# Patient Record
Sex: MALE | Race: AMERICAN INDIAN OR ALASKA NATIVE | NOT HISPANIC OR LATINO | URBAN - METROPOLITAN AREA
[De-identification: names, ages, dates, MRNs, and addresses within clinical notes are randomized per-mention and may not be internally consistent; named-entity substitution may affect disease eponyms.]

---

## 2017-08-22 ENCOUNTER — INPATIENT (INPATIENT)
Facility: HOSPITAL | Age: 62
LOS: 2 days | Discharge: HOME | DRG: 918 | End: 2017-08-25
Attending: FAMILY MEDICINE | Admitting: INTERNAL MEDICINE
Payer: MEDICAID

## 2017-08-22 VITALS
RESPIRATION RATE: 15 BRPM | WEIGHT: 149.91 LBS | SYSTOLIC BLOOD PRESSURE: 120 MMHG | TEMPERATURE: 99 F | HEART RATE: 86 BPM | DIASTOLIC BLOOD PRESSURE: 76 MMHG | OXYGEN SATURATION: 97 %

## 2017-08-22 DIAGNOSIS — T45.511A POISONING BY ANTICOAGULANTS, ACCIDENTAL (UNINTENTIONAL), INITIAL ENCOUNTER: ICD-10-CM

## 2017-08-22 DIAGNOSIS — J39.2 OTHER DISEASES OF PHARYNX: ICD-10-CM

## 2017-08-22 DIAGNOSIS — Z95.1 PRESENCE OF AORTOCORONARY BYPASS GRAFT: Chronic | ICD-10-CM

## 2017-08-22 DIAGNOSIS — I63.9 CEREBRAL INFARCTION, UNSPECIFIED: ICD-10-CM

## 2017-08-22 DIAGNOSIS — E11.9 TYPE 2 DIABETES MELLITUS WITHOUT COMPLICATIONS: ICD-10-CM

## 2017-08-22 DIAGNOSIS — I49.9 CARDIAC ARRHYTHMIA, UNSPECIFIED: ICD-10-CM

## 2017-08-22 DIAGNOSIS — Z29.9 ENCOUNTER FOR PROPHYLACTIC MEASURES, UNSPECIFIED: ICD-10-CM

## 2017-08-22 DIAGNOSIS — I10 ESSENTIAL (PRIMARY) HYPERTENSION: ICD-10-CM

## 2017-08-22 DIAGNOSIS — Z98.890 OTHER SPECIFIED POSTPROCEDURAL STATES: Chronic | ICD-10-CM

## 2017-08-22 DIAGNOSIS — T14.8 OTHER INJURY OF UNSPECIFIED BODY REGION: ICD-10-CM

## 2017-08-22 LAB
ALBUMIN SERPL ELPH-MCNC: 2 G/DL — LOW (ref 3.3–5)
ALP SERPL-CCNC: 653 U/L — HIGH (ref 40–120)
ALT FLD-CCNC: 88 U/L — HIGH (ref 12–78)
ANION GAP SERPL CALC-SCNC: 9 MMOL/L — SIGNIFICANT CHANGE UP (ref 5–17)
APTT BLD: 100.6 SEC — HIGH (ref 27.5–37.4)
APTT BLD: >200 SEC — CRITICAL HIGH (ref 27.5–37.4)
AST SERPL-CCNC: 114 U/L — HIGH (ref 15–37)
BASOPHILS # BLD AUTO: 0.1 K/UL — SIGNIFICANT CHANGE UP (ref 0–0.2)
BASOPHILS NFR BLD AUTO: 0.6 % — SIGNIFICANT CHANGE UP (ref 0–2)
BILIRUB SERPL-MCNC: 1.4 MG/DL — HIGH (ref 0.2–1.2)
BLD GP AB SCN SERPL QL: SIGNIFICANT CHANGE UP
BUN SERPL-MCNC: 10 MG/DL — SIGNIFICANT CHANGE UP (ref 7–23)
CALCIUM SERPL-MCNC: 8 MG/DL — LOW (ref 8.5–10.1)
CHLORIDE SERPL-SCNC: 96 MMOL/L — SIGNIFICANT CHANGE UP (ref 96–108)
CO2 SERPL-SCNC: 27 MMOL/L — SIGNIFICANT CHANGE UP (ref 22–31)
CREAT SERPL-MCNC: 0.55 MG/DL — SIGNIFICANT CHANGE UP (ref 0.5–1.3)
EOSINOPHIL # BLD AUTO: 0.2 K/UL — SIGNIFICANT CHANGE UP (ref 0–0.5)
EOSINOPHIL NFR BLD AUTO: 1.2 % — SIGNIFICANT CHANGE UP (ref 0–6)
FIBRINOGEN PPP-MCNC: 529 MG/DL — HIGH (ref 310–510)
GLUCOSE SERPL-MCNC: 200 MG/DL — HIGH (ref 70–99)
HCT VFR BLD CALC: 35.4 % — LOW (ref 39–50)
HGB BLD-MCNC: 11.8 G/DL — LOW (ref 13–17)
INR BLD: 7.44 RATIO — CRITICAL HIGH (ref 0.88–1.16)
INR BLD: >15 RATIO (ref 0.88–1.16)
LYMPHOCYTES # BLD AUTO: 13.2 % — SIGNIFICANT CHANGE UP (ref 13–44)
LYMPHOCYTES # BLD AUTO: 2.2 K/UL — SIGNIFICANT CHANGE UP (ref 1–3.3)
MCHC RBC-ENTMCNC: 29.4 PG — SIGNIFICANT CHANGE UP (ref 27–34)
MCHC RBC-ENTMCNC: 33.3 GM/DL — SIGNIFICANT CHANGE UP (ref 32–36)
MCV RBC AUTO: 88.4 FL — SIGNIFICANT CHANGE UP (ref 80–100)
MONOCYTES # BLD AUTO: 1.4 K/UL — HIGH (ref 0–0.9)
MONOCYTES NFR BLD AUTO: 8.2 % — SIGNIFICANT CHANGE UP (ref 1–9)
NEUTROPHILS # BLD AUTO: 12.6 K/UL — HIGH (ref 1.8–7.4)
NEUTROPHILS NFR BLD AUTO: 76.7 % — SIGNIFICANT CHANGE UP (ref 43–77)
PLATELET # BLD AUTO: 318 K/UL — SIGNIFICANT CHANGE UP (ref 150–400)
POTASSIUM SERPL-MCNC: 3.9 MMOL/L — SIGNIFICANT CHANGE UP (ref 3.5–5.3)
POTASSIUM SERPL-SCNC: 3.9 MMOL/L — SIGNIFICANT CHANGE UP (ref 3.5–5.3)
PROT SERPL-MCNC: 6.9 G/DL — SIGNIFICANT CHANGE UP (ref 6–8.3)
PROTHROM AB SERPL-ACNC: 84.5 SEC — HIGH (ref 9.8–12.7)
PROTHROM AB SERPL-ACNC: > 200 SEC (ref 9.8–12.7)
RBC # BLD: 4 M/UL — LOW (ref 4.2–5.8)
RBC # FLD: 15.4 % — HIGH (ref 10.3–14.5)
SODIUM SERPL-SCNC: 132 MMOL/L — LOW (ref 135–145)
WBC # BLD: 16.4 K/UL — HIGH (ref 3.8–10.5)
WBC # FLD AUTO: 16.4 K/UL — HIGH (ref 3.8–10.5)

## 2017-08-22 PROCEDURE — 99223 1ST HOSP IP/OBS HIGH 75: CPT | Mod: AI,GC

## 2017-08-22 PROCEDURE — 71010: CPT | Mod: 26

## 2017-08-22 PROCEDURE — 70490 CT SOFT TISSUE NECK W/O DYE: CPT | Mod: 26

## 2017-08-22 PROCEDURE — 99291 CRITICAL CARE FIRST HOUR: CPT | Mod: 25

## 2017-08-22 PROCEDURE — 93010 ELECTROCARDIOGRAM REPORT: CPT

## 2017-08-22 PROCEDURE — 99285 EMERGENCY DEPT VISIT HI MDM: CPT

## 2017-08-22 PROCEDURE — 99223 1ST HOSP IP/OBS HIGH 75: CPT

## 2017-08-22 RX ORDER — LEVETIRACETAM 250 MG/1
750 TABLET, FILM COATED ORAL EVERY 12 HOURS
Qty: 0 | Refills: 0 | Status: DISCONTINUED | OUTPATIENT
Start: 2017-08-22 | End: 2017-08-25

## 2017-08-22 RX ORDER — ATORVASTATIN CALCIUM 80 MG/1
0 TABLET, FILM COATED ORAL
Qty: 0 | Refills: 0 | COMMUNITY

## 2017-08-22 RX ORDER — LEVETIRACETAM 250 MG/1
0 TABLET, FILM COATED ORAL
Qty: 0 | Refills: 0 | COMMUNITY

## 2017-08-22 RX ORDER — AMIODARONE HYDROCHLORIDE 400 MG/1
0 TABLET ORAL
Qty: 0 | Refills: 0 | COMMUNITY

## 2017-08-22 RX ORDER — FAMOTIDINE 10 MG/ML
20 INJECTION INTRAVENOUS
Qty: 0 | Refills: 0 | Status: DISCONTINUED | OUTPATIENT
Start: 2017-08-22 | End: 2017-08-23

## 2017-08-22 RX ORDER — RANITIDINE HYDROCHLORIDE 150 MG/1
0 TABLET, FILM COATED ORAL
Qty: 0 | Refills: 0 | COMMUNITY

## 2017-08-22 RX ORDER — PHYTONADIONE (VIT K1) 5 MG
10 TABLET ORAL ONCE
Qty: 0 | Refills: 0 | Status: COMPLETED | OUTPATIENT
Start: 2017-08-22 | End: 2017-08-22

## 2017-08-22 RX ORDER — LEVETIRACETAM 250 MG/1
750 TABLET, FILM COATED ORAL
Qty: 0 | Refills: 0 | Status: DISCONTINUED | OUTPATIENT
Start: 2017-08-22 | End: 2017-08-22

## 2017-08-22 RX ORDER — SODIUM CHLORIDE 9 MG/ML
3 INJECTION INTRAMUSCULAR; INTRAVENOUS; SUBCUTANEOUS ONCE
Qty: 0 | Refills: 0 | Status: COMPLETED | OUTPATIENT
Start: 2017-08-22 | End: 2017-08-22

## 2017-08-22 RX ORDER — WARFARIN SODIUM 2.5 MG/1
0 TABLET ORAL
Qty: 0 | Refills: 0 | COMMUNITY

## 2017-08-22 RX ORDER — INFLUENZA VIRUS VACCINE 15; 15; 15; 15 UG/.5ML; UG/.5ML; UG/.5ML; UG/.5ML
0.5 SUSPENSION INTRAMUSCULAR ONCE
Qty: 0 | Refills: 0 | Status: DISCONTINUED | OUTPATIENT
Start: 2017-08-22 | End: 2017-08-22

## 2017-08-22 RX ORDER — ASPIRIN/CALCIUM CARB/MAGNESIUM 324 MG
0 TABLET ORAL
Qty: 0 | Refills: 0 | COMMUNITY

## 2017-08-22 RX ORDER — GLIMEPIRIDE 1 MG
0 TABLET ORAL
Qty: 0 | Refills: 0 | COMMUNITY

## 2017-08-22 RX ADMIN — LEVETIRACETAM 400 MILLIGRAM(S): 250 TABLET, FILM COATED ORAL at 20:29

## 2017-08-22 RX ADMIN — Medication 102 MILLIGRAM(S): at 18:19

## 2017-08-22 RX ADMIN — SODIUM CHLORIDE 3 MILLILITER(S): 9 INJECTION INTRAMUSCULAR; INTRAVENOUS; SUBCUTANEOUS at 15:00

## 2017-08-22 RX ADMIN — FAMOTIDINE 20 MILLIGRAM(S): 10 INJECTION INTRAVENOUS at 17:22

## 2017-08-22 NOTE — CONSULT NOTE ADULT - SUBJECTIVE AND OBJECTIVE BOX
CC: Mouth discomfort  HPI: 62 year old male with multiple medical problems. He has a mechanical heart valve and is on Coumadin. He woke up this AM with difficult speaking. He was sent to the ER for evaluation and discovered he had a FOM hematoma. He denies any hoarseness, he has some mild dysphagia but can tolerate his secretions. He feels better since the morning.       Allergies: NKDA  Meds:  Cardiac medications, coumadin  PMH: CVA, Arrhymia, TIA  PSH: Heart Valve    ROS: all other systems status quo    SH: no smoke    PE: AOx3, Penjabi speaking male, NAD  EARS: TM intact, ME spaces well aerated  OC/OP: FOM hematoma, small and compressible  able to see the posterior pharynx, tongue mobile, no trismus    Neck: supple, trach scar    Flexible Laryngoscopy [id number]    After a verbal consent was obtained, benzocaine was placed intranasally. A flexible laryngoscope was placed into the left nasal cavity. Nasopharynx was clear. He has some mild hematoma of the base of tongue. He had a normal supraglottis, bilateral vocal fold movement. He has some secretions in the pyriform sinuses.       A/P: FOM Hematoma, dysphagia    1. control INR since greater than 15.   2. will follow  3. cont to monitor in ICU.

## 2017-08-22 NOTE — H&P ADULT - NSHPSOCIALHISTORY_GEN_ALL_CORE
SOCIAL HISTORY:  Smoker: [ ] Yes  [ x] No          ETOH use: [ ] Yes  [ x] No                Ilicit Drug use:  [ ] Yes  x[ ] No  Occupation: none  Live with: Family, Kids  Assist device use: ambulated with family members,

## 2017-08-22 NOTE — ED PROVIDER NOTE - OBJECTIVE STATEMENT
63 yo M p/w noticed swelling in mouth today, went to PMD. Pt sent for eval. No weak / dizzy, no bleeding difficulty. no recent fall / trauma. No neck / back pain. no fever/chills. No agg/allev factors. No diff swallowing saliva. Pt with some diff eating today. No other co.  Unk last known INR, last one was not resulted.

## 2017-08-22 NOTE — CONSULT NOTE ADULT - SUBJECTIVE AND OBJECTIVE BOX
[facility]    Rahcel Carranza, Joshua, Urbano, Maximo, Cornelio, Kalli      [phone]    CHIEF COMPLAINT: Patient is a 62y old  Male who presents with a chief complaint of swelling under tongue.     HPI: 62 year old M with PMHx of HTN, DMTypeII, A.fib on Coumadin, CABG (2007), AS s/p AVR, CVA with right hemiparesis S/P craniectomy (July 2015) who presented with swelling/hematoma under tongue. History was taken by daughter at bedside as patient speaks Rastafarian and patient has aphasia. Pt's daughter states that son was feeding his father breakfast and noticed he had swelling with bleeding under his tongue. Daughter also noticed that pt had bleeding under his fingernails. Additionally, she states that he was c/o L. shoulder pain x2 days. Otherwise he denied fevers, chills, difficulty eating, abdominal pain, sob.     Cardio: Patient sees Dr. Yuri Werner at [facility].     PAST MEDICAL HISTORY  Arrhythmia    CVA (cerebral vascular accident)    DM (diabetes mellitus)    HTN (hypertension).    PAST SURGICAL HISTORY  craniectomy 2015  CABG 2007    MEDICATIONS   Amiodarone 200mg PO   ASA 81mg  Atorvastatin 20mg  Keppra 20mg  Ranitidine 150mg  Warfarin 3mg   Enalapril 2.5mg qd  Glimepiride 1mg PO qd    ALLERGIES  nkda    FAMILY HISTORY  Non-contributory    SOCIAL HISTORY  Denies smoking, EtOH use       MEDICATIONS  (STANDING):  levETIRAcetam 750 milliGRAM(s) Oral two times a day  famotidine    Tablet 20 milliGRAM(s) Oral two times a day  phytonadione  IVPB 10 milliGRAM(s) IV Intermittent once        REVIEW OF SYSTEMS:  eye, ent, GI, , allergic, dermatologic, musculoskeletal and neurologic are negative except as described above    Vital Signs Last 24 Hrs  T(C): 36.7 (22 Aug 2017 17:24), Max: 37.1 (22 Aug 2017 14:30)  T(F): 98 (22 Aug 2017 17:24), Max: 98.8 (22 Aug 2017 14:30)  HR: 80 (22 Aug 2017 17:24) (80 - 88)  BP: 121/74 (22 Aug 2017 17:24) (114/76 - 121/74)  BP(mean): --  RR: 16 (22 Aug 2017 17:24) (15 - 16)  SpO2: 100% (22 Aug 2017 17:24) (97% - 100%)    I&O's Summary      PHYSICAL EXAM:    Constitutional: Pt. seen lying comfortably in bed in no respiratory distress, NAD   HEENT:  Left sided craniectomy, MMM, sclerae anicteric, conjunctivae clear. Pt did not want to open mouth.   Pulmonary: Non-labored, breath sounds are clear bilaterally, No wheezing, rales or rhonchi  Cardiovascular: Regular, S1 and S2, No murmurs, rubs, gallops or clicks  Gastrointestinal: Bowel Sounds present, soft, nontender.   Lymph: No peripheral edema. No lymphadenopathy.  Neurological: Alert, right sided hemiparesis   Skin: No rashes.  Psych:  Mood & affect appropriate    LABS: All Labs Reviewed:                        11.8   16.4  )-----------( 318      ( 22 Aug 2017 16:08 )             35.4     22 Aug 2017 16:08    132    |  96     |  10     ----------------------------<  200    3.9     |  27     |  0.55     Ca    8.0        22 Aug 2017 16:08    TPro  6.9    /  Alb  2.0    /  TBili  1.4    /  DBili  x      /  AST  114    /  ALT  88     /  AlkPhos  653    22 Aug 2017 16:08    PT/INR - ( 22 Aug 2017 16:08 )   PT: > 200 sec;   INR: >15 ratio         PTT - ( 22 Aug 2017 16:08 )  PTT:>200.0 sec      Blood Culture:         RADIOLOGY:  CT NECK SOFT TISSUE                        PROCEDURE DATE:  08/22/2017    INTERPRETATION:  History: Blood clot in mouth, tongue.  Noncontrast CT soft tissues of the neck.  Inherently limited by lack of exogenous contrast.  There is asymmetric increased soft tissue density at the base of the   tongue on the right. This may be inflammatory or neoplastic in nature.   Follow-up with contrast-enhanced CT or MR and direct visualization. No   suspicious adenopathy by CT size criteria. No unusual fluid or gas   collections. The parotid and submandibular glands appear unremarkable.   Thyroid unremarkable. Cervical airway patent.  Incidental emphysematous changes in the visualized lung apices and a 1 cm   indeterminate left apical lung nodule. Follow-up with PET/CT of the chest.  No bony destructive lesions.    Impression:    Limited by lack of IV contrast.  Increased soft tissue density at the base of the tongue on the right,   inflammatory or neoplastic. Recommend contrast-enhanced CT or MR and   direct visualization  Indeterminate left upper lobe parenchymal nodule. Recommend PET/CT.    CHEST 1 VIEW                        PROCEDURE DATE:  08/22/2017    INTERPRETATION:  History: Mild swelling.    AP chest.    Status post median sternotomy cardiac valve surgery. Cardiac silhouette   magnified by AP film shallow inspiration. Bibasilar atelectasis. No   definite focal infiltrate or pleural effusion.    Impression: As above          EKG: [facility]    Rachel Carranza, Joshua, Urbano, Maximo, Cornelio, Kalli      [phone]    CHIEF COMPLAINT: Patient is a 62y old  Male who presents with a chief complaint of swelling under tongue.     HPI: 62 year old M with PMHx of HTN, DMTypeII, PAF on Coumadin, CABG (2007), AS s/p AVR (2007), CVA with right hemiparesis S/P craniectomy (July 2015) who presented with swelling/hematoma under tongue. History was taken by daughter at bedside as patient speaks Church and patient has aphasia. Pt's daughter states that son was feeding his father breakfast and noticed he had swelling with bleeding under his tongue. Daughter also noticed that pt had bleeding under his fingernails. Additionally, she states that he was c/o L. shoulder pain x2 days. Otherwise he denied fevers, chills, difficulty eating, abdominal pain, sob.     Cardio: Patient sees Dr. Yuri Werner at [facility].     PAST MEDICAL HISTORY  Arrhythmia    CVA (cerebral vascular accident)    DM (diabetes mellitus)    HTN (hypertension).    PAST SURGICAL HISTORY  craniectomy 2015  CABG 2007    MEDICATIONS   Amiodarone 200mg PO   ASA 81mg  Atorvastatin 20mg  Keppra 20mg  Ranitidine 150mg  Warfarin 3mg   Enalapril 2.5mg qd  Glimepiride 1mg PO qd    ALLERGIES  nkda    FAMILY HISTORY  Non-contributory    SOCIAL HISTORY  Denies smoking, EtOH use       MEDICATIONS  (STANDING):  levETIRAcetam 750 milliGRAM(s) Oral two times a day  famotidine    Tablet 20 milliGRAM(s) Oral two times a day  phytonadione  IVPB 10 milliGRAM(s) IV Intermittent once        REVIEW OF SYSTEMS:  eye, ent, GI, , allergic, dermatologic, musculoskeletal and neurologic are negative except as described above    Vital Signs Last 24 Hrs  T(C): 36.7 (22 Aug 2017 17:24), Max: 37.1 (22 Aug 2017 14:30)  T(F): 98 (22 Aug 2017 17:24), Max: 98.8 (22 Aug 2017 14:30)  HR: 80 (22 Aug 2017 17:24) (80 - 88)  BP: 121/74 (22 Aug 2017 17:24) (114/76 - 121/74)  BP(mean): --  RR: 16 (22 Aug 2017 17:24) (15 - 16)  SpO2: 100% (22 Aug 2017 17:24) (97% - 100%)    I&O's Summary      PHYSICAL EXAM:    Constitutional: Pt. seen lying comfortably in bed in no respiratory distress, NAD   HEENT:  Left sided craniectomy, MMM, sclerae anicteric, conjunctivae clear. + hematoma under tongue.   Pulmonary: Non-labored, breath sounds are clear bilaterally, No wheezing, rales or rhonchi. No obstruction of airway.   Cardiovascular: Regular, S1 and S2, No murmurs, rubs, gallops or clicks  Gastrointestinal: Bowel Sounds present, soft, nontender.   Lymph: No peripheral edema. No lymphadenopathy.  Neurological: Alert, right sided hemiparesis   Skin: Bleeding under finger nails noted  Psych:  Mood & affect appropriate    LABS: All Labs Reviewed:                        11.8   16.4  )-----------( 318      ( 22 Aug 2017 16:08 )             35.4     22 Aug 2017 16:08    132    |  96     |  10     ----------------------------<  200    3.9     |  27     |  0.55     Ca    8.0        22 Aug 2017 16:08    TPro  6.9    /  Alb  2.0    /  TBili  1.4    /  DBili  x      /  AST  114    /  ALT  88     /  AlkPhos  653    22 Aug 2017 16:08    PT/INR - ( 22 Aug 2017 16:08 )   PT: > 200 sec;   INR: >15 ratio         PTT - ( 22 Aug 2017 16:08 )  PTT:>200.0 sec      Blood Culture: n/a        RADIOLOGY:  CT NECK SOFT TISSUE                        PROCEDURE DATE:  08/22/2017    INTERPRETATION:  History: Blood clot in mouth, tongue.  Noncontrast CT soft tissues of the neck.  Inherently limited by lack of exogenous contrast.  There is asymmetric increased soft tissue density at the base of the   tongue on the right. This may be inflammatory or neoplastic in nature.   Follow-up with contrast-enhanced CT or MR and direct visualization. No   suspicious adenopathy by CT size criteria. No unusual fluid or gas   collections. The parotid and submandibular glands appear unremarkable.   Thyroid unremarkable. Cervical airway patent.  Incidental emphysematous changes in the visualized lung apices and a 1 cm   indeterminate left apical lung nodule. Follow-up with PET/CT of the chest.  No bony destructive lesions.    Impression:    Limited by lack of IV contrast.  Increased soft tissue density at the base of the tongue on the right,   inflammatory or neoplastic. Recommend contrast-enhanced CT or MR and   direct visualization  Indeterminate left upper lobe parenchymal nodule. Recommend PET/CT.    CHEST 1 VIEW                        PROCEDURE DATE:  08/22/2017    INTERPRETATION:  History: Mild swelling.    AP chest.    Status post median sternotomy cardiac valve surgery. Cardiac silhouette   magnified by AP film shallow inspiration. Bibasilar atelectasis. No   definite focal infiltrate or pleural effusion.    Impression: As above          EKG:  A.fib rate of 80's

## 2017-08-22 NOTE — H&P ADULT - PROBLEM SELECTOR PLAN 3
-care as per ICU -initial EKG shows Atrial Fibrillation   -Cardiology consulted  -care as per ICU -initial EKG shows Atrial Fibrillation   -Cardiology, Dr. Mahan consulted  -care as per ICU

## 2017-08-22 NOTE — ED PROVIDER NOTE - CARE PLAN
Principal Discharge DX:	Swelling of pharynx  Goal:	ro Bleeding from elev INR  Secondary Diagnosis:	Warfarin overdosage, accidental or unintentional, initial encounter

## 2017-08-22 NOTE — PATIENT PROFILE ADULT. - VISION (WITH CORRECTIVE LENSES IF THE PATIENT USUALLY WEARS THEM):
Partially impaired: cannot see medication labels or newsprint, but can see obstacles in path, and the surrounding layout; can count fingers at arm's length/uses glasses

## 2017-08-22 NOTE — ED PROVIDER NOTE - ENMT, MLM
Airway patent, Nasal mucosa clear. Mouth with normal mucosa. Throat has no vesicles, no oropharyngeal exudates. Pos large blood clot under tongue with elevation of tongue to , almost roof of mouth. Mod swelling. Unable to visual ize post pharynx.  MM Moist.  pos

## 2017-08-22 NOTE — CONSULT NOTE ADULT - SUBJECTIVE AND OBJECTIVE BOX
Patient is a 62y old  Male who presents with a chief complaint of bleeding under the tongue.     HPI: 63 y/o male with hx CAD, CABG, mechanical AVR (on warfarin), CVA, seizure disorder with right hemiparesis came in for bleeding under the tongue, difficulty speaking and eating. Denies any trauma, seizure activity. Previous INR is unknown. He was found to have an INR of >15 in the ED, received Vit K 10 mg.     Allergies: No Known Allergies    PAST MEDICAL & SURGICAL HISTORY:  Arrhythmia  DM (diabetes mellitus)  HTN (hypertension)  CVA (cerebral vascular accident)  S/P brain surgery  S/P CABG (coronary artery bypass graft)    HOME MEDICATIONS  amiodarone  aspirin  warfarin  atorvastatin  keppra  ranitidine    REVIEW OF SYSTEMS  Constitutional: No fever, chills, fatigue  Neuro: No headache, numbness, weakness  Resp: No cough, wheezing, shortness of breath  CVS: No chest pain, palpitations, leg swelling  GI: No abdominal pain, nausea, vomiting, diarrhea   : No dysuria, frequency, incontinence  Skin: No itching, burning, rashes, or lesions   Msk: No joint pain or swelling  Psych: No depression, anxiety, mood swings  Mouth: bleeding under the tongue    T(F): 98 (08-22-17 @ 17:24), Max: 98.8 (08-22-17 @ 14:30)  HR: 80 (08-22-17 @ 17:24) (80 - 88)  BP: 121/74 (08-22-17 @ 17:24) (114/76 - 121/74)  RR: 16 (08-22-17 @ 17:24) (15 - 16)  SpO2: 100% (08-22-17 @ 17:24) (97% - 100%)    PHYSICAL EXAM  General: NAD  CNS: AAO x 3, right sided weakness  HEENT: large blood clot under the tongue, tongue itself not swollen, no active bleeding, no stridor  Resp: clear b/l  CVS: S1S2, regular, metallic sound  Abd: soft, NT, +NS  Ext: no edema  Skin: warm    MEDICATIONS  levETIRAcetam Oral  famotidine    Tablet Oral                        11.8   16.4  )-----------( 318      ( 22 Aug 2017 16:08 )             35.4     08-22    132<L>  |  96  |  10  ----------------------------<  200<H>  3.9   |  27  |  0.55    Ca    8.0<L>      22 Aug 2017 16:08    TPro  6.9  /  Alb  2.0<L>  /  TBili  1.4<H>  /  DBili  x   /  AST  114<H>  /  ALT  88<H>  /  AlkPhos  653<H>  08-22    PT/INR - ( 22 Aug 2017 16:08 )   PT: > 200 sec;   INR: >15 ratio       PTT - ( 22 Aug 2017 16:08 )  PTT:>200.0 sec    CT Neck Soft Tissue No Cont (08.22.17 @ 15:46):   Limited by lack of IV contrast.  Increased soft tissue density at the base of the tongue on the right,   inflammatory or neoplastic. Recommend contrast-enhanced CT or MR and   direct visualization  Indeterminate left upper lobe parenchymal nodule. Recommend PET/CT.    CODE STATUS: full  GOC discussion: THAI

## 2017-08-22 NOTE — CONSULT NOTE ADULT - ATTENDING COMMENTS
Agree with above. Patient seen/examined at bedside.  Briefly, he is a 62 year old M HTN, DM2, A.fib on Coumadin, CABG (2007), severe AS s/p mechanical AVR, CVA with right hemiparesis S/P craniectomy (July 2015) who presented with swelling/hematoma under tongue, admitted for elevated INR of 15.   Recommend 10 mg po Vitamin K x 1; if bleeding, would seriously consider PCC and FFP.  Check INR q12hr  Transaminitis noted, would continue amio but watch carefully. Hold statin for now  No active ischemia or volume overload.

## 2017-08-22 NOTE — H&P ADULT - NSHPPHYSICALEXAM_GEN_ALL_CORE
General: Well developed, well nourished, NAD  HEENT: Left kwesi-craniectomy, EOMI, moist mucous membranes, large hematoma under tongue that eleavted the tongue, airway patent, unable to visualize posterior pharynx, no vesicles or exudates  Neurology: A&Ox3, R sided hemiplegia, ambulated with assistance    Respiratory: CTA B/L, No W/R/R  CV: RRR, +S1/S2, no murmurs,   Abdominal: Soft, mild diffusely tender, ND +BS  Extremities: Pitting edema 2+ right ankle, Bleeding in finger nail,     MSK: Normal ROM, no joint erythema or warmth, no joint swelling,   Skin: warm, dry, normal color, no rash or abnormal lesions General: Well developed, well nourished, NAD  HEENT: Left kwesi-craniectomy, EOMI, moist mucous membranes, large hematoma under tongue that elevated the tongue, airway patent, unable to visualize posterior pharynx, no vesicles or exudates  Neurology: A&Ox3, R sided hemiplegia, ambulated with assistance    Respiratory: CTA B/L, No W/R/R  CV: RRR, +S1/S2, no murmurs,   Abdominal: Soft, mild diffusely tender, ND +BS  Extremities: Pitting edema 2+ right ankle, Bleeding in finger nail,     MSK: Normal ROM, no joint erythema or warmth, no joint swelling,   Skin: warm, dry, normal color, no rash or abnormal lesions General: Well developed, well nourished, NAD  HEENT: Left kwesi-craniectomy, EOMI, moist mucous membranes, large hematoma under tongue that elevated the tongue, airway patent, unable to visualize posterior pharynx, no vesicles or exudates  Neurology: A&Ox3, R sided hemiplegia,   Respiratory: CTA B/L, No W/R/R  CV: RRR, +S1/S2, no murmurs,   Abdominal: Soft,  ND +BS  Extremities: Pitting edema 2+ right ankle,  MSK: Normal ROM, no joint erythema or warmth, no joint swelling,   Skin: warm, dry, normal color, no rash or abnormal lesions

## 2017-08-22 NOTE — ED ADULT NURSE NOTE - CHPI ED SYMPTOMS NEG
no shortness of breath/no chills/no body aches/no wheezing/no chest pain/no cough/no headache/no fever/no hemoptysis/no diaphoresis/no edema

## 2017-08-22 NOTE — H&P ADULT - PROBLEM SELECTOR PLAN 2
-Pt elevated PTT PT and INR >15, Pt has been taking 5mg of warfarin which was recently reduced to 3mg  -Pt has been given Phytonadione 10mg (Vit K)  for reversal of coumadin  -will hold off on FFP, recombinant factors  -Monitor F/U CBC, CMP, PT, PTT, INR  -Cardiology consulted  -care as per ICU -Pt elevated PTT PT and INR >15, Pt has been taking 5mg of warfarin which was recently reduced to 3mg  -Pt has been given Phytonadione 10mg (Vit K)  for reversal of coumadin  -will hold off on FFP, recombinant factors  -Monitor F/U CBC, CMP, PT, PTT, INR  -monitor for any bleeding  -Cardiology consulted  -care as per ICU -Pt elevated PTT PT and INR >15, Pt has been taking 5mg of warfarin which was recently reduced to 3mg  -Pt has been given Phytonadione 10mg (Vit K)  for reversal of coumadin  -Monitor F/U CBC, CMP, PT, PTT, INR  -monitor for any bleeding  -Cardiology consulted  -care as per ICU

## 2017-08-22 NOTE — H&P ADULT - PROBLEM SELECTOR PLAN 1
-Pt has sublingual hematoma with orophageal narrowing  -Pt is admitted to the ICU to protect the airway  -ENT has been consulted  -Monitor Pulse ox, BP control,  -Pt has been given 10 mg Vit K, NPO, Aspiration precautions  -care as per ICU -Pt has sublingual hematoma with orophageal narrowing  -Pt is admitted to the ICU to protect the airway  -ENT (Dr. Lezama) has been consulted  -Monitor Pulse ox, BP control,  -Pt has been given 10 mg Vit K, NPO, Aspiration precautions  -care as per ICU

## 2017-08-22 NOTE — H&P ADULT - PROBLEM SELECTOR PLAN 5
-continue to monitor Figer stick,   -hold PO diabetes meds  -sliding scale Insulin  -care as per ICU -continue to monitor accuchecks  -hold PO diabetes meds  -sliding scale Insulin  -care as per ICU

## 2017-08-22 NOTE — ED PROVIDER NOTE - CHPI ED SYMPTOMS NEG
no fever/no nausea/no loss of consciousness/no syncope/no chills/no vomiting/no numbness/no change in level of consciousness/no weakness/no blurred vision

## 2017-08-22 NOTE — H&P ADULT - HISTORY OF PRESENT ILLNESS
63 YO M with PMH of arrthymia, CVA (s/p L craniectomy in 2015), Right sided hemiplegia, DM, HTN, CAD (s/p CABG 2007) presented with swelling/blood clot in mouth today. Patient went to his PMD today for evaluation, noticed that there was a blood clot under his tongue and was asked to come to [place] ED.  Pt had some difficulty eating regular solid and liquid food which he normally eats daily. Pt had a  valve replaced in [place] a few months ago and was taking coumadin for it. Pt was taking about 5mg of coumadin and was found to be supra-therapeutic. The PMD lowered the dosage to 3 mg recently. Pt's missed his repeat INR check about 2 weeks ago. Pt visited the PMD today and was told to come to [place] ED. ROS is positive for left shoulder pain about 2 days ago, pain is exacerbated when he moves his arm above 90 degrees. Pt denies any weakness, dizziness, any recent bleeds, falls, head trauma, neck of back pain, fever or chills. History was attained from daughter and son at beside. 61 YO M with PMH of arrthymia, mechanical valve (on coumadin), CVA (s/p L craniectomy in 2015  right sided hemiplegia), DM, HTN, CAD (s/p CABG 2007) presented with swelling/blood clot in mouth for one day. Patient went to his PMD today for evaluation, noticed that there was a blood clot under his tongue and was asked to come to [place] ED.  Pt had some difficulty eating regular solid and liquid food which he normally eats daily. Pt had a  valve replaced in [place] a few months ago and was taking coumadin for it. Pt was taking about 5mg of coumadin and was found to be supra-therapeutic. The PMD lowered the dosage to 3 mg recently. Pt's missed his repeat INR check about 2 weeks ago. Pt visited the PMD today and was told to come to [place] ED. ROS is positive for left shoulder pain about 2 days ago, pain is exacerbated when he moves his arm above 90 degrees. Pt denies any weakness, dizziness, any recent bleeds, falls, head trauma, neck of back pain, fever or chills. History was attained from daughter and son at beside. 63 YO M with PMH of arrthymia, mechanical valve (on coumadin), CVA (s/p L craniectomy in 2015  right sided hemiplegia), DM, HTN, CAD (s/p CABG 2007) presented with swelling/blood clot in mouth for one day. Patient went to his PMD today for evaluation. PMD noticed that there was a blood clot under his tongue and was asked to come to [place] ED.  Pt had some difficulty eating his regular solid and liquid food which he normally eats daily. Pt had a  valve replaced in [place] a few months ago and was taking coumadin for it. Pt was taking about 5mg of coumadin and was found to be supra-therapeutic. The PMD lowered the dosage to 3 mg recently. Pt's missed his repeat INR check about 2 weeks ago. ROS is positive for left shoulder pain about 2 days ago, pain is exacerbated when he moves his arm above 90 degrees. Pt denies any weakness, dizziness, any recent bleeds, falls, head trauma, neck of back pain, fever or chills, chest pain, N/V/D/C, F/U/D. History was attained from daughter and son at beside. 63 YO M with PMH of arrthymia, mechanical valve (on coumadin), CVA (s/p L craniectomy in 2015  right sided hemiplegia), DM, HTN, CAD (s/p CABG 2007) presented with swelling/blood clot in mouth for one day. Patient went to his PMD today for evaluation. PMD noticed that there was a blood clot under his tongue and was asked to come to [place] ED.  Pt had some difficulty eating his regular solid and liquid food which he normally eats daily. Pt had a  valve replaced in [place] a few months ago and was taking coumadin for it. Pt was taking about 5mg of coumadin and was found to be supra-therapeutic. The PMD lowered the dosage to 3 mg recently. Pt's missed his repeat INR check about 2 weeks ago. ROS is positive for left shoulder pain about 2 days ago, pain is exacerbated when he moves his arm above 90 degrees. Pt denies any weakness, dizziness, any recent bleeds, falls, head trauma, neck of back pain, fever or chills, chest pain, N/V/D/C, F/U/D. History was attained from daughter and son at beside.     In the ED, T 98, /74, HR 80, RR 16, SPO2 100% on RA. WBC 16.4, Hgb 11.8, Hct 35.4, Platelet 318, PTT>200, PT>200, INR>15, Na 132, Glucose 200, Alk phos 653, , ALT 88  CT Neck: Increased soft tissue density at the base of the tongue on the right, inflammatory or neoplastic  CXR: Status post median sternotomy cardiac valve surgery. Cardiac silhouette magnified by AP film shallow inspiration. Bibasilar atelectasis.  EKG: A-fib, HR 77 (pending official read) 63 YO M with PMH of arrthymia, mechanical valve (on coumadin), CVA (s/p L craniectomy in 2015  right sided hemiplegia), DM, HTN, CAD (s/p CABG 2007) presented with swelling/blood clot in mouth for one day. Patient went to his PMD today for evaluation. PMD noticed that there was a blood clot under his tongue and was asked to come to [place] ED.  Pt had some difficulty eating his regular solid and liquid food which he normally eats daily. Pt had a  valve replaced in [place] in 2007 and was taking coumadin for it. Pt was taking about 5mg of coumadin and was found to be supra-therapeutic. The PMD lowered the dosage to 3 mg recently. Pt's missed his repeat INR check about 2 weeks ago. Pt denies , any recent bleeds, falls, head trauma, neck of back pain, fever or chills, chest pain, N/V/D/C, F/U/D. History was attained from daughter and son at beside.     In the ED, T 98, /74, HR 80, RR 16, SPO2 100% on RA. WBC 16.4, Hgb 11.8, Hct 35.4, Platelet 318, PTT>200, PT>200, INR>15, Na 132, Glucose 200, Alk phos 653, , ALT 88  CT Neck: Increased soft tissue density at the base of the tongue on the right, inflammatory or neoplastic  CXR: Status post median sternotomy cardiac valve surgery. Cardiac silhouette magnified by AP film shallow inspiration. Bibasilar atelectasis.  EKG: A-fib, HR 77 (pending official read)

## 2017-08-22 NOTE — CONSULT NOTE ADULT - ASSESSMENT
62 year old M with PMHx of HTN, DMTypeII, A.fib on Coumadin, CABG (2007), severe AS s/p mechanical AVR, CVA with right hemiparesis S/P craniectomy (July 2015) who presented with swelling/hematoma under tongue, admitted for  elevated INR of 15.     -INR 15 2/2 possible warfarin overdose. Recommend 10mg Vitamin K in ED. If continued bleeding, can consider giving FFP or Kcentra. Monitor PT/PTT/INR. Start Heparin once INR is at goal 2-2.5.   -EKG: A.fib, HR 80's. Continue Amiodarone  -HR stable in the 80's and BP is stable in 120/ 70's. Continue Enalapril with routine hemodynamic monitoring.   -Continue statin  -Last echo performed with outpatient cardiologist. LVEF 55%, mild concentric LV hypertrophy, mild to mod AR (7/24/17)   -will continue to follow 62 year old M with PMHx of HTN, DMTypeII, A.fib on Coumadin, CABG (2007), severe AS s/p mechanical AVR, CVA with right hemiparesis S/P craniectomy (July 2015) who presented with swelling/hematoma under tongue, admitted for  elevated INR of 15.     -INR of 15 2/2 warfarin overdose. Recommend 10mg Vitamin K in ED. If continued bleeding, can consider giving FFP or Kcentra. Monitor PT/PTT/INR. Start Heparin once INR is at goal 2-2.5.   -EKG: A.fib, HR 80's. Hold warfarin in setting of acute bleed. Continue Amiodarone.   -HR stable in the 80's and BP is stable in 120/ 70's. Continue Enalapril with routine hemodynamic monitoring.   -Continue statin  -Last echo performed with outpatient cardiologist. LVEF 55%, mild concentric LV hypertrophy, mild to mod AR (7/24/17)   -will continue to follow 62 year old M with PMHx of HTN, DMTypeII, A.fib on Coumadin, CABG (2007), severe AS s/p mechanical AVR, CVA with right hemiparesis S/P craniectomy (July 2015) who presented with swelling/hematoma under tongue, admitted for  elevated INR of 15.     -INR of 15 2/2 warfarin overdose. Recommend 10mg Vitamin K in ED. If continued bleeding, can consider giving FFP or Kcentra. Monitor PT/PTT/INR. Start Heparin once INR is at goal 2-2.5.   -EKG: A.fib, HR 80's. Hold warfarin in setting of acute bleed. Continue Amiodarone.   -Transaminitis, possibly related to amiodarone. Continue to monitor LFTs.   -HR stable in the 80's and BP is stable in 120/ 70's. Continue Enalapril with routine hemodynamic monitoring.   -Continue statin  -Last echo performed with outpatient cardiologist. LVEF 55%, mild concentric LV hypertrophy, mild to mod AR (7/24/17)   -will continue to follow

## 2017-08-22 NOTE — PATIENT PROFILE ADULT. - LANGUAGE ASSISTANCE NEEDED
No-Patient/Caregiver offered and refused free interpretation services./daughter staying with pt. pt unable to talk clearly

## 2017-08-22 NOTE — ED PROVIDER NOTE - PROGRESS NOTE DETAILS
Pt seen by Dr Grimes, ICU, and accepted. Pt doing well, no acute co. Dw Dr Barfield, accepts admit to Dr Hays. Dw Dr Grimes and Dr Mahan and Pharmacist, will give 10mg IV [place] K now

## 2017-08-22 NOTE — H&P ADULT - NSHPREVIEWOFSYSTEMS_GEN_ALL_CORE
REVIEW OF SYSTEMS:  CONSTITUTIONAL: No fever  ENMT:  Admits to tongue swelling and blood clot in mouth, No sinus or throat pain  RESPIRATORY: No cough, wheezing; No shortness of breath  CARDIOVASCULAR: No chest pain, palpitations, dizziness, or leg swelling  GASTROINTESTINAL: No abdominal or epigastric pain. No nausea, vomiting  NEUROLOGICAL: Admits to history of CVA with residual right hemiplegia  MUSCULOSKELETAL: No joint pain or swelling; No muscle, back, or extremity pain  ALLERGY AND IMMUNOLOGIC: No hives or eczema

## 2017-08-22 NOTE — H&P ADULT - PROBLEM SELECTOR PLAN 7
-currently supratherapeutic INR, -currently supratherapeutic INR. No need for chemical prophylaxis at this time.

## 2017-08-22 NOTE — H&P ADULT - ATTENDING COMMENTS
Pt is 62 year old male with h/o dm s/p cva  with rt hemiparesis , aortic mechanical valve on coumadin  admitted for supra therapeutic inr and sublingual hematoma   received vit k in er   watch for airway compromise ,   ent consult   cardiology evaluation   prognosis guarded

## 2017-08-22 NOTE — H&P ADULT - PROBLEM SELECTOR PLAN 4
-pt has a right sided hemiplegia, s/p left craniectomy in 2015, currently stable  -speech and swallow eval after resolution of hematoma and supratherapeutic INR  -care as per ICU

## 2017-08-22 NOTE — ED ADULT NURSE NOTE - OBJECTIVE STATEMENT
patient comes to ED with daughter who states patient has "a large blood clot" under his tongue daughter states patient went to bed last night without any problem and this AM awoke with this blood clot patient has large hematoma under tongue is on coumadin for artificial valve replacement 2 years ago states they have been "unable to get" an INR at doctors office

## 2017-08-22 NOTE — CONSULT NOTE ADULT - ATTENDING COMMENTS
62 M PMHx CAD, CABG, mechanical AVR (on warfarin), CVA, seizure disorder admitted with subglossal bleeding, coagulopathy (due to meds), transaminitis, hyperbilirubinemia, leucocytosis.     -neuro stable  -continue keppra  -hold aspirin  -hold warfarin, vitamin k 10 mg x 1  -repeat INR in am  -keep NPO pending swallow eval  -aspiration precautions  -ENT evaluation for oral bleeding  -check RUQ US, trend LFTs, hold amio, statin  -no obvious infection source, check UA, UCx, BCx, hold off on abx  -cc 36 mins 62 M PMHx CAD, CABG, mechanical AVR (on warfarin), CVA, seizure disorder admitted with subglossal bleeding, coagulopathy (due to meds), transaminitis, hyperbilirubinemia, leucocytosis.     -neuro stable  -continue keppra  -hold aspirin  -hold warfarin, vitamin k 10 mg x 1  -repeat INR in am  -monitor resp status, stable for now  -keep NPO pending swallow eval  -aspiration precautions  -ENT evaluation for oral bleeding  -check RUQ US, trend LFTs, hold amio, statin  -no obvious infection source, check UA, UCx, BCx, hold off on abx  -cc 36 mins

## 2017-08-22 NOTE — H&P ADULT - ASSESSMENT
63 YO M with PMH of arrthymia, mechanical valve (on coumadin), CVA (s/p L craniectomy in 2015  right sided hemiplegia), DM, HTN, CAD (s/p CABG 2007) presented with swelling and sublingual hematoma and admitted for potential airway compromise. 61 YO M with PMH of arrthymia, mechanical valve (on coumadin), CVA (s/p L craniectomy in 2015  right sided hemiplegia), DM, HTN, CAD (s/p CABG 2007) presented with swelling and sublingual hematoma and admitted for potential airway compromise., supra therapeutic inr

## 2017-08-23 LAB
ALBUMIN SERPL ELPH-MCNC: 1.7 G/DL — LOW (ref 3.3–5)
ALP SERPL-CCNC: 521 U/L — HIGH (ref 40–120)
ALT FLD-CCNC: 75 U/L — SIGNIFICANT CHANGE UP (ref 12–78)
ANION GAP SERPL CALC-SCNC: 6 MMOL/L — SIGNIFICANT CHANGE UP (ref 5–17)
APPEARANCE UR: ABNORMAL
APTT BLD: 51.4 SEC — HIGH (ref 27.5–37.4)
AST SERPL-CCNC: 106 U/L — HIGH (ref 15–37)
BASOPHILS # BLD AUTO: 0.2 K/UL — SIGNIFICANT CHANGE UP (ref 0–0.2)
BASOPHILS NFR BLD AUTO: 0.9 % — SIGNIFICANT CHANGE UP (ref 0–2)
BILIRUB SERPL-MCNC: 2.3 MG/DL — HIGH (ref 0.2–1.2)
BILIRUB UR-MCNC: NEGATIVE — SIGNIFICANT CHANGE UP
BUN SERPL-MCNC: 9 MG/DL — SIGNIFICANT CHANGE UP (ref 7–23)
CA-I BLD-SCNC: 1.14 MMOL/L — SIGNIFICANT CHANGE UP (ref 1.12–1.3)
CALCIUM SERPL-MCNC: 7.8 MG/DL — LOW (ref 8.5–10.1)
CHLORIDE SERPL-SCNC: 99 MMOL/L — SIGNIFICANT CHANGE UP (ref 96–108)
CK SERPL-CCNC: 38 U/L — SIGNIFICANT CHANGE UP (ref 26–308)
CO2 SERPL-SCNC: 29 MMOL/L — SIGNIFICANT CHANGE UP (ref 22–31)
COLOR SPEC: YELLOW — SIGNIFICANT CHANGE UP
CREAT SERPL-MCNC: 0.47 MG/DL — LOW (ref 0.5–1.3)
DIFF PNL FLD: ABNORMAL
EOSINOPHIL # BLD AUTO: 0.2 K/UL — SIGNIFICANT CHANGE UP (ref 0–0.5)
EOSINOPHIL NFR BLD AUTO: 1.4 % — SIGNIFICANT CHANGE UP (ref 0–6)
GLUCOSE SERPL-MCNC: 135 MG/DL — HIGH (ref 70–99)
GLUCOSE UR QL: NEGATIVE — SIGNIFICANT CHANGE UP
HCT VFR BLD CALC: 33.1 % — LOW (ref 39–50)
HGB BLD-MCNC: 10.9 G/DL — LOW (ref 13–17)
INR BLD: 1.95 RATIO — HIGH (ref 0.88–1.16)
KETONES UR-MCNC: NEGATIVE — SIGNIFICANT CHANGE UP
LEUKOCYTE ESTERASE UR-ACNC: NEGATIVE — SIGNIFICANT CHANGE UP
LYMPHOCYTES # BLD AUTO: 11.7 % — LOW (ref 13–44)
LYMPHOCYTES # BLD AUTO: 2 K/UL — SIGNIFICANT CHANGE UP (ref 1–3.3)
MAGNESIUM SERPL-MCNC: 1.6 MG/DL — SIGNIFICANT CHANGE UP (ref 1.6–2.6)
MCHC RBC-ENTMCNC: 29.2 PG — SIGNIFICANT CHANGE UP (ref 27–34)
MCHC RBC-ENTMCNC: 32.9 GM/DL — SIGNIFICANT CHANGE UP (ref 32–36)
MCV RBC AUTO: 88.6 FL — SIGNIFICANT CHANGE UP (ref 80–100)
MONOCYTES # BLD AUTO: 1.5 K/UL — HIGH (ref 0–0.9)
MONOCYTES NFR BLD AUTO: 8.5 % — SIGNIFICANT CHANGE UP (ref 1–9)
NEUTROPHILS # BLD AUTO: 13.6 K/UL — HIGH (ref 1.8–7.4)
NEUTROPHILS NFR BLD AUTO: 77.6 % — HIGH (ref 43–77)
NITRITE UR-MCNC: NEGATIVE — SIGNIFICANT CHANGE UP
PH UR: 7 — SIGNIFICANT CHANGE UP (ref 5–8)
PHOSPHATE SERPL-MCNC: 2.9 MG/DL — SIGNIFICANT CHANGE UP (ref 2.5–4.5)
PLATELET # BLD AUTO: 283 K/UL — SIGNIFICANT CHANGE UP (ref 150–400)
POTASSIUM SERPL-MCNC: 3.8 MMOL/L — SIGNIFICANT CHANGE UP (ref 3.5–5.3)
POTASSIUM SERPL-SCNC: 3.8 MMOL/L — SIGNIFICANT CHANGE UP (ref 3.5–5.3)
PROT SERPL-MCNC: 6.2 G/DL — SIGNIFICANT CHANGE UP (ref 6–8.3)
PROT UR-MCNC: 25 MG/DL
PROTHROM AB SERPL-ACNC: 21.6 SEC — HIGH (ref 9.8–12.7)
RBC # BLD: 3.73 M/UL — LOW (ref 4.2–5.8)
RBC # FLD: 16 % — HIGH (ref 10.3–14.5)
SODIUM SERPL-SCNC: 134 MMOL/L — LOW (ref 135–145)
SP GR SPEC: 1 — LOW (ref 1.01–1.02)
UROBILINOGEN FLD QL: NEGATIVE — SIGNIFICANT CHANGE UP
WBC # BLD: 17.5 K/UL — HIGH (ref 3.8–10.5)
WBC # FLD AUTO: 17.5 K/UL — HIGH (ref 3.8–10.5)

## 2017-08-23 PROCEDURE — 99233 SBSQ HOSP IP/OBS HIGH 50: CPT

## 2017-08-23 PROCEDURE — 76705 ECHO EXAM OF ABDOMEN: CPT | Mod: 26

## 2017-08-23 PROCEDURE — 99291 CRITICAL CARE FIRST HOUR: CPT

## 2017-08-23 RX ORDER — DEXTROSE 50 % IN WATER 50 %
25 SYRINGE (ML) INTRAVENOUS ONCE
Qty: 0 | Refills: 0 | Status: DISCONTINUED | OUTPATIENT
Start: 2017-08-23 | End: 2017-08-25

## 2017-08-23 RX ORDER — INSULIN LISPRO 100/ML
VIAL (ML) SUBCUTANEOUS
Qty: 0 | Refills: 0 | Status: DISCONTINUED | OUTPATIENT
Start: 2017-08-23 | End: 2017-08-25

## 2017-08-23 RX ORDER — SODIUM CHLORIDE 9 MG/ML
1000 INJECTION, SOLUTION INTRAVENOUS
Qty: 0 | Refills: 0 | Status: DISCONTINUED | OUTPATIENT
Start: 2017-08-23 | End: 2017-08-25

## 2017-08-23 RX ORDER — HEPARIN SODIUM 5000 [USP'U]/ML
5500 INJECTION INTRAVENOUS; SUBCUTANEOUS ONCE
Qty: 0 | Refills: 0 | Status: COMPLETED | OUTPATIENT
Start: 2017-08-23 | End: 2017-08-23

## 2017-08-23 RX ORDER — HEPARIN SODIUM 5000 [USP'U]/ML
5500 INJECTION INTRAVENOUS; SUBCUTANEOUS EVERY 6 HOURS
Qty: 0 | Refills: 0 | Status: DISCONTINUED | OUTPATIENT
Start: 2017-08-23 | End: 2017-08-25

## 2017-08-23 RX ORDER — HEPARIN SODIUM 5000 [USP'U]/ML
INJECTION INTRAVENOUS; SUBCUTANEOUS
Qty: 25000 | Refills: 0 | Status: DISCONTINUED | OUTPATIENT
Start: 2017-08-23 | End: 2017-08-25

## 2017-08-23 RX ORDER — FAMOTIDINE 10 MG/ML
20 INJECTION INTRAVENOUS DAILY
Qty: 0 | Refills: 0 | Status: DISCONTINUED | OUTPATIENT
Start: 2017-08-23 | End: 2017-08-25

## 2017-08-23 RX ORDER — DEXTROSE 50 % IN WATER 50 %
1 SYRINGE (ML) INTRAVENOUS ONCE
Qty: 0 | Refills: 0 | Status: DISCONTINUED | OUTPATIENT
Start: 2017-08-23 | End: 2017-08-25

## 2017-08-23 RX ORDER — GLUCAGON INJECTION, SOLUTION 0.5 MG/.1ML
1 INJECTION, SOLUTION SUBCUTANEOUS ONCE
Qty: 0 | Refills: 0 | Status: DISCONTINUED | OUTPATIENT
Start: 2017-08-23 | End: 2017-08-25

## 2017-08-23 RX ORDER — DEXTROSE 50 % IN WATER 50 %
12.5 SYRINGE (ML) INTRAVENOUS ONCE
Qty: 0 | Refills: 0 | Status: DISCONTINUED | OUTPATIENT
Start: 2017-08-23 | End: 2017-08-25

## 2017-08-23 RX ORDER — HEPARIN SODIUM 5000 [USP'U]/ML
2500 INJECTION INTRAVENOUS; SUBCUTANEOUS EVERY 6 HOURS
Qty: 0 | Refills: 0 | Status: DISCONTINUED | OUTPATIENT
Start: 2017-08-23 | End: 2017-08-25

## 2017-08-23 RX ADMIN — HEPARIN SODIUM 1200 UNIT(S)/HR: 5000 INJECTION INTRAVENOUS; SUBCUTANEOUS at 19:02

## 2017-08-23 RX ADMIN — Medication 1: at 21:48

## 2017-08-23 RX ADMIN — HEPARIN SODIUM 5500 UNIT(S): 5000 INJECTION INTRAVENOUS; SUBCUTANEOUS at 16:20

## 2017-08-23 RX ADMIN — FAMOTIDINE 20 MILLIGRAM(S): 10 INJECTION INTRAVENOUS at 12:04

## 2017-08-23 RX ADMIN — LEVETIRACETAM 400 MILLIGRAM(S): 250 TABLET, FILM COATED ORAL at 17:53

## 2017-08-23 RX ADMIN — LEVETIRACETAM 400 MILLIGRAM(S): 250 TABLET, FILM COATED ORAL at 05:45

## 2017-08-23 NOTE — PROGRESS NOTE ADULT - SUBJECTIVE AND OBJECTIVE BOX
Patient is a 62y old  Male who presents with a chief complaint of family found blood clot in the mouth. send by PMD (22 Aug 2017 18:33)    24 hour events: remained NPO overnight  no complaints this morning    REVIEW OF SYSTEMS  Constitutional: No fever, chills, fatigue  Neuro: No headache, numbness, weakness  Resp: No cough, wheezing, shortness of breath  CVS: No chest pain, palpitations, leg swelling  GI: No abdominal pain, nausea, vomiting, diarrhea   : No dysuria, frequency, incontinence  Skin: No itching, burning, rashes, or lesions   Msk: No joint pain or swelling  Psych: No depression, anxiety, mood swings    T(F): 98.1 (17 @ 04:00), Max: 98.8 (17 @ 14:30)  HR: 84 (17 @ 08:00) (78 - 89)  BP: 107/74 (17 @ 07:00) (94/55 - 139/77)  RR: 30 (17 @ 08:00) (15 - 30)  SpO2: 98% (17 @ 08:00) (92% - 100%)    CAPILLARY BLOOD GLUCOSE  132 ( @ 05:51), 128 ( @ 23:00)    I&O's Summary     @ 07:01  -   @ 07:00  --------------------------------------------------------  IN: 200 mL / OUT: 1500 mL / NET: -1300 mL    PHYSICAL EXAM  General: NAD  CNS: AAO x 3, right hemiparesis  HEENT: hematoma under tongue, tongue/uvula not swollen  Resp: no stridor, lungs clear  CVS: S1S2  Abd: soft, NT, +BS  Ext: no edema  Skin: warm    MEDICATIONS  levETIRAcetam  IVPB IV Intermittent  famotidine Injectable IV Push                        10.9   17.5  )-----------( 283      ( 23 Aug 2017 06:16 )             33.1     08    134<L>  |  99  |  9   ----------------------------<  135<H>  3.8   |  29  |  0.47<L>    Ca    7.8<L>      23 Aug 2017 06:16  Phos  2.9       Mg     1.6         TPro  6.2  /  Alb  1.7<L>  /  TBili  2.3<H>  /  DBili  1.40<H>  /  AST  106<H>  /  ALT  75  /  AlkPhos  521<H>      CARDIAC MARKERS ( 23 Aug 2017 06:16 )  x     / x     / 38 U/L / x     / x        PT/INR - ( 23 Aug 2017 06:16 )   PT: 21.6 sec;   INR: 1.95 ratio       PTT - ( 23 Aug 2017 06:16 )  PTT:51.4 sec  Urinalysis Basic - ( 23 Aug 2017 06:34 )    Color: Yellow / Appearance: x / S.005 / pH: x  Gluc: x / Ketone: Negative  / Bili: Negative / Urobili: Negative   Blood: x / Protein: 25 mg/dL / Nitrite: Negative   Leuk Esterase: Negative / RBC: 6-10 /HPF / WBC 0-2   Sq Epi: x / Non Sq Epi: x / Bacteria: x    CENTRAL LINE: N          ARMENTA: N                     A-LINE: N                        GLOBAL ISSUE/BEST PRACTICE  Analgesia: NA  Sedation: NA  CAM-ICU: neg  HOB elevation: yes  Stress ulcer prophylaxis: NA  VTE prophylaxis: N (bleeding)  Glycemic control: NA  Nutrition: N (pending swallow eval)    CODE STATUS: full  GOC discussion: Y

## 2017-08-23 NOTE — DIETITIAN INITIAL EVALUATION ADULT. - PERTINENT LABORATORY DATA
(8/23) Sodium: 134, Glucose: 135, Creatinine: 0.47, Calcium: 7.8; BG via fingersticks: (8/22)128 (8/23)132

## 2017-08-23 NOTE — SWALLOW BEDSIDE ASSESSMENT ADULT - ORAL PHASE
Decreased anterior-posterior movement of the bolus Decreased anterior-posterior movement of the bolus/Delayed oral transit time Decreased anterior-posterior movement of the bolus/Lingual stasis/Delayed oral transit time

## 2017-08-23 NOTE — DIETITIAN INITIAL EVALUATION ADULT. - OTHER INFO
Nutrition consult received for chewing/swallowing difficulty. Pt presented to ER with sublingual swelling and blood clot and reports of difficulty chewing/swallowing foods and liquids. Pt noted as failing dysphagia screen awaiting evaluation from speech and swallow. Pt is currently NPO for ultrasound. No reported food allergies at this time. Nutrition consult received for chewing/swallowing difficulty. Pt presented to ER with sublingual swelling and blood clot and reports of difficulty chewing/swallowing foods and liquids. Pt noted as failing dysphagia screen awaiting evaluation from speech and swallow. Pt is currently NPO for ultrasound. Daughter reports pt manages T2DM PTA with Glimepiride, controlled portions of carbohydrates, and checking fingersticks two times per week (usually 100-150mg/dL). Daughter denies issues with nausea/emesis recently and states pt has had normal BM. Daughter reports NKFA.

## 2017-08-23 NOTE — PROGRESS NOTE ADULT - ASSESSMENT
61 YO M with PMH of arrthymia, mechanical valve (on coumadin), CVA (s/p L craniectomy in 2015  right sided hemiplegia), DM, HTN, CAD (s/p CABG 2007) presented with swelling and sublingual hematoma and admitted for potential airway compromise., supra therapeutic inr

## 2017-08-23 NOTE — DIETITIAN INITIAL EVALUATION ADULT. - NS AS NUTRI INTERV ED CONTENT
Nutrition education not appropriate at this time as pt is NPO with speech and swallow evaluation pending. Discussed importance of balancing meals and contolling portions of carbohydrates at home. In depth nutrition education not appropriate at this time as pt is NPO with speech and swallow evaluation pending.

## 2017-08-23 NOTE — PROGRESS NOTE ADULT - PROBLEM SELECTOR PLAN 2
-now stable, cont on heparin drip for now as per cardio  -Pt has been given Phytonadione 10mg (Vit K)  for reversal of coumadin    -monitor for any bleeding  -Cardiology consulted  -care as per ICU -now stable, cont on heparin drip for now as per cardio  -Pt has been given Phytonadione 10mg (Vit K)  for reversal of coumadin    -monitor for any bleeding  -Cardiology consulted

## 2017-08-23 NOTE — PROGRESS NOTE ADULT - SUBJECTIVE AND OBJECTIVE BOX
[facility]    Rachel Carranza, Joshua, Urbano, Maximo, Cornelio, Kalli      [phone]    CHIEF COMPLAINT: Patient is a 62y old  Male who presents with a chief complaint of family found blood clot in the mouth. send by PMD (22 Aug 2017 18:33)      Follow Up: coagulopathy, avr    Interim history: speech issues have not worsened. no  cp or sob (via daughter at bedside)    MEDICATIONS  (STANDING):  levETIRAcetam  IVPB 750 milliGRAM(s) IV Intermittent every 12 hours  famotidine Injectable 20 milliGRAM(s) IV Push daily    MEDICATIONS  (PRN):      REVIEW OF SYSTEMS:  eye, ent, GI, , allergic, dermatologic, musculoskeletal and neurologic are negative except as described above    Vital Signs Last 24 Hrs  T(C): 36.7 (23 Aug 2017 04:00), Max: 37.1 (22 Aug 2017 14:30)  T(F): 98.1 (23 Aug 2017 04:00), Max: 98.8 (22 Aug 2017 14:30)  HR: 84 (23 Aug 2017 05:00) (78 - 89)  BP: 102/74 (23 Aug 2017 05:00) (94/55 - 139/77)  BP(mean): 84 (23 Aug 2017 05:00) (68 - 101)  RR: 22 (23 Aug 2017 05:00) (15 - 30)  SpO2: 97% (23 Aug 2017 05:00) (92% - 100%)    I&O's Summary    22 Aug 2017 07:01  -  23 Aug 2017 06:27  --------------------------------------------------------  IN: 200 mL / OUT: 1500 mL / NET: -1300 mL        Telemetry past 24h: af, controlled    PHYSICAL EXAM:    Constitutional: well-nourished, well-developed, NAD   HEENT:  MMM, sclerae anicteric, conjunctivae clear, no oral cyanosis.  Pulmonary: Non-labored, breath sounds are clear bilaterally, No wheezing, rales or rhonchi  Cardiovascular: irregular, S1 and S2.  1/6 syst murmur.  mech av closure. No rubs, gallops or clicks  Gastrointestinal: Bowel Sounds present, soft, nontender.   Lymph: No peripheral edema.   Neurological: Alert  Skin: No rashes.  Psych:  Mood & affect appropriate    LABS: All Labs Reviewed:                        10.9   17.5  )-----------( 283      ( 23 Aug 2017 06:16 )             33.1                         11.8   16.4  )-----------( 318      ( 22 Aug 2017 16:08 )             35.4     22 Aug 2017 16:08    132    |  96     |  10     ----------------------------<  200    3.9     |  27     |  0.55     Ca    8.0        22 Aug 2017 16:08    TPro  6.9    /  Alb  2.0    /  TBili  1.4    /  DBili  x      /  AST  114    /  ALT  88     /  AlkPhos  653    22 Aug 2017 16:08    PT/INR - ( 22 Aug 2017 19:46 )   PT: 84.5 sec;   INR: 7.44 ratio         PTT - ( 22 Aug 2017 19:46 )  PTT:100.6 sec      Blood Culture:         RADIOLOGY:    EKG:    Echo:

## 2017-08-23 NOTE — SWALLOW BEDSIDE ASSESSMENT ADULT - ASR SWALLOW ASPIRATION MONITOR
upper respiratory infection/cough/fever/pneumonia/throat clearing/change of breathing pattern/gurgly voice/position upright (90Y)/oral hygiene

## 2017-08-23 NOTE — SWALLOW BEDSIDE ASSESSMENT ADULT - SWALLOW EVAL: RECOMMENDED FEEDING/EATING TECHNIQUES
allow for swallow between intakes/alternate food with liquid/maintain upright posture during/after eating for 30 mins/oral hygiene/check mouth frequently for oral residue/pocketing/position upright (90 degrees)

## 2017-08-23 NOTE — SWALLOW BEDSIDE ASSESSMENT ADULT - PHARYNGEAL PHASE
Cough post oral intake/Delayed pharyngeal swallow/Wet vocal quality post oral intake/pt's O2 sats decreased to 93 Delayed pharyngeal swallow

## 2017-08-23 NOTE — DIETITIAN INITIAL EVALUATION ADULT. - PT NOT SOURCE
Pt's primary language is [place] and according to RN, pt may experience some aphagia s/p CVA (2015) Pt's primary language is [place] and according to RN, pt may experience some aphagia s/p CVA (2015) and daughter states pt cannot speak well and declines translation services on pt's behalf.

## 2017-08-23 NOTE — SWALLOW BEDSIDE ASSESSMENT ADULT - COMMENTS
Pt alert, cooperative, oriented.  Pt's family at bedside and translated.  Pt admitted with sublingual hematoma.  Family reported that pt would demonstrate overt s/s of aspiration with thin liquids.  Pt presents with oropharyngeal dysphagia characterized by adequate orientation to spoon/cup, reduced oral grading, incomplete formation of the bolus, labored/incomplete mastication, latent AP transport, swallow delay.  Overt s/s of aspiration noted for thin liquids. Pt at increased risk of aspiration for solids secondary to difficulty manipulating and masticating bolus due to hematoma.  Pt safely tolerating dysphagia 1 puree consistencies with nectar thickened liquids.  Discussed with Dr. Grimes.  Education provided to family.

## 2017-08-23 NOTE — DIETITIAN INITIAL EVALUATION ADULT. - NS AS NUTRI INTERV MEALS SNACK
When medically feasible, recommend providing Consistent Carbohydrate no snack, DASH/TLC diet with food/fluid consistency according to SLP recommendations pending speech and swallow evaluation.

## 2017-08-23 NOTE — PROGRESS NOTE ADULT - SUBJECTIVE AND OBJECTIVE BOX
Patient feels better this morning. His voice sounds stronger and he is able to move his tongue more.      AOx3, NAD  Ears TM intact  Oc/OP: Floor of mouth hematoma, decreased in size. Able to see uvula. Soft Floor of Mouth.   Neck Supple      A/P Floor of Mouth Hematoma. Dysphagia    Swelling has improved. Cont to monitor. Clear Diet okay, make advance to soft diet.  I will Follow.

## 2017-08-23 NOTE — PROGRESS NOTE ADULT - SUBJECTIVE AND OBJECTIVE BOX
Patient is a 62y old  Male who presents with a chief complaint of family found blood clot in the mouth. send by PMD (22 Aug 2017 18:33)      INTERVAL HPI: Pt seen and examined with extensive family at bedside. Kanchan speaking, daughter states he has no acute complaints at this time. States blot clot under tongue is much improved.    OVERNIGHT EVENTS: None noted  T(F): 97.4 (17 @ 05:47), Max: 98.1 (17 @ 08:07)  HR: 82 (17 @ 05:47) (81 - 89)  BP: 111/71 (17 @ 05:47) (94/67 - 119/72)  RR: 16 (17 @ 05:47) (16 - 30)  SpO2: 97% (17 @ 05:47) (96% - 99%)  I&O's Summary    23 Aug 2017 07:01  -  24 Aug 2017 07:00  --------------------------------------------------------  IN: 734 mL / OUT: 0 mL / NET: 734 mL        REVIEW OF SYSTEMS: 12 systems otherwise negative other than that stated in HPI    PHYSICAL EXAM:  GENERAL: NAD, elder, pleasant  HEAD:  Atraumatic, Normocephalic  EYES: EOMI, PERRLA, conjunctiva and sclera clear  ENMT: large hematoma inferior to distal tongue, swallows without difficulty  NECK: Supple, No JVD,  NERVOUS SYSTEM:  Alert & Oriented X3, Good concentration; Motor Strength 5/5 B/L upper and lower extremities; DTRs 2+ intact and symmetric  CHEST/LUNG: Clear to percussion bilaterally; No rales, rhonchi, wheezing, or rubs  HEART: Regular rate and rhythm; No murmurs, rubs, or gallops  ABDOMEN: Soft, Nontender, Nondistended; Bowel sounds present  EXTREMITIES:  2+ Peripheral Pulses, No clubbing, cyanosis, or edema  SKIN: No rashes or lesions    LABS:                        10.6   17.2  )-----------( 315      ( 24 Aug 2017 01:10 )             31.9     08-23    134<L>  |  99  |  9   ----------------------------<  135<H>  3.8   |  29  |  0.47<L>    Ca    7.8<L>      23 Aug 2017 06:16  Phos  2.9       Mg     1.6         TPro  6.2  /  Alb  1.7<L>  /  TBili  2.3<H>  /  DBili  1.40<H>  /  AST  106<H>  /  ALT  75  /  AlkPhos  521<H>      PT/INR - ( 23 Aug 2017 06:16 )   PT: 21.6 sec;   INR: 1.95 ratio         PTT - ( 24 Aug 2017 01:10 )  PTT:>200.0 sec  Urinalysis Basic - ( 23 Aug 2017 06:34 )    Color: Yellow / Appearance: x / S.005 / pH: x  Gluc: x / Ketone: Negative  / Bili: Negative / Urobili: Negative   Blood: x / Protein: 25 mg/dL / Nitrite: Negative   Leuk Esterase: Negative / RBC: 6-10 /HPF / WBC 0-2   Sq Epi: x / Non Sq Epi: x / Bacteria: x      CAPILLARY BLOOD GLUCOSE  164 (23 Aug 2017 21:00)  154 (23 Aug 2017 16:42)  148 (23 Aug 2017 12:04)                  MEDICATIONS  (STANDING):  levETIRAcetam  IVPB 750 milliGRAM(s) IV Intermittent every 12 hours  famotidine Injectable 20 milliGRAM(s) IV Push daily  insulin lispro (HumaLOG) corrective regimen sliding scale   SubCutaneous Before meals and at bedtime  dextrose 5%. 1000 milliLiter(s) (50 mL/Hr) IV Continuous <Continuous>  dextrose 50% Injectable 12.5 Gram(s) IV Push once  dextrose 50% Injectable 25 Gram(s) IV Push once  dextrose 50% Injectable 25 Gram(s) IV Push once  heparin  Infusion.  Unit(s)/Hr (12 mL/Hr) IV Continuous <Continuous>    MEDICATIONS  (PRN):  dextrose Gel 1 Dose(s) Oral once PRN Blood Glucose LESS THAN 70 milliGRAM(s)/deciliter  glucagon  Injectable 1 milliGRAM(s) IntraMuscular once PRN Glucose LESS THAN 70 milligrams/deciliter  heparin  Injectable 5500 Unit(s) IV Push every 6 hours PRN For aPTT less than 40  heparin  Injectable 2500 Unit(s) IV Push every 6 hours PRN For aPTT between 40 - 57

## 2017-08-23 NOTE — DIETITIAN INITIAL EVALUATION ADULT. - SOURCE
Comprehensive medical record review; RN Comprehensive medical record review; RN; daughters and son at bedside/family/significant other

## 2017-08-23 NOTE — PROGRESS NOTE ADULT - ASSESSMENT
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     - family reports some issues with recent monitoring of the inr, over the past 2 weeks  - unclear if coagulopathy reflects a lack of monitoring or hepatic dysfunction, noting transaminitis  - lst inr was ~7. continue to monitor. in the setting of af and mechanical avr goal inr would be 2.5-3.5  - since he is in af, and has hepatic issues, I see little benefit to continuing amiodarone, at least for now.  if it is being used as rate control, we can try an alternative medication  - bp reasonable  - no evidence for ischemia or volume overload  - hold statin until hepatic issues better defined  -Last echo performed with outpatient cardiologist. LVEF 55%, mild concentric LV hypertrophy, mild to mod AR (7/24/17)   -will follow     The patient is at risk of abrupt decompensation.  35 minutes of critical care time was spent with this patient.

## 2017-08-23 NOTE — DIETITIAN INITIAL EVALUATION ADULT. - PROBLEM SELECTOR PLAN 2
-Pt elevated PTT PT and INR >15, Pt has been taking 5mg of warfarin which was recently reduced to 3mg  -Pt has been given Phytonadione 10mg (Vit K)  for reversal of coumadin  -Monitor F/U CBC, CMP, PT, PTT, INR  -monitor for any bleeding  -Cardiology consulted  -care as per ICU

## 2017-08-23 NOTE — PROGRESS NOTE ADULT - PROBLEM SELECTOR PLAN 1
-improving  -Pt was stable in ICU and improved significantly for downgrade to floor   -ENT (Dr. Lezama) status uvula on exam is free of obstruction, no objection to heparin gtt at this time with close monitoring  -Monitor Pulse ox, BP control,  -Pt has been given 10 mg Vit K, dyshagia 1 diet,  Aspiration precautions  -apprec speech and swallow eval

## 2017-08-23 NOTE — PROGRESS NOTE ADULT - PROBLEM SELECTOR PLAN 5
-continue to monitor accuchecks  -hold PO diabetes meds  -sliding scale Insulin  -care as per ICU -continue to monitor accuchecks  -hold PO diabetes meds  -sliding scale Insulin

## 2017-08-23 NOTE — PROGRESS NOTE ADULT - PROBLEM SELECTOR PLAN 4
-pt has a right sided hemiplegia, s/p left craniectomy in 2015, currently stable  -speech and swallow eval after resolution of hematoma and supratherapeutic INR

## 2017-08-23 NOTE — DIETITIAN INITIAL EVALUATION ADULT. - PROBLEM SELECTOR PLAN 1
-Pt has sublingual hematoma with orophageal narrowing  -Pt is admitted to the ICU to protect the airway  -ENT (Dr. Lezama) has been consulted  -Monitor Pulse ox, BP control,  -Pt has been given 10 mg Vit K, NPO, Aspiration precautions  -care as per ICU

## 2017-08-24 LAB
ALBUMIN SERPL ELPH-MCNC: 1.8 G/DL — LOW (ref 3.3–5)
ALP SERPL-CCNC: 530 U/L — HIGH (ref 40–120)
ALT FLD-CCNC: 72 U/L — SIGNIFICANT CHANGE UP (ref 12–78)
ANION GAP SERPL CALC-SCNC: 8 MMOL/L — SIGNIFICANT CHANGE UP (ref 5–17)
APTT BLD: 141 SEC — SIGNIFICANT CHANGE UP (ref 27.5–37.4)
APTT BLD: 66.6 SEC — HIGH (ref 27.5–37.4)
APTT BLD: 88.1 SEC — HIGH (ref 27.5–37.4)
APTT BLD: >200 SEC — CRITICAL HIGH (ref 27.5–37.4)
AST SERPL-CCNC: 118 U/L — HIGH (ref 15–37)
BILIRUB SERPL-MCNC: 2.4 MG/DL — HIGH (ref 0.2–1.2)
BUN SERPL-MCNC: 10 MG/DL — SIGNIFICANT CHANGE UP (ref 7–23)
CALCIUM SERPL-MCNC: 7.8 MG/DL — LOW (ref 8.5–10.1)
CHLORIDE SERPL-SCNC: 99 MMOL/L — SIGNIFICANT CHANGE UP (ref 96–108)
CK SERPL-CCNC: 107 U/L — SIGNIFICANT CHANGE UP (ref 26–308)
CO2 SERPL-SCNC: 29 MMOL/L — SIGNIFICANT CHANGE UP (ref 22–31)
CREAT SERPL-MCNC: 0.56 MG/DL — SIGNIFICANT CHANGE UP (ref 0.5–1.3)
CULTURE RESULTS: NO GROWTH — SIGNIFICANT CHANGE UP
GLUCOSE SERPL-MCNC: 112 MG/DL — HIGH (ref 70–99)
HBA1C BLD-MCNC: 6.8 % — HIGH (ref 4–5.6)
HCT VFR BLD CALC: 31.9 % — LOW (ref 39–50)
HCT VFR BLD CALC: 35.1 % — LOW (ref 39–50)
HGB BLD-MCNC: 10.6 G/DL — LOW (ref 13–17)
HGB BLD-MCNC: 11.4 G/DL — LOW (ref 13–17)
INR BLD: 1.59 RATIO — HIGH (ref 0.88–1.16)
MAGNESIUM SERPL-MCNC: 1.8 MG/DL — SIGNIFICANT CHANGE UP (ref 1.6–2.6)
MCHC RBC-ENTMCNC: 29.2 PG — SIGNIFICANT CHANGE UP (ref 27–34)
MCHC RBC-ENTMCNC: 29.6 PG — SIGNIFICANT CHANGE UP (ref 27–34)
MCHC RBC-ENTMCNC: 32.5 GM/DL — SIGNIFICANT CHANGE UP (ref 32–36)
MCHC RBC-ENTMCNC: 33.3 GM/DL — SIGNIFICANT CHANGE UP (ref 32–36)
MCV RBC AUTO: 88.9 FL — SIGNIFICANT CHANGE UP (ref 80–100)
MCV RBC AUTO: 89.7 FL — SIGNIFICANT CHANGE UP (ref 80–100)
PHOSPHATE SERPL-MCNC: 3.6 MG/DL — SIGNIFICANT CHANGE UP (ref 2.5–4.5)
PLATELET # BLD AUTO: 315 K/UL — SIGNIFICANT CHANGE UP (ref 150–400)
PLATELET # BLD AUTO: 338 K/UL — SIGNIFICANT CHANGE UP (ref 150–400)
POTASSIUM SERPL-MCNC: 4.4 MMOL/L — SIGNIFICANT CHANGE UP (ref 3.5–5.3)
POTASSIUM SERPL-SCNC: 4.4 MMOL/L — SIGNIFICANT CHANGE UP (ref 3.5–5.3)
PROT SERPL-MCNC: 6.4 G/DL — SIGNIFICANT CHANGE UP (ref 6–8.3)
PROTHROM AB SERPL-ACNC: 17.5 SEC — HIGH (ref 9.8–12.7)
RBC # BLD: 3.59 M/UL — LOW (ref 4.2–5.8)
RBC # BLD: 3.91 M/UL — LOW (ref 4.2–5.8)
RBC # FLD: 16.1 % — HIGH (ref 10.3–14.5)
RBC # FLD: 16.1 % — HIGH (ref 10.3–14.5)
SODIUM SERPL-SCNC: 136 MMOL/L — SIGNIFICANT CHANGE UP (ref 135–145)
SPECIMEN SOURCE: SIGNIFICANT CHANGE UP
WBC # BLD: 14.4 K/UL — HIGH (ref 3.8–10.5)
WBC # BLD: 17.2 K/UL — HIGH (ref 3.8–10.5)
WBC # FLD AUTO: 14.4 K/UL — HIGH (ref 3.8–10.5)
WBC # FLD AUTO: 17.2 K/UL — HIGH (ref 3.8–10.5)

## 2017-08-24 PROCEDURE — 99233 SBSQ HOSP IP/OBS HIGH 50: CPT | Mod: GC

## 2017-08-24 PROCEDURE — 99233 SBSQ HOSP IP/OBS HIGH 50: CPT

## 2017-08-24 RX ORDER — WARFARIN SODIUM 2.5 MG/1
1 TABLET ORAL ONCE
Qty: 0 | Refills: 0 | Status: COMPLETED | OUTPATIENT
Start: 2017-08-24 | End: 2017-08-24

## 2017-08-24 RX ADMIN — HEPARIN SODIUM 800 UNIT(S)/HR: 5000 INJECTION INTRAVENOUS; SUBCUTANEOUS at 09:35

## 2017-08-24 RX ADMIN — Medication 1: at 22:04

## 2017-08-24 RX ADMIN — LEVETIRACETAM 400 MILLIGRAM(S): 250 TABLET, FILM COATED ORAL at 05:46

## 2017-08-24 RX ADMIN — LEVETIRACETAM 400 MILLIGRAM(S): 250 TABLET, FILM COATED ORAL at 17:33

## 2017-08-24 RX ADMIN — HEPARIN SODIUM 0 UNIT(S)/HR: 5000 INJECTION INTRAVENOUS; SUBCUTANEOUS at 01:45

## 2017-08-24 RX ADMIN — FAMOTIDINE 20 MILLIGRAM(S): 10 INJECTION INTRAVENOUS at 12:37

## 2017-08-24 RX ADMIN — HEPARIN SODIUM 0 UNIT(S)/HR: 5000 INJECTION INTRAVENOUS; SUBCUTANEOUS at 08:34

## 2017-08-24 RX ADMIN — HEPARIN SODIUM 800 UNIT(S)/HR: 5000 INJECTION INTRAVENOUS; SUBCUTANEOUS at 23:35

## 2017-08-24 RX ADMIN — Medication 2: at 12:37

## 2017-08-24 RX ADMIN — HEPARIN SODIUM 800 UNIT(S)/HR: 5000 INJECTION INTRAVENOUS; SUBCUTANEOUS at 16:23

## 2017-08-24 RX ADMIN — WARFARIN SODIUM 1 MILLIGRAM(S): 2.5 TABLET ORAL at 22:04

## 2017-08-24 NOTE — DISCHARGE NOTE ADULT - CARE PLAN
Principal Discharge DX:	Swelling of pharynx  Goal:	Resolution  Instructions for follow-up, activity and diet:	-Please follow up with your PMD within one week.  -Please follow up with ENT outpatient (information below).  Secondary Diagnosis:	Warfarin overdosage, accidental or unintentional, initial encounter  Secondary Diagnosis:	CVA (cerebral vascular accident)  Secondary Diagnosis:	DM (diabetes mellitus)  Secondary Diagnosis:	HTN (hypertension)  Secondary Diagnosis:	Arrhythmia Principal Discharge DX:	Swelling of pharynx  Goal:	Resolution  Instructions for follow-up, activity and diet:	-Please follow up with your PMD within one week.  -Please follow up with ENT outpatient (information below).  Secondary Diagnosis:	Warfarin overdosage, accidental or unintentional, initial encounter  Instructions for follow-up, activity and diet:	Please take 1 mg of warfarin on 8/25 (Friday), 1 mg of warfarin on 8/26, and 0.5 mg of warfarin on Sunday.  Please be sure to go to Dr. Rios for an INR check on Monday.  LFTs should also be checked at that visit.  Secondary Diagnosis:	HTN (hypertension)  Instructions for follow-up, activity and diet:	Please do not take your enalapril until instructed to do so by your PMD  Secondary Diagnosis:	Arrhythmia  Instructions for follow-up, activity and diet:	Please do not take your amiodarone until instructed to do so by your PMD  Secondary Diagnosis:	CVA (cerebral vascular accident)  Instructions for follow-up, activity and diet:	Please continue to take aspirin and keppra as instructed above  Secondary Diagnosis:	DM (diabetes mellitus)  Instructions for follow-up, activity and diet:	A1C 6.8  Please continue to take your glimepiride as instructed above Principal Discharge DX:	Swelling of pharynx  Goal:	Resolution of hematoma sec to high inr  Instructions for follow-up, activity and diet:	-Please follow up with your PMD within one week. need fu closely inr .   -Please follow up with ENT outpatient (information below).  Secondary Diagnosis:	Warfarin overdosage, accidental or unintentional, initial encounter  Instructions for follow-up, activity and diet:	Please take 1 mg of warfarin  for now . Please be sure to go to Dr. Rios for an INR check on Monday.  LFTs should also be checked at that visit.  Secondary Diagnosis:	HTN (hypertension)  Goal:	bp lower side  Instructions for follow-up, activity and diet:	off meds bp stable / resume after fu out pt  Secondary Diagnosis:	Arrhythmia  Goal:	chr afib / rate controlled now off meds  Instructions for follow-up, activity and diet:	Please do not take your amiodarone until instructed to do so by your PMD  Secondary Diagnosis:	CVA (cerebral vascular accident)  Instructions for follow-up, activity and diet:	Please continue to take aspirin and keppra as instructed above  Secondary Diagnosis:	DM (diabetes mellitus)  Instructions for follow-up, activity and diet:	A1C 6.8  Please continue to take your glimepiride as instructed above , fu bs , Accu-Chek closely

## 2017-08-24 NOTE — DISCHARGE NOTE ADULT - HOSPITAL COURSE
61 YO M with PMH of arrthymia, mechanical aortic valve (on coumadin), CVA (s/p L craniectomy in 2015  right sided hemiplegia), DM, HTN, CAD (s/p CABG 2007) presented with swelling/blood clot in mouth for one day. Patient went to his PMD on day of admission for evaluation. PMD noticed that there was a blood clot under his tongue and was asked to come to [place] ED.  Pt had some difficulty eating his regular solid and liquid food which he normally eats daily. Pt had a mechanical valve replaced in [place] in 2007 and was taking coumadin for it. Pt was taking about 5mg of coumadin and was found to be supra-therapeutic. The PMD lowered the dosage to 3 mg recently. Pt's missed his repeat INR check about 2 weeks ago. Pt denied any recent bleeds, falls, head trauma, neck of back pain, fever or chills, chest pain, N/V/D/C, F/U/D. History was attained from daughter and son at beside.     In the ED, T 98, /74, HR 80, RR 16, SPO2 100% on RA. WBC 16.4, Hgb 11.8, Hct 35.4, Platelet 318, PTT>200, PT>200, INR>15, Na 132, Glucose 200, Alk phos 653, , ALT 88  CT Neck: Increased soft tissue density at the base of the tongue on the right, inflammatory or neoplastic  CXR: Status post median sternotomy cardiac valve surgery. Cardiac silhouette magnified by AP film shallow inspiration. Bibasilar atelectasis.  EKG: A-fib, HR 77  UA showed moderate blood, negative LE, negative nitrite    Patient was admitted to the ICU for Pharyngeal swelling, Sublingual hematoma, Supratherapeutic INR, and for observation due to potential for airway compromise.  Evaluated by Dr. Carlin, who performed a flexible laryngoscopy via left nasal cavity. Nasopharynx was clear, with mild hematoma of the base of tongue noted. Patient had a normal supraglottis, bilateral vocal fold movement, and some secretions in the pyriform sinuses. Swelling continued to improve/resolve since day of admission. Patient evaluated by Speech and Swallow and started on Dysphagia diet.    Regarding supratherapeutic INR, patient's INR normalized with administration of Vitamin K. Home Coumadin was initially held and Heparin drip started. Once INR decreased to normal levels, Coumadin was restarted with Heparin bridge until target INR of 2.0-2.5 was reached per Cardio.    Regarding transaminitis, Alkaline Phos and AST remained elevated but trended down. US Gallbladder showed a contracted, mildly thick-walled gallbladder with trace pericholecystic fluid but no stones or other secondary signs of acute cholecystitis.    Regarding arrythmia, Evaluated by Kalli Zaman (Cardio), who discontinued patient's home Amiodarone due to elevated transaminases. Rate remained controlled off Amio.    Patient remained afebrile and WBC trended down to ___on day of discharge. Blood cultures x2, urine culture showed no growth.     Patient was medically optimized and improved clinically throughout hospital course. Patient seen and examined on day of discharge.    Patient is medically stable and cleared for discharge to home with outpatient follow up. 61 YO M with PMH of arrthymia, mechanical aortic valve (on coumadin), CVA (s/p L craniectomy in 2015  right sided hemiplegia), DM, HTN, CAD (s/p CABG 2007) presented with swelling/blood clot in mouth for one day. Patient went to his PMD on day of admission for evaluation. PMD noticed that there was a blood clot under his tongue and was asked to come to [place] ED.  Pt had some difficulty eating his regular solid and liquid food which he normally eats daily. Pt had a mechanical valve replaced in [place] in 2007 and was taking coumadin for it. Pt was taking about 5mg of coumadin and was found to be supra-therapeutic. The PMD lowered the dosage to 3 mg recently. Pt's missed his repeat INR check about 2 weeks ago. Pt denied any recent bleeds, falls, head trauma, neck of back pain, fever or chills, chest pain, N/V/D/C, F/U/D. History was attained from daughter and son at beside.     In the ED, T 98, /74, HR 80, RR 16, SPO2 100% on RA. WBC 16.4, Hgb 11.8, Hct 35.4, Platelet 318, PTT>200, PT>200, INR>15, Na 132, Glucose 200, Alk phos 653, , ALT 88  CT Neck: Increased soft tissue density at the base of the tongue on the right, inflammatory or neoplastic  CXR: Status post median sternotomy cardiac valve surgery. Cardiac silhouette magnified by AP film shallow inspiration. Bibasilar atelectasis.  EKG: A-fib, HR 77  UA showed moderate blood, negative LE, negative nitrite    Patient was admitted to the ICU for Pharyngeal swelling, Sublingual hematoma, Supratherapeutic INR, and for observation due to potential for airway compromise.  Evaluated by Dr. Carlin, who performed a flexible laryngoscopy via left nasal cavity. Nasopharynx was clear, with mild hematoma of the base of tongue noted. Patient had a normal supraglottis, bilateral vocal fold movement, and some secretions in the pyriform sinuses. Swelling continued to improve/resolve since day of admission. Patient evaluated by Speech and Swallow and started on Dysphagia diet.    Regarding supratherapeutic INR, patient's INR normalized with administration of Vitamin K. Home Coumadin was initially held and Heparin drip started. Once INR decreased to normal levels, Coumadin was restarted with Heparin bridge until target INR of 2.0-2.5 was reached per Cardio.    Regarding transaminitis, Alkaline Phos and AST remained elevated but trended down. US Gallbladder showed a contracted, mildly thick-walled gallbladder with trace pericholecystic fluid but no stones or other secondary signs of acute cholecystitis.    Regarding arrythmia, Evaluated by Kalli Zaman (Cardio), who discontinued patient's home Amiodarone due to elevated transaminases. Rate remained controlled off Amio.    Patient remained afebrile and WBC trended down to 12.9 on day of discharge. Blood cultures x2, urine culture showed no growth.     Patient was medically optimized and improved clinically throughout hospital course. Patient seen and examined on day of discharge.    Patient is medically stable and cleared for discharge to home with outpatient follow up. 63 YO M with PMH of arrthymia, mechanical aortic valve (on coumadin), CVA (s/p L craniectomy in 2015  right sided hemiplegia), DM, HTN, CAD (s/p CABG 2007) presented with swelling/blood clot in mouth for one day. Patient went to his PMD on day of admission for evaluation. PMD noticed that there was a blood clot under his tongue and was asked to come to [place] ED.  Pt had some difficulty eating his regular solid and liquid food which he normally eats daily. Pt had a mechanical valve replaced in [place] in 2007 and was taking coumadin for it. Pt was taking about 5mg of coumadin and was found to be supra-therapeutic. The PMD lowered the dosage to 3 mg recently. Pt's missed his repeat INR check about 2 weeks ago. Pt denied any recent bleeds, falls, head trauma, neck of back pain, fever or chills, chest pain, N/V/D/C, F/U/D. History was attained from daughter and son at beside.     In the ED, T 98, /74, HR 80, RR 16, SPO2 100% on RA. WBC 16.4, Hgb 11.8, Hct 35.4, Platelet 318, PTT>200, PT>200, INR>15, Na 132, Glucose 200, Alk phos 653, , ALT 88  CT Neck: Increased soft tissue density at the base of the tongue on the right, inflammatory or neoplastic  CXR: Status post median sternotomy cardiac valve surgery. Cardiac silhouette magnified by AP film shallow inspiration. Bibasilar atelectasis.  EKG: A-fib, HR 77  UA showed moderate blood, negative LE, negative nitrite    Patient was admitted to the ICU for Pharyngeal swelling, Sublingual hematoma, Supratherapeutic INR, and for observation due to potential for airway compromise.  Evaluated by Dr. Carlin, who performed a flexible laryngoscopy via left nasal cavity. Nasopharynx was clear, with mild hematoma of the base of tongue noted. Patient had a normal supraglottis, bilateral vocal fold movement, and some secretions in the pyriform sinuses. Swelling continued to improve/resolve since day of admission. Patient evaluated by Speech and Swallow and started on Dysphagia diet.    Regarding supratherapeutic INR, patient's INR normalized with administration of Vitamin K. Home Coumadin was initially held and Heparin drip started. Once INR decreased to normal levels, Coumadin was restarted with Heparin bridge until target INR of 2.0-2.5 was reached per Cardio.  INR was 2.27 on day of discharge.     Regarding transaminitis, Alkaline Phos and AST remained elevated but trended down. US Gallbladder showed a contracted, mildly thick-walled gallbladder with trace pericholecystic fluid but no stones or other secondary signs of acute cholecystitis.    Regarding arrythmia, Evaluated by Kalli Zaman (Cardio), who discontinued patient's home Amiodarone due to elevated transaminases. Rate remained controlled off Amio.  If patient requires rate control in the future, Toprol was recommended by shannen.  Mr. Moore's atorvastatin and enalapril were also held during admission in the setting of elevated LFTs and low BPs.  These medication may be restarted with resolution of lab abnormalities.    Regarding his DM, home glimepiride was held and sliding scale insulin was given.  BG remained 142-193.       Patient's home dose of Keppra was maintained during admission.     Patient remained afebrile and WBC trended down to 12.9 on day of discharge. Blood cultures x2, urine culture showed no growth.     Patient was medically optimized and improved clinically throughout hospital course. Patient seen and examined on day of discharge.    Vital Signs Last 24 Hrs  T(C): 36.8 (25 Aug 2017 04:49), Max: 36.9 (24 Aug 2017 12:59)  T(F): 98.2 (25 Aug 2017 04:49), Max: 98.5 (24 Aug 2017 12:59)  HR: 76 (25 Aug 2017 04:49) (76 - 95)  BP: 104/63 (25 Aug 2017 04:49) (104/63 - 123/90)  BP(mean): --  RR: 16 (25 Aug 2017 04:49) (16 - 16)  SpO2: 100% (25 Aug 2017 04:49) (96% - 100%)    Physical Exam:  General: Well developed, well nourished, NAD  HEENT: NCAT, PERRLA, EOMI bl, moist mucous membranes; able to protrude tongue more than previously able; sublingual hematoma markedly reduced  Neck: Supple, nontender, no mass  Neurology: A&Ox3, nonfocal, CN II-XII grossly intact; r. hemiplegia (2/2 prior CVA)  Respiratory: CTA B/L, No W/R/R  CV: RRR, +S1/S2, no murmurs, rubs or gallops  Abdominal: Soft, NT, ND +BSx4  Extremities: No C/C/E, + peripheral pulses  MSK: Normal ROM on left; ROM on r. markedly limited 2/2 previous CVA, no joint erythema or warmth, no joint swelling   Skin: warm, dry, normal color, no rash or abnormal lesions    Patient is medically stable and cleared for discharge to home with outpatient follow up. 63 YO M with PMH of arrthymia, mechanical aortic valve (on coumadin), CVA (s/p L craniectomy in 2015  right sided hemiplegia), DM, HTN, CAD (s/p CABG 2007) presented with swelling/blood clot in mouth for one day. Patient went to his PMD on day of admission for evaluation. PMD noticed that there was a blood clot under his tongue and was asked to come to [place] ED.  Pt had some difficulty eating his regular solid and liquid food which he normally eats daily. Pt had a mechanical valve replaced in [place] in 2007 and was taking coumadin for it. Pt was taking about 5mg of coumadin and was found to be supra-therapeutic. The PMD lowered the dosage to 3 mg recently. Pt's missed his repeat INR check about 2 weeks ago. Pt denied any recent bleeds, falls, head trauma, neck of back pain, fever or chills, chest pain, N/V/D/C, F/U/D. History was attained from daughter and son at beside.     In the ED, T 98, /74, HR 80, RR 16, SPO2 100% on RA. WBC 16.4, Hgb 11.8, Hct 35.4, Platelet 318, PTT>200, PT>200, INR>15, Na 132, Glucose 200, Alk phos 653, , ALT 88  CT Neck: Increased soft tissue density at the base of the tongue on the right, inflammatory or neoplastic  CXR: Status post median sternotomy cardiac valve surgery. Cardiac silhouette magnified by AP film shallow inspiration. Bibasilar atelectasis.  EKG: A-fib, HR 77  UA showed moderate blood, negative LE, negative nitrite    Patient was admitted to the ICU for Pharyngeal swelling, Sublingual hematoma, Supratherapeutic INR, and for observation due to potential for airway compromise.  Evaluated by Dr. Carlin, who performed a flexible laryngoscopy via left nasal cavity. Nasopharynx was clear, with mild hematoma of the base of tongue noted. Patient had a normal supraglottis, bilateral vocal fold movement, and some secretions in the pyriform sinuses. Swelling continued to improve/resolve since day of admission. Patient evaluated by Speech and Swallow and started on Dysphagia diet.    Regarding supratherapeutic INR, patient's INR normalized with administration of Vitamin K. Home Coumadin was initially held and Heparin drip started. Once INR decreased to normal levels, Coumadin was restarted with Heparin bridge until target INR of 2.0-2.5 was reached per Cardio.  INR was 2.27 on day of discharge.     Regarding transaminitis, Alkaline Phos and AST remained elevated but trended down. US Gallbladder showed a contracted, mildly thick-walled gallbladder with trace pericholecystic fluid but no stones or other secondary signs of acute cholecystitis.    Regarding arrythmia, Evaluated by Kalli Zaman (Cardio), who discontinued patient's home Amiodarone due to elevated transaminases. Rate remained controlled off Amio.  If patient requires rate control in the future, Toprol was recommended by shannen.  Mr. Moore's atorvastatin and enalapril were also held during admission in the setting of elevated LFTs and low BPs.  These medication may be restarted with resolution of lab abnormalities.    Regarding his DM, home glimepiride was held and sliding scale insulin was given.  BG remained 142-193.       Patient's home dose of Keppra was maintained during admission.     Patient remained afebrile and WBC trended down to 12.9 on day of discharge. Blood cultures x2, urine culture showed no growth.     Patient was medically optimized and improved clinically throughout hospital course. Patient seen and examined on day of discharge.    Vital Signs Last 24 Hrs  T(C): 36.8 (25 Aug 2017 04:49), Max: 36.9 (24 Aug 2017 12:59)  T(F): 98.2 (25 Aug 2017 04:49), Max: 98.5 (24 Aug 2017 12:59)  HR: 76 (25 Aug 2017 04:49) (76 - 95)  BP: 104/63 (25 Aug 2017 04:49) (104/63 - 123/90)  RR: 16 (25 Aug 2017 04:49) (16 - 16)  SpO2: 100% (25 Aug 2017 04:49) (96% - 100%)    Physical Exam:  General: Well developed, well nourished, NAD  HEENT: NCAT, PERRLA, EOMI bl, moist mucous membranes; able to protrude tongue more than previously able; sublingual hematoma markedly reduced  Neck: Supple, nontender, no mass  Neurology: A&Ox3, nonfocal, CN II-XII grossly intact; r. hemiplegia (2/2 prior CVA)  Respiratory: CTA B/L, No W/R/R  CV: RRR, +S1/S2, no murmurs, rubs or gallops  Abdominal: Soft, NT, ND +BSx4  Extremities: No C/C/E, + peripheral pulses  MSK: Normal ROM on left; ROM on r. markedly limited 2/2 previous CVA, no joint erythema or warmth, no joint swelling   Skin: warm, dry, normal color, no rash or abnormal lesions    Patient is medically stable and cleared for discharge to home with outpatient follow up. 61 YO M with PMH of arrthymia, mechanical aortic valve (on coumadin), CVA (s/p L craniectomy in 2015  right sided hemiplegia), DM, HTN, CAD (s/p CABG 2007) presented with swelling/blood clot in mouth for one day. Patient went to his PMD on day of admission for evaluation. PMD noticed that there was a blood clot under his tongue and was asked to come to [place] ED.  Pt had some difficulty eating his regular solid and liquid food which he normally eats daily. Pt had a mechanical valve replaced in [place] in 2007 and was taking coumadin for it. Pt was taking about 5mg of coumadin and was found to be supra-therapeutic. The PMD lowered the dosage to 3 mg recently. Pt's missed his repeat INR check about 2 weeks ago. Pt denied any recent bleeds, falls, head trauma, neck of back pain, fever or chills, chest pain, N/V/D/C, F/U/D. History was attained from daughter and son at beside.     In the ED, T 98, /74, HR 80, RR 16, SPO2 100% on RA. WBC 16.4, Hgb 11.8, Hct 35.4, Platelet 318, PTT>200, PT>200, INR>15, Na 132, Glucose 200, Alk phos 653, , ALT 88  CT Neck: Increased soft tissue density at the base of the tongue on the right, inflammatory or neoplastic  CXR: Status post median sternotomy cardiac valve surgery. Cardiac silhouette magnified by AP film shallow inspiration. Bibasilar atelectasis.  EKG: A-fib, HR 77  UA showed moderate blood, negative LE, negative nitrite    Patient was admitted to the ICU for Pharyngeal swelling, Sublingual hematoma sec to Supra therapeutic INR, and for observation due to potential for airway compromise.  Evaluated by Dr. Carlin, who performed a flexible laryngoscopy via left nasal cavity. Nasopharynx was clear, with mild hematoma of the base of tongue noted. Patient had a normal supraglottis, bilateral vocal fold movement, and some secretions in the pyriform sinuses. Swelling continued to improve/resolve since day of admission. Patient evaluated by Speech and Swallow  sec dysphagia sec to hx old cva  and started on Dysphagia diet 2  tolreated well , ra pulse ox above 90 , no resp distress notice .    Regarding supra therapeutic INR, patient's INR normalized with administration of Vitamin K. 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 seen by cardio dr peoples -BP stable in 110/70's.   - no evidence for ischemia or volume overload  -Last echo performed with outpatient cardiologist. LVEF 55%, mild concentric LV hypertrophy, mild to mod AR (7/24/17). No need for repeat echo at this time.  Once INR decreased to normal levels, Coumadin was restarted with Heparin bridge until target INR of 2.0-2.5 was reached per Cardio.  INR was 2.27 on day of discharge.     Regarding transaminitis, Alkaline Phos and AST remained elevated but trended down. US Gallbladder showed a contracted, mildly thick-walled gallbladder with trace pericholecystic fluid but no stones or other secondary signs of acute cholecystitis. so possible sec to meds induce .     Regarding arrythmia, Evaluated by Kalli Zaman (Cardio), who discontinued patient's home Amiodarone due to elevated transaminases. Rate remained controlled off Amio no other meds needed .  If patient requires rate control in the future, Toprol was recommended by shannen. pt atorvastatin and enalapril were also held during admission in the setting of elevated LFTs and low BPs.  These medication may be restarted with resolution of lab abnormalities after fu out pt pmd office .    Regarding his DM, home glimepiride was held and sliding scale insulin was given.  BG remained 142-193.       Patient's home dose of Keppra was maintained during admission as for seizure precaution .     Patient remained afebrile and WBC trended down to 12.9 on day of discharge. Blood cultures x2, urine culture showed no growth.     Patient was medically optimized and improved clinically throughout hospital course. Patient seen and examined on day of discharge 8/25/17 .    Vital Signs Last 24 Hrs  T(C): 36.8 (25 Aug 2017 04:49), Max: 36.9 (24 Aug 2017 12:59)  T(F): 98.2 (25 Aug 2017 04:49), Max: 98.5 (24 Aug 2017 12:59)  HR: 76 (25 Aug 2017 04:49) (76 - 95)  BP: 104/63 (25 Aug 2017 04:49) (104/63 - 123/90)  RR: 16 (25 Aug 2017 04:49) (16 - 16)  SpO2: 100% (25 Aug 2017 04:49) (96% - 100%)    Physical Exam:  General: Well developed, well nourished, NAD  HEENT: NCAT, PERRLA, EOMI bl, moist mucous membranes; able to protrude tongue more than previously able; sublingual hematoma markedly reduced  Neck: Supple, nontender, no mass  Neurology: A&Ox2, nonfocal, CN II-XII grossly intact; r. hemiparesis (2/2 prior CVA)  Respiratory: CTA B/L, No W/R/R  CV: RRR, +S1/S2, no murmurs,   Abdominal: Soft, NT, ND +BSx4  Extremities: No C/C/E, + peripheral pulses  MSK: Normal ROM on left; ROM on r. markedly limited 2/2 previous CVA, no joint erythema or warmth, no joint swelling   Skin: warm, dry, normal color, no rash or abnormal lesions    Patient is medically stable and cleared for discharge to home with outpatient follow up. pmd dr toribio amaral notified dc plan , fu inr , lft and bp closely out pt. fu out pt cardio in 1wk dr peoples office.

## 2017-08-24 NOTE — PROGRESS NOTE ADULT - PROBLEM SELECTOR PLAN 5
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

## 2017-08-24 NOTE — DISCHARGE NOTE ADULT - MEDICATION SUMMARY - MEDICATIONS TO TAKE
I will START or STAY ON the medications listed below when I get home from the hospital:    aspirin 81 mg oral delayed release tablet  -- 1 tab(s) by mouth once a day  -- Indication: For Heart prophy     warfarin 1 mg oral tablet  -- 1 tab(s) by mouth once a day MDD:1 please check inr monday   -- [facility]: [facility]     levETIRAcetam 750 mg oral tablet  -- 1 tab(s) by mouth 2 times a day  -- Indication: For Seizure precaution hx cva     glimepiride 1 mg oral tablet  -- 1 tab(s) by mouth once a day  -- Indication: For DM (diabetes mellitus)    raNITIdine 150 mg oral tablet  -- 1 tab(s) by mouth once a day  -- Indication: For gi prophy     multivitamin  -- 1 tab(s) by mouth once a day  -- Indication: For Need for prophylactic measure

## 2017-08-24 NOTE — PHYSICAL THERAPY INITIAL EVALUATION ADULT - PERTINENT HX OF CURRENT PROBLEM, REHAB EVAL
63 YO M with PMH of arrthymia, mechanical valve (on coumadin), CVA (s/p L craniectomy in 2015  right sided hemiplegia), DM, HTN, CAD (s/p CABG 2007) presented with swelling/blood clot in mouth for one day. Patient went to his PMD today for evaluation. PMD noticed that there was a blood clot under his tongue and was asked to come to [place] ED.  Pt had some difficulty eating his regular solid and liquid food which he normally eats daily.

## 2017-08-24 NOTE — DISCHARGE NOTE ADULT - ADDITIONAL INSTRUCTIONS
-Please follow up with your PMD within one week.  -Please follow up with outpatient (information below).  -Continue taking your medications as directed above.  -If symptoms persist/worsen, please call your PMD or return to the ED. -Please follow up with your PMD within one week.  -Please follow up with ENT outpatient (information below).  -Continue taking your medications as directed above.  -If symptoms persist/worsen, please call your PMD or return to the ED. -Please follow up with your PMD within one week dr toribio amaral  notified office . need fu inr monday [ keep  range -2 to 2.5  [facility] ], lft also, fu result of hepatitis panels  pending result . pt off amiodarone sec to high left , hr controlled without any meds , fu cardio in 1wk dr peoples  office.   -Please follow up with ENT outpatient (information below) dr Carlin  1 to 2 wk .  -Continue taking your medications  but stop atorvastatin and amidarone    sec to high liver function test  , stopped enalapril sec to bp low side . please check repeat blood work lft and inr and bp monday pmd office.  start  toporl xl  if needed out pt for afib .

## 2017-08-24 NOTE — PHYSICAL THERAPY INITIAL EVALUATION ADULT - RANGE OF MOTION EXAMINATION, REHAB EVAL
hypertonicity noted on L side; R side flaccid/Left UE ROM was WFL (within functional limits)/Left LE ROM was WFL (within functional limits)

## 2017-08-24 NOTE — PROGRESS NOTE ADULT - PROBLEM SELECTOR PLAN 4
-pt has a right sided hemiplegia, s/p left craniectomy in 2015, currently stable  -speech and swallow eval: increased risk of aspiration for solids, safely tolerating soft (puree) diet  -continue Levetiracetam 750mg (home medication)

## 2017-08-24 NOTE — PROGRESS NOTE ADULT - ASSESSMENT
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     - supratherapeutic INR, 1.59 today. In the setting of af and mechanical avr goal INR would be 2-2.5  - unclear if coagulopathy reflects a lack of monitoring or hepatic dysfunction, noting transaminitis  - since he is in af, and has hepatic issues, I see little benefit to continuing amiodarone, at least for now.  if it is being used as rate control, we can try an alternative medication  - bp reasonable  - no evidence for ischemia or volume overload  - hold statin until hepatic issues better defined  -Last echo performed with outpatient cardiologist. 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     - supratherapeutic INR on admission, INR 1.59 today. Unclear if coagulopathy reflects a lack of monitoring or hepatic dysfunction, noting transaminitis. Recommend restarting Coumadin. In the setting of af and mechanical avr, goal INR would be 2-2.5.   - HR stable in 80's. For rate control of A.fib, recommend starting on Toprol XL 25mg daily. Amiodarone was discontinued in setting of transaminitis.   -Transaminitis, unclear etiology. US abdomen performed: The gallbladder is contracted and mildly thick walled without stone or biliary dilatation or elicited Good sign. There is trace pericholecystic fluid. The liver is grossly normal. Hold statin until hepatic issues better defined  -BP stable in 110/70's.   - no evidence for ischemia or volume overload  -Last echo performed with outpatient cardiologist. LVEF 55%, mild concentric LV hypertrophy, mild to mod AR (7/24/17). No need for repeat echo at this time.   -will follow 62 year old M with PMHx of HTN,  A.fib on Coumadin, CABG (2007),  mechanical AVR, CVA with right hemiparesis S/P craniectomy (July 2015) who presented with swelling/hematoma under tongue, supratherapeutic INR> 15.     - supratherapeutic INR on admission, INR 1.59 today. Unclear if coagulopathy reflects a lack of monitoring or hepatic dysfunction, noting transaminitis. Recommend restarting Coumadin. In the setting of af and mechanical avr, goal INR would be 2-3.   - HR stable in 80's. For rate control of A.fib, recommend starting on Toprol XL 25mg daily. Amiodarone was discontinued in setting of transaminitis.   -Transaminitis, unclear etiology. US abdomen performed: The gallbladder is contracted and mildly thick walled without stone or biliary dilatation or elicited Good sign. There is trace pericholecystic fluid. The liver is grossly normal. Hold statin until hepatic issues better defined  -BP stable in 110/70's.   - no evidence for ischemia or volume overload  -Last echo performed with outpatient cardiologist. LVEF 55%, mild concentric LV hypertrophy, mild to mod AR (7/24/17). No need for repeat echo at this time.   -will follow

## 2017-08-24 NOTE — PROGRESS NOTE ADULT - PROBLEM SELECTOR PLAN 3
-initial EKG shows Atrial Fibrillation   -Echo (7/24/17 at outpatient cardiology): LVEF 55%, mild concentric LV hypertrophy, mild to mod AR   -Cardiology consulted: discontinue amiodarone due to elevated LFTs -initial EKG shows Atrial Fibrillation   - chr afib Echo (7/24/17 at outpatient cardiology): LVEF 55%, mild concentric LV hypertrophy, mild to mod AR   -Cardiology consulted: discontinue amiodarone due to elevated LFTs

## 2017-08-24 NOTE — PROGRESS NOTE ADULT - SUBJECTIVE AND OBJECTIVE BOX
Patient seen and examined.   He feels better.      PE: OC/CP-swelling and hematoma signifcantly improved. i am able to define [place]'s ducts. I amd bake to see his Uvula and posterior pharynx.      A/P FOM Hematoma    The hematoma is resolving. D/C home per primary team. I educated the daughter and grandson warning signs if any symptoms return.

## 2017-08-24 NOTE — PHYSICAL THERAPY INITIAL EVALUATION ADULT - FOLLOWS COMMANDS/ANSWERS QUESTIONS, REHAB EVAL
unable to use transporter phone due to patient mostly nonverbal; hematoma in his mouth. Daughter speaking on his behalf./able to follow single-step instructions

## 2017-08-24 NOTE — DISCHARGE NOTE ADULT - PROVIDER TOKENS
TOKEN:'[id number]',TOKEN:'[id number]' TOKEN:'[id number]:'[id number]:'[email or web address]'

## 2017-08-24 NOTE — PROGRESS NOTE ADULT - PROBLEM SELECTOR PLAN 1
-Sublingual hematoma improving  -Pt started on heparin, need to bridge with coumadin and have INR gradually increase to goal of 2.5-3.5  -f/u PT, PTT, INR -Sublingual hematoma improving  - ENT cleared for discharge  -Pt started on heparin, need to bridge with coumadin and have INR gradually increase per cardiology recs  -f/u PT, PTT, INR

## 2017-08-24 NOTE — DISCHARGE NOTE ADULT - CARE PROVIDER_API CALL
Roberta Rios (COREEN), Internal Medicine  [address] [place] [address]  Phone: [phone]  Fax: [phone]    Justino Carlin), Otolaryngology  [address] [place] [address] [address]  [place] [address]  Phone: [phone]  Fax: [phone] Roberta Rios (COREEN), Internal Medicine  [address] [place] [address]  Phone: [phone]  Fax: [phone]    Justino Carlin), Otolaryngology  [address] [place] [address] [address]  [place] [address]  Phone: [phone]  Fax: [phone]    Ulises Lewis), Cardiovascular Disease  [address] [place] [address]  Phone: [phone]  Fax: [phone]

## 2017-08-24 NOTE — DISCHARGE NOTE ADULT - PLAN OF CARE
Resolution -Please follow up with your PMD within one week.  -Please follow up with ENT outpatient (information below). Please take 1 mg of warfarin on 8/25 (Friday), 1 mg of warfarin on 8/26, and 0.5 mg of warfarin on Sunday.  Please be sure to go to Dr. Rios for an INR check on Monday.  LFTs should also be checked at that visit. Please continue to take aspirin and keppra as instructed above A1C 6.8  Please continue to take your glimepiride as instructed above Please do not take your amiodarone until instructed to do so by your PMD Please do not take your enalapril until instructed to do so by your PMD chr afib / rate controlled now off meds bp lower side Resolution of hematoma sec to high inr -Please follow up with your PMD within one week. need fu closely inr .   -Please follow up with ENT outpatient (information below). Please take 1 mg of warfarin  for now . Please be sure to go to Dr. Rios for an INR check on Monday.  LFTs should also be checked at that visit. A1C 6.8  Please continue to take your glimepiride as instructed above , haley bs , Accu-Chek closely off meds bp stable / resume after fu out pt

## 2017-08-24 NOTE — DISCHARGE NOTE ADULT - MEDICATION SUMMARY - MEDICATIONS TO STOP TAKING
I will STOP taking the medications listed below when I get home from the hospital:    amiodarone 200 mg oral tablet  -- 1 tab(s) by mouth 2 times a day    atorvastatin 20 mg oral tablet  -- 1 tab(s) by mouth once a day (at bedtime)    enalapril 2.5 mg oral tablet  -- 1 tab(s) by mouth once a day

## 2017-08-24 NOTE — PROGRESS NOTE ADULT - PROBLEM SELECTOR PLAN 7
-currently on heparin  -Famotidine for GI prophylaxis -currently on heparin; restart coumadin per cardiology recs  -Famotidine for GI prophylaxis

## 2017-08-24 NOTE — DISCHARGE NOTE ADULT - SECONDARY DIAGNOSIS.
Warfarin overdosage, accidental or unintentional, initial encounter CVA (cerebral vascular accident) DM (diabetes mellitus) HTN (hypertension) Arrhythmia

## 2017-08-24 NOTE — PROGRESS NOTE ADULT - SUBJECTIVE AND OBJECTIVE BOX
[facility]    Rachel Carranza, Joshua, Urbano, Maximo, Cornelio, Kalli     [phone]    CHIEF COMPLAINT: Patient is a 62y old  Male who presents with a chief complaint of family found blood clot in the mouth. send by PMD (22 Aug 2017 18:33)      Follow Up: hematoma under tongue, supratherapeutic INR>15     Interim history: Patient was seen and examined lying comfortably in bed with daughter and son at bedside. History was translated by children as patient has limited English proficiency. I observed that the patient's speech is stable and has not worsened, left shoulder pain has improved and hematoma under tongue has improved. Pt. denied chest pain, palpitations, sob, abdominal pain.     MEDICATIONS  (STANDING):  levETIRAcetam  IVPB 750 milliGRAM(s) IV Intermittent every 12 hours  famotidine Injectable 20 milliGRAM(s) IV Push daily  insulin lispro (HumaLOG) corrective regimen sliding scale   SubCutaneous Before meals and at bedtime  dextrose 5%. 1000 milliLiter(s) (50 mL/Hr) IV Continuous <Continuous>  dextrose 50% Injectable 12.5 Gram(s) IV Push once  dextrose 50% Injectable 25 Gram(s) IV Push once  dextrose 50% Injectable 25 Gram(s) IV Push once  heparin  Infusion.  Unit(s)/Hr (12 mL/Hr) IV Continuous <Continuous>    MEDICATIONS  (PRN):  dextrose Gel 1 Dose(s) Oral once PRN Blood Glucose LESS THAN 70 milliGRAM(s)/deciliter  glucagon  Injectable 1 milliGRAM(s) IntraMuscular once PRN Glucose LESS THAN 70 milligrams/deciliter  heparin  Injectable 5500 Unit(s) IV Push every 6 hours PRN For aPTT less than 40  heparin  Injectable 2500 Unit(s) IV Push every 6 hours PRN For aPTT between 40 - 57      REVIEW OF SYSTEMS:  eye, ent, GI, , allergic, dermatologic, musculoskeletal and neurologic are negative except as described above    Vital Signs Last 24 Hrs  T(C): 36.3 (24 Aug 2017 05:47), Max: 36.5 (23 Aug 2017 19:10)  T(F): 97.4 (24 Aug 2017 05:47), Max: 97.7 (23 Aug 2017 19:10)  HR: 82 (24 Aug 2017 05:47) (81 - 89)  BP: 111/71 (24 Aug 2017 05:47) (94/67 - 119/72)  BP(mean): 76 (23 Aug 2017 13:00) (76 - 83)  RR: 16 (24 Aug 2017 05:47) (16 - 25)  SpO2: 97% (24 Aug 2017 05:47) (96% - 99%)    I&O's Summary    23 Aug 2017 07:01  -  24 Aug 2017 07:00  --------------------------------------------------------  IN: 734 mL / OUT: 0 mL / NET: 734 mL        Telemetry past 24h: a.fib, rate controlled 80's    PHYSICAL EXAM:    Constitutional: Middle aged male lying comfortably in bed, no respiratory distress, well-nourished, well-developed, NAD   HEENT:  Left sided craniectomy, MMM, sclerae anicteric, conjunctivae clear, no oral cyanosis. Uvula apparent and hematoma under tongue has improved.   Pulmonary: Non-labored, breath sounds are clear bilaterally, No wheezing, rales or rhonchi  Cardiovascular: Regular, S1 and S2, No murmurs, rubs, gallops or clicks  Gastrointestinal: Bowel Sounds present, soft, nontender.   Lymph: No peripheral edema. No lymphadenopathy.  Neurological: Alert, no focal deficits. Right sided hemiparesis   Skin: Dried blood noted under left middle finger.   Psych:  Mood & affect appropriate    LABS: All Labs Reviewed:                        11.4   14.4  )-----------( 338      ( 24 Aug 2017 06:53 )             35.1                         10.6   17.2  )-----------( 315      ( 24 Aug 2017 01:10 )             31.9                         10.9   17.5  )-----------( 283      ( 23 Aug 2017 06:16 )             33.1     24 Aug 2017 06:53    136    |  99     |  10     ----------------------------<  112    4.4     |  29     |  0.56   23 Aug 2017 06:16    134    |  99     |  9      ----------------------------<  135    3.8     |  29     |  0.47   22 Aug 2017 16:08    132    |  96     |  10     ----------------------------<  200    3.9     |  27     |  0.55     Ca    7.8        24 Aug 2017 06:53  Ca    7.8        23 Aug 2017 06:16  Ca    8.0        22 Aug 2017 16:08  Phos  3.6       24 Aug 2017 06:53  Phos  2.9       23 Aug 2017 06:16  Mg     1.8       24 Aug 2017 06:53  Mg     1.6       23 Aug 2017 06:16    TPro  6.4    /  Alb  1.8    /  TBili  2.4    /  DBili  x      /  AST  118    /  ALT  72     /  AlkPhos  530    24 Aug 2017 06:53  TPro  6.2    /  Alb  1.7    /  TBili  2.3    /  DBili  1.40   /  AST  106    /  ALT  75     /  AlkPhos  521    23 Aug 2017 06:16  TPro  6.9    /  Alb  2.0    /  TBili  1.4    /  DBili  x      /  AST  114    /  ALT  88     /  AlkPhos  653    22 Aug 2017 16:08    PT/INR - ( 24 Aug 2017 06:53 )   PT: 17.5 sec;   INR: 1.59 ratio         PTT - ( 24 Aug 2017 06:53 )  PTT:141.0 sec  CARDIAC MARKERS ( 24 Aug 2017 06:53 )  x     / x     / 107 U/L / x     / x      CARDIAC MARKERS ( 23 Aug 2017 06:16 )  x     / x     / 38 U/L / x     / x          Blood Culture: negative x2         RADIOLOGY:  US ABDOMEN LIMITED                        PROCEDURE DATE:  08/23/2017    INTERPRETATION:  Right upper quadrant ultrasound    History abdominal pain and abnormal liver function    The gallbladder is contracted and mildly thick walled without stone or   biliary dilatation or elicited Good sign. There is trace   pericholecystic fluid. The liver is grossly normal. The pancreas is   obscured.    IMPRESSION:    Contracted mildly thick-walled gallbladder with trace pericholecystic   fluid but no stones or other secondary signs of acute cholecystitis        EKG:  A.fib rate of 80's [facility]    Rachel Carranza, Joshua, Urbano, Maximo, Cornelio, Kalli     [phone]    CHIEF COMPLAINT: Patient is a 62y old  Male who presents with a chief complaint of family found blood clot in the mouth. send by PMD (22 Aug 2017 18:33)      Follow Up: hematoma under tongue, supratherapeutic INR>15     Interim history: Patient was seen and examined lying comfortably in bed with daughter and son at bedside. History was translated by children as patient has limited English proficiency. I observed that the patient's speech is stable and has not worsened, left shoulder pain has improved and hematoma under tongue has improved. Pt. denied chest pain, palpitations, sob, abdominal pain.     MEDICATIONS  (STANDING):  levETIRAcetam  IVPB 750 milliGRAM(s) IV Intermittent every 12 hours  famotidine Injectable 20 milliGRAM(s) IV Push daily  insulin lispro (HumaLOG) corrective regimen sliding scale   SubCutaneous Before meals and at bedtime  dextrose 5%. 1000 milliLiter(s) (50 mL/Hr) IV Continuous <Continuous>  dextrose 50% Injectable 12.5 Gram(s) IV Push once  dextrose 50% Injectable 25 Gram(s) IV Push once  dextrose 50% Injectable 25 Gram(s) IV Push once  heparin  Infusion.  Unit(s)/Hr (12 mL/Hr) IV Continuous <Continuous>    MEDICATIONS  (PRN):  dextrose Gel 1 Dose(s) Oral once PRN Blood Glucose LESS THAN 70 milliGRAM(s)/deciliter  glucagon  Injectable 1 milliGRAM(s) IntraMuscular once PRN Glucose LESS THAN 70 milligrams/deciliter  heparin  Injectable 5500 Unit(s) IV Push every 6 hours PRN For aPTT less than 40  heparin  Injectable 2500 Unit(s) IV Push every 6 hours PRN For aPTT between 40 - 57      REVIEW OF SYSTEMS:  eye, ent, GI, , allergic, dermatologic, musculoskeletal and neurologic are negative except as described above    Vital Signs Last 24 Hrs  T(C): 36.3 (24 Aug 2017 05:47), Max: 36.5 (23 Aug 2017 19:10)  T(F): 97.4 (24 Aug 2017 05:47), Max: 97.7 (23 Aug 2017 19:10)  HR: 82 (24 Aug 2017 05:47) (81 - 89)  BP: 111/71 (24 Aug 2017 05:47) (94/67 - 119/72)  BP(mean): 76 (23 Aug 2017 13:00) (76 - 83)  RR: 16 (24 Aug 2017 05:47) (16 - 25)  SpO2: 97% (24 Aug 2017 05:47) (96% - 99%)    I&O's Summary    23 Aug 2017 07:01  -  24 Aug 2017 07:00  --------------------------------------------------------  IN: 734 mL / OUT: 0 mL / NET: 734 mL        Telemetry past 24h: a.fib, rate controlled 80's    PHYSICAL EXAM:    Constitutional: Middle aged male lying comfortably in bed, no respiratory distress, well-nourished, well-developed, NAD   HEENT:  Left sided craniectomy, MMM, sclerae anicteric, conjunctivae clear, no oral cyanosis. Uvula apparent and hematoma under tongue has improved.   Pulmonary: Non-labored, breath sounds are clear bilaterally, No wheezing, rales or rhonchi  Cardiovascular: Regular, S1 and S2, No murmurs, rubs, gallops or clicks  Gastrointestinal: Bowel Sounds present, soft, nontender.   Lymph: No peripheral edema. No lymphadenopathy.  Neurological: Alert, no focal deficits. Right sided hemiparesis   Skin: Dried blood noted under left middle finger.   Psych:  Unable to assess    LABS: All Labs Reviewed:                        11.4   14.4  )-----------( 338      ( 24 Aug 2017 06:53 )             35.1                         10.6   17.2  )-----------( 315      ( 24 Aug 2017 01:10 )             31.9                         10.9   17.5  )-----------( 283      ( 23 Aug 2017 06:16 )             33.1     24 Aug 2017 06:53    136    |  99     |  10     ----------------------------<  112    4.4     |  29     |  0.56   23 Aug 2017 06:16    134    |  99     |  9      ----------------------------<  135    3.8     |  29     |  0.47   22 Aug 2017 16:08    132    |  96     |  10     ----------------------------<  200    3.9     |  27     |  0.55     Ca    7.8        24 Aug 2017 06:53  Ca    7.8        23 Aug 2017 06:16  Ca    8.0        22 Aug 2017 16:08  Phos  3.6       24 Aug 2017 06:53  Phos  2.9       23 Aug 2017 06:16  Mg     1.8       24 Aug 2017 06:53  Mg     1.6       23 Aug 2017 06:16    TPro  6.4    /  Alb  1.8    /  TBili  2.4    /  DBili  x      /  AST  118    /  ALT  72     /  AlkPhos  530    24 Aug 2017 06:53  TPro  6.2    /  Alb  1.7    /  TBili  2.3    /  DBili  1.40   /  AST  106    /  ALT  75     /  AlkPhos  521    23 Aug 2017 06:16  TPro  6.9    /  Alb  2.0    /  TBili  1.4    /  DBili  x      /  AST  114    /  ALT  88     /  AlkPhos  653    22 Aug 2017 16:08    PT/INR - ( 24 Aug 2017 06:53 )   PT: 17.5 sec;   INR: 1.59 ratio         PTT - ( 24 Aug 2017 06:53 )  PTT:141.0 sec  CARDIAC MARKERS ( 24 Aug 2017 06:53 )  x     / x     / 107 U/L / x     / x      CARDIAC MARKERS ( 23 Aug 2017 06:16 )  x     / x     / 38 U/L / x     / x          Blood Culture: negative x2         RADIOLOGY:  US ABDOMEN LIMITED                        PROCEDURE DATE:  08/23/2017    INTERPRETATION:  Right upper quadrant ultrasound    History abdominal pain and abnormal liver function    The gallbladder is contracted and mildly thick walled without stone or   biliary dilatation or elicited Good sign. There is trace   pericholecystic fluid. The liver is grossly normal. The pancreas is   obscured.    IMPRESSION:    Contracted mildly thick-walled gallbladder with trace pericholecystic   fluid but no stones or other secondary signs of acute cholecystitis        EKG:  A.fib rate of 80's

## 2017-08-24 NOTE — PROGRESS NOTE ADULT - ASSESSMENT
63 yo M with PMH of arrythmia, mechanical valve (on coumadin), CVA with right hemiplegia (s/p L craniectomy in 2015), DM, HTN, CAD (s/p CABG 2007) admitted for possible airway compromise secondary to sublingual hematoma due to supra therapeutic INR. 63 yo M with PMH of arrythmia (possibly AFib), mechanical valve (on coumadin), CVA with right hemiplegia (s/p L craniectomy in 2015), DM, HTN, CAD (s/p CABG 2007) admitted for possible airway compromise secondary to sublingual hematoma due to supra therapeutic INR.

## 2017-08-24 NOTE — PHYSICAL THERAPY INITIAL EVALUATION ADULT - PASSIVE RANGE OF MOTION EXAMINATION, REHAB EVAL
Right UE Passive ROM was WFL (within functional limits)/Right LE Passive ROM was WFL (within functional limits)

## 2017-08-24 NOTE — DISCHARGE NOTE ADULT - CARE PROVIDERS DIRECT ADDRESSES
,DirectAddress_Unknown,DirectAddress_Unknown ,DirectAddress_Unknown,DirectAddress_Unknown,[email or web address]

## 2017-08-24 NOTE — DISCHARGE NOTE ADULT - NS AS ACTIVITY OBS
Do not drive or operate machinery/Do not make important decisions/No Heavy lifting/straining/Showering allowed Showering allowed/Do not drive or operate machinery/Do not make important decisions/Bathing allowed/No Heavy lifting/straining

## 2017-08-24 NOTE — PHYSICAL THERAPY INITIAL EVALUATION ADULT - ADDITIONAL COMMENTS
Patient nonverbal. Daughter present at bedside; states patient lives in private home with 1 flight of stairs to 2nd floor bedroom. Family members help him up the stairs by picking up his RLE to every step until he gets to the top. Requires total assist for ADL's.

## 2017-08-24 NOTE — PROGRESS NOTE ADULT - SUBJECTIVE AND OBJECTIVE BOX
Resident Progress Note    Patient is a 62y old  Male who presents with a chief complaint of family found blood clot in the mouth. send by PMD (22 Aug 2017 18:33)      HPI: Patient seen and examined with children at bedside with daughter translating. No acute events overnight. Daughter reports that patient is doing better with less swelling. He is tolerating soft diet. Denies any pain, fevers, chills, SOB, palpitations, abdominal pain, nausea, vomiting and changes in bowel habits.    ROS:  Constitutional: denies fever, chills, diaphoresis   HEENT: tongue swelling and blood clot under tongue  Respiratory: denies SOB, RUSSO, sputum production, wheezing, hemoptysis  Cardiovascular: denies CP, palpitations, edema  Gastrointestinal: denies nausea, vomiting, diarrhea, constipation, abdominal pain, melena, hematochezia   Genitourinary: denies dysuria, frequency, urgency, hematuria   Skin/Breast: denies rash, itching  Musculoskeletal: denies myalgias, joint swelling  Neurologic: right sided hemiplegia, diminished sensation on right side  Psychiatric: denies feeling anxious, depressed, suicidal, homicidal thoughts  Hematology/Oncology: denies bruising, tender or enlarged lymph nodes   ROS negative except as noted above    Vital Signs Last 24 Hrs  T(C): 36.3 (17 @ 05:47), Max: 36.5 (17 @ 19:10)  T(F): 97.4 (17 @ 05:47), Max: 97.7 (--17 @ 19:10)  HR: 82 (17 @ 05:47) (81 - 89)  BP: 111/71 (17 @ 05:47) (94/67 - 119/72)  BP(mean): 76 (17 @ 13:00) (76 - 83)  RR: 16 (17 @ 05:47) (16 - 25)  SpO2: 97% (17 @ 05:47) (96% - 99%)    Physical Exam:  General: Well developed, well nourished, NAD  HEENT: scar s/p left hemicraniectomy, EOMI, hematoma under tongue, moist mucous membranes, unable to visualize posterior pharynx   Neck: Supple, nontender, no mass  Neurology: A&Ox3  Respiratory: CTA B/L, No W/R/R  CV: RRR, +S1/S2, no murmurs  Abdominal: Soft, NT, ND +BSx4  Extremities: No C/C/E, + peripheral pulses  MSK: Decreased muscle strength on right side upper and lower extremities  Skin: warm, dry, normal color, no rash or abnormal lesions    LABS:                        11.4   14.4  )-----------( 338      ( 24 Aug 2017 06:53 )             35.1     24 Aug 2017 06:53    136    |  99     |  10     ----------------------------<  112    4.4     |  29     |  0.56     Ca    7.8        24 Aug 2017 06:53  Phos  3.6       24 Aug 2017 06:53  Mg     1.8       24 Aug 2017 06:53    TPro  6.4    /  Alb  1.8    /  TBili  2.4    /  DBili  x      /  AST  118    /  ALT  72     /  AlkPhos  530    24 Aug 2017 06:53    PT/INR - ( 24 Aug 2017 06:53 )   PT: 17.5 sec;   INR: 1.59 ratio         PTT - ( 24 Aug 2017 06:53 )  PTT:141.0 sec  CARDIAC MARKERS ( 24 Aug 2017 06:53 )  x     / x     / 107 U/L / x     / x      CARDIAC MARKERS ( 23 Aug 2017 06:16 )  x     / x     / 38 U/L / x     / x          Urinalysis Basic - ( 23 Aug 2017 06:34 )    Color: Yellow / Appearance: x / S.005 / pH: x  Gluc: x / Ketone: Negative  / Bili: Negative / Urobili: Negative   Blood: x / Protein: 25 mg/dL / Nitrite: Negative   Leuk Esterase: Negative / RBC: 6-10 /HPF / WBC 0-2   Sq Epi: x / Non Sq Epi: x / Bacteria: x      CAPILLARY BLOOD GLUCOSE  134 (24 Aug 2017 08:40)  164 (23 Aug 2017 21:00)  154 (23 Aug 2017 16:42)  148 (23 Aug 2017 12:04)            RADIOLOGY & ADDITIONAL TESTS:  EXAM:  US ABDOMEN LIMITED                        PROCEDURE DATE:  2017    IMPRESSION: Contracted mildly thick-walled gallbladder with trace pericholecystic   fluid but no stones or other secondary signs of acute cholecystitis      MEDICATIONS  (STANDING):  levETIRAcetam  IVPB 750 milliGRAM(s) IV Intermittent every 12 hours  famotidine Injectable 20 milliGRAM(s) IV Push daily  insulin lispro (HumaLOG) corrective regimen sliding scale   SubCutaneous Before meals and at bedtime  dextrose 5%. 1000 milliLiter(s) (50 mL/Hr) IV Continuous <Continuous>  dextrose 50% Injectable 12.5 Gram(s) IV Push once  dextrose 50% Injectable 25 Gram(s) IV Push once  dextrose 50% Injectable 25 Gram(s) IV Push once  heparin  Infusion.  Unit(s)/Hr (12 mL/Hr) IV Continuous <Continuous>    MEDICATIONS  (PRN):  dextrose Gel 1 Dose(s) Oral once PRN Blood Glucose LESS THAN 70 milliGRAM(s)/deciliter  glucagon  Injectable 1 milliGRAM(s) IntraMuscular once PRN Glucose LESS THAN 70 milligrams/deciliter  heparin  Injectable 5500 Unit(s) IV Push every 6 hours PRN For aPTT less than 40  heparin  Injectable 2500 Unit(s) IV Push every 6 hours PRN For aPTT between 40 - 57      Allergies    No Known Allergies    Intolerances Resident Progress Note    Patient is a 62y old  Male who presents with a chief complaint of family found blood clot in the mouth. send by PMD (22 Aug 2017 18:33)      HPI: Patient seen and examined with children at bedside with daughter translating. No acute events overnight. Daughter reports that patient is doing better with less swelling. He is tolerating soft diet. Denies any pain, fevers, chills, SOB, palpitations, abdominal pain, nausea, vomiting and changes in bowel habits.    ROS: Per family  Constitutional: denies fever, chills, diaphoresis   HEENT: tongue swelling and blood clot under tongue  Respiratory: denies SOB, RUSSO, sputum production, wheezing, hemoptysis  Cardiovascular: denies CP, palpitations, edema  Gastrointestinal: denies nausea, vomiting, diarrhea, constipation, abdominal pain, melena, hematochezia   Genitourinary: denies dysuria, frequency, urgency, hematuria   Skin/Breast: denies rash, itching  Musculoskeletal: denies myalgias, joint swelling  Neurologic: right sided hemiplegia, diminished sensation on right side (unchanged)  Psychiatric: denies feeling anxious, depressed, suicidal, homicidal thoughts  Hematology/Oncology: denies bruising, tender or enlarged lymph nodes   ROS negative except as noted above    Vital Signs Last 24 Hrs  T(C): 36.3 (17 @ 05:47), Max: 36.5 (17 @ 19:10)  T(F): 97.4 (17 @ 05:47), Max: 97.7 (-17 @ 19:10)  HR: 82 (17 @ 05:47) (81 - 89)  BP: 111/71 (17 @ 05:47) (94/67 - 119/72)  BP(mean): 76 (17 @ 13:00) (76 - 83)  RR: 16 (17 @ 05:47) (16 - 25)  SpO2: 97% (17 @ 05:47) (96% - 99%)    Physical Exam:  General: Well developed, well nourished, NAD  HEENT: scar s/p left hemicraniectomy, EOMI, hematoma under tongue, moist mucous membranes, unable to visualize posterior pharynx   Neck: Supple, nontender, no mass  Neurology: A&Ox3  Respiratory: CTA B/L, No W/R/R  CV: RRR, +S1/S2, no murmurs  Abdominal: Soft, NT, ND +BSx4  Extremities: No C/C/E, + peripheral pulses  MSK: Decreased muscle strength on right side upper and lower extremities  Skin: warm, dry, normal color, no rash or abnormal lesions    LABS:                        11.4   14.4  )-----------( 338      ( 24 Aug 2017 06:53 )             35.1     24 Aug 2017 06:53    136    |  99     |  10     ----------------------------<  112    4.4     |  29     |  0.56     Ca    7.8        24 Aug 2017 06:53  Phos  3.6       24 Aug 2017 06:53  Mg     1.8       24 Aug 2017 06:53    TPro  6.4    /  Alb  1.8    /  TBili  2.4    /  DBili  x      /  AST  118    /  ALT  72     /  AlkPhos  530    24 Aug 2017 06:53    PT/INR - ( 24 Aug 2017 06:53 )   PT: 17.5 sec;   INR: 1.59 ratio         PTT - ( 24 Aug 2017 06:53 )  PTT:141.0 sec  CARDIAC MARKERS ( 24 Aug 2017 06:53 )  x     / x     / 107 U/L / x     / x      CARDIAC MARKERS ( 23 Aug 2017 06:16 )  x     / x     / 38 U/L / x     / x          Urinalysis Basic - ( 23 Aug 2017 06:34 )    Color: Yellow / Appearance: x / S.005 / pH: x  Gluc: x / Ketone: Negative  / Bili: Negative / Urobili: Negative   Blood: x / Protein: 25 mg/dL / Nitrite: Negative   Leuk Esterase: Negative / RBC: 6-10 /HPF / WBC 0-2   Sq Epi: x / Non Sq Epi: x / Bacteria: x      CAPILLARY BLOOD GLUCOSE  134 (24 Aug 2017 08:40)  164 (23 Aug 2017 21:00)  154 (23 Aug 2017 16:42)  148 (23 Aug 2017 12:04)    Culture - Blood (17 @ 23:59)    Specimen Source: .Blood Blood-Peripheral    Culture Results:   No growth to date.    Culture - Blood (17 @ 23:59)    Specimen Source: .Blood Blood-Peripheral    Culture Results:   No growth to date.      RADIOLOGY & ADDITIONAL TESTS:  EXAM:  US ABDOMEN LIMITED                        PROCEDURE DATE:  2017    IMPRESSION: Contracted mildly thick-walled gallbladder with trace pericholecystic   fluid but no stones or other secondary signs of acute cholecystitis      EXAM:  CT NECK SOFT TISSUE                        PROCEDURE DATE:  2017    Impression:  Limited by lack of IV contrast.  Increased soft tissue density at the base of the tongue on the right,   inflammatory or neoplastic. Recommend contrast-enhanced CT or MR and   direct visualization  Indeterminate left upper lobe parenchymal nodule. Recommend PET/CT.          MEDICATIONS  (STANDING):  levETIRAcetam  IVPB 750 milliGRAM(s) IV Intermittent every 12 hours  famotidine Injectable 20 milliGRAM(s) IV Push daily  insulin lispro (HumaLOG) corrective regimen sliding scale   SubCutaneous Before meals and at bedtime  dextrose 5%. 1000 milliLiter(s) (50 mL/Hr) IV Continuous <Continuous>  dextrose 50% Injectable 12.5 Gram(s) IV Push once  dextrose 50% Injectable 25 Gram(s) IV Push once  dextrose 50% Injectable 25 Gram(s) IV Push once  heparin  Infusion.  Unit(s)/Hr (12 mL/Hr) IV Continuous <Continuous>    MEDICATIONS  (PRN):  dextrose Gel 1 Dose(s) Oral once PRN Blood Glucose LESS THAN 70 milliGRAM(s)/deciliter  glucagon  Injectable 1 milliGRAM(s) IntraMuscular once PRN Glucose LESS THAN 70 milligrams/deciliter  heparin  Injectable 5500 Unit(s) IV Push every 6 hours PRN For aPTT less than 40  heparin  Injectable 2500 Unit(s) IV Push every 6 hours PRN For aPTT between 40 - 57      Allergies    No Known Allergies    Intolerances Resident Progress Note    Patient is a 62y old  Male who presents with a chief complaint of family found subungual hematoma in the mouth. sent to ED by PMD (22 Aug 2017 18:33)      HPI: Patient seen and examined with children at bedside with daughter translating. No acute events overnight. Daughter reports that patient is doing better with less swelling. He is tolerating soft diet. Denies any pain, fevers, chills, SOB, palpitations, abdominal pain, nausea, vomiting and changes in bowel habits.    ROS: Per family  Constitutional: denies fever, chills  HEENT: tongue swelling and blood clot under tongue  Respiratory: denies SOB, RUSSO, sputum production, wheezing, hemoptysis  Cardiovascular: denies CP, palpitations, edema  Gastrointestinal: denies nausea, vomiting, diarrhea, constipation, abdominal pain, melena, hematochezia   Genitourinary: denies dysuria, frequency, urgency, hematuria   Skin/Breast: denies rash, itching  Musculoskeletal: denies myalgias, joint swelling  Neurologic: right sided hemiplegia, diminished sensation on right side (unchanged)  Psychiatric: denies feeling anxious, depressed, suicidal, homicidal thoughts  ROS negative except as noted above    Vital Signs Last 24 Hrs  T(C): 36.3 (17 @ 05:47), Max: 36.5 (17 @ 19:10)  T(F): 97.4 (17 @ 05:47), Max: 97.7 (17 @ 19:10)  HR: 82 (17 @ 05:47) (81 - 89)  BP: 111/71 (17 @ 05:47) (94/67 - 119/72)  BP(mean): 76 (17 @ 13:00) (76 - 83)  RR: 16 (17 @ 05:47) (16 - 25)  SpO2: 97% (17 @ 05:47) (96% - 99%)    Physical Exam:  General: Well developed, well nourished, NAD  HEENT: scar s/p left hemicraniectomy, EOMI, hematoma under tongue, moist mucous membranes, unable to visualize posterior pharynx due to pt anatomy; minimal swelling of tongue   Neurology: A&Ox3  Respiratory: CTA B/L, No W/R/R  CV: RRR, +S1/S2, no murmurs  Abdominal: Soft, NT, ND +BSx4  Extremities: No C/C/E, + peripheral pulses  MSK: Decreased muscle strength on right side upper and lower extremities; notable muscle wasting of right side  Skin: warm, dry, normal color, no rash or abnormal lesions except as noted above    LABS:                        11.4   14.4  )-----------( 338      ( 24 Aug 2017 06:53 )             35.1     24 Aug 2017 06:53    136    |  99     |  10     ----------------------------<  112    4.4     |  29     |  0.56     Ca    7.8        24 Aug 2017 06:53  Phos  3.6       24 Aug 2017 06:53  Mg     1.8       24 Aug 2017 06:53    TPro  6.4    /  Alb  1.8    /  TBili  2.4    /  DBili  x      /  AST  118    /  ALT  72     /  AlkPhos  530    24 Aug 2017 06:53    PT/INR - ( 24 Aug 2017 06:53 )   PT: 17.5 sec;   INR: 1.59 ratio         PTT - ( 24 Aug 2017 06:53 )  PTT:141.0 sec  CARDIAC MARKERS ( 24 Aug 2017 06:53 )  x     / x     / 107 U/L / x     / x      CARDIAC MARKERS ( 23 Aug 2017 06:16 )  x     / x     / 38 U/L / x     / x          Urinalysis Basic - ( 23 Aug 2017 06:34 )    Color: Yellow / Appearance: x / S.005 / pH: x  Gluc: x / Ketone: Negative  / Bili: Negative / Urobili: Negative   Blood: x / Protein: 25 mg/dL / Nitrite: Negative   Leuk Esterase: Negative / RBC: 6-10 /HPF / WBC 0-2   Sq Epi: x / Non Sq Epi: x / Bacteria: x      CAPILLARY BLOOD GLUCOSE  134 (24 Aug 2017 08:40)  164 (23 Aug 2017 21:00)  154 (23 Aug 2017 16:42)  148 (23 Aug 2017 12:04)    Culture - Blood (17 @ 23:59)    Specimen Source: .Blood Blood-Peripheral    Culture Results:   No growth to date.    Culture - Blood (17 @ 23:59)    Specimen Source: .Blood Blood-Peripheral    Culture Results:   No growth to date.      RADIOLOGY & ADDITIONAL TESTS:  EXAM:  US ABDOMEN LIMITED                        PROCEDURE DATE:  2017    IMPRESSION: Contracted mildly thick-walled gallbladder with trace pericholecystic   fluid but no stones or other secondary signs of acute cholecystitis      EXAM:  CT NECK SOFT TISSUE                        PROCEDURE DATE:  2017    Impression:  Limited by lack of IV contrast.  Increased soft tissue density at the base of the tongue on the right,   inflammatory or neoplastic. Recommend contrast-enhanced CT or MR and   direct visualization  Indeterminate left upper lobe parenchymal nodule. Recommend PET/CT.          MEDICATIONS  (STANDING):  levETIRAcetam  IVPB 750 milliGRAM(s) IV Intermittent every 12 hours  famotidine Injectable 20 milliGRAM(s) IV Push daily  insulin lispro (HumaLOG) corrective regimen sliding scale   SubCutaneous Before meals and at bedtime  dextrose 5%. 1000 milliLiter(s) (50 mL/Hr) IV Continuous <Continuous>  dextrose 50% Injectable 12.5 Gram(s) IV Push once  dextrose 50% Injectable 25 Gram(s) IV Push once  dextrose 50% Injectable 25 Gram(s) IV Push once  heparin  Infusion.  Unit(s)/Hr (12 mL/Hr) IV Continuous <Continuous>    MEDICATIONS  (PRN):  dextrose Gel 1 Dose(s) Oral once PRN Blood Glucose LESS THAN 70 milliGRAM(s)/deciliter  glucagon  Injectable 1 milliGRAM(s) IntraMuscular once PRN Glucose LESS THAN 70 milligrams/deciliter  heparin  Injectable 5500 Unit(s) IV Push every 6 hours PRN For aPTT less than 40  heparin  Injectable 2500 Unit(s) IV Push every 6 hours PRN For aPTT between 40 - 57      Allergies    No Known Allergies    Intolerances Resident Progress Note    Patient is a 62y old  Male who presents with a chief complaint of family found subungual hematoma in the mouth. sent to ED by PMD (22 Aug 2017 18:33)      HPI:  62 m hx cva , afib , mech aortic valve , dm type2 , Patient seen and examined with children at bedside with daughter translating. No acute events overnight. Daughter reports that patient is doing better with less swelling. He is tolerating soft diet. Denies any pain, fevers, chills, SOB, palpitations, abdominal pain, nausea, vomiting and changes in bowel habits.    ROS: Per family  Constitutional: denies fever, chills  HEENT: tongue swelling and blood clot under tongue  Respiratory: denies SOB, RUSSO, sputum production, wheezing, hemoptysis  Cardiovascular: denies CP, palpitations, edema  Gastrointestinal: denies nausea, vomiting, diarrhea, constipation, abdominal pain, melena, hematochezia   Genitourinary: denies dysuria, frequency, urgency, hematuria   Skin/Breast: denies rash, itching  Musculoskeletal: denies myalgias, joint swelling  Neurologic: right sided hemiplegia, diminished sensation on right side (unchanged)    ROS negative except as noted above    Vital Signs Last 24 Hrs  T(C): 36.3 (17 @ 05:47), Max: 36.5 (17 @ 19:10)  T(F): 97.4 (17 @ 05:47), Max: 97.7 (- @ 19:10)  HR: 82 (17 @ 05:47) (81 - 89)  BP: 111/71 (- @ 05:47) (94/67 - 119/72)  BP(mean): 76 (17 @ 13:00) (76 - 83)  RR: 16 (17 @ 05:47) (16 - 25)  SpO2: 97% (17 @ 05:47) (96% - 99%)    Physical Exam:  General: Well developed, well nourished, NAD  HEENT: scar s/p left hemicraniectomy, EOMI, hematoma under tongue, moist mucous membranes, unable to visualize posterior pharynx due to pt anatomy; minimal swelling of tongue   Neurology: A&Ox3  Respiratory: CTA B/L, No W/R/R  CV: RRR, +S1/S2, no murmurs  Abdominal: Soft, NT, ND +BSx4  Extremities: No C/C/E, + peripheral pulses  MSK: Decreased muscle strength on right side upper and lower extremities 3/5 rt side , lt 4/5 ; notable muscle wasting of right side  Skin: warm, dry, normal color, no rash or abnormal lesions except as noted above    LABS:                        11.4   14.4  )-----------( 338      ( 24 Aug 2017 06:53 )             35.1     24 Aug 2017 06:53    136    |  99     |  10     ----------------------------<  112    4.4     |  29     |  0.56     Ca    7.8        24 Aug 2017 06:53  Phos  3.6       24 Aug 2017 06:53  Mg     1.8       24 Aug 2017 06:53    TPro  6.4    /  Alb  1.8    /  TBili  2.4    /  DBili  x      /  AST  118    /  ALT  72     /  AlkPhos  530    24 Aug 2017 06:53    PT/INR - ( 24 Aug 2017 06:53 )   PT: 17.5 sec;   INR: 1.59 ratio         PTT - ( 24 Aug 2017 06:53 )  PTT:141.0 sec  CARDIAC MARKERS ( 24 Aug 2017 06:53 )  x     / x     / 107 U/L / x     / x      CARDIAC MARKERS ( 23 Aug 2017 06:16 )  x     / x     / 38 U/L / x     / x          Urinalysis Basic - ( 23 Aug 2017 06:34 )    Color: Yellow / Appearance: x / S.005 / pH: x  Gluc: x / Ketone: Negative  / Bili: Negative / Urobili: Negative   Blood: x / Protein: 25 mg/dL / Nitrite: Negative   Leuk Esterase: Negative / RBC: 6-10 /HPF / WBC 0-2   Sq Epi: x / Non Sq Epi: x / Bacteria: x      CAPILLARY BLOOD GLUCOSE  134 (24 Aug 2017 08:40)  164 (23 Aug 2017 21:00)  154 (23 Aug 2017 16:42)  148 (23 Aug 2017 12:04)    Culture - Blood (17 @ 23:59)    Specimen Source: .Blood Blood-Peripheral    Culture Results:   No growth to date.    Culture - Blood (17 @ 23:59)    Specimen Source: .Blood Blood-Peripheral    Culture Results:   No growth to date.      RADIOLOGY & ADDITIONAL TESTS:  EXAM:  US ABDOMEN LIMITED                        PROCEDURE DATE:  2017    IMPRESSION: Contracted mildly thick-walled gallbladder with trace pericholecystic   fluid but no stones or other secondary signs of acute cholecystitis      EXAM:  CT NECK SOFT TISSUE                        PROCEDURE DATE:  2017    Impression:  Limited by lack of IV contrast.  Increased soft tissue density at the base of the tongue on the right,   inflammatory or neoplastic. Recommend contrast-enhanced CT or MR and   direct visualization  Indeterminate left upper lobe parenchymal nodule. Recommend PET/CT.          MEDICATIONS  (STANDING):  levETIRAcetam  IVPB 750 milliGRAM(s) IV Intermittent every 12 hours  famotidine Injectable 20 milliGRAM(s) IV Push daily  insulin lispro (HumaLOG) corrective regimen sliding scale   SubCutaneous Before meals and at bedtime  dextrose 5%. 1000 milliLiter(s) (50 mL/Hr) IV Continuous <Continuous>  dextrose 50% Injectable 12.5 Gram(s) IV Push once  dextrose 50% Injectable 25 Gram(s) IV Push once  dextrose 50% Injectable 25 Gram(s) IV Push once  heparin  Infusion.  Unit(s)/Hr (12 mL/Hr) IV Continuous <Continuous>    MEDICATIONS  (PRN):  dextrose Gel 1 Dose(s) Oral once PRN Blood Glucose LESS THAN 70 milliGRAM(s)/deciliter  glucagon  Injectable 1 milliGRAM(s) IntraMuscular once PRN Glucose LESS THAN 70 milligrams/deciliter  heparin  Injectable 5500 Unit(s) IV Push every 6 hours PRN For aPTT less than 40  heparin  Injectable 2500 Unit(s) IV Push every 6 hours PRN For aPTT between 40 - 57      Allergies    No Known Allergies    Intolerances

## 2017-08-24 NOTE — DISCHARGE NOTE ADULT - PATIENT PORTAL LINK FT
“You can access the [employer] Patient Portal, offered by [facility], by registering with the following website: [email or web address]”

## 2017-08-25 VITALS
RESPIRATION RATE: 18 BRPM | DIASTOLIC BLOOD PRESSURE: 72 MMHG | HEART RATE: 82 BPM | OXYGEN SATURATION: 98 % | TEMPERATURE: 98 F | SYSTOLIC BLOOD PRESSURE: 107 MMHG

## 2017-08-25 LAB
ALBUMIN SERPL ELPH-MCNC: 1.7 G/DL — LOW (ref 3.3–5)
ALP SERPL-CCNC: 524 U/L — HIGH (ref 40–120)
ALT FLD-CCNC: 74 U/L — SIGNIFICANT CHANGE UP (ref 12–78)
ANION GAP SERPL CALC-SCNC: 6 MMOL/L — SIGNIFICANT CHANGE UP (ref 5–17)
ANION GAP SERPL CALC-SCNC: 7 MMOL/L — SIGNIFICANT CHANGE UP (ref 5–17)
APTT BLD: 139 SEC — CRITICAL HIGH (ref 27.5–37.4)
AST SERPL-CCNC: 113 U/L — HIGH (ref 15–37)
BILIRUB SERPL-MCNC: 2.1 MG/DL — HIGH (ref 0.2–1.2)
BUN SERPL-MCNC: 8 MG/DL — SIGNIFICANT CHANGE UP (ref 7–23)
BUN SERPL-MCNC: 9 MG/DL — SIGNIFICANT CHANGE UP (ref 7–23)
CALCIUM SERPL-MCNC: 7.4 MG/DL — LOW (ref 8.5–10.1)
CALCIUM SERPL-MCNC: 7.6 MG/DL — LOW (ref 8.5–10.1)
CHLORIDE SERPL-SCNC: 94 MMOL/L — LOW (ref 96–108)
CHLORIDE SERPL-SCNC: 98 MMOL/L — SIGNIFICANT CHANGE UP (ref 96–108)
CO2 SERPL-SCNC: 28 MMOL/L — SIGNIFICANT CHANGE UP (ref 22–31)
CO2 SERPL-SCNC: 29 MMOL/L — SIGNIFICANT CHANGE UP (ref 22–31)
CREAT SERPL-MCNC: 0.53 MG/DL — SIGNIFICANT CHANGE UP (ref 0.5–1.3)
CREAT SERPL-MCNC: 0.65 MG/DL — SIGNIFICANT CHANGE UP (ref 0.5–1.3)
GLUCOSE SERPL-MCNC: 123 MG/DL — HIGH (ref 70–99)
GLUCOSE SERPL-MCNC: 223 MG/DL — HIGH (ref 70–99)
HAV IGM SER-ACNC: SIGNIFICANT CHANGE UP
HBV CORE IGM SER-ACNC: SIGNIFICANT CHANGE UP
HBV SURFACE AG SER-ACNC: SIGNIFICANT CHANGE UP
HCT VFR BLD CALC: 31.7 % — LOW (ref 39–50)
HCV AB S/CO SERPL IA: 0.21 S/CO — SIGNIFICANT CHANGE UP
HCV AB SERPL-IMP: SIGNIFICANT CHANGE UP
HGB BLD-MCNC: 10.4 G/DL — LOW (ref 13–17)
INR BLD: 2.27 RATIO — HIGH (ref 0.88–1.16)
MCHC RBC-ENTMCNC: 29.5 PG — SIGNIFICANT CHANGE UP (ref 27–34)
MCHC RBC-ENTMCNC: 32.7 GM/DL — SIGNIFICANT CHANGE UP (ref 32–36)
MCV RBC AUTO: 90.1 FL — SIGNIFICANT CHANGE UP (ref 80–100)
PLATELET # BLD AUTO: 306 K/UL — SIGNIFICANT CHANGE UP (ref 150–400)
POTASSIUM SERPL-MCNC: 3.7 MMOL/L — SIGNIFICANT CHANGE UP (ref 3.5–5.3)
POTASSIUM SERPL-MCNC: 3.9 MMOL/L — SIGNIFICANT CHANGE UP (ref 3.5–5.3)
POTASSIUM SERPL-SCNC: 3.7 MMOL/L — SIGNIFICANT CHANGE UP (ref 3.5–5.3)
POTASSIUM SERPL-SCNC: 3.9 MMOL/L — SIGNIFICANT CHANGE UP (ref 3.5–5.3)
PROT SERPL-MCNC: 6 G/DL — SIGNIFICANT CHANGE UP (ref 6–8.3)
PROTHROM AB SERPL-ACNC: 25.2 SEC — HIGH (ref 9.8–12.7)
RBC # BLD: 3.52 M/UL — LOW (ref 4.2–5.8)
RBC # FLD: 15.5 % — HIGH (ref 10.3–14.5)
SODIUM SERPL-SCNC: 129 MMOL/L — LOW (ref 135–145)
SODIUM SERPL-SCNC: 133 MMOL/L — LOW (ref 135–145)
WBC # BLD: 12.9 K/UL — HIGH (ref 3.8–10.5)
WBC # FLD AUTO: 12.9 K/UL — HIGH (ref 3.8–10.5)

## 2017-08-25 PROCEDURE — 86850 RBC ANTIBODY SCREEN: CPT

## 2017-08-25 PROCEDURE — 99232 SBSQ HOSP IP/OBS MODERATE 35: CPT

## 2017-08-25 PROCEDURE — 85384 FIBRINOGEN ACTIVITY: CPT

## 2017-08-25 PROCEDURE — 86900 BLOOD TYPING SEROLOGIC ABO: CPT

## 2017-08-25 PROCEDURE — 82248 BILIRUBIN DIRECT: CPT

## 2017-08-25 PROCEDURE — 85610 PROTHROMBIN TIME: CPT

## 2017-08-25 PROCEDURE — 80053 COMPREHEN METABOLIC PANEL: CPT

## 2017-08-25 PROCEDURE — 82330 ASSAY OF CALCIUM: CPT

## 2017-08-25 PROCEDURE — 85730 THROMBOPLASTIN TIME PARTIAL: CPT

## 2017-08-25 PROCEDURE — 81001 URINALYSIS AUTO W/SCOPE: CPT

## 2017-08-25 PROCEDURE — 83735 ASSAY OF MAGNESIUM: CPT

## 2017-08-25 PROCEDURE — 96367 TX/PROPH/DG ADDL SEQ IV INF: CPT

## 2017-08-25 PROCEDURE — 80074 ACUTE HEPATITIS PANEL: CPT

## 2017-08-25 PROCEDURE — 99239 HOSP IP/OBS DSCHRG MGMT >30: CPT

## 2017-08-25 PROCEDURE — 86901 BLOOD TYPING SEROLOGIC RH(D): CPT

## 2017-08-25 PROCEDURE — 99285 EMERGENCY DEPT VISIT HI MDM: CPT | Mod: 25

## 2017-08-25 PROCEDURE — 97163 PT EVAL HIGH COMPLEX 45 MIN: CPT

## 2017-08-25 PROCEDURE — 80048 BASIC METABOLIC PNL TOTAL CA: CPT

## 2017-08-25 PROCEDURE — 82550 ASSAY OF CK (CPK): CPT

## 2017-08-25 PROCEDURE — 70490 CT SOFT TISSUE NECK W/O DYE: CPT

## 2017-08-25 PROCEDURE — 85027 COMPLETE CBC AUTOMATED: CPT

## 2017-08-25 PROCEDURE — 87086 URINE CULTURE/COLONY COUNT: CPT

## 2017-08-25 PROCEDURE — 83036 HEMOGLOBIN GLYCOSYLATED A1C: CPT

## 2017-08-25 PROCEDURE — 87040 BLOOD CULTURE FOR BACTERIA: CPT

## 2017-08-25 PROCEDURE — 71045 X-RAY EXAM CHEST 1 VIEW: CPT

## 2017-08-25 PROCEDURE — 93005 ELECTROCARDIOGRAM TRACING: CPT

## 2017-08-25 PROCEDURE — 84100 ASSAY OF PHOSPHORUS: CPT

## 2017-08-25 PROCEDURE — 76705 ECHO EXAM OF ABDOMEN: CPT

## 2017-08-25 PROCEDURE — 96366 THER/PROPH/DIAG IV INF ADDON: CPT

## 2017-08-25 PROCEDURE — 96365 THER/PROPH/DIAG IV INF INIT: CPT

## 2017-08-25 PROCEDURE — 36415 COLL VENOUS BLD VENIPUNCTURE: CPT

## 2017-08-25 RX ORDER — ATORVASTATIN CALCIUM 80 MG/1
1 TABLET, FILM COATED ORAL
Qty: 0 | Refills: 0 | COMMUNITY

## 2017-08-25 RX ORDER — WARFARIN SODIUM 2.5 MG/1
1 TABLET ORAL
Qty: 0 | Refills: 0 | COMMUNITY

## 2017-08-25 RX ORDER — WARFARIN SODIUM 2.5 MG/1
1 TABLET ORAL
Qty: 7 | Refills: 0
Start: 2017-08-25 | End: 2017-09-01

## 2017-08-25 RX ORDER — AMIODARONE HYDROCHLORIDE 400 MG/1
1 TABLET ORAL
Qty: 0 | Refills: 0 | COMMUNITY

## 2017-08-25 RX ORDER — WARFARIN SODIUM 2.5 MG/1
1 TABLET ORAL
Qty: 0 | Refills: 0 | COMMUNITY
Start: 2017-08-25 | End: 2017-08-26

## 2017-08-25 RX ORDER — WARFARIN SODIUM 2.5 MG/1
1 TABLET ORAL ONCE
Qty: 0 | Refills: 0 | Status: DISCONTINUED | OUTPATIENT
Start: 2017-08-25 | End: 2017-08-25

## 2017-08-25 RX ADMIN — HEPARIN SODIUM 0 UNIT(S)/HR: 5000 INJECTION INTRAVENOUS; SUBCUTANEOUS at 07:46

## 2017-08-25 RX ADMIN — LEVETIRACETAM 400 MILLIGRAM(S): 250 TABLET, FILM COATED ORAL at 05:13

## 2017-08-25 RX ADMIN — FAMOTIDINE 20 MILLIGRAM(S): 10 INJECTION INTRAVENOUS at 11:30

## 2017-08-25 RX ADMIN — Medication 1: at 12:06

## 2017-08-25 NOTE — PROGRESS NOTE ADULT - ATTENDING COMMENTS
62 M PMHx CAD, CABG, mechanical AVR (on warfarin), CVA, seizure disorder admitted with subglossal bleeding/hematoma, coagulopathy (due to meds), transaminitis, hyperbilirubinemia, leucocytosis.     -neuro stable  -continue keppra  -hold aspirin  -hold warfarin, INR 1.95 today, goal 2.5-3.5 with mechanical valve, suggest to start iv heparin when ok with ENT/cardio and monitor hematoma  -resp status stable  -seen by ENT, ok for diet, will start after speech/swallow eval  -aspiration precautions  -check RUQ US, trend LFTs, holding amio, statin  -no obvious infection source, cx pending  -PT, OOB  -stable for medicine
I saw and examined the patient.
I was performed the exam on the patient.
Chart reviewed  Pt seen and examined  Agree with plan as outlined above
Agree with above. Continue heparin gtt and start coumadin 3mg po tonight. Goal INR 2-3. AF is rate controlled, continue with Toprol XL. Amio has been held because of transaminitis. Will follow with you
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  as pt hematoma stable clear by ent dr gannon . dysphagia cont diet , aspiration precaution... For rate control of A.fib, recommend on Toprol XL 25mg daily. Amiodarone was discontinued in setting of transaminitis. fu lft hep panel.

## 2017-08-25 NOTE — PROGRESS NOTE ADULT - ASSESSMENT
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     - INR 2.27 today. Continue Coumadin. In the setting of af and mechanical avr, goal INR would be 2-3.   - HR stable in 80's. For rate control of A.fib, consider starting on Toprol XL 25mg daily. Amiodarone was discontinued in setting of transaminitis.   -Transaminitis, improving, unclear etiology. US abdomen performed: The gallbladder is contracted and mildly thick walled without stone or biliary dilatation or elicited Good sign. There is trace pericholecystic fluid. The liver is grossly normal. Hold statin until hepatic issues better defined  -BP stable in 110/70's.   - no evidence for ischemia or volume overload  -Last echo performed with outpatient cardiologist. LVEF 55%, mild concentric LV hypertrophy, mild to mod AR (7/24/17). No need for repeat echo at this time.   -will follow   62 year old M with PMHx of HTN,  A.fib on Coumadin, CABG (2007),  mechanical AVR, CVA with right hemiparesis S/P craniectomy (July 2015) who presented with swelling/hematoma under tongue, supratherapeutic INR> 15.     - supratherapeutic INR on admission, INR 1.59 today. Unclear if coagulopathy reflects a lack of monitoring or hepatic dysfunction, noting transaminitis. Recommend restarting Coumadin. In the setting of af and mechanical avr, goal INR would be 2-2.5.   - HR stable in 80's. For rate control of A.fib, recommend starting on Toprol XL 25mg daily. Amiodarone was discontinued in setting of transaminitis.   -Transaminitis, unclear etiology. US abdomen performed: The gallbladder is contracted and mildly thick walled without stone or biliary dilatation or elicited Good sign. There is trace pericholecystic fluid. The liver is grossly normal. Hold statin until hepatic issues better defined  -BP stable in 110/70's.   - no evidence for ischemia or volume overload  -Last echo performed with outpatient cardiologist. LVEF 55%, mild concentric LV hypertrophy, mild to mod AR (7/24/17). No need for repeat echo at this time.   -will follow

## 2017-08-25 NOTE — PROGRESS NOTE ADULT - SUBJECTIVE AND OBJECTIVE BOX
[facility]    Rachel Carranza, Joshua, Urbano, Maximo, Cornelio, Kalli     [phone]    CHIEF COMPLAINT: Patient is a 62y old  Male who presents with a chief complaint of family found blood clot in the mouth. send by PMD (24 Aug 2017 14:11)      Follow Up: hematoma under tongue, supratherapeutic INR>15     Interim history: Patient was seen and examined lying comfortably in bed in NAD. Patient's daughter and son at bedside stated that their father has no new complaints. There were no changes in the patient's speech. He denied chest pain, palpitations, sob, abdominal pain.     MEDICATIONS  (STANDING):  levETIRAcetam  IVPB 750 milliGRAM(s) IV Intermittent every 12 hours  famotidine Injectable 20 milliGRAM(s) IV Push daily  insulin lispro (HumaLOG) corrective regimen sliding scale   SubCutaneous Before meals and at bedtime  dextrose 5%. 1000 milliLiter(s) (50 mL/Hr) IV Continuous <Continuous>  dextrose 50% Injectable 12.5 Gram(s) IV Push once  dextrose 50% Injectable 25 Gram(s) IV Push once  dextrose 50% Injectable 25 Gram(s) IV Push once  warfarin 1 milliGRAM(s) Oral once    MEDICATIONS  (PRN):  dextrose Gel 1 Dose(s) Oral once PRN Blood Glucose LESS THAN 70 milliGRAM(s)/deciliter  glucagon  Injectable 1 milliGRAM(s) IntraMuscular once PRN Glucose LESS THAN 70 milligrams/deciliter      REVIEW OF SYSTEMS:  eye, ent, GI, , allergic, dermatologic, musculoskeletal and neurologic are negative except as described above    Vital Signs Last 24 Hrs  T(C): 36.8 (25 Aug 2017 04:49), Max: 36.9 (24 Aug 2017 12:59)  T(F): 98.2 (25 Aug 2017 04:49), Max: 98.5 (24 Aug 2017 12:59)  HR: 76 (25 Aug 2017 04:49) (76 - 95)  BP: 104/63 (25 Aug 2017 04:49) (104/63 - 123/90)  BP(mean): --  RR: 16 (25 Aug 2017 04:49) (16 - 16)  SpO2: 100% (25 Aug 2017 04:49) (96% - 100%)    I&O's Summary    24 Aug 2017 07:01  -  25 Aug 2017 07:00  --------------------------------------------------------  IN: 404 mL / OUT: 0 mL / NET: 404 mL    25 Aug 2017 07:01  -  25 Aug 2017 12:39  --------------------------------------------------------  IN: 246 mL / OUT: 0 mL / NET: 246 mL        Telemetry past 24h: not on tele    PHYSICAL EXAM:    Constitutional: Middle aged male lying comfortably in bed, no respiratory distress, well-nourished, well-developed, NAD   HEENT:  Left sided craniectomy, MMM, sclerae anicteric, conjunctivae clear, no oral cyanosis. Uvula apparent and hematoma under tongue has improved.   Pulmonary: Non-labored, breath sounds are clear bilaterally, No wheezing, rales or rhonchi  Cardiovascular: Regular, S1 and S2, No murmurs, rubs, gallops or clicks  Gastrointestinal: Bowel Sounds present, soft, nontender.   Lymph: No peripheral edema. No lymphadenopathy.  Neurological: Alert, no focal deficits. Right sided hemiparesis   Skin: Dried blood noted under left middle finger.   Psych:  Unable to assess    LABS: All Labs Reviewed:                        10.4   12.9  )-----------( 306      ( 25 Aug 2017 06:39 )             31.7                         11.4   14.4  )-----------( 338      ( 24 Aug 2017 06:53 )             35.1                         10.6   17.2  )-----------( 315      ( 24 Aug 2017 01:10 )             31.9     25 Aug 2017 09:43    133    |  98     |  9      ----------------------------<  223    3.9     |  29     |  0.65   25 Aug 2017 06:39    129    |  94     |  8      ----------------------------<  123    3.7     |  28     |  0.53   24 Aug 2017 06:53    136    |  99     |  10     ----------------------------<  112    4.4     |  29     |  0.56     Ca    7.6        25 Aug 2017 09:43  Ca    7.4        25 Aug 2017 06:39  Ca    7.8        24 Aug 2017 06:53  Phos  3.6       24 Aug 2017 06:53  Phos  2.9       23 Aug 2017 06:16  Mg     1.8       24 Aug 2017 06:53  Mg     1.6       23 Aug 2017 06:16    TPro  6.0    /  Alb  1.7    /  TBili  2.1    /  DBili  x      /  AST  113    /  ALT  74     /  AlkPhos  524    25 Aug 2017 06:39  TPro  6.4    /  Alb  1.8    /  TBili  2.4    /  DBili  x      /  AST  118    /  ALT  72     /  AlkPhos  530    24 Aug 2017 06:53  TPro  6.2    /  Alb  1.7    /  TBili  2.3    /  DBili  1.40   /  AST  106    /  ALT  75     /  AlkPhos  521    23 Aug 2017 06:16    PT/INR - ( 25 Aug 2017 06:39 )   PT: 25.2 sec;   INR: 2.27 ratio         PTT - ( 25 Aug 2017 06:39 )  PTT:139.0 sec  CARDIAC MARKERS ( 24 Aug 2017 06:53 )  x     / x     / 107 U/L / x     / x          Blood Culture: negative x2        RADIOLOGY: no new imaging performed    EKG:  Ventricular Rate 77 BPM    Atrial Rate 78 BPM    QRS Duration 100 ms    Q-T Interval 444 ms    QTC Calculation(Bezet) 502 ms    R Axis -15 degrees    T Axis 89 degrees    Diagnosis Line Atrial fibrillation  Possible Inferior infarct , age undetermined  Prolonged QT  Abnormal ECG

## 2017-08-27 RX ORDER — WARFARIN SODIUM 2.5 MG/1
0.5 TABLET ORAL
Qty: 0 | Refills: 0 | COMMUNITY
Start: 2017-08-27 | End: 2017-08-27

## 2017-08-28 LAB
CULTURE RESULTS: SIGNIFICANT CHANGE UP
CULTURE RESULTS: SIGNIFICANT CHANGE UP
SPECIMEN SOURCE: SIGNIFICANT CHANGE UP
SPECIMEN SOURCE: SIGNIFICANT CHANGE UP

## 2017-08-29 DIAGNOSIS — Z79.01 LONG TERM (CURRENT) USE OF ANTICOAGULANTS: ICD-10-CM

## 2017-08-29 DIAGNOSIS — I25.10 ATHEROSCLEROTIC HEART DISEASE OF NATIVE CORONARY ARTERY WITHOUT ANGINA PECTORIS: ICD-10-CM
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
## 2017-08-29 DIAGNOSIS — K14.8 OTHER DISEASES OF TONGUE: ICD-10-CM

## 2017-08-29 DIAGNOSIS — T45.511A POISONING BY ANTICOAGULANTS, ACCIDENTAL (UNINTENTIONAL), INITIAL ENCOUNTER: ICD-10-CM

## 2017-08-29 DIAGNOSIS — J39.2 OTHER DISEASES OF PHARYNX: ICD-10-CM

## 2017-08-29 DIAGNOSIS — T45.515A ADVERSE EFFECT OF ANTICOAGULANTS, INITIAL ENCOUNTER: ICD-10-CM

## 2017-08-29 DIAGNOSIS — I69.351 HEMIPLEGIA AND HEMIPARESIS FOLLOWING CEREBRAL INFARCTION AFFECTING RIGHT DOMINANT SIDE: ICD-10-CM

## 2017-08-29 DIAGNOSIS — E80.6 OTHER DISORDERS OF BILIRUBIN METABOLISM: ICD-10-CM
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
## 2017-08-29 DIAGNOSIS — I48.91 UNSPECIFIED ATRIAL FIBRILLATION: ICD-10-CM

## 2017-08-29 DIAGNOSIS — I49.9 CARDIAC ARRHYTHMIA, UNSPECIFIED: ICD-10-CM

## 2017-08-29 DIAGNOSIS — E11.9 TYPE 2 DIABETES MELLITUS WITHOUT COMPLICATIONS: ICD-10-CM

## 2017-08-29 DIAGNOSIS — Z95.1 PRESENCE OF AORTOCORONARY BYPASS GRAFT: ICD-10-CM

## 2017-08-29 DIAGNOSIS — I10 ESSENTIAL (PRIMARY) HYPERTENSION: ICD-10-CM

## 2017-08-29 DIAGNOSIS — D68.32 HEMORRHAGIC DISORDER DUE TO EXTRINSIC CIRCULATING ANTICOAGULANTS: ICD-10-CM

## 2018-05-11 NOTE — PATIENT PROFILE ADULT. - SPIRITUAL CULTURAL, RELIGIOUS PRACTICES/VALUES, PROFILE
Consent 3/Introductory Paragraph: I gave the patient a chance to ask questions they had about the procedure.  Following this I explained the Mohs procedure and consent was obtained. The risks, benefits and alternatives to therapy were discussed in detail. Specifically, the risks of infection, scarring, bleeding, prolonged wound healing, incomplete removal, allergy to anesthesia, nerve injury and recurrence were addressed. Prior to the procedure, the treatment site was clearly identified and confirmed by the patient. All components of Universal Protocol/PAUSE Rule completed. none

## 2019-04-12 ENCOUNTER — EMERGENCY (EMERGENCY)
Facility: HOSPITAL | Age: 64
LOS: 1 days | Discharge: HOME | End: 2019-04-12
Attending: EMERGENCY MEDICINE | Admitting: EMERGENCY MEDICINE
Payer: MEDICAID

## 2019-04-12 VITALS
DIASTOLIC BLOOD PRESSURE: 78 MMHG | SYSTOLIC BLOOD PRESSURE: 118 MMHG | OXYGEN SATURATION: 98 % | HEART RATE: 78 BPM | RESPIRATION RATE: 18 BRPM

## 2019-04-12 VITALS
OXYGEN SATURATION: 97 % | HEIGHT: 68 IN | TEMPERATURE: 98 F | HEART RATE: 86 BPM | WEIGHT: 149.03 LBS | DIASTOLIC BLOOD PRESSURE: 84 MMHG | RESPIRATION RATE: 18 BRPM | SYSTOLIC BLOOD PRESSURE: 113 MMHG

## 2019-04-12 DIAGNOSIS — Z95.1 PRESENCE OF AORTOCORONARY BYPASS GRAFT: Chronic | ICD-10-CM

## 2019-04-12 DIAGNOSIS — Z98.890 OTHER SPECIFIED POSTPROCEDURAL STATES: Chronic | ICD-10-CM

## 2019-04-12 PROBLEM — I10 ESSENTIAL (PRIMARY) HYPERTENSION: Chronic | Status: ACTIVE | Noted: 2017-08-22

## 2019-04-12 PROBLEM — I63.9 CEREBRAL INFARCTION, UNSPECIFIED: Chronic | Status: ACTIVE | Noted: 2017-08-22

## 2019-04-12 PROBLEM — E11.9 TYPE 2 DIABETES MELLITUS WITHOUT COMPLICATIONS: Chronic | Status: ACTIVE | Noted: 2017-08-22

## 2019-04-12 LAB
ALBUMIN SERPL ELPH-MCNC: 3 G/DL — LOW (ref 3.3–5)
ALP SERPL-CCNC: 324 U/L — HIGH (ref 40–120)
ALT FLD-CCNC: 29 U/L — SIGNIFICANT CHANGE UP (ref 12–78)
ANION GAP SERPL CALC-SCNC: 7 MMOL/L — SIGNIFICANT CHANGE UP (ref 5–17)
APTT BLD: 39.6 SEC — HIGH (ref 28.5–37)
AST SERPL-CCNC: 19 U/L — SIGNIFICANT CHANGE UP (ref 15–37)
BASOPHILS # BLD AUTO: 0.05 K/UL — SIGNIFICANT CHANGE UP (ref 0–0.2)
BASOPHILS NFR BLD AUTO: 0.5 % — SIGNIFICANT CHANGE UP (ref 0–2)
BILIRUB SERPL-MCNC: 0.8 MG/DL — SIGNIFICANT CHANGE UP (ref 0.2–1.2)
BUN SERPL-MCNC: 33 MG/DL — HIGH (ref 7–23)
CALCIUM SERPL-MCNC: 9 MG/DL — SIGNIFICANT CHANGE UP (ref 8.5–10.1)
CHLORIDE SERPL-SCNC: 89 MMOL/L — LOW (ref 96–108)
CO2 SERPL-SCNC: 32 MMOL/L — HIGH (ref 22–31)
CREAT SERPL-MCNC: 1.2 MG/DL — SIGNIFICANT CHANGE UP (ref 0.5–1.3)
EOSINOPHIL # BLD AUTO: 0.19 K/UL — SIGNIFICANT CHANGE UP (ref 0–0.5)
EOSINOPHIL NFR BLD AUTO: 2 % — SIGNIFICANT CHANGE UP (ref 0–6)
GLUCOSE SERPL-MCNC: 460 MG/DL — CRITICAL HIGH (ref 70–99)
HCT VFR BLD CALC: 39.7 % — SIGNIFICANT CHANGE UP (ref 39–50)
HGB BLD-MCNC: 13.2 G/DL — SIGNIFICANT CHANGE UP (ref 13–17)
IMM GRANULOCYTES NFR BLD AUTO: 0.3 % — SIGNIFICANT CHANGE UP (ref 0–1.5)
INR BLD: 2.46 RATIO — HIGH (ref 0.88–1.16)
LYMPHOCYTES # BLD AUTO: 2.22 K/UL — SIGNIFICANT CHANGE UP (ref 1–3.3)
LYMPHOCYTES # BLD AUTO: 23.6 % — SIGNIFICANT CHANGE UP (ref 13–44)
MCHC RBC-ENTMCNC: 27.2 PG — SIGNIFICANT CHANGE UP (ref 27–34)
MCHC RBC-ENTMCNC: 33.2 GM/DL — SIGNIFICANT CHANGE UP (ref 32–36)
MCV RBC AUTO: 81.7 FL — SIGNIFICANT CHANGE UP (ref 80–100)
MONOCYTES # BLD AUTO: 0.94 K/UL — HIGH (ref 0–0.9)
MONOCYTES NFR BLD AUTO: 10 % — SIGNIFICANT CHANGE UP (ref 2–14)
NEUTROPHILS # BLD AUTO: 5.99 K/UL — SIGNIFICANT CHANGE UP (ref 1.8–7.4)
NEUTROPHILS NFR BLD AUTO: 63.6 % — SIGNIFICANT CHANGE UP (ref 43–77)
NRBC # BLD: 0 /100 WBCS — SIGNIFICANT CHANGE UP (ref 0–0)
PLATELET # BLD AUTO: 256 K/UL — SIGNIFICANT CHANGE UP (ref 150–400)
POTASSIUM SERPL-MCNC: 5 MMOL/L — SIGNIFICANT CHANGE UP (ref 3.5–5.3)
POTASSIUM SERPL-SCNC: 5 MMOL/L — SIGNIFICANT CHANGE UP (ref 3.5–5.3)
PROT SERPL-MCNC: 8.6 G/DL — HIGH (ref 6–8.3)
PROTHROM AB SERPL-ACNC: 28.8 SEC — HIGH (ref 10–12.9)
RBC # BLD: 4.86 M/UL — SIGNIFICANT CHANGE UP (ref 4.2–5.8)
RBC # FLD: 14.3 % — SIGNIFICANT CHANGE UP (ref 10.3–14.5)
SODIUM SERPL-SCNC: 128 MMOL/L — LOW (ref 135–145)
WBC # BLD: 9.42 K/UL — SIGNIFICANT CHANGE UP (ref 3.8–10.5)
WBC # FLD AUTO: 9.42 K/UL — SIGNIFICANT CHANGE UP (ref 3.8–10.5)

## 2019-04-12 PROCEDURE — 96372 THER/PROPH/DIAG INJ SC/IM: CPT | Mod: XU

## 2019-04-12 PROCEDURE — 85610 PROTHROMBIN TIME: CPT

## 2019-04-12 PROCEDURE — 99285 EMERGENCY DEPT VISIT HI MDM: CPT

## 2019-04-12 PROCEDURE — 85027 COMPLETE CBC AUTOMATED: CPT

## 2019-04-12 PROCEDURE — 96374 THER/PROPH/DIAG INJ IV PUSH: CPT

## 2019-04-12 PROCEDURE — 70450 CT HEAD/BRAIN W/O DYE: CPT

## 2019-04-12 PROCEDURE — 80053 COMPREHEN METABOLIC PANEL: CPT

## 2019-04-12 PROCEDURE — 70450 CT HEAD/BRAIN W/O DYE: CPT | Mod: 26

## 2019-04-12 PROCEDURE — 85730 THROMBOPLASTIN TIME PARTIAL: CPT

## 2019-04-12 PROCEDURE — 82962 GLUCOSE BLOOD TEST: CPT

## 2019-04-12 PROCEDURE — 93005 ELECTROCARDIOGRAM TRACING: CPT

## 2019-04-12 PROCEDURE — 36415 COLL VENOUS BLD VENIPUNCTURE: CPT

## 2019-04-12 PROCEDURE — 93010 ELECTROCARDIOGRAM REPORT: CPT

## 2019-04-12 PROCEDURE — 99284 EMERGENCY DEPT VISIT MOD MDM: CPT | Mod: 25

## 2019-04-12 RX ORDER — FAMOTIDINE 10 MG/ML
20 INJECTION INTRAVENOUS ONCE
Qty: 0 | Refills: 0 | Status: COMPLETED | OUTPATIENT
Start: 2019-04-12 | End: 2019-04-12

## 2019-04-12 RX ORDER — INSULIN LISPRO 100/ML
10 VIAL (ML) SUBCUTANEOUS ONCE
Qty: 0 | Refills: 0 | Status: COMPLETED | OUTPATIENT
Start: 2019-04-12 | End: 2019-04-12

## 2019-04-12 RX ORDER — SODIUM CHLORIDE 9 MG/ML
1000 INJECTION INTRAMUSCULAR; INTRAVENOUS; SUBCUTANEOUS
Qty: 0 | Refills: 0 | Status: DISCONTINUED | OUTPATIENT
Start: 2019-04-12 | End: 2019-04-16

## 2019-04-12 RX ORDER — SODIUM CHLORIDE 9 MG/ML
3 INJECTION INTRAMUSCULAR; INTRAVENOUS; SUBCUTANEOUS ONCE
Qty: 0 | Refills: 0 | Status: COMPLETED | OUTPATIENT
Start: 2019-04-12 | End: 2019-04-12

## 2019-04-12 RX ADMIN — SODIUM CHLORIDE 3 MILLILITER(S): 9 INJECTION INTRAMUSCULAR; INTRAVENOUS; SUBCUTANEOUS at 11:38

## 2019-04-12 RX ADMIN — FAMOTIDINE 20 MILLIGRAM(S): 10 INJECTION INTRAVENOUS at 11:38

## 2019-04-12 RX ADMIN — Medication 10 UNIT(S): at 13:11

## 2019-04-12 RX ADMIN — SODIUM CHLORIDE 125 MILLILITER(S): 9 INJECTION INTRAMUSCULAR; INTRAVENOUS; SUBCUTANEOUS at 11:38

## 2019-04-12 NOTE — ED ADULT NURSE NOTE - NSIMPLEMENTINTERV_GEN_ALL_ED
Implemented All Fall with Harm Risk Interventions:  [place] to call system. Call bell, personal items and telephone within reach. Instruct patient to call for assistance. Room bathroom lighting operational. Non-slip footwear when patient is off stretcher. Physically safe environment: no spills, clutter or unnecessary equipment. Stretcher in lowest position, wheels locked, appropriate side rails in place. Provide visual cue, wrist band, yellow gown, etc. Monitor gait and stability. Monitor for mental status changes and reorient to person, place, and time. Review medications for side effects contributing to fall risk. Reinforce activity limits and safety measures with patient and family. Provide visual clues: red socks.

## 2019-04-12 NOTE — ED ADULT TRIAGE NOTE - CHIEF COMPLAINT QUOTE
Weakness and headache. Per family, he gets dizzy and is having episodes of unresponsiveness. Weakness and headache. Per family, he gets dizzy and is having episodes of unresponsiveness.  in triage

## 2019-04-12 NOTE — ED PROVIDER NOTE - PROGRESS NOTE DETAILS
As per family , back to baseline. All results were explained to patient and/or family and a copy of all available results given.  pt felt better.

## 2019-04-12 NOTE — ED PROVIDER NOTE - OBJECTIVE STATEMENT
decreased mentation and nonverbal communication as per daughters x 2 days.    appetite normal.  No pain.  right hemiparesis due to old cva decreased mentation and nonverbal communication as per daughters x 2 days.    appetite normal.  No pain.  right hemiparesis due to old cva.  No other complaint.  HO from daughter Jeffrey Orozco.  pt is wheelchair bound from home.

## 2020-06-22 ENCOUNTER — EMERGENCY (EMERGENCY)
Facility: HOSPITAL | Age: 65
LOS: 1 days | Discharge: HOME | End: 2020-06-22
Attending: EMERGENCY MEDICINE | Admitting: EMERGENCY MEDICINE
Payer: MEDICAID

## 2020-06-22 VITALS
OXYGEN SATURATION: 97 % | HEART RATE: 97 BPM | WEIGHT: 169.98 LBS | HEIGHT: 68 IN | SYSTOLIC BLOOD PRESSURE: 121 MMHG | RESPIRATION RATE: 18 BRPM | DIASTOLIC BLOOD PRESSURE: 76 MMHG | TEMPERATURE: 97 F

## 2020-06-22 VITALS
SYSTOLIC BLOOD PRESSURE: 119 MMHG | DIASTOLIC BLOOD PRESSURE: 74 MMHG | TEMPERATURE: 98 F | OXYGEN SATURATION: 100 % | RESPIRATION RATE: 17 BRPM | HEART RATE: 78 BPM

## 2020-06-22 DIAGNOSIS — Z95.1 PRESENCE OF AORTOCORONARY BYPASS GRAFT: Chronic | ICD-10-CM

## 2020-06-22 DIAGNOSIS — Z98.890 OTHER SPECIFIED POSTPROCEDURAL STATES: Chronic | ICD-10-CM

## 2020-06-22 LAB
ALBUMIN SERPL ELPH-MCNC: 3.1 G/DL — LOW (ref 3.3–5)
ALP SERPL-CCNC: 243 U/L — HIGH (ref 40–120)
ALT FLD-CCNC: 33 U/L — SIGNIFICANT CHANGE UP (ref 12–78)
ANION GAP SERPL CALC-SCNC: 8 MMOL/L — SIGNIFICANT CHANGE UP (ref 5–17)
AST SERPL-CCNC: 37 U/L — SIGNIFICANT CHANGE UP (ref 15–37)
BASOPHILS # BLD AUTO: 0.01 K/UL — SIGNIFICANT CHANGE UP (ref 0–0.2)
BASOPHILS NFR BLD AUTO: 0.1 % — SIGNIFICANT CHANGE UP (ref 0–2)
BILIRUB SERPL-MCNC: 0.4 MG/DL — SIGNIFICANT CHANGE UP (ref 0.2–1.2)
BUN SERPL-MCNC: 49 MG/DL — HIGH (ref 7–23)
CALCIUM SERPL-MCNC: 8.7 MG/DL — SIGNIFICANT CHANGE UP (ref 8.5–10.1)
CHLORIDE SERPL-SCNC: 98 MMOL/L — SIGNIFICANT CHANGE UP (ref 96–108)
CO2 SERPL-SCNC: 26 MMOL/L — SIGNIFICANT CHANGE UP (ref 22–31)
CREAT SERPL-MCNC: 1.4 MG/DL — HIGH (ref 0.5–1.3)
D DIMER BLD IA.RAPID-MCNC: 1034 NG/ML DDU — HIGH
EOSINOPHIL # BLD AUTO: 0.01 K/UL — SIGNIFICANT CHANGE UP (ref 0–0.5)
EOSINOPHIL NFR BLD AUTO: 0.1 % — SIGNIFICANT CHANGE UP (ref 0–6)
GLUCOSE SERPL-MCNC: 224 MG/DL — HIGH (ref 70–99)
HCT VFR BLD CALC: 34.5 % — LOW (ref 39–50)
HGB BLD-MCNC: 11.2 G/DL — LOW (ref 13–17)
IMM GRANULOCYTES NFR BLD AUTO: 0.6 % — SIGNIFICANT CHANGE UP (ref 0–1.5)
LACTATE SERPL-SCNC: 1.5 MMOL/L — SIGNIFICANT CHANGE UP (ref 0.7–2)
LYMPHOCYTES # BLD AUTO: 1.02 K/UL — SIGNIFICANT CHANGE UP (ref 1–3.3)
LYMPHOCYTES # BLD AUTO: 9.1 % — LOW (ref 13–44)
MCHC RBC-ENTMCNC: 26.5 PG — LOW (ref 27–34)
MCHC RBC-ENTMCNC: 32.5 GM/DL — SIGNIFICANT CHANGE UP (ref 32–36)
MCV RBC AUTO: 81.8 FL — SIGNIFICANT CHANGE UP (ref 80–100)
MONOCYTES # BLD AUTO: 0.68 K/UL — SIGNIFICANT CHANGE UP (ref 0–0.9)
MONOCYTES NFR BLD AUTO: 6.1 % — SIGNIFICANT CHANGE UP (ref 2–14)
NEUTROPHILS # BLD AUTO: 9.43 K/UL — HIGH (ref 1.8–7.4)
NEUTROPHILS NFR BLD AUTO: 84 % — HIGH (ref 43–77)
NRBC # BLD: 0 /100 WBCS — SIGNIFICANT CHANGE UP (ref 0–0)
PLATELET # BLD AUTO: 255 K/UL — SIGNIFICANT CHANGE UP (ref 150–400)
POTASSIUM SERPL-MCNC: 5.4 MMOL/L — HIGH (ref 3.5–5.3)
POTASSIUM SERPL-SCNC: 5.4 MMOL/L — HIGH (ref 3.5–5.3)
PROCALCITONIN SERPL-MCNC: 0.09 NG/ML — HIGH (ref 0–0.04)
PROT SERPL-MCNC: 8 G/DL — SIGNIFICANT CHANGE UP (ref 6–8.3)
RBC # BLD: 4.22 M/UL — SIGNIFICANT CHANGE UP (ref 4.2–5.8)
RBC # FLD: 14.4 % — SIGNIFICANT CHANGE UP (ref 10.3–14.5)
SODIUM SERPL-SCNC: 132 MMOL/L — LOW (ref 135–145)
WBC # BLD: 11.22 K/UL — HIGH (ref 3.8–10.5)
WBC # FLD AUTO: 11.22 K/UL — HIGH (ref 3.8–10.5)

## 2020-06-22 PROCEDURE — 36415 COLL VENOUS BLD VENIPUNCTURE: CPT

## 2020-06-22 PROCEDURE — U0003: CPT

## 2020-06-22 PROCEDURE — 85379 FIBRIN DEGRADATION QUANT: CPT

## 2020-06-22 PROCEDURE — 82728 ASSAY OF FERRITIN: CPT

## 2020-06-22 PROCEDURE — 83605 ASSAY OF LACTIC ACID: CPT

## 2020-06-22 PROCEDURE — 71045 X-RAY EXAM CHEST 1 VIEW: CPT

## 2020-06-22 PROCEDURE — 93010 ELECTROCARDIOGRAM REPORT: CPT

## 2020-06-22 PROCEDURE — 71045 X-RAY EXAM CHEST 1 VIEW: CPT | Mod: 26

## 2020-06-22 PROCEDURE — 86140 C-REACTIVE PROTEIN: CPT

## 2020-06-22 PROCEDURE — 84145 PROCALCITONIN (PCT): CPT

## 2020-06-22 PROCEDURE — 93005 ELECTROCARDIOGRAM TRACING: CPT

## 2020-06-22 PROCEDURE — 99284 EMERGENCY DEPT VISIT MOD MDM: CPT

## 2020-06-22 PROCEDURE — 87040 BLOOD CULTURE FOR BACTERIA: CPT

## 2020-06-22 PROCEDURE — 85610 PROTHROMBIN TIME: CPT

## 2020-06-22 PROCEDURE — 85027 COMPLETE CBC AUTOMATED: CPT

## 2020-06-22 PROCEDURE — 99285 EMERGENCY DEPT VISIT HI MDM: CPT | Mod: 25

## 2020-06-22 PROCEDURE — 80053 COMPREHEN METABOLIC PANEL: CPT

## 2020-06-22 PROCEDURE — 71250 CT THORAX DX C-: CPT

## 2020-06-22 PROCEDURE — 71250 CT THORAX DX C-: CPT | Mod: 26

## 2020-06-22 RX ORDER — SODIUM CHLORIDE 9 MG/ML
1000 INJECTION INTRAMUSCULAR; INTRAVENOUS; SUBCUTANEOUS ONCE
Refills: 0 | Status: COMPLETED | OUTPATIENT
Start: 2020-06-22 | End: 2020-06-22

## 2020-06-22 RX ADMIN — SODIUM CHLORIDE 1000 MILLILITER(S): 9 INJECTION INTRAMUSCULAR; INTRAVENOUS; SUBCUTANEOUS at 17:24

## 2020-06-22 NOTE — ED PROVIDER NOTE - ATTENDING CONTRIBUTION TO CARE
Pt seen and examined and d/w PA.  agree with a and p.  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 pt began with fever 2 weeks ago and saw pmd and had + ccovid 19 test. pt denies sob, cp, but pmd got outpt cxr and ? lobar pn. pt sent to er. on exam, nad, right hemiplegia, mmm, lungs cta, no wheeze,  wbc 11k,  ct chest with no pneumonia, sats over 95% and no distress, d/c home, has pulse ox,  fu with pmd, home quarentine to continue

## 2020-06-22 NOTE — ED PROVIDER NOTE - OBJECTIVE STATEMENT
65 y male brought to ED by daughter, who states patient tested + for covid 19 2 weeks ago, was seen by his PMD Dr Rios, who did the test related to patients PMH, a cxr was done after visit, was sent to ED for evaluation to check for pna.  states patient has had not fever, no cough, no nvd, has never had any symptoms,  does not ambulate,  has been eating and drinking well.    PMH:  Arrhythmia    CVA (cerebral vascular accident)  with residual left side weakness  DM (diabetes mellitus)    HTN (hypertension)    hx of left brain/skull surgery several years ago.

## 2020-06-22 NOTE — ED PROVIDER NOTE - PATIENT PORTAL LINK FT
You can access the FollowMyHealth Patient Portal offered by [facility] by registering at the following website: [email or web address]. By joining [employer]’s FollowMyHealth portal, you will also be able to view your health information using other applications (apps) compatible with our system.

## 2020-06-22 NOTE — ED PROVIDER NOTE - PROVIDER TOKENS
FREE:[LAST:[Dr Rios],PHONE:[(   )    -],FAX:[(   )    -],FOLLOWUP:[1-3 Days],ESTABLISHEDPATIENT:[T]]

## 2020-06-22 NOTE — ED PROVIDER NOTE - CPE EDP GU NORM
"-- Message is from the [facility]--    Order Request  Lab: blood work     Message / reason: Doctor had stated that he wanted patient to have blood tested     Insurance type: [employer] community medicaid   Payor: [address] / Plan: [place] MEDICAID HMO / Product Type: T19 HMO           Preferred Delivery Method   Call when ready for pickup - phone number to notify: [phone]     [address]       Type Contact Phone    10/09/2019 10:43 PM Phone (Incoming) Corey Jannette (Self) [phone] (H)          Alternative phone number: none    Turnaround time given to caller: ""This message will be sent to [facility] Provider's name]. The clinical team will fulfill your request as soon as they review your message when the office opens tomorrow. \""  " - - -

## 2020-06-22 NOTE — ED PROVIDER NOTE - NSFOLLOWUPINSTRUCTIONS_ED_ALL_ED_FT
follow up with Dr Rios, call tomorrow to arrange follow up  check patient oxygen level at home  if below 95 return to ED

## 2020-06-22 NOTE — ED PROVIDER NOTE - PROGRESS NOTE DETAILS
results discussed with daughter, vss, sat 100% ora, no tachycardia, patient is on coumadin INR 2.6, copy of results given, advised follow up with Dr Rios, daughter states they have pulse oximeter at home, advised if sat drops below 95, return to ED.

## 2020-06-23 LAB
CRP SERPL-MCNC: 0.28 MG/DL — SIGNIFICANT CHANGE UP (ref 0–0.4)
FERRITIN SERPL-MCNC: 35 NG/ML — SIGNIFICANT CHANGE UP (ref 30–400)
SARS-COV-2 RNA SPEC QL NAA+PROBE: SIGNIFICANT CHANGE UP

## 2020-08-10 NOTE — ED ADULT NURSE NOTE - NS_SISCREENINGSR_GEN_ALL_ED
[FreeTextEntry1] : MARAL GARCIA is a 79 year old F who presents today with the above history and the following active issues:  \par \par Abnormal EKG, with LVH and NSST's.   Nuclear stress testing in June thousand 18 was overall negative for ischemia, in presence of equivocal EKG changes. She denies any exertional chest pain or shortness of breath.  Recent echocardiogram was unremarkable as noted above\par \par She has prior history of Takotsubo cardiomyopathy  after sudden death of her , which has resolved. Currently asymptomatic. \par \par HTN. uncontrolled on low-dose Coreg.  Reduced to 6.25 mg twice daily.  Keep blood pressure log.  The patient claims that her blood pressure at home is quite low, sometimes systolic 110. \par \par Hypercholesterolemia.  Calculated 10-year cardiovascular risk is more than 7.5%.  Statins were started.  Follow-up labs are still  pending.\par \par Advised to seek medical advice prior to starting new homeopathic medications. \par \par History of CLL.  Now with neutropenia.  Follow up with Dr. Adams.\par \par History of DVT during pregnancy. No recurrence.\par \par 
Negative

## 2020-11-17 ENCOUNTER — EMERGENCY (EMERGENCY)
Facility: HOSPITAL | Age: 65
LOS: 1 days | Discharge: HOME | End: 2020-11-17
Attending: EMERGENCY MEDICINE | Admitting: EMERGENCY MEDICINE
Payer: COMMERCIAL

## 2020-11-17 VITALS
TEMPERATURE: 99 F | HEART RATE: 78 BPM | OXYGEN SATURATION: 97 % | RESPIRATION RATE: 16 BRPM | DIASTOLIC BLOOD PRESSURE: 71 MMHG | SYSTOLIC BLOOD PRESSURE: 112 MMHG

## 2020-11-17 VITALS
WEIGHT: 169.98 LBS | OXYGEN SATURATION: 97 % | HEART RATE: 85 BPM | SYSTOLIC BLOOD PRESSURE: 119 MMHG | TEMPERATURE: 99 F | RESPIRATION RATE: 16 BRPM | HEIGHT: 68 IN | DIASTOLIC BLOOD PRESSURE: 78 MMHG

## 2020-11-17 DIAGNOSIS — Z95.1 PRESENCE OF AORTOCORONARY BYPASS GRAFT: Chronic | ICD-10-CM

## 2020-11-17 DIAGNOSIS — Z98.890 OTHER SPECIFIED POSTPROCEDURAL STATES: Chronic | ICD-10-CM

## 2020-11-17 LAB
ALBUMIN SERPL ELPH-MCNC: 3.3 G/DL — SIGNIFICANT CHANGE UP (ref 3.3–5)
ALP SERPL-CCNC: 252 U/L — HIGH (ref 40–120)
ALT FLD-CCNC: 21 U/L — SIGNIFICANT CHANGE UP (ref 12–78)
ANION GAP SERPL CALC-SCNC: 4 MMOL/L — LOW (ref 5–17)
APTT BLD: 55.8 SEC — HIGH (ref 27.5–35.5)
AST SERPL-CCNC: 31 U/L — SIGNIFICANT CHANGE UP (ref 15–37)
BASOPHILS # BLD AUTO: 0.03 K/UL — SIGNIFICANT CHANGE UP (ref 0–0.2)
BASOPHILS NFR BLD AUTO: 0.2 % — SIGNIFICANT CHANGE UP (ref 0–2)
BILIRUB SERPL-MCNC: 0.6 MG/DL — SIGNIFICANT CHANGE UP (ref 0.2–1.2)
BUN SERPL-MCNC: 27 MG/DL — HIGH (ref 7–23)
CALCIUM SERPL-MCNC: 9.2 MG/DL — SIGNIFICANT CHANGE UP (ref 8.5–10.1)
CHLORIDE SERPL-SCNC: 100 MMOL/L — SIGNIFICANT CHANGE UP (ref 96–108)
CO2 SERPL-SCNC: 30 MMOL/L — SIGNIFICANT CHANGE UP (ref 22–31)
CREAT SERPL-MCNC: 1.1 MG/DL — SIGNIFICANT CHANGE UP (ref 0.5–1.3)
EOSINOPHIL # BLD AUTO: 0.15 K/UL — SIGNIFICANT CHANGE UP (ref 0–0.5)
EOSINOPHIL NFR BLD AUTO: 1.1 % — SIGNIFICANT CHANGE UP (ref 0–6)
GLUCOSE SERPL-MCNC: 147 MG/DL — HIGH (ref 70–99)
HCT VFR BLD CALC: 36.3 % — LOW (ref 39–50)
HGB BLD-MCNC: 12 G/DL — LOW (ref 13–17)
IMM GRANULOCYTES NFR BLD AUTO: 0.5 % — SIGNIFICANT CHANGE UP (ref 0–1.5)
INR BLD: 3.5 RATIO — HIGH (ref 0.88–1.16)
LYMPHOCYTES # BLD AUTO: 1.52 K/UL — SIGNIFICANT CHANGE UP (ref 1–3.3)
LYMPHOCYTES # BLD AUTO: 11.3 % — LOW (ref 13–44)
MCHC RBC-ENTMCNC: 26.6 PG — LOW (ref 27–34)
MCHC RBC-ENTMCNC: 33.1 GM/DL — SIGNIFICANT CHANGE UP (ref 32–36)
MCV RBC AUTO: 80.5 FL — SIGNIFICANT CHANGE UP (ref 80–100)
MONOCYTES # BLD AUTO: 1.14 K/UL — HIGH (ref 0–0.9)
MONOCYTES NFR BLD AUTO: 8.5 % — SIGNIFICANT CHANGE UP (ref 2–14)
NEUTROPHILS # BLD AUTO: 10.57 K/UL — HIGH (ref 1.8–7.4)
NEUTROPHILS NFR BLD AUTO: 78.4 % — HIGH (ref 43–77)
NRBC # BLD: 0 /100 WBCS — SIGNIFICANT CHANGE UP (ref 0–0)
PLATELET # BLD AUTO: 333 K/UL — SIGNIFICANT CHANGE UP (ref 150–400)
POTASSIUM SERPL-MCNC: 5.3 MMOL/L — SIGNIFICANT CHANGE UP (ref 3.5–5.3)
POTASSIUM SERPL-SCNC: 5.3 MMOL/L — SIGNIFICANT CHANGE UP (ref 3.5–5.3)
PROT SERPL-MCNC: 8.3 G/DL — SIGNIFICANT CHANGE UP (ref 6–8.3)
PROTHROM AB SERPL-ACNC: 38.6 SEC — HIGH (ref 10.6–13.6)
RBC # BLD: 4.51 M/UL — SIGNIFICANT CHANGE UP (ref 4.2–5.8)
RBC # FLD: 14.4 % — SIGNIFICANT CHANGE UP (ref 10.3–14.5)
SARS-COV-2 RNA SPEC QL NAA+PROBE: SIGNIFICANT CHANGE UP
SODIUM SERPL-SCNC: 134 MMOL/L — LOW (ref 135–145)
TROPONIN I SERPL-MCNC: <.015 NG/ML — SIGNIFICANT CHANGE UP (ref 0.01–0.04)
WBC # BLD: 13.48 K/UL — HIGH (ref 3.8–10.5)
WBC # FLD AUTO: 13.48 K/UL — HIGH (ref 3.8–10.5)

## 2020-11-17 PROCEDURE — 93010 ELECTROCARDIOGRAM REPORT: CPT

## 2020-11-17 PROCEDURE — U0003: CPT

## 2020-11-17 PROCEDURE — 71260 CT THORAX DX C+: CPT

## 2020-11-17 PROCEDURE — 93005 ELECTROCARDIOGRAM TRACING: CPT

## 2020-11-17 PROCEDURE — 36415 COLL VENOUS BLD VENIPUNCTURE: CPT

## 2020-11-17 PROCEDURE — 70450 CT HEAD/BRAIN W/O DYE: CPT

## 2020-11-17 PROCEDURE — 71260 CT THORAX DX C+: CPT | Mod: 26

## 2020-11-17 PROCEDURE — 85730 THROMBOPLASTIN TIME PARTIAL: CPT

## 2020-11-17 PROCEDURE — 80053 COMPREHEN METABOLIC PANEL: CPT

## 2020-11-17 PROCEDURE — 99285 EMERGENCY DEPT VISIT HI MDM: CPT

## 2020-11-17 PROCEDURE — 99284 EMERGENCY DEPT VISIT MOD MDM: CPT | Mod: 25

## 2020-11-17 PROCEDURE — 85610 PROTHROMBIN TIME: CPT

## 2020-11-17 PROCEDURE — 84484 ASSAY OF TROPONIN QUANT: CPT

## 2020-11-17 PROCEDURE — 71045 X-RAY EXAM CHEST 1 VIEW: CPT

## 2020-11-17 PROCEDURE — 70450 CT HEAD/BRAIN W/O DYE: CPT | Mod: 26

## 2020-11-17 PROCEDURE — 71045 X-RAY EXAM CHEST 1 VIEW: CPT | Mod: 26

## 2020-11-17 PROCEDURE — 85025 COMPLETE CBC W/AUTO DIFF WBC: CPT
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
## 2020-11-17 NOTE — ED PROVIDER NOTE - CARE PROVIDER_API CALL
Segun Seals  CARDIOVASCULAR DISEASE  [address], [address]  [place] [address]  Phone: [phone]  Fax: [phone]  Follow Up Time: 1-3 Days

## 2020-11-17 NOTE — ED PROVIDER NOTE - PROGRESS NOTE DETAILS
pt doing well. Work up WNL. no rib fx, intrathoracic injury. CE negative. Pt made aware of stable aneurysm. Advised acute follow up. ER return precautions discussed

## 2020-11-17 NOTE — ED ADULT NURSE NOTE - NSIMPLEMENTINTERV_GEN_ALL_ED
Implemented All Fall with Harm Risk Interventions:  [employer] to call system. Call bell, personal items and telephone within reach. Instruct patient to call for assistance. Room bathroom lighting operational. Non-slip footwear when patient is off stretcher. Physically safe environment: no spills, clutter or unnecessary equipment. Stretcher in lowest position, wheels locked, appropriate side rails in place. Provide visual cue, wrist band, yellow gown, etc. Monitor gait and stability. Monitor for mental status changes and reorient to person, place, and time. Review medications for side effects contributing to fall risk. Reinforce activity limits and safety measures with patient and family. Provide visual clues: red socks.
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
## 2020-11-17 NOTE — ED PROVIDER NOTE - PATIENT PORTAL LINK FT
You can access the FollowMyHealth Patient Portal offered by [facility] by registering at the following website: [email or web address]. By joining [employer]’s FollowMyHealth portal, you will also be able to view your health information using other applications (apps) compatible with our system.

## 2020-11-17 NOTE — ED PROVIDER NOTE - PHYSICAL EXAMINATION
Constitutional: Awake, Alert, non-toxic. NAD. Well appearing, well nourished.   HEAD: Normocephalic, atraumatic.   EYES: PERRL, EOM intact, conjunctiva and sclera are clear bilaterally. No raccoon eyes.   ENT: No tristan sign. No rhinorrhea, normal pharynx, patent, no tonsillar exudate or enlargement, mucous membranes pink/moist, no erythema, no drooling or stridor.   NECK: Supple, non-tender  BACK: No midline or paraspinal TTP of cervical/thoracic/lumbar spine, FROM. No ecchymosis or hematomas.   CARDIOVASCULAR: Normal S1, S2; regular rate and rhythm.  CHEST; (+) anterior superior chest wall swelling and TTP.  RESPIRATORY: Normal respiratory effort; breath sounds CTAB, no wheezes, rhonchi, or rales. Speaking in full sentences. No accessory muscle use.   ABDOMEN: Soft; non-tender, non-distended.   EXTREMITIES: NO movement of RLE and RUE; FROM LUE and RLE non-tender to palpation; distal pulses palpable and symmetric, no edema, no crepitus or step off  SKIN: Warm, dry; good skin turgor, no apparent lesions or rashes, no ecchymosis, brisk capillary refill.  NEURO: A&O x3. Sensory and motor functions are grossly intact. Speech is normal. Appearance and judgement seem appropriate for gender and age. No neurological deficits. Neurovascular sensation intact motor function 5/5 of left sided upper and lower extremities, CN II-XII grossly intact, no ataxia, absent romberg and pronator drift, intact cerebellar function. Speech clear, without articulation or word-finding difficulties. Eyes- PERRL bilaterally. EOMs in tact. No nystagmus. No facial droop.

## 2020-11-17 NOTE — ED PROVIDER NOTE - NSFOLLOWUPCLINICS_GEN_ALL_ED_FT
[facility] - [facility]  Primary Care  [address] [place] [address]  Phone: [phone]  Fax:   Follow Up Time: 1-3 Days

## 2020-11-17 NOTE — ED ADULT NURSE NOTE - CHPI ED NUR SYMPTOMS NEG
no chills/no back pain/no shortness of breath/no vomiting/no fever/no congestion/no nausea/no syncope/no dizziness/no diaphoresis
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
## 2020-11-17 NOTE — ED PROVIDER NOTE - NSFOLLOWUPINSTRUCTIONS_ED_ALL_ED_FT
Follow up with your primary care doctor as soon as possible. Return to ED for any worsening pain, shortness of breath, dizziness, palpitatios, or any worsening condition. You need to follow up with you doctor for the aneurysm noted on CT.     Contusion      A contusion is a deep bruise. This is a result of an injury that causes bleeding under the skin. Symptoms of bruising include pain, swelling, and discolored skin. The skin may turn blue, purple, or yellow.      Follow these instructions at home:      Managing pain, stiffness, and swelling   You may use RICE. This stands for:  •Resting.      •Icing.      •Compression, or putting pressure.      •Elevating, or raising the injured area.    To follow this method, do these actions:  •Rest the injured area.    •If told, put ice on the injured area.  •Put ice in a plastic bag.      •Place a towel between your skin and the bag.      •Leave the ice on for 20 minutes, 2–3 times per day.        •If told, put light pressure (compression) on the injured area using an elastic bandage. Make sure the bandage is not too tight. If the area tingles or becomes numb, remove it and put it back on as told by your doctor.      •If possible, raise (elevate) the injured area above the level of your heart while you are sitting or lying down.      General instructions     •Take over-the-counter and prescription medicines only as told by your doctor.      •Keep all follow-up visits as told by your doctor. This is important.        Contact a doctor if:    •Your symptoms do not get better after several days of treatment.      •Your symptoms get worse.      •You have trouble moving the injured area.        Get help right away if:    •You have very bad pain.      •You have a loss of feeling (numbness) in a hand or foot.      •Your hand or foot turns pale or cold.        Summary    •A contusion is a deep bruise. This is a result of an injury that causes bleeding under the skin.      •Symptoms of bruising include pain, swelling, and discolored skin. The skin may turn blue, purple, or yellow.      •This condition is treated with rest, ice, compression, and elevation. This is also called RICE. You may be given over-the-counter medicines for pain.      •Contact a doctor if you do not feel better, or you feel worse. Get help right away if you have very bad pain, have lost feeling in a hand or foot, or the area turns pale or cold.      This information is not intended to replace advice given to you by your health care provider. Make sure you discuss any questions you have with your health care provider.

## 2020-11-17 NOTE — ED ADULT NURSE NOTE - CHPI ED NUR AGGRAVATING FX
From: Salvador Thurman  To: Mary SHARRI Antonina  Sent: 10/27/2020 11:14 AM CDT  Subject: Lab Test or Test Related Question    I am writing to you about my appointment and lab work from Sep 18 2020. I am being billed for a vitamin D test and some others that look regular test. The vitamin D test is the one got my attention today. I am being billed $267 for that test alone. In the past this was the test I had as part of my blood work and I never had to pay out of pocket before. The insurance just informed me that if a test is not preventive I have to pay out of pocket. No one ever inform me that I have to pay for my regular check. It would be some error on the lap order or some how that showed it was not a preventive lap test. I would appreciate if the clinic can explain or make a corrected claim to my insurance.  Regards,    Katerin   breathing deeply
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
## 2021-07-25 NOTE — ED ADULT TRIAGE NOTE - NS ED NURSE BANDS TYPE
Name band; Scribe Attestation (For Scribes USE Only)... Attending Attestation (For Attendings USE Only).../Scribe Attestation (For Scribes USE Only)...

## 2022-05-02 ENCOUNTER — EMERGENCY (EMERGENCY)
Facility: HOSPITAL | Age: 67
LOS: 1 days | Discharge: HOME | End: 2022-05-02
Attending: STUDENT IN AN ORGANIZED HEALTH CARE EDUCATION/TRAINING PROGRAM | Admitting: STUDENT IN AN ORGANIZED HEALTH CARE EDUCATION/TRAINING PROGRAM
Payer: MEDICAID

## 2022-05-02 VITALS
RESPIRATION RATE: 15 BRPM | TEMPERATURE: 98 F | SYSTOLIC BLOOD PRESSURE: 114 MMHG | HEART RATE: 89 BPM | OXYGEN SATURATION: 99 % | DIASTOLIC BLOOD PRESSURE: 75 MMHG

## 2022-05-02 VITALS
WEIGHT: 169.98 LBS | TEMPERATURE: 98 F | SYSTOLIC BLOOD PRESSURE: 121 MMHG | HEIGHT: 68 IN | OXYGEN SATURATION: 98 % | HEART RATE: 95 BPM | RESPIRATION RATE: 16 BRPM | DIASTOLIC BLOOD PRESSURE: 84 MMHG

## 2022-05-02 DIAGNOSIS — Z98.890 OTHER SPECIFIED POSTPROCEDURAL STATES: Chronic | ICD-10-CM

## 2022-05-02 DIAGNOSIS — Z95.1 PRESENCE OF AORTOCORONARY BYPASS GRAFT: Chronic | ICD-10-CM

## 2022-05-02 LAB
ALBUMIN SERPL ELPH-MCNC: 3.5 G/DL — SIGNIFICANT CHANGE UP (ref 3.3–5)
ALP SERPL-CCNC: 277 U/L — HIGH (ref 40–120)
ALT FLD-CCNC: 23 U/L — SIGNIFICANT CHANGE UP (ref 12–78)
ANION GAP SERPL CALC-SCNC: 7 MMOL/L — SIGNIFICANT CHANGE UP (ref 5–17)
APTT BLD: 47 SEC — HIGH (ref 27.5–35.5)
AST SERPL-CCNC: 20 U/L — SIGNIFICANT CHANGE UP (ref 15–37)
B-OH-BUTYR SERPL-SCNC: 1.4 MMOL/L — HIGH
BASE EXCESS BLDV CALC-SCNC: 3 MMOL/L — SIGNIFICANT CHANGE UP (ref -2–3)
BASOPHILS # BLD AUTO: 0.03 K/UL — SIGNIFICANT CHANGE UP (ref 0–0.2)
BASOPHILS NFR BLD AUTO: 0.3 % — SIGNIFICANT CHANGE UP (ref 0–2)
BILIRUB SERPL-MCNC: 0.7 MG/DL — SIGNIFICANT CHANGE UP (ref 0.2–1.2)
BUN SERPL-MCNC: 32 MG/DL — HIGH (ref 7–23)
CALCIUM SERPL-MCNC: 9.6 MG/DL — SIGNIFICANT CHANGE UP (ref 8.5–10.1)
CHLORIDE SERPL-SCNC: 89 MMOL/L — LOW (ref 96–108)
CO2 SERPL-SCNC: 29 MMOL/L — SIGNIFICANT CHANGE UP (ref 22–31)
CREAT SERPL-MCNC: 1.3 MG/DL — SIGNIFICANT CHANGE UP (ref 0.5–1.3)
EGFR: 61 ML/MIN/1.73M2 — SIGNIFICANT CHANGE UP
EOSINOPHIL # BLD AUTO: 0.07 K/UL — SIGNIFICANT CHANGE UP (ref 0–0.5)
EOSINOPHIL NFR BLD AUTO: 0.7 % — SIGNIFICANT CHANGE UP (ref 0–6)
GAS PNL BLDV: SIGNIFICANT CHANGE UP
GLUCOSE SERPL-MCNC: 401 MG/DL — HIGH (ref 70–99)
HCO3 BLDV-SCNC: 28 MMOL/L — SIGNIFICANT CHANGE UP (ref 22–29)
HCT VFR BLD CALC: 39 % — SIGNIFICANT CHANGE UP (ref 39–50)
HGB BLD-MCNC: 12.4 G/DL — LOW (ref 13–17)
IMM GRANULOCYTES NFR BLD AUTO: 0.5 % — SIGNIFICANT CHANGE UP (ref 0–1.5)
INR BLD: 3.64 RATIO — HIGH (ref 0.88–1.16)
LYMPHOCYTES # BLD AUTO: 1.25 K/UL — SIGNIFICANT CHANGE UP (ref 1–3.3)
LYMPHOCYTES # BLD AUTO: 12.7 % — LOW (ref 13–44)
MAGNESIUM SERPL-MCNC: 2 MG/DL — SIGNIFICANT CHANGE UP (ref 1.6–2.6)
MCHC RBC-ENTMCNC: 23.9 PG — LOW (ref 27–34)
MCHC RBC-ENTMCNC: 31.8 GM/DL — LOW (ref 32–36)
MCV RBC AUTO: 75.1 FL — LOW (ref 80–100)
MONOCYTES # BLD AUTO: 0.56 K/UL — SIGNIFICANT CHANGE UP (ref 0–0.9)
MONOCYTES NFR BLD AUTO: 5.7 % — SIGNIFICANT CHANGE UP (ref 2–14)
NEUTROPHILS # BLD AUTO: 7.88 K/UL — HIGH (ref 1.8–7.4)
NEUTROPHILS NFR BLD AUTO: 80.1 % — HIGH (ref 43–77)
NRBC # BLD: 0 /100 WBCS — SIGNIFICANT CHANGE UP (ref 0–0)
PCO2 BLDV: 49 MMHG — SIGNIFICANT CHANGE UP (ref 42–55)
PH BLDV: 7.37 — SIGNIFICANT CHANGE UP (ref 7.32–7.43)
PLATELET # BLD AUTO: 359 K/UL — SIGNIFICANT CHANGE UP (ref 150–400)
PO2 BLDV: 50 MMHG — HIGH (ref 25–45)
POTASSIUM SERPL-MCNC: 5.6 MMOL/L — HIGH (ref 3.5–5.3)
POTASSIUM SERPL-SCNC: 5.6 MMOL/L — HIGH (ref 3.5–5.3)
PROT SERPL-MCNC: 8 G/DL — SIGNIFICANT CHANGE UP (ref 6–8.3)
PROTHROM AB SERPL-ACNC: 43.2 SEC — HIGH (ref 10.5–13.4)
RBC # BLD: 5.19 M/UL — SIGNIFICANT CHANGE UP (ref 4.2–5.8)
RBC # FLD: 15.8 % — HIGH (ref 10.3–14.5)
SAO2 % BLDV: 76.2 % — SIGNIFICANT CHANGE UP (ref 67–88)
SODIUM SERPL-SCNC: 125 MMOL/L — LOW (ref 135–145)
TROPONIN I, HIGH SENSITIVITY RESULT: 13 NG/L — SIGNIFICANT CHANGE UP
WBC # BLD: 9.84 K/UL — SIGNIFICANT CHANGE UP (ref 3.8–10.5)
WBC # FLD AUTO: 9.84 K/UL — SIGNIFICANT CHANGE UP (ref 3.8–10.5)

## 2022-05-02 PROCEDURE — 93005 ELECTROCARDIOGRAM TRACING: CPT

## 2022-05-02 PROCEDURE — 83735 ASSAY OF MAGNESIUM: CPT

## 2022-05-02 PROCEDURE — 71045 X-RAY EXAM CHEST 1 VIEW: CPT

## 2022-05-02 PROCEDURE — 84484 ASSAY OF TROPONIN QUANT: CPT

## 2022-05-02 PROCEDURE — 85610 PROTHROMBIN TIME: CPT

## 2022-05-02 PROCEDURE — 36415 COLL VENOUS BLD VENIPUNCTURE: CPT

## 2022-05-02 PROCEDURE — 70450 CT HEAD/BRAIN W/O DYE: CPT | Mod: 26,MA

## 2022-05-02 PROCEDURE — 85730 THROMBOPLASTIN TIME PARTIAL: CPT

## 2022-05-02 PROCEDURE — 85025 COMPLETE CBC W/AUTO DIFF WBC: CPT

## 2022-05-02 PROCEDURE — 70450 CT HEAD/BRAIN W/O DYE: CPT | Mod: MA

## 2022-05-02 PROCEDURE — 99285 EMERGENCY DEPT VISIT HI MDM: CPT | Mod: 25

## 2022-05-02 PROCEDURE — 82803 BLOOD GASES ANY COMBINATION: CPT

## 2022-05-02 PROCEDURE — 71045 X-RAY EXAM CHEST 1 VIEW: CPT | Mod: 26

## 2022-05-02 PROCEDURE — 93010 ELECTROCARDIOGRAM REPORT: CPT

## 2022-05-02 PROCEDURE — 99285 EMERGENCY DEPT VISIT HI MDM: CPT

## 2022-05-02 PROCEDURE — 80053 COMPREHEN METABOLIC PANEL: CPT

## 2022-05-02 PROCEDURE — 82962 GLUCOSE BLOOD TEST: CPT

## 2022-05-02 PROCEDURE — 82010 KETONE BODYS QUAN: CPT

## 2022-05-02 RX ORDER — SODIUM CHLORIDE 9 MG/ML
1000 INJECTION INTRAMUSCULAR; INTRAVENOUS; SUBCUTANEOUS ONCE
Refills: 0 | Status: COMPLETED | OUTPATIENT
Start: 2022-05-02 | End: 2022-05-02

## 2022-05-02 RX ORDER — INSULIN LISPRO 100/ML
5 VIAL (ML) SUBCUTANEOUS ONCE
Refills: 0 | Status: COMPLETED | OUTPATIENT
Start: 2022-05-02 | End: 2022-05-02

## 2022-05-02 RX ADMIN — SODIUM CHLORIDE 1000 MILLILITER(S): 9 INJECTION INTRAMUSCULAR; INTRAVENOUS; SUBCUTANEOUS at 11:00

## 2022-05-02 RX ADMIN — Medication 5 UNIT(S): at 13:22

## 2022-05-02 NOTE — ED PROVIDER NOTE - NS ED ROS FT
Constitutional: No reported recent fever.   Neurological: No reported acute headache.  Eyes: No reported new vision changes.   Ears, Nose, Mouth, Throat: No reported acute sore throat.  Cardiovascular: No reported current chest pain.  Respiratory: No reported new shortness of breath.  Gastrointestinal: No reported vomiting.  Genitourinary: No reported new urinary problems.  Musculoskeletal: No reported acute extremity pain.  Integumentary (skin and/or breast): No reported new rash.

## 2022-05-02 NOTE — ED PROVIDER NOTE - PROGRESS NOTE DETAILS
patient feels better, trying to leave. patient and family made aware of results including elevated potassium and INR. they do not want to stay for repeat labs. they will follow up with PMD ASAP.

## 2022-05-02 NOTE — ED PROVIDER NOTE - CARE PROVIDER_API CALL
Roberta Rios  Internal Medicine  [address] [place] [address]  Phone: ([phone]  Fax: ()-  Follow Up Time: Urgent

## 2022-05-02 NOTE — ED ADULT NURSE NOTE - OBJECTIVE STATEMENT
patient brought to ED by family who states patient has had decreased appetite for past few days and more lethargic and seems weaker family reports patient was like this when his INR was high in the past pt has right sided hemiparesis and aphasia from a stroke 7 years ago pt has hx of a fib taking Coumadin pt is bed/wheelchair bound

## 2022-05-02 NOTE — ED ADULT NURSE NOTE - CHIEF COMPLAINT QUOTE
c/o Not feeling well,not eating for a couple of days c/o Not feeling well,not eating for a couple of days,Hx of CVA in 2015 with left sided weakness,aphasia and wheelchair bound

## 2022-05-02 NOTE — ED PROVIDER NOTE - HIV OFFER
sounds normal.   Abdominal: Soft. Bowel sounds are normal. There is no tenderness. There is no rebound and no guarding. Lymphadenopathy:     She has no cervical adenopathy. ASSESSMENT/ PLAN    1. Encounter for routine child health examination without abnormal findings  - vaccines utd    2.  Need for influenza vaccination  - INFLUENZA, QUADV, 3 YRS AND OLDER, IM, MDV, 0.5ML (Thony Nguyen)                Electronically signed by:  EDISON Castañeda   1/8/18
Unable to answer due to medical condition/unresponsive/etc...

## 2022-05-02 NOTE — ED PROVIDER NOTE - NSFOLLOWUPINSTRUCTIONS_ED_ALL_ED_FT
Please follow up with your Primary Care Physician and any specialists as discussed.  Stay hydrated, use your insulin to control your blood sugar.  Avoid high potassium foods.  Hold one dose of Warfarin (Coumadin) and speak to your PMD about your INR goal and coumadin dosing.  Please take your medications as prescribed.  If your symptoms persist or worsen, please seek care. Either return to the Emergency Department, go to urgent care or see your primary care doctor.  See refer to general information and follow up instructions below:      Hyperglycemia      Hyperglycemia is when the sugar (glucose) level in your blood is too high. High blood sugar can happen to people who have or do not have diabetes. High blood sugar can happen quickly. It can be an emergency.      What are the causes?    If you have diabetes, high blood sugar may be caused by:  •Medicines that increase blood sugar or affect your control of diabetes.      •Getting less physical activity.      •Overeating.      •Being sick or injured or having an infection.      •Having surgery.      •Stress.       •Not giving yourself enough insulin (if you are taking it).      You may have high blood sugar because you have diabetes that has not been diagnosed yet.    If you do not have diabetes, high blood sugar may be caused by:  •Certain medicines.      •Stress.       •A bad illness.      •An infection.      •Having surgery.      •Diseases of the pancreas.        What increases the risk?    This condition is more likely to develop in people who have risk factors for diabetes, such as:  •Having a family member with diabetes.       •Certain conditions in which the body's defense system (immune system) attacks itself. These are called autoimmune disorders.      •Being overweight.      •Not being active.      •Having a condition called insulin resistance.    •Having a history of:   •Prediabetes.      •Diabetes when pregnant.      •Polycystic ovarian syndrome (PCOS).          What are the signs or symptoms?    This condition may not cause symptoms. If you do have symptoms, they may include:  •Feeling more thirsty than normal.      •Needing to pee (urinate) more often than normal.      •Hunger.      •Feeling very tired.      •Blurry eyesight (vision).      You may get other symptoms as the condition gets worse, such as:  •Dry mouth.      •Pain in your belly (abdomen).      •Not being hungry (loss of appetite).      •Breath that smells fruity.      •Weakness.      •Weight loss that is not planned.      •A tingling or numb feeling in your hands or feet.      •A headache.      •Cuts or bruises that heal slowly.        How is this treated?    Treatment depends on the cause of your condition. Treatment may include:  •Taking medicine to control your blood sugar levels.      •Changing your medicine or dosage if you take insulin or other diabetes medicines.    •Lifestyle changes. These may include:  •Exercising more.      •Eating healthier foods.      •Losing weight.        •Treating an illness or infection.       •Checking your blood sugar more often.      •Stopping or reducing steroid medicines.      If your condition gets very bad, you will need to be treated in the hospital.      Follow these instructions at home:    General instructions     •Take over-the-counter and prescription medicines only as told by your doctor.      • Do not smoke or use any products that contain nicotine or tobacco. If you need help quitting, ask your doctor.    •If you drink alcohol: •Limit how much you have to:  •0–1 drink a day for women who are not pregnant.      •0–2 drinks a day for men.         •Know how much alcohol is in a drink. In the [place] one drink equals one 12 oz bottle of beer (355 mL), one 5 oz glass of wine (148 mL), or one 1½ oz glass of hard liquor (44 mL).        •Manage stress. If you need help with this, ask your doctor.      •Do exercises as told by your doctor.      •Keep all follow-up visits.        Eating and drinking      •Stay at a healthy weight.    •Make sure you drink enough fluid when you:  •Exercise.      •Get sick.      •Are in hot temperatures.        •Drink enough fluid to keep your pee (urine) pale yellow.        If you have diabetes:      •Know the symptoms of high blood sugar.    •Follow your diabetes management plan as told by your doctor. Make sure you:  •Take insulin and medicines as told.      •Follow your exercise plan.      •Follow your meal plan. Eat on time. Do not skip meals.      •Check your blood sugar as often as told. Make sure you check before and after exercise. If you exercise longer or in a different way, check your blood sugar more often.      •Follow your sick day plan whenever you cannot eat or drink normally. Make this plan ahead of time with your doctor.        •Share your diabetes management plan with people in your workplace, school, and household.      •Check your pee for ketones when you are ill and as told by your doctor.      •Carry a card or wear jewelry that says that you have diabetes.        Where to find more information    American Diabetes Association: www.diabetes.org      Contact a doctor if:    •Your blood sugar level is at or above 240 mg/dL (13.3 mmol/L) for 2 days in a row.      •You have problems keeping your blood sugar in your target range.      •You have high blood pressure often.    •You have signs of illness, such as:  •Feeling like you may vomit (feeling nauseous).      •Vomiting.      •A fever.          Get help right away if:    •Your blood sugar monitor reads "high" even when you are taking insulin.      •You have trouble breathing.      •You have a change in how you think, feel, or act (mental status).      •You feel like you may vomit, and the feeling does not go away.      •You cannot stop vomiting.      These symptoms may be an emergency. Get medical help right away. Call your local emergency services (911 in the [place]    • Do not wait to see if the symptoms will go away.        • Do not drive yourself to the hospital.         Summary    •Hyperglycemia is when the sugar (glucose) level in your blood is too high.      •High blood sugar can happen to people who have or do not have diabetes.      •Make sure you drink enough fluids and follow your meal plan. Exercise as often as told by your doctor.      •Contact your doctor if you have problems keeping your blood sugar in your target range.      This information is not intended to replace advice given to you by your health care provider. Make sure you discuss any questions you have with your health care provider.

## 2022-05-02 NOTE — ED ADULT NURSE NOTE - NSIMPLEMENTINTERV_GEN_ALL_ED
Implemented All Fall with Harm Risk Interventions:  [place] to call system. Call bell, personal items and telephone within reach. Instruct patient to call for assistance. Room bathroom lighting operational. Non-slip footwear when patient is off stretcher. Physically safe environment: no spills, clutter or unnecessary equipment. Stretcher in lowest position, wheels locked, appropriate side rails in place. Provide visual cue, wrist band, yellow gown, etc. Monitor gait and stability. Monitor for mental status changes and reorient to person, place, and time. Review medications for side effects contributing to fall risk. Reinforce activity limits and safety measures with patient and family. Provide visual clues: red socks.

## 2022-05-02 NOTE — ED PROVIDER NOTE - PATIENT PORTAL LINK FT
You can access the FollowMyHealth Patient Portal offered by [facility] by registering at the following website: [email or web address]. By joining [employer]’s FollowMyHealth portal, you will also be able to view your health information using other applications (apps) compatible with our system.

## 2022-05-02 NOTE — ED ADULT NURSE NOTE - NSICDXPASTMEDICALHX_GEN_ALL_CORE_FT
PAST MEDICAL HISTORY:  Arrhythmia     CVA (cerebral vascular accident)     DM (diabetes mellitus)     History of atrial fibrillation     HTN (hypertension)

## 2022-05-02 NOTE — ED PROVIDER NOTE - PHYSICAL EXAMINATION
Vital signs as available reviewed.  General:  No acute distress.  Head:  Normocephalic, atraumatic.  Eyes:  Conjunctiva pink, no icterus.  Cardiovascular:  Regular rate, irregular rhythm.  Respiratory:  Clear to auscultation, good air entry bilaterally.  Abdomen:  Soft, non-tender.  Musculoskeletal:  No obvious deformity.  Neurologic: Alert, aphasic, right nasolabial flattening, flaccid right upper extremity and right lower extremity.  Skin:  Warm and dry.

## 2022-05-02 NOTE — ED PROVIDER NOTE - OBJECTIVE STATEMENT
66 male with PMHx A Fib, CAD, CVA (residual right sided hemiplegia) and DM 67 yo [place] speaking male with PMHx A Fib (on eliquis), CAD, CVA (residual right sided hemiplegia / aphasia) and DM here with daughter complaining of several days of malaise. family notes he has been more tired and has not been eating as much. They also note blood sugar was elevated this morning. (normally ~200, not >300). patient has been refusing to use his insulin. patient always feels cold, no fevers, cough, chest pain, nausea, vomiting, abdominal pain, constipation, diarrhea. PMD Roberta Coe, Cardio Shaikh Talavera. Video  offered- Daughter gives history no acute distress provides interpretation per patient and family preference.

## 2022-05-02 NOTE — ED PROVIDER NOTE - CARE PLAN
Principal Discharge DX:	Hyperglycemia  Secondary Diagnosis:	Hyperkalemia  Secondary Diagnosis:	Elevated INR   1

## 2022-10-11 NOTE — ED PROVIDER NOTE - NSCAREINITIATED _GEN_ER
Ulises Garcia(Attending)
Initiate Treatment: tazarotene 0.1 % topical cream
Detail Level: Zone
Initiate Treatment: hydroquinone 4 % topical cream

## 2022-10-18 NOTE — ED ADULT TRIAGE NOTE - NS ED NOTE AC HIGH RISK COUNTRIES
No RRT RN rounded on pt for MEWS of 3. Primary RN reports that MD is aware of vs. BP soft this afternoon after receiving morning medications. VS still within normal limits. RN denies need for assistance from RRT at this time.

## 2022-11-21 ENCOUNTER — INPATIENT (INPATIENT)
Facility: HOSPITAL | Age: 67
LOS: 2 days | Discharge: HOME | DRG: 378 | End: 2022-11-24
Attending: INTERNAL MEDICINE | Admitting: INTERNAL MEDICINE
Payer: MEDICARE

## 2022-11-21 VITALS
SYSTOLIC BLOOD PRESSURE: 151 MMHG | HEART RATE: 68 BPM | RESPIRATION RATE: 18 BRPM | DIASTOLIC BLOOD PRESSURE: 79 MMHG | OXYGEN SATURATION: 98 % | WEIGHT: 156.09 LBS | TEMPERATURE: 98 F

## 2022-11-21 DIAGNOSIS — Z95.1 PRESENCE OF AORTOCORONARY BYPASS GRAFT: Chronic | ICD-10-CM

## 2022-11-21 DIAGNOSIS — K92.2 GASTROINTESTINAL HEMORRHAGE, UNSPECIFIED: ICD-10-CM

## 2022-11-21 DIAGNOSIS — Z98.890 OTHER SPECIFIED POSTPROCEDURAL STATES: Chronic | ICD-10-CM

## 2022-11-21 PROBLEM — Z86.79 PERSONAL HISTORY OF OTHER DISEASES OF THE CIRCULATORY SYSTEM: Chronic | Status: ACTIVE | Noted: 2022-05-02

## 2022-11-21 LAB
ALBUMIN SERPL ELPH-MCNC: 3.4 G/DL — SIGNIFICANT CHANGE UP (ref 3.3–5)
ALP SERPL-CCNC: 237 U/L — HIGH (ref 40–120)
ALT FLD-CCNC: 21 U/L — SIGNIFICANT CHANGE UP (ref 12–78)
ANION GAP SERPL CALC-SCNC: 9 MMOL/L — SIGNIFICANT CHANGE UP (ref 5–17)
APTT BLD: 39 SEC — HIGH (ref 27.5–35.5)
AST SERPL-CCNC: 21 U/L — SIGNIFICANT CHANGE UP (ref 15–37)
BASOPHILS # BLD AUTO: 0.03 K/UL — SIGNIFICANT CHANGE UP (ref 0–0.2)
BASOPHILS NFR BLD AUTO: 0.3 % — SIGNIFICANT CHANGE UP (ref 0–2)
BILIRUB SERPL-MCNC: 0.6 MG/DL — SIGNIFICANT CHANGE UP (ref 0.2–1.2)
BUN SERPL-MCNC: 27 MG/DL — HIGH (ref 7–23)
CALCIUM SERPL-MCNC: 9.1 MG/DL — SIGNIFICANT CHANGE UP (ref 8.5–10.1)
CHLORIDE SERPL-SCNC: 94 MMOL/L — LOW (ref 96–108)
CO2 SERPL-SCNC: 29 MMOL/L — SIGNIFICANT CHANGE UP (ref 22–31)
CREAT SERPL-MCNC: 1.1 MG/DL — SIGNIFICANT CHANGE UP (ref 0.5–1.3)
EGFR: 74 ML/MIN/1.73M2 — SIGNIFICANT CHANGE UP
EOSINOPHIL # BLD AUTO: 0.18 K/UL — SIGNIFICANT CHANGE UP (ref 0–0.5)
EOSINOPHIL NFR BLD AUTO: 2.1 % — SIGNIFICANT CHANGE UP (ref 0–6)
GLUCOSE SERPL-MCNC: 289 MG/DL — HIGH (ref 70–99)
HCT VFR BLD CALC: 26 % — LOW (ref 39–50)
HGB BLD-MCNC: 7.1 G/DL — LOW (ref 13–17)
IMM GRANULOCYTES NFR BLD AUTO: 0.2 % — SIGNIFICANT CHANGE UP (ref 0–0.9)
INR BLD: 1.75 RATIO — HIGH (ref 0.88–1.16)
LYMPHOCYTES # BLD AUTO: 1.38 K/UL — SIGNIFICANT CHANGE UP (ref 1–3.3)
LYMPHOCYTES # BLD AUTO: 16 % — SIGNIFICANT CHANGE UP (ref 13–44)
MCHC RBC-ENTMCNC: 17.1 PG — LOW (ref 27–34)
MCHC RBC-ENTMCNC: 27.3 GM/DL — LOW (ref 32–36)
MCV RBC AUTO: 62.8 FL — LOW (ref 80–100)
MONOCYTES # BLD AUTO: 0.78 K/UL — SIGNIFICANT CHANGE UP (ref 0–0.9)
MONOCYTES NFR BLD AUTO: 9 % — SIGNIFICANT CHANGE UP (ref 2–14)
NEUTROPHILS # BLD AUTO: 6.25 K/UL — SIGNIFICANT CHANGE UP (ref 1.8–7.4)
NEUTROPHILS NFR BLD AUTO: 72.4 % — SIGNIFICANT CHANGE UP (ref 43–77)
NRBC # BLD: 0 /100 WBCS — SIGNIFICANT CHANGE UP (ref 0–0)
PLATELET # BLD AUTO: 331 K/UL — SIGNIFICANT CHANGE UP (ref 150–400)
POTASSIUM SERPL-MCNC: 3.9 MMOL/L — SIGNIFICANT CHANGE UP (ref 3.5–5.3)
POTASSIUM SERPL-SCNC: 3.9 MMOL/L — SIGNIFICANT CHANGE UP (ref 3.5–5.3)
PROT SERPL-MCNC: 7.8 G/DL — SIGNIFICANT CHANGE UP (ref 6–8.3)
PROTHROM AB SERPL-ACNC: 20.6 SEC — HIGH (ref 10.5–13.4)
RBC # BLD: 4.14 M/UL — LOW (ref 4.2–5.8)
RBC # FLD: 18.4 % — HIGH (ref 10.3–14.5)
SARS-COV-2 RNA SPEC QL NAA+PROBE: SIGNIFICANT CHANGE UP
SODIUM SERPL-SCNC: 132 MMOL/L — LOW (ref 135–145)
WBC # BLD: 8.64 K/UL — SIGNIFICANT CHANGE UP (ref 3.8–10.5)
WBC # FLD AUTO: 8.64 K/UL — SIGNIFICANT CHANGE UP (ref 3.8–10.5)

## 2022-11-21 PROCEDURE — 93010 ELECTROCARDIOGRAM REPORT: CPT

## 2022-11-21 PROCEDURE — 99285 EMERGENCY DEPT VISIT HI MDM: CPT

## 2022-11-21 PROCEDURE — 71045 X-RAY EXAM CHEST 1 VIEW: CPT | Mod: 26

## 2022-11-21 RX ORDER — PANTOPRAZOLE SODIUM 20 MG/1
40 TABLET, DELAYED RELEASE ORAL ONCE
Refills: 0 | Status: COMPLETED | OUTPATIENT
Start: 2022-11-21 | End: 2022-11-21

## 2022-11-21 RX ADMIN — PANTOPRAZOLE SODIUM 40 MILLIGRAM(S): 20 TABLET, DELAYED RELEASE ORAL at 19:32

## 2022-11-21 NOTE — ED ADULT TRIAGE NOTE - CHIEF COMPLAINT QUOTE
67yr old male arrived to ED from home with daughter, per daughter PCP contacted pt to inform hbg 6.4, results noted to be from 11/14. pt notes to be non-verbal hx of cva rt sided weakness. Daughter denies noting blood in stool. Respiration even and unlabored no other complaints at this time.

## 2022-11-21 NOTE — ED ADULT NURSE NOTE - OBJECTIVE STATEMENT
Pt a/ox4, presents with right side weakness from previous stroke, uses wheelchair at home.  No signs of acute distress, sent by PMD for low H/H.

## 2022-11-21 NOTE — ED PROVIDER NOTE - NS ED ATTENDING STATEMENT MOD
This was a shared visit with the EYAL. I reviewed and verified the documentation and independently performed the documented:

## 2022-11-21 NOTE — ED PROVIDER NOTE - OBJECTIVE STATEMENT
68 yo M PMHx CVA with residual R sided weakness and aphasia, esophageal varices, ascites, as per daughter sent in by Dr. Cuong JOHNSON for tranfusion and follow up in the AM. Outpatient labs revealed hemoglobin 6.4-- daughter reports melena x a few days previously but now normal. As per daughter, pt at his baseline. Pt has no acute complaint.

## 2022-11-21 NOTE — ED PROVIDER NOTE - CLINICAL SUMMARY MEDICAL DECISION MAKING FREE TEXT BOX
With the patient's daughter at the bedside, as patient is aphasic secondary to intracerebral hemorrhage with right hemiplegia, status post left craniotomy.  He is nonverbal.  He has a history of esophageal varices ascites of uncertain etiology neither the daughter is aware he follows with a GI specialist.  Was found to be anemic with a hemoglobin in the sixes.  Sent by his primary doctor to be transfused, and then to follow-up with GI in the morning.  According to the daughter he had couple of episodes of melanotic stools which are.  The stools are now normal.  Patient had no abdominal pain, continue to eat and drink well.    On evaluation is a chronically ill male who is aphasic Miles is cooperative and is in no distress.  HEENT reveals a large cranial defect status post craniotomy.  A status post tracheotomy scar in the anterior neck.  Smiling, with marked pallor of his conjunctiva.  His speech is undiscernible to me as he speaks in foreign language but the daughter is able to understand him and he is able to understand her following simple commands.  Cardiac exam he has a loud blowing murmur secondary to valve replacement, chest exam is clear.  Abdominal exam is soft nontender without any pain or guarding.  Musculoskeletal exam he has marked right hemiplegia.  Skin exam reveals marked pallor.  This includes pale conjunctiva, pale nailbeds, and mucous membranes.  Neurologic exam according to the daughter is unchanged.    Plan of care is to check a CBC, provide transfusion of blood, and have patient follow-up with his GI specialist in the morning.

## 2022-11-22 LAB
HCT VFR BLD CALC: 29.4 % — LOW (ref 39–50)
HGB BLD-MCNC: 8.3 G/DL — LOW (ref 13–17)
MCHC RBC-ENTMCNC: 18.9 PG — LOW (ref 27–34)
MCHC RBC-ENTMCNC: 28.2 GM/DL — LOW (ref 32–36)
MCV RBC AUTO: 67 FL — LOW (ref 80–100)
NRBC # BLD: 0 /100 WBCS — SIGNIFICANT CHANGE UP (ref 0–0)
PLATELET # BLD AUTO: 277 K/UL — SIGNIFICANT CHANGE UP (ref 150–400)
RBC # BLD: 4.39 M/UL — SIGNIFICANT CHANGE UP (ref 4.2–5.8)
RBC # FLD: 22.5 % — HIGH (ref 10.3–14.5)
WBC # BLD: 8.95 K/UL — SIGNIFICANT CHANGE UP (ref 3.8–10.5)
WBC # FLD AUTO: 8.95 K/UL — SIGNIFICANT CHANGE UP (ref 3.8–10.5)

## 2022-11-22 RX ORDER — DEXTROSE 50 % IN WATER 50 %
15 SYRINGE (ML) INTRAVENOUS ONCE
Refills: 0 | Status: DISCONTINUED | OUTPATIENT
Start: 2022-11-22 | End: 2022-11-24

## 2022-11-22 RX ORDER — LEVETIRACETAM 250 MG/1
750 TABLET, FILM COATED ORAL
Refills: 0 | Status: DISCONTINUED | OUTPATIENT
Start: 2022-11-22 | End: 2022-11-24

## 2022-11-22 RX ORDER — PANTOPRAZOLE SODIUM 20 MG/1
40 TABLET, DELAYED RELEASE ORAL
Refills: 0 | Status: DISCONTINUED | OUTPATIENT
Start: 2022-11-22 | End: 2022-11-24

## 2022-11-22 RX ORDER — ATORVASTATIN CALCIUM 80 MG/1
10 TABLET, FILM COATED ORAL AT BEDTIME
Refills: 0 | Status: DISCONTINUED | OUTPATIENT
Start: 2022-11-22 | End: 2022-11-24

## 2022-11-22 RX ORDER — INSULIN GLARGINE 100 [IU]/ML
7 INJECTION, SOLUTION SUBCUTANEOUS AT BEDTIME
Refills: 0 | Status: DISCONTINUED | OUTPATIENT
Start: 2022-11-22 | End: 2022-11-23

## 2022-11-22 RX ORDER — DEXTROSE 50 % IN WATER 50 %
12.5 SYRINGE (ML) INTRAVENOUS ONCE
Refills: 0 | Status: DISCONTINUED | OUTPATIENT
Start: 2022-11-22 | End: 2022-11-24

## 2022-11-22 RX ORDER — ASPIRIN/CALCIUM CARB/MAGNESIUM 324 MG
81 TABLET ORAL DAILY
Refills: 0 | Status: DISCONTINUED | OUTPATIENT
Start: 2022-11-22 | End: 2022-11-24

## 2022-11-22 RX ORDER — HEPARIN SODIUM 5000 [USP'U]/ML
4400 INJECTION INTRAVENOUS; SUBCUTANEOUS EVERY 6 HOURS
Refills: 0 | Status: DISCONTINUED | OUTPATIENT
Start: 2022-11-22 | End: 2022-11-22

## 2022-11-22 RX ORDER — DEXTROSE 50 % IN WATER 50 %
25 SYRINGE (ML) INTRAVENOUS ONCE
Refills: 0 | Status: DISCONTINUED | OUTPATIENT
Start: 2022-11-22 | End: 2022-11-24

## 2022-11-22 RX ORDER — SODIUM CHLORIDE 9 MG/ML
1000 INJECTION, SOLUTION INTRAVENOUS
Refills: 0 | Status: DISCONTINUED | OUTPATIENT
Start: 2022-11-22 | End: 2022-11-24

## 2022-11-22 RX ORDER — GLUCAGON INJECTION, SOLUTION 0.5 MG/.1ML
1 INJECTION, SOLUTION SUBCUTANEOUS ONCE
Refills: 0 | Status: DISCONTINUED | OUTPATIENT
Start: 2022-11-22 | End: 2022-11-24

## 2022-11-22 RX ORDER — FUROSEMIDE 40 MG
40 TABLET ORAL DAILY
Refills: 0 | Status: DISCONTINUED | OUTPATIENT
Start: 2022-11-22 | End: 2022-11-24

## 2022-11-22 RX ORDER — SPIRONOLACTONE 25 MG/1
25 TABLET, FILM COATED ORAL
Refills: 0 | Status: DISCONTINUED | OUTPATIENT
Start: 2022-11-22 | End: 2022-11-24

## 2022-11-22 RX ORDER — ASPIRIN/CALCIUM CARB/MAGNESIUM 324 MG
81 TABLET ORAL DAILY
Refills: 0 | Status: DISCONTINUED | OUTPATIENT
Start: 2022-11-22 | End: 2022-11-22

## 2022-11-22 RX ORDER — INSULIN LISPRO 100/ML
VIAL (ML) SUBCUTANEOUS
Refills: 0 | Status: DISCONTINUED | OUTPATIENT
Start: 2022-11-22 | End: 2022-11-23

## 2022-11-22 RX ORDER — HEPARIN SODIUM 5000 [USP'U]/ML
4400 INJECTION INTRAVENOUS; SUBCUTANEOUS ONCE
Refills: 0 | Status: DISCONTINUED | OUTPATIENT
Start: 2022-11-22 | End: 2022-11-22

## 2022-11-22 RX ORDER — FAMOTIDINE 10 MG/ML
20 INJECTION INTRAVENOUS EVERY 24 HOURS
Refills: 0 | Status: DISCONTINUED | OUTPATIENT
Start: 2022-11-22 | End: 2022-11-22

## 2022-11-22 RX ORDER — HEPARIN SODIUM 5000 [USP'U]/ML
INJECTION INTRAVENOUS; SUBCUTANEOUS
Qty: 25000 | Refills: 0 | Status: DISCONTINUED | OUTPATIENT
Start: 2022-11-22 | End: 2022-11-22

## 2022-11-22 RX ORDER — INSULIN LISPRO 100/ML
VIAL (ML) SUBCUTANEOUS
Refills: 0 | Status: DISCONTINUED | OUTPATIENT
Start: 2022-11-22 | End: 2022-11-22

## 2022-11-22 RX ADMIN — Medication 3: at 12:56

## 2022-11-22 RX ADMIN — ATORVASTATIN CALCIUM 10 MILLIGRAM(S): 80 TABLET, FILM COATED ORAL at 22:40

## 2022-11-22 RX ADMIN — SPIRONOLACTONE 25 MILLIGRAM(S): 25 TABLET, FILM COATED ORAL at 17:25

## 2022-11-22 RX ADMIN — Medication 4: at 23:30

## 2022-11-22 RX ADMIN — LEVETIRACETAM 750 MILLIGRAM(S): 250 TABLET, FILM COATED ORAL at 17:26

## 2022-11-22 RX ADMIN — Medication 40 MILLIGRAM(S): at 08:38

## 2022-11-22 RX ADMIN — PANTOPRAZOLE SODIUM 40 MILLIGRAM(S): 20 TABLET, DELAYED RELEASE ORAL at 17:25

## 2022-11-22 RX ADMIN — INSULIN GLARGINE 7 UNIT(S): 100 INJECTION, SOLUTION SUBCUTANEOUS at 23:44

## 2022-11-22 RX ADMIN — Medication 3: at 17:26

## 2022-11-22 RX ADMIN — Medication 3: at 08:34

## 2022-11-22 RX ADMIN — Medication 81 MILLIGRAM(S): at 12:46

## 2022-11-22 NOTE — PATIENT PROFILE ADULT - FALL HARM RISK - HARM RISK INTERVENTIONS

## 2022-11-22 NOTE — PATIENT PROFILE ADULT - FUNCTIONAL ASSESSMENT - BASIC MOBILITY 6.
2-calculated by average/Not able to assess (calculate score using [facility] averaging method)

## 2022-11-22 NOTE — PROVIDER CONTACT NOTE (OTHER) - ACTION/TREATMENT ORDERED:
Dr. Taylor was notified and stated Dr. Strong is the primary resident/MD and was made aware by him and will put an order in.
Spoke to Dr Harmon as per pt's daughter pt has a metallic valve and needs anticoagulation. Will start heparin after results of CBC and MD's confirmation to start. Dr Pham, cardiologist to see pt.

## 2022-11-22 NOTE — CONSULT NOTE ADULT - ASSESSMENT
GI bleed      s/p prbc  cont proton pump inhibitor  hep gtt on hold  cont asa   upper gastrointestinal endoscopy in am  d/w dtr

## 2022-11-22 NOTE — H&P ADULT - NSHPPHYSICALEXAM_GEN_ALL_CORE
General: WN/WD NAD  PERRLA  Neurology: A&Ox3, nonfocal, WILKINS x 4  Respiratory: CTA B/L  CV: RRR, S1S2, no murmurs, rubs or gallops  Abdominal: Soft, NT, ND +BS, Last BM  Extremities: No edema, + peripheral pulses  Skin Normal

## 2022-11-22 NOTE — H&P ADULT - NSHPLABSRESULTS_GEN_ALL_CORE
Lab Results:  CBC  CBC Full  -  ( 21 Nov 2022 16:00 )  WBC Count : 8.64 K/uL  RBC Count : 4.14 M/uL  Hemoglobin : 7.1 g/dL  Hematocrit : 26.0 %  Platelet Count - Automated : 331 K/uL  Mean Cell Volume : 62.8 fl  Mean Cell Hemoglobin : 17.1 pg  Mean Cell Hemoglobin Concentration : 27.3 gm/dL  Auto Neutrophil # : 6.25 K/uL  Auto Lymphocyte # : 1.38 K/uL  Auto Monocyte # : 0.78 K/uL  Auto Eosinophil # : 0.18 K/uL  Auto Basophil # : 0.03 K/uL  Auto Neutrophil % : 72.4 %  Auto Lymphocyte % : 16.0 %  Auto Monocyte % : 9.0 %  Auto Eosinophil % : 2.1 %  Auto Basophil % : 0.3 %    .		Differential:	[] Automated		[] Manual  Chemistry                        7.1    8.64  )-----------( 331      ( 21 Nov 2022 16:00 )             26.0     11-21    132<L>  |  94<L>  |  27<H>  ----------------------------<  289<H>  3.9   |  29  |  1.10    Ca    9.1      21 Nov 2022 16:00    TPro  7.8  /  Alb  3.4  /  TBili  0.6  /  DBili  x   /  AST  21  /  ALT  21  /  AlkPhos  237<H>  11-21    LIVER FUNCTIONS - ( 21 Nov 2022 16:00 )  Alb: 3.4 g/dL / Pro: 7.8 g/dL / ALK PHOS: 237 U/L / ALT: 21 U/L / AST: 21 U/L / GGT: x           PT/INR - ( 21 Nov 2022 16:00 )   PT: 20.6 sec;   INR: 1.75 ratio         PTT - ( 21 Nov 2022 16:00 )  PTT:39.0 sec          MICROBIOLOGY/CULTURES:      RADIOLOGY RESULTS:reviwewed

## 2022-11-22 NOTE — CONSULT NOTE ADULT - ASSESSMENT
67M CAD s/p CABG, DM, Afib, mechanical valve RIGHT admitted with sided weakness and aphasia and anemia.    Suggest:  1.       67M CAD s/p CABG, DM, Afib, mechanical valve with RIGHT sided weakness and aphasia and anemia.    Suggest:  1.  GI Bleed with iron def anemia  - Hold IV heparin until cleared by GI  - Cardiac optimized for endoscopy/colonoscopy    2. Mechanical valve with known AF  - I have contacted patient's cardiologist and am waiting to hear back  - Hold IV anticoagulation for now  - Continue with baby aspirin daily    3. CAD s/p CABG  - C/w baby aspirin and Lipitor 10 mg daily    4. HTN  - C/w Spironolactone    5. Will follow

## 2022-11-22 NOTE — H&P ADULT - ASSESSMENT
68 yo M PMHx CVA with residual R sided weakness and aphasia, esophageal varices, ascites, as per daughter sent in by Dr. Cuong JOHNSON for tranfusion and follow up in the AM. Outpatient labs revealed hemoglobin 6.4-- daughter reports melena x a few days previously but now normal. As per daughter, pt at his baseline. Pt has no acute complaint.    1  66 yo M PMHx CVA with residual R sided weakness and aphasia, esophageal varices, ascites, as per daughter sent in by Dr. Cuong JOHNSON for tranfusion and follow up in the AM. Outpatient labs revealed hemoglobin 6.4-- daughter reports melena x a few days previously but now normal. As per daughter, pt at his baseline. Pt has no acute complaint.    1 Acute blood loss anemia  - sp 1 unit  - monitor cbc  - transfuse PRN  - GI called    2 CAD, CABG  - cw asa, statin  - cards called    3 Metallic valve   -unclear which one as per daughter  - will start heparin drip and monitor   - cards to folllow  - check ECHO    4 HX of CVA  - b\neuro fu  - cw asa, statin

## 2022-11-22 NOTE — CONSULT NOTE ADULT - ASSESSMENT
66 yo M PMHx CVA , CAD, CABG, mechanical valve as per daughterwith residual R sided weakness and aphasia, esophageal varices, ascites, as per daughter sent in by Dr. Cuong JOHNSON for tranfusion and follow up in the AM. Outpatient labs revealed hemoglobin 6.4-- daughter reports melena x a few days previously but now normal. As per daughter, pt at his baseline. Pt has no acute complaint. (22 Nov 2022 06:39)      hyponatremia   will check ua , urine osmolality , urine sodium , urine uric acid , serum sodium , serum osmolality , serum uric acid , f/u with hyponatremia work up , f/u with bmp , monitor i and o    fluid overload   spironolactone 25 milliGRAM(s) Oral two times a day  furosemide    Tablet 40 milliGRAM(s) Oral daily    BP monitoring,continue current antihypertensive meds, low salt diet,followup with PMD in 1-2 weeks  propranolol 20 milliGRAM(s) Oral two times a day

## 2022-11-22 NOTE — H&P ADULT - HISTORY OF PRESENT ILLNESS
68 yo M PMHx CVA with residual R sided weakness and aphasia, esophageal varices, ascites, as per daughter sent in by Dr. Cuong JOHNSON for tranfusion and follow up in the AM. Outpatient labs revealed hemoglobin 6.4-- daughter reports melena x a few days previously but now normal. As per daughter, pt at his baseline. Pt has no acute complaint. 66 yo M PMHx CVA , CAD, CABG, mechanical valve as per daughterwith residual R sided weakness and aphasia, esophageal varices, ascites, as per daughter sent in by Dr. Cuong JOHNSON for tranfusion and follow up in the AM. Outpatient labs revealed hemoglobin 6.4-- daughter reports melena x a few days previously but now normal. As per daughter, pt at his baseline. Pt has no acute complaint.

## 2022-11-22 NOTE — PROVIDER CONTACT NOTE (OTHER) - SITUATION
Heparin infusion ordered, pt is a GI bleed with a Hgb of 7.1 prior to 1unit of PRBC.
Blood sugar was elevated, but there's no insulin coverage on the EMAR at this time
no vascular compromise

## 2022-11-22 NOTE — CHART NOTE - NSCHARTNOTEFT_GEN_A_CORE
Called by RN for high glucoses      - Placed patient on LDSS and 7 units of Lantus at bedside. Nurse to sign out to day nurse to notify Dr. Harmon for further management and adjustments if needed.

## 2022-11-23 DIAGNOSIS — E11.65 TYPE 2 DIABETES MELLITUS WITH HYPERGLYCEMIA: ICD-10-CM

## 2022-11-23 LAB
A1C WITH ESTIMATED AVERAGE GLUCOSE RESULT: 11.1 % — HIGH (ref 4–5.6)
ALBUMIN SERPL ELPH-MCNC: 3.1 G/DL — LOW (ref 3.3–5)
ALP SERPL-CCNC: 193 U/L — HIGH (ref 40–120)
ALT FLD-CCNC: 15 U/L — SIGNIFICANT CHANGE UP (ref 12–78)
ANION GAP SERPL CALC-SCNC: 5 MMOL/L — SIGNIFICANT CHANGE UP (ref 5–17)
AST SERPL-CCNC: 13 U/L — LOW (ref 15–37)
BILIRUB SERPL-MCNC: 0.9 MG/DL — SIGNIFICANT CHANGE UP (ref 0.2–1.2)
BUN SERPL-MCNC: 18 MG/DL — SIGNIFICANT CHANGE UP (ref 7–23)
CALCIUM SERPL-MCNC: 8.7 MG/DL — SIGNIFICANT CHANGE UP (ref 8.5–10.1)
CHLORIDE SERPL-SCNC: 99 MMOL/L — SIGNIFICANT CHANGE UP (ref 96–108)
CO2 SERPL-SCNC: 32 MMOL/L — HIGH (ref 22–31)
CREAT SERPL-MCNC: 0.92 MG/DL — SIGNIFICANT CHANGE UP (ref 0.5–1.3)
EGFR: 92 ML/MIN/1.73M2 — SIGNIFICANT CHANGE UP
ESTIMATED AVERAGE GLUCOSE: 272 MG/DL — HIGH (ref 68–114)
GLUCOSE SERPL-MCNC: 239 MG/DL — HIGH (ref 70–99)
HCT VFR BLD CALC: 28.1 % — LOW (ref 39–50)
HCV AB S/CO SERPL IA: 0.1 S/CO — SIGNIFICANT CHANGE UP (ref 0–0.99)
HCV AB SERPL-IMP: SIGNIFICANT CHANGE UP
HGB BLD-MCNC: 8.2 G/DL — LOW (ref 13–17)
MCHC RBC-ENTMCNC: 19.2 PG — LOW (ref 27–34)
MCHC RBC-ENTMCNC: 29.2 GM/DL — LOW (ref 32–36)
MCV RBC AUTO: 65.7 FL — LOW (ref 80–100)
NRBC # BLD: 0 /100 WBCS — SIGNIFICANT CHANGE UP (ref 0–0)
PLATELET # BLD AUTO: 275 K/UL — SIGNIFICANT CHANGE UP (ref 150–400)
POTASSIUM SERPL-MCNC: 3.2 MMOL/L — LOW (ref 3.5–5.3)
POTASSIUM SERPL-SCNC: 3.2 MMOL/L — LOW (ref 3.5–5.3)
PROT SERPL-MCNC: 7.1 G/DL — SIGNIFICANT CHANGE UP (ref 6–8.3)
RBC # BLD: 4.28 M/UL — SIGNIFICANT CHANGE UP (ref 4.2–5.8)
RBC # FLD: 22.5 % — HIGH (ref 10.3–14.5)
SODIUM SERPL-SCNC: 136 MMOL/L — SIGNIFICANT CHANGE UP (ref 135–145)
WBC # BLD: 8.48 K/UL — SIGNIFICANT CHANGE UP (ref 3.8–10.5)
WBC # FLD AUTO: 8.48 K/UL — SIGNIFICANT CHANGE UP (ref 3.8–10.5)

## 2022-11-23 PROCEDURE — 99221 1ST HOSP IP/OBS SF/LOW 40: CPT

## 2022-11-23 PROCEDURE — 88312 SPECIAL STAINS GROUP 1: CPT | Mod: 26

## 2022-11-23 PROCEDURE — 88305 TISSUE EXAM BY PATHOLOGIST: CPT | Mod: 26

## 2022-11-23 RX ORDER — INSULIN LISPRO 100/ML
VIAL (ML) SUBCUTANEOUS AT BEDTIME
Refills: 0 | Status: DISCONTINUED | OUTPATIENT
Start: 2022-11-23 | End: 2022-11-24

## 2022-11-23 RX ORDER — INSULIN GLARGINE 100 [IU]/ML
25 INJECTION, SOLUTION SUBCUTANEOUS AT BEDTIME
Refills: 0 | Status: DISCONTINUED | OUTPATIENT
Start: 2022-11-23 | End: 2022-11-24

## 2022-11-23 RX ORDER — INSULIN LISPRO 100/ML
10 VIAL (ML) SUBCUTANEOUS
Refills: 0 | Status: DISCONTINUED | OUTPATIENT
Start: 2022-11-23 | End: 2022-11-24

## 2022-11-23 RX ORDER — INSULIN LISPRO 100/ML
VIAL (ML) SUBCUTANEOUS
Refills: 0 | Status: DISCONTINUED | OUTPATIENT
Start: 2022-11-23 | End: 2022-11-24

## 2022-11-23 RX ADMIN — Medication 40 MILLIGRAM(S): at 05:46

## 2022-11-23 RX ADMIN — LEVETIRACETAM 750 MILLIGRAM(S): 250 TABLET, FILM COATED ORAL at 05:46

## 2022-11-23 RX ADMIN — SPIRONOLACTONE 25 MILLIGRAM(S): 25 TABLET, FILM COATED ORAL at 05:46

## 2022-11-23 RX ADMIN — PANTOPRAZOLE SODIUM 40 MILLIGRAM(S): 20 TABLET, DELAYED RELEASE ORAL at 05:46

## 2022-11-23 RX ADMIN — ATORVASTATIN CALCIUM 10 MILLIGRAM(S): 80 TABLET, FILM COATED ORAL at 21:16

## 2022-11-23 RX ADMIN — Medication 10 UNIT(S): at 17:28

## 2022-11-23 RX ADMIN — PANTOPRAZOLE SODIUM 40 MILLIGRAM(S): 20 TABLET, DELAYED RELEASE ORAL at 17:29

## 2022-11-23 RX ADMIN — Medication 3: at 08:36

## 2022-11-23 RX ADMIN — Medication 4: at 21:16

## 2022-11-23 RX ADMIN — LEVETIRACETAM 750 MILLIGRAM(S): 250 TABLET, FILM COATED ORAL at 17:27

## 2022-11-23 RX ADMIN — INSULIN GLARGINE 25 UNIT(S): 100 INJECTION, SOLUTION SUBCUTANEOUS at 21:17

## 2022-11-23 RX ADMIN — Medication 4: at 12:10

## 2022-11-23 RX ADMIN — Medication 4: at 17:28

## 2022-11-23 RX ADMIN — SPIRONOLACTONE 25 MILLIGRAM(S): 25 TABLET, FILM COATED ORAL at 17:27

## 2022-11-23 RX ADMIN — Medication 81 MILLIGRAM(S): at 17:27

## 2022-11-23 NOTE — PROGRESS NOTE ADULT - SUBJECTIVE AND OBJECTIVE BOX
Patient is a 67y Male whom presented to the hospital with hyponatremia     PAST MEDICAL & SURGICAL HISTORY:  CVA (cerebral vascular accident)      HTN (hypertension)      DM (diabetes mellitus)      Arrhythmia      History of atrial fibrillation      S/P CABG (coronary artery bypass graft)      S/P brain surgery        Allergies    No Known Allergies    Intolerances        SOCIAL HISTORY:  Denies ETOh,Smoking,     FAMILY HISTORY:  No pertinent family history in first degree relatives        REVIEW OF SYSTEMS:    CONSTITUTIONAL: No weakness, fevers or chills  RESPIRATORY: No cough, wheezing, hemoptysis; No shortness of breath  CARDIOVASCULAR: No chest pain or palpitations  GASTROINTESTINAL: No abdominal or epigastric pain. No nausea, vomiting,     No diarrhea or constipation. No melena   SKIN: dry    Date of servie : 11-23-22 @ 12:54  INTERVAL HPI/OVERNIGHT EVENTS:  T(C): 36.8 (11-23-22 @ 12:33), Max: 36.9 (11-22-22 @ 21:41)  HR: 77 (11-23-22 @ 12:33) (75 - 81)  BP: 126/76 (11-23-22 @ 12:33) (104/73 - 126/76)  RR: 18 (11-23-22 @ 12:33) (17 - 18)  SpO2: 99% (11-23-22 @ 12:33) (98% - 99%)  Wt(kg): --  I&O's Summary      LABS:                        8.2    8.48  )-----------( 275      ( 23 Nov 2022 09:40 )             28.1     11-23    136  |  99  |  18  ----------------------------<  239<H>  3.2<L>   |  32<H>  |  0.92    Ca    8.7      23 Nov 2022 09:40    TPro  7.1  /  Alb  3.1<L>  /  TBili  0.9  /  DBili  x   /  AST  13<L>  /  ALT  15  /  AlkPhos  193<H>  11-23    PT/INR - ( 21 Nov 2022 16:00 )   PT: 20.6 sec;   INR: 1.75 ratio         PTT - ( 21 Nov 2022 16:00 )  PTT:39.0 sec    CAPILLARY BLOOD GLUCOSE      POCT Blood Glucose.: 202 mg/dL (23 Nov 2022 11:27)  POCT Blood Glucose.: 287 mg/dL (23 Nov 2022 07:40)  POCT Blood Glucose.: 339 mg/dL (22 Nov 2022 23:20)  POCT Blood Glucose.: 346 mg/dL (22 Nov 2022 21:55)  POCT Blood Glucose.: 292 mg/dL (22 Nov 2022 16:34)            MEDICATIONS  (STANDING):  aspirin enteric coated 81 milliGRAM(s) Oral daily  atorvastatin 10 milliGRAM(s) Oral at bedtime  dextrose 5%. 1000 milliLiter(s) (50 mL/Hr) IV Continuous <Continuous>  dextrose 5%. 1000 milliLiter(s) (100 mL/Hr) IV Continuous <Continuous>  dextrose 50% Injectable 25 Gram(s) IV Push once  dextrose 50% Injectable 12.5 Gram(s) IV Push once  dextrose 50% Injectable 25 Gram(s) IV Push once  furosemide    Tablet 40 milliGRAM(s) Oral daily  glucagon  Injectable 1 milliGRAM(s) IntraMuscular once  insulin glargine Injectable (LANTUS) 25 Unit(s) SubCutaneous at bedtime  insulin lispro (ADMELOG) corrective regimen sliding scale   SubCutaneous three times a day before meals  insulin lispro (ADMELOG) corrective regimen sliding scale   SubCutaneous at bedtime  insulin lispro Injectable (ADMELOG) 10 Unit(s) SubCutaneous three times a day before meals  levETIRAcetam 750 milliGRAM(s) Oral two times a day  pantoprazole  Injectable 40 milliGRAM(s) IV Push two times a day  propranolol 20 milliGRAM(s) Oral two times a day  spironolactone 25 milliGRAM(s) Oral two times a day    MEDICATIONS  (PRN):  dextrose Oral Gel 15 Gram(s) Oral once PRN Blood Glucose LESS THAN 70 milliGRAM(s)/deciliter              PHYSICAL EXAM:    Constitutional: NAD  HEENT: conjunctive   clear   Neck:  No JVD  Respiratory: CTAB  Cardiovascular: S1 and S2  Gastrointestinal: BS+, soft, NT/ND  Extremities: No peripheral edema  : No Adams  Skin: No rashes  Access: Not applicable

## 2022-11-23 NOTE — PROGRESS NOTE ADULT - ASSESSMENT
66 yo M PMHx CVA , CAD, CABG, mechanical valve as per daughterwith residual R sided weakness and aphasia, esophageal varices, ascites, as per daughter sent in by Dr. Cuong JOHNSON for tranfusion and follow up in the AM. Outpatient labs revealed hemoglobin 6.4-- daughter reports melena x a few days previously but now normal. As per daughter, pt at his baseline. Pt has no acute complaint. (22 Nov 2022 06:39)      hyponatremia is improving   will check ua , urine osmolality , urine sodium , urine uric acid , serum sodium , serum osmolality , serum uric acid , f/u with hyponatremia work up , f/u with bmp , monitor i and o    fluid overload   spironolactone 25 milliGRAM(s) Oral two times a day  furosemide    Tablet 40 milliGRAM(s) Oral daily    BP monitoring,continue current antihypertensive meds, low salt diet,followup with PMD in 1-2 weeks  propranolol 20 milliGRAM(s) Oral two times a day

## 2022-11-23 NOTE — CONSULT NOTE ADULT - ASSESSMENT
Physical Exam:   Vital Signs Last 24 Hrs  T(C): 36.2 (23 Nov 2022 05:10), Max: 36.9 (22 Nov 2022 21:41)  T(F): 97.2 (23 Nov 2022 05:10), Max: 98.4 (22 Nov 2022 21:41)  HR: 75 (23 Nov 2022 05:10) (75 - 81)  BP: 124/76 (23 Nov 2022 05:10) (104/73 - 124/76)  BP(mean): --  RR: 18 (23 Nov 2022 05:10) (17 - 18)  SpO2: 98% (23 Nov 2022 05:10) (95% - 99%)    Parameters below as of 22 Nov 2022 21:41  Patient On (Oxygen Delivery Method): room air      CAPILLARY BLOOD GLUCOSE      POCT Blood Glucose.: 287 mg/dL (23 Nov 2022 07:40)  POCT Blood Glucose.: 339 mg/dL (22 Nov 2022 23:20)  POCT Blood Glucose.: 346 mg/dL (22 Nov 2022 21:55)  POCT Blood Glucose.: 292 mg/dL (22 Nov 2022 16:34)  POCT Blood Glucose.: 278 mg/dL (22 Nov 2022 12:49)  POCT Blood Glucose.: 275 mg/dL (22 Nov 2022 11:40)      Cholesterol, Serum: 113 mg/dL (05.19.21 @ 08:36)     HDL Cholesterol, Serum: 22 mg/dL (05.19.21 @ 08:36)     LDL Cholesterol Calculated: 66 mg/dL (05.19.21 @ 08:36)     DIET: CC  >50%

## 2022-11-23 NOTE — PROGRESS NOTE ADULT - ASSESSMENT
68 yo M PMHx CVA with residual R sided weakness and aphasia, esophageal varices, ascites, as per daughter sent in by Dr. Cuong JOHNSON for tranfusion and follow up in the AM. Outpatient labs revealed hemoglobin 6.4-- daughter reports melena x a few days previously but now normal. As per daughter, pt at his baseline. Pt has no acute complaint.    1 Acute blood loss anemia  - sp 1 unit  - monitor cbc  - transfuse PRN  - GI fu  - EGD today     2 CAD, CABG  - cw asa, statin  - cards called    3 Aortic  Metallic valve   - ok to hold AC for a week as per cards   - cards fu       4 HX of CVA  -  neuro fu  - cw asa, statin

## 2022-11-23 NOTE — PROGRESS NOTE ADULT - SUBJECTIVE AND OBJECTIVE BOX
s/p  upper gastrointestinal endoscopy    normal esophagus  multiple clean based gastric ulcers, biopsied  normal duodenum    rec  reg diet  proton pump inhibitor bid

## 2022-11-23 NOTE — PROGRESS NOTE ADULT - SUBJECTIVE AND OBJECTIVE BOX
Patient is a 67y old  Male who presents with a chief complaint of anemia (23 Nov 2022 11:03)    Date of servie : 11-23-22 @ 12:54  INTERVAL HPI/OVERNIGHT EVENTS:  T(C): 36.8 (11-23-22 @ 12:33), Max: 36.9 (11-22-22 @ 21:41)  HR: 77 (11-23-22 @ 12:33) (75 - 81)  BP: 126/76 (11-23-22 @ 12:33) (104/73 - 126/76)  RR: 18 (11-23-22 @ 12:33) (17 - 18)  SpO2: 99% (11-23-22 @ 12:33) (98% - 99%)  Wt(kg): --  I&O's Summary      LABS:                        8.2    8.48  )-----------( 275      ( 23 Nov 2022 09:40 )             28.1     11-23    136  |  99  |  18  ----------------------------<  239<H>  3.2<L>   |  32<H>  |  0.92    Ca    8.7      23 Nov 2022 09:40    TPro  7.1  /  Alb  3.1<L>  /  TBili  0.9  /  DBili  x   /  AST  13<L>  /  ALT  15  /  AlkPhos  193<H>  11-23    PT/INR - ( 21 Nov 2022 16:00 )   PT: 20.6 sec;   INR: 1.75 ratio         PTT - ( 21 Nov 2022 16:00 )  PTT:39.0 sec    CAPILLARY BLOOD GLUCOSE      POCT Blood Glucose.: 202 mg/dL (23 Nov 2022 11:27)  POCT Blood Glucose.: 287 mg/dL (23 Nov 2022 07:40)  POCT Blood Glucose.: 339 mg/dL (22 Nov 2022 23:20)  POCT Blood Glucose.: 346 mg/dL (22 Nov 2022 21:55)  POCT Blood Glucose.: 292 mg/dL (22 Nov 2022 16:34)            MEDICATIONS  (STANDING):  aspirin enteric coated 81 milliGRAM(s) Oral daily  atorvastatin 10 milliGRAM(s) Oral at bedtime  dextrose 5%. 1000 milliLiter(s) (50 mL/Hr) IV Continuous <Continuous>  dextrose 5%. 1000 milliLiter(s) (100 mL/Hr) IV Continuous <Continuous>  dextrose 50% Injectable 25 Gram(s) IV Push once  dextrose 50% Injectable 12.5 Gram(s) IV Push once  dextrose 50% Injectable 25 Gram(s) IV Push once  furosemide    Tablet 40 milliGRAM(s) Oral daily  glucagon  Injectable 1 milliGRAM(s) IntraMuscular once  insulin glargine Injectable (LANTUS) 25 Unit(s) SubCutaneous at bedtime  insulin lispro (ADMELOG) corrective regimen sliding scale   SubCutaneous three times a day before meals  insulin lispro (ADMELOG) corrective regimen sliding scale   SubCutaneous at bedtime  insulin lispro Injectable (ADMELOG) 10 Unit(s) SubCutaneous three times a day before meals  levETIRAcetam 750 milliGRAM(s) Oral two times a day  pantoprazole  Injectable 40 milliGRAM(s) IV Push two times a day  propranolol 20 milliGRAM(s) Oral two times a day  spironolactone 25 milliGRAM(s) Oral two times a day    MEDICATIONS  (PRN):  dextrose Oral Gel 15 Gram(s) Oral once PRN Blood Glucose LESS THAN 70 milliGRAM(s)/deciliter          PHYSICAL EXAM:  GENERAL: NAD, well-groomed, well-developed  HEAD:  Atraumatic, Normocephalic  CHEST/LUNG: Clear to percussion bilaterally; No rales, rhonchi, wheezing, or rubs  HEART: Regular rate and rhythm; No murmurs, rubs, or gallops  ABDOMEN: Soft, Nontender, Nondistended; Bowel sounds present  EXTREMITIES:  2+ Peripheral Pulses, No clubbing, cyanosis, or edema  LYMPH: No lymphadenopathy noted  SKIN: No rashes or lesions    Care Discussed with Consultants/Other Providers [ ] YES  [ ] NO

## 2022-11-23 NOTE — PROGRESS NOTE ADULT - SUBJECTIVE AND OBJECTIVE BOX
Neurology follow up note    ARNIE [id number]      Interval History:    Patient feels ok seen dtr     Allergies    No Known Allergies    Intolerances        MEDICATIONS    aspirin enteric coated 81 milliGRAM(s) Oral daily  atorvastatin 10 milliGRAM(s) Oral at bedtime  dextrose 5%. 1000 milliLiter(s) IV Continuous <Continuous>  dextrose 5%. 1000 milliLiter(s) IV Continuous <Continuous>  dextrose 50% Injectable 25 Gram(s) IV Push once  dextrose 50% Injectable 12.5 Gram(s) IV Push once  dextrose 50% Injectable 25 Gram(s) IV Push once  dextrose Oral Gel 15 Gram(s) Oral once PRN  furosemide    Tablet 40 milliGRAM(s) Oral daily  glucagon  Injectable 1 milliGRAM(s) IntraMuscular once  insulin glargine Injectable (LANTUS) 25 Unit(s) SubCutaneous at bedtime  insulin lispro (ADMELOG) corrective regimen sliding scale   SubCutaneous three times a day before meals  insulin lispro (ADMELOG) corrective regimen sliding scale   SubCutaneous at bedtime  insulin lispro Injectable (ADMELOG) 10 Unit(s) SubCutaneous three times a day before meals  levETIRAcetam 750 milliGRAM(s) Oral two times a day  pantoprazole  Injectable 40 milliGRAM(s) IV Push two times a day  propranolol 20 milliGRAM(s) Oral two times a day  spironolactone 25 milliGRAM(s) Oral two times a day            Weight (kg): 70.8 (11-23 @ 09:53)    Vital Signs Last 24 Hrs  T(C): 36.2 (23 Nov 2022 05:10), Max: 36.9 (22 Nov 2022 21:41)  T(F): 97.2 (23 Nov 2022 05:10), Max: 98.4 (22 Nov 2022 21:41)  HR: 75 (23 Nov 2022 05:10) (75 - 81)  BP: 124/76 (23 Nov 2022 05:10) (104/73 - 124/76)  BP(mean): --  RR: 18 (23 Nov 2022 05:10) (17 - 18)  SpO2: 98% (23 Nov 2022 05:10) (95% - 99%)    Parameters below as of 22 Nov 2022 21:41  Patient On (Oxygen Delivery Method): room air    REVIEW OF SYSTEMS:  Completely limited secondary to the patient repeats words over, has severe expressive aphasia, cannot express himself properly, but has been in his normal state of health and is at his baseline at present as per daughter.    PHYSICAL EXAMINATION:   HEENT:  Head:  Normocephalic.  Eyes:  No scleral icterus.  Ears:  Hearing hard to evaluate secondary to cannot answer questions accordingly.  NECK:  Supple.  RESPIRATORY:  Good air entry bilaterally.  CARDIOVASCULAR:  S1 and S2 heard.  ABDOMEN:  Soft and nontender.  EXTREMITIES:  No clubbing or cyanosis was noted.      NEUROLOGIC:  The patient is awake and alert.  Positive on-off blink to visual threat.  Positive expressive aphasia, will say a few words but repeat same words over.  Right upper and right lower 0/5 with increased tone.  Right hemiplegia at baseline.  Left upper and left lower 4/5.                  LABS:  CBC Full  -  ( 23 Nov 2022 09:40 )  WBC Count : 8.48 K/uL  RBC Count : 4.28 M/uL  Hemoglobin : 8.2 g/dL  Hematocrit : 28.1 %  Platelet Count - Automated : 275 K/uL  Mean Cell Volume : 65.7 fl  Mean Cell Hemoglobin : 19.2 pg  Mean Cell Hemoglobin Concentration : 29.2 gm/dL  Auto Neutrophil # : x  Auto Lymphocyte # : x  Auto Monocyte # : x  Auto Eosinophil # : x  Auto Basophil # : x  Auto Neutrophil % : x  Auto Lymphocyte % : x  Auto Monocyte % : x  Auto Eosinophil % : x  Auto Basophil % : x      11-23    136  |  99  |  18  ----------------------------<  239<H>  3.2<L>   |  32<H>  |  0.92    Ca    8.7      23 Nov 2022 09:40    TPro  7.1  /  Alb  3.1<L>  /  TBili  0.9  /  DBili  x   /  AST  13<L>  /  ALT  15  /  AlkPhos  193<H>  11-23    Hemoglobin A1C:     LIVER FUNCTIONS - ( 23 Nov 2022 09:40 )  Alb: 3.1 g/dL / Pro: 7.1 g/dL / ALK PHOS: 193 U/L / ALT: 15 U/L / AST: 13 U/L / GGT: x           Vitamin B12   PT/INR - ( 21 Nov 2022 16:00 )   PT: 20.6 sec;   INR: 1.75 ratio         PTT - ( 21 Nov 2022 16:00 )  PTT:39.0 sec      RADIOLOGY    ANALYSIS AND PLAN:  This is a 67-year-old with history of cerebrovascular accident with low hemoglobin and hematocrit, atrial fibrillation, and heart valve replacement.  For history of cerebrovascular accident and atrial fibrillation, risks versus benefits if the patient is having any type of active GI bleeding.  From neurology standpoint, the patient does not need to be on antiplatelet and anticoagulation as he is on anticoagulation from neurology standpoint for atrial fibrillation.  For history of seizure prophylaxis, continue the patient on Keppra.  For history of diabetes, strict control of blood sugars.  For hypertension, monitor systolic blood pressure.    Spoke with daughter, Jeffrey, at bedside.  Her telephone number is [phone] 11/23  She understands my reasoning and thought process.    Greater than 45 minutes of time was spent with the patient, plan of care, reviewing data, with greater than 50% of the visit was spent counseling and/or coordinating care with multidisciplinary healthcare team

## 2022-11-23 NOTE — PROGRESS NOTE ADULT - ASSESSMENT
67M CAD s/p CABG, DM, Afib, mechanical valve with RIGHT sided weakness and aphasia and anemia.    Suggest:  1.  GI Bleed with iron def anemia  - Hold IV heparin until cleared by GI  - Cardiac optimized for endoscopy/colonoscopy    2. Mechanical valve with known AF  - I have contacted patient's cardiologist and am waiting to hear back  - Continue to Hold IV anticoagulation for 24 hours further  - Continue with baby aspirin daily    3. CAD s/p CABG  - C/w baby aspirin and Lipitor 10 mg daily    4. HTN  - C/w Spironolactone    5. Will follow

## 2022-11-23 NOTE — CONSULT NOTE ADULT - PROBLEM SELECTOR RECOMMENDATION 9
Type 2 A1c pending% adm GIB  add Lantus 25 units @ HS  add admelog 10 units premeal  increase to moderate ISS  Recommend endocrine-Perlman onconsult  FU appt: TBA  DSC recommendations: return to home regimen and glucose monitoring  diabetes education provided as documented above  Diabetes support info and cell # [phone] given   Goal 100-180 mg/dL; 140-180 mg/dL in critical care areas

## 2022-11-23 NOTE — PROGRESS NOTE ADULT - SUBJECTIVE AND OBJECTIVE BOX
[facility] Cardiology Progress Note [phone] (Dr. Pham, Db, Linda, Edgar)    CHIEF COMPLAINT: Patient is a 67y old  Male who presents with a chief complaint of anemia (22 Nov 2022 16:58)      Follow Up Today: The patient denies any chest discomfort or shortness of breath.    HPI:  66 yo M PMHx CVA , CAD, CABG, mechanical valve as per daughterwith residual R sided weakness and aphasia, esophageal varices, ascites, as per daughter sent in by Dr. Cuong JOHNSON for tranfusion and follow up in the AM. Outpatient labs revealed hemoglobin 6.4-- daughter reports melena x a few days previously but now normal. As per daughter, pt at his baseline. Pt has no acute complaint. (22 Nov 2022 06:39)      PAST MEDICAL & SURGICAL HISTORY:  CVA (cerebral vascular accident)      HTN (hypertension)      DM (diabetes mellitus)      Arrhythmia      History of atrial fibrillation      S/P CABG (coronary artery bypass graft)      S/P brain surgery          MEDICATIONS  (STANDING):  aspirin enteric coated 81 milliGRAM(s) Oral daily  atorvastatin 10 milliGRAM(s) Oral at bedtime  dextrose 5%. 1000 milliLiter(s) (50 mL/Hr) IV Continuous <Continuous>  dextrose 5%. 1000 milliLiter(s) (100 mL/Hr) IV Continuous <Continuous>  dextrose 50% Injectable 25 Gram(s) IV Push once  dextrose 50% Injectable 12.5 Gram(s) IV Push once  dextrose 50% Injectable 25 Gram(s) IV Push once  furosemide    Tablet 40 milliGRAM(s) Oral daily  glucagon  Injectable 1 milliGRAM(s) IntraMuscular once  insulin glargine Injectable (LANTUS) 7 Unit(s) SubCutaneous at bedtime  insulin lispro (ADMELOG) corrective regimen sliding scale   SubCutaneous three times a day before meals  levETIRAcetam 750 milliGRAM(s) Oral two times a day  pantoprazole  Injectable 40 milliGRAM(s) IV Push two times a day  propranolol 20 milliGRAM(s) Oral two times a day  spironolactone 25 milliGRAM(s) Oral two times a day    MEDICATIONS  (PRN):  dextrose Oral Gel 15 Gram(s) Oral once PRN Blood Glucose LESS THAN 70 milliGRAM(s)/deciliter      Allergies    No Known Allergies    Intolerances        REVIEW OF SYSTEMS:    All other review of systems is negative unless indicated above    Vital Signs Last 24 Hrs  T(C): 36.2 (23 Nov 2022 05:10), Max: 36.9 (22 Nov 2022 21:41)  T(F): 97.2 (23 Nov 2022 05:10), Max: 98.4 (22 Nov 2022 21:41)  HR: 75 (23 Nov 2022 05:10) (75 - 81)  BP: 124/76 (23 Nov 2022 05:10) (104/73 - 124/76)  BP(mean): --  RR: 18 (23 Nov 2022 05:10) (17 - 18)  SpO2: 98% (23 Nov 2022 05:10) (95% - 99%)    Parameters below as of 22 Nov 2022 21:41  Patient On (Oxygen Delivery Method): room air        I&O's Summary      PHYSICAL EXAM:    Constitutional: NAD, awake and alert, well-developed  Eyes:  EOMI,  Pupils round, No oral cyanosis.  HEENT: No exudate or erythema  Pulmonary: Non-labored, breath sounds are clear bilaterally, No wheezing, rales or rhonchi  Cardiovascular: Regular, S1 and S2, No murmurs, rubs, gallops oir clicks  Gastrointestinal: Bowel Sounds present, soft, nontender.   Ext: No significant LE edema  Neurological: Alert, no gross focal motor deficits  Skin: No rashes.  Psych:  Mood & affect appropriate    LABS: All Labs Reviewed:                        8.3    8.95  )-----------( 277      ( 22 Nov 2022 07:50 )             29.4                         7.1    8.64  )-----------( 331      ( 21 Nov 2022 16:00 )             26.0     21 Nov 2022 16:00    132    |  94     |  27     ----------------------------<  289    3.9     |  29     |  1.10     Ca    9.1        21 Nov 2022 16:00    TPro  7.8    /  Alb  3.4    /  TBili  0.6    /  DBili  x      /  AST  21     /  ALT  21     /  AlkPhos  237    21 Nov 2022 16:00    PT/INR - ( 21 Nov 2022 16:00 )   PT: 20.6 sec;   INR: 1.75 ratio         PTT - ( 21 Nov 2022 16:00 )  PTT:39.0 sec      Blood Culture:         RADIOLOGY/EKG:    Attending Attestation:   20 minutes spent on total encounter; more than 50% of the visit was spent counseling and/or coordinating care by the attending physician.     ASSESSMENT AND PLAN

## 2022-11-24 VITALS — HEART RATE: 73 BPM | DIASTOLIC BLOOD PRESSURE: 68 MMHG | SYSTOLIC BLOOD PRESSURE: 107 MMHG

## 2022-11-24 LAB
ALBUMIN SERPL ELPH-MCNC: 3.1 G/DL — LOW (ref 3.3–5)
ALP SERPL-CCNC: 204 U/L — HIGH (ref 40–120)
ALT FLD-CCNC: 15 U/L — SIGNIFICANT CHANGE UP (ref 12–78)
ANION GAP SERPL CALC-SCNC: 7 MMOL/L — SIGNIFICANT CHANGE UP (ref 5–17)
AST SERPL-CCNC: 18 U/L — SIGNIFICANT CHANGE UP (ref 15–37)
BILIRUB SERPL-MCNC: 0.9 MG/DL — SIGNIFICANT CHANGE UP (ref 0.2–1.2)
BUN SERPL-MCNC: 22 MG/DL — SIGNIFICANT CHANGE UP (ref 7–23)
CALCIUM SERPL-MCNC: 9 MG/DL — SIGNIFICANT CHANGE UP (ref 8.5–10.1)
CHLORIDE SERPL-SCNC: 100 MMOL/L — SIGNIFICANT CHANGE UP (ref 96–108)
CO2 SERPL-SCNC: 30 MMOL/L — SIGNIFICANT CHANGE UP (ref 22–31)
CREAT SERPL-MCNC: 1.1 MG/DL — SIGNIFICANT CHANGE UP (ref 0.5–1.3)
EGFR: 74 ML/MIN/1.73M2 — SIGNIFICANT CHANGE UP
GLUCOSE SERPL-MCNC: 111 MG/DL — HIGH (ref 70–99)
HCT VFR BLD CALC: 29.9 % — LOW (ref 39–50)
HGB BLD-MCNC: 8.4 G/DL — LOW (ref 13–17)
MCHC RBC-ENTMCNC: 18.8 PG — LOW (ref 27–34)
MCHC RBC-ENTMCNC: 28.1 GM/DL — LOW (ref 32–36)
MCV RBC AUTO: 67 FL — LOW (ref 80–100)
NRBC # BLD: 0 /100 WBCS — SIGNIFICANT CHANGE UP (ref 0–0)
PLATELET # BLD AUTO: 269 K/UL — SIGNIFICANT CHANGE UP (ref 150–400)
POTASSIUM SERPL-MCNC: 3.5 MMOL/L — SIGNIFICANT CHANGE UP (ref 3.5–5.3)
POTASSIUM SERPL-SCNC: 3.5 MMOL/L — SIGNIFICANT CHANGE UP (ref 3.5–5.3)
PROT SERPL-MCNC: 7.1 G/DL — SIGNIFICANT CHANGE UP (ref 6–8.3)
RBC # BLD: 4.46 M/UL — SIGNIFICANT CHANGE UP (ref 4.2–5.8)
RBC # FLD: 23.1 % — HIGH (ref 10.3–14.5)
SODIUM SERPL-SCNC: 137 MMOL/L — SIGNIFICANT CHANGE UP (ref 135–145)
WBC # BLD: 8.56 K/UL — SIGNIFICANT CHANGE UP (ref 3.8–10.5)
WBC # FLD AUTO: 8.56 K/UL — SIGNIFICANT CHANGE UP (ref 3.8–10.5)

## 2022-11-24 PROCEDURE — 85610 PROTHROMBIN TIME: CPT

## 2022-11-24 PROCEDURE — 85730 THROMBOPLASTIN TIME PARTIAL: CPT

## 2022-11-24 PROCEDURE — 85025 COMPLETE CBC W/AUTO DIFF WBC: CPT

## 2022-11-24 PROCEDURE — 85027 COMPLETE CBC AUTOMATED: CPT

## 2022-11-24 PROCEDURE — 80053 COMPREHEN METABOLIC PANEL: CPT

## 2022-11-24 PROCEDURE — 93005 ELECTROCARDIOGRAM TRACING: CPT

## 2022-11-24 PROCEDURE — 82962 GLUCOSE BLOOD TEST: CPT

## 2022-11-24 PROCEDURE — 86901 BLOOD TYPING SEROLOGIC RH(D): CPT

## 2022-11-24 PROCEDURE — 96374 THER/PROPH/DIAG INJ IV PUSH: CPT | Mod: XU

## 2022-11-24 PROCEDURE — 86803 HEPATITIS C AB TEST: CPT

## 2022-11-24 PROCEDURE — U0005: CPT

## 2022-11-24 PROCEDURE — 88305 TISSUE EXAM BY PATHOLOGIST: CPT

## 2022-11-24 PROCEDURE — 93306 TTE W/DOPPLER COMPLETE: CPT

## 2022-11-24 PROCEDURE — 88312 SPECIAL STAINS GROUP 1: CPT

## 2022-11-24 PROCEDURE — 83036 HEMOGLOBIN GLYCOSYLATED A1C: CPT

## 2022-11-24 PROCEDURE — P9016: CPT

## 2022-11-24 PROCEDURE — 36430 TRANSFUSION BLD/BLD COMPNT: CPT

## 2022-11-24 PROCEDURE — 36415 COLL VENOUS BLD VENIPUNCTURE: CPT

## 2022-11-24 PROCEDURE — 86900 BLOOD TYPING SEROLOGIC ABO: CPT

## 2022-11-24 PROCEDURE — 99285 EMERGENCY DEPT VISIT HI MDM: CPT

## 2022-11-24 PROCEDURE — 86850 RBC ANTIBODY SCREEN: CPT

## 2022-11-24 PROCEDURE — U0003: CPT

## 2022-11-24 PROCEDURE — 86923 COMPATIBILITY TEST ELECTRIC: CPT

## 2022-11-24 PROCEDURE — 71045 X-RAY EXAM CHEST 1 VIEW: CPT

## 2022-11-24 RX ORDER — PANTOPRAZOLE SODIUM 20 MG/1
1 TABLET, DELAYED RELEASE ORAL
Qty: 60 | Refills: 0
Start: 2022-11-24 | End: 2022-12-23

## 2022-11-24 RX ORDER — ASPIRIN/CALCIUM CARB/MAGNESIUM 324 MG
1 TABLET ORAL
Qty: 0 | Refills: 0 | DISCHARGE
Start: 2022-11-24

## 2022-11-24 RX ORDER — ASPIRIN/CALCIUM CARB/MAGNESIUM 324 MG
1 TABLET ORAL
Qty: 0 | Refills: 0 | DISCHARGE

## 2022-11-24 RX ORDER — GLIMEPIRIDE 1 MG
1 TABLET ORAL
Qty: 0 | Refills: 0 | DISCHARGE

## 2022-11-24 RX ADMIN — LEVETIRACETAM 750 MILLIGRAM(S): 250 TABLET, FILM COATED ORAL at 06:03

## 2022-11-24 RX ADMIN — PANTOPRAZOLE SODIUM 40 MILLIGRAM(S): 20 TABLET, DELAYED RELEASE ORAL at 06:03

## 2022-11-24 NOTE — CONSULT NOTE ADULT - SUBJECTIVE AND OBJECTIVE BOX
[facility] Cardiology Consult - [place] [phone]    CHIEF COMPLAINT: Patient is a 67y old  Male who presents with a chief complaint of anemia (22 Nov 2022 06:39)      HPI:  68 yo M PMHx CVA , CAD, CABG, mechanical valve as per daughterwith residual R sided weakness and aphasia, esophageal varices, ascites, as per daughter sent in by Dr. Cuong JOHNSON for tranfusion and follow up in the AM. Outpatient labs revealed hemoglobin 6.4-- daughter reports melena x a few days previously but now normal. As per daughter, pt at his baseline. Pt has no acute complaint. (22 Nov 2022 06:39)      PAST MEDICAL & SURGICAL HISTORY:  CVA (cerebral vascular accident)      HTN (hypertension)      DM (diabetes mellitus)      Arrhythmia      History of atrial fibrillation      S/P CABG (coronary artery bypass graft)      S/P brain surgery          SOCIAL HISTORY: Alochol: Denied  Smoking: Nonsmoker  Drug Use: Denied  Marital Status:         FAMILY HISTORY: FAMILY HISTORY:  No pertinent family history in first degree relatives        MEDICATIONS  (STANDING):  aspirin enteric coated 81 milliGRAM(s) Oral daily  atorvastatin 10 milliGRAM(s) Oral at bedtime  dextrose 5%. 1000 milliLiter(s) (50 mL/Hr) IV Continuous <Continuous>  dextrose 5%. 1000 milliLiter(s) (100 mL/Hr) IV Continuous <Continuous>  dextrose 50% Injectable 25 Gram(s) IV Push once  dextrose 50% Injectable 12.5 Gram(s) IV Push once  dextrose 50% Injectable 25 Gram(s) IV Push once  furosemide    Tablet 40 milliGRAM(s) Oral daily  glucagon  Injectable 1 milliGRAM(s) IntraMuscular once  heparin   Injectable 4400 Unit(s) IV Push once  heparin  Infusion.  Unit(s)/Hr (9 mL/Hr) IV Continuous <Continuous>  insulin lispro (ADMELOG) corrective regimen sliding scale   SubCutaneous three times a day before meals  levETIRAcetam 750 milliGRAM(s) Oral two times a day  pantoprazole  Injectable 40 milliGRAM(s) IV Push two times a day  propranolol 20 milliGRAM(s) Oral two times a day  spironolactone 25 milliGRAM(s) Oral two times a day    MEDICATIONS  (PRN):  dextrose Oral Gel 15 Gram(s) Oral once PRN Blood Glucose LESS THAN 70 milliGRAM(s)/deciliter  heparin   Injectable 4400 Unit(s) IV Push every 6 hours PRN For aPTT less than 40      Allergies    No Known Allergies    Intolerances        REVIEW OF SYSTEMS:  CONSTITUTIONAL: No weakness, fevers or chills  EYES/ENT: No visual changes;  No vertigo or throat pain   NECK: No pain or stiffness  RESPIRATORY: No cough, wheezing, hemoptysis; No shortness of breath  CARDIOVASCULAR: No chest pain or palpitations  GASTROINTESTINAL: No abdominal pain. No nausea, vomiting, or hematemesis; No diarrhea or constipation. No melena or hematochezia.  GENITOURINARY: No dysuria, frequency or hematuria  NEUROLOGICAL: No numbness or weakness  SKIN: No itching or rash  All other review of systems is negative unless indicated above    VITAL SIGNS:   Vital Signs Last 24 Hrs  T(C): 36.9 (21 Nov 2022 23:00), Max: 36.9 (21 Nov 2022 19:20)  T(F): 98.5 (21 Nov 2022 23:00), Max: 98.5 (21 Nov 2022 19:20)  HR: 87 (21 Nov 2022 23:00) (68 - 87)  BP: 112/69 (21 Nov 2022 23:00) (101/74 - 151/79)  BP(mean): --  RR: 16 (21 Nov 2022 23:00) (16 - 18)  SpO2: 98% (21 Nov 2022 23:00) (98% - 98%)    Parameters below as of 21 Nov 2022 23:00  Patient On (Oxygen Delivery Method): room air        I&O's Summary      PHYSICAL EXAM:  Constitutional: Awake in NAD  HEENT:  GUSTAVO, EOMI  Neck: No JVD  Pulmonary: CTA B/L No R/R/W  Cardiovascular: PMI not palpable non-displaced Regular S1 and S2, no murmurs  Gastrointestinal: Bowel Sounds present, soft, nontender.   Extremities: Trace peripheral edema.   Neuro: No gross focal deficits    LABS: All Labs Reviewed:                        7.1    8.64  )-----------( 331      ( 21 Nov 2022 16:00 )             26.0     21 Nov 2022 16:00    132    |  94     |  27     ----------------------------<  289    3.9     |  29     |  1.10     Ca    9.1        21 Nov 2022 16:00    TPro  7.8    /  Alb  3.4    /  TBili  0.6    /  DBili  x      /  AST  21     /  ALT  21     /  AlkPhos  237    21 Nov 2022 16:00    PT/INR - ( 21 Nov 2022 16:00 )   PT: 20.6 sec;   INR: 1.75 ratio         PTT - ( 21 Nov 2022 16:00 )  PTT:39.0 sec      Blood Culture:         RADIOLOGY/EKG:    < from: CT Head No Cont (05.02.22 @ 11:29) >  IMPRESSION:    No acute intracranial hemorrhage or brain edema.    Redemonstration of extensive left frontal and parietal encephalomalacia   and gliosis with ex vacuo dilatation of the left lateral and third   ventricles.    < end of copied text >  
  Patient is a 67y Male whom presented to the hospital with hyponatremia     PAST MEDICAL & SURGICAL HISTORY:  CVA (cerebral vascular accident)      HTN (hypertension)      DM (diabetes mellitus)      Arrhythmia      History of atrial fibrillation      S/P CABG (coronary artery bypass graft)      S/P brain surgery          MEDICATIONS  (STANDING):  aspirin enteric coated 81 milliGRAM(s) Oral daily  atorvastatin 10 milliGRAM(s) Oral at bedtime  dextrose 5%. 1000 milliLiter(s) (50 mL/Hr) IV Continuous <Continuous>  dextrose 5%. 1000 milliLiter(s) (100 mL/Hr) IV Continuous <Continuous>  dextrose 50% Injectable 25 Gram(s) IV Push once  dextrose 50% Injectable 12.5 Gram(s) IV Push once  dextrose 50% Injectable 25 Gram(s) IV Push once  furosemide    Tablet 40 milliGRAM(s) Oral daily  glucagon  Injectable 1 milliGRAM(s) IntraMuscular once  insulin glargine Injectable (LANTUS) 25 Unit(s) SubCutaneous at bedtime  insulin lispro (ADMELOG) corrective regimen sliding scale   SubCutaneous three times a day before meals  insulin lispro (ADMELOG) corrective regimen sliding scale   SubCutaneous at bedtime  insulin lispro Injectable (ADMELOG) 10 Unit(s) SubCutaneous three times a day before meals  levETIRAcetam 750 milliGRAM(s) Oral two times a day  pantoprazole  Injectable 40 milliGRAM(s) IV Push two times a day  propranolol 20 milliGRAM(s) Oral two times a day  spironolactone 25 milliGRAM(s) Oral two times a day      Allergies    No Known Allergies    Intolerances        SOCIAL HISTORY:  Denies ETOh,Smoking,     FAMILY HISTORY:  No pertinent family history in first degree relatives        REVIEW OF SYSTEMS:    CONSTITUTIONAL: No weakness, fevers or chills  RESPIRATORY: No cough, wheezing, hemoptysis; No shortness of breath  CARDIOVASCULAR: No chest pain or palpitations  GASTROINTESTINAL: No abdominal or epigastric pain. No nausea, vomiting,     No diarrhea or constipation. No melena   SKIN: dry      Vital Signs:  Vital Signs Last 24 Hrs  T(C): 36.8 (22 Nov 2022 12:04), Max: 36.9 (21 Nov 2022 19:20)  T(F): 98.2 (22 Nov 2022 12:04), Max: 98.5 (21 Nov 2022 19:20)  HR: 76 (22 Nov 2022 12:04) (71 - 87)  BP: 121/69 (22 Nov 2022 12:04) (101/74 - 121/69)  BP(mean): --  RR: 17 (22 Nov 2022 12:04) (16 - 17)  SpO2: 95% (22 Nov 2022 12:04) (95% - 98%)    Parameters below as of 22 Nov 2022 12:04  Patient On (Oxygen Delivery Method): room air         8.3    8.95  )-----------( 277      ( 22 Nov 2022 07:50 )             29.4     11-21    132<L>  |  94<L>  |  27<H>  ----------------------------<  289<H>  3.9   |  29  |  1.10    Ca    9.1      21 Nov 2022 16:00    TPro  7.8  /  Alb  3.4  /  TBili  0.6  /  DBili  x   /  AST  21  /  ALT  21  /  AlkPhos  237<H>  11-21    LIVER FUNCTIONS - ( 21 Nov 2022 16:00 )  Alb: 3.4 g/dL / Pro: 7.8 g/dL / ALK PHOS: 237 U/L / ALT: 21 U/L / AST: 21 U/L / GGT: x           PT/INR - ( 21 Nov 2022 16:00 )   PT: 20.6 sec;   INR: 1.75 ratio         PTT - ( 21 Nov 2022 16:00 )  PTT:39.0 sec        Imaging:              PHYSICAL EXAM:    Constitutional: NAD  HEENT: conjunctive   clear   Neck:  No JVD  Respiratory: CTAB  Cardiovascular: S1 and S2  Gastrointestinal: BS+, soft, NT/ND  Extremities: No peripheral edema  : No Adams  Skin: No rashes  Access: Not applicable      Medications:  aspirin enteric coated 81 milliGRAM(s) Oral daily  atorvastatin 10 milliGRAM(s) Oral at bedtime  dextrose 5%. 1000 milliLiter(s) IV Continuous <Continuous>  dextrose 5%. 1000 milliLiter(s) IV Continuous <Continuous>  dextrose 50% Injectable 25 Gram(s) IV Push once  dextrose 50% Injectable 12.5 Gram(s) IV Push once  dextrose 50% Injectable 25 Gram(s) IV Push once  dextrose Oral Gel 15 Gram(s) Oral once PRN  furosemide    Tablet 40 milliGRAM(s) Oral daily  glucagon  Injectable 1 milliGRAM(s) IntraMuscular once  insulin lispro (ADMELOG) corrective regimen sliding scale   SubCutaneous three times a day before meals  levETIRAcetam 750 milliGRAM(s) Oral two times a day  pantoprazole  Injectable 40 milliGRAM(s) IV Push two times a day  propranolol 20 milliGRAM(s) Oral two times a day  spironolactone 25 milliGRAM(s) Oral two times a day      PMHX/PSHX:  CVA (cerebral vascular accident)    
[facility]  Shane Murray PA-C  [address] [place] [id number]  [phone]      Chief Complaint:  Patient is a 67y old  Male who presents with a chief complaint of anemia (22 Nov 2022 09:16)      HPI: 66 yo M PMHx CVA , CAD, CABG, mechanical valve as per daughterwith residual R sided weakness and aphasia, esophageal varices, ascites, as per daughter sent in by Dr. Cuong JOHNSON for tranfusion and follow up in the AM. Outpatient labs revealed hemoglobin 6.4-- daughter reports melena x a few days previously but now normal. As per daughter, pt at his baseline. Pt has no acute complaint.    Allergies:  No Known Allergies      Medications:  aspirin enteric coated 81 milliGRAM(s) Oral daily  atorvastatin 10 milliGRAM(s) Oral at bedtime  dextrose 5%. 1000 milliLiter(s) IV Continuous <Continuous>  dextrose 5%. 1000 milliLiter(s) IV Continuous <Continuous>  dextrose 50% Injectable 25 Gram(s) IV Push once  dextrose 50% Injectable 12.5 Gram(s) IV Push once  dextrose 50% Injectable 25 Gram(s) IV Push once  dextrose Oral Gel 15 Gram(s) Oral once PRN  furosemide    Tablet 40 milliGRAM(s) Oral daily  glucagon  Injectable 1 milliGRAM(s) IntraMuscular once  insulin lispro (ADMELOG) corrective regimen sliding scale   SubCutaneous three times a day before meals  levETIRAcetam 750 milliGRAM(s) Oral two times a day  pantoprazole  Injectable 40 milliGRAM(s) IV Push two times a day  propranolol 20 milliGRAM(s) Oral two times a day  spironolactone 25 milliGRAM(s) Oral two times a day      PMHX/PSHX:  CVA (cerebral vascular accident)    HTN (hypertension)    DM (diabetes mellitus)    Arrhythmia    History of atrial fibrillation    S/P CABG (coronary artery bypass graft)    S/P brain surgery        Family history:  No pertinent family history in first degree relatives    No pertinent family history in first degree relatives        Social History:     ROS:     General:  No wt loss, fevers, chills, night sweats, fatigue,   Eyes:  Good vision, no reported pain  ENT:  No sore throat, pain, runny nose, dysphagia  CV:  No pain, palpitations, hypo/hypertension  Resp:  No dyspnea, cough, tachypnea, wheezing  GI:  No pain, No nausea, No vomiting, No diarrhea, No constipation, No weight loss, No fever, No pruritis, No rectal bleeding, No tarry stools, No dysphagia,  :  No pain, bleeding, incontinence, nocturia  Muscle:  No pain, weakness  Neuro:  No weakness, tingling, memory problems  Psych:  No fatigue, insomnia, mood problems, depression  Endocrine:  No polyuria, polydipsia, cold/heat intolerance  Heme:  No petechiae, ecchymosis, easy bruisability  Skin:  No rash, tattoos, scars, edema      PHYSICAL EXAM:   Vital Signs:  Vital Signs Last 24 Hrs  T(C): 36.8 (22 Nov 2022 12:04), Max: 36.9 (21 Nov 2022 19:20)  T(F): 98.2 (22 Nov 2022 12:04), Max: 98.5 (21 Nov 2022 19:20)  HR: 76 (22 Nov 2022 12:04) (71 - 87)  BP: 121/69 (22 Nov 2022 12:04) (101/74 - 121/69)  BP(mean): --  RR: 17 (22 Nov 2022 12:04) (16 - 17)  SpO2: 95% (22 Nov 2022 12:04) (95% - 98%)    Parameters below as of 22 Nov 2022 12:04  Patient On (Oxygen Delivery Method): room air      Daily     Daily     GENERAL:  Appears stated age,   HEENT:  NC/AT,    CHEST:  Full & symmetric excursion,   HEART:  Regular rhythm  ABDOMEN:  Soft, non-tender, non-distended,   EXTEREMITIES:  no cyanosis,clubbing or edema  SKIN:  No rash  NEURO:  Alert,    LABS:                        8.3    8.95  )-----------( 277      ( 22 Nov 2022 07:50 )             29.4     11-21    132<L>  |  94<L>  |  27<H>  ----------------------------<  289<H>  3.9   |  29  |  1.10    Ca    9.1      21 Nov 2022 16:00    TPro  7.8  /  Alb  3.4  /  TBili  0.6  /  DBili  x   /  AST  21  /  ALT  21  /  AlkPhos  237<H>  11-21    LIVER FUNCTIONS - ( 21 Nov 2022 16:00 )  Alb: 3.4 g/dL / Pro: 7.8 g/dL / ALK PHOS: 237 U/L / ALT: 21 U/L / AST: 21 U/L / GGT: x           PT/INR - ( 21 Nov 2022 16:00 )   PT: 20.6 sec;   INR: 1.75 ratio         PTT - ( 21 Nov 2022 16:00 )  PTT:39.0 sec        Imaging:          
h/o ICH AFIB mechanical heart valve  low H/H form a neurologic  standpoint if on AC no need for antiplatelets  GI Anemia work up as needed  continue Keppra  spoke to dtr at bedside 
[facility] Cardiology    CHIEF COMPLAINT: Patient is a 67y old  Male who presents with a chief complaint of anemia (24 Nov 2022 10:12)      Follow Up: [ ] Chest Pain      [ ] Dyspnea     [ ] Palpitations    [ ] Atrial Fibrillation     [ ] Ventricular Dysrhythmia    [ ] Abnormal EKG                      [ ] Abnormal Cardiac Enzymes     [ ] Valvular Disease    HPI:  68 yo M PMHx CVA , CAD, CABG, mechanical valve as per daughterwith residual R sided weakness and aphasia, esophageal varices, ascites, as per daughter sent in by Dr. Cuong JOHNSON for tranfusion and follow up in the AM. Outpatient labs revealed hemoglobin 6.4-- daughter reports melena x a few days previously but now normal. As per daughter, pt at his baseline. Pt has no acute complaint. (22 Nov 2022 06:39)    PAST MEDICAL & SURGICAL HISTORY:  CVA (cerebral vascular accident)      HTN (hypertension)      DM (diabetes mellitus)      Arrhythmia      History of atrial fibrillation      S/P CABG (coronary artery bypass graft)      S/P brain surgery        MEDICATIONS  (STANDING):  aspirin enteric coated 81 milliGRAM(s) Oral daily  atorvastatin 10 milliGRAM(s) Oral at bedtime  dextrose 5%. 1000 milliLiter(s) (50 mL/Hr) IV Continuous <Continuous>  dextrose 5%. 1000 milliLiter(s) (100 mL/Hr) IV Continuous <Continuous>  dextrose 50% Injectable 25 Gram(s) IV Push once  dextrose 50% Injectable 12.5 Gram(s) IV Push once  dextrose 50% Injectable 25 Gram(s) IV Push once  furosemide    Tablet 40 milliGRAM(s) Oral daily  glucagon  Injectable 1 milliGRAM(s) IntraMuscular once  insulin glargine Injectable (LANTUS) 25 Unit(s) SubCutaneous at bedtime  insulin lispro (ADMELOG) corrective regimen sliding scale   SubCutaneous three times a day before meals  insulin lispro (ADMELOG) corrective regimen sliding scale   SubCutaneous at bedtime  insulin lispro Injectable (ADMELOG) 10 Unit(s) SubCutaneous three times a day before meals  levETIRAcetam 750 milliGRAM(s) Oral two times a day  pantoprazole  Injectable 40 milliGRAM(s) IV Push two times a day  propranolol 20 milliGRAM(s) Oral two times a day  spironolactone 25 milliGRAM(s) Oral two times a day    MEDICATIONS  (PRN):  dextrose Oral Gel 15 Gram(s) Oral once PRN Blood Glucose LESS THAN 70 milliGRAM(s)/deciliter    Allergies    No Known Allergies    Intolerances        REVIEW OF SYSTEMS:    CONSTITUTIONAL: No weakness, fevers or chills.   EYES/ENT: No visual changes;    NECK: No pain or stiffness  RESPIRATORY: No cough, wheezing, No shortness of breath  CARDIOVASCULAR: No chest pain or palpitations  GASTROINTESTINAL: No abdominal pain, or hematochezia.  GENITOURINARY: No dysuria orhematuria  NEUROLOGICAL: No numbness or weakness  SKIN: No itching, burning, rashes  All other review of systems is negative unless indicated above    Vital Signs Last 24 Hrs  T(C): 36.8 (24 Nov 2022 05:15), Max: 36.8 (23 Nov 2022 12:33)  T(F): 98.2 (24 Nov 2022 05:15), Max: 98.2 (23 Nov 2022 12:33)  HR: 73 (24 Nov 2022 06:01) (65 - 90)  BP: 107/68 (24 Nov 2022 06:01) (104/64 - 128/80)  BP(mean): --  RR: 16 (24 Nov 2022 05:15) (16 - 20)  SpO2: 96% (24 Nov 2022 05:15) (95% - 100%)    Parameters below as of 24 Nov 2022 05:15  Patient On (Oxygen Delivery Method): room air      I&O's Summary      PHYSICAL EXAM:  Constitutional: NAD  Neurological: Alert, no focal deficits  HEENT: no JVD, EOMI  Cardiovascular: Regular, S1 and S2, no murmur  Pulmonary: breath sounds bilaterally  Gastrointestinal: Bowel Sounds present, soft, nontender  EXT:  no peripheral edema  Skin: No rashes.  Psych:  Mood calm  LABS: All Labs Reviewed:                          8.4    8.56  )-----------( 269      ( 24 Nov 2022 07:30 )             29.9     11-24    137  |  100  |  22  ----------------------------<  111<H>  3.5   |  30  |  1.10    Ca    9.0      24 Nov 2022 07:30    TPro  7.1  /  Alb  3.1<L>  /  TBili  0.9  /  DBili  x   /  AST  18  /  ALT  15  /  AlkPhos  204<H>  11-24          TTE Echo Complete w/o Contrast w/ Doppler:   ACC: [id number] EXAM:  ECHO TTE WO CON COMP W DOPP                          PROCEDURE DATE:  11/22/2022          INTERPRETATION:  Ordering Physician: NICOLLE CHAVEZ [id number]    Indication: Murmur    Study Quality: Adequate  A complete echocardiographic study was performed utilizing standard   protocol including spectral and color Doppler in all echocardiographic   windows.    Height: 173 cm  Weight: 70 kg  BSA: 1.8  Blood Pressure: 121/69    MEASUREMENTS  IVS: 1.4cm  PWT: 1.1cm  LA: 4.3cm  AO: 3.3cm  AV Cusp Separation: cm  LVIDd: 4.2cm  LVIDs: 3cm  LVOT: 1.9cm    LVEF: 55%  RVSP: 34mmHg    FINDINGS  Left Ventricle: Normal LV systolic function  Aortic Valve: History of aortic valve replacement, mean aortic gradient   19 mmHg, mild aortic stenosis suggested  Mitral Valve: Trace MR  Tricuspid Valve: Trace TR  Pulmonic Valve: Trace PI  Left Atrium: Dilated  Right Ventricle: Normal systolic function  Right Atrium: Normal  Diastolic Function: Indeterminate  Pericardium/Pleura: Normal pericardium      Impression:  Normal LV systolic function EF 55%, history of [facility]-aortic valve replacement,   mean aortic gradient 19 mmHg, mild aortic stenosis suggested, biatrial   enlargement, normal pericardium, RVSP 34 mmHg    --- End of Report ---            MIKE PAULINO MD; Attending Cardiologist  This document has been electronically signed. Nov 24 2022  9:53AM (11-22-22 @ 19:15)  Xray Chest 1 View- PORTABLE-Urgent:   ACC: [id number] EXAM:  XR CHEST PORTABLE URGENT 1V                          PROCEDURE DATE:  11/21/2022          INTERPRETATION:  AP semierect chest on November 21, 2022 at 7:32 PM.   Patient is scheduled for admission.    Heart magnified by technique but possibly enlarged.    Sternotomy and prosthetic heart valve again noted.    There is a persistent loculated effusion at left base.    Chest is similar to May 2 this year.    IMPRESSION: Persistent left chest findings.    --- End of Report ---    RADHAMES ROBERTS MD; Attending Radiologist  This document has been electronically signed. Nov 22 2022  4:26PM (11-21-22 @ 19:54)  12 Lead ECG:   Ventricular Rate 73 BPM    QRS Duration 72 ms    Q-T Interval 442 ms    QTC Calculation(Bazett) 486 ms    R Axis 32 degrees    T Axis 171 degrees    Diagnosis Line Atrial fibrillation  ST & T wave abnormality, consider lateral ischemia  Confirmed by UZMA PHELPS ([id number]) on 11/22/2022 1:13:12 PM (11-21-22 @ 19:15)    Assessment and Plan:   · Assessment	  67M CAD s/p CABG, DM, Afib, mechanical valve with RIGHT sided weakness and aphasia and anemia.    Suggest:  1.  GI Bleed with iron def anemia  -s/p endoscopy/colonoscopy    2. Mechanical Aortic valve with known AF  - would start IV heparin bridge and warfarin 2 mg dosing today  -check daily INR, stop heparin when INR > 2  - restart ASA daily in 24-48 hrs per GI    3. CAD s/p CABG  -  aspirin and Lipitor 10 mg daily    4. HTN  - C/w Spironolactone    5. Will follow  here. pt sees Dr Sheik Horvath-outpt cardio  -inr followed by GP    
Patient is a 67y old  Male who presents with a chief complaint of anemia (23 Nov 2022 09:05)    pt is [place] speaking, aphasic from stroke in 2015, spoke with daughter/caregiver ILIANA GONZALES at bedside:  Type 2 DM DX 2007 year, hx CVA in 2015, no hx DKA/HSS, managed by PCP Dr. Roberta Rios, last seen November 17, A1c 12.2 (14.4 in May) previously managed by endo Dr. Magnus Camejo, last seen 2019. Rx home Metformin 500mg BID, Basaglar 50 units @ HS, admelog 25 units premeal BID. daughter injects patient, verbalizes proper insulin pen administration and injection sites, primarily stomach, reports patient sometimes refuses injections, discussed alternative injection sites, techniques to reduce discomfort. glucometer checks ACHS, reports readings usually 200-300, 1-2 episodes of hypoglycemia in the past, s/s of sweating, treated with sugar, discussed rule of 15, treat until >100mg/dL. pt able to eat and drink, eats primarily [place] cuisine, roti, lentils, potatoes, fish. discussed consistent carb diet, portion control, provided diabetes dietary meal plan guide. pt wheelchair bound.   diabetes education provided- A1c measure and BG targets  fasting, <180 2 hours postmeal. medication MOA and considerations/side effects,  insulin type, onset and duration, inhospital BGM frequency and insulin administration, s/s of hyperglycemia/hypoglycemia and management, glycemic control and preventing complications, consistent carb diet, balanced plate method, consistent meal planning. sick day management, provider f/u  Hx CVA    inhospital: F/S 263>275>278>292>346>339>287  CC diet, NPO for endoscopy  accucheck ACHS  ALICIA    HPI:  66 yo M PMHx CVA , CAD, CABG, mechanical valve as per daughterwith residual R sided weakness and aphasia, esophageal varices, ascites, as per daughter sent in by Dr. Cuong JOHNSON for tranfusion and follow up in the AM. Outpatient labs revealed hemoglobin 6.4-- daughter reports melena x a few days previously but now normal. As per daughter, pt at his baseline. Pt has no acute complaint. (22 Nov 2022 06:39)      PAST MEDICAL & SURGICAL HISTORY:  CVA (cerebral vascular accident)      HTN (hypertension)      DM (diabetes mellitus)      Arrhythmia      History of atrial fibrillation      S/P CABG (coronary artery bypass graft)      S/P brain surgery    Allergies    No Known Allergies    Intolerances        MEDICATIONS  (STANDING):  aspirin enteric coated 81 milliGRAM(s) Oral daily  atorvastatin 10 milliGRAM(s) Oral at bedtime  dextrose 5%. 1000 milliLiter(s) (50 mL/Hr) IV Continuous <Continuous>  dextrose 5%. 1000 milliLiter(s) (100 mL/Hr) IV Continuous <Continuous>  dextrose 50% Injectable 25 Gram(s) IV Push once  dextrose 50% Injectable 12.5 Gram(s) IV Push once  dextrose 50% Injectable 25 Gram(s) IV Push once  furosemide    Tablet 40 milliGRAM(s) Oral daily  glucagon  Injectable 1 milliGRAM(s) IntraMuscular once  insulin glargine Injectable (LANTUS) 7 Unit(s) SubCutaneous at bedtime  insulin lispro (ADMELOG) corrective regimen sliding scale   SubCutaneous three times a day before meals  levETIRAcetam 750 milliGRAM(s) Oral two times a day  pantoprazole  Injectable 40 milliGRAM(s) IV Push two times a day  propranolol 20 milliGRAM(s) Oral two times a day  spironolactone 25 milliGRAM(s) Oral two times a day

## 2022-11-24 NOTE — DISCHARGE NOTE PROVIDER - HOSPITAL COURSE
66 yo M PMHx CVA with residual R sided weakness and aphasia, esophageal varices, ascites, as per daughter sent in by Dr. Cuong JOHNSON for tranfusion and follow up in the AM. Outpatient labs revealed hemoglobin 6.4-- daughter reports melena x a few days previously but now normal. As per daughter, pt at his baseline. Pt has no acute complaint.    1 Acute blood loss anemia  - sp 1 unit  - monitor cbc  - transfuse PRN  - GI fu  - EGD with gastric ulcer  - PPI      2 CAD, CABG  - cw asa, statin  - cards called    3 Aortic  Metallic valve   - ok to hold AC for a week as per cards   - cards fu       4 HX of CVA  -  neuro fu  - cw asa, statin     Pts daughter was here in the hospital from 11/21 to 11/24 with the pt

## 2022-11-24 NOTE — DISCHARGE NOTE NURSING/CASE MANAGEMENT/SOCIAL WORK - PATIENT PORTAL LINK FT
You can access the FollowMyHealth Patient Portal offered by [facility] by registering at the following website: [email or web address]. By joining [employer]’s FollowMyHealth portal, you will also be able to view your health information using other applications (apps) compatible with our system.

## 2022-11-24 NOTE — DISCHARGE NOTE PROVIDER - CARE PROVIDER_API CALL
Gamaliel Kendrick (DO)  Gastroenterology; Internal Medicine  [address] [place] [address]  Phone: [phone]  Fax: [phone]  Follow Up Time:

## 2022-11-24 NOTE — PROGRESS NOTE ADULT - NUTRITIONAL ASSESSMENT
8.4    8.56  )-----------( 269      ( 24 Nov 2022 07:30 )             29.9       CBC Full  -  ( 24 Nov 2022 07:30 )  WBC Count : 8.56 K/uL  RBC Count : 4.46 M/uL  Hemoglobin : 8.4 g/dL  Hematocrit : 29.9 %  Platelet Count - Automated : 269 K/uL  Mean Cell Volume : 67.0 fl  Mean Cell Hemoglobin : 18.8 pg  Mean Cell Hemoglobin Concentration : 28.1 gm/dL  Auto Neutrophil # : x  Auto Lymphocyte # : x  Auto Monocyte # : x  Auto Eosinophil # : x  Auto Basophil # : x  Auto Neutrophil % : x  Auto Lymphocyte % : x  Auto Monocyte % : x  Auto Eosinophil % : x  Auto Basophil % : x      11-24    137  |  100  |  22  ----------------------------<  111<H>  3.5   |  30  |  1.10    Ca    9.0      24 Nov 2022 07:30    TPro  7.1  /  Alb  3.1<L>  /  TBili  0.9  /  DBili  x   /  AST  18  /  ALT  15  /  AlkPhos  204<H>  11-24      CAPILLARY BLOOD GLUCOSE      POCT Blood Glucose.: 141 mg/dL (24 Nov 2022 07:47)  POCT Blood Glucose.: 316 mg/dL (23 Nov 2022 21:06)      Vital Signs Last 24 Hrs  T(C): 36.8 (24 Nov 2022 05:15), Max: 36.8 (24 Nov 2022 05:15)  T(F): 98.2 (24 Nov 2022 05:15), Max: 98.2 (24 Nov 2022 05:15)  HR: 73 (24 Nov 2022 06:01) (73 - 90)  BP: 107/68 (24 Nov 2022 06:01) (104/64 - 112/69)  BP(mean): --  RR: 16 (24 Nov 2022 05:15) (16 - 18)  SpO2: 96% (24 Nov 2022 05:15) (96% - 99%)    Parameters below as of 24 Nov 2022 05:15  Patient On (Oxygen Delivery Method): room air      MEDICATIONS  (STANDING):  aspirin enteric coated 81 milliGRAM(s) Oral daily  atorvastatin 10 milliGRAM(s) Oral at bedtime  dextrose 5%. 1000 milliLiter(s) (50 mL/Hr) IV Continuous <Continuous>  dextrose 5%. 1000 milliLiter(s) (100 mL/Hr) IV Continuous <Continuous>  dextrose 50% Injectable 25 Gram(s) IV Push once  dextrose 50% Injectable 12.5 Gram(s) IV Push once  dextrose 50% Injectable 25 Gram(s) IV Push once  furosemide    Tablet 40 milliGRAM(s) Oral daily  glucagon  Injectable 1 milliGRAM(s) IntraMuscular once  insulin glargine Injectable (LANTUS) 25 Unit(s) SubCutaneous at bedtime  insulin lispro (ADMELOG) corrective regimen sliding scale   SubCutaneous three times a day before meals  insulin lispro (ADMELOG) corrective regimen sliding scale   SubCutaneous at bedtime  insulin lispro Injectable (ADMELOG) 10 Unit(s) SubCutaneous three times a day before meals  levETIRAcetam 750 milliGRAM(s) Oral two times a day  pantoprazole  Injectable 40 milliGRAM(s) IV Push two times a day  propranolol 20 milliGRAM(s) Oral two times a day  spironolactone 25 milliGRAM(s) Oral two times a day

## 2022-11-24 NOTE — PROGRESS NOTE ADULT - ASSESSMENT
GI bleed    s/p  upper gastrointestinal endoscopy; multiple clean based gastric ulcers, biopsied  follow path  reg diet  proton pump inhibitor bid  outpatient follow up  hold asa for 24-48h  no objection to d/c plan  d/w pt and family    I reviewed the overnight course of events on the unit, re-confirming the patient history. I discussed the care with the patient and their family  Differential diagnosis and plan of care discussed with patient after the evaluation  35 minutes spent on total encounter of which more than fifty percent of the encounter was spent counseling and/or coordinating care by the attending physician.  Advanced care planning was discussed with patient and family.  Advanced care planning forms were reviewed and discussed.  Risks, benefits and alternatives of gastroenterologic procedures were discussed in detail and all questions were answered.   GI bleed    s/p  upper gastrointestinal endoscopy; multiple clean based gastric ulcers, biopsied  follow path  reg diet  proton pump inhibitor bid  outpatient follow up  cont asa  ok to restart a/c  no objection to d/c plan  d/w pt and family    I reviewed the overnight course of events on the unit, re-confirming the patient history. I discussed the care with the patient and their family  Differential diagnosis and plan of care discussed with patient after the evaluation  35 minutes spent on total encounter of which more than fifty percent of the encounter was spent counseling and/or coordinating care by the attending physician.  Advanced care planning was discussed with patient and family.  Advanced care planning forms were reviewed and discussed.  Risks, benefits and alternatives of gastroenterologic procedures were discussed in detail and all questions were answered.

## 2022-11-24 NOTE — PROGRESS NOTE ADULT - ASSESSMENT
68 yo M PMHx CVA , CAD, CABG, mechanical valve as per daughterwith residual R sided weakness and aphasia, esophageal varices, ascites, as per daughter sent in by Dr. Cuong JOHNSON for tranfusion and follow up in the AM. Outpatient labs revealed hemoglobin 6.4-- daughter reports melena x a few days previously but now normal. As per daughter, pt at his baseline. Pt has no acute complaint. (22 Nov 2022 06:39)      hyponatremia is improving   will check ua , urine osmolality , urine sodium , urine uric acid , serum sodium , serum osmolality , serum uric acid , f/u with hyponatremia work up , f/u with bmp , monitor i and o    fluid overload   spironolactone 25 milliGRAM(s) Oral two times a day  furosemide    Tablet 40 milliGRAM(s) Oral daily    BP monitoring,continue current antihypertensive meds, low salt diet,followup with PMD in 1-2 weeks  propranolol 20 milliGRAM(s) Oral two times a day

## 2022-11-24 NOTE — CONSULT NOTE ADULT - CONSULT REQUESTED DATE/TIME
22-Nov-2022 09:16
22-Nov-2022 16:58
24-Nov-2022 10:46
22-Nov-2022
22-Nov-2022 08:53
23-Nov-2022 09:56

## 2022-11-24 NOTE — DISCHARGE NOTE PROVIDER - NSDCCPCAREPLAN_GEN_ALL_CORE_FT
PRINCIPAL DISCHARGE DIAGNOSIS  Diagnosis: Gastrointestinal bleeding  Assessment and Plan of Treatment: started protonix 40 bid for gastric ulcer, fu with GI

## 2022-11-24 NOTE — PROGRESS NOTE ADULT - SUBJECTIVE AND OBJECTIVE BOX
[facility]  Shane Murray PA-C  [address] [place] [address]  [phone]      INTERVAL HPI/OVERNIGHT EVENTS:  Pt s/e with family member at bedside who preferred to translate for patient  Reports feeling well s/p egd  Otherwise no GI events    MEDICATIONS  (STANDING):  aspirin enteric coated 81 milliGRAM(s) Oral daily  atorvastatin 10 milliGRAM(s) Oral at bedtime  dextrose 5%. 1000 milliLiter(s) (50 mL/Hr) IV Continuous <Continuous>  dextrose 5%. 1000 milliLiter(s) (100 mL/Hr) IV Continuous <Continuous>  dextrose 50% Injectable 25 Gram(s) IV Push once  dextrose 50% Injectable 12.5 Gram(s) IV Push once  dextrose 50% Injectable 25 Gram(s) IV Push once  furosemide    Tablet 40 milliGRAM(s) Oral daily  glucagon  Injectable 1 milliGRAM(s) IntraMuscular once  insulin glargine Injectable (LANTUS) 25 Unit(s) SubCutaneous at bedtime  insulin lispro (ADMELOG) corrective regimen sliding scale   SubCutaneous three times a day before meals  insulin lispro (ADMELOG) corrective regimen sliding scale   SubCutaneous at bedtime  insulin lispro Injectable (ADMELOG) 10 Unit(s) SubCutaneous three times a day before meals  levETIRAcetam 750 milliGRAM(s) Oral two times a day  pantoprazole  Injectable 40 milliGRAM(s) IV Push two times a day  propranolol 20 milliGRAM(s) Oral two times a day  spironolactone 25 milliGRAM(s) Oral two times a day    MEDICATIONS  (PRN):  dextrose Oral Gel 15 Gram(s) Oral once PRN Blood Glucose LESS THAN 70 milliGRAM(s)/deciliter      Allergies    No Known Allergies    PHYSICAL EXAM:   Vital Signs:  Vital Signs Last 24 Hrs  T(C): 36.8 (24 Nov 2022 05:15), Max: 36.8 (23 Nov 2022 12:33)  T(F): 98.2 (24 Nov 2022 05:15), Max: 98.2 (23 Nov 2022 12:33)  HR: 73 (24 Nov 2022 06:01) (65 - 90)  BP: 107/68 (24 Nov 2022 06:01) (104/64 - 128/80)  BP(mean): --  RR: 16 (24 Nov 2022 05:15) (16 - 20)  SpO2: 96% (24 Nov 2022 05:15) (95% - 100%)    Parameters below as of 24 Nov 2022 05:15  Patient On (Oxygen Delivery Method): room air    GENERAL:  Appears stated age  ABDOMEN:  Soft, non-tender, non-distended  NEURO:  Alert      LABS:                        8.4    8.56  )-----------( 269      ( 24 Nov 2022 07:30 )             29.9     11-24    137  |  100  |  22  ----------------------------<  111<H>  3.5   |  30  |  1.10    Ca    9.0      24 Nov 2022 07:30    TPro  7.1  /  Alb  3.1<L>  /  TBili  0.9  /  DBili  x   /  AST  18  /  ALT  15  /  AlkPhos  204<H>  11-24    I spent 120 minutes face to face with the patient. Time was spent in discussion with patient. Discussed endoscopy results, lab results, treatment plan, outpatient follow up plan, answered all patient questions. Visit start time: 8:00. Visit end time: 10:00.

## 2022-11-24 NOTE — PROGRESS NOTE ADULT - SUBJECTIVE AND OBJECTIVE BOX
Patient is a 67y Male whom presented to the hospital with hyponatremia     PAST MEDICAL & SURGICAL HISTORY:  CVA (cerebral vascular accident)      HTN (hypertension)      DM (diabetes mellitus)      Arrhythmia      History of atrial fibrillation      S/P CABG (coronary artery bypass graft)      S/P brain surgery        Allergies    No Known Allergies    Intolerances        SOCIAL HISTORY:  Denies ETOh,Smoking,     FAMILY HISTORY:  No pertinent family history in first degree relatives        REVIEW OF SYSTEMS:    CONSTITUTIONAL: No weakness, fevers or chills  RESPIRATORY: No cough, wheezing, hemoptysis; No shortness of breath  CARDIOVASCULAR: No chest pain or palpitations  GASTROINTESTINAL: No abdominal or epigastric pain. No nausea, vomiting,     No diarrhea or constipation. No melena   SKIN: dry                            8.4    8.56  )-----------( 269      ( 24 Nov 2022 07:30 )             29.9       CBC Full  -  ( 24 Nov 2022 07:30 )  WBC Count : 8.56 K/uL  RBC Count : 4.46 M/uL  Hemoglobin : 8.4 g/dL  Hematocrit : 29.9 %  Platelet Count - Automated : 269 K/uL  Mean Cell Volume : 67.0 fl  Mean Cell Hemoglobin : 18.8 pg  Mean Cell Hemoglobin Concentration : 28.1 gm/dL  Auto Neutrophil # : x  Auto Lymphocyte # : x  Auto Monocyte # : x  Auto Eosinophil # : x  Auto Basophil # : x  Auto Neutrophil % : x  Auto Lymphocyte % : x  Auto Monocyte % : x  Auto Eosinophil % : x  Auto Basophil % : x      11-24    137  |  100  |  22  ----------------------------<  111<H>  3.5   |  30  |  1.10    Ca    9.0      24 Nov 2022 07:30    TPro  7.1  /  Alb  3.1<L>  /  TBili  0.9  /  DBili  x   /  AST  18  /  ALT  15  /  AlkPhos  204<H>  11-24      CAPILLARY BLOOD GLUCOSE      POCT Blood Glucose.: 141 mg/dL (24 Nov 2022 07:47)  POCT Blood Glucose.: 316 mg/dL (23 Nov 2022 21:06)      Vital Signs Last 24 Hrs  T(C): 36.8 (24 Nov 2022 05:15), Max: 36.8 (24 Nov 2022 05:15)  T(F): 98.2 (24 Nov 2022 05:15), Max: 98.2 (24 Nov 2022 05:15)  HR: 73 (24 Nov 2022 06:01) (73 - 90)  BP: 107/68 (24 Nov 2022 06:01) (104/64 - 112/69)  BP(mean): --  RR: 16 (24 Nov 2022 05:15) (16 - 18)  SpO2: 96% (24 Nov 2022 05:15) (96% - 99%)    Parameters below as of 24 Nov 2022 05:15  Patient On (Oxygen Delivery Method): room air                    PHYSICAL EXAM:    Constitutional: NAD  HEENT: conjunctive   clear   Neck:  No JVD  Respiratory: CTAB  Cardiovascular: S1 and S2  Gastrointestinal: BS+, soft, NT/ND  Extremities: No peripheral edema  : No Adams  Skin: No rashes  Access: Not applicable

## 2022-11-24 NOTE — DISCHARGE NOTE PROVIDER - NSDCMRMEDTOKEN_GEN_ALL_CORE_FT
aspirin 81 mg oral delayed release tablet: 1 tab(s) orally once a day  atorvastatin 10 mg oral tablet: 1 tab(s) orally once a day  furosemide 40 mg oral tablet: 1 tab(s) orally once a day  insulin glargine 100 units/mL subcutaneous solution:   levETIRAcetam 750 mg oral tablet: 1 tab(s) orally 2 times a day  metFORMIN 500 mg oral tablet: 1 tab(s) orally 2 times a day  multivitamin: 1 tab(s) orally once a day  NovoLOG FlexPen 100 units/mL injectable solution:   pantoprazole 40 mg oral granule, delayed release: 1 each orally 2 times a day   propranolol 20 mg oral tablet: 1 tab(s) orally 2 times a day  raNITIdine 150 mg oral tablet: 1 tab(s) orally once a day  spironolactone 25 mg oral tablet: 1 tab(s) orally 2 times a day  warfarin 1 mg oral tablet: 1 tab(s) orally once a day MDD:  Every Sunday only  warfarin 2 mg oral tablet: 1 tab(s) orally once a day.  Monday to Saturday

## 2022-11-28 ENCOUNTER — INPATIENT (INPATIENT)
Facility: HOSPITAL | Age: 67
LOS: 5 days | Discharge: HOME | DRG: 193 | End: 2022-12-04
Attending: INTERNAL MEDICINE | Admitting: INTERNAL MEDICINE
Payer: MEDICARE

## 2022-11-28 VITALS
WEIGHT: 149.91 LBS | SYSTOLIC BLOOD PRESSURE: 123 MMHG | HEART RATE: 81 BPM | TEMPERATURE: 99 F | DIASTOLIC BLOOD PRESSURE: 75 MMHG | HEIGHT: 68 IN | RESPIRATION RATE: 16 BRPM | OXYGEN SATURATION: 95 %

## 2022-11-28 DIAGNOSIS — Z95.1 PRESENCE OF AORTOCORONARY BYPASS GRAFT: Chronic | ICD-10-CM

## 2022-11-28 DIAGNOSIS — J18.9 PNEUMONIA, UNSPECIFIED ORGANISM: ICD-10-CM

## 2022-11-28 DIAGNOSIS — Z98.890 OTHER SPECIFIED POSTPROCEDURAL STATES: Chronic | ICD-10-CM

## 2022-11-28 LAB
ALBUMIN SERPL ELPH-MCNC: 3 G/DL — LOW (ref 3.3–5)
ALP SERPL-CCNC: 227 U/L — HIGH (ref 40–120)
ALT FLD-CCNC: 17 U/L — SIGNIFICANT CHANGE UP (ref 12–78)
ANION GAP SERPL CALC-SCNC: 8 MMOL/L — SIGNIFICANT CHANGE UP (ref 5–17)
APPEARANCE UR: CLEAR — SIGNIFICANT CHANGE UP
APTT BLD: 37.1 SEC — HIGH (ref 27.5–35.5)
AST SERPL-CCNC: 21 U/L — SIGNIFICANT CHANGE UP (ref 15–37)
BASOPHILS # BLD AUTO: 0.03 K/UL — SIGNIFICANT CHANGE UP (ref 0–0.2)
BASOPHILS NFR BLD AUTO: 0.5 % — SIGNIFICANT CHANGE UP (ref 0–2)
BILIRUB SERPL-MCNC: 0.9 MG/DL — SIGNIFICANT CHANGE UP (ref 0.2–1.2)
BILIRUB UR-MCNC: NEGATIVE — SIGNIFICANT CHANGE UP
BUN SERPL-MCNC: 33 MG/DL — HIGH (ref 7–23)
CALCIUM SERPL-MCNC: 8.7 MG/DL — SIGNIFICANT CHANGE UP (ref 8.5–10.1)
CHLORIDE SERPL-SCNC: 93 MMOL/L — LOW (ref 96–108)
CO2 SERPL-SCNC: 29 MMOL/L — SIGNIFICANT CHANGE UP (ref 22–31)
COLOR SPEC: SIGNIFICANT CHANGE UP
CREAT SERPL-MCNC: 1.3 MG/DL — SIGNIFICANT CHANGE UP (ref 0.5–1.3)
DIFF PNL FLD: ABNORMAL
EGFR: 61 ML/MIN/1.73M2 — SIGNIFICANT CHANGE UP
EOSINOPHIL # BLD AUTO: 0.11 K/UL — SIGNIFICANT CHANGE UP (ref 0–0.5)
EOSINOPHIL NFR BLD AUTO: 1.7 % — SIGNIFICANT CHANGE UP (ref 0–6)
GLUCOSE SERPL-MCNC: 299 MG/DL — HIGH (ref 70–99)
GLUCOSE UR QL: 250
HCT VFR BLD CALC: 28.6 % — LOW (ref 39–50)
HGB BLD-MCNC: 8 G/DL — LOW (ref 13–17)
HMPV RNA SPEC QL NAA+PROBE: DETECTED
IMM GRANULOCYTES NFR BLD AUTO: 0.6 % — SIGNIFICANT CHANGE UP (ref 0–0.9)
INR BLD: 1.64 RATIO — HIGH (ref 0.88–1.16)
KETONES UR-MCNC: NEGATIVE — SIGNIFICANT CHANGE UP
LACTATE SERPL-SCNC: 2.6 MMOL/L — HIGH (ref 0.7–2)
LEUKOCYTE ESTERASE UR-ACNC: NEGATIVE — SIGNIFICANT CHANGE UP
LIDOCAIN IGE QN: 146 U/L — SIGNIFICANT CHANGE UP (ref 73–393)
LYMPHOCYTES # BLD AUTO: 0.95 K/UL — LOW (ref 1–3.3)
LYMPHOCYTES # BLD AUTO: 14.5 % — SIGNIFICANT CHANGE UP (ref 13–44)
MCHC RBC-ENTMCNC: 18.8 PG — LOW (ref 27–34)
MCHC RBC-ENTMCNC: 28 GM/DL — LOW (ref 32–36)
MCV RBC AUTO: 67.1 FL — LOW (ref 80–100)
MONOCYTES # BLD AUTO: 1 K/UL — HIGH (ref 0–0.9)
MONOCYTES NFR BLD AUTO: 15.3 % — HIGH (ref 2–14)
NEUTROPHILS # BLD AUTO: 4.41 K/UL — SIGNIFICANT CHANGE UP (ref 1.8–7.4)
NEUTROPHILS NFR BLD AUTO: 67.4 % — SIGNIFICANT CHANGE UP (ref 43–77)
NITRITE UR-MCNC: NEGATIVE — SIGNIFICANT CHANGE UP
NRBC # BLD: 0 /100 WBCS — SIGNIFICANT CHANGE UP (ref 0–0)
PH UR: 6 — SIGNIFICANT CHANGE UP (ref 5–8)
PLATELET # BLD AUTO: 221 K/UL — SIGNIFICANT CHANGE UP (ref 150–400)
POTASSIUM SERPL-MCNC: 4.2 MMOL/L — SIGNIFICANT CHANGE UP (ref 3.5–5.3)
POTASSIUM SERPL-SCNC: 4.2 MMOL/L — SIGNIFICANT CHANGE UP (ref 3.5–5.3)
PROT SERPL-MCNC: 7.3 G/DL — SIGNIFICANT CHANGE UP (ref 6–8.3)
PROT UR-MCNC: 15
PROTHROM AB SERPL-ACNC: 19.3 SEC — HIGH (ref 10.5–13.4)
RAPID RVP RESULT: DETECTED
RBC # BLD: 4.26 M/UL — SIGNIFICANT CHANGE UP (ref 4.2–5.8)
RBC # FLD: 23.9 % — HIGH (ref 10.3–14.5)
SARS-COV-2 RNA SPEC QL NAA+PROBE: SIGNIFICANT CHANGE UP
SODIUM SERPL-SCNC: 130 MMOL/L — LOW (ref 135–145)
SP GR SPEC: 1.01 — SIGNIFICANT CHANGE UP (ref 1.01–1.02)
TROPONIN I, HIGH SENSITIVITY RESULT: 14.2 NG/L — SIGNIFICANT CHANGE UP
UROBILINOGEN FLD QL: NEGATIVE — SIGNIFICANT CHANGE UP
WBC # BLD: 6.54 K/UL — SIGNIFICANT CHANGE UP (ref 3.8–10.5)
WBC # FLD AUTO: 6.54 K/UL — SIGNIFICANT CHANGE UP (ref 3.8–10.5)

## 2022-11-28 PROCEDURE — 99285 EMERGENCY DEPT VISIT HI MDM: CPT

## 2022-11-28 PROCEDURE — 74176 CT ABD & PELVIS W/O CONTRAST: CPT | Mod: 26,MA

## 2022-11-28 PROCEDURE — 93010 ELECTROCARDIOGRAM REPORT: CPT

## 2022-11-28 PROCEDURE — 70450 CT HEAD/BRAIN W/O DYE: CPT | Mod: 26,MA

## 2022-11-28 PROCEDURE — 71250 CT THORAX DX C-: CPT | Mod: 26,MA

## 2022-11-28 PROCEDURE — 71045 X-RAY EXAM CHEST 1 VIEW: CPT | Mod: 26

## 2022-11-28 RX ORDER — PIPERACILLIN AND TAZOBACTAM 4; .5 G/20ML; G/20ML
3.38 INJECTION, POWDER, LYOPHILIZED, FOR SOLUTION INTRAVENOUS ONCE
Refills: 0 | Status: COMPLETED | OUTPATIENT
Start: 2022-11-28 | End: 2022-11-28

## 2022-11-28 RX ORDER — INSULIN LISPRO 100/ML
VIAL (ML) SUBCUTANEOUS AT BEDTIME
Refills: 0 | Status: DISCONTINUED | OUTPATIENT
Start: 2022-11-28 | End: 2022-12-01

## 2022-11-28 RX ORDER — PANTOPRAZOLE SODIUM 20 MG/1
40 TABLET, DELAYED RELEASE ORAL
Refills: 0 | Status: DISCONTINUED | OUTPATIENT
Start: 2022-11-28 | End: 2022-12-04

## 2022-11-28 RX ORDER — ASPIRIN/CALCIUM CARB/MAGNESIUM 324 MG
81 TABLET ORAL DAILY
Refills: 0 | Status: DISCONTINUED | OUTPATIENT
Start: 2022-11-28 | End: 2022-12-04

## 2022-11-28 RX ORDER — PIPERACILLIN AND TAZOBACTAM 4; .5 G/20ML; G/20ML
3.38 INJECTION, POWDER, LYOPHILIZED, FOR SOLUTION INTRAVENOUS EVERY 8 HOURS
Refills: 0 | Status: DISCONTINUED | OUTPATIENT
Start: 2022-11-28 | End: 2022-11-30

## 2022-11-28 RX ORDER — WARFARIN SODIUM 2.5 MG/1
2 TABLET ORAL ONCE
Refills: 0 | Status: COMPLETED | OUTPATIENT
Start: 2022-11-28 | End: 2022-11-28

## 2022-11-28 RX ORDER — SODIUM CHLORIDE 9 MG/ML
1000 INJECTION, SOLUTION INTRAVENOUS
Refills: 0 | Status: DISCONTINUED | OUTPATIENT
Start: 2022-11-28 | End: 2022-12-04

## 2022-11-28 RX ORDER — DEXTROSE 50 % IN WATER 50 %
25 SYRINGE (ML) INTRAVENOUS ONCE
Refills: 0 | Status: DISCONTINUED | OUTPATIENT
Start: 2022-11-28 | End: 2022-12-04

## 2022-11-28 RX ORDER — SPIRONOLACTONE 25 MG/1
25 TABLET, FILM COATED ORAL
Refills: 0 | Status: DISCONTINUED | OUTPATIENT
Start: 2022-11-28 | End: 2022-11-28

## 2022-11-28 RX ORDER — INSULIN LISPRO 100/ML
VIAL (ML) SUBCUTANEOUS
Refills: 0 | Status: DISCONTINUED | OUTPATIENT
Start: 2022-11-28 | End: 2022-12-01

## 2022-11-28 RX ORDER — GLUCAGON INJECTION, SOLUTION 0.5 MG/.1ML
1 INJECTION, SOLUTION SUBCUTANEOUS ONCE
Refills: 0 | Status: DISCONTINUED | OUTPATIENT
Start: 2022-11-28 | End: 2022-12-04

## 2022-11-28 RX ORDER — DEXTROSE 50 % IN WATER 50 %
12.5 SYRINGE (ML) INTRAVENOUS ONCE
Refills: 0 | Status: DISCONTINUED | OUTPATIENT
Start: 2022-11-28 | End: 2022-12-04

## 2022-11-28 RX ORDER — DEXTROSE 50 % IN WATER 50 %
15 SYRINGE (ML) INTRAVENOUS ONCE
Refills: 0 | Status: DISCONTINUED | OUTPATIENT
Start: 2022-11-28 | End: 2022-12-04

## 2022-11-28 RX ORDER — ACETAMINOPHEN 500 MG
650 TABLET ORAL EVERY 6 HOURS
Refills: 0 | Status: DISCONTINUED | OUTPATIENT
Start: 2022-11-28 | End: 2022-12-04

## 2022-11-28 RX ORDER — SODIUM CHLORIDE 9 MG/ML
1000 INJECTION INTRAMUSCULAR; INTRAVENOUS; SUBCUTANEOUS ONCE
Refills: 0 | Status: COMPLETED | OUTPATIENT
Start: 2022-11-28 | End: 2022-11-28

## 2022-11-28 RX ORDER — ATORVASTATIN CALCIUM 80 MG/1
10 TABLET, FILM COATED ORAL AT BEDTIME
Refills: 0 | Status: DISCONTINUED | OUTPATIENT
Start: 2022-11-28 | End: 2022-12-04

## 2022-11-28 RX ORDER — ENOXAPARIN SODIUM 100 MG/ML
70 INJECTION SUBCUTANEOUS EVERY 12 HOURS
Refills: 0 | Status: DISCONTINUED | OUTPATIENT
Start: 2022-11-28 | End: 2022-12-04

## 2022-11-28 RX ORDER — FAMOTIDINE 10 MG/ML
20 INJECTION INTRAVENOUS EVERY 24 HOURS
Refills: 0 | Status: DISCONTINUED | OUTPATIENT
Start: 2022-11-28 | End: 2022-11-30

## 2022-11-28 RX ORDER — LEVETIRACETAM 250 MG/1
750 TABLET, FILM COATED ORAL
Refills: 0 | Status: DISCONTINUED | OUTPATIENT
Start: 2022-11-28 | End: 2022-12-04

## 2022-11-28 RX ADMIN — PIPERACILLIN AND TAZOBACTAM 25 GRAM(S): 4; .5 INJECTION, POWDER, LYOPHILIZED, FOR SOLUTION INTRAVENOUS at 18:34

## 2022-11-28 RX ADMIN — SODIUM CHLORIDE 1000 MILLILITER(S): 9 INJECTION INTRAMUSCULAR; INTRAVENOUS; SUBCUTANEOUS at 11:08

## 2022-11-28 RX ADMIN — PANTOPRAZOLE SODIUM 40 MILLIGRAM(S): 20 TABLET, DELAYED RELEASE ORAL at 18:26

## 2022-11-28 RX ADMIN — FAMOTIDINE 20 MILLIGRAM(S): 10 INJECTION INTRAVENOUS at 18:32

## 2022-11-28 RX ADMIN — LEVETIRACETAM 750 MILLIGRAM(S): 250 TABLET, FILM COATED ORAL at 20:32

## 2022-11-28 RX ADMIN — ENOXAPARIN SODIUM 70 MILLIGRAM(S): 100 INJECTION SUBCUTANEOUS at 18:26

## 2022-11-28 RX ADMIN — ATORVASTATIN CALCIUM 10 MILLIGRAM(S): 80 TABLET, FILM COATED ORAL at 23:05

## 2022-11-28 RX ADMIN — Medication 2: at 23:14

## 2022-11-28 RX ADMIN — PIPERACILLIN AND TAZOBACTAM 200 GRAM(S): 4; .5 INJECTION, POWDER, LYOPHILIZED, FOR SOLUTION INTRAVENOUS at 11:08

## 2022-11-28 RX ADMIN — WARFARIN SODIUM 2 MILLIGRAM(S): 2.5 TABLET ORAL at 23:06

## 2022-11-28 NOTE — CONSULT NOTE ADULT - SUBJECTIVE AND OBJECTIVE BOX
[facility]  Shane Murray PA-C  [address] [place] [id number]  [phone]      Chief Complaint:  Patient is a 67y old  Male who presents with a chief complaint of weakness (2022 13:57)      HPI:67 year old male w/ PMHx CVA (w/ residual right sided weakness and aphasia), CAD, A fib (on coumadin), PUD, DM presents to the ED w/ weakness, decreased appetite. Pt d/c from [facility] on 2022 after admission for melena/low hemoglobin, requiring transfusion. Pt had endoscopy showing PUD w/o active bleeding. Pt daughter at bedside to assist w/ H&P and translation for patient. Daughter states pt felt well after leaving hospital. Over the weekend, family noted decreased appetite, fatigue and generalized weakness. Daughter also noted non productive cough since discharge. Denies fever/chills. Pt w/ hx of CVA and aphasia, communicates minimally but daughter states he has been at his baseline mental status. Pt has not complained of any pain. She does not believe he has had a bowel movement since discharge. He has started his PPI and restarted his dose of coumadin. Daughter denies any N/V. she has noted increased work of breathing for pt. States urine has been normal color, no foul smell. no hx of asthma/copd     Allergies:  No Known Allergies      Medications:  aspirin enteric coated 81 milliGRAM(s) Oral daily  atorvastatin 10 milliGRAM(s) Oral at bedtime  dextrose 5%. 1000 milliLiter(s) IV Continuous <Continuous>  dextrose 5%. 1000 milliLiter(s) IV Continuous <Continuous>  dextrose 50% Injectable 25 Gram(s) IV Push once  dextrose 50% Injectable 12.5 Gram(s) IV Push once  dextrose 50% Injectable 25 Gram(s) IV Push once  dextrose Oral Gel 15 Gram(s) Oral once PRN  famotidine Injectable 20 milliGRAM(s) IV Push every 24 hours  glucagon  Injectable 1 milliGRAM(s) IntraMuscular once  insulin lispro (ADMELOG) corrective regimen sliding scale   SubCutaneous three times a day before meals  levETIRAcetam 750 milliGRAM(s) Oral two times a day  pantoprazole   Suspension 40 milliGRAM(s) Oral two times a day  piperacillin/tazobactam IVPB.. 3.375 Gram(s) IV Intermittent every 8 hours  propranolol 20 milliGRAM(s) Oral two times a day  spironolactone 25 milliGRAM(s) Oral two times a day      PMHX/PSHX:  CVA (cerebral vascular accident)    HTN (hypertension)    DM (diabetes mellitus)    Arrhythmia    History of atrial fibrillation    S/P CABG (coronary artery bypass graft)    S/P brain surgery        Family history:  No pertinent family history in first degree relatives    No pertinent family history in first degree relatives        Social History:     ROS:     General:  No wt loss, fevers, chills, night sweats, fatigue,   Eyes:  Good vision, no reported pain  ENT:  No sore throat, pain, runny nose, dysphagia  CV:  No pain, palpitations, hypo/hypertension  Resp:  No dyspnea, cough, tachypnea, wheezing  GI:  No pain, No nausea, No vomiting, No diarrhea, No constipation, No weight loss, No fever, No pruritis, No rectal bleeding, No tarry stools, No dysphagia,  :  No pain, bleeding, incontinence, nocturia  Muscle:  No pain, weakness  Neuro:  No weakness, tingling, memory problems  Psych:  No fatigue, insomnia, mood problems, depression  Endocrine:  No polyuria, polydipsia, cold/heat intolerance  Heme:  No petechiae, ecchymosis, easy bruisability  Skin:  No rash, tattoos, scars, edema      PHYSICAL EXAM:   Vital Signs:  Vital Signs Last 24 Hrs  T(C): 37.1 (2022 08:53), Max: 37.1 (2022 08:53)  T(F): 98.8 (2022 08:53), Max: 98.8 (2022 08:53)  HR: 81 (2022 08:53) (81 - 81)  BP: 123/75 (2022 08:53) (123/75 - 123/75)  BP(mean): --  RR: 16 (2022 08:53) (16 - 16)  SpO2: 95% (2022 08:53) (95% - 95%)    Parameters below as of 2022 08:53  Patient On (Oxygen Delivery Method): room air      Daily Height in cm: 172.72 (2022 08:53)    Daily     GENERAL:  Appears stated age,   HEENT:  NC/AT,    CHEST:  Full & symmetric excursion,   HEART:  Regular rhythm  ABDOMEN:  Soft, non-tender, non-distended,   EXTEREMITIES:  no cyanosis,clubbing or edema  SKIN:  No rash  NEURO:  Alert,    LABS:                        8.0    6.54  )-----------( 221      ( 2022 09:30 )             28.6         130<L>  |  93<L>  |  33<H>  ----------------------------<  299<H>  4.2   |  29  |  1.30    Ca    8.7      2022 09:30    TPro  7.3  /  Alb  3.0<L>  /  TBili  0.9  /  DBili  x   /  AST  21  /  ALT  17  /  AlkPhos  227<H>      LIVER FUNCTIONS - ( 2022 09:30 )  Alb: 3.0 g/dL / Pro: 7.3 g/dL / ALK PHOS: 227 U/L / ALT: 17 U/L / AST: 21 U/L / GGT: x           PT/INR - ( 2022 09:30 )   PT: 19.3 sec;   INR: 1.64 ratio         PTT - ( 2022 09:30 )  PTT:37.1 sec  Urinalysis Basic - ( 2022 12:20 )    Color: Pale Yellow / Appearance: Clear / S.010 / pH: x  Gluc: x / Ketone: Negative  / Bili: Negative / Urobili: Negative   Blood: x / Protein: 15 / Nitrite: Negative   Leuk Esterase: Negative / RBC: 3-5 /HPF / WBC 0-2   Sq Epi: x / Non Sq Epi: Negative / Bacteria: x      Amylase Serum--      Lipase [id number]       Ammonia--      Imaging:

## 2022-11-28 NOTE — H&P ADULT - ASSESSMENT
67 year old male w/ PMHx CVA (w/ residual right sided weakness and aphasia), CAD, esophageal varices, A fib (on coumadin), PUD, DM presents to the ED w/ weakness, decreased appetite. Pt d/c from [facility] on 11/24/2022 after admission for melena/low hemoglobin, requiring transfusion. Pt had endoscopy showing PUD w/o active bleeding. Pt daughter at bedside to assist w/ H&P and translation for patient. Daughter states pt felt well after leaving hospital. Over the weekend, family noted decreased appetite, fatigue and generalized weakness. Daughter also noted non productive cough since discharge. Denies fever/chills. Pt w/ hx of CVA and aphasia, communicates minimally but daughter states he has been at his baseline mental status. Pt has not complained of any pain. She does not believe he has had a bowel movement since discharge. He has started his PPI and restarted his dose of coumadin. Daughter denies any N/V. she has noted increased work of breathing for pt. States urine has been normal color, no foul smell. no hx of asthma/copd     1 Pneumonia  - cw zosyn  - pulmonary and ID fu  - fu cultures  - speech and swallow  - will monitor     2 Recent GIB  - EGD with gastric ulcers  - cw PPI  - monitor cbc    3 Hyponatremia  - hold lasix and spironolatone  - monitor bmp    4 CAD  - stable  - cw home meds  - cards to follow    5 Hx of CVA  - cw coumadin   - will monitor

## 2022-11-28 NOTE — CONSULT NOTE ADULT - ASSESSMENT
67 year old male w/ PMHx CVA (w/ residual right sided weakness and aphasia), CAD, esophageal varices, A fib (on coumadin), PUD, DM presented to the ED w/ weakness, decreased appetit, and altered mental status. Pt d/c from [facility] on 11/24/2022 after admission for melena/low hemoglobin, requiring transfusion. Pt had endoscopy showing PUD w/o active bleeding. Pt felt well after leaving hospital. Noted non productive cough since discharge. Pt w/ hx of CVA and aphasia, communicates minimally. Abn chest exam and imaging and RVP+  hMPV (RapRVP): Detected    RECOMMENDATIONS  Confirmed hMPV but also concerning based on exam and imaging for bacterial infection (bacterial PNA) and with mental status and recent hosp stay agree with  Zosyn (started 11/28) with anticipated 5 days of Rx with last day 12/2 but recs to follow.    Thank you for consulting us and involving us in the management of this most interesting and challenging case.  We will follow along in the care of this patient. Please call us at [phone] or text me directly on my cell# at [phone] with any concerns.

## 2022-11-28 NOTE — PATIENT PROFILE ADULT - SAFE PLACE TO LIVE
PATIENT IS CALLING IN SHE STATES THAT SHE WAS AT [facility] AND THEY DID AN XRAY ON HER BACK AND HE SAW SOMETHING ON HER LUNGS AND IS RECOMMENDING THAT SHE GET CHEST XRAY TO GET THIS LOOKED AT.      SHE WOULD LIKE TO KNOW CAN SHE JUST WALK IN TO GET OR DOES SHE NEED ORDER FIRST.    PLEASE ADVISE    CALLBACK NUMBER IS  [id number]     no

## 2022-11-28 NOTE — CONSULT NOTE ADULT - ASSESSMENT
67 year old male w/ PMHx CVA (w/ residual right sided weakness and aphasia), CAD, esophageal varices, A fib (on coumadin), PUD, DM presents to the ED w/ weakness, decreased appetite. Pt d/c from [facility] on 11/24/2022 after admission for melena/low hemoglobin, requiring transfusion. Pt had endoscopy showing PUD w/o active bleeding. Pt daughter at bedside to assist w/ H&P and translation for patient. Daughter states pt felt well after leaving hospital. Over the weekend, family noted decreased appetite, fatigue and generalized weakness. Daughter also noted non productive cough since discharge. Denies fever/chills. Pt w/ hx of CVA and aphasia, communicates minimally but daughter states he has been at his baseline mental status. Pt has not complained of any pain. She does not believe he has had a bowel movement since discharge. He has started his PPI and restarted his dose of coumadin. Daughter denies any N/V. she has noted increased work of breathing for pt. States urine has been normal color, no foul smell. no hx of asthma/copd  (28 Nov 2022 13:57)      hyponatremia    will check ua , urine osmolality , urine sodium , urine uric acid , serum sodium , serum osmolality , serum uric acid , f/u with hyponatremia work up , f/u with bmp , monitor i and o     blood and urine cx,serial lactate levels,monitor vitals closley,ivfs hydration,monitor urine output and renal profile,iv abx as per id cons    BP monitoring,continue current antihypertensive meds, low salt diet,followup with PMD in 1-2 weeks

## 2022-11-28 NOTE — CONSULT NOTE ADULT - SUBJECTIVE AND OBJECTIVE BOX
Date/Time Patient Seen:  		  Referring MD:   Data Reviewed	       Patient is a 67y old  Male who presents with a chief complaint of weakness (28 Nov 2022 14:14)      Subjective/HPI   67 year old male w/ PMHx CVA (w/ residual right sided weakness and aphasia), CAD, esophageal varices, A fib (on coumadin), PUD, DM presents to the ED w/ weakness, decreased appetite. Pt d/c from [facility] on 11/24/2022 after admission for melena/low hemoglobin, requiring transfusion. Pt had endoscopy showing PUD w/o active bleeding. Pt daughter at bedside to assist w/ H&P and translation for patient. Daughter states pt felt well after leaving hospital. Over the weekend, family noted decreased appetite, fatigue and generalized weakness. Daughter also noted non productive cough since discharge. Denies fever/chills. Pt w/ hx of CVA and aphasia, communicates minimally but daughter states he has been at his baseline mental status. Pt has not complained of any pain. She does not believe he has had a bowel movement since discharge. He has started his PPI and restarted his dose of coumadin. Daughter denies any N/V. she has noted increased work of breathing for pt. States urine has been normal color, no foul smell. no hx of asthma/copd   PAST MEDICAL & SURGICAL HISTORY:  CVA (cerebral vascular accident)    HTN (hypertension)    DM (diabetes mellitus)    Arrhythmia    History of atrial fibrillation    S/P CABG (coronary artery bypass graft)    S/P brain surgery    FAMILY HISTORY:  No pertinent family history in first degree relatives. No pertinent family history of: weakness.     Social History:  · Substance use	No  · Social History (marital status, living situation, occupation, and sexual history)	neg     Tobacco Screening:  · Core Measure Site	Yes  · Has the patient used tobacco in the past 30 days?	No    Risk Assessment:    Present on Admission:  Deep Venous Thrombosis	no  Pulmonary Embolus	no     HIV Screening:  · In accordance with [place] State law, we offer every patient who comes to our ED an HIV test. Would you like to be tested today?	Opt out        Medication list         MEDICATIONS  (STANDING):  aspirin enteric coated 81 milliGRAM(s) Oral daily  atorvastatin 10 milliGRAM(s) Oral at bedtime  dextrose 5%. 1000 milliLiter(s) (50 mL/Hr) IV Continuous <Continuous>  dextrose 5%. 1000 milliLiter(s) (100 mL/Hr) IV Continuous <Continuous>  dextrose 50% Injectable 25 Gram(s) IV Push once  dextrose 50% Injectable 12.5 Gram(s) IV Push once  dextrose 50% Injectable 25 Gram(s) IV Push once  famotidine Injectable 20 milliGRAM(s) IV Push every 24 hours  glucagon  Injectable 1 milliGRAM(s) IntraMuscular once  insulin lispro (ADMELOG) corrective regimen sliding scale   SubCutaneous three times a day before meals  levETIRAcetam 750 milliGRAM(s) Oral two times a day  pantoprazole   Suspension 40 milliGRAM(s) Oral two times a day  piperacillin/tazobactam IVPB.. 3.375 Gram(s) IV Intermittent every 8 hours  propranolol 20 milliGRAM(s) Oral two times a day    MEDICATIONS  (PRN):  dextrose Oral Gel 15 Gram(s) Oral once PRN Blood Glucose LESS THAN 70 milliGRAM(s)/deciliter         Vitals log        ICU Vital Signs Last 24 Hrs  T(C): 37.1 (28 Nov 2022 08:53), Max: 37.1 (28 Nov 2022 08:53)  T(F): 98.8 (28 Nov 2022 08:53), Max: 98.8 (28 Nov 2022 08:53)  HR: 81 (28 Nov 2022 08:53) (81 - 81)  BP: 123/75 (28 Nov 2022 08:53) (123/75 - 123/75)  BP(mean): --  ABP: --  ABP(mean): --  RR: 16 (28 Nov 2022 08:53) (16 - 16)  SpO2: 95% (28 Nov 2022 08:53) (95% - 95%)    O2 Parameters below as of 28 Nov 2022 08:53  Patient On (Oxygen Delivery Method): room air                 Input and Output:  I&O's Detail      Lab Data                        8.0    6.54  )-----------( 221      ( 28 Nov 2022 09:30 )             28.6     11-28    130<L>  |  93<L>  |  33<H>  ----------------------------<  299<H>  4.2   |  29  |  1.30    Ca    8.7      28 Nov 2022 09:30    TPro  7.3  /  Alb  3.0<L>  /  TBili  0.9  /  DBili  x   /  AST  21  /  ALT  17  /  AlkPhos  227<H>  11-28            Review of Systems	  weakness      Objective     Physical Examination        Pertinent Lab findings & Imaging      Adams:  NO   Adequate UO     I&O's Detail           Discussed with:     Cultures:	        Radiology    ACC: [id number] EXAM:  CT ABDOMEN AND PELVIS                        ACC: [id number] EXAM:  CT CHEST                          PROCEDURE DATE:  11/28/2022          INTERPRETATION:  CLINICAL INFORMATION: Cough. Weakness, lethargy,   decreased appetite.  History of CVA with right hemiparesis and aphasia.    COMPARISON: CT scan chest 11/17/2020.    CONTRAST/COMPLICATIONS:  IV Contrast: NONE  Oral Contrast: NONE  Complications: None reported at time of study completion    PROCEDURE:  CT of the Chest, Abdomen and Pelvis was performed.  Sagittal and coronal reformats were performed.    FINDINGS:    CHEST:    LUNGS AND LARGE AIRWAYS: PLEURA:  There are bronchocentric airspace consolidations bilateral upper lobes,   left lingula/lower lobe and to a lesser degree right lower lobe.    There is volume loss left lower lobe, with persistent area of rounded   consolidation left lower lobe extending to the pleural surface. Findings   are compatible with chronic rounded atelectasis.  There is trace left-sided pleural effusion associated with pleural   thickening.    The central airways remain patent.    VESSELS: Atherosclerotic changes thoracic aorta with aneurysmal   dilatation of the ascending aorta measuring 4.4 cm, previously measuring   4.1 cm.  Coronary artery calcification.    HEART: Cardiomegaly.  Aortic valve replacement. No pericardial effusion.    MEDIASTINUM AND GREER:  Clusters of small subcentimeter short axis prevascular, pretracheal and   subcarinal mediastinal lymph nodes.    CHEST WALL AND LOWER NECK:  Bilateral gynecomastia.  Sternotomy.    ABDOMEN AND PELVIS:    The evaluation of the solid organ parenchyma is limited without   intravenous contrast.    LIVER: Nodular contour suggestive of cirrhotic morphology.  BILE DUCTS: Normal caliber.  GALLBLADDER: Within normal limits.  SPLEEN: Within normal limits.  PANCREAS: Within normal limits.  ADRENALS: Within normal limits.  KIDNEYS/URETERS: Within normal limits.    BLADDER: Moderately distended.  REPRODUCTIVE ORGANS: The prostate is mildly enlarged.    BOWEL:   Colonic fecal retention, without bowel obstruction.  Appendix within normal limits.  PERITONEUM: No ascites.    VESSELS: Atherosclerotic changes.  RETROPERITONEUM/LYMPH NODES: No lymphadenopathy.    ABDOMINAL WALL:  Tiny fat-containing umbilical hernia.    BONES: Degenerative changes.    IMPRESSION:    Bilateral, multilobar pneumonia.    Chronic rounded atelectasis left lower lobe.    No acute intra-abdominal pathology.    Other findings as discussed above.    --- End of Report ---            ELLEN PUGA MD; Attending Radiologist  This document has been electronically signed. Nov 28 2022 11:41AM                           Date/Time Patient Seen:  		  Referring MD:   Data Reviewed	       Patient is a 67y old  Male who presents with a chief complaint of weakness (28 Nov 2022 14:14)      Subjective/HPI  ID eval noted  GI eval noted  vs noted  labs reviewed  H and P reviewed  ER provider note reviewed  imaging reviewed       67 year old male w/ PMHx CVA (w/ residual right sided weakness and aphasia), CAD, esophageal varices, A fib (on coumadin), PUD, DM presents to the ED w/ weakness, decreased appetite. Pt d/c from [facility] on 11/24/2022 after admission for melena/low hemoglobin, requiring transfusion. Pt had endoscopy showing PUD w/o active bleeding. Pt daughter at bedside to assist w/ H&P and translation for patient. Daughter states pt felt well after leaving hospital. Over the weekend, family noted decreased appetite, fatigue and generalized weakness. Daughter also noted non productive cough since discharge. Denies fever/chills. Pt w/ hx of CVA and aphasia, communicates minimally but daughter states he has been at his baseline mental status. Pt has not complained of any pain. She does not believe he has had a bowel movement since discharge. He has started his PPI and restarted his dose of coumadin. Daughter denies any N/V. she has noted increased work of breathing for pt. States urine has been normal color, no foul smell. no hx of asthma/copd   PAST MEDICAL & SURGICAL HISTORY:  CVA (cerebral vascular accident)    HTN (hypertension)    DM (diabetes mellitus)    Arrhythmia    History of atrial fibrillation    S/P CABG (coronary artery bypass graft)    S/P brain surgery    FAMILY HISTORY:  No pertinent family history in first degree relatives. No pertinent family history of: weakness.     Social History:  · Substance use	No  · Social History (marital status, living situation, occupation, and sexual history)	neg     Tobacco Screening:  · Core Measure Site	Yes  · Has the patient used tobacco in the past 30 days?	No    Risk Assessment:    Present on Admission:  Deep Venous Thrombosis	no  Pulmonary Embolus	no     HIV Screening:  · In accordance with [place] law, we offer every patient who comes to our ED an HIV test. Would you like to be tested today?	Opt out        Medication list         MEDICATIONS  (STANDING):  aspirin enteric coated 81 milliGRAM(s) Oral daily  atorvastatin 10 milliGRAM(s) Oral at bedtime  dextrose 5%. 1000 milliLiter(s) (50 mL/Hr) IV Continuous <Continuous>  dextrose 5%. 1000 milliLiter(s) (100 mL/Hr) IV Continuous <Continuous>  dextrose 50% Injectable 25 Gram(s) IV Push once  dextrose 50% Injectable 12.5 Gram(s) IV Push once  dextrose 50% Injectable 25 Gram(s) IV Push once  famotidine Injectable 20 milliGRAM(s) IV Push every 24 hours  glucagon  Injectable 1 milliGRAM(s) IntraMuscular once  insulin lispro (ADMELOG) corrective regimen sliding scale   SubCutaneous three times a day before meals  levETIRAcetam 750 milliGRAM(s) Oral two times a day  pantoprazole   Suspension 40 milliGRAM(s) Oral two times a day  piperacillin/tazobactam IVPB.. 3.375 Gram(s) IV Intermittent every 8 hours  propranolol 20 milliGRAM(s) Oral two times a day    MEDICATIONS  (PRN):  dextrose Oral Gel 15 Gram(s) Oral once PRN Blood Glucose LESS THAN 70 milliGRAM(s)/deciliter         Vitals log        ICU Vital Signs Last 24 Hrs  T(C): 37.1 (28 Nov 2022 08:53), Max: 37.1 (28 Nov 2022 08:53)  T(F): 98.8 (28 Nov 2022 08:53), Max: 98.8 (28 Nov 2022 08:53)  HR: 81 (28 Nov 2022 08:53) (81 - 81)  BP: 123/75 (28 Nov 2022 08:53) (123/75 - 123/75)  BP(mean): --  ABP: --  ABP(mean): --  RR: 16 (28 Nov 2022 08:53) (16 - 16)  SpO2: 95% (28 Nov 2022 08:53) (95% - 95%)    O2 Parameters below as of 28 Nov 2022 08:53  Patient On (Oxygen Delivery Method): room air                 Input and Output:  I&O's Detail      Lab Data                        8.0    6.54  )-----------( 221      ( 28 Nov 2022 09:30 )             28.6     11-28    130<L>  |  93<L>  |  33<H>  ----------------------------<  299<H>  4.2   |  29  |  1.30    Ca    8.7      28 Nov 2022 09:30    TPro  7.3  /  Alb  3.0<L>  /  TBili  0.9  /  DBili  x   /  AST  21  /  ALT  17  /  AlkPhos  227<H>  11-28            Review of Systems	  weakness      Objective     Physical Examination  heart s1s2  lung dec BS  head nc        Pertinent Lab findings & Imaging      Adams:  NO   Adequate UO     I&O's Detail           Discussed with:     Cultures:	        Radiology    ACC: [id number] EXAM:  CT ABDOMEN AND PELVIS                        ACC: [id number] EXAM:  CT CHEST                          PROCEDURE DATE:  11/28/2022          INTERPRETATION:  CLINICAL INFORMATION: Cough. Weakness, lethargy,   decreased appetite.  History of CVA with right hemiparesis and aphasia.    COMPARISON: CT scan chest 11/17/2020.    CONTRAST/COMPLICATIONS:  IV Contrast: NONE  Oral Contrast: NONE  Complications: None reported at time of study completion    PROCEDURE:  CT of the Chest, Abdomen and Pelvis was performed.  Sagittal and coronal reformats were performed.    FINDINGS:    CHEST:    LUNGS AND LARGE AIRWAYS: PLEURA:  There are bronchocentric airspace consolidations bilateral upper lobes,   left lingula/lower lobe and to a lesser degree right lower lobe.    There is volume loss left lower lobe, with persistent area of rounded   consolidation left lower lobe extending to the pleural surface. Findings   are compatible with chronic rounded atelectasis.  There is trace left-sided pleural effusion associated with pleural   thickening.    The central airways remain patent.    VESSELS: Atherosclerotic changes thoracic aorta with aneurysmal   dilatation of the ascending aorta measuring 4.4 cm, previously measuring   4.1 cm.  Coronary artery calcification.    HEART: Cardiomegaly.  Aortic valve replacement. No pericardial effusion.    MEDIASTINUM AND GREER:  Clusters of small subcentimeter short axis prevascular, pretracheal and   subcarinal mediastinal lymph nodes.    CHEST WALL AND LOWER NECK:  Bilateral gynecomastia.  Sternotomy.    ABDOMEN AND PELVIS:    The evaluation of the solid organ parenchyma is limited without   intravenous contrast.    LIVER: Nodular contour suggestive of cirrhotic morphology.  BILE DUCTS: Normal caliber.  GALLBLADDER: Within normal limits.  SPLEEN: Within normal limits.  PANCREAS: Within normal limits.  ADRENALS: Within normal limits.  KIDNEYS/URETERS: Within normal limits.    BLADDER: Moderately distended.  REPRODUCTIVE ORGANS: The prostate is mildly enlarged.    BOWEL:   Colonic fecal retention, without bowel obstruction.  Appendix within normal limits.  PERITONEUM: No ascites.    VESSELS: Atherosclerotic changes.  RETROPERITONEUM/LYMPH NODES: No lymphadenopathy.    ABDOMINAL WALL:  Tiny fat-containing umbilical hernia.    BONES: Degenerative changes.    IMPRESSION:    Bilateral, multilobar pneumonia.    Chronic rounded atelectasis left lower lobe.    No acute intra-abdominal pathology.    Other findings as discussed above.    --- End of Report ---            ELLEN PUGA MD; Attending Radiologist  This document has been electronically signed. Nov 28 2022 11:41AM

## 2022-11-28 NOTE — ED ADULT NURSE NOTE - OBJECTIVE STATEMENT
Received pt brought by family for increased weakness and loss of appetite.   Family states they brought pt to the ed 11/21 when he was admitted with anemia and dx with stomach ulcer.   Family denies BM since discharging from hospital on 11/24, denies known active bleeding.    Pt appears weak at this time (baseline R sided flaccidity and nonverbal s/p old CVA).   Pt safety maintained, pale in appearance.  ED PA at bedside.  Will continue frequent monitring. BN Received pt brought by family for increased weakness and loss of appetite.   Family states they brought pt to the ed 11/21 when he was admitted with anemia and dx with stomach ulcer.   Family denies BM since discharging from hospital on 11/24, denies known active bleeding.    Pt appears weak at this time (baseline R sided flaccidity and nonverbal s/p old CVA).   Pt safety maintained, pale in appearance.  ED PA at bedside.  Will continue frequent monitring. BN      1618: pt resting in stretcher at this time, respirations even and unlabored.   Pt calm no coughing noted at this time.   Family at bedside, will continue frequent monitoring. BN

## 2022-11-28 NOTE — CONSULT NOTE ADULT - ASSESSMENT
67 year old male w/ PMHx CVA (w/ residual right sided weakness and aphasia), CAD, esophageal varices, A fib (on coumadin), PUD, DM presents to the ED w/ weakness, decreased appetite    covid  anemia  cva  gastritis  cad  gerd  esoph varices  PUD  DM   67 year old male w/ PMHx CVA (w/ residual right sided weakness and aphasia), CAD, esophageal varices, A fib (on coumadin), PUD, DM presents to the ED w/ weakness, decreased appetite      anemia  cva  gastritis  cad  gerd  esoph varices  PUD  DM  hMPV - viral infection - PNA    hMPV resp viral infection with prob PNA - superimposed infection  CT imaging reviewed  on ZOSYN  ID eval noted  GI eval noted  PPI  monitor Hgb - monitor HD -   serial labs  isolation precs  cough rx regimen  acap rx regimen  on AC for valv heart disease - metallic valve -   monitor VS and Sat  keep sat > 88 pct  old records reviewed  assist with needs  Biomarkers -

## 2022-11-28 NOTE — ED ADULT NURSE NOTE - NSINTERVENTIONOPT_GEN_ALL_ED
Hourly Rounding/Enhanced Supervision/Move Toilet (commode/urinal) to patient using "arms reach" Communicable/Infectious

## 2022-11-28 NOTE — ED PROVIDER NOTE - OBJECTIVE STATEMENT
67 year old male w/ PMHx CVA (w/ residual right sided weakness and aphasia), CAD, esophageal varices, A fib (on coumadin), PUD, DM presents to the ED w/ weakness, decreased appetite. Pt d/c from [facility] on 11/24/2022 after admission for melena/low hemoglobin, requiring transfusion. Pt had endoscopy showing PUD w/o active bleeding. Pt daughter at bedside to assist w/ H&P and translation for patient. Daughter states pt felt well after leaving hospital. Over the weekend, family noted decreased appetite, fatigue and generalized weakness. Daughter also noted non productive cough since discharge. Denies fever/chills. Pt w/ hx of CVA and aphasia, communicates minimally but daughter states he has been at his baseline mental status. Pt has not complained of any pain. She does not believe he has had a bowel movement since discharge. He has started his PPI and restarted his dose of coumadin. Daughter denies any N/V. she has noted increased work of breathing for pt. States urine has been normal color, no foul smell. no hx of asthma/copd   pt is vaccinated against covid 19, hx of covid 19 in August 2022

## 2022-11-28 NOTE — H&P ADULT - NSHPLABSRESULTS_GEN_ALL_CORE
Lab Results:  CBC  CBC Full  -  ( 2022 09:30 )  WBC Count : 6.54 K/uL  RBC Count : 4.26 M/uL  Hemoglobin : 8.0 g/dL  Hematocrit : 28.6 %  Platelet Count - Automated : 221 K/uL  Mean Cell Volume : 67.1 fl  Mean Cell Hemoglobin : 18.8 pg  Mean Cell Hemoglobin Concentration : 28.0 gm/dL  Auto Neutrophil # : 4.41 K/uL  Auto Lymphocyte # : 0.95 K/uL  Auto Monocyte # : 1.00 K/uL  Auto Eosinophil # : 0.11 K/uL  Auto Basophil # : 0.03 K/uL  Auto Neutrophil % : 67.4 %  Auto Lymphocyte % : 14.5 %  Auto Monocyte % : 15.3 %  Auto Eosinophil % : 1.7 %  Auto Basophil % : 0.5 %    .		Differential:	[] Automated		[] Manual  Chemistry                        8.0    6.54  )-----------( 221      ( 2022 09:30 )             28.6     11-28    130<L>  |  93<L>  |  33<H>  ----------------------------<  299<H>  4.2   |  29  |  1.30    Ca    8.7      2022 09:30    TPro  7.3  /  Alb  3.0<L>  /  TBili  0.9  /  DBili  x   /  AST  21  /  ALT  17  /  AlkPhos  227<H>  11-28    LIVER FUNCTIONS - ( 2022 09:30 )  Alb: 3.0 g/dL / Pro: 7.3 g/dL / ALK PHOS: 227 U/L / ALT: 17 U/L / AST: 21 U/L / GGT: x           PT/INR - ( 2022 09:30 )   PT: 19.3 sec;   INR: 1.64 ratio         PTT - ( 2022 09:30 )  PTT:37.1 sec  Urinalysis Basic - ( 2022 12:20 )    Color: Pale Yellow / Appearance: Clear / S.010 / pH: x  Gluc: x / Ketone: Negative  / Bili: Negative / Urobili: Negative   Blood: x / Protein: 15 / Nitrite: Negative   Leuk Esterase: Negative / RBC: 3-5 /HPF / WBC 0-2   Sq Epi: x / Non Sq Epi: Negative / Bacteria: x            MICROBIOLOGY/CULTURES:      RADIOLOGY RESULTS: reviewed

## 2022-11-28 NOTE — ED PROVIDER NOTE - CARE PLAN
Principal Discharge DX:	Pneumonia  Secondary Diagnosis:	Fatigue  Secondary Diagnosis:	Decreased appetite  Secondary Diagnosis:	Sepsis, unspecified organism   1

## 2022-11-28 NOTE — ED PROVIDER NOTE - CLINICAL SUMMARY MEDICAL DECISION MAKING FREE TEXT BOX
67 year old male w/ PMHx CVA (w/ residual right sided weakness and aphasia), CAD, esophageal varices, A fib (on coumadin), PUD, DM presents to the ED w/ weakness, decreased appetite. pt noted w/ cough, rhonchi and slight expiratory wheeze, concern for URI/pneumonia   plan for sepsis w/u including lactate, blood/urine culture, CXR, UA, RVP w/ covid . Repeat Type and screen due to recent hx of GI bleed

## 2022-11-28 NOTE — CONSULT NOTE ADULT - SUBJECTIVE AND OBJECTIVE BOX
Patient is a 67y Male whom presented to the hospital with hyponatremia     PAST MEDICAL & SURGICAL HISTORY:  CVA (cerebral vascular accident)      HTN (hypertension)      DM (diabetes mellitus)      Arrhythmia      History of atrial fibrillation      S/P CABG (coronary artery bypass graft)      S/P brain surgery          MEDICATIONS  (STANDING):  aspirin enteric coated 81 milliGRAM(s) Oral daily  atorvastatin 10 milliGRAM(s) Oral at bedtime  dextrose 5%. 1000 milliLiter(s) (50 mL/Hr) IV Continuous <Continuous>  dextrose 5%. 1000 milliLiter(s) (100 mL/Hr) IV Continuous <Continuous>  dextrose 50% Injectable 25 Gram(s) IV Push once  dextrose 50% Injectable 12.5 Gram(s) IV Push once  dextrose 50% Injectable 25 Gram(s) IV Push once  enoxaparin Injectable 70 milliGRAM(s) SubCutaneous every 12 hours  famotidine Injectable 20 milliGRAM(s) IV Push every 24 hours  glucagon  Injectable 1 milliGRAM(s) IntraMuscular once  insulin lispro (ADMELOG) corrective regimen sliding scale   SubCutaneous three times a day before meals  levETIRAcetam 750 milliGRAM(s) Oral two times a day  pantoprazole   Suspension 40 milliGRAM(s) Oral two times a day  piperacillin/tazobactam IVPB.. 3.375 Gram(s) IV Intermittent every 8 hours  propranolol 20 milliGRAM(s) Oral two times a day  warfarin 2 milliGRAM(s) Oral once      Allergies    No Known Allergies    Intolerances        SOCIAL HISTORY:  Denies ETOh,Smoking,     FAMILY HISTORY:  No pertinent family history in first degree relatives        REVIEW OF SYSTEMS:    CONSTITUTIONAL: No weakness, fevers or chills  RESPIRATORY: No cough, wheezing, hemoptysis; No shortness of breath  CARDIOVASCULAR: No chest pain or palpitations  GASTROINTESTINAL: No abdominal or epigastric pain. No nausea, vomiting,     No diarrhea or constipation. No melena   SKIN: dry      VITAL:  T(C): , Max: 37.1 (22 @ 08:53)  T(F): , Max: 98.8 (22 @ 08:53)  HR: 71 (22 @ 16:16)  BP: 100/64 (22 @ 16:16)  BP(mean): --  RR: 18 (22 @ 16:16)  SpO2: 97% (22 @ 16:16)  Wt(kg): --    I and O's:    Height (cm): 172.7 ( @ 08:53)  Weight (kg): 68 ( @ 08:53)  BMI (kg/m2): 22.8 ( @ 08:53)  BSA (m2): 1.81 ( @ 08:53)    PHYSICAL EXAM:    Constitutional: NAD  HEENT: conjunctive   clear   Neck:  No JVD  Respiratory: CTAB  Cardiovascular: S1 and S2  Gastrointestinal: BS+, soft, NT/ND  Extremities: No peripheral edema  Neurological:  no focal deficits  Psychiatric: Normal mood, normal affect  : No Adams  Skin: No rashes  Access: Not applicable    LABS:                        8.0    6.54  )-----------( 221      ( 2022 09:30 )             28.6         130<L>  |  93<L>  |  33<H>  ----------------------------<  299<H>  4.2   |  29  |  1.30    Ca    8.7      2022 09:30    TPro  7.3  /  Alb  3.0<L>  /  TBili  0.9  /  DBili  x   /  AST  21  /  ALT  17  /  AlkPhos  227<H>        Urine Studies:  Urinalysis Basic - ( 2022 12:20 )    Color: Pale Yellow / Appearance: Clear / S.010 / pH: x  Gluc: x / Ketone: Negative  / Bili: Negative / Urobili: Negative   Blood: x / Protein: 15 / Nitrite: Negative   Leuk Esterase: Negative / RBC: 3-5 /HPF / WBC 0-2   Sq Epi: x / Non Sq Epi: Negative / Bacteria: x            RADIOLOGY & ADDITIONAL STUDIES:

## 2022-11-28 NOTE — CONSULT NOTE ADULT - ASSESSMENT
gastric ulcer  recent GI bleed    diet as tolerated  cont proton pump inhibitor  hgb stable  supportive care  will follow

## 2022-11-28 NOTE — H&P ADULT - NSHPPHYSICALEXAM_GEN_ALL_CORE
General: WN/WD NAD  PERRLA  Neurology: A&Ox3  Respiratory: CTA B/L  CV: RRR, S1S2, no murmurs, rubs or gallops  Abdominal: Soft, NT, ND +BS, Last BM  Extremities: No edema, + peripheral pulses  Skin Normal

## 2022-11-28 NOTE — ED PROVIDER NOTE - ATTENDING APP SHARED VISIT CONTRIBUTION OF CARE
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, recent admission 11/21 through 11/24/2022 in which case the patient required 1 unit blood transfusion and had EGD which showed multiple clean based gastric ulcers, when patient was discharged to home family states the patient has not been eating, has had increased weakness, has been having cough, patient awakens to voice, noted cough and congestion concern for pneumonia we will follow-up chest x-ray, patient currently on Coumadin with increased lethargy and history of CVA we will follow-up CT head to rule out intracranial pathology, patient had recent blood transfusion for anemia and GI bleed, will follow-up repeat CBC and type and screen, will also send CMP and blood cultures and lactate level and reevaluate patient.

## 2022-11-28 NOTE — ED PROVIDER NOTE - PROGRESS NOTE DETAILS
Zosyn, IVF ordered for lactate 2.6 pt noted w/ bilateral pneumonia on CT Chest   IV zosyn given   case discussed w/ Dr Harmon, pt accepted for admission

## 2022-11-28 NOTE — H&P ADULT - HISTORY OF PRESENT ILLNESS
67 year old male w/ PMHx CVA (w/ residual right sided weakness and aphasia), CAD, esophageal varices, A fib (on coumadin), PUD, DM presents to the ED w/ weakness, decreased appetite. Pt d/c from [facility] on 11/24/2022 after admission for melena/low hemoglobin, requiring transfusion. Pt had endoscopy showing PUD w/o active bleeding. Pt daughter at bedside to assist w/ H&P and translation for patient. Daughter states pt felt well after leaving hospital. Over the weekend, family noted decreased appetite, fatigue and generalized weakness. Daughter also noted non productive cough since discharge. Denies fever/chills. Pt w/ hx of CVA and aphasia, communicates minimally but daughter states he has been at his baseline mental status. Pt has not complained of any pain. She does not believe he has had a bowel movement since discharge. He has started his PPI and restarted his dose of coumadin. Daughter denies any N/V. she has noted increased work of breathing for pt. States urine has been normal color, no foul smell. no hx of asthma/copd  67 year old male w/ PMHx CVA (w/ residual right sided weakness and aphasia), CAD, esophageal varices, A fib (on coumadin), PUD, T2 DM presents to the ED w/ weakness, decreased appetite. Pt d/c from [facility] on 11/24/2022 after admission for melena/low hemoglobin, requiring transfusion. Pt had endoscopy showing PUD w/o active bleeding. Pt daughter at bedside to assist w/ H&P and translation for patient. Daughter states pt felt well after leaving hospital. Over the weekend, family noted decreased appetite, fatigue and generalized weakness. Daughter also noted non productive cough since discharge. Denies fever/chills. Pt w/ hx of CVA and aphasia, communicates minimally but daughter states he has been at his baseline mental status. Pt has not complained of any pain. She does not believe he has had a bowel movement since discharge. He has started his PPI and restarted his dose of coumadin. Daughter denies any N/V. she has noted increased work of breathing for pt. States urine has been normal color, no foul smell. no hx of asthma/copd

## 2022-11-28 NOTE — CONSULT NOTE ADULT - SUBJECTIVE AND OBJECTIVE BOX
[facility] DIVISION of INFECTIOUS DISEASE  Guru Michel MD PhD, Lisa James MD, Becca Acosta MD, Karen Grimes MD, Corky Perez MD  and providing coverage with Carlos Marx MD  Providing Infectious Disease Consultations at [facility], [facility], [facility]'s    Office# [phone] to schedule follow up appointments  Answering Service for urgent calls or New Consults [phone]  Cell# to text for urgent issues Guru Michel [phone]     HPI:  67 year old male w/ PMHx CVA (w/ residual right sided weakness and aphasia), CAD, esophageal varices, A fib (on coumadin), PUD, DM presented to the ED w/ weakness, decreased appetite. Pt d/c from [facility] on 2022 after admission for melena/low hemoglobin, requiring transfusion. Pt had endoscopy showing PUD w/o active bleeding. Pt felt well after leaving hospital. Over the weekend, family noted decreased appetite, fatigue and generalized weakness. Daughter also noted non productive cough since discharge. Denies fever/chills. Pt w/ hx of CVA and aphasia, communicates minimally but daughter states he has been at his baseline mental status. Pt has not complained of any pain. She does not believe he has had a bowel movement since discharge. He has started his PPI and restarted his dose of coumadin. Daughter denies any N/V. she has noted increased work of breathing for pt. States urine has been normal color, no foul smell. no hx of asthma/copd  (2022 13:57)      PAST MEDICAL & SURGICAL HISTORY:  CVA (cerebral vascular accident)  HTN (hypertension)  DM (diabetes mellitus)  Arrhythmia - atrial fibrillation      S/P CABG (coronary artery bypass graft)  S/P brain surgery          Antimicrobials  piperacillin/tazobactam IVPB.. 3.375 Gram(s) IV Intermittent every 8 hours      Immunological      Other  aspirin enteric coated 81 milliGRAM(s) Oral daily  atorvastatin 10 milliGRAM(s) Oral at bedtime  dextrose 5%. 1000 milliLiter(s) IV Continuous <Continuous>  dextrose 5%. 1000 milliLiter(s) IV Continuous <Continuous>  dextrose 50% Injectable 25 Gram(s) IV Push once  dextrose 50% Injectable 12.5 Gram(s) IV Push once  dextrose 50% Injectable 25 Gram(s) IV Push once  dextrose Oral Gel 15 Gram(s) Oral once PRN  enoxaparin Injectable 70 milliGRAM(s) SubCutaneous every 12 hours  famotidine Injectable 20 milliGRAM(s) IV Push every 24 hours  glucagon  Injectable 1 milliGRAM(s) IntraMuscular once  insulin lispro (ADMELOG) corrective regimen sliding scale   SubCutaneous three times a day before meals  levETIRAcetam 750 milliGRAM(s) Oral two times a day  pantoprazole   Suspension 40 milliGRAM(s) Oral two times a day  propranolol 20 milliGRAM(s) Oral two times a day  warfarin 2 milliGRAM(s) Oral once      Allergies    No Known Allergies    Intolerances        SOCIAL HISTORY:  no toxic habits reported      FAMILY HISTORY:  No pertinent family history in first degree relatives        ROS:  limited by cognitive function    Vital Signs Last 24 Hrs  T(C): 36.7 (2022 16:16), Max: 37.1 (2022 08:53)  T(F): 98 (2022 16:16), Max: 98.8 (2022 08:53)  HR: 71 (2022 16:16) (71 - 81)  BP: 100/64 (2022 16:16) (100/64 - 123/75)  BP(mean): --  RR: 18 (2022 16:16) (16 - 18)  SpO2: 97% (2022 16:16) (95% - 97%)    Parameters below as of 2022 16:16  Patient On (Oxygen Delivery Method): room air        PE:  WDWN in no distress  HEENT:  NC, PERRL, sclerae anicteric, conjunctivae clear, EOMI.  Sinuses nontender, no nasal exudate.  No buccal or pharyngeal lesions, erythema or exudate  Neck:  Supple, no adenopathy  Lungs:  No accessory muscle use, bilaterally diffuse ronchi, areas of dec BS and crackles  Cor:  distant  Abd:  Symmetric, normoactive BS.  Soft, nontender, no masses, guarding or rebound.  Liver and spleen not enlarged  Extrem:  No cyanosis or edema  Skin:  No rashes.  Neuro: limited        LABS:                        8.0    6.54  )-----------( 221      ( 2022 09:30 )             28.6       WBC Count: 6.54 K/uL (22 @ 09:30)  WBC Count: 8.56 K/uL (22 @ 07:30)  WBC Count: 8.48 K/uL (22 @ 09:40)  WBC Count: 8.95 K/uL (22 @ 07:50)          130<L>  |  93<L>  |  33<H>  ----------------------------<  299<H>  4.2   |  29  |  1.30    Ca    8.7      2022 09:30    TPro  7.3  /  Alb  3.0<L>  /  TBili  0.9  /  DBili  x   /  AST  21  /  ALT  17  /  AlkPhos  227<H>        Creatinine, Serum: 1.30 mg/dL (22 @ 09:30)  Creatinine, Serum: 1.10 mg/dL (22 @ 07:30)  Creatinine, Serum: 0.92 mg/dL (22 @ 09:40)      Urinalysis Basic - ( 2022 12:20 )    Color: Pale Yellow / Appearance: Clear / S.010 / pH: x  Gluc: x / Ketone: Negative  / Bili: Negative / Urobili: Negative   Blood: x / Protein: 15 / Nitrite: Negative   Leuk Esterase: Negative / RBC: 3-5 /HPF / WBC 0-2   Sq Epi: x / Non Sq Epi: Negative / Bacteria: x      MICROBIOLOGY:    Respiratory Viral Panel with COVID-19 by JUAN DANIEL (22 @ 09:30)    Rapid RVP Result: Detected    SARS-CoV-2: NotDetec: This Respiratory Panel uses polymerase chain reaction (PCR) to detect for  adenovirus; coronavirus (HKU1, NL63, 229E, OC43); human metapneumovirus  (hMPV); human enterovirus/rhinovirus (Entero/RV); influenza A; influenza  A/H1; influenza A/H3; influenza A/H1-2009; influenza B; parainfluenza  viruses 1, 2, 3, 4; respiratory syncytial virus; Mycoplasma pneumoniae;  Chlamydophila pneumoniae; and SARS-CoV-2.    hMPV (RapRVP): Detected        RADIOLOGY & ADDITIONAL STUDIES:    --

## 2022-11-29 LAB
APTT BLD: 54.1 SEC — HIGH (ref 27.5–35.5)
CULTURE RESULTS: NO GROWTH — SIGNIFICANT CHANGE UP
INR BLD: 1.83 RATIO — HIGH (ref 0.88–1.16)
MAGNESIUM SERPL-MCNC: 1.7 MG/DL — SIGNIFICANT CHANGE UP (ref 1.6–2.6)
PROCALCITONIN SERPL-MCNC: 0.13 NG/ML — HIGH
PROTHROM AB SERPL-ACNC: 21.6 SEC — HIGH (ref 10.5–13.4)
SPECIMEN SOURCE: SIGNIFICANT CHANGE UP

## 2022-11-29 RX ADMIN — PIPERACILLIN AND TAZOBACTAM 25 GRAM(S): 4; .5 INJECTION, POWDER, LYOPHILIZED, FOR SOLUTION INTRAVENOUS at 02:58

## 2022-11-29 RX ADMIN — Medication 2: at 08:35

## 2022-11-29 RX ADMIN — LEVETIRACETAM 750 MILLIGRAM(S): 250 TABLET, FILM COATED ORAL at 05:33

## 2022-11-29 RX ADMIN — PIPERACILLIN AND TAZOBACTAM 25 GRAM(S): 4; .5 INJECTION, POWDER, LYOPHILIZED, FOR SOLUTION INTRAVENOUS at 20:17

## 2022-11-29 RX ADMIN — ENOXAPARIN SODIUM 70 MILLIGRAM(S): 100 INJECTION SUBCUTANEOUS at 17:20

## 2022-11-29 RX ADMIN — Medication 3: at 17:18

## 2022-11-29 RX ADMIN — PIPERACILLIN AND TAZOBACTAM 25 GRAM(S): 4; .5 INJECTION, POWDER, LYOPHILIZED, FOR SOLUTION INTRAVENOUS at 12:08

## 2022-11-29 RX ADMIN — LEVETIRACETAM 750 MILLIGRAM(S): 250 TABLET, FILM COATED ORAL at 17:19

## 2022-11-29 RX ADMIN — Medication 3: at 12:08

## 2022-11-29 RX ADMIN — PANTOPRAZOLE SODIUM 40 MILLIGRAM(S): 20 TABLET, DELAYED RELEASE ORAL at 17:19

## 2022-11-29 RX ADMIN — FAMOTIDINE 20 MILLIGRAM(S): 10 INJECTION INTRAVENOUS at 18:49

## 2022-11-29 RX ADMIN — PANTOPRAZOLE SODIUM 40 MILLIGRAM(S): 20 TABLET, DELAYED RELEASE ORAL at 05:33

## 2022-11-29 RX ADMIN — ENOXAPARIN SODIUM 70 MILLIGRAM(S): 100 INJECTION SUBCUTANEOUS at 05:33

## 2022-11-29 RX ADMIN — ATORVASTATIN CALCIUM 10 MILLIGRAM(S): 80 TABLET, FILM COATED ORAL at 21:59

## 2022-11-29 RX ADMIN — Medication 81 MILLIGRAM(S): at 12:09

## 2022-11-29 NOTE — SWALLOW BEDSIDE ASSESSMENT ADULT - COMMENTS
per H&P - 67 year old male w/ PMHx CVA (w/ residual right sided weakness and aphasia), CAD, esophageal varices, A fib (on coumadin), PUD, DM presents to the ED w/ weakness, decreased appetite  WBC WNL  CT HEAD - IMPRESSION:  No significant interval change compared with the prior   5/2/2022 with left hemicraniectomy and encephalomalacia in the left   frontal temporal parietal region. No intracranial hemorrhage appreciated.  There are bronchocentric airspace consolidations bilateral upper lobes,   left lingula/lower lobe and to a lesser degree right lower lobe.    CT CHEST - There is volume loss left lower lobe, with persistent area of rounded   consolidation left lower lobe extending to the pleural surface. Findings   are compatible with chronic rounded atelectasis.  There is trace left-sided pleural effusion associated with pleural   thickening.    Orders received and chart reviewed. The patient is received in bed with family present and providing translation as needed per their preference. They provide background information as needed. The patient has aphasia from old CVA but has been tolerating regular diet/thin liquids at home with no history of aspiration PNA. Patient is not following commands or verbalizing at time of assessment.

## 2022-11-29 NOTE — SWALLOW BEDSIDE ASSESSMENT ADULT - SWALLOW EVAL: RECOMMENDED DIET
Will defer diet consistency to MD at this time as SLP was unable to complete full assessment due to lethargy

## 2022-11-29 NOTE — SWALLOW BEDSIDE ASSESSMENT ADULT - ASR SWALLOW LINGUAL MOBILITY
patient did not follow commands to complete oral peripheral exam, however his daughter reports poor lingual movement since CVA

## 2022-11-29 NOTE — CONSULT NOTE ADULT - ASSESSMENT
67M with h/o Mech Ao Valve, Afib on coumadin, CAD and CVA (cerebral hemorrhage from coumadin toxicity) with recent GI bleed from esophageal varices presents with worsening fatigue, productive cough and AMS.  He was found with HMPV and concern for bacterial PNA started on IV abx by ID.      TTE Echo Complete w/o Contrast w/ Doppler --11.22.22 @ 19:15  Normal LV systolic function EF 55%, history of aortic valve replacement,   mean aortic gradient 19 mmHg, mild aortic stenosis suggested, biatrial   enlargement, normal pericardium, RVSP 34 mmHg      Suggest:    1. Cough/PNA  - Empiric Tx with Zosyn  - IV fluid hydration  - Doubt repeat TTE will provide additional benefit to evaluate for endocarditis  - Follow up blood cultures    2. Mech Ao Valve/Afib  - Continue with Lovenox full dose vs. coumadin with Goal INR 2.5- 3.5  - Continue with aspirin 81mg daily  - Monitor INR and CBC daily    3. CAD  - C/w Lipitor and aspirin    4. Will follow 67M with h/o Mech Ao Valve, Afib on coumadin, CAD and CVA (cerebral hemorrhage from coumadin toxicity) with recent GI bleed from esophageal varices presents with worsening fatigue, productive cough and AMS.  He was found with HMPV and concern for bacterial PNA started on IV abx by ID.  Daughter at bedside states that he is clinically improving    TTE Echo Complete w/o Contrast w/ Doppler --11.22.22 @ 19:15  Normal LV systolic function EF 55%, history of aortic valve replacement,   mean aortic gradient 19 mmHg, mild aortic stenosis suggested, biatrial   enlargement, normal pericardium, RVSP 34 mmHg      Suggest:    1. Cough/PNA  - Empiric Tx with Zosyn  - IV fluid hydration  - Doubt repeat TTE will provide additional benefit to evaluate for endocarditis  - Follow up blood cultures    2. Mech Ao Valve/Afib  - Continue with Lovenox full dose vs. coumadin with Goal INR 2.5- 3.5  - Continue with aspirin 81mg daily  - Monitor INR and CBC daily    3. CAD  - C/w Lipitor and aspirin    4. Will follow

## 2022-11-29 NOTE — CONSULT NOTE ADULT - SUBJECTIVE AND OBJECTIVE BOX
[facility] Cardiology Consult - [employer] [place] [phone]    CHIEF COMPLAINT: Patient is a 67y old  Male who presents with a chief complaint of weakness (29 Nov 2022 05:18)      HPI:  67 year old male w/ PMHx CVA (w/ residual right sided weakness and aphasia), CAD, esophageal varices, A fib (on coumadin), PUD, DM presents to the ED w/ weakness, decreased appetite. Pt d/c from [facility] on 11/24/2022 after admission for melena/low hemoglobin, requiring transfusion. Pt had endoscopy showing PUD w/o active bleeding. Pt daughter at bedside to assist w/ H&P and translation for patient. Daughter states pt felt well after leaving hospital. Over the weekend, family noted decreased appetite, fatigue and generalized weakness. Daughter also noted non productive cough since discharge. Denies fever/chills. Pt w/ hx of CVA and aphasia, communicates minimally but daughter states he has been at his baseline mental status. Pt has not complained of any pain. She does not believe he has had a bowel movement since discharge. He has started his PPI and restarted his dose of coumadin. Daughter denies any N/V. she has noted increased work of breathing for pt. States urine has been normal color, no foul smell. no hx of asthma/copd  (28 Nov 2022 13:57)      PAST MEDICAL & SURGICAL HISTORY:  CVA (cerebral vascular accident)      HTN (hypertension)      DM (diabetes mellitus)      Arrhythmia      History of atrial fibrillation      S/P CABG (coronary artery bypass graft)      S/P brain surgery          SOCIAL HISTORY: Alochol: Denied  Smoking: Nonsmoker  Drug Use: Denied  Marital Status:         FAMILY HISTORY: FAMILY HISTORY:  No pertinent family history in first degree relatives        MEDICATIONS  (STANDING):  aspirin enteric coated 81 milliGRAM(s) Oral daily  atorvastatin 10 milliGRAM(s) Oral at bedtime  dextrose 5%. 1000 milliLiter(s) (50 mL/Hr) IV Continuous <Continuous>  dextrose 5%. 1000 milliLiter(s) (100 mL/Hr) IV Continuous <Continuous>  dextrose 50% Injectable 25 Gram(s) IV Push once  dextrose 50% Injectable 12.5 Gram(s) IV Push once  dextrose 50% Injectable 25 Gram(s) IV Push once  enoxaparin Injectable 70 milliGRAM(s) SubCutaneous every 12 hours  famotidine Injectable 20 milliGRAM(s) IV Push every 24 hours  glucagon  Injectable 1 milliGRAM(s) IntraMuscular once  insulin lispro (ADMELOG) corrective regimen sliding scale   SubCutaneous at bedtime  insulin lispro (ADMELOG) corrective regimen sliding scale   SubCutaneous three times a day before meals  levETIRAcetam 750 milliGRAM(s) Oral two times a day  pantoprazole   Suspension 40 milliGRAM(s) Oral two times a day  piperacillin/tazobactam IVPB.. 3.375 Gram(s) IV Intermittent every 8 hours  propranolol 20 milliGRAM(s) Oral two times a day    MEDICATIONS  (PRN):  acetaminophen     Tablet .. 650 milliGRAM(s) Oral every 6 hours PRN Temp greater or equal to 38C (100.4F), Mild Pain (1 - 3)  dextrose Oral Gel 15 Gram(s) Oral once PRN Blood Glucose LESS THAN 70 milliGRAM(s)/deciliter  guaiFENesin Oral Liquid (Sugar-Free) 200 milliGRAM(s) Oral every 6 hours PRN Cough      Allergies    No Known Allergies    Intolerances        REVIEW OF SYSTEMS:  CONSTITUTIONAL: No weakness, fevers or chills  EYES/ENT: No visual changes;  No vertigo or throat pain   NECK: No pain or stiffness  RESPIRATORY: No cough, wheezing, hemoptysis; No shortness of breath  CARDIOVASCULAR: No chest pain or palpitations  GASTROINTESTINAL: No abdominal pain. No nausea, vomiting, or hematemesis; No diarrhea or constipation. No melena or hematochezia.  GENITOURINARY: No dysuria, frequency or hematuria  NEUROLOGICAL: No numbness or weakness  SKIN: No itching or rash  All other review of systems is negative unless indicated above    VITAL SIGNS:   Vital Signs Last 24 Hrs  T(C): 36.9 (29 Nov 2022 05:10), Max: 37.1 (28 Nov 2022 08:53)  T(F): 98.4 (29 Nov 2022 05:10), Max: 98.8 (28 Nov 2022 08:53)  HR: 68 (29 Nov 2022 05:10) (68 - 92)  BP: 131/75 (29 Nov 2022 05:10) (100/64 - 131/75)  BP(mean): --  RR: 17 (29 Nov 2022 05:10) (16 - 18)  SpO2: 95% (29 Nov 2022 05:10) (94% - 97%)    Parameters below as of 29 Nov 2022 05:10  Patient On (Oxygen Delivery Method): room air        I&O's Summary    28 Nov 2022 07:01  -  29 Nov 2022 07:00  --------------------------------------------------------  IN: 0 mL / OUT: 400 mL / NET: -400 mL        PHYSICAL EXAM:  Constitutional: Awake in NAD  HEENT:  GUSTAVO, EOMI  Neck: No JVD  Pulmonary: CTA B/L No R/R/W  Cardiovascular: PMI not palpable non-displaced Regular S1 and S2, no murmurs  Gastrointestinal: Bowel Sounds present, soft, nontender.   Extremities: Trace peripheral edema.   Neuro: No gross focal deficits    LABS: All Labs Reviewed:                        8.0    6.54  )-----------( 221      ( 28 Nov 2022 09:30 )             28.6     28 Nov 2022 09:30    130    |  93     |  33     ----------------------------<  299    4.2     |  29     |  1.30     Ca    8.7        28 Nov 2022 09:30  Mg     1.7       29 Nov 2022 06:05    TPro  7.3    /  Alb  3.0    /  TBili  0.9    /  DBili  x      /  AST  21     /  ALT  17     /  AlkPhos  227    28 Nov 2022 09:30    PT/INR - ( 29 Nov 2022 06:05 )   PT: 21.6 sec;   INR: 1.83 ratio         PTT - ( 29 Nov 2022 06:05 )  PTT:54.1 sec      Blood Culture:         RADIOLOGY/EKG:

## 2022-11-29 NOTE — SWALLOW BEDSIDE ASSESSMENT ADULT - ASR SWALLOW LABIAL MOBILITY
patient did not follow commands to complete oral peripheral exam, however his daughter reports poor labial movement/closure since CVA

## 2022-11-29 NOTE — SWALLOW BEDSIDE ASSESSMENT ADULT - SWALLOW EVAL: DIAGNOSIS
1. The patient demonstrated mild oropharyngeal dysphagia for thin liquids. Oral stage marked by increased time required to form seal to accept liquid from cup, delayed posterior transfer, trace anterior loss. Pharyngeal stage marked by appearance of delayed swallow initiation, present hyolaryngeal movement, no overt signs of aspiration, congested breath sounds not exacerbated. Patient only accepts 1x cup sip of thin liquids prior to closing eyes/falling asleep, and no further trials were provided. Therefore this assessment is extremely limited.

## 2022-11-30 DIAGNOSIS — E11.65 TYPE 2 DIABETES MELLITUS WITH HYPERGLYCEMIA: ICD-10-CM

## 2022-11-30 LAB
ALBUMIN SERPL ELPH-MCNC: 2.7 G/DL — LOW (ref 3.3–5)
ALBUMIN SERPL ELPH-MCNC: 3.1 G/DL — LOW (ref 3.3–5)
ALP SERPL-CCNC: 215 U/L — HIGH (ref 40–120)
ALP SERPL-CCNC: 227 U/L — HIGH (ref 40–120)
ALT FLD-CCNC: 16 U/L — SIGNIFICANT CHANGE UP (ref 12–78)
ALT FLD-CCNC: 16 U/L — SIGNIFICANT CHANGE UP (ref 12–78)
ANION GAP SERPL CALC-SCNC: 5 MMOL/L — SIGNIFICANT CHANGE UP (ref 5–17)
ANION GAP SERPL CALC-SCNC: 7 MMOL/L — SIGNIFICANT CHANGE UP (ref 5–17)
AST SERPL-CCNC: 24 U/L — SIGNIFICANT CHANGE UP (ref 15–37)
AST SERPL-CCNC: 24 U/L — SIGNIFICANT CHANGE UP (ref 15–37)
BILIRUB SERPL-MCNC: 0.7 MG/DL — SIGNIFICANT CHANGE UP (ref 0.2–1.2)
BILIRUB SERPL-MCNC: 0.8 MG/DL — SIGNIFICANT CHANGE UP (ref 0.2–1.2)
BUN SERPL-MCNC: 14 MG/DL — SIGNIFICANT CHANGE UP (ref 7–23)
BUN SERPL-MCNC: 14 MG/DL — SIGNIFICANT CHANGE UP (ref 7–23)
CALCIUM SERPL-MCNC: 8.5 MG/DL — SIGNIFICANT CHANGE UP (ref 8.5–10.1)
CALCIUM SERPL-MCNC: 9.4 MG/DL — SIGNIFICANT CHANGE UP (ref 8.5–10.1)
CHLORIDE SERPL-SCNC: 100 MMOL/L — SIGNIFICANT CHANGE UP (ref 96–108)
CHLORIDE SERPL-SCNC: 99 MMOL/L — SIGNIFICANT CHANGE UP (ref 96–108)
CO2 SERPL-SCNC: 31 MMOL/L — SIGNIFICANT CHANGE UP (ref 22–31)
CO2 SERPL-SCNC: 31 MMOL/L — SIGNIFICANT CHANGE UP (ref 22–31)
CREAT SERPL-MCNC: 0.88 MG/DL — SIGNIFICANT CHANGE UP (ref 0.5–1.3)
CREAT SERPL-MCNC: 0.99 MG/DL — SIGNIFICANT CHANGE UP (ref 0.5–1.3)
EGFR: 85 ML/MIN/1.73M2 — SIGNIFICANT CHANGE UP
EGFR: 95 ML/MIN/1.73M2 — SIGNIFICANT CHANGE UP
GLUCOSE SERPL-MCNC: 228 MG/DL — HIGH (ref 70–99)
GLUCOSE SERPL-MCNC: 288 MG/DL — HIGH (ref 70–99)
HCT VFR BLD CALC: 29.7 % — LOW (ref 39–50)
HCT VFR BLD CALC: 32 % — LOW (ref 39–50)
HGB BLD-MCNC: 8.3 G/DL — LOW (ref 13–17)
HGB BLD-MCNC: 8.7 G/DL — LOW (ref 13–17)
INR BLD: 2.15 RATIO — HIGH (ref 0.88–1.16)
MCHC RBC-ENTMCNC: 18.6 PG — LOW (ref 27–34)
MCHC RBC-ENTMCNC: 18.9 PG — LOW (ref 27–34)
MCHC RBC-ENTMCNC: 27.2 GM/DL — LOW (ref 32–36)
MCHC RBC-ENTMCNC: 27.9 GM/DL — LOW (ref 32–36)
MCV RBC AUTO: 67.8 FL — LOW (ref 80–100)
MCV RBC AUTO: 68.5 FL — LOW (ref 80–100)
MRSA PCR RESULT.: SIGNIFICANT CHANGE UP
NRBC # BLD: 0 /100 WBCS — SIGNIFICANT CHANGE UP (ref 0–0)
NRBC # BLD: 0 /100 WBCS — SIGNIFICANT CHANGE UP (ref 0–0)
PLATELET # BLD AUTO: 227 K/UL — SIGNIFICANT CHANGE UP (ref 150–400)
PLATELET # BLD AUTO: 270 K/UL — SIGNIFICANT CHANGE UP (ref 150–400)
POTASSIUM SERPL-MCNC: 4.1 MMOL/L — SIGNIFICANT CHANGE UP (ref 3.5–5.3)
POTASSIUM SERPL-MCNC: 4.3 MMOL/L — SIGNIFICANT CHANGE UP (ref 3.5–5.3)
POTASSIUM SERPL-SCNC: 4.1 MMOL/L — SIGNIFICANT CHANGE UP (ref 3.5–5.3)
POTASSIUM SERPL-SCNC: 4.3 MMOL/L — SIGNIFICANT CHANGE UP (ref 3.5–5.3)
PROT SERPL-MCNC: 6.8 G/DL — SIGNIFICANT CHANGE UP (ref 6–8.3)
PROT SERPL-MCNC: 7.4 G/DL — SIGNIFICANT CHANGE UP (ref 6–8.3)
PROTHROM AB SERPL-ACNC: 25.4 SEC — HIGH (ref 10.5–13.4)
RBC # BLD: 4.38 M/UL — SIGNIFICANT CHANGE UP (ref 4.2–5.8)
RBC # BLD: 4.67 M/UL — SIGNIFICANT CHANGE UP (ref 4.2–5.8)
RBC # FLD: 23.8 % — HIGH (ref 10.3–14.5)
RBC # FLD: 23.9 % — HIGH (ref 10.3–14.5)
S AUREUS DNA NOSE QL NAA+PROBE: SIGNIFICANT CHANGE UP
SODIUM SERPL-SCNC: 136 MMOL/L — SIGNIFICANT CHANGE UP (ref 135–145)
SODIUM SERPL-SCNC: 137 MMOL/L — SIGNIFICANT CHANGE UP (ref 135–145)
WBC # BLD: 4.88 K/UL — SIGNIFICANT CHANGE UP (ref 3.8–10.5)
WBC # BLD: 5.13 K/UL — SIGNIFICANT CHANGE UP (ref 3.8–10.5)
WBC # FLD AUTO: 4.88 K/UL — SIGNIFICANT CHANGE UP (ref 3.8–10.5)
WBC # FLD AUTO: 5.13 K/UL — SIGNIFICANT CHANGE UP (ref 3.8–10.5)

## 2022-11-30 RX ORDER — CEFTRIAXONE 500 MG/1
1000 INJECTION, POWDER, FOR SOLUTION INTRAMUSCULAR; INTRAVENOUS EVERY 24 HOURS
Refills: 0 | Status: COMPLETED | OUTPATIENT
Start: 2022-11-30 | End: 2022-12-02

## 2022-11-30 RX ORDER — WARFARIN SODIUM 2.5 MG/1
3 TABLET ORAL ONCE
Refills: 0 | Status: DISCONTINUED | OUTPATIENT
Start: 2022-11-30 | End: 2022-11-30

## 2022-11-30 RX ORDER — WARFARIN SODIUM 2.5 MG/1
2 TABLET ORAL ONCE
Refills: 0 | Status: COMPLETED | OUTPATIENT
Start: 2022-11-30 | End: 2022-11-30

## 2022-11-30 RX ADMIN — Medication 200 MILLIGRAM(S): at 21:27

## 2022-11-30 RX ADMIN — Medication 5: at 17:12

## 2022-11-30 RX ADMIN — Medication 81 MILLIGRAM(S): at 11:27

## 2022-11-30 RX ADMIN — PIPERACILLIN AND TAZOBACTAM 25 GRAM(S): 4; .5 INJECTION, POWDER, LYOPHILIZED, FOR SOLUTION INTRAVENOUS at 04:18

## 2022-11-30 RX ADMIN — ATORVASTATIN CALCIUM 10 MILLIGRAM(S): 80 TABLET, FILM COATED ORAL at 21:24

## 2022-11-30 RX ADMIN — Medication 4: at 11:29

## 2022-11-30 RX ADMIN — PANTOPRAZOLE SODIUM 40 MILLIGRAM(S): 20 TABLET, DELAYED RELEASE ORAL at 05:18

## 2022-11-30 RX ADMIN — ENOXAPARIN SODIUM 70 MILLIGRAM(S): 100 INJECTION SUBCUTANEOUS at 05:18

## 2022-11-30 RX ADMIN — LEVETIRACETAM 750 MILLIGRAM(S): 250 TABLET, FILM COATED ORAL at 17:13

## 2022-11-30 RX ADMIN — Medication 2: at 21:27

## 2022-11-30 RX ADMIN — Medication 2: at 08:10

## 2022-11-30 RX ADMIN — LEVETIRACETAM 750 MILLIGRAM(S): 250 TABLET, FILM COATED ORAL at 05:18

## 2022-11-30 RX ADMIN — PANTOPRAZOLE SODIUM 40 MILLIGRAM(S): 20 TABLET, DELAYED RELEASE ORAL at 17:15

## 2022-11-30 RX ADMIN — CEFTRIAXONE 100 MILLIGRAM(S): 500 INJECTION, POWDER, FOR SOLUTION INTRAMUSCULAR; INTRAVENOUS at 15:55

## 2022-11-30 RX ADMIN — WARFARIN SODIUM 2 MILLIGRAM(S): 2.5 TABLET ORAL at 21:24

## 2022-11-30 RX ADMIN — PIPERACILLIN AND TAZOBACTAM 25 GRAM(S): 4; .5 INJECTION, POWDER, LYOPHILIZED, FOR SOLUTION INTRAVENOUS at 11:27

## 2022-11-30 RX ADMIN — ENOXAPARIN SODIUM 70 MILLIGRAM(S): 100 INJECTION SUBCUTANEOUS at 17:13

## 2022-11-30 NOTE — DISCHARGE NOTE NURSING/CASE MANAGEMENT/SOCIAL WORK - PATIENT PORTAL LINK FT
You can access the FollowMyHealth Patient Portal offered by [facility] by registering at the following website: [email or web address] By joining [facility]’s FollowMyHealth portal, you will also be able to view your health information using other applications (apps) compatible with our system.

## 2022-11-30 NOTE — DISCHARGE NOTE NURSING/CASE MANAGEMENT/SOCIAL WORK - NSDCPEFALRISK_GEN_ALL_CORE
For information on Fall & Injury Prevention, visit: [email or web address] OR  [email or web address] OR  https://www.cdc.gov/steadi/patient.html

## 2022-11-30 NOTE — PHARMACOTHERAPY INTERVENTION NOTE - COMMENTS
Patient is a 67-year old male ordered for warfarin 3 mg x 1 for the treatment of non-valvular atrial fibrillation. Confirmed with patient’s daughter that pt receives 2 mg Monday-Saturday and half a tablet of 2 mg on Sunday. Daughter noted doses of 3 mg have previously resulted in large INR jumps. Discussed with Dr. Harmon reducing dose to 2 mg or less following increase in INR from 1.83 to 2.15 and discussion with daughter. MD agreed and requested order for 2 mg x 1 tonight and follow INR. Order was entered and timed accordingly.     Patient is a 67-year old male ordered for warfarin 3 mg x 1 for the treatment of non-valvular atrial fibrillation with a goal INR of 2.5-3.5. Confirmed with patient’s daughter that pt receives 2 mg Monday-Saturday and half a tablet of 2 mg on Sunday. Daughter noted doses of 3 mg have previously resulted in large INR jumps. Discussed with Dr. Harmon reducing dose to 2 mg or less following increase in INR from 1.83 to 2.15 and discussion with daughter. MD agreed and requested order for 2 mg x 1 tonight and follow INR. Order was entered and timed accordingly.

## 2022-11-30 NOTE — DISCHARGE NOTE NURSING/CASE MANAGEMENT/SOCIAL WORK - NSSCNAMETXT_GEN_ALL_CORE
[facility] - [phone]/ [phone]  Registered Nurse to visit the day after hospital discharge; Physical Therapist to follow. Please contact the home care agency at the above phone number if you have not heard from them by 12 noon on the day after your hospital discharge.

## 2022-11-30 NOTE — CONSULT NOTE ADULT - PROBLEM SELECTOR RECOMMENDATION 9
change mod dose admelog corrective scale coverage qac/qhs  add lantus 15 units qhs  add admelog 5 units 3x/day before meals  cont cons cho diet  goal bg 100-180 in hosp setting

## 2022-11-30 NOTE — CONSULT NOTE ADULT - CONSULT REQUESTED DATE/TIME
28-Nov-2022 15:24
28-Nov-2022 16:33
29-Nov-2022 08:17
28-Nov-2022 14:15
28-Nov-2022 19:10
30-Nov-2022 11:51

## 2022-11-30 NOTE — DISCHARGE NOTE NURSING/CASE MANAGEMENT/SOCIAL WORK - INFLUENZA IMMUNIZATION DATE (APPROXIMATE):
C/O intermittent pelvic pain with heavier vaginal bleeding x3 days than usual for pt. Denies passing clots, dizziness or pregnancy. Denies , BM symptoms. Stable gait, A&Ox3.   05-Oct-2022

## 2022-11-30 NOTE — CONSULT NOTE ADULT - SUBJECTIVE AND OBJECTIVE BOX
Patient is a 67y old  Male who presents with a chief complaint of weakness (30 Nov 2022 11:12)      Reason For Consult: dm2 uncontrolled    HPI:  67 year old male w/ PMHx CVA (w/ residual right sided weakness and aphasia), CAD, esophageal varices, A fib (on coumadin), PUD, T2 DM presents to the ED w/ weakness, decreased appetite. Pt d/c from [facility] on 11/24/2022 after admission for melena/low hemoglobin, requiring transfusion. Pt had endoscopy showing PUD w/o active bleeding. Pt daughter at bedside to assist w/ H&P and translation for patient. Daughter states pt felt well after leaving hospital. Over the weekend, family noted decreased appetite, fatigue and generalized weakness. Daughter also noted non productive cough since discharge. Denies fever/chills. Pt w/ hx of CVA and aphasia, communicates minimally but daughter states he has been at his baseline mental status. Pt has not complained of any pain. She does not believe he has had a bowel movement since discharge. He has started his PPI and restarted his dose of coumadin. Daughter denies any N/V. she has noted increased work of breathing for pt. States urine has been normal color, no foul smell. no hx of asthma/copd  (28 Nov 2022 13:57)      PAST MEDICAL & SURGICAL HISTORY:  CVA (cerebral vascular accident)      HTN (hypertension)      DM (diabetes mellitus)      Arrhythmia      History of atrial fibrillation      S/P CABG (coronary artery bypass graft)      S/P brain surgery          FAMILY HISTORY:  No pertinent family history in first degree relatives          Social History:    MEDICATIONS  (STANDING):  aspirin enteric coated 81 milliGRAM(s) Oral daily  atorvastatin 10 milliGRAM(s) Oral at bedtime  dextrose 5%. 1000 milliLiter(s) (50 mL/Hr) IV Continuous <Continuous>  dextrose 5%. 1000 milliLiter(s) (100 mL/Hr) IV Continuous <Continuous>  dextrose 50% Injectable 25 Gram(s) IV Push once  dextrose 50% Injectable 12.5 Gram(s) IV Push once  dextrose 50% Injectable 25 Gram(s) IV Push once  enoxaparin Injectable 70 milliGRAM(s) SubCutaneous every 12 hours  famotidine Injectable 20 milliGRAM(s) IV Push every 24 hours  glucagon  Injectable 1 milliGRAM(s) IntraMuscular once  insulin lispro (ADMELOG) corrective regimen sliding scale   SubCutaneous at bedtime  insulin lispro (ADMELOG) corrective regimen sliding scale   SubCutaneous three times a day before meals  levETIRAcetam 750 milliGRAM(s) Oral two times a day  pantoprazole   Suspension 40 milliGRAM(s) Oral two times a day  piperacillin/tazobactam IVPB.. 3.375 Gram(s) IV Intermittent every 8 hours  propranolol 20 milliGRAM(s) Oral two times a day  warfarin 3 milliGRAM(s) Oral once    MEDICATIONS  (PRN):  acetaminophen     Tablet .. 650 milliGRAM(s) Oral every 6 hours PRN Temp greater or equal to 38C (100.4F), Mild Pain (1 - 3)  dextrose Oral Gel 15 Gram(s) Oral once PRN Blood Glucose LESS THAN 70 milliGRAM(s)/deciliter  guaiFENesin Oral Liquid (Sugar-Free) 200 milliGRAM(s) Oral every 6 hours PRN Cough        T(C): 36.6 (11-30-22 @ 05:08), Max: 36.6 (11-30-22 @ 05:08)  HR: 74 (11-30-22 @ 05:08) (62 - 74)  BP: 131/84 (11-30-22 @ 05:08) (115/75 - 131/84)  RR: 17 (11-30-22 @ 05:08) (17 - 18)  SpO2: 96% (11-30-22 @ 05:08) (95% - 96%)  Wt(kg): --    PHYSICAL EXAM:  CHEST/LUNG: Clear to percussion bilaterally; No rales, rhonchi, wheezing, or rubs  HEART: Regular rate and rhythm; No murmurs, rubs, or gallops  ABDOMEN: Soft, Nontender, Nondistended; Bowel sounds present  EXTREMITIES:  2+ Peripheral Pulses, No clubbing, cyanosis, or edema  SKIN: No rashes or lesions    CAPILLARY BLOOD GLUCOSE      POCT Blood Glucose.: 337 mg/dL (30 Nov 2022 11:28)  POCT Blood Glucose.: 250 mg/dL (30 Nov 2022 08:04)  POCT Blood Glucose.: 243 mg/dL (29 Nov 2022 21:49)  POCT Blood Glucose.: 291 mg/dL (29 Nov 2022 16:44)                            8.3    4.88  )-----------( 227      ( 30 Nov 2022 10:00 )             29.7       CMP:  11-30 @ 10:00  SGPT 16  Albumin 2.7   Alk Phos 215   Anion Gap 5   SGOT 24   Total Bili 0.7   BUN 14   Calcium Total 8.5   CO2 31   Chloride 100   Creatinine 0.99   eGFR if AA --   eGFR if non AA --   Glucose 288   Potassium 4.1   Protein 6.8   Sodium 136      Thyroid Function Tests:      Diabetes Tests:       Radiology:

## 2022-12-01 LAB
INR BLD: 2.08 RATIO — HIGH (ref 0.88–1.16)
LEGIONELLA AG UR QL: NEGATIVE — SIGNIFICANT CHANGE UP
PROTHROM AB SERPL-ACNC: 24.5 SEC — HIGH (ref 10.5–13.4)

## 2022-12-01 RX ORDER — INSULIN LISPRO 100/ML
5 VIAL (ML) SUBCUTANEOUS
Refills: 0 | Status: DISCONTINUED | OUTPATIENT
Start: 2022-12-01 | End: 2022-12-04

## 2022-12-01 RX ORDER — INSULIN LISPRO 100/ML
VIAL (ML) SUBCUTANEOUS AT BEDTIME
Refills: 0 | Status: DISCONTINUED | OUTPATIENT
Start: 2022-12-01 | End: 2022-12-04

## 2022-12-01 RX ORDER — WARFARIN SODIUM 2.5 MG/1
2 TABLET ORAL ONCE
Refills: 0 | Status: COMPLETED | OUTPATIENT
Start: 2022-12-01 | End: 2022-12-01

## 2022-12-01 RX ORDER — INSULIN GLARGINE 100 [IU]/ML
15 INJECTION, SOLUTION SUBCUTANEOUS EVERY MORNING
Refills: 0 | Status: DISCONTINUED | OUTPATIENT
Start: 2022-12-02 | End: 2022-12-04

## 2022-12-01 RX ORDER — INSULIN GLARGINE 100 [IU]/ML
15 INJECTION, SOLUTION SUBCUTANEOUS AT BEDTIME
Refills: 0 | Status: DISCONTINUED | OUTPATIENT
Start: 2022-12-01 | End: 2022-12-01

## 2022-12-01 RX ORDER — INSULIN GLARGINE 100 [IU]/ML
15 INJECTION, SOLUTION SUBCUTANEOUS ONCE
Refills: 0 | Status: COMPLETED | OUTPATIENT
Start: 2022-12-01 | End: 2022-12-01

## 2022-12-01 RX ORDER — INSULIN LISPRO 100/ML
VIAL (ML) SUBCUTANEOUS
Refills: 0 | Status: DISCONTINUED | OUTPATIENT
Start: 2022-12-01 | End: 2022-12-04

## 2022-12-01 RX ADMIN — LEVETIRACETAM 750 MILLIGRAM(S): 250 TABLET, FILM COATED ORAL at 19:57

## 2022-12-01 RX ADMIN — ENOXAPARIN SODIUM 70 MILLIGRAM(S): 100 INJECTION SUBCUTANEOUS at 19:57

## 2022-12-01 RX ADMIN — CEFTRIAXONE 100 MILLIGRAM(S): 500 INJECTION, POWDER, FOR SOLUTION INTRAMUSCULAR; INTRAVENOUS at 16:16

## 2022-12-01 RX ADMIN — WARFARIN SODIUM 2 MILLIGRAM(S): 2.5 TABLET ORAL at 21:21

## 2022-12-01 RX ADMIN — Medication 5 UNIT(S): at 17:08

## 2022-12-01 RX ADMIN — Medication 5 UNIT(S): at 08:17

## 2022-12-01 RX ADMIN — Medication 6: at 12:00

## 2022-12-01 RX ADMIN — PANTOPRAZOLE SODIUM 40 MILLIGRAM(S): 20 TABLET, DELAYED RELEASE ORAL at 19:56

## 2022-12-01 RX ADMIN — Medication 5 UNIT(S): at 12:00

## 2022-12-01 RX ADMIN — ENOXAPARIN SODIUM 70 MILLIGRAM(S): 100 INJECTION SUBCUTANEOUS at 05:29

## 2022-12-01 RX ADMIN — INSULIN GLARGINE 15 UNIT(S): 100 INJECTION, SOLUTION SUBCUTANEOUS at 08:18

## 2022-12-01 RX ADMIN — LEVETIRACETAM 750 MILLIGRAM(S): 250 TABLET, FILM COATED ORAL at 05:29

## 2022-12-01 RX ADMIN — PANTOPRAZOLE SODIUM 40 MILLIGRAM(S): 20 TABLET, DELAYED RELEASE ORAL at 05:29

## 2022-12-01 RX ADMIN — Medication 81 MILLIGRAM(S): at 16:15

## 2022-12-01 RX ADMIN — ATORVASTATIN CALCIUM 10 MILLIGRAM(S): 80 TABLET, FILM COATED ORAL at 21:21

## 2022-12-02 LAB
ALBUMIN SERPL ELPH-MCNC: 2.6 G/DL — LOW (ref 3.3–5)
ALP SERPL-CCNC: 237 U/L — HIGH (ref 40–120)
ALT FLD-CCNC: 18 U/L — SIGNIFICANT CHANGE UP (ref 12–78)
ANION GAP SERPL CALC-SCNC: 7 MMOL/L — SIGNIFICANT CHANGE UP (ref 5–17)
AST SERPL-CCNC: 23 U/L — SIGNIFICANT CHANGE UP (ref 15–37)
BILIRUB SERPL-MCNC: 0.4 MG/DL — SIGNIFICANT CHANGE UP (ref 0.2–1.2)
BUN SERPL-MCNC: 10 MG/DL — SIGNIFICANT CHANGE UP (ref 7–23)
CALCIUM SERPL-MCNC: 8.4 MG/DL — LOW (ref 8.5–10.1)
CHLORIDE SERPL-SCNC: 103 MMOL/L — SIGNIFICANT CHANGE UP (ref 96–108)
CO2 SERPL-SCNC: 29 MMOL/L — SIGNIFICANT CHANGE UP (ref 22–31)
CREAT SERPL-MCNC: 0.76 MG/DL — SIGNIFICANT CHANGE UP (ref 0.5–1.3)
EGFR: 100 ML/MIN/1.73M2 — SIGNIFICANT CHANGE UP
GLUCOSE SERPL-MCNC: 118 MG/DL — HIGH (ref 70–99)
HCT VFR BLD CALC: 28.5 % — LOW (ref 39–50)
HGB BLD-MCNC: 7.9 G/DL — LOW (ref 13–17)
INR BLD: 2.27 RATIO — HIGH (ref 0.88–1.16)
MCHC RBC-ENTMCNC: 18.8 PG — LOW (ref 27–34)
MCHC RBC-ENTMCNC: 27.7 GM/DL — LOW (ref 32–36)
MCV RBC AUTO: 67.9 FL — LOW (ref 80–100)
NRBC # BLD: 0 /100 WBCS — SIGNIFICANT CHANGE UP (ref 0–0)
PLATELET # BLD AUTO: 258 K/UL — SIGNIFICANT CHANGE UP (ref 150–400)
POTASSIUM SERPL-MCNC: 3.9 MMOL/L — SIGNIFICANT CHANGE UP (ref 3.5–5.3)
POTASSIUM SERPL-SCNC: 3.9 MMOL/L — SIGNIFICANT CHANGE UP (ref 3.5–5.3)
PROT SERPL-MCNC: 6.4 G/DL — SIGNIFICANT CHANGE UP (ref 6–8.3)
PROTHROM AB SERPL-ACNC: 26.8 SEC — HIGH (ref 10.5–13.4)
RBC # BLD: 4.2 M/UL — SIGNIFICANT CHANGE UP (ref 4.2–5.8)
RBC # FLD: 23.7 % — HIGH (ref 10.3–14.5)
SODIUM SERPL-SCNC: 139 MMOL/L — SIGNIFICANT CHANGE UP (ref 135–145)
WBC # BLD: 6.11 K/UL — SIGNIFICANT CHANGE UP (ref 3.8–10.5)
WBC # FLD AUTO: 6.11 K/UL — SIGNIFICANT CHANGE UP (ref 3.8–10.5)

## 2022-12-02 RX ORDER — WARFARIN SODIUM 2.5 MG/1
2 TABLET ORAL ONCE
Refills: 0 | Status: COMPLETED | OUTPATIENT
Start: 2022-12-02 | End: 2022-12-02

## 2022-12-02 RX ADMIN — Medication 5 UNIT(S): at 17:19

## 2022-12-02 RX ADMIN — Medication 5 UNIT(S): at 12:17

## 2022-12-02 RX ADMIN — WARFARIN SODIUM 2 MILLIGRAM(S): 2.5 TABLET ORAL at 21:13

## 2022-12-02 RX ADMIN — LEVETIRACETAM 750 MILLIGRAM(S): 250 TABLET, FILM COATED ORAL at 17:18

## 2022-12-02 RX ADMIN — CEFTRIAXONE 100 MILLIGRAM(S): 500 INJECTION, POWDER, FOR SOLUTION INTRAMUSCULAR; INTRAVENOUS at 11:54

## 2022-12-02 RX ADMIN — PANTOPRAZOLE SODIUM 40 MILLIGRAM(S): 20 TABLET, DELAYED RELEASE ORAL at 17:19

## 2022-12-02 RX ADMIN — ENOXAPARIN SODIUM 70 MILLIGRAM(S): 100 INJECTION SUBCUTANEOUS at 05:32

## 2022-12-02 RX ADMIN — Medication 5 UNIT(S): at 08:16

## 2022-12-02 RX ADMIN — INSULIN GLARGINE 15 UNIT(S): 100 INJECTION, SOLUTION SUBCUTANEOUS at 08:17

## 2022-12-02 RX ADMIN — Medication 81 MILLIGRAM(S): at 11:54

## 2022-12-02 RX ADMIN — ATORVASTATIN CALCIUM 10 MILLIGRAM(S): 80 TABLET, FILM COATED ORAL at 21:13

## 2022-12-02 RX ADMIN — Medication 6: at 12:17

## 2022-12-02 RX ADMIN — ENOXAPARIN SODIUM 70 MILLIGRAM(S): 100 INJECTION SUBCUTANEOUS at 17:18

## 2022-12-02 RX ADMIN — LEVETIRACETAM 750 MILLIGRAM(S): 250 TABLET, FILM COATED ORAL at 05:32

## 2022-12-02 RX ADMIN — PANTOPRAZOLE SODIUM 40 MILLIGRAM(S): 20 TABLET, DELAYED RELEASE ORAL at 05:32

## 2022-12-02 NOTE — PROGRESS NOTE ADULT - PROBLEM SELECTOR PLAN 1
cont lantus 15 units qam  cont admelog 5 units 3x/day before meals  cont admelog corrective scale coverage qac/qhs  cont cons cho diet  goal bg 100-180 in hosp setting

## 2022-12-03 LAB
CULTURE RESULTS: SIGNIFICANT CHANGE UP
CULTURE RESULTS: SIGNIFICANT CHANGE UP
HCT VFR BLD CALC: 29.5 % — LOW (ref 39–50)
HGB BLD-MCNC: 7.9 G/DL — LOW (ref 13–17)
INR BLD: 2.07 RATIO — HIGH (ref 0.88–1.16)
MCHC RBC-ENTMCNC: 18.4 PG — LOW (ref 27–34)
MCHC RBC-ENTMCNC: 26.8 GM/DL — LOW (ref 32–36)
MCV RBC AUTO: 68.8 FL — LOW (ref 80–100)
NRBC # BLD: 0 /100 WBCS — SIGNIFICANT CHANGE UP (ref 0–0)
PLATELET # BLD AUTO: 289 K/UL — SIGNIFICANT CHANGE UP (ref 150–400)
PROTHROM AB SERPL-ACNC: 24.4 SEC — HIGH (ref 10.5–13.4)
RBC # BLD: 4.29 M/UL — SIGNIFICANT CHANGE UP (ref 4.2–5.8)
RBC # FLD: 23.6 % — HIGH (ref 10.3–14.5)
SPECIMEN SOURCE: SIGNIFICANT CHANGE UP
SPECIMEN SOURCE: SIGNIFICANT CHANGE UP
WBC # BLD: 7.13 K/UL — SIGNIFICANT CHANGE UP (ref 3.8–10.5)
WBC # FLD AUTO: 7.13 K/UL — SIGNIFICANT CHANGE UP (ref 3.8–10.5)

## 2022-12-03 RX ORDER — WARFARIN SODIUM 2.5 MG/1
3 TABLET ORAL ONCE
Refills: 0 | Status: COMPLETED | OUTPATIENT
Start: 2022-12-03 | End: 2022-12-03

## 2022-12-03 RX ADMIN — ATORVASTATIN CALCIUM 10 MILLIGRAM(S): 80 TABLET, FILM COATED ORAL at 21:18

## 2022-12-03 RX ADMIN — PANTOPRAZOLE SODIUM 40 MILLIGRAM(S): 20 TABLET, DELAYED RELEASE ORAL at 17:37

## 2022-12-03 RX ADMIN — WARFARIN SODIUM 3 MILLIGRAM(S): 2.5 TABLET ORAL at 21:18

## 2022-12-03 RX ADMIN — Medication 5 UNIT(S): at 11:46

## 2022-12-03 RX ADMIN — Medication 81 MILLIGRAM(S): at 11:48

## 2022-12-03 RX ADMIN — INSULIN GLARGINE 15 UNIT(S): 100 INJECTION, SOLUTION SUBCUTANEOUS at 08:12

## 2022-12-03 RX ADMIN — ENOXAPARIN SODIUM 70 MILLIGRAM(S): 100 INJECTION SUBCUTANEOUS at 17:49

## 2022-12-03 RX ADMIN — Medication 5 UNIT(S): at 08:11

## 2022-12-03 RX ADMIN — Medication 2: at 08:11

## 2022-12-03 RX ADMIN — Medication 6: at 11:47

## 2022-12-03 RX ADMIN — ENOXAPARIN SODIUM 70 MILLIGRAM(S): 100 INJECTION SUBCUTANEOUS at 06:33

## 2022-12-03 RX ADMIN — LEVETIRACETAM 750 MILLIGRAM(S): 250 TABLET, FILM COATED ORAL at 17:38

## 2022-12-03 RX ADMIN — Medication 200 MILLIGRAM(S): at 11:46

## 2022-12-03 RX ADMIN — LEVETIRACETAM 750 MILLIGRAM(S): 250 TABLET, FILM COATED ORAL at 06:33

## 2022-12-03 RX ADMIN — PANTOPRAZOLE SODIUM 40 MILLIGRAM(S): 20 TABLET, DELAYED RELEASE ORAL at 06:33

## 2022-12-04 VITALS
DIASTOLIC BLOOD PRESSURE: 81 MMHG | SYSTOLIC BLOOD PRESSURE: 127 MMHG | RESPIRATION RATE: 18 BRPM | HEART RATE: 64 BPM | TEMPERATURE: 98 F | OXYGEN SATURATION: 97 %

## 2022-12-04 LAB
ALBUMIN SERPL ELPH-MCNC: 2.7 G/DL — LOW (ref 3.3–5)
ALP SERPL-CCNC: 278 U/L — HIGH (ref 40–120)
ALT FLD-CCNC: 19 U/L — SIGNIFICANT CHANGE UP (ref 12–78)
ANION GAP SERPL CALC-SCNC: 5 MMOL/L — SIGNIFICANT CHANGE UP (ref 5–17)
AST SERPL-CCNC: 26 U/L — SIGNIFICANT CHANGE UP (ref 15–37)
BILIRUB SERPL-MCNC: 0.5 MG/DL — SIGNIFICANT CHANGE UP (ref 0.2–1.2)
BUN SERPL-MCNC: 11 MG/DL — SIGNIFICANT CHANGE UP (ref 7–23)
CALCIUM SERPL-MCNC: 8.4 MG/DL — LOW (ref 8.5–10.1)
CHLORIDE SERPL-SCNC: 105 MMOL/L — SIGNIFICANT CHANGE UP (ref 96–108)
CO2 SERPL-SCNC: 29 MMOL/L — SIGNIFICANT CHANGE UP (ref 22–31)
CREAT SERPL-MCNC: 0.95 MG/DL — SIGNIFICANT CHANGE UP (ref 0.5–1.3)
EGFR: 89 ML/MIN/1.73M2 — SIGNIFICANT CHANGE UP
GLUCOSE SERPL-MCNC: 125 MG/DL — HIGH (ref 70–99)
HCT VFR BLD CALC: 29.8 % — LOW (ref 39–50)
HGB BLD-MCNC: 8 G/DL — LOW (ref 13–17)
INR BLD: 2.19 RATIO — HIGH (ref 0.88–1.16)
MCHC RBC-ENTMCNC: 18.5 PG — LOW (ref 27–34)
MCHC RBC-ENTMCNC: 26.8 GM/DL — LOW (ref 32–36)
MCV RBC AUTO: 68.8 FL — LOW (ref 80–100)
NRBC # BLD: 0 /100 WBCS — SIGNIFICANT CHANGE UP (ref 0–0)
PLATELET # BLD AUTO: 310 K/UL — SIGNIFICANT CHANGE UP (ref 150–400)
POTASSIUM SERPL-MCNC: 3.9 MMOL/L — SIGNIFICANT CHANGE UP (ref 3.5–5.3)
POTASSIUM SERPL-SCNC: 3.9 MMOL/L — SIGNIFICANT CHANGE UP (ref 3.5–5.3)
PROT SERPL-MCNC: 6.7 G/DL — SIGNIFICANT CHANGE UP (ref 6–8.3)
PROTHROM AB SERPL-ACNC: 25.8 SEC — HIGH (ref 10.5–13.4)
RBC # BLD: 4.33 M/UL — SIGNIFICANT CHANGE UP (ref 4.2–5.8)
RBC # FLD: 23.5 % — HIGH (ref 10.3–14.5)
SODIUM SERPL-SCNC: 139 MMOL/L — SIGNIFICANT CHANGE UP (ref 135–145)
WBC # BLD: 7.41 K/UL — SIGNIFICANT CHANGE UP (ref 3.8–10.5)
WBC # FLD AUTO: 7.41 K/UL — SIGNIFICANT CHANGE UP (ref 3.8–10.5)

## 2022-12-04 PROCEDURE — 85610 PROTHROMBIN TIME: CPT

## 2022-12-04 PROCEDURE — 83690 ASSAY OF LIPASE: CPT

## 2022-12-04 PROCEDURE — 84145 PROCALCITONIN (PCT): CPT

## 2022-12-04 PROCEDURE — 74176 CT ABD & PELVIS W/O CONTRAST: CPT | Mod: MA

## 2022-12-04 PROCEDURE — 87640 STAPH A DNA AMP PROBE: CPT

## 2022-12-04 PROCEDURE — 85730 THROMBOPLASTIN TIME PARTIAL: CPT

## 2022-12-04 PROCEDURE — 82962 GLUCOSE BLOOD TEST: CPT

## 2022-12-04 PROCEDURE — 80053 COMPREHEN METABOLIC PANEL: CPT

## 2022-12-04 PROCEDURE — 96374 THER/PROPH/DIAG INJ IV PUSH: CPT

## 2022-12-04 PROCEDURE — 70450 CT HEAD/BRAIN W/O DYE: CPT | Mod: MA

## 2022-12-04 PROCEDURE — 0225U NFCT DS DNA&RNA 21 SARSCOV2: CPT

## 2022-12-04 PROCEDURE — 81001 URINALYSIS AUTO W/SCOPE: CPT

## 2022-12-04 PROCEDURE — 93005 ELECTROCARDIOGRAM TRACING: CPT

## 2022-12-04 PROCEDURE — 36415 COLL VENOUS BLD VENIPUNCTURE: CPT

## 2022-12-04 PROCEDURE — 87086 URINE CULTURE/COLONY COUNT: CPT

## 2022-12-04 PROCEDURE — 83605 ASSAY OF LACTIC ACID: CPT

## 2022-12-04 PROCEDURE — 85027 COMPLETE CBC AUTOMATED: CPT

## 2022-12-04 PROCEDURE — 83735 ASSAY OF MAGNESIUM: CPT

## 2022-12-04 PROCEDURE — 85025 COMPLETE CBC W/AUTO DIFF WBC: CPT

## 2022-12-04 PROCEDURE — 71250 CT THORAX DX C-: CPT | Mod: MA

## 2022-12-04 PROCEDURE — 84484 ASSAY OF TROPONIN QUANT: CPT

## 2022-12-04 PROCEDURE — 87641 MR-STAPH DNA AMP PROBE: CPT

## 2022-12-04 PROCEDURE — 71045 X-RAY EXAM CHEST 1 VIEW: CPT

## 2022-12-04 PROCEDURE — 87040 BLOOD CULTURE FOR BACTERIA: CPT

## 2022-12-04 PROCEDURE — 99285 EMERGENCY DEPT VISIT HI MDM: CPT

## 2022-12-04 PROCEDURE — 87449 NOS EACH ORGANISM AG IA: CPT

## 2022-12-04 RX ORDER — WARFARIN SODIUM 2.5 MG/1
1 TABLET ORAL
Qty: 0 | Refills: 0 | DISCHARGE

## 2022-12-04 RX ADMIN — ENOXAPARIN SODIUM 70 MILLIGRAM(S): 100 INJECTION SUBCUTANEOUS at 05:22

## 2022-12-04 RX ADMIN — INSULIN GLARGINE 15 UNIT(S): 100 INJECTION, SOLUTION SUBCUTANEOUS at 08:05

## 2022-12-04 RX ADMIN — LEVETIRACETAM 750 MILLIGRAM(S): 250 TABLET, FILM COATED ORAL at 05:22

## 2022-12-04 RX ADMIN — PANTOPRAZOLE SODIUM 40 MILLIGRAM(S): 20 TABLET, DELAYED RELEASE ORAL at 05:22

## 2022-12-04 RX ADMIN — Medication 5 UNIT(S): at 08:05

## 2022-12-04 NOTE — DISCHARGE NOTE PROVIDER - NSDCMRMEDTOKEN_GEN_ALL_CORE_FT
aspirin 81 mg oral delayed release tablet: 1 tab(s) orally once a day  atorvastatin 10 mg oral tablet: 1 tab(s) orally once a day  furosemide 40 mg oral tablet: 1 tab(s) orally once a day  insulin glargine 100 units/mL subcutaneous solution:   levETIRAcetam 750 mg oral tablet: 1 tab(s) orally 2 times a day  metFORMIN 500 mg oral tablet: 1 tab(s) orally 2 times a day  multivitamin: 1 tab(s) orally once a day  NovoLOG FlexPen 100 units/mL injectable solution:   pantoprazole 40 mg oral granule, delayed release: 1 each orally 2 times a day   propranolol 20 mg oral tablet: 1 tab(s) orally 2 times a day  raNITIdine 150 mg oral tablet: 1 tab(s) orally once a day  spironolactone 25 mg oral tablet: 1 tab(s) orally 2 times a day  warfarin 2 mg oral tablet: 1.5 tab(s) orally once a day

## 2022-12-04 NOTE — DISCHARGE NOTE PROVIDER - HOSPITAL COURSE
67 year old male w/ PMHx CVA (w/ residual right sided weakness and aphasia), CAD, esophageal varices, A fib (on coumadin), PUD, DM presents to the ED w/ weakness, decreased appetite. Pt d/c from [facility] on 11/24/2022 after admission for melena/low hemoglobin, requiring transfusion. Pt had endoscopy showing PUD w/o active bleeding. Pt daughter at bedside to assist w/ H&P and translation for patient. Daughter states pt felt well after leaving hospital. Over the weekend, family noted decreased appetite, fatigue and generalized weakness. Daughter also noted non productive cough since discharge. Denies fever/chills. Pt w/ hx of CVA and aphasia, communicates minimally but daughter states he has been at his baseline mental status. Pt has not complained of any pain. She does not believe he has had a bowel movement since discharge. He has started his PPI and restarted his dose of coumadin. Daughter denies any N/V. she has noted increased work of breathing for pt. States urine has been normal color, no foul smell. no hx of asthma/copd     1 Pneumonia  - abx as per ID   - pulmonary and ID fu  - fu cultures  - speech and swallow reviewed  - will monitor     2 Recent GIB  - EGD with gastric ulcers  - cw PPI  - monitor cbc    3 Hyponatremia  - renal fu   - monitor bmp    4 CAD  - stable  - cw home meds  - cards fu     5 Hx of CVA  - cw coumadin   - will monitor     6 Metallic aortic valve  - check INR and dose coumadin accordingly

## 2022-12-04 NOTE — PROGRESS NOTE ADULT - REASON FOR ADMISSION
weakness

## 2022-12-04 NOTE — PROGRESS NOTE ADULT - ASSESSMENT
67 year old male w/ PMHx CVA (w/ residual right sided weakness and aphasia), CAD, esophageal varices, A fib (on coumadin), PUD, DM presents to the ED w/ weakness, decreased appetite      anemia  cva  gastritis  cad  gerd  esoph varices  PUD  DM  hMPV - viral infection - PNA    s/p ABX  CM follow up  dc planning    hMPV resp viral infection with prob PNA - superimposed infection  CT imaging reviewed  s/p Rocephin  ID eval noted  GI eval noted  PPI  monitor Hgb - monitor HD -   serial labs  isolation precs  cough rx regimen  acap rx regimen  on AC for valv heart disease - metallic valve -   monitor VS and Sat  keep sat > 88 pct  old records reviewed  assist with needs  [employer] -   
67 year old male w/ PMHx CVA (w/ residual right sided weakness and aphasia), CAD, esophageal varices, A fib (on coumadin), PUD, DM presents to the ED w/ weakness, decreased appetite      anemia  cva  gastritis  cad  gerd  esoph varices  PUD  DM  hMPV - viral infection - PNA    vs noted  on Rocephin      hMPV resp viral infection with prob PNA - superimposed infection  CT imaging reviewed  on Rocephin  ID eval noted  GI eval noted  PPI  monitor Hgb - monitor HD -   serial labs  isolation precs  cough rx regimen  acap rx regimen  on AC for valv heart disease - metallic valve -   monitor VS and Sat  keep sat > 88 pct  old records reviewed  assist with needs  [employer] - 
67 year old male w/ PMHx CVA (w/ residual right sided weakness and aphasia), CAD, esophageal varices, A fib (on coumadin), PUD, DM presents to the ED w/ weakness, decreased appetite      anemia  cva  gastritis  cad  gerd  esoph varices  PUD  DM  hMPV - viral infection - PNA    vs noted  on Rocephin  hyperglycemia  labs reviewed  cardio eval noted  renal follow up    hMPV resp viral infection with prob PNA - superimposed infection  CT imaging reviewed  on Rocephin  ID eval noted  GI eval noted  PPI  monitor Hgb - monitor HD -   serial labs  isolation precs  cough rx regimen  acap rx regimen  on AC for valv heart disease - metallic valve -   monitor VS and Sat  keep sat > 88 pct  old records reviewed  assist with needs  [employer] - 
67 year old male w/ PMHx CVA (w/ residual right sided weakness and aphasia), CAD, esophageal varices, A fib (on coumadin), PUD, DM presents to the ED w/ weakness, decreased appetite      anemia  cva  gastritis  cad  gerd  esoph varices  PUD  DM  hMPV - viral infection - PNA    vs noted  on zosyn  hyperglycemia  labs reviewed      hMPV resp viral infection with prob PNA - superimposed infection  CT imaging reviewed  on ZOSYN  ID eval noted  GI eval noted  PPI  monitor Hgb - monitor HD -   serial labs  isolation precs  cough rx regimen  acap rx regimen  on AC for valv heart disease - metallic valve -   monitor VS and Sat  keep sat > 88 pct  old records reviewed  assist with needs  [employer] -   
67 year old male w/ PMHx CVA (w/ residual right sided weakness and aphasia), CAD, esophageal varices, A fib (on coumadin), PUD, DM presents to the ED w/ weakness, decreased appetite. Pt d/c from [facility] on 11/24/2022 after admission for melena/low hemoglobin, requiring transfusion. Pt had endoscopy showing PUD w/o active bleeding. Pt daughter at bedside to assist w/ H&P and translation for patient. Daughter states pt felt well after leaving hospital. Over the weekend, family noted decreased appetite, fatigue and generalized weakness. Daughter also noted non productive cough since discharge. Denies fever/chills. Pt w/ hx of CVA and aphasia, communicates minimally but daughter states he has been at his baseline mental status. Pt has not complained of any pain. She does not believe he has had a bowel movement since discharge. He has started his PPI and restarted his dose of coumadin. Daughter denies any N/V. she has noted increased work of breathing for pt. States urine has been normal color, no foul smell. no hx of asthma/copd  (28 Nov 2022 13:57)      hyponatremia    will check ua , urine osmolality , urine sodium , urine uric acid , serum sodium , serum osmolality , serum uric acid , f/u with hyponatremia work up , f/u with bmp , monitor i and o     blood and urine cx,serial lactate levels,monitor vitals closley,ivfs hydration,monitor urine output and renal profile,iv abx as per id cons    BP monitoring,continue current antihypertensive meds, low salt diet,followup with PMD in 1-2 weeks  
67 year old male w/ PMHx CVA (w/ residual right sided weakness and aphasia), CAD, esophageal varices, A fib (on coumadin), PUD, DM presents to the ED w/ weakness, decreased appetite. Pt d/c from [facility] on 11/24/2022 after admission for melena/low hemoglobin, requiring transfusion. Pt had endoscopy showing PUD w/o active bleeding. Pt daughter at bedside to assist w/ H&P and translation for patient. Daughter states pt felt well after leaving hospital. Over the weekend, family noted decreased appetite, fatigue and generalized weakness. Daughter also noted non productive cough since discharge. Denies fever/chills. Pt w/ hx of CVA and aphasia, communicates minimally but daughter states he has been at his baseline mental status. Pt has not complained of any pain. She does not believe he has had a bowel movement since discharge. He has started his PPI and restarted his dose of coumadin. Daughter denies any N/V. she has noted increased work of breathing for pt. States urine has been normal color, no foul smell. no hx of asthma/copd     1 Pneumonia  - abx as per ID   - pulmonary and ID fu  - fu cultures  - speech and swallow reviewed  - will monitor     2 Recent GIB  - EGD with gastric ulcers  - cw PPI  - monitor cbc    3 Hyponatremia  - renal fu   - monitor bmp    4 CAD  - stable  - cw home meds  - cards fu     5 Hx of CVA  - cw coumadin   - will monitor     6 Metallic aortic valve  - check INR and dose coumadin accordingly  - cw Lovenox bridge     
67 year old male w/ PMHx CVA (w/ residual right sided weakness and aphasia), CAD, esophageal varices, A fib (on coumadin), PUD, DM presents to the ED w/ weakness, decreased appetite. Pt d/c from [facility] on 11/24/2022 after admission for melena/low hemoglobin, requiring transfusion. Pt had endoscopy showing PUD w/o active bleeding. Pt daughter at bedside to assist w/ H&P and translation for patient. Daughter states pt felt well after leaving hospital. Over the weekend, family noted decreased appetite, fatigue and generalized weakness. Daughter also noted non productive cough since discharge. Denies fever/chills. Pt w/ hx of CVA and aphasia, communicates minimally but daughter states he has been at his baseline mental status. Pt has not complained of any pain. She does not believe he has had a bowel movement since discharge. He has started his PPI and restarted his dose of coumadin. Daughter denies any N/V. she has noted increased work of breathing for pt. States urine has been normal color, no foul smell. no hx of asthma/copd     1 Pneumonia  - abx as per ID   - pulmonary and ID fu  - fu cultures  - speech and swallow reviewed  - will monitor     2 Recent GIB  - EGD with gastric ulcers  - cw PPI  - monitor cbc    3 Hyponatremia  - renal fu   - monitor bmp    4 CAD  - stable  - cw home meds  - cards fu     5 Hx of CVA  - cw coumadin   - will monitor     6 Metallic aortic valve  - check INR and dose coumadin accordingly  - cw Lovenox bridge     
67 year old male w/ PMHx CVA (w/ residual right sided weakness and aphasia), CAD, esophageal varices, A fib (on coumadin), PUD, DM presents to the ED w/ weakness, decreased appetite. Pt d/c from [facility] on 11/24/2022 after admission for melena/low hemoglobin, requiring transfusion. Pt had endoscopy showing PUD w/o active bleeding. Pt daughter at bedside to assist w/ H&P and translation for patient. Daughter states pt felt well after leaving hospital. Over the weekend, family noted decreased appetite, fatigue and generalized weakness. Daughter also noted non productive cough since discharge. Denies fever/chills. Pt w/ hx of CVA and aphasia, communicates minimally but daughter states he has been at his baseline mental status. Pt has not complained of any pain. She does not believe he has had a bowel movement since discharge. He has started his PPI and restarted his dose of coumadin. Daughter denies any N/V. she has noted increased work of breathing for pt. States urine has been normal color, no foul smell. no hx of asthma/copd  (28 Nov 2022 13:57)      hyponatremia    will check ua , urine osmolality , urine sodium , urine uric acid , serum sodium , serum osmolality , serum uric acid , f/u with hyponatremia work up , f/u with bmp , monitor i and o     blood and urine cx,serial lactate levels,monitor vitals closley,ivfs hydration,monitor urine output and renal profile,iv abx as per id cons    BP monitoring,continue current antihypertensive meds, low salt diet,followup with PMD in 1-2 weeks  
gastric ulcer  recent GI bleed    diet as tolerated  cont proton pump inhibitor  hgb stable  cont ac  supportive care  will follow     I reviewed the overnight course of events on the unit, re-confirming the patient history. I discussed the care with the patient and their family  Differential diagnosis and plan of care discussed with patient after the evaluation  35 minutes spent on total encounter of which more than fifty percent of the encounter was spent counseling and/or coordinating care by the attending physician.  Advanced care planning was discussed with patient and family.  Advanced care planning forms were reviewed and discussed.  Risks, benefits and alternatives of gastroenterologic procedures were discussed in detail and all questions were answered.
gastric ulcer  recent GI bleed    diet as tolerated  cont proton pump inhibitor  hgb stable  cont ac  supportive care  will follow     I reviewed the overnight course of events on the unit, re-confirming the patient history. I discussed the care with the patient and their family  Differential diagnosis and plan of care discussed with patient after the evaluation  35 minutes spent on total encounter of which more than fifty percent of the encounter was spent counseling and/or coordinating care by the attending physician.  Advanced care planning was discussed with patient and family.  Advanced care planning forms were reviewed and discussed.  Risks, benefits and alternatives of gastroenterologic procedures were discussed in detail and all questions were answered.
67 year old male w/ PMHx CVA (w/ residual right sided weakness and aphasia), CAD, esophageal varices, A fib (on coumadin), PUD, DM presented to the ED w/ weakness, decreased appetit, and altered mental status. Pt d/c from [facility] on 11/24/2022 after admission for melena/low hemoglobin, requiring transfusion. Pt had endoscopy showing PUD w/o active bleeding. Pt felt well after leaving hospital. Noted non productive cough since discharge. Pt w/ hx of CVA and aphasia, communicates minimally. Abn chest exam and imaging and RVP+  hMPV (RapRVP): Detected    RECOMMENDATIONS  Confirmed hMPV but also concerning based on exam and imaging for bacterial infection (bacterial PNA) and with mental status and recent hosp stay agree with  Zosyn (started 11/28) with anticipated 5 days of Rx with last day 12/2 but recs to follow with possible transition to oral to finish course    Thank you for consulting us and involving us in the management of this most interesting and challenging case.  We will follow along in the care of this patient. Please call us at [phone] or text me directly on my cell# at [phone] with any concerns.      
67 year old male w/ PMHx CVA (w/ residual right sided weakness and aphasia), CAD, esophageal varices, A fib (on coumadin), PUD, DM presented to the ED w/ weakness, decreased appetit, and altered mental status. Pt d/c from [facility] on 11/24/2022 after admission for melena/low hemoglobin, requiring transfusion. Pt had endoscopy showing PUD w/o active bleeding. Pt felt well after leaving hospital. Noted non productive cough since discharge. Pt w/ hx of CVA and aphasia, communicates minimally. Abn chest exam and imaging and RVP+  hMPV (RapRVP): Detected    RECOMMENDATIONS  Confirmed hMPV but also concerning based on exam and imaging for bacterial infection (bacterial PNA) and with mental status and recent hosp stay agree with  Zosyn (started 11/28)   s/p ceftriaxone -- completed total 5d course 12/2   Continue off antibiotics     Stable from ID standpoint.   Discharge planning per primary team   ID will sign off at this time but remains available for any further questions/concerns.    Karen Grimes M.D.  [facility], Division of Infectious Diseases  [phone]  After 5pm on weekdays and all day on weekends - please call [phone]
67 year old male w/ PMHx CVA (w/ residual right sided weakness and aphasia), CAD, esophageal varices, A fib (on coumadin), PUD, DM presented to the ED w/ weakness, decreased appetit, and altered mental status. Pt d/c from [facility] on 11/24/2022 after admission for melena/low hemoglobin, requiring transfusion. Pt had endoscopy showing PUD w/o active bleeding. Pt felt well after leaving hospital. Noted non productive cough since discharge. Pt w/ hx of CVA and aphasia, communicates minimally. Abn chest exam and imaging and RVP+  hMPV (RapRVP): Detected    RECOMMENDATIONS  Confirmed hMPV but also concerning based on exam and imaging for bacterial infection (bacterial PNA) and with mental status and recent hosp stay agree with  Zosyn (started 11/28)   changed Ceftriaxone -- complete total 5d course today 12/2     Discharge planning per primary team   Karen Grimes M.D.  [facility], Division of Infectious Diseases  [phone]  After 5pm on weekdays and all day on weekends - please call [phone]
67 year old male w/ PMHx CVA (w/ residual right sided weakness and aphasia), CAD, esophageal varices, A fib (on coumadin), PUD, DM presents to the ED w/ weakness, decreased appetite      anemia  cva  gastritis  cad  gerd  esoph varices  PUD  DM  hMPV - viral infection - PNA    vs noted  on zosyn  hyperglycemia  labs reviewed  cardio eval noted  SLP eval noted    hMPV resp viral infection with prob PNA - superimposed infection  CT imaging reviewed  on ZOSYN  ID eval noted  GI eval noted  PPI  monitor Hgb - monitor HD -   serial labs  isolation precs  cough rx regimen  acap rx regimen  on AC for valv heart disease - metallic valve -   monitor VS and Sat  keep sat > 88 pct  old records reviewed  assist with needs  [employer] - 
67 year old male w/ PMHx CVA (w/ residual right sided weakness and aphasia), CAD, esophageal varices, A fib (on coumadin), PUD, DM presents to the ED w/ weakness, decreased appetite. Pt d/c from [facility] on 11/24/2022 after admission for melena/low hemoglobin, requiring transfusion. Pt had endoscopy showing PUD w/o active bleeding. Pt daughter at bedside to assist w/ H&P and translation for patient. Daughter states pt felt well after leaving hospital. Over the weekend, family noted decreased appetite, fatigue and generalized weakness. Daughter also noted non productive cough since discharge. Denies fever/chills. Pt w/ hx of CVA and aphasia, communicates minimally but daughter states he has been at his baseline mental status. Pt has not complained of any pain. She does not believe he has had a bowel movement since discharge. He has started his PPI and restarted his dose of coumadin. Daughter denies any N/V. she has noted increased work of breathing for pt. States urine has been normal color, no foul smell. no hx of asthma/copd  (28 Nov 2022 13:57)      hyponatremia    will check ua , urine osmolality , urine sodium , urine uric acid , serum sodium , serum osmolality , serum uric acid , f/u with hyponatremia work up , f/u with bmp , monitor i and o     blood and urine cx,serial lactate levels,monitor vitals closley,ivfs hydration,monitor urine output and renal profile,iv abx as per id cons    BP monitoring,continue current antihypertensive meds, low salt diet,followup with PMD in 1-2 weeks  
67 year old male w/ PMHx CVA (w/ residual right sided weakness and aphasia), CAD, esophageal varices, A fib (on coumadin), PUD, DM presents to the ED w/ weakness, decreased appetite. Pt d/c from [facility] on 11/24/2022 after admission for melena/low hemoglobin, requiring transfusion. Pt had endoscopy showing PUD w/o active bleeding. Pt daughter at bedside to assist w/ H&P and translation for patient. Daughter states pt felt well after leaving hospital. Over the weekend, family noted decreased appetite, fatigue and generalized weakness. Daughter also noted non productive cough since discharge. Denies fever/chills. Pt w/ hx of CVA and aphasia, communicates minimally but daughter states he has been at his baseline mental status. Pt has not complained of any pain. She does not believe he has had a bowel movement since discharge. He has started his PPI and restarted his dose of coumadin. Daughter denies any N/V. she has noted increased work of breathing for pt. States urine has been normal color, no foul smell. no hx of asthma/copd  (28 Nov 2022 13:57)      hyponatremia    will check ua , urine osmolality , urine sodium , urine uric acid , serum sodium , serum osmolality , serum uric acid , f/u with hyponatremia work up , f/u with bmp , monitor i and o     blood and urine cx,serial lactate levels,monitor vitals closley,ivfs hydration,monitor urine output and renal profile,iv abx as per id cons    BP monitoring,continue current antihypertensive meds, low salt diet,followup with PMD in 1-2 weeks  
67 year old male w/ PMHx CVA (w/ residual right sided weakness and aphasia), CAD, esophageal varices, A fib (on coumadin), PUD, DM presents to the ED w/ weakness, decreased appetite. Pt d/c from [facility] on 11/24/2022 after admission for melena/low hemoglobin, requiring transfusion. Pt had endoscopy showing PUD w/o active bleeding. Pt daughter at bedside to assist w/ H&P and translation for patient. Daughter states pt felt well after leaving hospital. Over the weekend, family noted decreased appetite, fatigue and generalized weakness. Daughter also noted non productive cough since discharge. Denies fever/chills. Pt w/ hx of CVA and aphasia, communicates minimally but daughter states he has been at his baseline mental status. Pt has not complained of any pain. She does not believe he has had a bowel movement since discharge. He has started his PPI and restarted his dose of coumadin. Daughter denies any N/V. she has noted increased work of breathing for pt. States urine has been normal color, no foul smell. no hx of asthma/copd     1 Pneumonia  - cw zosyn  - pulmonary and ID fu  - fu cultures  - speech and swallow  - will monitor     2 Recent GIB  - EGD with gastric ulcers  - cw PPI  - monitor cbc    3 Hyponatremia  - hold lasix and spironolatone  - monitor bmp    4 CAD  - stable  - cw home meds  - cards to follow    5 Hx of CVA  - cw coumadin   - will monitor     
gastric ulcer  recent GI bleed    diet as tolerated  cont proton pump inhibitor  hgb stable  cont ac  supportive care  will follow     I reviewed the overnight course of events on the unit, re-confirming the patient history. I discussed the care with the patient and their family  Differential diagnosis and plan of care discussed with patient after the evaluation  35 minutes spent on total encounter of which more than fifty percent of the encounter was spent counseling and/or coordinating care by the attending physician.  Advanced care planning was discussed with patient and family.  Advanced care planning forms were reviewed and discussed.  Risks, benefits and alternatives of gastroenterologic procedures were discussed in detail and all questions were answered.
67 year old male w/ PMHx CVA (w/ residual right sided weakness and aphasia), CAD, esophageal varices, A fib (on coumadin), PUD, DM presented to the ED w/ weakness, decreased appetit, and altered mental status. Pt d/c from [facility] on 11/24/2022 after admission for melena/low hemoglobin, requiring transfusion. Pt had endoscopy showing PUD w/o active bleeding. Pt felt well after leaving hospital. Noted non productive cough since discharge. Pt w/ hx of CVA and aphasia, communicates minimally. Abn chest exam and imaging and RVP+  hMPV (RapRVP): Detected    RECOMMENDATIONS  Confirmed hMPV but also concerning based on exam and imaging for bacterial infection (bacterial PNA) and with mental status and recent hosp stay agree with  Zosyn (started 11/28)   changed Ceftriaxone   anticipated 5 days of antibiotics with last day 12/2 but as pt improves could finish course with Cefuroxime 500mg PO BID  with last day 12/2    Thank you for consulting us and involving us in the management of this most interesting and challenging case.  We will follow along in the care of this patient. Please call us at [phone] or text me directly on my cell# at [phone] with any concerns.      
67 year old male w/ PMHx CVA (w/ residual right sided weakness and aphasia), CAD, esophageal varices, A fib (on coumadin), PUD, DM presented to the ED w/ weakness, decreased appetit, and altered mental status. Pt d/c from [facility] on 11/24/2022 after admission for melena/low hemoglobin, requiring transfusion. Pt had endoscopy showing PUD w/o active bleeding. Pt felt well after leaving hospital. Noted non productive cough since discharge. Pt w/ hx of CVA and aphasia, communicates minimally. Abn chest exam and imaging and RVP+  hMPV (RapRVP): Detected    RECOMMENDATIONS  Confirmed hMPV but also concerning based on exam and imaging for bacterial infection (bacterial PNA) and with mental status and recent hosp stay agree with  Zosyn (started 11/28)   will change to Ceftriaxone today with anticipated 5 days of Rx with last day 12/2 but as pt improves could finish course with Cefuroxime 500mg PO BID  with last day 12/2    Thank you for consulting us and involving us in the management of this most interesting and challenging case.  We will follow along in the care of this patient. Please call us at [phone] or text me directly on my cell# at [phone] with any concerns.      
67 year old male w/ PMHx CVA (w/ residual right sided weakness and aphasia), CAD, esophageal varices, A fib (on coumadin), PUD, DM presents to the ED w/ weakness, decreased appetite      anemia  cva  gastritis  cad  gerd  esoph varices  PUD  DM  hMPV - viral infection - PNA    s/p ABX  CM follow up  dc planning    hMPV resp viral infection with prob PNA - superimposed infection  CT imaging reviewed  s/p Rocephin  ID eval noted  GI eval noted  PPI  monitor Hgb - monitor HD -   serial labs  isolation precs  cough rx regimen  acap rx regimen  on AC for valv heart disease - metallic valve -   monitor VS and Sat  keep sat > 88 pct  old records reviewed  assist with needs  [employer] - 
67 year old male w/ PMHx CVA (w/ residual right sided weakness and aphasia), CAD, esophageal varices, A fib (on coumadin), PUD, DM presents to the ED w/ weakness, decreased appetite. Pt d/c from [facility] on 11/24/2022 after admission for melena/low hemoglobin, requiring transfusion. Pt had endoscopy showing PUD w/o active bleeding. Pt daughter at bedside to assist w/ H&P and translation for patient. Daughter states pt felt well after leaving hospital. Over the weekend, family noted decreased appetite, fatigue and generalized weakness. Daughter also noted non productive cough since discharge. Denies fever/chills. Pt w/ hx of CVA and aphasia, communicates minimally but daughter states he has been at his baseline mental status. Pt has not complained of any pain. She does not believe he has had a bowel movement since discharge. He has started his PPI and restarted his dose of coumadin. Daughter denies any N/V. she has noted increased work of breathing for pt. States urine has been normal color, no foul smell. no hx of asthma/copd     1 Pneumonia  - abx as per ID   - pulmonary and ID fu  - fu cultures  - speech and swallow reviewed  - will monitor     2 Recent GIB  - EGD with gastric ulcers  - cw PPI  - monitor cbc    3 Hyponatremia  - renal fu   - monitor bmp    4 CAD  - stable  - cw home meds  - cards fu     5 Hx of CVA  - cw coumadin   - will monitor     6 Metallic aortic valve  - check INR and dose coumadin accordingly  - cw Lovenox bridge     
67 year old male w/ PMHx CVA (w/ residual right sided weakness and aphasia), CAD, esophageal varices, A fib (on coumadin), PUD, DM presents to the ED w/ weakness, decreased appetite. Pt d/c from [facility] on 11/24/2022 after admission for melena/low hemoglobin, requiring transfusion. Pt had endoscopy showing PUD w/o active bleeding. Pt daughter at bedside to assist w/ H&P and translation for patient. Daughter states pt felt well after leaving hospital. Over the weekend, family noted decreased appetite, fatigue and generalized weakness. Daughter also noted non productive cough since discharge. Denies fever/chills. Pt w/ hx of CVA and aphasia, communicates minimally but daughter states he has been at his baseline mental status. Pt has not complained of any pain. She does not believe he has had a bowel movement since discharge. He has started his PPI and restarted his dose of coumadin. Daughter denies any N/V. she has noted increased work of breathing for pt. States urine has been normal color, no foul smell. no hx of asthma/copd  (28 Nov 2022 13:57)      hyponatremia    will check ua , urine osmolality , urine sodium , urine uric acid , serum sodium , serum osmolality , serum uric acid , f/u with hyponatremia work up , f/u with bmp , monitor i and o     blood and urine cx,serial lactate levels,monitor vitals closley,ivfs hydration,monitor urine output and renal profile,iv abx as per id cons    BP monitoring,continue current antihypertensive meds, low salt diet,followup with PMD in 1-2 weeks  
67 year old male w/ PMHx CVA (w/ residual right sided weakness and aphasia), CAD, esophageal varices, A fib (on coumadin), PUD, DM presents to the ED w/ weakness, decreased appetite. Pt d/c from [facility] on 11/24/2022 after admission for melena/low hemoglobin, requiring transfusion. Pt had endoscopy showing PUD w/o active bleeding. Pt daughter at bedside to assist w/ H&P and translation for patient. Daughter states pt felt well after leaving hospital. Over the weekend, family noted decreased appetite, fatigue and generalized weakness. Daughter also noted non productive cough since discharge. Denies fever/chills. Pt w/ hx of CVA and aphasia, communicates minimally but daughter states he has been at his baseline mental status. Pt has not complained of any pain. She does not believe he has had a bowel movement since discharge. He has started his PPI and restarted his dose of coumadin. Daughter denies any N/V. she has noted increased work of breathing for pt. States urine has been normal color, no foul smell. no hx of asthma/copd     1 Pneumonia  - abx as per ID   - pulmonary and ID fu  - fu cultures  - speech and swallow  - will monitor     2 Recent GIB  - EGD with gastric ulcers  - cw PPI  - monitor cbc    3 Hyponatremia  - hold lasix and spironolatone  - monitor bmp    4 CAD  - stable  - cw home meds  - cards to follow    5 Hx of CVA  - cw coumadin   - will monitor   
gastric ulcer  recent GI bleed    diet as tolerated  cont proton pump inhibitor  hgb stable  cont ac  supportive care  will follow     I reviewed the overnight course of events on the unit, re-confirming the patient history. I discussed the care with the patient and their family  Differential diagnosis and plan of care discussed with patient after the evaluation  35 minutes spent on total encounter of which more than fifty percent of the encounter was spent counseling and/or coordinating care by the attending physician.  Advanced care planning was discussed with patient and family.  Advanced care planning forms were reviewed and discussed.  Risks, benefits and alternatives of gastroenterologic procedures were discussed in detail and all questions were answered.  
67 year old male w/ PMHx CVA (w/ residual right sided weakness and aphasia), CAD, esophageal varices, A fib (on coumadin), PUD, DM presents to the ED w/ weakness, decreased appetite. Pt d/c from [facility] on 11/24/2022 after admission for melena/low hemoglobin, requiring transfusion. Pt had endoscopy showing PUD w/o active bleeding. Pt daughter at bedside to assist w/ H&P and translation for patient. Daughter states pt felt well after leaving hospital. Over the weekend, family noted decreased appetite, fatigue and generalized weakness. Daughter also noted non productive cough since discharge. Denies fever/chills. Pt w/ hx of CVA and aphasia, communicates minimally but daughter states he has been at his baseline mental status. Pt has not complained of any pain. She does not believe he has had a bowel movement since discharge. He has started his PPI and restarted his dose of coumadin. Daughter denies any N/V. she has noted increased work of breathing for pt. States urine has been normal color, no foul smell. no hx of asthma/copd  (28 Nov 2022 13:57)      hyponatremia    will check ua , urine osmolality , urine sodium , urine uric acid , serum sodium , serum osmolality , serum uric acid , f/u with hyponatremia work up , f/u with bmp , monitor i and o     blood and urine cx,serial lactate levels,monitor vitals closley,ivfs hydration,monitor urine output and renal profile,iv abx as per id cons    BP monitoring,continue current antihypertensive meds, low salt diet,followup with PMD in 1-2 weeks

## 2022-12-04 NOTE — PROGRESS NOTE ADULT - SUBJECTIVE AND OBJECTIVE BOX
Patient is a 67y Male whom presented to the hospital with hyponatremia     PAST MEDICAL & SURGICAL HISTORY:  CVA (cerebral vascular accident)      HTN (hypertension)      DM (diabetes mellitus)      Arrhythmia      History of atrial fibrillation      S/P CABG (coronary artery bypass graft)      S/P brain surgery          MEDICATIONS  (STANDING):  aspirin enteric coated 81 milliGRAM(s) Oral daily  atorvastatin 10 milliGRAM(s) Oral at bedtime  dextrose 5%. 1000 milliLiter(s) (50 mL/Hr) IV Continuous <Continuous>  dextrose 5%. 1000 milliLiter(s) (100 mL/Hr) IV Continuous <Continuous>  dextrose 50% Injectable 25 Gram(s) IV Push once  dextrose 50% Injectable 12.5 Gram(s) IV Push once  dextrose 50% Injectable 25 Gram(s) IV Push once  enoxaparin Injectable 70 milliGRAM(s) SubCutaneous every 12 hours  famotidine Injectable 20 milliGRAM(s) IV Push every 24 hours  glucagon  Injectable 1 milliGRAM(s) IntraMuscular once  insulin lispro (ADMELOG) corrective regimen sliding scale   SubCutaneous three times a day before meals  levETIRAcetam 750 milliGRAM(s) Oral two times a day  pantoprazole   Suspension 40 milliGRAM(s) Oral two times a day  piperacillin/tazobactam IVPB.. 3.375 Gram(s) IV Intermittent every 8 hours  propranolol 20 milliGRAM(s) Oral two times a day  warfarin 2 milliGRAM(s) Oral once      Allergies    No Known Allergies    Intolerances        SOCIAL HISTORY:  Denies ETOh,Smoking,     FAMILY HISTORY:  No pertinent family history in first degree relatives        REVIEW OF SYSTEMS:    CONSTITUTIONAL: No weakness, fevers or chills  RESPIRATORY: No cough, wheezing, hemoptysis; No shortness of breath  CARDIOVASCULAR: No chest pain or palpitations  GASTROINTESTINAL: No abdominal or epigastric pain. No nausea, vomiting,     No diarrhea or constipation. No melena   SKIN: dry                                                                   7.9    7.13  )-----------( 289      ( 03 Dec 2022 05:57 )             29.5       CBC Full  -  ( 03 Dec 2022 05:57 )  WBC Count : 7.13 K/uL  RBC Count : 4.29 M/uL  Hemoglobin : 7.9 g/dL  Hematocrit : 29.5 %  Platelet Count - Automated : 289 K/uL  Mean Cell Volume : 68.8 fl  Mean Cell Hemoglobin : 18.4 pg  Mean Cell Hemoglobin Concentration : 26.8 gm/dL  Auto Neutrophil # : x  Auto Lymphocyte # : x  Auto Monocyte # : x  Auto Eosinophil # : x  Auto Basophil # : x  Auto Neutrophil % : x  Auto Lymphocyte % : x  Auto Monocyte % : x  Auto Eosinophil % : x  Auto Basophil % : x      12-02    139  |  103  |  10  ----------------------------<  118<H>  3.9   |  29  |  0.76    Ca    8.4<L>      02 Dec 2022 07:40    TPro  6.4  /  Alb  2.6<L>  /  TBili  0.4  /  DBili  x   /  AST  23  /  ALT  18  /  AlkPhos  237<H>  12-02      CAPILLARY BLOOD GLUCOSE      POCT Blood Glucose.: 75 mg/dL (03 Dec 2022 16:46)  POCT Blood Glucose.: 264 mg/dL (03 Dec 2022 11:37)  POCT Blood Glucose.: 180 mg/dL (03 Dec 2022 07:56)  POCT Blood Glucose.: 125 mg/dL (02 Dec 2022 21:06)      Vital Signs Last 24 Hrs  T(C): 36.7 (03 Dec 2022 11:47), Max: 36.8 (03 Dec 2022 05:10)  T(F): 98.1 (03 Dec 2022 11:47), Max: 98.2 (03 Dec 2022 05:10)  HR: 67 (03 Dec 2022 11:47) (67 - 76)  BP: 132/88 (03 Dec 2022 11:47) (122/71 - 134/74)  BP(mean): --  RR: 18 (03 Dec 2022 11:47) (16 - 18)  SpO2: 96% (03 Dec 2022 11:47) (95% - 96%)    Parameters below as of 03 Dec 2022 11:47  Patient On (Oxygen Delivery Method): room air            PT/INR - ( 03 Dec 2022 05:57 )   PT: 24.4 sec;   INR: 2.07 ratio                   PHYSICAL EXAM:    Constitutional: NAD  HEENT: conjunctive   clear   Neck:  No JVD  Respiratory: CTAB  Cardiovascular: S1 and S2  Gastrointestinal: BS+, soft, NT/ND  Extremities: No peripheral edema  Neurological:  no focal deficits          
  Patient is a 67y Male whom presented to the hospital with hyponatremia     PAST MEDICAL & SURGICAL HISTORY:  CVA (cerebral vascular accident)      HTN (hypertension)      DM (diabetes mellitus)      Arrhythmia      History of atrial fibrillation      S/P CABG (coronary artery bypass graft)      S/P brain surgery          MEDICATIONS  (STANDING):  aspirin enteric coated 81 milliGRAM(s) Oral daily  atorvastatin 10 milliGRAM(s) Oral at bedtime  dextrose 5%. 1000 milliLiter(s) (50 mL/Hr) IV Continuous <Continuous>  dextrose 5%. 1000 milliLiter(s) (100 mL/Hr) IV Continuous <Continuous>  dextrose 50% Injectable 25 Gram(s) IV Push once  dextrose 50% Injectable 12.5 Gram(s) IV Push once  dextrose 50% Injectable 25 Gram(s) IV Push once  enoxaparin Injectable 70 milliGRAM(s) SubCutaneous every 12 hours  famotidine Injectable 20 milliGRAM(s) IV Push every 24 hours  glucagon  Injectable 1 milliGRAM(s) IntraMuscular once  insulin lispro (ADMELOG) corrective regimen sliding scale   SubCutaneous three times a day before meals  levETIRAcetam 750 milliGRAM(s) Oral two times a day  pantoprazole   Suspension 40 milliGRAM(s) Oral two times a day  piperacillin/tazobactam IVPB.. 3.375 Gram(s) IV Intermittent every 8 hours  propranolol 20 milliGRAM(s) Oral two times a day  warfarin 2 milliGRAM(s) Oral once      Allergies    No Known Allergies    Intolerances        SOCIAL HISTORY:  Denies ETOh,Smoking,     FAMILY HISTORY:  No pertinent family history in first degree relatives        REVIEW OF SYSTEMS:    CONSTITUTIONAL: No weakness, fevers or chills  RESPIRATORY: No cough, wheezing, hemoptysis; No shortness of breath  CARDIOVASCULAR: No chest pain or palpitations  GASTROINTESTINAL: No abdominal or epigastric pain. No nausea, vomiting,     No diarrhea or constipation. No melena   SKIN: dry                                     8.3    4.88  )-----------( 227      ( 30 Nov 2022 10:00 )             29.7       CBC Full  -  ( 30 Nov 2022 10:00 )  WBC Count : 4.88 K/uL  RBC Count : 4.38 M/uL  Hemoglobin : 8.3 g/dL  Hematocrit : 29.7 %  Platelet Count - Automated : 227 K/uL  Mean Cell Volume : 67.8 fl  Mean Cell Hemoglobin : 18.9 pg  Mean Cell Hemoglobin Concentration : 27.9 gm/dL  Auto Neutrophil # : x  Auto Lymphocyte # : x  Auto Monocyte # : x  Auto Eosinophil # : x  Auto Basophil # : x  Auto Neutrophil % : x  Auto Lymphocyte % : x  Auto Monocyte % : x  Auto Eosinophil % : x  Auto Basophil % : x      11-30    136  |  100  |  14  ----------------------------<  288<H>  4.1   |  31  |  0.99    Ca    8.5      30 Nov 2022 10:00  Mg     1.7     11-29    TPro  6.8  /  Alb  2.7<L>  /  TBili  0.7  /  DBili  x   /  AST  24  /  ALT  16  /  AlkPhos  215<H>  11-30      CAPILLARY BLOOD GLUCOSE      POCT Blood Glucose.: 337 mg/dL (30 Nov 2022 11:28)  POCT Blood Glucose.: 250 mg/dL (30 Nov 2022 08:04)  POCT Blood Glucose.: 243 mg/dL (29 Nov 2022 21:49)  POCT Blood Glucose.: 291 mg/dL (29 Nov 2022 16:44)      Vital Signs Last 24 Hrs  T(C): 36.3 (30 Nov 2022 12:05), Max: 36.6 (30 Nov 2022 05:08)  T(F): 97.3 (30 Nov 2022 12:05), Max: 97.8 (30 Nov 2022 05:08)  HR: 69 (30 Nov 2022 12:05) (62 - 74)  BP: 123/78 (30 Nov 2022 12:05) (115/75 - 131/84)  BP(mean): --  RR: 18 (30 Nov 2022 12:05) (17 - 18)  SpO2: 98% (30 Nov 2022 12:05) (95% - 98%)    Parameters below as of 30 Nov 2022 12:05  Patient On (Oxygen Delivery Method): room air            PT/INR - ( 30 Nov 2022 10:00 )   PT: 25.4 sec;   INR: 2.15 ratio         PTT - ( 29 Nov 2022 06:05 )  PTT:54.1 sec      PHYSICAL EXAM:    Constitutional: NAD  HEENT: conjunctive   clear   Neck:  No JVD  Respiratory: CTAB  Cardiovascular: S1 and S2  Gastrointestinal: BS+, soft, NT/ND  Extremities: No peripheral edema  Neurological:  no focal deficits          
[facility]  Shane Murray PA-C  [address] [place] [address]  [phone]      INTERVAL HPI/OVERNIGHT EVENTS:  Pt s/e with family member who translates for patient  Reports no abdominal pain, N/V/D  Denies overt GI bleeding or further GI complaints    MEDICATIONS  (STANDING):  aspirin enteric coated 81 milliGRAM(s) Oral daily  atorvastatin 10 milliGRAM(s) Oral at bedtime  dextrose 5%. 1000 milliLiter(s) (50 mL/Hr) IV Continuous <Continuous>  dextrose 5%. 1000 milliLiter(s) (100 mL/Hr) IV Continuous <Continuous>  dextrose 50% Injectable 25 Gram(s) IV Push once  dextrose 50% Injectable 12.5 Gram(s) IV Push once  dextrose 50% Injectable 25 Gram(s) IV Push once  enoxaparin Injectable 70 milliGRAM(s) SubCutaneous every 12 hours  glucagon  Injectable 1 milliGRAM(s) IntraMuscular once  insulin glargine Injectable (LANTUS) 15 Unit(s) SubCutaneous every morning  insulin lispro (ADMELOG) corrective regimen sliding scale   SubCutaneous three times a day before meals  insulin lispro (ADMELOG) corrective regimen sliding scale   SubCutaneous at bedtime  insulin lispro Injectable (ADMELOG) 5 Unit(s) SubCutaneous three times a day before meals  levETIRAcetam 750 milliGRAM(s) Oral two times a day  pantoprazole   Suspension 40 milliGRAM(s) Oral two times a day  propranolol 20 milliGRAM(s) Oral two times a day    MEDICATIONS  (PRN):  acetaminophen     Tablet .. 650 milliGRAM(s) Oral every 6 hours PRN Temp greater or equal to 38C (100.4F), Mild Pain (1 - 3)  dextrose Oral Gel 15 Gram(s) Oral once PRN Blood Glucose LESS THAN 70 milliGRAM(s)/deciliter  guaiFENesin Oral Liquid (Sugar-Free) 200 milliGRAM(s) Oral every 6 hours PRN Cough      Allergies    No Known Allergies    PHYSICAL EXAM:   Vital Signs:  Vital Signs Last 24 Hrs  T(C): 36.4 (04 Dec 2022 04:44), Max: 36.7 (03 Dec 2022 11:47)  T(F): 97.5 (04 Dec 2022 04:44), Max: 98.1 (03 Dec 2022 11:47)  HR: 74 (04 Dec 2022 04:44) (67 - 74)  BP: 147/73 (04 Dec 2022 04:44) (110/62 - 147/73)  BP(mean): --  RR: 18 (04 Dec 2022 04:44) (18 - 18)  SpO2: 98% (04 Dec 2022 04:44) (96% - 98%)    Parameters below as of 04 Dec 2022 04:44  Patient On (Oxygen Delivery Method): room air    GENERAL:  Appears stated age  ABDOMEN:  Soft, non-tender, non-distended  NEURO:  Alert      LABS:                        8.0    7.41  )-----------( 310      ( 04 Dec 2022 07:15 )             29.8     12-04    139  |  105  |  11  ----------------------------<  125<H>  3.9   |  29  |  0.95    Ca    8.4<L>      04 Dec 2022 07:15    TPro  6.7  /  Alb  2.7<L>  /  TBili  0.5  /  DBili  x   /  AST  26  /  ALT  19  /  AlkPhos  278<H>  12-04    PT/INR - ( 04 Dec 2022 07:15 )   PT: 25.8 sec;   INR: 2.19 ratio       
[facility]    CHIEF COMPLAINT: Patient is a 67y old  Male who presents with a chief complaint of weakness (01 Dec 2022 06:30)      Follow Up: [ ] Chest Pain      [ ] Dyspnea     [ ] Palpitations    [ ] Atrial Fibrillation     [ ] Ventricular Dysrhythmia    [ ] Abnormal EKG                      [ ] Abnormal Cardiac Enzymes     [ ] Valvular Disease    HPI:  67 year old male w/ PMHx CVA (w/ residual right sided weakness and aphasia), CAD, esophageal varices, A fib (on coumadin), PUD, T2 DM presents to the ED w/ weakness, decreased appetite. Pt d/c [facility] on 11/24/[facility] after admission for melena/low hemoglobin, requiring transfusion. Pt had endoscopy showing PUD w/o active bleeding. Pt daughter at bedside to assist w/ H&P and translation for patient. Daughter states pt felt well after leaving hospital. Over the weekend, family noted decreased appetite, fatigue and generalized weakness. Daughter also noted non productive cough since discharge. Denies fever/chills. Pt w/ hx of CVA and aphasia, communicates minimally but daughter states he has been at his baseline mental status. Pt has not complained of any pain. She does not believe he has had a bowel movement since discharge. He has started his PPI and restarted his dose of coumadin. Daughter denies any N/V. she has noted increased work of breathing for pt. States urine has been normal color, no foul smell. no hx of asthma/copd  (28 Nov 2022 13:57)    PAST MEDICAL & SURGICAL HISTORY:  CVA (cerebral vascular accident)      HTN (hypertension)      DM (diabetes mellitus)      Arrhythmia      History of atrial fibrillation      S/P CABG (coronary artery bypass graft)      S/P brain surgery        MEDICATIONS  (STANDING):  aspirin enteric coated 81 milliGRAM(s) Oral daily  atorvastatin 10 milliGRAM(s) Oral at bedtime  cefTRIAXone   IVPB 1000 milliGRAM(s) IV Intermittent every 24 hours  dextrose 5%. 1000 milliLiter(s) (100 mL/Hr) IV Continuous <Continuous>  dextrose 5%. 1000 milliLiter(s) (50 mL/Hr) IV Continuous <Continuous>  dextrose 50% Injectable 25 Gram(s) IV Push once  dextrose 50% Injectable 12.5 Gram(s) IV Push once  dextrose 50% Injectable 25 Gram(s) IV Push once  enoxaparin Injectable 70 milliGRAM(s) SubCutaneous every 12 hours  glucagon  Injectable 1 milliGRAM(s) IntraMuscular once  insulin lispro (ADMELOG) corrective regimen sliding scale   SubCutaneous three times a day before meals  insulin lispro (ADMELOG) corrective regimen sliding scale   SubCutaneous at bedtime  insulin lispro Injectable (ADMELOG) 5 Unit(s) SubCutaneous three times a day before meals  levETIRAcetam 750 milliGRAM(s) Oral two times a day  pantoprazole   Suspension 40 milliGRAM(s) Oral two times a day  propranolol 20 milliGRAM(s) Oral two times a day    MEDICATIONS  (PRN):  acetaminophen     Tablet .. 650 milliGRAM(s) Oral every 6 hours PRN Temp greater or equal to 38C (100.4F), Mild Pain (1 - 3)  dextrose Oral Gel 15 Gram(s) Oral once PRN Blood Glucose LESS THAN 70 milliGRAM(s)/deciliter  guaiFENesin Oral Liquid (Sugar-Free) 200 milliGRAM(s) Oral every 6 hours PRN Cough    Allergies    No Known Allergies    Intolerances        REVIEW OF SYSTEMS:    CONSTITUTIONAL: No weakness, fevers or chills.   EYES/ENT: No visual changes;    NECK: No pain or stiffness  RESPIRATORY: No cough, wheezing, No shortness of breath  CARDIOVASCULAR: No chest pain or palpitations  GASTROINTESTINAL: No abdominal pain, or hematochezia.  GENITOURINARY: No dysuria orhematuria  NEUROLOGICAL: No numbness or weakness  SKIN: No itching, burning, rashes  All other review of systems is negative unless indicated above    Vital Signs Last 24 Hrs  T(C): 36.7 (01 Dec 2022 04:57), Max: 36.7 (30 Nov 2022 20:43)  T(F): 98.1 (01 Dec 2022 04:57), Max: 98.1 (01 Dec 2022 04:57)  HR: 72 (01 Dec 2022 04:57) (69 - 72)  BP: 118/68 (01 Dec 2022 04:57) (118/68 - 123/78)  BP(mean): --  RR: 17 (01 Dec 2022 04:57) (17 - 18)  SpO2: 93% (01 Dec 2022 04:57) (92% - 98%)    Parameters below as of 01 Dec 2022 04:57  Patient On (Oxygen Delivery Method): room air      I&O's Summary      PHYSICAL EXAM:  Constitutional: NAD  Neurological: Alert, no focal deficits  HEENT: no JVD, EOMI  Cardiovascular: Regular, S1 and S2, no murmur  Pulmonary: breath sounds bilaterally  Gastrointestinal: Bowel Sounds present, soft, nontender  EXT:  no peripheral edema  Skin: No rashes.  Psych:  Mood calm  LABS: All Labs Reviewed:                          8.3    4.88  )-----------( 227      ( 30 Nov 2022 10:00 )             29.7     11-30    136  |  100  |  14  ----------------------------<  288<H>  4.1   |  31  |  0.99    Ca    8.5      30 Nov 2022 10:00    TPro  6.8  /  Alb  2.7<L>  /  TBili  0.7  /  DBili  x   /  AST  24  /  ALT  16  /  AlkPhos  215<H>  11-30    PT/INR - ( 01 Dec 2022 06:48 )   PT: 24.5 sec;   INR: 2.08 ratio               CT Head No Cont:   ACC: [id number] EXAM:  CT BRAIN                          PROCEDURE DATE:  11/28/2022          INTERPRETATION:  INDICATIONS:  AMS, lethargy on coumadin    TECHNIQUE:  Serial axial images were obtained from the skull base to the   vertex without intravenous contrast. Sagittal and Coronal reformats were   performed    COMPARISON EXAMINATION: 5/2/2022    FINDINGS:  VENTRICLES AND SULCI: Ex vacuo dilatation of the left lateral ventricle   as seen on the prior study  INTRA-AXIAL: Encephalomalacia in the left frontal temporal parietal   region with evidence of the prior hemicraniectomy. Evidence of wallerian   degeneration on the left.  EXTRA-AXIAL:  No mass or collection is seen.  VISUALIZED SINUSES:  Clear.  VISUALIZED MASTOIDS:  Clear.  CALVARIUM:  Left hemicraniectomy  MISCELLANEOUS:  None.    IMPRESSION:  No significant interval change compared with the prior   5/2/2022 with left hemicraniectomy and encephalomalacia in the left   frontal temporal parietal region. No intracranial hemorrhage appreciated.    --- End of Report ---            SHARONDA ALVAREZ MD; Attending Radiologist  This document has been electronically signed. Nov 28 2022 10:56AM (11-28-22 @ 10:52)  12 Lead ECG:   Ventricular Rate 67 BPM    QRS Duration 74 ms    Q-T Interval 432 ms    QTC Calculation(Bazett) 456 ms    R Axis 26 degrees    T Axis 162 degrees    Diagnosis Line Atrial fibrillation  ST & T wave abnormality, consider lateral ischemia  Abnormal ECG  When compared with ECG of 21-NOV-2022 19:15,  No significant change was found  Confirmed by giselle Mahan ([id number]) on 11/28/2022 2:50:57 PM (11-28-22 @ 10:18)  Xray Chest 1 View- PORTABLE-Urgent:   ACC: [id number] EXAM:  XR CHEST PORTABLE URGENT 1V                          PROCEDURE DATE:  11/28/2022          INTERPRETATION:  INDICATION: Sepsis    COMPARISON: 11/21/2022    FINDINGS:  An AP portable semiupright view of the chest shows an ill-defined   infiltrate in the right upper lobe. This is not present previously. There   is persistent consolidation in the left lower lobe, likely due to   atelectasis with coexistent left pleural effusion. Loculated fluid versus   pleural-based lesion or thickening on the left may be present as well,   also grossly unchanged. The cardiac silhouette remains slightly prominent   with midline sternal wires and previous valvuloplasty but without   pulmonary edema. There is no pleural effusion on the right. There is no   pneumothorax.    IMPRESSION:  1. There is a new ill-defined infiltrate in the right upper lobe   consistent with pneumonia.  2. Persistent chronic left lower lobe consolidation, likely due to   atelectasis with coexistent left pleural effusion, some of which may be   loculated or associated with left pleural thickening. This is unchanged.  3. Slight prominence of the cardiac silhouette with valvuloplasty and   midline sternal wires but without pulmonary edema, unchanged.    --- Endof Report ---    UMA MALDONADO MD; Attending Radiologist  This document has been electronically signed. Nov 28 2022  3:19PM (11-28-22 @ 10:06)    · Assessment	  67M with h/o Mech Ao Valve, Afib on coumadin, CAD and CVA (cerebral hemorrhage from coumadin toxicity) with recent GI bleed from esophageal varices presents with worsening fatigue, productive cough and AMS.  He was found with HMPV and concern for bacterial PNA started on IV abx by ID.  Daughter at bedside states that he is clinically improving    TTE Echo Complete w/o Contrast w/ Doppler --11.22.22 @ 19:15  Normal LV systolic function EF 55%, history of aortic valve replacement,   mean aortic gradient 19 mmHg, mild aortic stenosis suggested, biatrial   enlargement, normal pericardium, RVSP 34 mmHg      Suggest:    1. Cough/PNA  - Empiric Tx with Zosyn  - IV fluid hydration  - Follow up blood cultures    2. Mech Ao Valve/Afib  - s/p Lovenox full dose, . coumadin with Goal INR 2.5- 3.5.  inr2.08 today  - Continue with aspirin 81mg daily  - Monitor INR and CBC daily    3. CAD  - C/w Lipitor and aspirin    4. Will follow        
[facility]  Shane Murray PA-C  [address] [place] [address]  [phone]      INTERVAL HPI/OVERNIGHT EVENTS:  Pt s/e with family at bedside who translates for pt  Reports no overt GI bleeding    MEDICATIONS  (STANDING):  aspirin enteric coated 81 milliGRAM(s) Oral daily  atorvastatin 10 milliGRAM(s) Oral at bedtime  dextrose 5%. 1000 milliLiter(s) (50 mL/Hr) IV Continuous <Continuous>  dextrose 5%. 1000 milliLiter(s) (100 mL/Hr) IV Continuous <Continuous>  dextrose 50% Injectable 25 Gram(s) IV Push once  dextrose 50% Injectable 12.5 Gram(s) IV Push once  dextrose 50% Injectable 25 Gram(s) IV Push once  enoxaparin Injectable 70 milliGRAM(s) SubCutaneous every 12 hours  glucagon  Injectable 1 milliGRAM(s) IntraMuscular once  insulin glargine Injectable (LANTUS) 15 Unit(s) SubCutaneous every morning  insulin lispro (ADMELOG) corrective regimen sliding scale   SubCutaneous three times a day before meals  insulin lispro (ADMELOG) corrective regimen sliding scale   SubCutaneous at bedtime  insulin lispro Injectable (ADMELOG) 5 Unit(s) SubCutaneous three times a day before meals  levETIRAcetam 750 milliGRAM(s) Oral two times a day  pantoprazole   Suspension 40 milliGRAM(s) Oral two times a day  propranolol 20 milliGRAM(s) Oral two times a day  warfarin 3 milliGRAM(s) Oral once    MEDICATIONS  (PRN):  acetaminophen     Tablet .. 650 milliGRAM(s) Oral every 6 hours PRN Temp greater or equal to 38C (100.4F), Mild Pain (1 - 3)  dextrose Oral Gel 15 Gram(s) Oral once PRN Blood Glucose LESS THAN 70 milliGRAM(s)/deciliter  guaiFENesin Oral Liquid (Sugar-Free) 200 milliGRAM(s) Oral every 6 hours PRN Cough      Allergies    No Known Allergies    PHYSICAL EXAM:   Vital Signs:  Vital Signs Last 24 Hrs  T(C): 36.8 (03 Dec 2022 05:10), Max: 36.8 (02 Dec 2022 12:01)  T(F): 98.2 (03 Dec 2022 05:10), Max: 98.2 (02 Dec 2022 12:01)  HR: 76 (03 Dec 2022 05:10) (70 - 76)  BP: 128/76 (03 Dec 2022 05:10) (121/76 - 163/74)  BP(mean): --  RR: 18 (03 Dec 2022 05:10) (16 - 18)  SpO2: 95% (03 Dec 2022 05:10) (95% - 97%)    Parameters below as of 03 Dec 2022 05:10  Patient On (Oxygen Delivery Method): room air    GENERAL:  Appears stated age  ABDOMEN:  Soft, non-tender, non-distended  NEURO:  Alert    LABS:                        7.9    7.13  )-----------( 289      ( 03 Dec 2022 05:57 )             29.5     12-02    139  |  103  |  10  ----------------------------<  118<H>  3.9   |  29  |  0.76    Ca    8.4<L>      02 Dec 2022 07:40    TPro  6.4  /  Alb  2.6<L>  /  TBili  0.4  /  DBili  x   /  AST  23  /  ALT  18  /  AlkPhos  237<H>  12-02    PT/INR - ( 03 Dec 2022 05:57 )   PT: 24.4 sec;   INR: 2.07 ratio    
[facility]  ADAM Nava Y. Patel, S. Shah, G. Chris  [phone]  ([phone] - weekdays after 5pm and weekends)    Name: ARNIE DELGADILLO  Age/Gender: 67y Male  MRN: [id number]    Interval History:  Patient seen and examined.  Feels well, has no new complaints.   Notes reviewed  No concerning overnight events  Afebrile   Allergies: No Known Allergies    Objective:  Vitals:   T(F): 98.2 (12-02-22 @ 12:01), Max: 98.2 (12-02-22 @ 12:01)  HR: 71 (12-02-22 @ 12:01) (71 - 75)  BP: 121/76 (12-02-22 @ 12:01) (121/76 - 129/71)  RR: 18 (12-02-22 @ 12:01) (17 - 18)  SpO2: 97% (12-02-22 @ 12:01) (96% - 97%)  Physical Examination:  General: no acute distress, nontoxic appearing  HEENT: NC/AT, anicteric, neck supple  Respiratory: no acc muscle use, breathing comfortably  Cardiovascular: S1 and S2 present  Gastrointestinal: normal appearing, nondistended  Extremities: no edema, no cyanosis  Skin: no visible rash    Laboratory Studies:  CBC:                       7.9    6.11  )-----------( 258      ( 02 Dec 2022 07:40 )             28.5     WBC Trend:  6.11 12-02-22 @ 07:40  4.88 11-30-22 @ 10:00  5.13 11-30-22 @ 06:35  6.54 11-28-22 @ 09:30    CMP: 12-02    139  |  103  |  10  ----------------------------<  118<H>  3.9   |  29  |  0.76    Ca    8.4<L>      02 Dec 2022 07:40    TPro  6.4  /  Alb  2.6<L>  /  TBili  0.4  /  DBili  x   /  AST  23  /  ALT  18  /  AlkPhos  237<H>  12-02    Creatinine, Serum: 0.76 mg/dL (12-02-22 @ 07:40)  Creatinine, Serum: 0.99 mg/dL (11-30-22 @ 10:00)  Creatinine, Serum: 0.88 mg/dL (11-30-22 @ 06:35)  Creatinine, Serum: 1.30 mg/dL (11-28-22 @ 09:30)    LIVER FUNCTIONS - ( 02 Dec 2022 07:40 )  Alb: 2.6 g/dL / Pro: 6.4 g/dL / ALK PHOS: 237 U/L / ALT: 18 U/L / AST: 23 U/L / GGT: x           Microbiology: reviewed   Culture - Urine (collected 11-28-22 @ 12:20)  Source: Clean Catch Clean Catch (Midstream)  Final Report (11-29-22 @ 15:49):    No growth    Culture - Blood (collected 11-28-22 @ 09:30)  Source: .Blood Blood-Peripheral  Preliminary Report (11-29-22 @ 17:02):    No growth to date.    Culture - Blood (collected 11-28-22 @ 09:20)  Source: .Blood Blood-Peripheral  Preliminary Report (11-29-22 @ 17:02):    No growth to date.    COVID-19 PCR: NotDetec (21 Nov 2022 16:00)    Radiology: reviewed     Medications:  acetaminophen     Tablet .. 650 milliGRAM(s) Oral every 6 hours PRN  aspirin enteric coated 81 milliGRAM(s) Oral daily  atorvastatin 10 milliGRAM(s) Oral at bedtime  dextrose 5%. 1000 milliLiter(s) IV Continuous <Continuous>  dextrose 5%. 1000 milliLiter(s) IV Continuous <Continuous>  dextrose 50% Injectable 25 Gram(s) IV Push once  dextrose 50% Injectable 12.5 Gram(s) IV Push once  dextrose 50% Injectable 25 Gram(s) IV Push once  dextrose Oral Gel 15 Gram(s) Oral once PRN  enoxaparin Injectable 70 milliGRAM(s) SubCutaneous every 12 hours  glucagon  Injectable 1 milliGRAM(s) IntraMuscular once  guaiFENesin Oral Liquid (Sugar-Free) 200 milliGRAM(s) Oral every 6 hours PRN  insulin glargine Injectable (LANTUS) 15 Unit(s) SubCutaneous every morning  insulin lispro (ADMELOG) corrective regimen sliding scale   SubCutaneous three times a day before meals  insulin lispro (ADMELOG) corrective regimen sliding scale   SubCutaneous at bedtime  insulin lispro Injectable (ADMELOG) 5 Unit(s) SubCutaneous three times a day before meals  levETIRAcetam 750 milliGRAM(s) Oral two times a day  pantoprazole   Suspension 40 milliGRAM(s) Oral two times a day  propranolol 20 milliGRAM(s) Oral two times a day  warfarin 2 milliGRAM(s) Oral once    Current Antimicrobials:    Prior/Completed Antimicrobials:  cefTRIAXone   IVPB  piperacillin/tazobactam IVPB...  
[facility] of INFECTIOUS DISEASE  Guru Michel MD PhD, Lisa James MD, Becca Acosta MD, Karen Grimes MD, Corky Perez MD  and providing coverage with Carlos Marx MD  Providing Infectious Disease Consultations at [facility], [facility], [place], [facility], [facility]'s    Office# [phone] to schedule follow up appointments  Answering Service for urgent calls or New Consults [phone]  Cell# to text for urgent issues Guru Michel [phone]     infectious diseases progress note:    ARNIE DELGADILLO is a 67y y. o. Male patient    Patient more alert    Allergies    No Known Allergies    Intolerances        ANTIBIOTICS/RELEVANT:  antimicrobials  piperacillin/tazobactam IVPB.. 3.375 Gram(s) IV Intermittent every 8 hours    immunologic:    OTHER:  acetaminophen     Tablet .. 650 milliGRAM(s) Oral every 6 hours PRN  aspirin enteric coated 81 milliGRAM(s) Oral daily  atorvastatin 10 milliGRAM(s) Oral at bedtime  dextrose 5%. 1000 milliLiter(s) IV Continuous <Continuous>  dextrose 5%. 1000 milliLiter(s) IV Continuous <Continuous>  dextrose 50% Injectable 25 Gram(s) IV Push once  dextrose 50% Injectable 12.5 Gram(s) IV Push once  dextrose 50% Injectable 25 Gram(s) IV Push once  dextrose Oral Gel 15 Gram(s) Oral once PRN  enoxaparin Injectable 70 milliGRAM(s) SubCutaneous every 12 hours  famotidine Injectable 20 milliGRAM(s) IV Push every 24 hours  glucagon  Injectable 1 milliGRAM(s) IntraMuscular once  guaiFENesin Oral Liquid (Sugar-Free) 200 milliGRAM(s) Oral every 6 hours PRN  insulin lispro (ADMELOG) corrective regimen sliding scale   SubCutaneous at bedtime  insulin lispro (ADMELOG) corrective regimen sliding scale   SubCutaneous three times a day before meals  levETIRAcetam 750 milliGRAM(s) Oral two times a day  pantoprazole   Suspension 40 milliGRAM(s) Oral two times a day  propranolol 20 milliGRAM(s) Oral two times a day      Objective:  Last 24-Vital Signs Last 24 Hrs  T(C): 36.9 (2022 05:10), Max: 36.9 (2022 05:10)  T(F): 98.4 (2022 05:10), Max: 98.4 (2022 05:10)  HR: 68 (2022 05:10) (68 - 92)  BP: 131/75 (2022 05:10) (100/64 - 131/75)  BP(mean): --  RR: 17 (2022 05:10) (17 - 18)  SpO2: 95% (2022 05:10) (94% - 97%)    Parameters below as of 2022 05:10  Patient On (Oxygen Delivery Method): room air        T(C): 36.9 (22 @ 05:10), Max: 37.1 (22 @ 08:53)  T(F): 98.4 (22 @ 05:10), Max: 98.8 (22 @ 08:53)  T(C): 36.9 (22 @ 05:10), Max: 37.1 (22 @ 08:53)  T(F): 98.4 (22 @ 05:10), Max: 98.8 (22 @ 08:53)  T(C): 36.9 (22 @ 05:10), Max: 37.1 (22 @ 08:53)  T(F): 98.4 (22 @ 05:10), Max: 98.8 (22 @ 08:53)    PHYSICAL EXAM:  Constitutional: Well-developed, well nourished  Eyes: PERRLA, EOMI  Ear/Nose/Throat: oropharynx normal	  Neck: no JVD, no lymphadenopathy, supple  Respiratory: no accessory muscle use, lung fields bilaterally crackles and ronchi  Cardiovascular: distant  Gastrointestinal: soft, NT, no HSM, BS-normal  Extremities: no clubbing, no cyanosis, edema absent  Neuro: patient alert,         LABS:                        8.0    6.54  )-----------( 221      ( 2022 09:30 )             28.6       WBC 6.54   @ 09:30  WBC 8.56   @ 07:30  WBC 8.48   @ 09:40          130<L>  |  93<L>  |  33<H>  ----------------------------<  299<H>  4.2   |  29  |  1.30    Ca    8.7      2022 09:30  Mg     1.7         TPro  7.3  /  Alb  3.0<L>  /  TBili  0.9  /  DBili  x   /  AST  21  /  ALT  17  /  AlkPhos  227<H>        Creatinine, Serum: 1.30 mg/dL (22 @ 09:30)  Creatinine, Serum: 1.10 mg/dL (22 @ 07:30)  Creatinine, Serum: 0.92 mg/dL (22 @ 09:40)      PT/INR - ( 2022 06:05 )   PT: 21.6 sec;   INR: 1.83 ratio         PTT - ( 2022 06:05 )  PTT:54.1 sec  Urinalysis Basic - ( 2022 12:20 )    Color: Pale Yellow / Appearance: Clear / S.010 / pH: x  Gluc: x / Ketone: Negative  / Bili: Negative / Urobili: Negative   Blood: x / Protein: 15 / Nitrite: Negative   Leuk Esterase: Negative / RBC: 3-5 /HPF / WBC 0-2   Sq Epi: x / Non Sq Epi: Negative / Bacteria: x            MICROBIOLOGY:              RADIOLOGY & ADDITIONAL STUDIES:  
[facility]  Shane Murray PA-C  [address] [place] [address]  [phone]      INTERVAL HPI/OVERNIGHT EVENTS:  Pt s/e with family at bedside who translated for patient  He reports no abdominal pain, N/V/D  Denies overt GI bleeding or further GI complaints    MEDICATIONS  (STANDING):  aspirin enteric coated 81 milliGRAM(s) Oral daily  atorvastatin 10 milliGRAM(s) Oral at bedtime  cefTRIAXone   IVPB 1000 milliGRAM(s) IV Intermittent every 24 hours  dextrose 5%. 1000 milliLiter(s) (100 mL/Hr) IV Continuous <Continuous>  dextrose 5%. 1000 milliLiter(s) (50 mL/Hr) IV Continuous <Continuous>  dextrose 50% Injectable 25 Gram(s) IV Push once  dextrose 50% Injectable 12.5 Gram(s) IV Push once  dextrose 50% Injectable 25 Gram(s) IV Push once  enoxaparin Injectable 70 milliGRAM(s) SubCutaneous every 12 hours  glucagon  Injectable 1 milliGRAM(s) IntraMuscular once  insulin lispro (ADMELOG) corrective regimen sliding scale   SubCutaneous three times a day before meals  insulin lispro (ADMELOG) corrective regimen sliding scale   SubCutaneous at bedtime  insulin lispro Injectable (ADMELOG) 5 Unit(s) SubCutaneous three times a day before meals  levETIRAcetam 750 milliGRAM(s) Oral two times a day  pantoprazole   Suspension 40 milliGRAM(s) Oral two times a day  propranolol 20 milliGRAM(s) Oral two times a day    MEDICATIONS  (PRN):  acetaminophen     Tablet .. 650 milliGRAM(s) Oral every 6 hours PRN Temp greater or equal to 38C (100.4F), Mild Pain (1 - 3)  dextrose Oral Gel 15 Gram(s) Oral once PRN Blood Glucose LESS THAN 70 milliGRAM(s)/deciliter  guaiFENesin Oral Liquid (Sugar-Free) 200 milliGRAM(s) Oral every 6 hours PRN Cough      Allergies    No Known Allergies    PHYSICAL EXAM:   Vital Signs:  Vital Signs Last 24 Hrs  T(C): 36.7 (01 Dec 2022 04:57), Max: 36.7 (30 Nov 2022 20:43)  T(F): 98.1 (01 Dec 2022 04:57), Max: 98.1 (01 Dec 2022 04:57)  HR: 72 (01 Dec 2022 04:57) (69 - 72)  BP: 118/68 (01 Dec 2022 04:57) (118/68 - 123/78)  BP(mean): --  RR: 17 (01 Dec 2022 04:57) (17 - 18)  SpO2: 93% (01 Dec 2022 04:57) (92% - 98%)    Parameters below as of 01 Dec 2022 04:57  Patient On (Oxygen Delivery Method): room air    GENERAL:  Appears stated age  ABDOMEN:  Soft, non-tender, non-distended  NEURO:  Alert      LABS:                        8.3    4.88  )-----------( 227      ( 30 Nov 2022 10:00 )             29.7     11-30    136  |  100  |  14  ----------------------------<  288<H>  4.1   |  31  |  0.99    Ca    8.5      30 Nov 2022 10:00    TPro  6.8  /  Alb  2.7<L>  /  TBili  0.7  /  DBili  x   /  AST  24  /  ALT  16  /  AlkPhos  215<H>  11-30    PT/INR - ( 01 Dec 2022 06:48 )   PT: 24.5 sec;   INR: 2.08 ratio       
Patient is a 67y old  Male who presents with a chief complaint of weakness (2022 11:42)    Date of servie : 22 @ 13:28  INTERVAL HPI/OVERNIGHT EVENTS:  T(C): 36.4 (22 @ 11:35), Max: 36.9 (22 @ 05:10)  HR: 75 (22 @ 11:35) (68 - 92)  BP: 111/66 (22 @ 11:35) (100/64 - 131/75)  RR: 18 (22 @ 11:35) (17 - 18)  SpO2: 95% (22 @ 11:35) (94% - 97%)  Wt(kg): --  I&O's Summary    2022 07:01  -  2022 07:00  --------------------------------------------------------  IN: 0 mL / OUT: 400 mL / NET: -400 mL        LABS:                        8.0    6.54  )-----------( 221      ( 2022 09:30 )             28.6         130<L>  |  93<L>  |  33<H>  ----------------------------<  299<H>  4.2   |  29  |  1.30    Ca    8.7      2022 09:30  Mg     1.7         TPro  7.3  /  Alb  3.0<L>  /  TBili  0.9  /  DBili  x   /  AST  21  /  ALT  17  /  AlkPhos  227<H>      PT/INR - ( 2022 06:05 )   PT: 21.6 sec;   INR: 1.83 ratio         PTT - ( 2022 06:05 )  PTT:54.1 sec  Urinalysis Basic - ( 2022 12:20 )    Color: Pale Yellow / Appearance: Clear / S.010 / pH: x  Gluc: x / Ketone: Negative  / Bili: Negative / Urobili: Negative   Blood: x / Protein: 15 / Nitrite: Negative   Leuk Esterase: Negative / RBC: 3-5 /HPF / WBC 0-2   Sq Epi: x / Non Sq Epi: Negative / Bacteria: x      CAPILLARY BLOOD GLUCOSE      POCT Blood Glucose.: 262 mg/dL (2022 11:29)  POCT Blood Glucose.: 246 mg/dL (2022 07:46)  POCT Blood Glucose.: 321 mg/dL (2022 22:20)  POCT Blood Glucose.: 289 mg/dL (2022 18:24)        Urinalysis Basic - ( 2022 12:20 )    Color: Pale Yellow / Appearance: Clear / S.010 / pH: x  Gluc: x / Ketone: Negative  / Bili: Negative / Urobili: Negative   Blood: x / Protein: 15 / Nitrite: Negative   Leuk Esterase: Negative / RBC: 3-5 /HPF / WBC 0-2   Sq Epi: x / Non Sq Epi: Negative / Bacteria: x        MEDICATIONS  (STANDING):  aspirin enteric coated 81 milliGRAM(s) Oral daily  atorvastatin 10 milliGRAM(s) Oral at bedtime  dextrose 5%. 1000 milliLiter(s) (50 mL/Hr) IV Continuous <Continuous>  dextrose 5%. 1000 milliLiter(s) (100 mL/Hr) IV Continuous <Continuous>  dextrose 50% Injectable 25 Gram(s) IV Push once  dextrose 50% Injectable 12.5 Gram(s) IV Push once  dextrose 50% Injectable 25 Gram(s) IV Push once  enoxaparin Injectable 70 milliGRAM(s) SubCutaneous every 12 hours  famotidine Injectable 20 milliGRAM(s) IV Push every 24 hours  glucagon  Injectable 1 milliGRAM(s) IntraMuscular once  insulin lispro (ADMELOG) corrective regimen sliding scale   SubCutaneous at bedtime  insulin lispro (ADMELOG) corrective regimen sliding scale   SubCutaneous three times a day before meals  levETIRAcetam 750 milliGRAM(s) Oral two times a day  pantoprazole   Suspension 40 milliGRAM(s) Oral two times a day  piperacillin/tazobactam IVPB.. 3.375 Gram(s) IV Intermittent every 8 hours  propranolol 20 milliGRAM(s) Oral two times a day    MEDICATIONS  (PRN):  acetaminophen     Tablet .. 650 milliGRAM(s) Oral every 6 hours PRN Temp greater or equal to 38C (100.4F), Mild Pain (1 - 3)  dextrose Oral Gel 15 Gram(s) Oral once PRN Blood Glucose LESS THAN 70 milliGRAM(s)/deciliter  guaiFENesin Oral Liquid (Sugar-Free) 200 milliGRAM(s) Oral every 6 hours PRN Cough          PHYSICAL EXAM:  GENERAL: NAD, well-groomed, well-developed  HEAD:  Atraumatic, Normocephalic  CHEST/LUNG: Clear to percussion bilaterally; No rales, rhonchi, wheezing, or rubs  HEART: Regular rate and rhythm; No murmurs, rubs, or gallops  ABDOMEN: Soft, Nontender, Nondistended; Bowel sounds present  EXTREMITIES:  2+ Peripheral Pulses, No clubbing, cyanosis, or edema  LYMPH: No lymphadenopathy noted  SKIN: No rashes or lesions    Care Discussed with Consultants/Other Providers [x ] YES  [ ] NO
Patient is a 67y old  Male who presents with a chief complaint of weakness (30 Nov 2022 13:54)    Date of servie : 11-30-22 @ 18:54  INTERVAL HPI/OVERNIGHT EVENTS:  T(C): 36.3 (11-30-22 @ 12:05), Max: 36.6 (11-30-22 @ 05:08)  HR: 69 (11-30-22 @ 12:05) (62 - 74)  BP: 123/78 (11-30-22 @ 12:05) (123/78 - 131/84)  RR: 18 (11-30-22 @ 12:05) (17 - 18)  SpO2: 98% (11-30-22 @ 12:05) (95% - 98%)  Wt(kg): --  I&O's Summary      LABS:                        8.3    4.88  )-----------( 227      ( 30 Nov 2022 10:00 )             29.7     11-30    136  |  100  |  14  ----------------------------<  288<H>  4.1   |  31  |  0.99    Ca    8.5      30 Nov 2022 10:00  Mg     1.7     11-29    TPro  6.8  /  Alb  2.7<L>  /  TBili  0.7  /  DBili  x   /  AST  24  /  ALT  16  /  AlkPhos  215<H>  11-30    PT/INR - ( 30 Nov 2022 10:00 )   PT: 25.4 sec;   INR: 2.15 ratio         PTT - ( 29 Nov 2022 06:05 )  PTT:54.1 sec    CAPILLARY BLOOD GLUCOSE      POCT Blood Glucose.: 374 mg/dL (30 Nov 2022 16:57)  POCT Blood Glucose.: 337 mg/dL (30 Nov 2022 11:28)  POCT Blood Glucose.: 250 mg/dL (30 Nov 2022 08:04)  POCT Blood Glucose.: 243 mg/dL (29 Nov 2022 21:49)            MEDICATIONS  (STANDING):  aspirin enteric coated 81 milliGRAM(s) Oral daily  atorvastatin 10 milliGRAM(s) Oral at bedtime  cefTRIAXone   IVPB 1000 milliGRAM(s) IV Intermittent every 24 hours  dextrose 5%. 1000 milliLiter(s) (100 mL/Hr) IV Continuous <Continuous>  dextrose 5%. 1000 milliLiter(s) (50 mL/Hr) IV Continuous <Continuous>  dextrose 50% Injectable 25 Gram(s) IV Push once  dextrose 50% Injectable 12.5 Gram(s) IV Push once  dextrose 50% Injectable 25 Gram(s) IV Push once  enoxaparin Injectable 70 milliGRAM(s) SubCutaneous every 12 hours  glucagon  Injectable 1 milliGRAM(s) IntraMuscular once  insulin lispro (ADMELOG) corrective regimen sliding scale   SubCutaneous at bedtime  insulin lispro (ADMELOG) corrective regimen sliding scale   SubCutaneous three times a day before meals  levETIRAcetam 750 milliGRAM(s) Oral two times a day  pantoprazole   Suspension 40 milliGRAM(s) Oral two times a day  propranolol 20 milliGRAM(s) Oral two times a day  warfarin 2 milliGRAM(s) Oral once    MEDICATIONS  (PRN):  acetaminophen     Tablet .. 650 milliGRAM(s) Oral every 6 hours PRN Temp greater or equal to 38C (100.4F), Mild Pain (1 - 3)  dextrose Oral Gel 15 Gram(s) Oral once PRN Blood Glucose LESS THAN 70 milliGRAM(s)/deciliter  guaiFENesin Oral Liquid (Sugar-Free) 200 milliGRAM(s) Oral every 6 hours PRN Cough          PHYSICAL EXAM:  GENERAL: NAD, well-groomed, well-developed  HEAD:  Atraumatic, Normocephalic  CHEST/LUNG: Clear to percussion bilaterally; No rales, rhonchi, wheezing, or rubs  HEART: Regular rate and rhythm; No murmurs, rubs, or gallops  ABDOMEN: Soft, Nontender, Nondistended; Bowel sounds present  EXTREMITIES:  2+ Peripheral Pulses, No clubbing, cyanosis, or edema  LYMPH: No lymphadenopathy noted  SKIN: No rashes or lesions    Care Discussed with Consultants/Other Providers [ ] YES  [ ] NO
[facility]  Shane Murray PA-C  [address] [place] [address]  [phone]      INTERVAL HPI/OVERNIGHT EVENTS:  Pt s/e with daughter at bedside  Dtr preferred to translate for patient instead of using  phone  Reports no overt GI bleeding  No GI complaints    MEDICATIONS  (STANDING):  aspirin enteric coated 81 milliGRAM(s) Oral daily  atorvastatin 10 milliGRAM(s) Oral at bedtime  dextrose 5%. 1000 milliLiter(s) (50 mL/Hr) IV Continuous <Continuous>  dextrose 5%. 1000 milliLiter(s) (100 mL/Hr) IV Continuous <Continuous>  dextrose 50% Injectable 25 Gram(s) IV Push once  dextrose 50% Injectable 12.5 Gram(s) IV Push once  dextrose 50% Injectable 25 Gram(s) IV Push once  enoxaparin Injectable 70 milliGRAM(s) SubCutaneous every 12 hours  famotidine Injectable 20 milliGRAM(s) IV Push every 24 hours  glucagon  Injectable 1 milliGRAM(s) IntraMuscular once  insulin lispro (ADMELOG) corrective regimen sliding scale   SubCutaneous at bedtime  insulin lispro (ADMELOG) corrective regimen sliding scale   SubCutaneous three times a day before meals  levETIRAcetam 750 milliGRAM(s) Oral two times a day  pantoprazole   Suspension 40 milliGRAM(s) Oral two times a day  piperacillin/tazobactam IVPB.. 3.375 Gram(s) IV Intermittent every 8 hours  propranolol 20 milliGRAM(s) Oral two times a day    MEDICATIONS  (PRN):  acetaminophen     Tablet .. 650 milliGRAM(s) Oral every 6 hours PRN Temp greater or equal to 38C (100.4F), Mild Pain (1 - 3)  dextrose Oral Gel 15 Gram(s) Oral once PRN Blood Glucose LESS THAN 70 milliGRAM(s)/deciliter  guaiFENesin Oral Liquid (Sugar-Free) 200 milliGRAM(s) Oral every 6 hours PRN Cough      Allergies    No Known Allergies    PHYSICAL EXAM:   Vital Signs:  Vital Signs Last 24 Hrs  T(C): 36.4 (2022 11:35), Max: 36.9 (2022 05:10)  T(F): 97.6 (2022 11:35), Max: 98.4 (2022 05:10)  HR: 75 (2022 11:35) (68 - 92)  BP: 111/66 (2022 11:35) (100/64 - 131/75)  BP(mean): --  RR: 18 (2022 11:35) (17 - 18)  SpO2: 95% (2022 11:35) (94% - 97%)    Parameters below as of 2022 11:35  Patient On (Oxygen Delivery Method): room air      GENERAL:  Appears stated age  ABDOMEN:  Soft, non-tender, non-distended  NEURO:  Alert    LABS:                        8.0    6.54  )-----------( 221      ( 2022 09:30 )             28.6     11-28    130<L>  |  93<L>  |  33<H>  ----------------------------<  299<H>  4.2   |  29  |  1.30    Ca    8.7      2022 09:30  Mg     1.7     11-29    TPro  7.3  /  Alb  3.0<L>  /  TBili  0.9  /  DBili  x   /  AST  21  /  ALT  17  /  AlkPhos  227<H>  11-28    PT/INR - ( 2022 06:05 )   PT: 21.6 sec;   INR: 1.83 ratio         PTT - ( 2022 06:05 )  PTT:54.1 sec  Urinalysis Basic - ( 2022 12:20 )    Color: Pale Yellow / Appearance: Clear / S.010 / pH: x  Gluc: x / Ketone: Negative  / Bili: Negative / Urobili: Negative   Blood: x / Protein: 15 / Nitrite: Negative   Leuk Esterase: Negative / RBC: 3-5 /HPF / WBC 0-2   Sq Epi: x / Non Sq Epi: Negative / Bacteria: x    I spent 120 minutes face to face with the patient. Time was spent in discussion with patient. Discussed previous endoscopy, anticoagulation, lab work, imaging results, treatment plan, answered all patient questions. Visit start time: 8:00. Visit end time: 10:00.  
[facility]  Shane Murray PA-C  [address] [place] [address]  [phone]      INTERVAL HPI/OVERNIGHT EVENTS:  Pt s/e with family member at bedside who preferred to translate for patient  Reports no new GI complaints; no overt GI bleeding    MEDICATIONS  (STANDING):  aspirin enteric coated 81 milliGRAM(s) Oral daily  atorvastatin 10 milliGRAM(s) Oral at bedtime  dextrose 5%. 1000 milliLiter(s) (50 mL/Hr) IV Continuous <Continuous>  dextrose 5%. 1000 milliLiter(s) (100 mL/Hr) IV Continuous <Continuous>  dextrose 50% Injectable 25 Gram(s) IV Push once  dextrose 50% Injectable 12.5 Gram(s) IV Push once  dextrose 50% Injectable 25 Gram(s) IV Push once  enoxaparin Injectable 70 milliGRAM(s) SubCutaneous every 12 hours  famotidine Injectable 20 milliGRAM(s) IV Push every 24 hours  glucagon  Injectable 1 milliGRAM(s) IntraMuscular once  insulin lispro (ADMELOG) corrective regimen sliding scale   SubCutaneous three times a day before meals  insulin lispro (ADMELOG) corrective regimen sliding scale   SubCutaneous at bedtime  levETIRAcetam 750 milliGRAM(s) Oral two times a day  pantoprazole   Suspension 40 milliGRAM(s) Oral two times a day  piperacillin/tazobactam IVPB.. 3.375 Gram(s) IV Intermittent every 8 hours  propranolol 20 milliGRAM(s) Oral two times a day  warfarin 3 milliGRAM(s) Oral once    MEDICATIONS  (PRN):  acetaminophen     Tablet .. 650 milliGRAM(s) Oral every 6 hours PRN Temp greater or equal to 38C (100.4F), Mild Pain (1 - 3)  dextrose Oral Gel 15 Gram(s) Oral once PRN Blood Glucose LESS THAN 70 milliGRAM(s)/deciliter  guaiFENesin Oral Liquid (Sugar-Free) 200 milliGRAM(s) Oral every 6 hours PRN Cough      Allergies    No Known Allergies      PHYSICAL EXAM:   Vital Signs:  Vital Signs Last 24 Hrs  T(C): 36.6 (2022 05:08), Max: 36.6 (2022 05:08)  T(F): 97.8 (2022 05:08), Max: 97.8 (2022 05:08)  HR: 74 (2022 05:08) (62 - 75)  BP: 131/84 (2022 05:08) (111/66 - 131/84)  BP(mean): --  RR: 17 (2022 05:08) (17 - 18)  SpO2: 96% (2022 05:08) (95% - 96%)    Parameters below as of 2022 05:08  Patient On (Oxygen Delivery Method): room air      Daily     Daily Weight in k.6 (2022 05:08)    GENERAL:  Appears stated age  ABDOMEN:  Soft, non-tender, non-distended  NEURO: Alert      LABS:                        8.3    4.88  )-----------( 227      ( 2022 10:00 )             29.7     11-30    136  |  100  |  14  ----------------------------<  288<H>  4.1   |  31  |  0.99    Ca    8.5      2022 10:00  Mg     1.7     11-29    TPro  6.8  /  Alb  2.7<L>  /  TBili  0.7  /  DBili  x   /  AST  24  /  ALT  16  /  AlkPhos  215<H>  11-30    PT/INR - ( 2022 10:00 )   PT: 25.4 sec;   INR: 2.15 ratio         PTT - ( 2022 06:05 )  PTT:54.1 sec  Urinalysis Basic - ( 2022 12:20 )    Color: Pale Yellow / Appearance: Clear / S.010 / pH: x  Gluc: x / Ketone: Negative  / Bili: Negative / Urobili: Negative   Blood: x / Protein: 15 / Nitrite: Negative   Leuk Esterase: Negative / RBC: 3-5 /HPF / WBC 0-2   Sq Epi: x / Non Sq Epi: Negative / Bacteria: x  
Date/Time Patient Seen:  		  Referring MD:   Data Reviewed	       Patient is a 67y old  Male who presents with a chief complaint of weakness (02 Dec 2022 13:45)      Subjective/HPI     PAST MEDICAL & SURGICAL HISTORY:  CVA (cerebral vascular accident)    HTN (hypertension)    DM (diabetes mellitus)    Arrhythmia    History of atrial fibrillation    S/P CABG (coronary artery bypass graft)    S/P brain surgery          Medication list         MEDICATIONS  (STANDING):  aspirin enteric coated 81 milliGRAM(s) Oral daily  atorvastatin 10 milliGRAM(s) Oral at bedtime  dextrose 5%. 1000 milliLiter(s) (100 mL/Hr) IV Continuous <Continuous>  dextrose 5%. 1000 milliLiter(s) (50 mL/Hr) IV Continuous <Continuous>  dextrose 50% Injectable 25 Gram(s) IV Push once  dextrose 50% Injectable 12.5 Gram(s) IV Push once  dextrose 50% Injectable 25 Gram(s) IV Push once  enoxaparin Injectable 70 milliGRAM(s) SubCutaneous every 12 hours  glucagon  Injectable 1 milliGRAM(s) IntraMuscular once  insulin glargine Injectable (LANTUS) 15 Unit(s) SubCutaneous every morning  insulin lispro (ADMELOG) corrective regimen sliding scale   SubCutaneous three times a day before meals  insulin lispro (ADMELOG) corrective regimen sliding scale   SubCutaneous at bedtime  insulin lispro Injectable (ADMELOG) 5 Unit(s) SubCutaneous three times a day before meals  levETIRAcetam 750 milliGRAM(s) Oral two times a day  pantoprazole   Suspension 40 milliGRAM(s) Oral two times a day  propranolol 20 milliGRAM(s) Oral two times a day    MEDICATIONS  (PRN):  acetaminophen     Tablet .. 650 milliGRAM(s) Oral every 6 hours PRN Temp greater or equal to 38C (100.4F), Mild Pain (1 - 3)  dextrose Oral Gel 15 Gram(s) Oral once PRN Blood Glucose LESS THAN 70 milliGRAM(s)/deciliter  guaiFENesin Oral Liquid (Sugar-Free) 200 milliGRAM(s) Oral every 6 hours PRN Cough         Vitals log        ICU Vital Signs Last 24 Hrs  T(C): 36.8 (03 Dec 2022 05:10), Max: 36.8 (02 Dec 2022 12:01)  T(F): 98.2 (03 Dec 2022 05:10), Max: 98.2 (02 Dec 2022 12:01)  HR: 76 (03 Dec 2022 05:10) (70 - 76)  BP: 128/76 (03 Dec 2022 05:10) (121/76 - 163/74)  BP(mean): --  ABP: --  ABP(mean): --  RR: 18 (03 Dec 2022 05:10) (16 - 18)  SpO2: 95% (03 Dec 2022 05:10) (95% - 97%)    O2 Parameters below as of 03 Dec 2022 05:10  Patient On (Oxygen Delivery Method): room air                 Input and Output:  I&O's Detail    02 Dec 2022 07:01  -  03 Dec 2022 05:24  --------------------------------------------------------  IN:  Total IN: 0 mL    OUT:    Voided (mL): 780 mL  Total OUT: 780 mL    Total NET: -780 mL          Lab Data                        7.9    6.11  )-----------( 258      ( 02 Dec 2022 07:40 )             28.5     12-02    139  |  103  |  10  ----------------------------<  118<H>  3.9   |  29  |  0.76    Ca    8.4<L>      02 Dec 2022 07:40    TPro  6.4  /  Alb  2.6<L>  /  TBili  0.4  /  DBili  x   /  AST  23  /  ALT  18  /  AlkPhos  237<H>  12-02            Review of Systems	      Objective     Physical Examination    heart s1s2  lung dec BS  abd soft  head nc      Pertinent Lab findings & Imaging      Bryan:  NO   Adequate UO     I&O's Detail    02 Dec 2022 07:01  -  03 Dec 2022 05:24  --------------------------------------------------------  IN:  Total IN: 0 mL    OUT:    Voided (mL): 780 mL  Total OUT: 780 mL    Total NET: -780 mL               Discussed with:     Cultures:	        Radiology                            
[facility] of INFECTIOUS DISEASE  Guru Michel MD PhD, Lisa James MD, Becca Acosta MD, Karen Grimes MD, Corky Perez MD  and providing coverage with Carlos Marx MD  Providing Infectious Disease Consultations at [facility], [facility], [facility], [facility]'s    Office# [phone] to schedule follow up appointments  Answering Service for urgent calls or New Consults [phone]  Cell# to text for urgent issues Guru Michel [phone]     infectious diseases progress note:    ARNIE DELGADILLO is a 67y y. o. Male patient    Overnight and events of the last 24hrs reviewed    Allergies    No Known Allergies    Intolerances        ANTIBIOTICS/RELEVANT:  antimicrobials  cefTRIAXone   IVPB 1000 milliGRAM(s) IV Intermittent every 24 hours    immunologic:    OTHER:  acetaminophen     Tablet .. 650 milliGRAM(s) Oral every 6 hours PRN  aspirin enteric coated 81 milliGRAM(s) Oral daily  atorvastatin 10 milliGRAM(s) Oral at bedtime  dextrose 5%. 1000 milliLiter(s) IV Continuous <Continuous>  dextrose 5%. 1000 milliLiter(s) IV Continuous <Continuous>  dextrose 50% Injectable 25 Gram(s) IV Push once  dextrose 50% Injectable 12.5 Gram(s) IV Push once  dextrose 50% Injectable 25 Gram(s) IV Push once  dextrose Oral Gel 15 Gram(s) Oral once PRN  enoxaparin Injectable 70 milliGRAM(s) SubCutaneous every 12 hours  glucagon  Injectable 1 milliGRAM(s) IntraMuscular once  guaiFENesin Oral Liquid (Sugar-Free) 200 milliGRAM(s) Oral every 6 hours PRN  insulin lispro (ADMELOG) corrective regimen sliding scale   SubCutaneous three times a day before meals  insulin lispro (ADMELOG) corrective regimen sliding scale   SubCutaneous at bedtime  insulin lispro Injectable (ADMELOG) 5 Unit(s) SubCutaneous three times a day before meals  levETIRAcetam 750 milliGRAM(s) Oral two times a day  pantoprazole   Suspension 40 milliGRAM(s) Oral two times a day  propranolol 20 milliGRAM(s) Oral two times a day  warfarin 2 milliGRAM(s) Oral once      Objective:  Vital Signs Last 24 Hrs  T(C): 36.2 (01 Dec 2022 12:55), Max: 36.7 (30 Nov 2022 20:43)  T(F): 97.2 (01 Dec 2022 12:55), Max: 98.1 (01 Dec 2022 04:57)  HR: 70 (01 Dec 2022 12:55) (70 - 72)  BP: 134/78 (01 Dec 2022 12:55) (118/68 - 134/78)  BP(mean): --  RR: 18 (01 Dec 2022 12:55) (17 - 18)  SpO2: 95% (01 Dec 2022 12:55) (92% - 95%)    Parameters below as of 01 Dec 2022 12:55  Patient On (Oxygen Delivery Method): room air        T(C): 36.2 (12-01-22 @ 12:55), Max: 36.7 (11-30-22 @ 20:43)  T(C): 36.2 (12-01-22 @ 12:55), Max: 36.9 (11-29-22 @ 05:10)  T(C): 36.2 (12-01-22 @ 12:55), Max: 37.1 (11-28-22 @ 08:53)    PHYSICAL EXAM:  HEENT: NC atraumatic  Neck: supple  Respiratory: no accessory muscle use, breathing comfortably  Cardiovascular: distant  Gastrointestinal: normal appearing, nondistended  Extremities: no clubbing, no cyanosis,        LABS:                          8.3    4.88  )-----------( 227      ( 30 Nov 2022 10:00 )             29.7       WBC  4.88 11-30 @ 10:00  5.13 11-30 @ 06:35  6.54 11-28 @ 09:30      11-30    136  |  100  |  14  ----------------------------<  288<H>  4.1   |  31  |  0.99    Ca    8.5      30 Nov 2022 10:00    TPro  6.8  /  Alb  2.7<L>  /  TBili  0.7  /  DBili  x   /  AST  24  /  ALT  16  /  AlkPhos  215<H>  11-30      Creatinine, Serum: 0.99 mg/dL (11-30-22 @ 10:00)  Creatinine, Serum: 0.88 mg/dL (11-30-22 @ 06:35)  Creatinine, Serum: 1.30 mg/dL (11-28-22 @ 09:30)      PT/INR - ( 01 Dec 2022 06:48 )   PT: 24.5 sec;   INR: 2.08 ratio                   INFLAMMATORY MARKERS      MICROBIOLOGY:              RADIOLOGY & ADDITIONAL STUDIES:  
Patient is a 67y old  Male who presents with a chief complaint of weakness (01 Dec 2022 05:36)    Date of servie : 12-01-22 @ 06:31  INTERVAL HPI/OVERNIGHT EVENTS:  T(C): 36.7 (12-01-22 @ 04:57), Max: 36.7 (11-30-22 @ 20:43)  HR: 72 (12-01-22 @ 04:57) (69 - 72)  BP: 118/68 (12-01-22 @ 04:57) (118/68 - 123/78)  RR: 17 (12-01-22 @ 04:57) (17 - 18)  SpO2: 93% (12-01-22 @ 04:57) (92% - 98%)  Wt(kg): --  I&O's Summary      LABS:                        8.3    4.88  )-----------( 227      ( 30 Nov 2022 10:00 )             29.7     11-30    136  |  100  |  14  ----------------------------<  288<H>  4.1   |  31  |  0.99    Ca    8.5      30 Nov 2022 10:00    TPro  6.8  /  Alb  2.7<L>  /  TBili  0.7  /  DBili  x   /  AST  24  /  ALT  16  /  AlkPhos  215<H>  11-30    PT/INR - ( 30 Nov 2022 10:00 )   PT: 25.4 sec;   INR: 2.15 ratio             CAPILLARY BLOOD GLUCOSE      POCT Blood Glucose.: 313 mg/dL (30 Nov 2022 20:56)  POCT Blood Glucose.: 374 mg/dL (30 Nov 2022 16:57)  POCT Blood Glucose.: 337 mg/dL (30 Nov 2022 11:28)  POCT Blood Glucose.: 250 mg/dL (30 Nov 2022 08:04)            MEDICATIONS  (STANDING):  aspirin enteric coated 81 milliGRAM(s) Oral daily  atorvastatin 10 milliGRAM(s) Oral at bedtime  cefTRIAXone   IVPB 1000 milliGRAM(s) IV Intermittent every 24 hours  dextrose 5%. 1000 milliLiter(s) (100 mL/Hr) IV Continuous <Continuous>  dextrose 5%. 1000 milliLiter(s) (50 mL/Hr) IV Continuous <Continuous>  dextrose 50% Injectable 25 Gram(s) IV Push once  dextrose 50% Injectable 12.5 Gram(s) IV Push once  dextrose 50% Injectable 25 Gram(s) IV Push once  enoxaparin Injectable 70 milliGRAM(s) SubCutaneous every 12 hours  glucagon  Injectable 1 milliGRAM(s) IntraMuscular once  insulin glargine Injectable (LANTUS) 15 Unit(s) SubCutaneous at bedtime  insulin lispro (ADMELOG) corrective regimen sliding scale   SubCutaneous at bedtime  insulin lispro (ADMELOG) corrective regimen sliding scale   SubCutaneous three times a day before meals  insulin lispro Injectable (ADMELOG) 5 Unit(s) SubCutaneous three times a day before meals  levETIRAcetam 750 milliGRAM(s) Oral two times a day  pantoprazole   Suspension 40 milliGRAM(s) Oral two times a day  propranolol 20 milliGRAM(s) Oral two times a day    MEDICATIONS  (PRN):  acetaminophen     Tablet .. 650 milliGRAM(s) Oral every 6 hours PRN Temp greater or equal to 38C (100.4F), Mild Pain (1 - 3)  dextrose Oral Gel 15 Gram(s) Oral once PRN Blood Glucose LESS THAN 70 milliGRAM(s)/deciliter  guaiFENesin Oral Liquid (Sugar-Free) 200 milliGRAM(s) Oral every 6 hours PRN Cough          PHYSICAL EXAM:  GENERAL: NAD, well-groomed, well-developed  HEAD:  Atraumatic, Normocephalic  CHEST/LUNG: Clear to percussion bilaterally; No rales, rhonchi, wheezing, or rubs  HEART: Regular rate and rhythm; No murmurs, rubs, or gallops  ABDOMEN: Soft, Nontender, Nondistended; Bowel sounds present  EXTREMITIES:  2+ Peripheral Pulses, No clubbing, cyanosis, or edema  LYMPH: No lymphadenopathy noted  SKIN: No rashes or lesions    Care Discussed with Consultants/Other Providers [ ] YES  [ ] NO
  Patient is a 67y Male whom presented to the hospital with hyponatremia     PAST MEDICAL & SURGICAL HISTORY:  CVA (cerebral vascular accident)      HTN (hypertension)      DM (diabetes mellitus)      Arrhythmia      History of atrial fibrillation      S/P CABG (coronary artery bypass graft)      S/P brain surgery          MEDICATIONS  (STANDING):  aspirin enteric coated 81 milliGRAM(s) Oral daily  atorvastatin 10 milliGRAM(s) Oral at bedtime  dextrose 5%. 1000 milliLiter(s) (50 mL/Hr) IV Continuous <Continuous>  dextrose 5%. 1000 milliLiter(s) (100 mL/Hr) IV Continuous <Continuous>  dextrose 50% Injectable 25 Gram(s) IV Push once  dextrose 50% Injectable 12.5 Gram(s) IV Push once  dextrose 50% Injectable 25 Gram(s) IV Push once  enoxaparin Injectable 70 milliGRAM(s) SubCutaneous every 12 hours  famotidine Injectable 20 milliGRAM(s) IV Push every 24 hours  glucagon  Injectable 1 milliGRAM(s) IntraMuscular once  insulin lispro (ADMELOG) corrective regimen sliding scale   SubCutaneous three times a day before meals  levETIRAcetam 750 milliGRAM(s) Oral two times a day  pantoprazole   Suspension 40 milliGRAM(s) Oral two times a day  piperacillin/tazobactam IVPB.. 3.375 Gram(s) IV Intermittent every 8 hours  propranolol 20 milliGRAM(s) Oral two times a day  warfarin 2 milliGRAM(s) Oral once      Allergies    No Known Allergies    Intolerances        SOCIAL HISTORY:  Denies ETOh,Smoking,     FAMILY HISTORY:  No pertinent family history in first degree relatives        REVIEW OF SYSTEMS:    CONSTITUTIONAL: No weakness, fevers or chills  RESPIRATORY: No cough, wheezing, hemoptysis; No shortness of breath  CARDIOVASCULAR: No chest pain or palpitations  GASTROINTESTINAL: No abdominal or epigastric pain. No nausea, vomiting,     No diarrhea or constipation. No melena   SKIN: dry                                             7.9    6.11  )-----------( 258      ( 02 Dec 2022 07:40 )             28.5       CBC Full  -  ( 02 Dec 2022 07:40 )  WBC Count : 6.11 K/uL  RBC Count : 4.20 M/uL  Hemoglobin : 7.9 g/dL  Hematocrit : 28.5 %  Platelet Count - Automated : 258 K/uL  Mean Cell Volume : 67.9 fl  Mean Cell Hemoglobin : 18.8 pg  Mean Cell Hemoglobin Concentration : 27.7 gm/dL  Auto Neutrophil # : x  Auto Lymphocyte # : x  Auto Monocyte # : x  Auto Eosinophil # : x  Auto Basophil # : x  Auto Neutrophil % : x  Auto Lymphocyte % : x  Auto Monocyte % : x  Auto Eosinophil % : x  Auto Basophil % : x      12-02    139  |  103  |  10  ----------------------------<  118<H>  3.9   |  29  |  0.76    Ca    8.4<L>      02 Dec 2022 07:40    TPro  6.4  /  Alb  2.6<L>  /  TBili  0.4  /  DBili  x   /  AST  23  /  ALT  18  /  AlkPhos  237<H>  12-02      CAPILLARY BLOOD GLUCOSE      POCT Blood Glucose.: 255 mg/dL (02 Dec 2022 12:12)  POCT Blood Glucose.: 110 mg/dL (02 Dec 2022 07:57)  POCT Blood Glucose.: 113 mg/dL (01 Dec 2022 20:38)  POCT Blood Glucose.: 114 mg/dL (01 Dec 2022 16:50)      Vital Signs Last 24 Hrs  T(C): 36.8 (02 Dec 2022 12:01), Max: 36.8 (02 Dec 2022 12:01)  T(F): 98.2 (02 Dec 2022 12:01), Max: 98.2 (02 Dec 2022 12:01)  HR: 71 (02 Dec 2022 12:01) (71 - 75)  BP: 121/76 (02 Dec 2022 12:01) (121/76 - 129/71)  BP(mean): --  RR: 18 (02 Dec 2022 12:01) (17 - 18)  SpO2: 97% (02 Dec 2022 12:01) (96% - 97%)    Parameters below as of 02 Dec 2022 12:01  Patient On (Oxygen Delivery Method): room air            PT/INR - ( 02 Dec 2022 07:40 )   PT: 26.8 sec;   INR: 2.27 ratio                PHYSICAL EXAM:    Constitutional: NAD  HEENT: conjunctive   clear   Neck:  No JVD  Respiratory: CTAB  Cardiovascular: S1 and S2  Gastrointestinal: BS+, soft, NT/ND  Extremities: No peripheral edema  Neurological:  no focal deficits          
[facility] Cardiology    CHIEF COMPLAINT: Patient is a 67y old  Male who presents with a chief complaint of weakness (02 Dec 2022 08:07)      Follow Up: [ ] Chest Pain      [ ] Dyspnea     [ ] Palpitations    [ ] Atrial Fibrillation     [ ] Ventricular Dysrhythmia    [ ] Abnormal EKG                      [ ] Abnormal Cardiac Enzymes     [ ] Valvular Disease    HPI:  67 year old male w/ PMHx CVA (w/ residual right sided weakness and aphasia), CAD, esophageal varices, A fib (on coumadin), PUD, T2 DM presents to the ED w/ weakness, decreased appetite. Pt d/c from [facility] on 11/24/2022 after admission for melena/low hemoglobin, requiring transfusion. Pt had endoscopy showing PUD w/o active bleeding. Pt daughter at bedside to assist w/ H&P and translation for patient. Daughter states pt felt well after leaving hospital. Over the weekend, family noted decreased appetite, fatigue and generalized weakness. Daughter also noted non productive cough since discharge. Denies fever/chills. Pt w/ hx of CVA and aphasia, communicates minimally but daughter states he has been at his baseline mental status. Pt has not complained of any pain. She does not believe he has had a bowel movement since discharge. He has started his PPI and restarted his dose of coumadin. Daughter denies any N/V. she has noted increased work of breathing for pt. States urine has been normal color, no foul smell. no hx of asthma/copd  (28 Nov 2022 13:57)    PAST MEDICAL & SURGICAL HISTORY:  CVA (cerebral vascular accident)      HTN (hypertension)      DM (diabetes mellitus)      Arrhythmia      History of atrial fibrillation      S/P CABG (coronary artery bypass graft)      S/P brain surgery        MEDICATIONS  (STANDING):  aspirin enteric coated 81 milliGRAM(s) Oral daily  atorvastatin 10 milliGRAM(s) Oral at bedtime  cefTRIAXone   IVPB 1000 milliGRAM(s) IV Intermittent every 24 hours  dextrose 5%. 1000 milliLiter(s) (100 mL/Hr) IV Continuous <Continuous>  dextrose 5%. 1000 milliLiter(s) (50 mL/Hr) IV Continuous <Continuous>  dextrose 50% Injectable 25 Gram(s) IV Push once  dextrose 50% Injectable 12.5 Gram(s) IV Push once  dextrose 50% Injectable 25 Gram(s) IV Push once  enoxaparin Injectable 70 milliGRAM(s) SubCutaneous every 12 hours  glucagon  Injectable 1 milliGRAM(s) IntraMuscular once  insulin glargine Injectable (LANTUS) 15 Unit(s) SubCutaneous every morning  insulin lispro (ADMELOG) corrective regimen sliding scale   SubCutaneous three times a day before meals  insulin lispro (ADMELOG) corrective regimen sliding scale   SubCutaneous at bedtime  insulin lispro Injectable (ADMELOG) 5 Unit(s) SubCutaneous three times a day before meals  levETIRAcetam 750 milliGRAM(s) Oral two times a day  pantoprazole   Suspension 40 milliGRAM(s) Oral two times a day  propranolol 20 milliGRAM(s) Oral two times a day    MEDICATIONS  (PRN):  acetaminophen     Tablet .. 650 milliGRAM(s) Oral every 6 hours PRN Temp greater or equal to 38C (100.4F), Mild Pain (1 - 3)  dextrose Oral Gel 15 Gram(s) Oral once PRN Blood Glucose LESS THAN 70 milliGRAM(s)/deciliter  guaiFENesin Oral Liquid (Sugar-Free) 200 milliGRAM(s) Oral every 6 hours PRN Cough    Allergies    No Known Allergies    Intolerances        REVIEW OF SYSTEMS:    CONSTITUTIONAL: No weakness, fevers or chills.   EYES/ENT: No visual changes;    NECK: No pain or stiffness  RESPIRATORY: No cough, wheezing, No shortness of breath  CARDIOVASCULAR: No chest pain or palpitations  GASTROINTESTINAL: No abdominal pain, or hematochezia.  GENITOURINARY: No dysuria orhematuria  NEUROLOGICAL: No numbness or weakness  SKIN: No itching, burning, rashes  All other review of systems is negative unless indicated above    Vital Signs Last 24 Hrs  T(C): 36.4 (02 Dec 2022 05:31), Max: 36.4 (02 Dec 2022 05:31)  T(F): 97.5 (02 Dec 2022 05:31), Max: 97.5 (02 Dec 2022 05:31)  HR: 75 (02 Dec 2022 05:31) (70 - 75)  BP: 126/75 (02 Dec 2022 05:31) (126/75 - 134/78)  BP(mean): --  RR: 17 (02 Dec 2022 05:31) (17 - 18)  SpO2: 97% (02 Dec 2022 05:31) (95% - 97%)    Parameters below as of 02 Dec 2022 05:31  Patient On (Oxygen Delivery Method): room air      I&O's Summary      PHYSICAL EXAM:  Constitutional: NAD  Neurological: Alert,   daughter/family at bedside  HEENT: no JVD, EOMI  Psych:  Mood calm  LABS: All Labs Reviewed:                          7.9    6.11  )-----------( 258      ( 02 Dec 2022 07:40 )             28.5     12-02    139  |  103  |  10  ----------------------------<  118<H>  3.9   |  29  |  0.76    Ca    8.4<L>      02 Dec 2022 07:40    TPro  6.4  /  Alb  2.6<L>  /  TBili  0.4  /  DBili  x   /  AST  23  /  ALT  18  /  AlkPhos  237<H>  12-02    PT/INR - ( 02 Dec 2022 07:40 )   PT: 26.8 sec;   INR: 2.27 ratio           · Assessment	  67M with h/o [facility] Ao Valve, Afib on coumadin, CAD and CVA (cerebral hemorrhage from coumadin toxicity) with recent GI bleed from esophageal varices presents with worsening fatigue, productive cough and AMS.  He was found with HMPV and concern for bacterial PNA started on IV abx by ID.  Daughter at bedside states that he is clinically improving    TTE Echo Complete w/o Contrast w/ Doppler --11.22.22 @ 19:15  Normal LV systolic function EF 55%, history of aortic valve replacement,   mean aortic gradient 19 mmHg, mild aortic stenosis suggested, biatrial   enlargement, normal pericardium, RVSP 34 mmHg      Suggest:    1. Cough/PNA  - Empiric Tx with Zosyn  - IV fluid hydration  - Follow up blood cultures per Med/ID    2. [facility] Ao Valve/Afib  - s/p Lovenox full dose, . coumadin with Goal INR 2.5- 3.5.  inr2.27 today  - Continue with aspirin 81mg daily  - Monitor INR and CBC daily    3. CAD  - C/w Lipitor and aspirin    4. Will follow prn                  
[facility] Cardiology    CHIEF COMPLAINT: Patient is a 67y old  Male who presents with a chief complaint of weakness (30 Nov 2022 05:25)      Follow Up: [ ] Chest Pain      [ ] Dyspnea     [ ] Palpitations    [ ] Atrial Fibrillation     [ ] Ventricular Dysrhythmia    [ ] Abnormal EKG                      [ ] Abnormal Cardiac Enzymes     [ ] Valvular Disease    HPI:  67 year old male w/ PMHx CVA (w/ residual right sided weakness and aphasia), CAD, esophageal varices, A fib (on coumadin), PUD, DM presents to the ED w/ weakness, decreased appetite. Pt d/c from [facility] on 11/24/2022 after admission for melena/low hemoglobin, requiring transfusion. Pt had endoscopy showing PUD w/o active bleeding. Pt daughter at bedside to assist w/ H&P and translation for patient. Daughter states pt felt well after leaving hospital. Over the weekend, family noted decreased appetite, fatigue and generalized weakness. Daughter also noted non productive cough since discharge. Denies fever/chills. Pt w/ hx of CVA and aphasia, communicates minimally but daughter states he has been at his baseline mental status. Pt has not complained of any pain. She does not believe he has had a bowel movement since discharge. He has started his PPI and restarted his dose of coumadin. Daughter denies any N/V. she has noted increased work of breathing for pt. States urine has been normal color, no foul smell. no hx of asthma/copd  (28 Nov 2022 13:57)    PAST MEDICAL & SURGICAL HISTORY:  CVA (cerebral vascular accident)      HTN (hypertension)      DM (diabetes mellitus)      Arrhythmia      History of atrial fibrillation      S/P CABG (coronary artery bypass graft)      S/P brain surgery        MEDICATIONS  (STANDING):  aspirin enteric coated 81 milliGRAM(s) Oral daily  atorvastatin 10 milliGRAM(s) Oral at bedtime  dextrose 5%. 1000 milliLiter(s) (50 mL/Hr) IV Continuous <Continuous>  dextrose 5%. 1000 milliLiter(s) (100 mL/Hr) IV Continuous <Continuous>  dextrose 50% Injectable 25 Gram(s) IV Push once  dextrose 50% Injectable 12.5 Gram(s) IV Push once  dextrose 50% Injectable 25 Gram(s) IV Push once  enoxaparin Injectable 70 milliGRAM(s) SubCutaneous every 12 hours  famotidine Injectable 20 milliGRAM(s) IV Push every 24 hours  glucagon  Injectable 1 milliGRAM(s) IntraMuscular once  insulin lispro (ADMELOG) corrective regimen sliding scale   SubCutaneous three times a day before meals  insulin lispro (ADMELOG) corrective regimen sliding scale   SubCutaneous at bedtime  levETIRAcetam 750 milliGRAM(s) Oral two times a day  pantoprazole   Suspension 40 milliGRAM(s) Oral two times a day  piperacillin/tazobactam IVPB.. 3.375 Gram(s) IV Intermittent every 8 hours  propranolol 20 milliGRAM(s) Oral two times a day  warfarin 3 milliGRAM(s) Oral once    MEDICATIONS  (PRN):  acetaminophen     Tablet .. 650 milliGRAM(s) Oral every 6 hours PRN Temp greater or equal to 38C (100.4F), Mild Pain (1 - 3)  dextrose Oral Gel 15 Gram(s) Oral once PRN Blood Glucose LESS THAN 70 milliGRAM(s)/deciliter  guaiFENesin Oral Liquid (Sugar-Free) 200 milliGRAM(s) Oral every 6 hours PRN Cough    Allergies    No Known Allergies    Intolerances        REVIEW OF SYSTEMS:    CONSTITUTIONAL: No weakness, fevers or chills.   EYES/ENT: No visual changes;    NECK: No pain or stiffness  RESPIRATORY: No cough, wheezing, No shortness of breath  CARDIOVASCULAR: No chest pain or palpitations  GASTROINTESTINAL: No abdominal pain, or hematochezia.  GENITOURINARY: No dysuria orhematuria  NEUROLOGICAL: No numbness or weakness  SKIN: No itching, burning, rashes  All other review of systems is negative unless indicated above    Vital Signs Last 24 Hrs  T(C): 36.6 (30 Nov 2022 05:08), Max: 36.6 (30 Nov 2022 05:08)  T(F): 97.8 (30 Nov 2022 05:08), Max: 97.8 (30 Nov 2022 05:08)  HR: 74 (30 Nov 2022 05:08) (62 - 75)  BP: 131/84 (30 Nov 2022 05:08) (111/66 - 131/84)  BP(mean): --  RR: 17 (30 Nov 2022 05:08) (17 - 18)  SpO2: 96% (30 Nov 2022 05:08) (95% - 96%)    Parameters below as of 30 Nov 2022 05:08  Patient On (Oxygen Delivery Method): room air      I&O's Summary      PHYSICAL EXAM:  Constitutional: NAD  Neurological: Alert, no focal deficits  HEENT: no JVD, EOMI  Cardiovascular: Regular, S1 and S2, no murmur  Pulmonary: breath sounds bilaterally coarse  Gastrointestinal: Bowel Sounds present, soft, nontender  EXT:  no peripheral edema  Skin: No rashes.  Psych:  Mood calm  LABS: All Labs Reviewed:                          8.7    5.13  )-----------( 270      ( 30 Nov 2022 06:35 )             32.0     11-30    137  |  99  |  14  ----------------------------<  228<H>  4.3   |  31  |  0.88    Ca    9.4      30 Nov 2022 06:35  Mg     1.7     11-29    TPro  7.4  /  Alb  3.1<L>  /  TBili  0.8  /  DBili  x   /  AST  24  /  ALT  16  /  AlkPhos  227<H>  11-30    PT/INR - ( 29 Nov 2022 06:05 )   PT: 21.6 sec;   INR: 1.83 ratio         PTT - ( 29 Nov 2022 06:05 )  PTT:54.1 sec      CT Head No Cont:   ACC: [id number] EXAM:  CT BRAIN                          PROCEDURE DATE:  11/28/2022          INTERPRETATION:  INDICATIONS:  AMS, lethargy on coumadin    TECHNIQUE:  Serial axial images were obtained from the skull base to the   vertex without intravenous contrast. Sagittal and Coronal reformats were   performed    COMPARISON EXAMINATION: 5/2/2022    FINDINGS:  VENTRICLES AND SULCI: Ex vacuo dilatation of the left lateral ventricle   as seen on the prior study  INTRA-AXIAL: Encephalomalacia in the left frontal temporal parietal   region with evidence of the prior hemicraniectomy. Evidence of wallerian   degeneration on the left.  EXTRA-AXIAL:  No mass or collection is seen.  VISUALIZED SINUSES:  Clear.  VISUALIZED MASTOIDS:  Clear.  CALVARIUM:  Left hemicraniectomy  MISCELLANEOUS:  None.    IMPRESSION:  No significant interval change compared with the prior   5/2/2022 with left hemicraniectomy and encephalomalacia in the left   frontal temporal parietal region. No intracranial hemorrhage appreciated.    --- End of Report ---      SHARONDA ALVAREZ MD; Attending Radiologist  This document has been electronically signed. Nov 28 2022 10:56AM (11-28-22 @ 10:52)  12 Lead ECG:   Ventricular Rate 67 BPM    QRS Duration 74 ms    Q-T Interval 432 ms    QTC Calculation(Bazett) 456 ms    R Axis 26 degrees    T Axis 162 degrees    Diagnosis Line Atrial fibrillation  ST & T wave abnormality, consider lateral ischemia  Abnormal ECG  When compared with ECG of 21-NOV-2022 19:15,  No significant change was found  Confirmed by giselle Mahan ([id number]) on 11/28/2022 2:50:57 PM (11-28-22 @ 10:18)  Xray Chest 1 View- PORTABLE-Urgent:   ACC: [id number] EXAM:  XR CHEST PORTABLE URGENT 1V                          PROCEDURE DATE:  11/28/2022      INTERPRETATION:  INDICATION: Sepsis  COMPARISON: 11/21/2022    FINDINGS:  An AP portable semiupright view of the chest shows an ill-defined   infiltrate in the right upper lobe. This is not present previously. There   is persistent consolidation in the left lower lobe, likely due to   atelectasis with coexistent left pleural effusion. Loculated fluid versus   pleural-based lesion or thickening on the left may be present as well,   also grossly unchanged. The cardiac silhouette remains slightly prominent   with midline sternal wires and previous valvuloplasty but without   pulmonary edema. There is no pleural effusion on the right. There is no   pneumothorax.    IMPRESSION:  1. There is a new ill-defined infiltrate in the right upper lobe   consistent with pneumonia.  2. Persistent chronic left lower lobe consolidation, likely due to   atelectasis with coexistent left pleural effusion, some of which may be   loculated or associated with left pleural thickening. This is unchanged.  3. Slight prominence of the cardiac silhouette with valvuloplasty and   midline sternal wires but without pulmonary edema, unchanged.    --- Endof Report ---    · Assessment	  67M with h/o Mech Ao Valve, Afib on coumadin, CAD and CVA (cerebral hemorrhage from coumadin toxicity) with recent GI bleed from esophageal varices presents with worsening fatigue, productive cough and AMS.  He was found with HMPV and concern for bacterial PNA started on IV abx by ID.  Daughter at bedside states that he is clinically improving    TTE Echo Complete w/o Contrast w/ Doppler --11.22.22 @ 19:15  Normal LV systolic function EF 55%, history of aortic valve replacement,   mean aortic gradient 19 mmHg, mild aortic stenosis suggested, biatrial   enlargement, normal pericardium, RVSP 34 mmHg      Suggest:    1. Cough/PNA  - Empiric Tx with Zosyn  - IV fluid hydration  - Follow up blood cultures    [facility]  - Continue with Lovenox full dose, . coumadin with Goal INR 2.5- 3.5  - Continue with aspirin 81mg daily  - Monitor INR and CBC daily    3. CAD  - C/w Lipitor and aspirin    4. Will follow            UMA MALDONADO MD; Attending Radiologist  This document has been electronically signed. Nov 28 2022  3:19PM (11-28-22 @ 10:06)    
Date/Time Patient Seen:  		  Referring MD:   Data Reviewed	       Patient is a 67y old  Male who presents with a chief complaint of weakness (28 Nov 2022 16:33)      Subjective/HPI     PAST MEDICAL & SURGICAL HISTORY:  CVA (cerebral vascular accident)    HTN (hypertension)    DM (diabetes mellitus)    Arrhythmia    History of atrial fibrillation    S/P CABG (coronary artery bypass graft)    S/P brain surgery          Medication list         MEDICATIONS  (STANDING):  aspirin enteric coated 81 milliGRAM(s) Oral daily  atorvastatin 10 milliGRAM(s) Oral at bedtime  dextrose 5%. 1000 milliLiter(s) (50 mL/Hr) IV Continuous <Continuous>  dextrose 5%. 1000 milliLiter(s) (100 mL/Hr) IV Continuous <Continuous>  dextrose 50% Injectable 25 Gram(s) IV Push once  dextrose 50% Injectable 12.5 Gram(s) IV Push once  dextrose 50% Injectable 25 Gram(s) IV Push once  enoxaparin Injectable 70 milliGRAM(s) SubCutaneous every 12 hours  famotidine Injectable 20 milliGRAM(s) IV Push every 24 hours  glucagon  Injectable 1 milliGRAM(s) IntraMuscular once  insulin lispro (ADMELOG) corrective regimen sliding scale   SubCutaneous at bedtime  insulin lispro (ADMELOG) corrective regimen sliding scale   SubCutaneous three times a day before meals  levETIRAcetam 750 milliGRAM(s) Oral two times a day  pantoprazole   Suspension 40 milliGRAM(s) Oral two times a day  piperacillin/tazobactam IVPB.. 3.375 Gram(s) IV Intermittent every 8 hours  propranolol 20 milliGRAM(s) Oral two times a day    MEDICATIONS  (PRN):  acetaminophen     Tablet .. 650 milliGRAM(s) Oral every 6 hours PRN Temp greater or equal to 38C (100.4F), Mild Pain (1 - 3)  dextrose Oral Gel 15 Gram(s) Oral once PRN Blood Glucose LESS THAN 70 milliGRAM(s)/deciliter  guaiFENesin Oral Liquid (Sugar-Free) 200 milliGRAM(s) Oral every 6 hours PRN Cough         Vitals log        ICU Vital Signs Last 24 Hrs  T(C): 36.9 (29 Nov 2022 05:10), Max: 37.1 (28 Nov 2022 08:53)  T(F): 98.4 (29 Nov 2022 05:10), Max: 98.8 (28 Nov 2022 08:53)  HR: 68 (29 Nov 2022 05:10) (68 - 92)  BP: 131/75 (29 Nov 2022 05:10) (100/64 - 131/75)  BP(mean): --  ABP: --  ABP(mean): --  RR: 17 (29 Nov 2022 05:10) (16 - 18)  SpO2: 95% (29 Nov 2022 05:10) (94% - 97%)    O2 Parameters below as of 29 Nov 2022 05:10  Patient On (Oxygen Delivery Method): room air                 Input and Output:  I&O's Detail      Lab Data                        8.0    6.54  )-----------( 221      ( 28 Nov 2022 09:30 )             28.6     11-28    130<L>  |  93<L>  |  33<H>  ----------------------------<  299<H>  4.2   |  29  |  1.30    Ca    8.7      28 Nov 2022 09:30    TPro  7.3  /  Alb  3.0<L>  /  TBili  0.9  /  DBili  x   /  AST  21  /  ALT  17  /  AlkPhos  227<H>  11-28            Review of Systems	      Objective     Physical Examination    heart s1s2  lung dec BS  head nc  head at      Pertinent Lab findings & Imaging      Bryan:  NO   Adequate UO     I&O's Detail           Discussed with:     Cultures:	        Radiology                            
Date/Time Patient Seen:  		  Referring MD:   Data Reviewed	       Patient is a 67y old  Male who presents with a chief complaint of weakness (29 Nov 2022 13:28)      Subjective/HPI     PAST MEDICAL & SURGICAL HISTORY:  CVA (cerebral vascular accident)    HTN (hypertension)    DM (diabetes mellitus)    Arrhythmia    History of atrial fibrillation    S/P CABG (coronary artery bypass graft)    S/P brain surgery          Medication list         MEDICATIONS  (STANDING):  aspirin enteric coated 81 milliGRAM(s) Oral daily  atorvastatin 10 milliGRAM(s) Oral at bedtime  dextrose 5%. 1000 milliLiter(s) (50 mL/Hr) IV Continuous <Continuous>  dextrose 5%. 1000 milliLiter(s) (100 mL/Hr) IV Continuous <Continuous>  dextrose 50% Injectable 25 Gram(s) IV Push once  dextrose 50% Injectable 12.5 Gram(s) IV Push once  dextrose 50% Injectable 25 Gram(s) IV Push once  enoxaparin Injectable 70 milliGRAM(s) SubCutaneous every 12 hours  famotidine Injectable 20 milliGRAM(s) IV Push every 24 hours  glucagon  Injectable 1 milliGRAM(s) IntraMuscular once  insulin lispro (ADMELOG) corrective regimen sliding scale   SubCutaneous three times a day before meals  insulin lispro (ADMELOG) corrective regimen sliding scale   SubCutaneous at bedtime  levETIRAcetam 750 milliGRAM(s) Oral two times a day  pantoprazole   Suspension 40 milliGRAM(s) Oral two times a day  piperacillin/tazobactam IVPB.. 3.375 Gram(s) IV Intermittent every 8 hours  propranolol 20 milliGRAM(s) Oral two times a day    MEDICATIONS  (PRN):  acetaminophen     Tablet .. 650 milliGRAM(s) Oral every 6 hours PRN Temp greater or equal to 38C (100.4F), Mild Pain (1 - 3)  dextrose Oral Gel 15 Gram(s) Oral once PRN Blood Glucose LESS THAN 70 milliGRAM(s)/deciliter  guaiFENesin Oral Liquid (Sugar-Free) 200 milliGRAM(s) Oral every 6 hours PRN Cough         Vitals log        ICU Vital Signs Last 24 Hrs  T(C): 36.6 (30 Nov 2022 05:08), Max: 36.6 (30 Nov 2022 05:08)  T(F): 97.8 (30 Nov 2022 05:08), Max: 97.8 (30 Nov 2022 05:08)  HR: 74 (30 Nov 2022 05:08) (62 - 75)  BP: 131/84 (30 Nov 2022 05:08) (111/66 - 131/84)  BP(mean): --  ABP: --  ABP(mean): --  RR: 17 (30 Nov 2022 05:08) (17 - 18)  SpO2: 96% (30 Nov 2022 05:08) (95% - 96%)    O2 Parameters below as of 30 Nov 2022 05:08  Patient On (Oxygen Delivery Method): room air                 Input and Output:  I&O's Detail    28 Nov 2022 07:01  -  29 Nov 2022 07:00  --------------------------------------------------------  IN:  Total IN: 0 mL    OUT:    Blood Loss (mL): 400 mL  Total OUT: 400 mL    Total NET: -400 mL          Lab Data                        8.0    6.54  )-----------( 221      ( 28 Nov 2022 09:30 )             28.6     11-28    130<L>  |  93<L>  |  33<H>  ----------------------------<  299<H>  4.2   |  29  |  1.30    Ca    8.7      28 Nov 2022 09:30  Mg     1.7     11-29    TPro  7.3  /  Alb  3.0<L>  /  TBili  0.9  /  DBili  x   /  AST  21  /  ALT  17  /  AlkPhos  227<H>  11-28            Review of Systems	      Objective     Physical Examination    heart s1s2  lung dec BS  head nc      Pertinent Lab findings & Imaging      Bryan:  NO   Adequate UO     I&O's Detail    28 Nov 2022 07:01  -  29 Nov 2022 07:00  --------------------------------------------------------  IN:  Total IN: 0 mL    OUT:    Blood Loss (mL): 400 mL  Total OUT: 400 mL    Total NET: -400 mL               Discussed with:     Cultures:	        Radiology                            
Date/Time Patient Seen:  		  Referring MD:   Data Reviewed	       Patient is a 67y old  Male who presents with a chief complaint of weakness (30 Nov 2022 18:53)      Subjective/HPI     PAST MEDICAL & SURGICAL HISTORY:  CVA (cerebral vascular accident)    HTN (hypertension)    DM (diabetes mellitus)    Arrhythmia    History of atrial fibrillation    S/P CABG (coronary artery bypass graft)    S/P brain surgery          Medication list         MEDICATIONS  (STANDING):  aspirin enteric coated 81 milliGRAM(s) Oral daily  atorvastatin 10 milliGRAM(s) Oral at bedtime  cefTRIAXone   IVPB 1000 milliGRAM(s) IV Intermittent every 24 hours  dextrose 5%. 1000 milliLiter(s) (100 mL/Hr) IV Continuous <Continuous>  dextrose 5%. 1000 milliLiter(s) (50 mL/Hr) IV Continuous <Continuous>  dextrose 50% Injectable 25 Gram(s) IV Push once  dextrose 50% Injectable 12.5 Gram(s) IV Push once  dextrose 50% Injectable 25 Gram(s) IV Push once  enoxaparin Injectable 70 milliGRAM(s) SubCutaneous every 12 hours  glucagon  Injectable 1 milliGRAM(s) IntraMuscular once  insulin lispro (ADMELOG) corrective regimen sliding scale   SubCutaneous at bedtime  insulin lispro (ADMELOG) corrective regimen sliding scale   SubCutaneous three times a day before meals  levETIRAcetam 750 milliGRAM(s) Oral two times a day  pantoprazole   Suspension 40 milliGRAM(s) Oral two times a day  propranolol 20 milliGRAM(s) Oral two times a day    MEDICATIONS  (PRN):  acetaminophen     Tablet .. 650 milliGRAM(s) Oral every 6 hours PRN Temp greater or equal to 38C (100.4F), Mild Pain (1 - 3)  dextrose Oral Gel 15 Gram(s) Oral once PRN Blood Glucose LESS THAN 70 milliGRAM(s)/deciliter  guaiFENesin Oral Liquid (Sugar-Free) 200 milliGRAM(s) Oral every 6 hours PRN Cough         Vitals log        ICU Vital Signs Last 24 Hrs  T(C): 36.7 (01 Dec 2022 04:57), Max: 36.7 (30 Nov 2022 20:43)  T(F): 98.1 (01 Dec 2022 04:57), Max: 98.1 (01 Dec 2022 04:57)  HR: 72 (01 Dec 2022 04:57) (69 - 72)  BP: 118/68 (01 Dec 2022 04:57) (118/68 - 123/78)  BP(mean): --  ABP: --  ABP(mean): --  RR: 17 (01 Dec 2022 04:57) (17 - 18)  SpO2: 93% (01 Dec 2022 04:57) (92% - 98%)    O2 Parameters below as of 01 Dec 2022 04:57  Patient On (Oxygen Delivery Method): room air                 Input and Output:  I&O's Detail      Lab Data                        8.3    4.88  )-----------( 227      ( 30 Nov 2022 10:00 )             29.7     11-30    136  |  100  |  14  ----------------------------<  288<H>  4.1   |  31  |  0.99    Ca    8.5      30 Nov 2022 10:00  Mg     1.7     11-29    TPro  6.8  /  Alb  2.7<L>  /  TBili  0.7  /  DBili  x   /  AST  24  /  ALT  16  /  AlkPhos  215<H>  11-30            Review of Systems	      Objective     Physical Examination    heart s1s2  lung dec BS  head nc      Pertinent Lab findings & Imaging      Bryan:  NO   Adequate UO     I&O's Detail           Discussed with:     Cultures:	        Radiology                            
[facility]    CHIEF COMPLAINT: Patient is a 67y old  Male who presents with a chief complaint of weakness (03 Dec 2022 11:33)      Follow Up: [ ] Chest Pain      [ ] Dyspnea     [ ] Palpitations    [ ] Atrial Fibrillation     [ ] Ventricular Dysrhythmia    [ ] Abnormal EKG                      [ ] Abnormal Cardiac Enzymes     [ ] Valvular Disease    HPI:  67 year old male w/ PMHx CVA (w/ residual right sided weakness and aphasia), CAD, esophageal varices, A fib (on coumadin), PUD, T2 DM presents to the ED w/ weakness, decreased appetite. Pt d/c from [facility] on 11/24/2022 after admission for melena/low hemoglobin, requiring transfusion. Pt had endoscopy showing PUD w/o active bleeding. Pt daughter at bedside to assist w/ H&P and translation for patient. Daughter states pt felt well after leaving hospital. Over the weekend, family noted decreased appetite, fatigue and generalized weakness. Daughter also noted non productive cough since discharge. Denies fever/chills. Pt w/ hx of CVA and aphasia, communicates minimally but daughter states he has been at his baseline mental status. Pt has not complained of any pain. She does not believe he has had a bowel movement since discharge. He has started his PPI and restarted his dose of coumadin. Daughter denies any N/V. she has noted increased work of breathing for pt. States urine has been normal color, no foul smell. no hx of asthma/copd  (28 Nov 2022 13:57)    PAST MEDICAL & SURGICAL HISTORY:  CVA (cerebral vascular accident)      HTN (hypertension)      DM (diabetes mellitus)      Arrhythmia      History of atrial fibrillation      S/P CABG (coronary artery bypass graft)      S/P brain surgery        MEDICATIONS  (STANDING):  aspirin enteric coated 81 milliGRAM(s) Oral daily  atorvastatin 10 milliGRAM(s) Oral at bedtime  dextrose 5%. 1000 milliLiter(s) (100 mL/Hr) IV Continuous <Continuous>  dextrose 5%. 1000 milliLiter(s) (50 mL/Hr) IV Continuous <Continuous>  dextrose 50% Injectable 25 Gram(s) IV Push once  dextrose 50% Injectable 12.5 Gram(s) IV Push once  dextrose 50% Injectable 25 Gram(s) IV Push once  enoxaparin Injectable 70 milliGRAM(s) SubCutaneous every 12 hours  glucagon  Injectable 1 milliGRAM(s) IntraMuscular once  insulin glargine Injectable (LANTUS) 15 Unit(s) SubCutaneous every morning  insulin lispro (ADMELOG) corrective regimen sliding scale   SubCutaneous three times a day before meals  insulin lispro (ADMELOG) corrective regimen sliding scale   SubCutaneous at bedtime  insulin lispro Injectable (ADMELOG) 5 Unit(s) SubCutaneous three times a day before meals  levETIRAcetam 750 milliGRAM(s) Oral two times a day  pantoprazole   Suspension 40 milliGRAM(s) Oral two times a day  propranolol 20 milliGRAM(s) Oral two times a day  warfarin 3 milliGRAM(s) Oral once    MEDICATIONS  (PRN):  acetaminophen     Tablet .. 650 milliGRAM(s) Oral every 6 hours PRN Temp greater or equal to 38C (100.4F), Mild Pain (1 - 3)  dextrose Oral Gel 15 Gram(s) Oral once PRN Blood Glucose LESS THAN 70 milliGRAM(s)/deciliter  guaiFENesin Oral Liquid (Sugar-Free) 200 milliGRAM(s) Oral every 6 hours PRN Cough    Allergies    No Known Allergies    Intolerances        REVIEW OF SYSTEMS:    CONSTITUTIONAL: No weakness, fevers or chills.   EYES/ENT: No visual changes;    NECK: No pain or stiffness  RESPIRATORY: No cough, wheezing, No shortness of breath  CARDIOVASCULAR: No chest pain or palpitations  GASTROINTESTINAL: No abdominal pain, or hematochezia.  GENITOURINARY: No dysuria orhematuria  NEUROLOGICAL: No numbness or weakness  SKIN: No itching, burning, rashes  All other review of systems is negative unless indicated above    Vital Signs Last 24 Hrs  T(C): 36.7 (03 Dec 2022 11:47), Max: 36.8 (03 Dec 2022 05:10)  T(F): 98.1 (03 Dec 2022 11:47), Max: 98.2 (03 Dec 2022 05:10)  HR: 67 (03 Dec 2022 11:47) (67 - 76)  BP: 132/88 (03 Dec 2022 11:47) (122/71 - 163/74)  BP(mean): --  RR: 18 (03 Dec 2022 11:47) (16 - 18)  SpO2: 96% (03 Dec 2022 11:47) (95% - 96%)    Parameters below as of 03 Dec 2022 11:47  Patient On (Oxygen Delivery Method): room air      I&O's Summary    02 Dec 2022 07:01  -  03 Dec 2022 07:00  --------------------------------------------------------  IN: 0 mL / OUT: 780 mL / NET: -780 mL        PHYSICAL EXAM:  Constitutional: NAD  Neurological: Alert,   HEENT: no JVD, EOMI  Cardiovascular: Regular, S1 and S2, no murmur  Pulmonary: breath sounds bilaterally  Gastrointestinal: Bowel Sounds present, soft, nontender  EXT:  no peripheral edema  Skin: No rashes.  Psych:  Mood calm  LABS: All Labs Reviewed:                          7.9    7.13  )-----------( 289      ( 03 Dec 2022 05:57 )             29.5     12-02    139  |  103  |  10  ----------------------------<  118<H>  3.9   |  29  |  0.76    Ca    8.4<L>      02 Dec 2022 07:40    TPro  6.4  /  Alb  2.6<L>  /  TBili  0.4  /  DBili  x   /  AST  23  /  ALT  18  /  AlkPhos  237<H>  12-02    PT/INR - ( 03 Dec 2022 05:57 )   PT: 24.4 sec;   INR: 2.07 ratio         · Assessment	  67M with h/o Mech Ao Valve, Afib on coumadin, CAD and CVA (cerebral hemorrhage from coumadin toxicity) with recent GI bleed from esophageal varices presents with worsening fatigue, productive cough and AMS.  He was found with HMPV and concern for bacterial PNA started on IV abx by ID.  Daughter at bedside states that he is clinically improving    TTE Echo Complete w/o Contrast w/ Doppler --11.22.22 @ 19:15  Normal LV systolic function EF 55%, history of aortic valve replacement,   mean aortic gradient 19 mmHg, mild aortic stenosis suggested, biatrial   enlargement, normal pericardium, RVSP 34 mmHg      Suggest:    1. Cough/PNA  - Empiric Tx with Zosyn  - IV fluid hydration  - Follow up blood cultures per Med/ID    2. Mech Ao Valve/Afib  - s/p Lovenox full dose, . coumadin with Goal INR 2.5- 3.5.  inr2.07 today  - Continue with aspirin 81mg daily  - Monitor INR and CBC daily    3. CAD  - C/w Lipitor and aspirin    4. Will follow prn            
Patient is a 67y old  Male who presents with a chief complaint of weakness (02 Dec 2022 13:34)    Date of servie : 12-02-22 @ 13:45  INTERVAL HPI/OVERNIGHT EVENTS:  T(C): 36.8 (12-02-22 @ 12:01), Max: 36.8 (12-02-22 @ 12:01)  HR: 71 (12-02-22 @ 12:01) (71 - 75)  BP: 121/76 (12-02-22 @ 12:01) (121/76 - 129/71)  RR: 18 (12-02-22 @ 12:01) (17 - 18)  SpO2: 97% (12-02-22 @ 12:01) (96% - 97%)  Wt(kg): --  I&O's Summary      LABS:                        7.9    6.11  )-----------( 258      ( 02 Dec 2022 07:40 )             28.5     12-02    139  |  103  |  10  ----------------------------<  118<H>  3.9   |  29  |  0.76    Ca    8.4<L>      02 Dec 2022 07:40    TPro  6.4  /  Alb  2.6<L>  /  TBili  0.4  /  DBili  x   /  AST  23  /  ALT  18  /  AlkPhos  237<H>  12-02    PT/INR - ( 02 Dec 2022 07:40 )   PT: 26.8 sec;   INR: 2.27 ratio             CAPILLARY BLOOD GLUCOSE      POCT Blood Glucose.: 255 mg/dL (02 Dec 2022 12:12)  POCT Blood Glucose.: 110 mg/dL (02 Dec 2022 07:57)  POCT Blood Glucose.: 113 mg/dL (01 Dec 2022 20:38)  POCT Blood Glucose.: 114 mg/dL (01 Dec 2022 16:50)            MEDICATIONS  (STANDING):  aspirin enteric coated 81 milliGRAM(s) Oral daily  atorvastatin 10 milliGRAM(s) Oral at bedtime  dextrose 5%. 1000 milliLiter(s) (50 mL/Hr) IV Continuous <Continuous>  dextrose 5%. 1000 milliLiter(s) (100 mL/Hr) IV Continuous <Continuous>  dextrose 50% Injectable 25 Gram(s) IV Push once  dextrose 50% Injectable 12.5 Gram(s) IV Push once  dextrose 50% Injectable 25 Gram(s) IV Push once  enoxaparin Injectable 70 milliGRAM(s) SubCutaneous every 12 hours  glucagon  Injectable 1 milliGRAM(s) IntraMuscular once  insulin glargine Injectable (LANTUS) 15 Unit(s) SubCutaneous every morning  insulin lispro (ADMELOG) corrective regimen sliding scale   SubCutaneous three times a day before meals  insulin lispro (ADMELOG) corrective regimen sliding scale   SubCutaneous at bedtime  insulin lispro Injectable (ADMELOG) 5 Unit(s) SubCutaneous three times a day before meals  levETIRAcetam 750 milliGRAM(s) Oral two times a day  pantoprazole   Suspension 40 milliGRAM(s) Oral two times a day  propranolol 20 milliGRAM(s) Oral two times a day  warfarin 2 milliGRAM(s) Oral once    MEDICATIONS  (PRN):  acetaminophen     Tablet .. 650 milliGRAM(s) Oral every 6 hours PRN Temp greater or equal to 38C (100.4F), Mild Pain (1 - 3)  dextrose Oral Gel 15 Gram(s) Oral once PRN Blood Glucose LESS THAN 70 milliGRAM(s)/deciliter  guaiFENesin Oral Liquid (Sugar-Free) 200 milliGRAM(s) Oral every 6 hours PRN Cough          PHYSICAL EXAM:  GENERAL: NAD, well-groomed, well-developed  HEAD:  Atraumatic, Normocephalic  CHEST/LUNG: Clear to percussion bilaterally; No rales, rhonchi, wheezing, or rubs  HEART: Regular rate and rhythm; No murmurs, rubs, or gallops  ABDOMEN: Soft, Nontender, Nondistended; Bowel sounds present  EXTREMITIES:  2+ Peripheral Pulses, No clubbing, cyanosis, or edema  LYMPH: No lymphadenopathy noted  SKIN: No rashes or lesions    Care Discussed with Consultants/Other Providers [ x] YES  [ ] NO
Patient is a 67y old  Male who presents with a chief complaint of weakness (03 Dec 2022 11:24)    Date of servie : 12-03-22 @ 11:34  INTERVAL HPI/OVERNIGHT EVENTS:  T(C): 36.8 (12-03-22 @ 05:10), Max: 36.8 (12-02-22 @ 12:01)  HR: 76 (12-03-22 @ 05:10) (70 - 76)  BP: 128/76 (12-03-22 @ 05:10) (121/76 - 163/74)  RR: 18 (12-03-22 @ 05:10) (16 - 18)  SpO2: 95% (12-03-22 @ 05:10) (95% - 97%)  Wt(kg): --  I&O's Summary    02 Dec 2022 07:01  -  03 Dec 2022 07:00  --------------------------------------------------------  IN: 0 mL / OUT: 780 mL / NET: -780 mL        LABS:                        7.9    7.13  )-----------( 289      ( 03 Dec 2022 05:57 )             29.5     12-02    139  |  103  |  10  ----------------------------<  118<H>  3.9   |  29  |  0.76    Ca    8.4<L>      02 Dec 2022 07:40    TPro  6.4  /  Alb  2.6<L>  /  TBili  0.4  /  DBili  x   /  AST  23  /  ALT  18  /  AlkPhos  237<H>  12-02    PT/INR - ( 03 Dec 2022 05:57 )   PT: 24.4 sec;   INR: 2.07 ratio             CAPILLARY BLOOD GLUCOSE      POCT Blood Glucose.: 180 mg/dL (03 Dec 2022 07:56)  POCT Blood Glucose.: 125 mg/dL (02 Dec 2022 21:06)  POCT Blood Glucose.: 110 mg/dL (02 Dec 2022 17:03)  POCT Blood Glucose.: 255 mg/dL (02 Dec 2022 12:12)            MEDICATIONS  (STANDING):  aspirin enteric coated 81 milliGRAM(s) Oral daily  atorvastatin 10 milliGRAM(s) Oral at bedtime  dextrose 5%. 1000 milliLiter(s) (50 mL/Hr) IV Continuous <Continuous>  dextrose 5%. 1000 milliLiter(s) (100 mL/Hr) IV Continuous <Continuous>  dextrose 50% Injectable 25 Gram(s) IV Push once  dextrose 50% Injectable 12.5 Gram(s) IV Push once  dextrose 50% Injectable 25 Gram(s) IV Push once  enoxaparin Injectable 70 milliGRAM(s) SubCutaneous every 12 hours  glucagon  Injectable 1 milliGRAM(s) IntraMuscular once  insulin glargine Injectable (LANTUS) 15 Unit(s) SubCutaneous every morning  insulin lispro (ADMELOG) corrective regimen sliding scale   SubCutaneous three times a day before meals  insulin lispro (ADMELOG) corrective regimen sliding scale   SubCutaneous at bedtime  insulin lispro Injectable (ADMELOG) 5 Unit(s) SubCutaneous three times a day before meals  levETIRAcetam 750 milliGRAM(s) Oral two times a day  pantoprazole   Suspension 40 milliGRAM(s) Oral two times a day  propranolol 20 milliGRAM(s) Oral two times a day  warfarin 3 milliGRAM(s) Oral once    MEDICATIONS  (PRN):  acetaminophen     Tablet .. 650 milliGRAM(s) Oral every 6 hours PRN Temp greater or equal to 38C (100.4F), Mild Pain (1 - 3)  dextrose Oral Gel 15 Gram(s) Oral once PRN Blood Glucose LESS THAN 70 milliGRAM(s)/deciliter  guaiFENesin Oral Liquid (Sugar-Free) 200 milliGRAM(s) Oral every 6 hours PRN Cough          PHYSICAL EXAM:  GENERAL: NAD, well-groomed, well-developed  HEAD:  Atraumatic, Normocephalic  CHEST/LUNG: Clear to percussion bilaterally; No rales, rhonchi, wheezing, or rubs  HEART: Regular rate and rhythm; No murmurs, rubs, or gallops  ABDOMEN: Soft, Nontender, Nondistended; Bowel sounds present  EXTREMITIES:  2+ Peripheral Pulses, No clubbing, cyanosis, or edema  LYMPH: No lymphadenopathy noted  SKIN: No rashes or lesions    Care Discussed with Consultants/Other Providers [ ] YES  [ ] NO
  Patient is a 67y Male whom presented to the hospital with hyponatremia     PAST MEDICAL & SURGICAL HISTORY:  CVA (cerebral vascular accident)      HTN (hypertension)      DM (diabetes mellitus)      Arrhythmia      History of atrial fibrillation      S/P CABG (coronary artery bypass graft)      S/P brain surgery          MEDICATIONS  (STANDING):  aspirin enteric coated 81 milliGRAM(s) Oral daily  atorvastatin 10 milliGRAM(s) Oral at bedtime  dextrose 5%. 1000 milliLiter(s) (50 mL/Hr) IV Continuous <Continuous>  dextrose 5%. 1000 milliLiter(s) (100 mL/Hr) IV Continuous <Continuous>  dextrose 50% Injectable 25 Gram(s) IV Push once  dextrose 50% Injectable 12.5 Gram(s) IV Push once  dextrose 50% Injectable 25 Gram(s) IV Push once  enoxaparin Injectable 70 milliGRAM(s) SubCutaneous every 12 hours  famotidine Injectable 20 milliGRAM(s) IV Push every 24 hours  glucagon  Injectable 1 milliGRAM(s) IntraMuscular once  insulin lispro (ADMELOG) corrective regimen sliding scale   SubCutaneous three times a day before meals  levETIRAcetam 750 milliGRAM(s) Oral two times a day  pantoprazole   Suspension 40 milliGRAM(s) Oral two times a day  piperacillin/tazobactam IVPB.. 3.375 Gram(s) IV Intermittent every 8 hours  propranolol 20 milliGRAM(s) Oral two times a day  warfarin 2 milliGRAM(s) Oral once      Allergies    No Known Allergies    Intolerances        SOCIAL HISTORY:  Denies ETOh,Smoking,     FAMILY HISTORY:  No pertinent family history in first degree relatives        REVIEW OF SYSTEMS:    CONSTITUTIONAL: No weakness, fevers or chills  RESPIRATORY: No cough, wheezing, hemoptysis; No shortness of breath  CARDIOVASCULAR: No chest pain or palpitations  GASTROINTESTINAL: No abdominal or epigastric pain. No nausea, vomiting,                               8.0    7.41  )-----------( 310      ( 04 Dec 2022 07:15 )             29.8       CBC Full  -  ( 04 Dec 2022 07:15 )  WBC Count : 7.41 K/uL  RBC Count : 4.33 M/uL  Hemoglobin : 8.0 g/dL  Hematocrit : 29.8 %  Platelet Count - Automated : 310 K/uL  Mean Cell Volume : 68.8 fl  Mean Cell Hemoglobin : 18.5 pg  Mean Cell Hemoglobin Concentration : 26.8 gm/dL  Auto Neutrophil # : x  Auto Lymphocyte # : x  Auto Monocyte # : x  Auto Eosinophil # : x  Auto Basophil # : x  Auto Neutrophil % : x  Auto Lymphocyte % : x  Auto Monocyte % : x  Auto Eosinophil % : x  Auto Basophil % : x      12-04    139  |  105  |  11  ----------------------------<  125<H>  3.9   |  29  |  0.95    Ca    8.4<L>      04 Dec 2022 07:15    TPro  6.7  /  Alb  2.7<L>  /  TBili  0.5  /  DBili  x   /  AST  26  /  ALT  19  /  AlkPhos  278<H>  12-04      CAPILLARY BLOOD GLUCOSE      POCT Blood Glucose.: 136 mg/dL (04 Dec 2022 07:46)  POCT Blood Glucose.: 117 mg/dL (03 Dec 2022 21:30)  POCT Blood Glucose.: 75 mg/dL (03 Dec 2022 16:46)      Vital Signs Last 24 Hrs  T(C): 36.6 (04 Dec 2022 11:29), Max: 36.6 (03 Dec 2022 20:43)  T(F): 97.9 (04 Dec 2022 11:29), Max: 97.9 (04 Dec 2022 11:29)  HR: 64 (04 Dec 2022 11:29) (64 - 74)  BP: 127/81 (04 Dec 2022 11:29) (110/62 - 147/73)  BP(mean): --  RR: 18 (04 Dec 2022 11:29) (18 - 18)  SpO2: 97% (04 Dec 2022 11:29) (96% - 98%)    Parameters below as of 04 Dec 2022 11:29  Patient On (Oxygen Delivery Method): room air            PT/INR - ( 04 Dec 2022 07:15 )   PT: 25.8 sec;   INR: 2.19 ratio                      PHYSICAL EXAM:    Constitutional: NAD  HEENT: conjunctive   clear   Neck:  No JVD  Respiratory: CTAB  Cardiovascular: S1 and S2  Gastrointestinal: BS+, soft, NT/ND  Extremities: No peripheral edema  Neurological:  no focal deficits          
  Patient is a 67y Male whom presented to the hospital with hyponatremia     PAST MEDICAL & SURGICAL HISTORY:  CVA (cerebral vascular accident)      HTN (hypertension)      DM (diabetes mellitus)      Arrhythmia      History of atrial fibrillation      S/P CABG (coronary artery bypass graft)      S/P brain surgery          MEDICATIONS  (STANDING):  aspirin enteric coated 81 milliGRAM(s) Oral daily  atorvastatin 10 milliGRAM(s) Oral at bedtime  dextrose 5%. 1000 milliLiter(s) (50 mL/Hr) IV Continuous <Continuous>  dextrose 5%. 1000 milliLiter(s) (100 mL/Hr) IV Continuous <Continuous>  dextrose 50% Injectable 25 Gram(s) IV Push once  dextrose 50% Injectable 12.5 Gram(s) IV Push once  dextrose 50% Injectable 25 Gram(s) IV Push once  enoxaparin Injectable 70 milliGRAM(s) SubCutaneous every 12 hours  famotidine Injectable 20 milliGRAM(s) IV Push every 24 hours  glucagon  Injectable 1 milliGRAM(s) IntraMuscular once  insulin lispro (ADMELOG) corrective regimen sliding scale   SubCutaneous three times a day before meals  levETIRAcetam 750 milliGRAM(s) Oral two times a day  pantoprazole   Suspension 40 milliGRAM(s) Oral two times a day  piperacillin/tazobactam IVPB.. 3.375 Gram(s) IV Intermittent every 8 hours  propranolol 20 milliGRAM(s) Oral two times a day  warfarin 2 milliGRAM(s) Oral once      Allergies    No Known Allergies    Intolerances        SOCIAL HISTORY:  Denies ETOh,Smoking,     FAMILY HISTORY:  No pertinent family history in first degree relatives        REVIEW OF SYSTEMS:    CONSTITUTIONAL: No weakness, fevers or chills  RESPIRATORY: No cough, wheezing, hemoptysis; No shortness of breath  CARDIOVASCULAR: No chest pain or palpitations  GASTROINTESTINAL: No abdominal or epigastric pain. No nausea, vomiting,     No diarrhea or constipation. No melena   SKIN: dry                            8.0    6.54  )-----------( 221      ( 2022 09:30 )             28.6       CBC Full  -  ( 2022 09:30 )  WBC Count : 6.54 K/uL  RBC Count : 4.26 M/uL  Hemoglobin : 8.0 g/dL  Hematocrit : 28.6 %  Platelet Count - Automated : 221 K/uL  Mean Cell Volume : 67.1 fl  Mean Cell Hemoglobin : 18.8 pg  Mean Cell Hemoglobin Concentration : 28.0 gm/dL  Auto Neutrophil # : 4.41 K/uL  Auto Lymphocyte # : 0.95 K/uL  Auto Monocyte # : 1.00 K/uL  Auto Eosinophil # : 0.11 K/uL  Auto Basophil # : 0.03 K/uL  Auto Neutrophil % : 67.4 %  Auto Lymphocyte % : 14.5 %  Auto Monocyte % : 15.3 %  Auto Eosinophil % : 1.7 %  Auto Basophil % : 0.5 %          130<L>  |  93<L>  |  33<H>  ----------------------------<  299<H>  4.2   |  29  |  1.30    Ca    8.7      2022 09:30  Mg     1.7         TPro  7.3  /  Alb  3.0<L>  /  TBili  0.9  /  DBili  x   /  AST  21  /  ALT  17  /  AlkPhos  227<H>        CAPILLARY BLOOD GLUCOSE      POCT Blood Glucose.: 291 mg/dL (2022 16:44)  POCT Blood Glucose.: 262 mg/dL (2022 11:29)  POCT Blood Glucose.: 246 mg/dL (2022 07:46)  POCT Blood Glucose.: 321 mg/dL (2022 22:20)      Vital Signs Last 24 Hrs  T(C): 36.4 (2022 20:24), Max: 36.9 (2022 05:10)  T(F): 97.5 (2022 20:24), Max: 98.4 (2022 05:10)  HR: 62 (2022 20:24) (62 - 75)  BP: 131/71 (2022 20:24) (111/66 - 131/75)  BP(mean): --  RR: 18 (2022 20:24) (17 - 18)  SpO2: 95% (2022 20:24) (95% - 95%)    Parameters below as of 2022 20:24  Patient On (Oxygen Delivery Method): room air        Urinalysis Basic - ( 2022 12:20 )    Color: Pale Yellow / Appearance: Clear / S.010 / pH: x  Gluc: x / Ketone: Negative  / Bili: Negative / Urobili: Negative   Blood: x / Protein: 15 / Nitrite: Negative   Leuk Esterase: Negative / RBC: 3-5 /HPF / WBC 0-2   Sq Epi: x / Non Sq Epi: Negative / Bacteria: x        PT/INR - ( 2022 06:05 )   PT: 21.6 sec;   INR: 1.83 ratio         PTT - ( 2022 06:05 )  PTT:54.1 sec          PHYSICAL EXAM:    Constitutional: NAD  HEENT: conjunctive   clear   Neck:  No JVD  Respiratory: CTAB  Cardiovascular: S1 and S2  Gastrointestinal: BS+, soft, NT/ND  Extremities: No peripheral edema  Neurological:  no focal deficits          
  Patient is a 67y Male whom presented to the hospital with hyponatremia     PAST MEDICAL & SURGICAL HISTORY:  CVA (cerebral vascular accident)      HTN (hypertension)      DM (diabetes mellitus)      Arrhythmia      History of atrial fibrillation      S/P CABG (coronary artery bypass graft)      S/P brain surgery          MEDICATIONS  (STANDING):  aspirin enteric coated 81 milliGRAM(s) Oral daily  atorvastatin 10 milliGRAM(s) Oral at bedtime  dextrose 5%. 1000 milliLiter(s) (50 mL/Hr) IV Continuous <Continuous>  dextrose 5%. 1000 milliLiter(s) (100 mL/Hr) IV Continuous <Continuous>  dextrose 50% Injectable 25 Gram(s) IV Push once  dextrose 50% Injectable 12.5 Gram(s) IV Push once  dextrose 50% Injectable 25 Gram(s) IV Push once  enoxaparin Injectable 70 milliGRAM(s) SubCutaneous every 12 hours  famotidine Injectable 20 milliGRAM(s) IV Push every 24 hours  glucagon  Injectable 1 milliGRAM(s) IntraMuscular once  insulin lispro (ADMELOG) corrective regimen sliding scale   SubCutaneous three times a day before meals  levETIRAcetam 750 milliGRAM(s) Oral two times a day  pantoprazole   Suspension 40 milliGRAM(s) Oral two times a day  piperacillin/tazobactam IVPB.. 3.375 Gram(s) IV Intermittent every 8 hours  propranolol 20 milliGRAM(s) Oral two times a day  warfarin 2 milliGRAM(s) Oral once      Allergies    No Known Allergies    Intolerances        SOCIAL HISTORY:  Denies ETOh,Smoking,     FAMILY HISTORY:  No pertinent family history in first degree relatives        REVIEW OF SYSTEMS:    CONSTITUTIONAL: No weakness, fevers or chills  RESPIRATORY: No cough, wheezing, hemoptysis; No shortness of breath  CARDIOVASCULAR: No chest pain or palpitations  GASTROINTESTINAL: No abdominal or epigastric pain. No nausea, vomiting,     No diarrhea or constipation. No melena   SKIN: dry                          8.3    4.88  )-----------( 227      ( 30 Nov 2022 10:00 )             29.7       CBC Full  -  ( 30 Nov 2022 10:00 )  WBC Count : 4.88 K/uL  RBC Count : 4.38 M/uL  Hemoglobin : 8.3 g/dL  Hematocrit : 29.7 %  Platelet Count - Automated : 227 K/uL  Mean Cell Volume : 67.8 fl  Mean Cell Hemoglobin : 18.9 pg  Mean Cell Hemoglobin Concentration : 27.9 gm/dL  Auto Neutrophil # : x  Auto Lymphocyte # : x  Auto Monocyte # : x  Auto Eosinophil # : x  Auto Basophil # : x  Auto Neutrophil % : x  Auto Lymphocyte % : x  Auto Monocyte % : x  Auto Eosinophil % : x  Auto Basophil % : x      11-30    136  |  100  |  14  ----------------------------<  288<H>  4.1   |  31  |  0.99    Ca    8.5      30 Nov 2022 10:00    TPro  6.8  /  Alb  2.7<L>  /  TBili  0.7  /  DBili  x   /  AST  24  /  ALT  16  /  AlkPhos  215<H>  11-30      CAPILLARY BLOOD GLUCOSE      POCT Blood Glucose.: 114 mg/dL (01 Dec 2022 16:50)  POCT Blood Glucose.: 281 mg/dL (01 Dec 2022 11:26)  POCT Blood Glucose.: 183 mg/dL (01 Dec 2022 07:48)  POCT Blood Glucose.: 313 mg/dL (30 Nov 2022 20:56)      Vital Signs Last 24 Hrs  T(C): 36.2 (01 Dec 2022 12:55), Max: 36.7 (30 Nov 2022 20:43)  T(F): 97.2 (01 Dec 2022 12:55), Max: 98.1 (01 Dec 2022 04:57)  HR: 70 (01 Dec 2022 12:55) (70 - 72)  BP: 134/78 (01 Dec 2022 12:55) (118/68 - 134/78)  BP(mean): --  RR: 18 (01 Dec 2022 12:55) (17 - 18)  SpO2: 95% (01 Dec 2022 12:55) (92% - 95%)    Parameters below as of 01 Dec 2022 12:55  Patient On (Oxygen Delivery Method): room air            PT/INR - ( 01 Dec 2022 06:48 )   PT: 24.5 sec;   INR: 2.08 ratio             PHYSICAL EXAM:    Constitutional: NAD  HEENT: conjunctive   clear   Neck:  No JVD  Respiratory: CTAB  Cardiovascular: S1 and S2  Gastrointestinal: BS+, soft, NT/ND  Extremities: No peripheral edema  Neurological:  no focal deficits          
[facility]  Shane Murray PA-C  [address] [place] [address]  [phone]      INTERVAL HPI/OVERNIGHT EVENTS:  Pt s/e with family member who translated for pt  Reports no new GI complaints  Denies overt GI bleeding    MEDICATIONS  (STANDING):  aspirin enteric coated 81 milliGRAM(s) Oral daily  atorvastatin 10 milliGRAM(s) Oral at bedtime  dextrose 5%. 1000 milliLiter(s) (100 mL/Hr) IV Continuous <Continuous>  dextrose 5%. 1000 milliLiter(s) (50 mL/Hr) IV Continuous <Continuous>  dextrose 50% Injectable 25 Gram(s) IV Push once  dextrose 50% Injectable 12.5 Gram(s) IV Push once  dextrose 50% Injectable 25 Gram(s) IV Push once  enoxaparin Injectable 70 milliGRAM(s) SubCutaneous every 12 hours  glucagon  Injectable 1 milliGRAM(s) IntraMuscular once  insulin glargine Injectable (LANTUS) 15 Unit(s) SubCutaneous every morning  insulin lispro (ADMELOG) corrective regimen sliding scale   SubCutaneous three times a day before meals  insulin lispro (ADMELOG) corrective regimen sliding scale   SubCutaneous at bedtime  insulin lispro Injectable (ADMELOG) 5 Unit(s) SubCutaneous three times a day before meals  levETIRAcetam 750 milliGRAM(s) Oral two times a day  pantoprazole   Suspension 40 milliGRAM(s) Oral two times a day  propranolol 20 milliGRAM(s) Oral two times a day  warfarin 2 milliGRAM(s) Oral once    MEDICATIONS  (PRN):  acetaminophen     Tablet .. 650 milliGRAM(s) Oral every 6 hours PRN Temp greater or equal to 38C (100.4F), Mild Pain (1 - 3)  dextrose Oral Gel 15 Gram(s) Oral once PRN Blood Glucose LESS THAN 70 milliGRAM(s)/deciliter  guaiFENesin Oral Liquid (Sugar-Free) 200 milliGRAM(s) Oral every 6 hours PRN Cough      Allergies    No Known Allergies      PHYSICAL EXAM:   Vital Signs:  Vital Signs Last 24 Hrs  T(C): 36.8 (02 Dec 2022 12:01), Max: 36.8 (02 Dec 2022 12:01)  T(F): 98.2 (02 Dec 2022 12:01), Max: 98.2 (02 Dec 2022 12:01)  HR: 71 (02 Dec 2022 12:01) (70 - 75)  BP: 121/76 (02 Dec 2022 12:01) (121/76 - 134/78)  BP(mean): --  RR: 18 (02 Dec 2022 12:01) (17 - 18)  SpO2: 97% (02 Dec 2022 12:01) (95% - 97%)    Parameters below as of 02 Dec 2022 12:01  Patient On (Oxygen Delivery Method): room air      GENERAL:  Appears stated age  ABDOMEN:  Soft, non-tender, non-distended  NEURO:  Alert    LABS:                        7.9    6.11  )-----------( 258      ( 02 Dec 2022 07:40 )             28.5     12-02    139  |  103  |  10  ----------------------------<  118<H>  3.9   |  29  |  0.76    Ca    8.4<L>      02 Dec 2022 07:40    TPro  6.4  /  Alb  2.6<L>  /  TBili  0.4  /  DBili  x   /  AST  23  /  ALT  18  /  AlkPhos  237<H>  12-02    PT/INR - ( 02 Dec 2022 07:40 )   PT: 26.8 sec;   INR: 2.27 ratio         
Date/Time Patient Seen:  		  Referring MD:   Data Reviewed	       Patient is a 67y old  Male who presents with a chief complaint of weakness (01 Dec 2022 13:49)      Subjective/HPI     PAST MEDICAL & SURGICAL HISTORY:  CVA (cerebral vascular accident)    HTN (hypertension)    DM (diabetes mellitus)    Arrhythmia    History of atrial fibrillation    S/P CABG (coronary artery bypass graft)    S/P brain surgery          Medication list         MEDICATIONS  (STANDING):  aspirin enteric coated 81 milliGRAM(s) Oral daily  atorvastatin 10 milliGRAM(s) Oral at bedtime  cefTRIAXone   IVPB 1000 milliGRAM(s) IV Intermittent every 24 hours  dextrose 5%. 1000 milliLiter(s) (100 mL/Hr) IV Continuous <Continuous>  dextrose 5%. 1000 milliLiter(s) (50 mL/Hr) IV Continuous <Continuous>  dextrose 50% Injectable 25 Gram(s) IV Push once  dextrose 50% Injectable 12.5 Gram(s) IV Push once  dextrose 50% Injectable 25 Gram(s) IV Push once  enoxaparin Injectable 70 milliGRAM(s) SubCutaneous every 12 hours  glucagon  Injectable 1 milliGRAM(s) IntraMuscular once  insulin glargine Injectable (LANTUS) 15 Unit(s) SubCutaneous every morning  insulin lispro (ADMELOG) corrective regimen sliding scale   SubCutaneous three times a day before meals  insulin lispro (ADMELOG) corrective regimen sliding scale   SubCutaneous at bedtime  insulin lispro Injectable (ADMELOG) 5 Unit(s) SubCutaneous three times a day before meals  levETIRAcetam 750 milliGRAM(s) Oral two times a day  pantoprazole   Suspension 40 milliGRAM(s) Oral two times a day  propranolol 20 milliGRAM(s) Oral two times a day    MEDICATIONS  (PRN):  acetaminophen     Tablet .. 650 milliGRAM(s) Oral every 6 hours PRN Temp greater or equal to 38C (100.4F), Mild Pain (1 - 3)  dextrose Oral Gel 15 Gram(s) Oral once PRN Blood Glucose LESS THAN 70 milliGRAM(s)/deciliter  guaiFENesin Oral Liquid (Sugar-Free) 200 milliGRAM(s) Oral every 6 hours PRN Cough         Vitals log        ICU Vital Signs Last 24 Hrs  T(C): 36.3 (01 Dec 2022 19:36), Max: 36.3 (01 Dec 2022 19:36)  T(F): 97.3 (01 Dec 2022 19:36), Max: 97.3 (01 Dec 2022 19:36)  HR: 73 (01 Dec 2022 19:36) (70 - 73)  BP: 129/71 (01 Dec 2022 19:36) (129/71 - 134/78)  BP(mean): --  ABP: --  ABP(mean): --  RR: 18 (01 Dec 2022 19:36) (18 - 18)  SpO2: 96% (01 Dec 2022 19:36) (95% - 96%)    O2 Parameters below as of 01 Dec 2022 19:36  Patient On (Oxygen Delivery Method): room air                 Input and Output:  I&O's Detail      Lab Data                        8.3    4.88  )-----------( 227      ( 30 Nov 2022 10:00 )             29.7     11-30    136  |  100  |  14  ----------------------------<  288<H>  4.1   |  31  |  0.99    Ca    8.5      30 Nov 2022 10:00    TPro  6.8  /  Alb  2.7<L>  /  TBili  0.7  /  DBili  x   /  AST  24  /  ALT  16  /  AlkPhos  215<H>  11-30            Review of Systems	      Objective     Physical Examination  heart s1s2  lung dc BS  head nc        Pertinent Lab findings & Imaging      Bryan:  NO   Adequate UO     I&O's Detail           Discussed with:     Cultures:	        Radiology                            
Date/Time Patient Seen:  		  Referring MD:   Data Reviewed	       Patient is a 67y old  Male who presents with a chief complaint of weakness (03 Dec 2022 15:21)      Subjective/HPI     PAST MEDICAL & SURGICAL HISTORY:  CVA (cerebral vascular accident)    HTN (hypertension)    DM (diabetes mellitus)    Arrhythmia    History of atrial fibrillation    S/P CABG (coronary artery bypass graft)    S/P brain surgery          Medication list         MEDICATIONS  (STANDING):  aspirin enteric coated 81 milliGRAM(s) Oral daily  atorvastatin 10 milliGRAM(s) Oral at bedtime  dextrose 5%. 1000 milliLiter(s) (100 mL/Hr) IV Continuous <Continuous>  dextrose 5%. 1000 milliLiter(s) (50 mL/Hr) IV Continuous <Continuous>  dextrose 50% Injectable 12.5 Gram(s) IV Push once  dextrose 50% Injectable 25 Gram(s) IV Push once  dextrose 50% Injectable 25 Gram(s) IV Push once  enoxaparin Injectable 70 milliGRAM(s) SubCutaneous every 12 hours  glucagon  Injectable 1 milliGRAM(s) IntraMuscular once  insulin glargine Injectable (LANTUS) 15 Unit(s) SubCutaneous every morning  insulin lispro (ADMELOG) corrective regimen sliding scale   SubCutaneous three times a day before meals  insulin lispro (ADMELOG) corrective regimen sliding scale   SubCutaneous at bedtime  insulin lispro Injectable (ADMELOG) 5 Unit(s) SubCutaneous three times a day before meals  levETIRAcetam 750 milliGRAM(s) Oral two times a day  pantoprazole   Suspension 40 milliGRAM(s) Oral two times a day  propranolol 20 milliGRAM(s) Oral two times a day    MEDICATIONS  (PRN):  acetaminophen     Tablet .. 650 milliGRAM(s) Oral every 6 hours PRN Temp greater or equal to 38C (100.4F), Mild Pain (1 - 3)  dextrose Oral Gel 15 Gram(s) Oral once PRN Blood Glucose LESS THAN 70 milliGRAM(s)/deciliter  guaiFENesin Oral Liquid (Sugar-Free) 200 milliGRAM(s) Oral every 6 hours PRN Cough         Vitals log        ICU Vital Signs Last 24 Hrs  T(C): 36.4 (04 Dec 2022 04:44), Max: 36.7 (03 Dec 2022 11:47)  T(F): 97.5 (04 Dec 2022 04:44), Max: 98.1 (03 Dec 2022 11:47)  HR: 74 (04 Dec 2022 04:44) (67 - 74)  BP: 147/73 (04 Dec 2022 04:44) (110/62 - 147/73)  BP(mean): --  ABP: --  ABP(mean): --  RR: 18 (04 Dec 2022 04:44) (18 - 18)  SpO2: 98% (04 Dec 2022 04:44) (96% - 98%)    O2 Parameters below as of 04 Dec 2022 04:44  Patient On (Oxygen Delivery Method): room air                 Input and Output:  I&O's Detail    02 Dec 2022 07:01  -  03 Dec 2022 07:00  --------------------------------------------------------  IN:  Total IN: 0 mL    OUT:    Voided (mL): 780 mL  Total OUT: 780 mL    Total NET: -780 mL          Lab Data                        7.9    7.13  )-----------( 289      ( 03 Dec 2022 05:57 )             29.5     12-02    139  |  103  |  10  ----------------------------<  118<H>  3.9   |  29  |  0.76    Ca    8.4<L>      02 Dec 2022 07:40    TPro  6.4  /  Alb  2.6<L>  /  TBili  0.4  /  DBili  x   /  AST  23  /  ALT  18  /  AlkPhos  237<H>  12-02            Review of Systems	      Objective     Physical Examination  heart s1s2  lung dec BS  head nc        Pertinent Lab findings & Imaging      Bryan:  NO   Adequate UO     I&O's Detail    02 Dec 2022 07:01  -  03 Dec 2022 07:00  --------------------------------------------------------  IN:  Total IN: 0 mL    OUT:    Voided (mL): 780 mL  Total OUT: 780 mL    Total NET: -780 mL               Discussed with:     Cultures:	        Radiology                            
[facility]  ADAM Nava Y. Patel, S. Shah, G. Chris  [phone]  ([phone] - weekdays after 5pm and weekends)    Name: ARNIE DELGADILLO  Age/Gender: 67y Male  MRN: [id number]    Interval History:  Patient seen this afternoon  Resting comfortably.   Notes reviewed. Afebrile   Allergies: No Known Allergies      Objective:  Vitals:   T(F): 97.8 (12-03-22 @ 20:43), Max: 98.2 (12-03-22 @ 05:10)  HR: 68 (12-03-22 @ 20:43) (67 - 76)  BP: 110/62 (12-03-22 @ 20:43) (110/62 - 132/88)  RR: 18 (12-03-22 @ 20:43) (18 - 18)  SpO2: 96% (12-03-22 @ 20:43) (95% - 96%)  Physical Examination:  General: no acute distress  HEENT: NC/AT, anicteric, neck supple  Respiratory: no acc muscle use, breathing comfortably  Cardiovascular: S1 and S2 present  Gastrointestinal: normal appearing, nondistended  Extremities: no edema, no cyanosis  Skin: no visible rash    Laboratory Studies:  CBC:                       7.9    7.13  )-----------( 289      ( 03 Dec 2022 05:57 )             29.5     WBC Trend:  7.13 12-03-22 @ 05:57  6.11 12-02-22 @ 07:40  4.88 11-30-22 @ 10:00  5.13 11-30-22 @ 06:35  6.54 11-28-22 @ 09:30    CMP: 12-02    139  |  103  |  10  ----------------------------<  118<H>  3.9   |  29  |  0.76    Ca    8.4<L>      02 Dec 2022 07:40    TPro  6.4  /  Alb  2.6<L>  /  TBili  0.4  /  DBili  x   /  AST  23  /  ALT  18  /  AlkPhos  237<H>  12-02    Creatinine, Serum: 0.76 mg/dL (12-02-22 @ 07:40)  Creatinine, Serum: 0.99 mg/dL (11-30-22 @ 10:00)  Creatinine, Serum: 0.88 mg/dL (11-30-22 @ 06:35)  Creatinine, Serum: 1.30 mg/dL (11-28-22 @ 09:30)    LIVER FUNCTIONS - ( 02 Dec 2022 07:40 )  Alb: 2.6 g/dL / Pro: 6.4 g/dL / ALK PHOS: 237 U/L / ALT: 18 U/L / AST: 23 U/L / GGT: x           Microbiology: reviewed   Culture - Urine (collected 11-28-22 @ 12:20)  Source: Clean Catch Clean Catch (Midstream)  Final Report (11-29-22 @ 15:49):    No growth    Culture - Blood (collected 11-28-22 @ 09:30)  Source: .Blood Blood-Peripheral  Final Report (12-03-22 @ 17:01):    No Growth Final    Culture - Blood (collected 11-28-22 @ 09:20)  Source: .Blood Blood-Peripheral  Final Report (12-03-22 @ 17:01):    No Growth Final    COVID-19 PCR: NotDetec (21 Nov 2022 16:00)    Radiology: reviewed     Medications:  acetaminophen     Tablet .. 650 milliGRAM(s) Oral every 6 hours PRN  aspirin enteric coated 81 milliGRAM(s) Oral daily  atorvastatin 10 milliGRAM(s) Oral at bedtime  dextrose 5%. 1000 milliLiter(s) IV Continuous <Continuous>  dextrose 5%. 1000 milliLiter(s) IV Continuous <Continuous>  dextrose 50% Injectable 25 Gram(s) IV Push once  dextrose 50% Injectable 25 Gram(s) IV Push once  dextrose 50% Injectable 12.5 Gram(s) IV Push once  dextrose Oral Gel 15 Gram(s) Oral once PRN  enoxaparin Injectable 70 milliGRAM(s) SubCutaneous every 12 hours  glucagon  Injectable 1 milliGRAM(s) IntraMuscular once  guaiFENesin Oral Liquid (Sugar-Free) 200 milliGRAM(s) Oral every 6 hours PRN  insulin glargine Injectable (LANTUS) 15 Unit(s) SubCutaneous every morning  insulin lispro (ADMELOG) corrective regimen sliding scale   SubCutaneous three times a day before meals  insulin lispro (ADMELOG) corrective regimen sliding scale   SubCutaneous at bedtime  insulin lispro Injectable (ADMELOG) 5 Unit(s) SubCutaneous three times a day before meals  levETIRAcetam 750 milliGRAM(s) Oral two times a day  pantoprazole   Suspension 40 milliGRAM(s) Oral two times a day  propranolol 20 milliGRAM(s) Oral two times a day    Current Antimicrobials:    Prior/Completed Antimicrobials:  cefTRIAXone   IVPB  piperacillin/tazobactam IVPB...  
[facility]  Guru Michel MD PhD, Lisa James MD, Becca Acosta MD, Karen Grimes MD, Corky Perez MD  and providing coverage with Carlos Marx MD  Providing Infectious Disease Consultations at [facility], [facility], [facility]'s    Office# [phone] to schedule follow up appointments  Answering Service for urgent calls or New Consults [phone]  Cell# to text for urgent issues Guru Michel [phone]     infectious diseases progress note:    ARNIE DELGADILLO is a 67y y. o. Male patient    Overnight and events of the last 24hrs reviewed    Allergies    No Known Allergies    Intolerances        ANTIBIOTICS/RELEVANT:  antimicrobials  piperacillin/tazobactam IVPB.. 3.375 Gram(s) IV Intermittent every 8 hours    immunologic:    OTHER:  acetaminophen     Tablet .. 650 milliGRAM(s) Oral every 6 hours PRN  aspirin enteric coated 81 milliGRAM(s) Oral daily  atorvastatin 10 milliGRAM(s) Oral at bedtime  dextrose 5%. 1000 milliLiter(s) IV Continuous <Continuous>  dextrose 5%. 1000 milliLiter(s) IV Continuous <Continuous>  dextrose 50% Injectable 25 Gram(s) IV Push once  dextrose 50% Injectable 12.5 Gram(s) IV Push once  dextrose 50% Injectable 25 Gram(s) IV Push once  dextrose Oral Gel 15 Gram(s) Oral once PRN  enoxaparin Injectable 70 milliGRAM(s) SubCutaneous every 12 hours  famotidine Injectable 20 milliGRAM(s) IV Push every 24 hours  glucagon  Injectable 1 milliGRAM(s) IntraMuscular once  guaiFENesin Oral Liquid (Sugar-Free) 200 milliGRAM(s) Oral every 6 hours PRN  insulin lispro (ADMELOG) corrective regimen sliding scale   SubCutaneous at bedtime  insulin lispro (ADMELOG) corrective regimen sliding scale   SubCutaneous three times a day before meals  levETIRAcetam 750 milliGRAM(s) Oral two times a day  pantoprazole   Suspension 40 milliGRAM(s) Oral two times a day  propranolol 20 milliGRAM(s) Oral two times a day  warfarin 3 milliGRAM(s) Oral once      Objective:  Vital Signs Last 24 Hrs  T(C): 36.3 (30 Nov 2022 12:05), Max: 36.6 (30 Nov 2022 05:08)  T(F): 97.3 (30 Nov 2022 12:05), Max: 97.8 (30 Nov 2022 05:08)  HR: 69 (30 Nov 2022 12:05) (62 - 74)  BP: 123/78 (30 Nov 2022 12:05) (115/75 - 131/84)  BP(mean): --  RR: 18 (30 Nov 2022 12:05) (17 - 18)  SpO2: 98% (30 Nov 2022 12:05) (95% - 98%)    Parameters below as of 30 Nov 2022 12:05  Patient On (Oxygen Delivery Method): room air        T(C): 36.3 (11-30-22 @ 12:05), Max: 36.9 (11-29-22 @ 05:10)  T(C): 36.3 (11-30-22 @ 12:05), Max: 37.1 (11-28-22 @ 08:53)  T(C): 36.3 (11-30-22 @ 12:05), Max: 37.1 (11-28-22 @ 08:53)    PHYSICAL EXAM:  HEENT: NC atraumatic  Neck: supple  Respiratory: no accessory muscle use, breathing comfortably  Cardiovascular: distant  Gastrointestinal: normal appearing, nondistended  Extremities: no clubbing, no cyanosis,        LABS:                          8.3    4.88  )-----------( 227      ( 30 Nov 2022 10:00 )             29.7       WBC  4.88 11-30 @ 10:00  5.13 11-30 @ 06:35  6.54 11-28 @ 09:30  8.56 11-24 @ 07:30      11-30    136  |  100  |  14  ----------------------------<  288<H>  4.1   |  31  |  0.99    Ca    8.5      30 Nov 2022 10:00  Mg     1.7     11-29    TPro  6.8  /  Alb  2.7<L>  /  TBili  0.7  /  DBili  x   /  AST  24  /  ALT  16  /  AlkPhos  215<H>  11-30      Creatinine, Serum: 0.99 mg/dL (11-30-22 @ 10:00)  Creatinine, Serum: 0.88 mg/dL (11-30-22 @ 06:35)  Creatinine, Serum: 1.30 mg/dL (11-28-22 @ 09:30)  Creatinine, Serum: 1.10 mg/dL (11-24-22 @ 07:30)      PT/INR - ( 30 Nov 2022 10:00 )   PT: 25.4 sec;   INR: 2.15 ratio         PTT - ( 29 Nov 2022 06:05 )  PTT:54.1 sec          INFLAMMATORY MARKERS      MICROBIOLOGY:              RADIOLOGY & ADDITIONAL STUDIES:  
CAPILLARY BLOOD GLUCOSE      POCT Blood Glucose.: 110 mg/dL (02 Dec 2022 07:57)  POCT Blood Glucose.: 113 mg/dL (01 Dec 2022 20:38)  POCT Blood Glucose.: 114 mg/dL (01 Dec 2022 16:50)  POCT Blood Glucose.: 281 mg/dL (01 Dec 2022 11:26)      Vital Signs Last 24 Hrs  T(C): 36.4 (02 Dec 2022 05:31), Max: 36.4 (02 Dec 2022 05:31)  T(F): 97.5 (02 Dec 2022 05:31), Max: 97.5 (02 Dec 2022 05:31)  HR: 75 (02 Dec 2022 05:31) (70 - 75)  BP: 126/75 (02 Dec 2022 05:31) (126/75 - 134/78)  BP(mean): --  RR: 17 (02 Dec 2022 05:31) (17 - 18)  SpO2: 97% (02 Dec 2022 05:31) (95% - 97%)    Parameters below as of 02 Dec 2022 05:31  Patient On (Oxygen Delivery Method): room air        General: WN/WD NAD  Respiratory: CTA B/L  CV: RRR, S1S2, no murmurs, rubs or gallops  Abdominal: Soft, NT, ND +BS, Last BM  Extremities: No edema, + peripheral pulses     11-30    136  |  100  |  14  ----------------------------<  288<H>  4.1   |  31  |  0.99    Ca    8.5      30 Nov 2022 10:00    TPro  6.8  /  Alb  2.7<L>  /  TBili  0.7  /  DBili  x   /  AST  24  /  ALT  16  /  AlkPhos  215<H>  11-30      atorvastatin 10 milliGRAM(s) Oral at bedtime  dextrose 50% Injectable 25 Gram(s) IV Push once  dextrose 50% Injectable 12.5 Gram(s) IV Push once  dextrose 50% Injectable 25 Gram(s) IV Push once  dextrose Oral Gel 15 Gram(s) Oral once PRN  glucagon  Injectable 1 milliGRAM(s) IntraMuscular once  insulin glargine Injectable (LANTUS) 15 Unit(s) SubCutaneous every morning  insulin lispro (ADMELOG) corrective regimen sliding scale   SubCutaneous three times a day before meals  insulin lispro (ADMELOG) corrective regimen sliding scale   SubCutaneous at bedtime  insulin lispro Injectable (ADMELOG) 5 Unit(s) SubCutaneous three times a day before meals

## 2022-12-04 NOTE — DISCHARGE NOTE PROVIDER - CARE PROVIDER_API CALL
Roberta Rios)  Internal Medicine  [id number] [place] [address]  Phone: [phone]  Fax: [phone]  Follow Up Time:

## 2022-12-04 NOTE — PROGRESS NOTE ADULT - NUTRITIONAL ASSESSMENT
MEDICATIONS  (STANDING):  aspirin enteric coated 81 milliGRAM(s) Oral daily  atorvastatin 10 milliGRAM(s) Oral at bedtime  cefTRIAXone   IVPB 1000 milliGRAM(s) IV Intermittent every 24 hours  dextrose 5%. 1000 milliLiter(s) (100 mL/Hr) IV Continuous <Continuous>  dextrose 5%. 1000 milliLiter(s) (50 mL/Hr) IV Continuous <Continuous>  dextrose 50% Injectable 25 Gram(s) IV Push once  dextrose 50% Injectable 12.5 Gram(s) IV Push once  dextrose 50% Injectable 25 Gram(s) IV Push once  enoxaparin Injectable 70 milliGRAM(s) SubCutaneous every 12 hours  famotidine Injectable 20 milliGRAM(s) IV Push every 24 hours  glucagon  Injectable 1 milliGRAM(s) IntraMuscular once  insulin lispro (ADMELOG) corrective regimen sliding scale   SubCutaneous at bedtime  insulin lispro (ADMELOG) corrective regimen sliding scale   SubCutaneous three times a day before meals  levETIRAcetam 750 milliGRAM(s) Oral two times a day  pantoprazole   Suspension 40 milliGRAM(s) Oral two times a day  propranolol 20 milliGRAM(s) Oral two times a day  warfarin 3 milliGRAM(s) Oral once
MEDICATIONS  (STANDING):  aspirin enteric coated 81 milliGRAM(s) Oral daily  atorvastatin 10 milliGRAM(s) Oral at bedtime  dextrose 5%. 1000 milliLiter(s) (50 mL/Hr) IV Continuous <Continuous>  dextrose 5%. 1000 milliLiter(s) (100 mL/Hr) IV Continuous <Continuous>  dextrose 50% Injectable 25 Gram(s) IV Push once  dextrose 50% Injectable 12.5 Gram(s) IV Push once  dextrose 50% Injectable 25 Gram(s) IV Push once  enoxaparin Injectable 70 milliGRAM(s) SubCutaneous every 12 hours  glucagon  Injectable 1 milliGRAM(s) IntraMuscular once  insulin glargine Injectable (LANTUS) 15 Unit(s) SubCutaneous every morning  insulin lispro (ADMELOG) corrective regimen sliding scale   SubCutaneous three times a day before meals  insulin lispro (ADMELOG) corrective regimen sliding scale   SubCutaneous at bedtime  insulin lispro Injectable (ADMELOG) 5 Unit(s) SubCutaneous three times a day before meals  levETIRAcetam 750 milliGRAM(s) Oral two times a day  pantoprazole   Suspension 40 milliGRAM(s) Oral two times a day  propranolol 20 milliGRAM(s) Oral two times a day  warfarin 2 milliGRAM(s) Oral once
MEDICATIONS  (STANDING):  aspirin enteric coated 81 milliGRAM(s) Oral daily  atorvastatin 10 milliGRAM(s) Oral at bedtime  cefTRIAXone   IVPB 1000 milliGRAM(s) IV Intermittent every 24 hours  dextrose 5%. 1000 milliLiter(s) (100 mL/Hr) IV Continuous <Continuous>  dextrose 5%. 1000 milliLiter(s) (50 mL/Hr) IV Continuous <Continuous>  dextrose 50% Injectable 25 Gram(s) IV Push once  dextrose 50% Injectable 12.5 Gram(s) IV Push once  dextrose 50% Injectable 25 Gram(s) IV Push once  enoxaparin Injectable 70 milliGRAM(s) SubCutaneous every 12 hours  famotidine Injectable 20 milliGRAM(s) IV Push every 24 hours  glucagon  Injectable 1 milliGRAM(s) IntraMuscular once  insulin lispro (ADMELOG) corrective regimen sliding scale   SubCutaneous at bedtime  insulin lispro (ADMELOG) corrective regimen sliding scale   SubCutaneous three times a day before meals  levETIRAcetam 750 milliGRAM(s) Oral two times a day  pantoprazole   Suspension 40 milliGRAM(s) Oral two times a day  propranolol 20 milliGRAM(s) Oral two times a day  warfarin 3 milliGRAM(s) Oral once
MEDICATIONS  (STANDING):  aspirin enteric coated 81 milliGRAM(s) Oral daily  atorvastatin 10 milliGRAM(s) Oral at bedtime  dextrose 5%. 1000 milliLiter(s) (50 mL/Hr) IV Continuous <Continuous>  dextrose 5%. 1000 milliLiter(s) (100 mL/Hr) IV Continuous <Continuous>  dextrose 50% Injectable 25 Gram(s) IV Push once  dextrose 50% Injectable 12.5 Gram(s) IV Push once  dextrose 50% Injectable 25 Gram(s) IV Push once  enoxaparin Injectable 70 milliGRAM(s) SubCutaneous every 12 hours  glucagon  Injectable 1 milliGRAM(s) IntraMuscular once  insulin glargine Injectable (LANTUS) 15 Unit(s) SubCutaneous every morning  insulin lispro (ADMELOG) corrective regimen sliding scale   SubCutaneous three times a day before meals  insulin lispro (ADMELOG) corrective regimen sliding scale   SubCutaneous at bedtime  insulin lispro Injectable (ADMELOG) 5 Unit(s) SubCutaneous three times a day before meals  levETIRAcetam 750 milliGRAM(s) Oral two times a day  pantoprazole   Suspension 40 milliGRAM(s) Oral two times a day  propranolol 20 milliGRAM(s) Oral two times a day  warfarin 2 milliGRAM(s) Oral once
MEDICATIONS  (STANDING):  aspirin enteric coated 81 milliGRAM(s) Oral daily  atorvastatin 10 milliGRAM(s) Oral at bedtime  dextrose 5%. 1000 milliLiter(s) (50 mL/Hr) IV Continuous <Continuous>  dextrose 5%. 1000 milliLiter(s) (100 mL/Hr) IV Continuous <Continuous>  dextrose 50% Injectable 25 Gram(s) IV Push once  dextrose 50% Injectable 12.5 Gram(s) IV Push once  dextrose 50% Injectable 25 Gram(s) IV Push once  enoxaparin Injectable 70 milliGRAM(s) SubCutaneous every 12 hours  glucagon  Injectable 1 milliGRAM(s) IntraMuscular once  insulin glargine Injectable (LANTUS) 15 Unit(s) SubCutaneous every morning  insulin lispro (ADMELOG) corrective regimen sliding scale   SubCutaneous three times a day before meals  insulin lispro (ADMELOG) corrective regimen sliding scale   SubCutaneous at bedtime  insulin lispro Injectable (ADMELOG) 5 Unit(s) SubCutaneous three times a day before meals  levETIRAcetam 750 milliGRAM(s) Oral two times a day  pantoprazole   Suspension 40 milliGRAM(s) Oral two times a day  propranolol 20 milliGRAM(s) Oral two times a day  warfarin 2 milliGRAM(s) Oral once

## 2022-12-04 NOTE — DISCHARGE NOTE PROVIDER - NSDCCPCAREPLAN_GEN_ALL_CORE_FT
PRINCIPAL DISCHARGE DIAGNOSIS  Diagnosis: Pneumonia  Assessment and Plan of Treatment:       SECONDARY DISCHARGE DIAGNOSES  Diagnosis: Fatigue  Assessment and Plan of Treatment:     Diagnosis: Decreased appetite  Assessment and Plan of Treatment:     Diagnosis: Sepsis, unspecified organism  Assessment and Plan of Treatment:

## 2023-01-01 ENCOUNTER — INPATIENT (INPATIENT)
Facility: HOSPITAL | Age: 68
LOS: 8 days | Discharge: HOME | DRG: 256 | End: 2023-06-14
Attending: INTERNAL MEDICINE | Admitting: INTERNAL MEDICINE
Payer: MEDICARE

## 2023-01-01 ENCOUNTER — OUTPATIENT (OUTPATIENT)
Dept: OUTPATIENT SERVICES | Facility: HOSPITAL | Age: 68
LOS: 1 days | End: 2023-01-01
Payer: MEDICARE

## 2023-01-01 ENCOUNTER — INPATIENT (INPATIENT)
Facility: HOSPITAL | Age: 68
LOS: 14 days | Discharge: HOME | DRG: 813 | End: 2023-10-20
Attending: INTERNAL MEDICINE | Admitting: INTERNAL MEDICINE
Payer: MEDICARE

## 2023-01-01 VITALS
SYSTOLIC BLOOD PRESSURE: 115 MMHG | OXYGEN SATURATION: 97 % | DIASTOLIC BLOOD PRESSURE: 73 MMHG | TEMPERATURE: 97 F | HEART RATE: 63 BPM | RESPIRATION RATE: 18 BRPM

## 2023-01-01 VITALS
SYSTOLIC BLOOD PRESSURE: 122 MMHG | DIASTOLIC BLOOD PRESSURE: 75 MMHG | TEMPERATURE: 98 F | HEART RATE: 67 BPM | OXYGEN SATURATION: 95 % | RESPIRATION RATE: 18 BRPM

## 2023-01-01 VITALS
OXYGEN SATURATION: 98 % | SYSTOLIC BLOOD PRESSURE: 101 MMHG | RESPIRATION RATE: 18 BRPM | TEMPERATURE: 98 F | HEART RATE: 69 BPM | DIASTOLIC BLOOD PRESSURE: 64 MMHG | HEIGHT: 70 IN | WEIGHT: 130.07 LBS

## 2023-01-01 VITALS
RESPIRATION RATE: 15 BRPM | HEIGHT: 70 IN | WEIGHT: 130.07 LBS | OXYGEN SATURATION: 100 % | DIASTOLIC BLOOD PRESSURE: 72 MMHG | TEMPERATURE: 97 F | SYSTOLIC BLOOD PRESSURE: 109 MMHG | HEART RATE: 53 BPM

## 2023-01-01 VITALS
OXYGEN SATURATION: 100 % | DIASTOLIC BLOOD PRESSURE: 58 MMHG | RESPIRATION RATE: 16 BRPM | SYSTOLIC BLOOD PRESSURE: 92 MMHG | WEIGHT: 139.99 LBS | HEART RATE: 82 BPM | TEMPERATURE: 97 F

## 2023-01-01 DIAGNOSIS — E11.65 TYPE 2 DIABETES MELLITUS WITH HYPERGLYCEMIA: ICD-10-CM

## 2023-01-01 DIAGNOSIS — I96 GANGRENE, NOT ELSEWHERE CLASSIFIED: ICD-10-CM

## 2023-01-01 DIAGNOSIS — Z95.1 PRESENCE OF AORTOCORONARY BYPASS GRAFT: Chronic | ICD-10-CM

## 2023-01-01 DIAGNOSIS — E11.40 TYPE 2 DIABETES MELLITUS WITH DIABETIC NEUROPATHY, UNSPECIFIED: ICD-10-CM

## 2023-01-01 DIAGNOSIS — Z98.890 OTHER SPECIFIED POSTPROCEDURAL STATES: Chronic | ICD-10-CM

## 2023-01-01 DIAGNOSIS — K92.2 GASTROINTESTINAL HEMORRHAGE, UNSPECIFIED: ICD-10-CM

## 2023-01-01 DIAGNOSIS — E11.49 TYPE 2 DIABETES MELLITUS WITH OTHER DIABETIC NEUROLOGICAL COMPLICATION: ICD-10-CM

## 2023-01-01 DIAGNOSIS — L97.524 NON-PRESSURE CHRONIC ULCER OF OTHER PART OF LEFT FOOT WITH NECROSIS OF BONE: ICD-10-CM

## 2023-01-01 DIAGNOSIS — Z01.818 ENCOUNTER FOR OTHER PREPROCEDURAL EXAMINATION: ICD-10-CM

## 2023-01-01 LAB
-  AMPICILLIN/SULBACTAM: SIGNIFICANT CHANGE UP
-  CEFAZOLIN: SIGNIFICANT CHANGE UP
-  CLINDAMYCIN: SIGNIFICANT CHANGE UP
-  ERYTHROMYCIN: SIGNIFICANT CHANGE UP
-  GENTAMICIN: SIGNIFICANT CHANGE UP
-  OXACILLIN: SIGNIFICANT CHANGE UP
-  RIFAMPIN: SIGNIFICANT CHANGE UP
-  TETRACYCLINE: SIGNIFICANT CHANGE UP
-  TRIMETHOPRIM/SULFAMETHOXAZOLE: SIGNIFICANT CHANGE UP
-  VANCOMYCIN: SIGNIFICANT CHANGE UP
A1C WITH ESTIMATED AVERAGE GLUCOSE RESULT: 7.3 % — HIGH (ref 4–5.6)
A1C WITH ESTIMATED AVERAGE GLUCOSE RESULT: 7.6 % — HIGH (ref 4–5.6)
A1C WITH ESTIMATED AVERAGE GLUCOSE RESULT: 9.4 % — HIGH (ref 4–5.6)
ALBUMIN SERPL ELPH-MCNC: 2.4 G/DL — LOW (ref 3.3–5)
ALBUMIN SERPL ELPH-MCNC: 2.4 G/DL — LOW (ref 3.3–5)
ALBUMIN SERPL ELPH-MCNC: 2.5 G/DL — LOW (ref 3.3–5)
ALBUMIN SERPL ELPH-MCNC: 2.6 G/DL — LOW (ref 3.3–5)
ALBUMIN SERPL ELPH-MCNC: 2.6 G/DL — LOW (ref 3.3–5)
ALBUMIN SERPL ELPH-MCNC: 2.7 G/DL — LOW (ref 3.3–5)
ALBUMIN SERPL ELPH-MCNC: 2.9 G/DL — LOW (ref 3.3–5)
ALBUMIN SERPL ELPH-MCNC: 3 G/DL — LOW (ref 3.3–5)
ALBUMIN SERPL ELPH-MCNC: 3.1 G/DL — LOW (ref 3.3–5)
ALBUMIN SERPL ELPH-MCNC: 3.2 G/DL — LOW (ref 3.3–5)
ALBUMIN SERPL ELPH-MCNC: 3.4 G/DL — SIGNIFICANT CHANGE UP (ref 3.3–5)
ALP SERPL-CCNC: 316 U/L — HIGH (ref 40–120)
ALP SERPL-CCNC: 347 U/L — HIGH (ref 40–120)
ALP SERPL-CCNC: 364 U/L — HIGH (ref 40–120)
ALP SERPL-CCNC: 383 U/L — HIGH (ref 40–120)
ALP SERPL-CCNC: 418 U/L — HIGH (ref 40–120)
ALP SERPL-CCNC: 479 U/L — HIGH (ref 40–120)
ALP SERPL-CCNC: 497 U/L — HIGH (ref 40–120)
ALP SERPL-CCNC: 569 U/L — HIGH (ref 40–120)
ALP SERPL-CCNC: 580 U/L — HIGH (ref 40–120)
ALP SERPL-CCNC: 603 U/L — HIGH (ref 40–120)
ALP SERPL-CCNC: 631 U/L — HIGH (ref 40–120)
ALP SERPL-CCNC: 661 U/L — HIGH (ref 40–120)
ALP SERPL-CCNC: 665 U/L — HIGH (ref 40–120)
ALP SERPL-CCNC: 769 U/L — HIGH (ref 40–120)
ALP SERPL-CCNC: 933 U/L — HIGH (ref 40–120)
ALP SERPL-CCNC: 957 U/L — HIGH (ref 40–120)
ALP SERPL-CCNC: 957 U/L — HIGH (ref 40–120)
ALP SERPL-CCNC: 980 U/L — HIGH (ref 40–120)
ALP SERPL-CCNC: 986 U/L — HIGH (ref 40–120)
ALP SERPL-CCNC: 986 U/L — HIGH (ref 40–120)
ALP SERPL-CCNC: 987 U/L — HIGH (ref 40–120)
ALP SERPL-CCNC: 987 U/L — HIGH (ref 40–120)
ALT FLD-CCNC: 11 U/L — LOW (ref 12–78)
ALT FLD-CCNC: 11 U/L — LOW (ref 12–78)
ALT FLD-CCNC: 12 U/L — SIGNIFICANT CHANGE UP (ref 12–78)
ALT FLD-CCNC: 12 U/L — SIGNIFICANT CHANGE UP (ref 12–78)
ALT FLD-CCNC: 15 U/L — SIGNIFICANT CHANGE UP (ref 12–78)
ALT FLD-CCNC: 15 U/L — SIGNIFICANT CHANGE UP (ref 12–78)
ALT FLD-CCNC: 16 U/L — SIGNIFICANT CHANGE UP (ref 12–78)
ALT FLD-CCNC: 16 U/L — SIGNIFICANT CHANGE UP (ref 12–78)
ALT FLD-CCNC: 17 U/L — SIGNIFICANT CHANGE UP (ref 12–78)
ALT FLD-CCNC: 20 U/L — SIGNIFICANT CHANGE UP (ref 12–78)
ALT FLD-CCNC: 21 U/L — SIGNIFICANT CHANGE UP (ref 12–78)
ALT FLD-CCNC: 21 U/L — SIGNIFICANT CHANGE UP (ref 12–78)
ALT FLD-CCNC: 23 U/L — SIGNIFICANT CHANGE UP (ref 12–78)
ALT FLD-CCNC: 26 U/L — SIGNIFICANT CHANGE UP (ref 12–78)
ALT FLD-CCNC: 29 U/L — SIGNIFICANT CHANGE UP (ref 12–78)
ALT FLD-CCNC: 35 U/L — SIGNIFICANT CHANGE UP (ref 12–78)
ALT FLD-CCNC: 35 U/L — SIGNIFICANT CHANGE UP (ref 12–78)
ALT FLD-CCNC: 39 U/L — SIGNIFICANT CHANGE UP (ref 12–78)
ALT FLD-CCNC: 40 U/L — SIGNIFICANT CHANGE UP (ref 12–78)
ALT FLD-CCNC: 42 U/L — SIGNIFICANT CHANGE UP (ref 12–78)
ALT FLD-CCNC: 42 U/L — SIGNIFICANT CHANGE UP (ref 12–78)
ALT FLD-CCNC: 46 U/L — SIGNIFICANT CHANGE UP (ref 12–78)
ANION GAP SERPL CALC-SCNC: 10 MMOL/L — SIGNIFICANT CHANGE UP (ref 5–17)
ANION GAP SERPL CALC-SCNC: 10 MMOL/L — SIGNIFICANT CHANGE UP (ref 5–17)
ANION GAP SERPL CALC-SCNC: 11 MMOL/L — SIGNIFICANT CHANGE UP (ref 5–17)
ANION GAP SERPL CALC-SCNC: 3 MMOL/L — LOW (ref 5–17)
ANION GAP SERPL CALC-SCNC: 3 MMOL/L — LOW (ref 5–17)
ANION GAP SERPL CALC-SCNC: 4 MMOL/L — LOW (ref 5–17)
ANION GAP SERPL CALC-SCNC: 5 MMOL/L — SIGNIFICANT CHANGE UP (ref 5–17)
ANION GAP SERPL CALC-SCNC: 6 MMOL/L — SIGNIFICANT CHANGE UP (ref 5–17)
ANION GAP SERPL CALC-SCNC: 7 MMOL/L — SIGNIFICANT CHANGE UP (ref 5–17)
ANION GAP SERPL CALC-SCNC: 8 MMOL/L — SIGNIFICANT CHANGE UP (ref 5–17)
ANION GAP SERPL CALC-SCNC: 8 MMOL/L — SIGNIFICANT CHANGE UP (ref 5–17)
ANION GAP SERPL CALC-SCNC: 9 MMOL/L — SIGNIFICANT CHANGE UP (ref 5–17)
ANISOCYTOSIS BLD QL: SLIGHT — SIGNIFICANT CHANGE UP
APTT BLD: 103.1 SEC — HIGH (ref 24.5–35.6)
APTT BLD: 127.3 SEC — CRITICAL HIGH (ref 24.5–35.6)
APTT BLD: 134.9 SEC — CRITICAL HIGH (ref 24.5–35.6)
APTT BLD: 159.5 SEC — CRITICAL HIGH (ref 24.5–35.6)
APTT BLD: 183.1 SEC — CRITICAL HIGH (ref 24.5–35.6)
APTT BLD: 34.2 SEC — SIGNIFICANT CHANGE UP (ref 24.5–35.6)
APTT BLD: 34.4 SEC — SIGNIFICANT CHANGE UP (ref 24.5–35.6)
APTT BLD: 43.2 SEC — HIGH (ref 27.5–35.5)
APTT BLD: 47.5 SEC — HIGH (ref 27.5–35.5)
APTT BLD: 56.6 SEC — HIGH (ref 24.5–35.6)
APTT BLD: 57.9 SEC — HIGH (ref 24.5–35.6)
APTT BLD: 58.1 SEC — HIGH (ref 27.5–35.5)
APTT BLD: 69.6 SEC — HIGH (ref 24.5–35.6)
APTT BLD: 69.9 SEC — HIGH (ref 24.5–35.6)
APTT BLD: 73.4 SEC — HIGH (ref 24.5–35.6)
APTT BLD: 73.4 SEC — HIGH (ref 24.5–35.6)
APTT BLD: 74.8 SEC — HIGH (ref 24.5–35.6)
APTT BLD: 75.7 SEC — HIGH (ref 24.5–35.6)
APTT BLD: 78.8 SEC — HIGH (ref 24.5–35.6)
APTT BLD: 80.8 SEC — HIGH (ref 24.5–35.6)
APTT BLD: 82.9 SEC — HIGH (ref 24.5–35.6)
APTT BLD: 91.6 SEC — HIGH (ref 24.5–35.6)
APTT BLD: 94.7 SEC — HIGH (ref 24.5–35.6)
AST SERPL-CCNC: 16 U/L — SIGNIFICANT CHANGE UP (ref 15–37)
AST SERPL-CCNC: 16 U/L — SIGNIFICANT CHANGE UP (ref 15–37)
AST SERPL-CCNC: 17 U/L — SIGNIFICANT CHANGE UP (ref 15–37)
AST SERPL-CCNC: 17 U/L — SIGNIFICANT CHANGE UP (ref 15–37)
AST SERPL-CCNC: 21 U/L — SIGNIFICANT CHANGE UP (ref 15–37)
AST SERPL-CCNC: 33 U/L — SIGNIFICANT CHANGE UP (ref 15–37)
AST SERPL-CCNC: 34 U/L — SIGNIFICANT CHANGE UP (ref 15–37)
AST SERPL-CCNC: 41 U/L — HIGH (ref 15–37)
AST SERPL-CCNC: 41 U/L — HIGH (ref 15–37)
AST SERPL-CCNC: 42 U/L — HIGH (ref 15–37)
AST SERPL-CCNC: 44 U/L — HIGH (ref 15–37)
AST SERPL-CCNC: 44 U/L — HIGH (ref 15–37)
AST SERPL-CCNC: 45 U/L — HIGH (ref 15–37)
AST SERPL-CCNC: 46 U/L — HIGH (ref 15–37)
AST SERPL-CCNC: 49 U/L — HIGH (ref 15–37)
AST SERPL-CCNC: 68 U/L — HIGH (ref 15–37)
BASOPHILS # BLD AUTO: 0.02 K/UL — SIGNIFICANT CHANGE UP (ref 0–0.2)
BASOPHILS # BLD AUTO: 0.03 K/UL — SIGNIFICANT CHANGE UP (ref 0–0.2)
BASOPHILS NFR BLD AUTO: 0.3 % — SIGNIFICANT CHANGE UP (ref 0–2)
BASOPHILS NFR BLD AUTO: 0.3 % — SIGNIFICANT CHANGE UP (ref 0–2)
BILIRUB SERPL-MCNC: 0.4 MG/DL — SIGNIFICANT CHANGE UP (ref 0.2–1.2)
BILIRUB SERPL-MCNC: 0.5 MG/DL — SIGNIFICANT CHANGE UP (ref 0.2–1.2)
BILIRUB SERPL-MCNC: 0.6 MG/DL — SIGNIFICANT CHANGE UP (ref 0.2–1.2)
BILIRUB SERPL-MCNC: 0.7 MG/DL — SIGNIFICANT CHANGE UP (ref 0.2–1.2)
BILIRUB SERPL-MCNC: 1 MG/DL — SIGNIFICANT CHANGE UP (ref 0.2–1.2)
BILIRUB SERPL-MCNC: 1.1 MG/DL — SIGNIFICANT CHANGE UP (ref 0.2–1.2)
BILIRUB SERPL-MCNC: 1.1 MG/DL — SIGNIFICANT CHANGE UP (ref 0.2–1.2)
BILIRUB SERPL-MCNC: 1.2 MG/DL — SIGNIFICANT CHANGE UP (ref 0.2–1.2)
BLD GP AB SCN SERPL QL: SIGNIFICANT CHANGE UP
BLD GP AB SCN SERPL QL: SIGNIFICANT CHANGE UP
BUN SERPL-MCNC: 14 MG/DL — SIGNIFICANT CHANGE UP (ref 7–23)
BUN SERPL-MCNC: 15 MG/DL — SIGNIFICANT CHANGE UP (ref 7–23)
BUN SERPL-MCNC: 18 MG/DL — SIGNIFICANT CHANGE UP (ref 7–23)
BUN SERPL-MCNC: 27 MG/DL — HIGH (ref 7–23)
BUN SERPL-MCNC: 29 MG/DL — HIGH (ref 7–23)
BUN SERPL-MCNC: 31 MG/DL — HIGH (ref 7–23)
BUN SERPL-MCNC: 33 MG/DL — HIGH (ref 7–23)
BUN SERPL-MCNC: 35 MG/DL — HIGH (ref 7–23)
BUN SERPL-MCNC: 35 MG/DL — HIGH (ref 7–23)
BUN SERPL-MCNC: 37 MG/DL — HIGH (ref 7–23)
BUN SERPL-MCNC: 39 MG/DL — HIGH (ref 7–23)
BUN SERPL-MCNC: 42 MG/DL — HIGH (ref 7–23)
BUN SERPL-MCNC: 43 MG/DL — HIGH (ref 7–23)
BUN SERPL-MCNC: 46 MG/DL — HIGH (ref 7–23)
BUN SERPL-MCNC: 46 MG/DL — HIGH (ref 7–23)
BUN SERPL-MCNC: 50 MG/DL — HIGH (ref 7–23)
BUN SERPL-MCNC: 50 MG/DL — HIGH (ref 7–23)
BUN SERPL-MCNC: 63 MG/DL — HIGH (ref 7–23)
BUN SERPL-MCNC: 63 MG/DL — HIGH (ref 7–23)
BUN SERPL-MCNC: 64 MG/DL — HIGH (ref 7–23)
CALCIUM SERPL-MCNC: 10.2 MG/DL — HIGH (ref 8.5–10.1)
CALCIUM SERPL-MCNC: 8.5 MG/DL — SIGNIFICANT CHANGE UP (ref 8.5–10.1)
CALCIUM SERPL-MCNC: 8.6 MG/DL — SIGNIFICANT CHANGE UP (ref 8.5–10.1)
CALCIUM SERPL-MCNC: 8.7 MG/DL — SIGNIFICANT CHANGE UP (ref 8.5–10.1)
CALCIUM SERPL-MCNC: 8.8 MG/DL — SIGNIFICANT CHANGE UP (ref 8.5–10.1)
CALCIUM SERPL-MCNC: 9 MG/DL — SIGNIFICANT CHANGE UP (ref 8.5–10.1)
CALCIUM SERPL-MCNC: 9.1 MG/DL — SIGNIFICANT CHANGE UP (ref 8.5–10.1)
CALCIUM SERPL-MCNC: 9.2 MG/DL — SIGNIFICANT CHANGE UP (ref 8.5–10.1)
CALCIUM SERPL-MCNC: 9.2 MG/DL — SIGNIFICANT CHANGE UP (ref 8.5–10.1)
CALCIUM SERPL-MCNC: 9.4 MG/DL — SIGNIFICANT CHANGE UP (ref 8.5–10.1)
CALCIUM SERPL-MCNC: 9.6 MG/DL — SIGNIFICANT CHANGE UP (ref 8.5–10.1)
CALCIUM SERPL-MCNC: 9.7 MG/DL — SIGNIFICANT CHANGE UP (ref 8.5–10.1)
CALCIUM SERPL-MCNC: 9.9 MG/DL — SIGNIFICANT CHANGE UP (ref 8.5–10.1)
CHLORIDE SERPL-SCNC: 100 MMOL/L — SIGNIFICANT CHANGE UP (ref 96–108)
CHLORIDE SERPL-SCNC: 101 MMOL/L — SIGNIFICANT CHANGE UP (ref 96–108)
CHLORIDE SERPL-SCNC: 101 MMOL/L — SIGNIFICANT CHANGE UP (ref 96–108)
CHLORIDE SERPL-SCNC: 102 MMOL/L — SIGNIFICANT CHANGE UP (ref 96–108)
CHLORIDE SERPL-SCNC: 94 MMOL/L — LOW (ref 96–108)
CHLORIDE SERPL-SCNC: 95 MMOL/L — LOW (ref 96–108)
CHLORIDE SERPL-SCNC: 96 MMOL/L — SIGNIFICANT CHANGE UP (ref 96–108)
CHLORIDE SERPL-SCNC: 96 MMOL/L — SIGNIFICANT CHANGE UP (ref 96–108)
CHLORIDE SERPL-SCNC: 97 MMOL/L — SIGNIFICANT CHANGE UP (ref 96–108)
CHLORIDE SERPL-SCNC: 98 MMOL/L — SIGNIFICANT CHANGE UP (ref 96–108)
CHLORIDE SERPL-SCNC: 99 MMOL/L — SIGNIFICANT CHANGE UP (ref 96–108)
CO2 SERPL-SCNC: 25 MMOL/L — SIGNIFICANT CHANGE UP (ref 22–31)
CO2 SERPL-SCNC: 25 MMOL/L — SIGNIFICANT CHANGE UP (ref 22–31)
CO2 SERPL-SCNC: 26 MMOL/L — SIGNIFICANT CHANGE UP (ref 22–31)
CO2 SERPL-SCNC: 27 MMOL/L — SIGNIFICANT CHANGE UP (ref 22–31)
CO2 SERPL-SCNC: 28 MMOL/L — SIGNIFICANT CHANGE UP (ref 22–31)
CO2 SERPL-SCNC: 29 MMOL/L — SIGNIFICANT CHANGE UP (ref 22–31)
CO2 SERPL-SCNC: 31 MMOL/L — SIGNIFICANT CHANGE UP (ref 22–31)
CO2 SERPL-SCNC: 31 MMOL/L — SIGNIFICANT CHANGE UP (ref 22–31)
CO2 SERPL-SCNC: 32 MMOL/L — HIGH (ref 22–31)
CO2 SERPL-SCNC: 33 MMOL/L — HIGH (ref 22–31)
CO2 SERPL-SCNC: 34 MMOL/L — HIGH (ref 22–31)
CREAT SERPL-MCNC: 0.83 MG/DL — SIGNIFICANT CHANGE UP (ref 0.5–1.3)
CREAT SERPL-MCNC: 0.91 MG/DL — SIGNIFICANT CHANGE UP (ref 0.5–1.3)
CREAT SERPL-MCNC: 0.92 MG/DL — SIGNIFICANT CHANGE UP (ref 0.5–1.3)
CREAT SERPL-MCNC: 0.92 MG/DL — SIGNIFICANT CHANGE UP (ref 0.5–1.3)
CREAT SERPL-MCNC: 0.94 MG/DL — SIGNIFICANT CHANGE UP (ref 0.5–1.3)
CREAT SERPL-MCNC: 0.95 MG/DL — SIGNIFICANT CHANGE UP (ref 0.5–1.3)
CREAT SERPL-MCNC: 0.99 MG/DL — SIGNIFICANT CHANGE UP (ref 0.5–1.3)
CREAT SERPL-MCNC: 1.1 MG/DL — SIGNIFICANT CHANGE UP (ref 0.5–1.3)
CREAT SERPL-MCNC: 1.2 MG/DL — SIGNIFICANT CHANGE UP (ref 0.5–1.3)
CREAT SERPL-MCNC: 1.2 MG/DL — SIGNIFICANT CHANGE UP (ref 0.5–1.3)
CREAT SERPL-MCNC: 1.3 MG/DL — SIGNIFICANT CHANGE UP (ref 0.5–1.3)
CREAT SERPL-MCNC: 1.4 MG/DL — HIGH (ref 0.5–1.3)
CREAT SERPL-MCNC: 1.6 MG/DL — HIGH (ref 0.5–1.3)
CREAT SERPL-MCNC: 1.6 MG/DL — HIGH (ref 0.5–1.3)
CREAT SERPL-MCNC: 1.7 MG/DL — HIGH (ref 0.5–1.3)
CREAT SERPL-MCNC: 1.8 MG/DL — HIGH (ref 0.5–1.3)
CREAT SERPL-MCNC: 1.9 MG/DL — HIGH (ref 0.5–1.3)
CREAT SERPL-MCNC: 2 MG/DL — HIGH (ref 0.5–1.3)
CRP SERPL-MCNC: 58 MG/L — HIGH
CULTURE RESULTS: SIGNIFICANT CHANGE UP
EGFR: 36 ML/MIN/1.73M2 — LOW
EGFR: 38 ML/MIN/1.73M2 — LOW
EGFR: 41 ML/MIN/1.73M2 — LOW
EGFR: 44 ML/MIN/1.73M2 — LOW
EGFR: 47 ML/MIN/1.73M2 — LOW
EGFR: 47 ML/MIN/1.73M2 — LOW
EGFR: 55 ML/MIN/1.73M2 — LOW
EGFR: 61 ML/MIN/1.73M2 — SIGNIFICANT CHANGE UP
EGFR: 67 ML/MIN/1.73M2 — SIGNIFICANT CHANGE UP
EGFR: 67 ML/MIN/1.73M2 — SIGNIFICANT CHANGE UP
EGFR: 74 ML/MIN/1.73M2 — SIGNIFICANT CHANGE UP
EGFR: 85 ML/MIN/1.73M2 — SIGNIFICANT CHANGE UP
EGFR: 89 ML/MIN/1.73M2 — SIGNIFICANT CHANGE UP
EGFR: 90 ML/MIN/1.73M2 — SIGNIFICANT CHANGE UP
EGFR: 92 ML/MIN/1.73M2 — SIGNIFICANT CHANGE UP
EGFR: 92 ML/MIN/1.73M2 — SIGNIFICANT CHANGE UP
EGFR: 94 ML/MIN/1.73M2 — SIGNIFICANT CHANGE UP
EGFR: 97 ML/MIN/1.73M2 — SIGNIFICANT CHANGE UP
ELLIPTOCYTES BLD QL SMEAR: SLIGHT — SIGNIFICANT CHANGE UP
EOSINOPHIL # BLD AUTO: 0.08 K/UL — SIGNIFICANT CHANGE UP (ref 0–0.5)
EOSINOPHIL # BLD AUTO: 0.23 K/UL — SIGNIFICANT CHANGE UP (ref 0–0.5)
EOSINOPHIL NFR BLD AUTO: 0.7 % — SIGNIFICANT CHANGE UP (ref 0–6)
EOSINOPHIL NFR BLD AUTO: 3.7 % — SIGNIFICANT CHANGE UP (ref 0–6)
ESTIMATED AVERAGE GLUCOSE: 163 MG/DL — HIGH (ref 68–114)
ESTIMATED AVERAGE GLUCOSE: 171 MG/DL — HIGH (ref 68–114)
ESTIMATED AVERAGE GLUCOSE: 223 MG/DL — HIGH (ref 68–114)
FLUAV AG NPH QL: SIGNIFICANT CHANGE UP
FLUBV AG NPH QL: SIGNIFICANT CHANGE UP
GLUCOSE SERPL-MCNC: 109 MG/DL — HIGH (ref 70–99)
GLUCOSE SERPL-MCNC: 122 MG/DL — HIGH (ref 70–99)
GLUCOSE SERPL-MCNC: 144 MG/DL — HIGH (ref 70–99)
GLUCOSE SERPL-MCNC: 147 MG/DL — HIGH (ref 70–99)
GLUCOSE SERPL-MCNC: 185 MG/DL — HIGH (ref 70–99)
GLUCOSE SERPL-MCNC: 201 MG/DL — HIGH (ref 70–99)
GLUCOSE SERPL-MCNC: 201 MG/DL — HIGH (ref 70–99)
GLUCOSE SERPL-MCNC: 219 MG/DL — HIGH (ref 70–99)
GLUCOSE SERPL-MCNC: 236 MG/DL — HIGH (ref 70–99)
GLUCOSE SERPL-MCNC: 241 MG/DL — HIGH (ref 70–99)
GLUCOSE SERPL-MCNC: 242 MG/DL — HIGH (ref 70–99)
GLUCOSE SERPL-MCNC: 250 MG/DL — HIGH (ref 70–99)
GLUCOSE SERPL-MCNC: 250 MG/DL — HIGH (ref 70–99)
GLUCOSE SERPL-MCNC: 264 MG/DL — HIGH (ref 70–99)
GLUCOSE SERPL-MCNC: 278 MG/DL — HIGH (ref 70–99)
GLUCOSE SERPL-MCNC: 300 MG/DL — HIGH (ref 70–99)
GLUCOSE SERPL-MCNC: 324 MG/DL — HIGH (ref 70–99)
GLUCOSE SERPL-MCNC: 56 MG/DL — LOW (ref 70–99)
GLUCOSE SERPL-MCNC: 64 MG/DL — LOW (ref 70–99)
GLUCOSE SERPL-MCNC: 71 MG/DL — SIGNIFICANT CHANGE UP (ref 70–99)
GLUCOSE SERPL-MCNC: 79 MG/DL — SIGNIFICANT CHANGE UP (ref 70–99)
GLUCOSE SERPL-MCNC: 87 MG/DL — SIGNIFICANT CHANGE UP (ref 70–99)
GRAM STN FLD: SIGNIFICANT CHANGE UP
HCT VFR BLD CALC: 18.8 % — CRITICAL LOW (ref 39–50)
HCT VFR BLD CALC: 22.1 % — LOW (ref 39–50)
HCT VFR BLD CALC: 22.7 % — LOW (ref 39–50)
HCT VFR BLD CALC: 22.8 % — LOW (ref 39–50)
HCT VFR BLD CALC: 24.2 % — LOW (ref 39–50)
HCT VFR BLD CALC: 24.3 % — LOW (ref 39–50)
HCT VFR BLD CALC: 25 % — LOW (ref 39–50)
HCT VFR BLD CALC: 25.3 % — LOW (ref 39–50)
HCT VFR BLD CALC: 25.7 % — LOW (ref 39–50)
HCT VFR BLD CALC: 26.4 % — LOW (ref 39–50)
HCT VFR BLD CALC: 26.5 % — LOW (ref 39–50)
HCT VFR BLD CALC: 27 % — LOW (ref 39–50)
HCT VFR BLD CALC: 27.3 % — LOW (ref 39–50)
HCT VFR BLD CALC: 27.7 % — LOW (ref 39–50)
HCT VFR BLD CALC: 28.7 % — LOW (ref 39–50)
HCT VFR BLD CALC: 29.4 % — LOW (ref 39–50)
HCT VFR BLD CALC: 30.1 % — LOW (ref 39–50)
HCT VFR BLD CALC: 30.4 % — LOW (ref 39–50)
HCT VFR BLD CALC: 30.4 % — LOW (ref 39–50)
HCT VFR BLD CALC: 30.6 % — LOW (ref 39–50)
HCT VFR BLD CALC: 31.8 % — LOW (ref 39–50)
HCT VFR BLD CALC: 31.9 % — LOW (ref 39–50)
HCT VFR BLD CALC: 31.9 % — LOW (ref 39–50)
HCT VFR BLD CALC: 32.2 % — LOW (ref 39–50)
HCT VFR BLD CALC: 33.2 % — LOW (ref 39–50)
HCT VFR BLD CALC: 33.4 % — LOW (ref 39–50)
HCT VFR BLD CALC: 33.6 % — LOW (ref 39–50)
HCT VFR BLD CALC: 33.7 % — LOW (ref 39–50)
HCT VFR BLD CALC: 33.8 % — LOW (ref 39–50)
HCT VFR BLD CALC: 34.2 % — LOW (ref 39–50)
HCT VFR BLD CALC: 34.4 % — LOW (ref 39–50)
HCT VFR BLD CALC: 34.6 % — LOW (ref 39–50)
HCT VFR BLD CALC: 35.3 % — LOW (ref 39–50)
HCT VFR BLD CALC: 39 % — SIGNIFICANT CHANGE UP (ref 39–50)
HGB BLD-MCNC: 10 G/DL — LOW (ref 13–17)
HGB BLD-MCNC: 10.5 G/DL — LOW (ref 13–17)
HGB BLD-MCNC: 10.8 G/DL — LOW (ref 13–17)
HGB BLD-MCNC: 10.8 G/DL — LOW (ref 13–17)
HGB BLD-MCNC: 10.9 G/DL — LOW (ref 13–17)
HGB BLD-MCNC: 11 G/DL — LOW (ref 13–17)
HGB BLD-MCNC: 11.1 G/DL — LOW (ref 13–17)
HGB BLD-MCNC: 11.2 G/DL — LOW (ref 13–17)
HGB BLD-MCNC: 11.4 G/DL — LOW (ref 13–17)
HGB BLD-MCNC: 11.4 G/DL — LOW (ref 13–17)
HGB BLD-MCNC: 12.5 G/DL — LOW (ref 13–17)
HGB BLD-MCNC: 5.9 G/DL — CRITICAL LOW (ref 13–17)
HGB BLD-MCNC: 7.1 G/DL — LOW (ref 13–17)
HGB BLD-MCNC: 7.4 G/DL — LOW (ref 13–17)
HGB BLD-MCNC: 7.4 G/DL — LOW (ref 13–17)
HGB BLD-MCNC: 7.9 G/DL — LOW (ref 13–17)
HGB BLD-MCNC: 7.9 G/DL — LOW (ref 13–17)
HGB BLD-MCNC: 8 G/DL — LOW (ref 13–17)
HGB BLD-MCNC: 8.2 G/DL — LOW (ref 13–17)
HGB BLD-MCNC: 8.6 G/DL — LOW (ref 13–17)
HGB BLD-MCNC: 8.6 G/DL — LOW (ref 13–17)
HGB BLD-MCNC: 8.7 G/DL — LOW (ref 13–17)
HGB BLD-MCNC: 8.8 G/DL — LOW (ref 13–17)
HGB BLD-MCNC: 9.1 G/DL — LOW (ref 13–17)
HGB BLD-MCNC: 9.3 G/DL — LOW (ref 13–17)
HGB BLD-MCNC: 9.5 G/DL — LOW (ref 13–17)
HGB BLD-MCNC: 9.6 G/DL — LOW (ref 13–17)
HGB BLD-MCNC: 9.6 G/DL — LOW (ref 13–17)
HGB BLD-MCNC: 9.8 G/DL — LOW (ref 13–17)
HGB BLD-MCNC: 9.9 G/DL — LOW (ref 13–17)
HGB BLD-MCNC: 9.9 G/DL — LOW (ref 13–17)
IMM GRANULOCYTES NFR BLD AUTO: 0.2 % — SIGNIFICANT CHANGE UP (ref 0–0.9)
IMM GRANULOCYTES NFR BLD AUTO: 0.7 % — SIGNIFICANT CHANGE UP (ref 0–0.9)
INR BLD: 1.08 RATIO — SIGNIFICANT CHANGE UP (ref 0.88–1.16)
INR BLD: 1.11 RATIO — SIGNIFICANT CHANGE UP (ref 0.85–1.18)
INR BLD: 1.16 RATIO — SIGNIFICANT CHANGE UP (ref 0.85–1.18)
INR BLD: 1.16 RATIO — SIGNIFICANT CHANGE UP (ref 0.85–1.18)
INR BLD: 1.17 RATIO — HIGH (ref 0.88–1.16)
INR BLD: 1.18 RATIO — HIGH (ref 0.88–1.16)
INR BLD: 1.2 RATIO — HIGH (ref 0.85–1.18)
INR BLD: 1.24 RATIO — HIGH (ref 0.88–1.16)
INR BLD: 1.25 RATIO — HIGH (ref 0.85–1.18)
INR BLD: 1.26 RATIO — HIGH (ref 0.85–1.18)
INR BLD: 1.42 RATIO — HIGH (ref 0.85–1.18)
INR BLD: 1.5 RATIO — HIGH (ref 0.88–1.16)
INR BLD: 1.73 RATIO — HIGH (ref 0.88–1.16)
INR BLD: 1.83 RATIO — HIGH (ref 0.88–1.16)
INR BLD: 1.91 RATIO — HIGH (ref 0.85–1.18)
INR BLD: 1.99 RATIO — HIGH (ref 0.88–1.16)
INR BLD: 2.12 RATIO — HIGH (ref 0.85–1.18)
INR BLD: 2.12 RATIO — HIGH (ref 0.85–1.18)
INR BLD: 2.17 RATIO — HIGH (ref 0.85–1.18)
INR BLD: 2.17 RATIO — HIGH (ref 0.85–1.18)
INR BLD: 2.36 RATIO — HIGH (ref 0.88–1.16)
INR BLD: 2.52 RATIO — HIGH (ref 0.85–1.18)
INR BLD: 2.75 RATIO — HIGH (ref 0.85–1.18)
INR BLD: 2.75 RATIO — HIGH (ref 0.85–1.18)
INR BLD: 2.88 RATIO — HIGH (ref 0.85–1.18)
INR BLD: 3.09 RATIO — HIGH (ref 0.85–1.18)
INR BLD: 3.09 RATIO — HIGH (ref 0.85–1.18)
INR BLD: 7.01 RATIO — CRITICAL HIGH (ref 0.85–1.18)
LYMPHOCYTES # BLD AUTO: 1.15 K/UL — SIGNIFICANT CHANGE UP (ref 1–3.3)
LYMPHOCYTES # BLD AUTO: 1.52 K/UL — SIGNIFICANT CHANGE UP (ref 1–3.3)
LYMPHOCYTES # BLD AUTO: 12.9 % — LOW (ref 13–44)
LYMPHOCYTES # BLD AUTO: 18.6 % — SIGNIFICANT CHANGE UP (ref 13–44)
MACROCYTES BLD QL: SLIGHT — SIGNIFICANT CHANGE UP
MCHC RBC-ENTMCNC: 25.4 PG — LOW (ref 27–34)
MCHC RBC-ENTMCNC: 26.1 PG — LOW (ref 27–34)
MCHC RBC-ENTMCNC: 26.1 PG — LOW (ref 27–34)
MCHC RBC-ENTMCNC: 26.2 PG — LOW (ref 27–34)
MCHC RBC-ENTMCNC: 26.4 PG — LOW (ref 27–34)
MCHC RBC-ENTMCNC: 26.7 PG — LOW (ref 27–34)
MCHC RBC-ENTMCNC: 26.9 PG — LOW (ref 27–34)
MCHC RBC-ENTMCNC: 26.9 PG — LOW (ref 27–34)
MCHC RBC-ENTMCNC: 27 PG — SIGNIFICANT CHANGE UP (ref 27–34)
MCHC RBC-ENTMCNC: 27.1 PG — SIGNIFICANT CHANGE UP (ref 27–34)
MCHC RBC-ENTMCNC: 27.2 PG — SIGNIFICANT CHANGE UP (ref 27–34)
MCHC RBC-ENTMCNC: 27.3 PG — SIGNIFICANT CHANGE UP (ref 27–34)
MCHC RBC-ENTMCNC: 27.4 PG — SIGNIFICANT CHANGE UP (ref 27–34)
MCHC RBC-ENTMCNC: 27.5 PG — SIGNIFICANT CHANGE UP (ref 27–34)
MCHC RBC-ENTMCNC: 27.6 PG — SIGNIFICANT CHANGE UP (ref 27–34)
MCHC RBC-ENTMCNC: 27.7 PG — SIGNIFICANT CHANGE UP (ref 27–34)
MCHC RBC-ENTMCNC: 28.7 PG — SIGNIFICANT CHANGE UP (ref 27–34)
MCHC RBC-ENTMCNC: 28.9 PG — SIGNIFICANT CHANGE UP (ref 27–34)
MCHC RBC-ENTMCNC: 28.9 PG — SIGNIFICANT CHANGE UP (ref 27–34)
MCHC RBC-ENTMCNC: 29.1 PG — SIGNIFICANT CHANGE UP (ref 27–34)
MCHC RBC-ENTMCNC: 29.2 PG — SIGNIFICANT CHANGE UP (ref 27–34)
MCHC RBC-ENTMCNC: 29.3 PG — SIGNIFICANT CHANGE UP (ref 27–34)
MCHC RBC-ENTMCNC: 29.3 PG — SIGNIFICANT CHANGE UP (ref 27–34)
MCHC RBC-ENTMCNC: 30.2 GM/DL — LOW (ref 32–36)
MCHC RBC-ENTMCNC: 30.2 GM/DL — LOW (ref 32–36)
MCHC RBC-ENTMCNC: 31 GM/DL — LOW (ref 32–36)
MCHC RBC-ENTMCNC: 31.1 GM/DL — LOW (ref 32–36)
MCHC RBC-ENTMCNC: 31.2 GM/DL — LOW (ref 32–36)
MCHC RBC-ENTMCNC: 31.3 GM/DL — LOW (ref 32–36)
MCHC RBC-ENTMCNC: 31.4 GM/DL — LOW (ref 32–36)
MCHC RBC-ENTMCNC: 31.6 GM/DL — LOW (ref 32–36)
MCHC RBC-ENTMCNC: 31.8 GM/DL — LOW (ref 32–36)
MCHC RBC-ENTMCNC: 31.9 GM/DL — LOW (ref 32–36)
MCHC RBC-ENTMCNC: 32 GM/DL — SIGNIFICANT CHANGE UP (ref 32–36)
MCHC RBC-ENTMCNC: 32 GM/DL — SIGNIFICANT CHANGE UP (ref 32–36)
MCHC RBC-ENTMCNC: 32.1 GM/DL — SIGNIFICANT CHANGE UP (ref 32–36)
MCHC RBC-ENTMCNC: 32.2 GM/DL — SIGNIFICANT CHANGE UP (ref 32–36)
MCHC RBC-ENTMCNC: 32.2 GM/DL — SIGNIFICANT CHANGE UP (ref 32–36)
MCHC RBC-ENTMCNC: 32.3 GM/DL — SIGNIFICANT CHANGE UP (ref 32–36)
MCHC RBC-ENTMCNC: 32.4 GM/DL — SIGNIFICANT CHANGE UP (ref 32–36)
MCHC RBC-ENTMCNC: 32.5 GM/DL — SIGNIFICANT CHANGE UP (ref 32–36)
MCHC RBC-ENTMCNC: 32.6 GM/DL — SIGNIFICANT CHANGE UP (ref 32–36)
MCHC RBC-ENTMCNC: 32.8 GM/DL — SIGNIFICANT CHANGE UP (ref 32–36)
MCHC RBC-ENTMCNC: 32.9 GM/DL — SIGNIFICANT CHANGE UP (ref 32–36)
MCHC RBC-ENTMCNC: 33.1 GM/DL — SIGNIFICANT CHANGE UP (ref 32–36)
MCHC RBC-ENTMCNC: 33.5 GM/DL — SIGNIFICANT CHANGE UP (ref 32–36)
MCV RBC AUTO: 81 FL — SIGNIFICANT CHANGE UP (ref 80–100)
MCV RBC AUTO: 82.2 FL — SIGNIFICANT CHANGE UP (ref 80–100)
MCV RBC AUTO: 82.9 FL — SIGNIFICANT CHANGE UP (ref 80–100)
MCV RBC AUTO: 83.1 FL — SIGNIFICANT CHANGE UP (ref 80–100)
MCV RBC AUTO: 83.7 FL — SIGNIFICANT CHANGE UP (ref 80–100)
MCV RBC AUTO: 84.3 FL — SIGNIFICANT CHANGE UP (ref 80–100)
MCV RBC AUTO: 84.4 FL — SIGNIFICANT CHANGE UP (ref 80–100)
MCV RBC AUTO: 84.8 FL — SIGNIFICANT CHANGE UP (ref 80–100)
MCV RBC AUTO: 84.9 FL — SIGNIFICANT CHANGE UP (ref 80–100)
MCV RBC AUTO: 85 FL — SIGNIFICANT CHANGE UP (ref 80–100)
MCV RBC AUTO: 85 FL — SIGNIFICANT CHANGE UP (ref 80–100)
MCV RBC AUTO: 85.2 FL — SIGNIFICANT CHANGE UP (ref 80–100)
MCV RBC AUTO: 85.4 FL — SIGNIFICANT CHANGE UP (ref 80–100)
MCV RBC AUTO: 85.4 FL — SIGNIFICANT CHANGE UP (ref 80–100)
MCV RBC AUTO: 85.8 FL — SIGNIFICANT CHANGE UP (ref 80–100)
MCV RBC AUTO: 85.9 FL — SIGNIFICANT CHANGE UP (ref 80–100)
MCV RBC AUTO: 86.2 FL — SIGNIFICANT CHANGE UP (ref 80–100)
MCV RBC AUTO: 86.4 FL — SIGNIFICANT CHANGE UP (ref 80–100)
MCV RBC AUTO: 86.4 FL — SIGNIFICANT CHANGE UP (ref 80–100)
MCV RBC AUTO: 86.9 FL — SIGNIFICANT CHANGE UP (ref 80–100)
MCV RBC AUTO: 86.9 FL — SIGNIFICANT CHANGE UP (ref 80–100)
MCV RBC AUTO: 88 FL — SIGNIFICANT CHANGE UP (ref 80–100)
MCV RBC AUTO: 88.8 FL — SIGNIFICANT CHANGE UP (ref 80–100)
MCV RBC AUTO: 88.9 FL — SIGNIFICANT CHANGE UP (ref 80–100)
MCV RBC AUTO: 89.1 FL — SIGNIFICANT CHANGE UP (ref 80–100)
MCV RBC AUTO: 89.5 FL — SIGNIFICANT CHANGE UP (ref 80–100)
MCV RBC AUTO: 89.8 FL — SIGNIFICANT CHANGE UP (ref 80–100)
MCV RBC AUTO: 89.9 FL — SIGNIFICANT CHANGE UP (ref 80–100)
MCV RBC AUTO: 90.1 FL — SIGNIFICANT CHANGE UP (ref 80–100)
MCV RBC AUTO: 91.3 FL — SIGNIFICANT CHANGE UP (ref 80–100)
METHOD TYPE: SIGNIFICANT CHANGE UP
MICROCYTES BLD QL: SLIGHT — SIGNIFICANT CHANGE UP
MONOCYTES # BLD AUTO: 0.83 K/UL — SIGNIFICANT CHANGE UP (ref 0–0.9)
MONOCYTES # BLD AUTO: 1.14 K/UL — HIGH (ref 0–0.9)
MONOCYTES NFR BLD AUTO: 13.4 % — SIGNIFICANT CHANGE UP (ref 2–14)
MONOCYTES NFR BLD AUTO: 9.7 % — SIGNIFICANT CHANGE UP (ref 2–14)
MRSA PCR RESULT.: SIGNIFICANT CHANGE UP
MSSA DNA SPEC QL NAA+PROBE: SIGNIFICANT CHANGE UP
MSSA DNA SPEC QL NAA+PROBE: SIGNIFICANT CHANGE UP
NEUTROPHILS # BLD AUTO: 3.95 K/UL — SIGNIFICANT CHANGE UP (ref 1.8–7.4)
NEUTROPHILS # BLD AUTO: 8.91 K/UL — HIGH (ref 1.8–7.4)
NEUTROPHILS NFR BLD AUTO: 63.8 % — SIGNIFICANT CHANGE UP (ref 43–77)
NEUTROPHILS NFR BLD AUTO: 75.7 % — SIGNIFICANT CHANGE UP (ref 43–77)
NRBC # BLD: 0 /100 WBCS — SIGNIFICANT CHANGE UP (ref 0–0)
NT-PROBNP SERPL-SCNC: 6044 PG/ML — HIGH (ref 0–125)
ORGANISM # SPEC MICROSCOPIC CNT: SIGNIFICANT CHANGE UP
PLAT MORPH BLD: NORMAL — SIGNIFICANT CHANGE UP
PLATELET # BLD AUTO: 195 K/UL — SIGNIFICANT CHANGE UP (ref 150–400)
PLATELET # BLD AUTO: 214 K/UL — SIGNIFICANT CHANGE UP (ref 150–400)
PLATELET # BLD AUTO: 216 K/UL — SIGNIFICANT CHANGE UP (ref 150–400)
PLATELET # BLD AUTO: 217 K/UL — SIGNIFICANT CHANGE UP (ref 150–400)
PLATELET # BLD AUTO: 219 K/UL — SIGNIFICANT CHANGE UP (ref 150–400)
PLATELET # BLD AUTO: 220 K/UL — SIGNIFICANT CHANGE UP (ref 150–400)
PLATELET # BLD AUTO: 228 K/UL — SIGNIFICANT CHANGE UP (ref 150–400)
PLATELET # BLD AUTO: 229 K/UL — SIGNIFICANT CHANGE UP (ref 150–400)
PLATELET # BLD AUTO: 232 K/UL — SIGNIFICANT CHANGE UP (ref 150–400)
PLATELET # BLD AUTO: 236 K/UL — SIGNIFICANT CHANGE UP (ref 150–400)
PLATELET # BLD AUTO: 238 K/UL — SIGNIFICANT CHANGE UP (ref 150–400)
PLATELET # BLD AUTO: 239 K/UL — SIGNIFICANT CHANGE UP (ref 150–400)
PLATELET # BLD AUTO: 240 K/UL — SIGNIFICANT CHANGE UP (ref 150–400)
PLATELET # BLD AUTO: 240 K/UL — SIGNIFICANT CHANGE UP (ref 150–400)
PLATELET # BLD AUTO: 243 K/UL — SIGNIFICANT CHANGE UP (ref 150–400)
PLATELET # BLD AUTO: 247 K/UL — SIGNIFICANT CHANGE UP (ref 150–400)
PLATELET # BLD AUTO: 249 K/UL — SIGNIFICANT CHANGE UP (ref 150–400)
PLATELET # BLD AUTO: 252 K/UL — SIGNIFICANT CHANGE UP (ref 150–400)
PLATELET # BLD AUTO: 275 K/UL — SIGNIFICANT CHANGE UP (ref 150–400)
PLATELET # BLD AUTO: 275 K/UL — SIGNIFICANT CHANGE UP (ref 150–400)
PLATELET # BLD AUTO: 286 K/UL — SIGNIFICANT CHANGE UP (ref 150–400)
PLATELET # BLD AUTO: 307 K/UL — SIGNIFICANT CHANGE UP (ref 150–400)
PLATELET # BLD AUTO: 314 K/UL — SIGNIFICANT CHANGE UP (ref 150–400)
PLATELET # BLD AUTO: 322 K/UL — SIGNIFICANT CHANGE UP (ref 150–400)
PLATELET # BLD AUTO: 327 K/UL — SIGNIFICANT CHANGE UP (ref 150–400)
PLATELET # BLD AUTO: 328 K/UL — SIGNIFICANT CHANGE UP (ref 150–400)
PLATELET # BLD AUTO: 329 K/UL — SIGNIFICANT CHANGE UP (ref 150–400)
PLATELET # BLD AUTO: 331 K/UL — SIGNIFICANT CHANGE UP (ref 150–400)
PLATELET # BLD AUTO: 354 K/UL — SIGNIFICANT CHANGE UP (ref 150–400)
PLATELET # BLD AUTO: 355 K/UL — SIGNIFICANT CHANGE UP (ref 150–400)
PLATELET # BLD AUTO: 357 K/UL — SIGNIFICANT CHANGE UP (ref 150–400)
POLYCHROMASIA BLD QL SMEAR: SLIGHT — SIGNIFICANT CHANGE UP
POTASSIUM SERPL-MCNC: 3 MMOL/L — LOW (ref 3.5–5.3)
POTASSIUM SERPL-MCNC: 3.3 MMOL/L — LOW (ref 3.5–5.3)
POTASSIUM SERPL-MCNC: 3.3 MMOL/L — LOW (ref 3.5–5.3)
POTASSIUM SERPL-MCNC: 3.4 MMOL/L — LOW (ref 3.5–5.3)
POTASSIUM SERPL-MCNC: 3.7 MMOL/L — SIGNIFICANT CHANGE UP (ref 3.5–5.3)
POTASSIUM SERPL-MCNC: 3.9 MMOL/L — SIGNIFICANT CHANGE UP (ref 3.5–5.3)
POTASSIUM SERPL-MCNC: 4.2 MMOL/L — SIGNIFICANT CHANGE UP (ref 3.5–5.3)
POTASSIUM SERPL-MCNC: 4.4 MMOL/L — SIGNIFICANT CHANGE UP (ref 3.5–5.3)
POTASSIUM SERPL-MCNC: 4.5 MMOL/L — SIGNIFICANT CHANGE UP (ref 3.5–5.3)
POTASSIUM SERPL-MCNC: 4.7 MMOL/L — SIGNIFICANT CHANGE UP (ref 3.5–5.3)
POTASSIUM SERPL-MCNC: 4.7 MMOL/L — SIGNIFICANT CHANGE UP (ref 3.5–5.3)
POTASSIUM SERPL-MCNC: 4.8 MMOL/L — SIGNIFICANT CHANGE UP (ref 3.5–5.3)
POTASSIUM SERPL-MCNC: 4.8 MMOL/L — SIGNIFICANT CHANGE UP (ref 3.5–5.3)
POTASSIUM SERPL-MCNC: 4.9 MMOL/L — SIGNIFICANT CHANGE UP (ref 3.5–5.3)
POTASSIUM SERPL-MCNC: 5 MMOL/L — SIGNIFICANT CHANGE UP (ref 3.5–5.3)
POTASSIUM SERPL-MCNC: 5 MMOL/L — SIGNIFICANT CHANGE UP (ref 3.5–5.3)
POTASSIUM SERPL-SCNC: 3 MMOL/L — LOW (ref 3.5–5.3)
POTASSIUM SERPL-SCNC: 3.3 MMOL/L — LOW (ref 3.5–5.3)
POTASSIUM SERPL-SCNC: 3.3 MMOL/L — LOW (ref 3.5–5.3)
POTASSIUM SERPL-SCNC: 3.4 MMOL/L — LOW (ref 3.5–5.3)
POTASSIUM SERPL-SCNC: 3.7 MMOL/L — SIGNIFICANT CHANGE UP (ref 3.5–5.3)
POTASSIUM SERPL-SCNC: 3.9 MMOL/L — SIGNIFICANT CHANGE UP (ref 3.5–5.3)
POTASSIUM SERPL-SCNC: 4.2 MMOL/L — SIGNIFICANT CHANGE UP (ref 3.5–5.3)
POTASSIUM SERPL-SCNC: 4.4 MMOL/L — SIGNIFICANT CHANGE UP (ref 3.5–5.3)
POTASSIUM SERPL-SCNC: 4.5 MMOL/L — SIGNIFICANT CHANGE UP (ref 3.5–5.3)
POTASSIUM SERPL-SCNC: 4.7 MMOL/L — SIGNIFICANT CHANGE UP (ref 3.5–5.3)
POTASSIUM SERPL-SCNC: 4.7 MMOL/L — SIGNIFICANT CHANGE UP (ref 3.5–5.3)
POTASSIUM SERPL-SCNC: 4.8 MMOL/L — SIGNIFICANT CHANGE UP (ref 3.5–5.3)
POTASSIUM SERPL-SCNC: 4.8 MMOL/L — SIGNIFICANT CHANGE UP (ref 3.5–5.3)
POTASSIUM SERPL-SCNC: 4.9 MMOL/L — SIGNIFICANT CHANGE UP (ref 3.5–5.3)
POTASSIUM SERPL-SCNC: 5 MMOL/L — SIGNIFICANT CHANGE UP (ref 3.5–5.3)
POTASSIUM SERPL-SCNC: 5 MMOL/L — SIGNIFICANT CHANGE UP (ref 3.5–5.3)
PROCALCITONIN SERPL-MCNC: 0.2 NG/ML — HIGH
PROT SERPL-MCNC: 6.3 G/DL — SIGNIFICANT CHANGE UP (ref 6–8.3)
PROT SERPL-MCNC: 6.5 G/DL — SIGNIFICANT CHANGE UP (ref 6–8.3)
PROT SERPL-MCNC: 6.8 G/DL — SIGNIFICANT CHANGE UP (ref 6–8.3)
PROT SERPL-MCNC: 7 G/DL — SIGNIFICANT CHANGE UP (ref 6–8.3)
PROT SERPL-MCNC: 7 G/DL — SIGNIFICANT CHANGE UP (ref 6–8.3)
PROT SERPL-MCNC: 7.2 G/DL — SIGNIFICANT CHANGE UP (ref 6–8.3)
PROT SERPL-MCNC: 7.2 G/DL — SIGNIFICANT CHANGE UP (ref 6–8.3)
PROT SERPL-MCNC: 7.5 G/DL — SIGNIFICANT CHANGE UP (ref 6–8.3)
PROT SERPL-MCNC: 7.6 G/DL — SIGNIFICANT CHANGE UP (ref 6–8.3)
PROT SERPL-MCNC: 7.7 G/DL — SIGNIFICANT CHANGE UP (ref 6–8.3)
PROT SERPL-MCNC: 7.7 G/DL — SIGNIFICANT CHANGE UP (ref 6–8.3)
PROT SERPL-MCNC: 7.9 G/DL — SIGNIFICANT CHANGE UP (ref 6–8.3)
PROT SERPL-MCNC: 7.9 G/DL — SIGNIFICANT CHANGE UP (ref 6–8.3)
PROT SERPL-MCNC: 8.2 G/DL — SIGNIFICANT CHANGE UP (ref 6–8.3)
PROT SERPL-MCNC: 8.2 G/DL — SIGNIFICANT CHANGE UP (ref 6–8.3)
PROT SERPL-MCNC: 8.4 G/DL — HIGH (ref 6–8.3)
PROT SERPL-MCNC: 8.9 G/DL — HIGH (ref 6–8.3)
PROTHROM AB SERPL-ACNC: 12.7 SEC — SIGNIFICANT CHANGE UP (ref 10.5–13.4)
PROTHROM AB SERPL-ACNC: 12.9 SEC — SIGNIFICANT CHANGE UP (ref 9.5–13)
PROTHROM AB SERPL-ACNC: 13.5 SEC — HIGH (ref 9.5–13)
PROTHROM AB SERPL-ACNC: 13.5 SEC — HIGH (ref 9.5–13)
PROTHROM AB SERPL-ACNC: 13.7 SEC — HIGH (ref 10.5–13.4)
PROTHROM AB SERPL-ACNC: 13.8 SEC — HIGH (ref 10.5–13.4)
PROTHROM AB SERPL-ACNC: 14 SEC — HIGH (ref 9.5–13)
PROTHROM AB SERPL-ACNC: 14.5 SEC — HIGH (ref 10.5–13.4)
PROTHROM AB SERPL-ACNC: 14.5 SEC — HIGH (ref 9.5–13)
PROTHROM AB SERPL-ACNC: 14.6 SEC — HIGH (ref 9.5–13)
PROTHROM AB SERPL-ACNC: 16.4 SEC — HIGH (ref 9.5–13)
PROTHROM AB SERPL-ACNC: 17.6 SEC — HIGH (ref 10.5–13.4)
PROTHROM AB SERPL-ACNC: 20.3 SEC — HIGH (ref 10.5–13.4)
PROTHROM AB SERPL-ACNC: 21.5 SEC — HIGH (ref 10.5–13.4)
PROTHROM AB SERPL-ACNC: 21.9 SEC — HIGH (ref 9.5–13)
PROTHROM AB SERPL-ACNC: 23.5 SEC — HIGH (ref 10.5–13.4)
PROTHROM AB SERPL-ACNC: 24.3 SEC — HIGH (ref 9.5–13)
PROTHROM AB SERPL-ACNC: 24.3 SEC — HIGH (ref 9.5–13)
PROTHROM AB SERPL-ACNC: 24.8 SEC — HIGH (ref 9.5–13)
PROTHROM AB SERPL-ACNC: 24.8 SEC — HIGH (ref 9.5–13)
PROTHROM AB SERPL-ACNC: 27.8 SEC — HIGH (ref 10.5–13.4)
PROTHROM AB SERPL-ACNC: 28.7 SEC — HIGH (ref 9.5–13)
PROTHROM AB SERPL-ACNC: 31.2 SEC — HIGH (ref 9.5–13)
PROTHROM AB SERPL-ACNC: 31.2 SEC — HIGH (ref 9.5–13)
PROTHROM AB SERPL-ACNC: 32.7 SEC — HIGH (ref 9.5–13)
PROTHROM AB SERPL-ACNC: 35 SEC — HIGH (ref 9.5–13)
PROTHROM AB SERPL-ACNC: 35 SEC — HIGH (ref 9.5–13)
PROTHROM AB SERPL-ACNC: 77.5 SEC — HIGH (ref 9.5–13)
RBC # BLD: 2.32 M/UL — LOW (ref 4.2–5.8)
RBC # BLD: 2.67 M/UL — LOW (ref 4.2–5.8)
RBC # BLD: 2.69 M/UL — LOW (ref 4.2–5.8)
RBC # BLD: 2.7 M/UL — LOW (ref 4.2–5.8)
RBC # BLD: 2.87 M/UL — LOW (ref 4.2–5.8)
RBC # BLD: 2.88 M/UL — LOW (ref 4.2–5.8)
RBC # BLD: 2.91 M/UL — LOW (ref 4.2–5.8)
RBC # BLD: 3 M/UL — LOW (ref 4.2–5.8)
RBC # BLD: 3.1 M/UL — LOW (ref 4.2–5.8)
RBC # BLD: 3.13 M/UL — LOW (ref 4.2–5.8)
RBC # BLD: 3.14 M/UL — LOW (ref 4.2–5.8)
RBC # BLD: 3.18 M/UL — LOW (ref 4.2–5.8)
RBC # BLD: 3.22 M/UL — LOW (ref 4.2–5.8)
RBC # BLD: 3.23 M/UL — LOW (ref 4.2–5.8)
RBC # BLD: 3.43 M/UL — LOW (ref 4.2–5.8)
RBC # BLD: 3.45 M/UL — LOW (ref 4.2–5.8)
RBC # BLD: 3.55 M/UL — LOW (ref 4.2–5.8)
RBC # BLD: 3.56 M/UL — LOW (ref 4.2–5.8)
RBC # BLD: 3.56 M/UL — LOW (ref 4.2–5.8)
RBC # BLD: 3.62 M/UL — LOW (ref 4.2–5.8)
RBC # BLD: 3.66 M/UL — LOW (ref 4.2–5.8)
RBC # BLD: 3.66 M/UL — LOW (ref 4.2–5.8)
RBC # BLD: 3.68 M/UL — LOW (ref 4.2–5.8)
RBC # BLD: 3.68 M/UL — LOW (ref 4.2–5.8)
RBC # BLD: 3.71 M/UL — LOW (ref 4.2–5.8)
RBC # BLD: 3.72 M/UL — LOW (ref 4.2–5.8)
RBC # BLD: 3.72 M/UL — LOW (ref 4.2–5.8)
RBC # BLD: 3.74 M/UL — LOW (ref 4.2–5.8)
RBC # BLD: 3.75 M/UL — LOW (ref 4.2–5.8)
RBC # BLD: 3.76 M/UL — LOW (ref 4.2–5.8)
RBC # BLD: 3.79 M/UL — LOW (ref 4.2–5.8)
RBC # BLD: 3.84 M/UL — LOW (ref 4.2–5.8)
RBC # BLD: 3.85 M/UL — LOW (ref 4.2–5.8)
RBC # BLD: 3.91 M/UL — LOW (ref 4.2–5.8)
RBC # BLD: 3.93 M/UL — LOW (ref 4.2–5.8)
RBC # BLD: 3.97 M/UL — LOW (ref 4.2–5.8)
RBC # BLD: 4.27 M/UL — SIGNIFICANT CHANGE UP (ref 4.2–5.8)
RBC # FLD: 13.7 % — SIGNIFICANT CHANGE UP (ref 10.3–14.5)
RBC # FLD: 13.8 % — SIGNIFICANT CHANGE UP (ref 10.3–14.5)
RBC # FLD: 13.8 % — SIGNIFICANT CHANGE UP (ref 10.3–14.5)
RBC # FLD: 14 % — SIGNIFICANT CHANGE UP (ref 10.3–14.5)
RBC # FLD: 14 % — SIGNIFICANT CHANGE UP (ref 10.3–14.5)
RBC # FLD: 14.1 % — SIGNIFICANT CHANGE UP (ref 10.3–14.5)
RBC # FLD: 14.2 % — SIGNIFICANT CHANGE UP (ref 10.3–14.5)
RBC # FLD: 14.3 % — SIGNIFICANT CHANGE UP (ref 10.3–14.5)
RBC # FLD: 14.6 % — HIGH (ref 10.3–14.5)
RBC # FLD: 19.2 % — HIGH (ref 10.3–14.5)
RBC # FLD: 19.4 % — HIGH (ref 10.3–14.5)
RBC # FLD: 19.6 % — HIGH (ref 10.3–14.5)
RBC # FLD: 19.8 % — HIGH (ref 10.3–14.5)
RBC # FLD: 19.9 % — HIGH (ref 10.3–14.5)
RBC # FLD: 20 % — HIGH (ref 10.3–14.5)
RBC # FLD: 20.1 % — HIGH (ref 10.3–14.5)
RBC # FLD: 20.1 % — HIGH (ref 10.3–14.5)
RBC # FLD: 20.2 % — HIGH (ref 10.3–14.5)
RBC # FLD: 20.6 % — HIGH (ref 10.3–14.5)
RBC # FLD: 20.7 % — HIGH (ref 10.3–14.5)
RBC # FLD: 20.8 % — HIGH (ref 10.3–14.5)
RBC # FLD: 20.8 % — HIGH (ref 10.3–14.5)
RBC # FLD: 21 % — HIGH (ref 10.3–14.5)
RBC # FLD: 21 % — HIGH (ref 10.3–14.5)
RBC # FLD: 21.1 % — HIGH (ref 10.3–14.5)
RBC # FLD: 21.2 % — HIGH (ref 10.3–14.5)
RBC # FLD: 21.2 % — HIGH (ref 10.3–14.5)
RBC # FLD: 21.5 % — HIGH (ref 10.3–14.5)
RBC # FLD: 22.6 % — HIGH (ref 10.3–14.5)
RBC BLD AUTO: SIGNIFICANT CHANGE UP
RSV RNA NPH QL NAA+NON-PROBE: SIGNIFICANT CHANGE UP
S AUREUS DNA NOSE QL NAA+PROBE: DETECTED
SARS-COV-2 RNA SPEC QL NAA+PROBE: SIGNIFICANT CHANGE UP
SODIUM SERPL-SCNC: 129 MMOL/L — LOW (ref 135–145)
SODIUM SERPL-SCNC: 131 MMOL/L — LOW (ref 135–145)
SODIUM SERPL-SCNC: 132 MMOL/L — LOW (ref 135–145)
SODIUM SERPL-SCNC: 133 MMOL/L — LOW (ref 135–145)
SODIUM SERPL-SCNC: 133 MMOL/L — LOW (ref 135–145)
SODIUM SERPL-SCNC: 134 MMOL/L — LOW (ref 135–145)
SODIUM SERPL-SCNC: 135 MMOL/L — SIGNIFICANT CHANGE UP (ref 135–145)
SODIUM SERPL-SCNC: 136 MMOL/L — SIGNIFICANT CHANGE UP (ref 135–145)
SODIUM SERPL-SCNC: 138 MMOL/L — SIGNIFICANT CHANGE UP (ref 135–145)
SODIUM SERPL-SCNC: 138 MMOL/L — SIGNIFICANT CHANGE UP (ref 135–145)
SPECIMEN SOURCE: SIGNIFICANT CHANGE UP
WBC # BLD: 10.74 K/UL — HIGH (ref 3.8–10.5)
WBC # BLD: 11.7 K/UL — HIGH (ref 3.8–10.5)
WBC # BLD: 11.76 K/UL — HIGH (ref 3.8–10.5)
WBC # BLD: 13.98 K/UL — HIGH (ref 3.8–10.5)
WBC # BLD: 14.07 K/UL — HIGH (ref 3.8–10.5)
WBC # BLD: 14.83 K/UL — HIGH (ref 3.8–10.5)
WBC # BLD: 14.83 K/UL — HIGH (ref 3.8–10.5)
WBC # BLD: 14.95 K/UL — HIGH (ref 3.8–10.5)
WBC # BLD: 15.31 K/UL — HIGH (ref 3.8–10.5)
WBC # BLD: 15.73 K/UL — HIGH (ref 3.8–10.5)
WBC # BLD: 18.6 K/UL — HIGH (ref 3.8–10.5)
WBC # BLD: 5.56 K/UL — SIGNIFICANT CHANGE UP (ref 3.8–10.5)
WBC # BLD: 5.56 K/UL — SIGNIFICANT CHANGE UP (ref 3.8–10.5)
WBC # BLD: 6.19 K/UL — SIGNIFICANT CHANGE UP (ref 3.8–10.5)
WBC # BLD: 6.32 K/UL — SIGNIFICANT CHANGE UP (ref 3.8–10.5)
WBC # BLD: 6.39 K/UL — SIGNIFICANT CHANGE UP (ref 3.8–10.5)
WBC # BLD: 6.5 K/UL — SIGNIFICANT CHANGE UP (ref 3.8–10.5)
WBC # BLD: 6.5 K/UL — SIGNIFICANT CHANGE UP (ref 3.8–10.5)
WBC # BLD: 6.58 K/UL — SIGNIFICANT CHANGE UP (ref 3.8–10.5)
WBC # BLD: 6.6 K/UL — SIGNIFICANT CHANGE UP (ref 3.8–10.5)
WBC # BLD: 6.6 K/UL — SIGNIFICANT CHANGE UP (ref 3.8–10.5)
WBC # BLD: 6.92 K/UL — SIGNIFICANT CHANGE UP (ref 3.8–10.5)
WBC # BLD: 6.94 K/UL — SIGNIFICANT CHANGE UP (ref 3.8–10.5)
WBC # BLD: 6.95 K/UL — SIGNIFICANT CHANGE UP (ref 3.8–10.5)
WBC # BLD: 6.99 K/UL — SIGNIFICANT CHANGE UP (ref 3.8–10.5)
WBC # BLD: 7.07 K/UL — SIGNIFICANT CHANGE UP (ref 3.8–10.5)
WBC # BLD: 7.07 K/UL — SIGNIFICANT CHANGE UP (ref 3.8–10.5)
WBC # BLD: 7.46 K/UL — SIGNIFICANT CHANGE UP (ref 3.8–10.5)
WBC # BLD: 7.52 K/UL — SIGNIFICANT CHANGE UP (ref 3.8–10.5)
WBC # BLD: 7.58 K/UL — SIGNIFICANT CHANGE UP (ref 3.8–10.5)
WBC # BLD: 7.64 K/UL — SIGNIFICANT CHANGE UP (ref 3.8–10.5)
WBC # BLD: 7.64 K/UL — SIGNIFICANT CHANGE UP (ref 3.8–10.5)
WBC # BLD: 7.86 K/UL — SIGNIFICANT CHANGE UP (ref 3.8–10.5)
WBC # BLD: 8.07 K/UL — SIGNIFICANT CHANGE UP (ref 3.8–10.5)
WBC # BLD: 8.31 K/UL — SIGNIFICANT CHANGE UP (ref 3.8–10.5)
WBC # BLD: 8.58 K/UL — SIGNIFICANT CHANGE UP (ref 3.8–10.5)
WBC # BLD: 8.73 K/UL — SIGNIFICANT CHANGE UP (ref 3.8–10.5)
WBC # FLD AUTO: 10.74 K/UL — HIGH (ref 3.8–10.5)
WBC # FLD AUTO: 11.7 K/UL — HIGH (ref 3.8–10.5)
WBC # FLD AUTO: 11.76 K/UL — HIGH (ref 3.8–10.5)
WBC # FLD AUTO: 13.98 K/UL — HIGH (ref 3.8–10.5)
WBC # FLD AUTO: 14.07 K/UL — HIGH (ref 3.8–10.5)
WBC # FLD AUTO: 14.83 K/UL — HIGH (ref 3.8–10.5)
WBC # FLD AUTO: 14.83 K/UL — HIGH (ref 3.8–10.5)
WBC # FLD AUTO: 14.95 K/UL — HIGH (ref 3.8–10.5)
WBC # FLD AUTO: 15.31 K/UL — HIGH (ref 3.8–10.5)
WBC # FLD AUTO: 15.73 K/UL — HIGH (ref 3.8–10.5)
WBC # FLD AUTO: 18.6 K/UL — HIGH (ref 3.8–10.5)
WBC # FLD AUTO: 5.56 K/UL — SIGNIFICANT CHANGE UP (ref 3.8–10.5)
WBC # FLD AUTO: 5.56 K/UL — SIGNIFICANT CHANGE UP (ref 3.8–10.5)
WBC # FLD AUTO: 6.19 K/UL — SIGNIFICANT CHANGE UP (ref 3.8–10.5)
WBC # FLD AUTO: 6.32 K/UL — SIGNIFICANT CHANGE UP (ref 3.8–10.5)
WBC # FLD AUTO: 6.39 K/UL — SIGNIFICANT CHANGE UP (ref 3.8–10.5)
WBC # FLD AUTO: 6.5 K/UL — SIGNIFICANT CHANGE UP (ref 3.8–10.5)
WBC # FLD AUTO: 6.5 K/UL — SIGNIFICANT CHANGE UP (ref 3.8–10.5)
WBC # FLD AUTO: 6.58 K/UL — SIGNIFICANT CHANGE UP (ref 3.8–10.5)
WBC # FLD AUTO: 6.6 K/UL — SIGNIFICANT CHANGE UP (ref 3.8–10.5)
WBC # FLD AUTO: 6.6 K/UL — SIGNIFICANT CHANGE UP (ref 3.8–10.5)
WBC # FLD AUTO: 6.92 K/UL — SIGNIFICANT CHANGE UP (ref 3.8–10.5)
WBC # FLD AUTO: 6.94 K/UL — SIGNIFICANT CHANGE UP (ref 3.8–10.5)
WBC # FLD AUTO: 6.95 K/UL — SIGNIFICANT CHANGE UP (ref 3.8–10.5)
WBC # FLD AUTO: 6.99 K/UL — SIGNIFICANT CHANGE UP (ref 3.8–10.5)
WBC # FLD AUTO: 7.07 K/UL — SIGNIFICANT CHANGE UP (ref 3.8–10.5)
WBC # FLD AUTO: 7.07 K/UL — SIGNIFICANT CHANGE UP (ref 3.8–10.5)
WBC # FLD AUTO: 7.46 K/UL — SIGNIFICANT CHANGE UP (ref 3.8–10.5)
WBC # FLD AUTO: 7.52 K/UL — SIGNIFICANT CHANGE UP (ref 3.8–10.5)
WBC # FLD AUTO: 7.58 K/UL — SIGNIFICANT CHANGE UP (ref 3.8–10.5)
WBC # FLD AUTO: 7.64 K/UL — SIGNIFICANT CHANGE UP (ref 3.8–10.5)
WBC # FLD AUTO: 7.64 K/UL — SIGNIFICANT CHANGE UP (ref 3.8–10.5)
WBC # FLD AUTO: 7.86 K/UL — SIGNIFICANT CHANGE UP (ref 3.8–10.5)
WBC # FLD AUTO: 8.07 K/UL — SIGNIFICANT CHANGE UP (ref 3.8–10.5)
WBC # FLD AUTO: 8.31 K/UL — SIGNIFICANT CHANGE UP (ref 3.8–10.5)
WBC # FLD AUTO: 8.58 K/UL — SIGNIFICANT CHANGE UP (ref 3.8–10.5)
WBC # FLD AUTO: 8.73 K/UL — SIGNIFICANT CHANGE UP (ref 3.8–10.5)

## 2023-01-01 PROCEDURE — 85610 PROTHROMBIN TIME: CPT

## 2023-01-01 PROCEDURE — 84145 PROCALCITONIN (PCT): CPT

## 2023-01-01 PROCEDURE — 93010 ELECTROCARDIOGRAM REPORT: CPT

## 2023-01-01 PROCEDURE — 78582 LUNG VENTILAT&PERFUS IMAGING: CPT | Mod: 26

## 2023-01-01 PROCEDURE — 87070 CULTURE OTHR SPECIMN AEROBIC: CPT

## 2023-01-01 PROCEDURE — 73630 X-RAY EXAM OF FOOT: CPT | Mod: 26,50

## 2023-01-01 PROCEDURE — 36415 COLL VENOUS BLD VENIPUNCTURE: CPT

## 2023-01-01 PROCEDURE — 82962 GLUCOSE BLOOD TEST: CPT

## 2023-01-01 PROCEDURE — 36573 INSJ PICC RS&I 5 YR+: CPT

## 2023-01-01 PROCEDURE — 94640 AIRWAY INHALATION TREATMENT: CPT

## 2023-01-01 PROCEDURE — 87040 BLOOD CULTURE FOR BACTERIA: CPT

## 2023-01-01 PROCEDURE — 71045 X-RAY EXAM CHEST 1 VIEW: CPT

## 2023-01-01 PROCEDURE — A9567: CPT

## 2023-01-01 PROCEDURE — 80053 COMPREHEN METABOLIC PANEL: CPT

## 2023-01-01 PROCEDURE — 76937 US GUIDE VASCULAR ACCESS: CPT

## 2023-01-01 PROCEDURE — 87640 STAPH A DNA AMP PROBE: CPT

## 2023-01-01 PROCEDURE — 86901 BLOOD TYPING SEROLOGIC RH(D): CPT

## 2023-01-01 PROCEDURE — 88311 DECALCIFY TISSUE: CPT

## 2023-01-01 PROCEDURE — 86140 C-REACTIVE PROTEIN: CPT

## 2023-01-01 PROCEDURE — 85025 COMPLETE CBC W/AUTO DIFF WBC: CPT

## 2023-01-01 PROCEDURE — 99285 EMERGENCY DEPT VISIT HI MDM: CPT

## 2023-01-01 PROCEDURE — 73630 X-RAY EXAM OF FOOT: CPT

## 2023-01-01 PROCEDURE — 93010 ELECTROCARDIOGRAM REPORT: CPT | Mod: 76

## 2023-01-01 PROCEDURE — 86850 RBC ANTIBODY SCREEN: CPT

## 2023-01-01 PROCEDURE — 73620 X-RAY EXAM OF FOOT: CPT

## 2023-01-01 PROCEDURE — 36430 TRANSFUSION BLD/BLD COMPNT: CPT

## 2023-01-01 PROCEDURE — P9016: CPT

## 2023-01-01 PROCEDURE — 87077 CULTURE AEROBIC IDENTIFY: CPT

## 2023-01-01 PROCEDURE — 86900 BLOOD TYPING SEROLOGIC ABO: CPT

## 2023-01-01 PROCEDURE — 71045 X-RAY EXAM CHEST 1 VIEW: CPT | Mod: 26

## 2023-01-01 PROCEDURE — 99291 CRITICAL CARE FIRST HOUR: CPT

## 2023-01-01 PROCEDURE — 85027 COMPLETE CBC AUTOMATED: CPT

## 2023-01-01 PROCEDURE — 87186 SC STD MICRODIL/AGAR DIL: CPT

## 2023-01-01 PROCEDURE — 87637 SARSCOV2&INF A&B&RSV AMP PRB: CPT

## 2023-01-01 PROCEDURE — 93005 ELECTROCARDIOGRAM TRACING: CPT

## 2023-01-01 PROCEDURE — 88311 DECALCIFY TISSUE: CPT | Mod: 26

## 2023-01-01 PROCEDURE — 83880 ASSAY OF NATRIURETIC PEPTIDE: CPT

## 2023-01-01 PROCEDURE — 73620 X-RAY EXAM OF FOOT: CPT | Mod: 26,LT

## 2023-01-01 PROCEDURE — 83036 HEMOGLOBIN GLYCOSYLATED A1C: CPT

## 2023-01-01 PROCEDURE — 85730 THROMBOPLASTIN TIME PARTIAL: CPT

## 2023-01-01 PROCEDURE — 87641 MR-STAPH DNA AMP PROBE: CPT

## 2023-01-01 PROCEDURE — 71250 CT THORAX DX C-: CPT | Mod: 26

## 2023-01-01 PROCEDURE — 80048 BASIC METABOLIC PNL TOTAL CA: CPT

## 2023-01-01 PROCEDURE — 87150 DNA/RNA AMPLIFIED PROBE: CPT

## 2023-01-01 PROCEDURE — 88305 TISSUE EXAM BY PATHOLOGIST: CPT

## 2023-01-01 PROCEDURE — 71250 CT THORAX DX C-: CPT

## 2023-01-01 PROCEDURE — G0463: CPT

## 2023-01-01 PROCEDURE — C1751: CPT

## 2023-01-01 PROCEDURE — 86923 COMPATIBILITY TEST ELECTRIC: CPT

## 2023-01-01 PROCEDURE — 88305 TISSUE EXAM BY PATHOLOGIST: CPT | Mod: 26

## 2023-01-01 PROCEDURE — 99222 1ST HOSP IP/OBS MODERATE 55: CPT

## 2023-01-01 PROCEDURE — 94760 N-INVAS EAR/PLS OXIMETRY 1: CPT

## 2023-01-01 PROCEDURE — 78582 LUNG VENTILAT&PERFUS IMAGING: CPT

## 2023-01-01 PROCEDURE — A9540: CPT

## 2023-01-01 RX ORDER — WARFARIN SODIUM 2.5 MG/1
1 TABLET ORAL ONCE
Refills: 0 | Status: COMPLETED | OUTPATIENT
Start: 2023-01-01 | End: 2023-01-01

## 2023-01-01 RX ORDER — PANTOPRAZOLE SODIUM 20 MG/1
40 TABLET, DELAYED RELEASE ORAL
Refills: 0 | Status: DISCONTINUED | OUTPATIENT
Start: 2023-01-01 | End: 2023-01-01

## 2023-01-01 RX ORDER — PANTOPRAZOLE SODIUM 20 MG/1
8 TABLET, DELAYED RELEASE ORAL
Qty: 80 | Refills: 0 | Status: DISCONTINUED | OUTPATIENT
Start: 2023-01-01 | End: 2023-01-01

## 2023-01-01 RX ORDER — PIPERACILLIN AND TAZOBACTAM 4; .5 G/20ML; G/20ML
3.38 INJECTION, POWDER, LYOPHILIZED, FOR SOLUTION INTRAVENOUS ONCE
Refills: 0 | Status: COMPLETED | OUTPATIENT
Start: 2023-01-01 | End: 2023-01-01

## 2023-01-01 RX ORDER — INSULIN LISPRO 100/ML
8 VIAL (ML) SUBCUTANEOUS ONCE
Refills: 0 | Status: DISCONTINUED | OUTPATIENT
Start: 2023-01-01 | End: 2023-01-01

## 2023-01-01 RX ORDER — WARFARIN SODIUM 2.5 MG/1
3 TABLET ORAL ONCE
Refills: 0 | Status: COMPLETED | OUTPATIENT
Start: 2023-01-01 | End: 2023-01-01

## 2023-01-01 RX ORDER — INSULIN LISPRO 100/ML
VIAL (ML) SUBCUTANEOUS AT BEDTIME
Refills: 0 | Status: DISCONTINUED | OUTPATIENT
Start: 2023-01-01 | End: 2023-01-01

## 2023-01-01 RX ORDER — CLOPIDOGREL BISULFATE 75 MG/1
75 TABLET, FILM COATED ORAL DAILY
Refills: 0 | Status: DISCONTINUED | OUTPATIENT
Start: 2023-01-01 | End: 2023-01-01

## 2023-01-01 RX ORDER — HEPARIN SODIUM 5000 [USP'U]/ML
INJECTION INTRAVENOUS; SUBCUTANEOUS
Qty: 25000 | Refills: 0 | Status: DISCONTINUED | OUTPATIENT
Start: 2023-01-01 | End: 2023-01-01

## 2023-01-01 RX ORDER — INFLUENZA VIRUS VACCINE 15; 15; 15; 15 UG/.5ML; UG/.5ML; UG/.5ML; UG/.5ML
0.7 SUSPENSION INTRAMUSCULAR ONCE
Refills: 0 | Status: DISCONTINUED | OUTPATIENT
Start: 2023-01-01 | End: 2023-01-01

## 2023-01-01 RX ORDER — DEXTROSE 50 % IN WATER 50 %
25 SYRINGE (ML) INTRAVENOUS ONCE
Refills: 0 | Status: DISCONTINUED | OUTPATIENT
Start: 2023-01-01 | End: 2023-01-01

## 2023-01-01 RX ORDER — IPRATROPIUM/ALBUTEROL SULFATE 18-103MCG
3 AEROSOL WITH ADAPTER (GRAM) INHALATION ONCE
Refills: 0 | Status: COMPLETED | OUTPATIENT
Start: 2023-01-01 | End: 2023-01-01

## 2023-01-01 RX ORDER — TAMSULOSIN HYDROCHLORIDE 0.4 MG/1
2 CAPSULE ORAL
Qty: 60 | Refills: 0
Start: 2023-01-01 | End: 2023-01-01

## 2023-01-01 RX ORDER — SODIUM CHLORIDE 9 MG/ML
1 INJECTION INTRAMUSCULAR; INTRAVENOUS; SUBCUTANEOUS THREE TIMES A DAY
Refills: 0 | Status: DISCONTINUED | OUTPATIENT
Start: 2023-01-01 | End: 2023-01-01

## 2023-01-01 RX ORDER — WARFARIN SODIUM 2.5 MG/1
5 TABLET ORAL ONCE
Refills: 0 | Status: COMPLETED | OUTPATIENT
Start: 2023-01-01 | End: 2023-01-01

## 2023-01-01 RX ORDER — UREA 15 G
15 POWDER IN PACKET (EA) ORAL DAILY
Refills: 0 | Status: DISCONTINUED | OUTPATIENT
Start: 2023-01-01 | End: 2023-01-01

## 2023-01-01 RX ORDER — INSULIN LISPRO 100/ML
VIAL (ML) SUBCUTANEOUS
Refills: 0 | Status: DISCONTINUED | OUTPATIENT
Start: 2023-01-01 | End: 2023-01-01

## 2023-01-01 RX ORDER — PIPERACILLIN AND TAZOBACTAM 4; .5 G/20ML; G/20ML
3.38 INJECTION, POWDER, LYOPHILIZED, FOR SOLUTION INTRAVENOUS ONCE
Refills: 0 | Status: DISCONTINUED | OUTPATIENT
Start: 2023-01-01 | End: 2023-01-01

## 2023-01-01 RX ORDER — HEPARIN SODIUM 5000 [USP'U]/ML
5000 INJECTION INTRAVENOUS; SUBCUTANEOUS ONCE
Refills: 0 | Status: COMPLETED | OUTPATIENT
Start: 2023-01-01 | End: 2023-01-01

## 2023-01-01 RX ORDER — DILTIAZEM HCL 120 MG
10 CAPSULE, EXT RELEASE 24 HR ORAL
Qty: 125 | Refills: 0 | Status: DISCONTINUED | OUTPATIENT
Start: 2023-01-01 | End: 2023-01-01

## 2023-01-01 RX ORDER — DILTIAZEM HCL 120 MG
1 CAPSULE, EXT RELEASE 24 HR ORAL
Qty: 30 | Refills: 0
Start: 2023-01-01 | End: 2023-01-01

## 2023-01-01 RX ORDER — INSULIN GLARGINE 100 [IU]/ML
8 INJECTION, SOLUTION SUBCUTANEOUS AT BEDTIME
Refills: 0 | Status: DISCONTINUED | OUTPATIENT
Start: 2023-01-01 | End: 2023-01-01

## 2023-01-01 RX ORDER — SODIUM CHLORIDE 9 MG/ML
1000 INJECTION, SOLUTION INTRAVENOUS
Refills: 0 | Status: DISCONTINUED | OUTPATIENT
Start: 2023-01-01 | End: 2023-01-01

## 2023-01-01 RX ORDER — SODIUM CHLORIDE 9 MG/ML
1 INJECTION INTRAMUSCULAR; INTRAVENOUS; SUBCUTANEOUS
Refills: 0 | Status: DISCONTINUED | OUTPATIENT
Start: 2023-01-01 | End: 2023-01-01

## 2023-01-01 RX ORDER — ENOXAPARIN SODIUM 100 MG/ML
60 INJECTION SUBCUTANEOUS EVERY 12 HOURS
Refills: 0 | Status: DISCONTINUED | OUTPATIENT
Start: 2023-01-01 | End: 2023-01-01

## 2023-01-01 RX ORDER — DEXTROSE 50 % IN WATER 50 %
12.5 SYRINGE (ML) INTRAVENOUS ONCE
Refills: 0 | Status: DISCONTINUED | OUTPATIENT
Start: 2023-01-01 | End: 2023-01-01

## 2023-01-01 RX ORDER — FINASTERIDE 5 MG/1
5 TABLET, FILM COATED ORAL ONCE
Refills: 0 | Status: COMPLETED | OUTPATIENT
Start: 2023-01-01 | End: 2023-01-01

## 2023-01-01 RX ORDER — PIPERACILLIN AND TAZOBACTAM 4; .5 G/20ML; G/20ML
3.38 INJECTION, POWDER, LYOPHILIZED, FOR SOLUTION INTRAVENOUS EVERY 8 HOURS
Refills: 0 | Status: DISCONTINUED | OUTPATIENT
Start: 2023-01-01 | End: 2023-01-01

## 2023-01-01 RX ORDER — ONDANSETRON 8 MG/1
4 TABLET, FILM COATED ORAL EVERY 6 HOURS
Refills: 0 | Status: DISCONTINUED | OUTPATIENT
Start: 2023-01-01 | End: 2023-01-01

## 2023-01-01 RX ORDER — GLUCAGON INJECTION, SOLUTION 0.5 MG/.1ML
1 INJECTION, SOLUTION SUBCUTANEOUS ONCE
Refills: 0 | Status: DISCONTINUED | OUTPATIENT
Start: 2023-01-01 | End: 2023-01-01

## 2023-01-01 RX ORDER — PANTOPRAZOLE SODIUM 20 MG/1
80 TABLET, DELAYED RELEASE ORAL ONCE
Refills: 0 | Status: COMPLETED | OUTPATIENT
Start: 2023-01-01 | End: 2023-01-01

## 2023-01-01 RX ORDER — DILTIAZEM HCL 120 MG
1 CAPSULE, EXT RELEASE 24 HR ORAL
Qty: 0 | Refills: 0 | DISCHARGE
Start: 2023-01-01

## 2023-01-01 RX ORDER — INSULIN LISPRO 100/ML
10 VIAL (ML) SUBCUTANEOUS ONCE
Refills: 0 | Status: COMPLETED | OUTPATIENT
Start: 2023-01-01 | End: 2023-01-01

## 2023-01-01 RX ORDER — INSULIN LISPRO 100/ML
4 VIAL (ML) SUBCUTANEOUS
Refills: 0 | Status: DISCONTINUED | OUTPATIENT
Start: 2023-01-01 | End: 2023-01-01

## 2023-01-01 RX ORDER — INSULIN GLARGINE 100 [IU]/ML
5 INJECTION, SOLUTION SUBCUTANEOUS
Qty: 0 | Refills: 0 | DISCHARGE
Start: 2023-01-01

## 2023-01-01 RX ORDER — INSULIN GLARGINE 100 [IU]/ML
45 INJECTION, SOLUTION SUBCUTANEOUS
Refills: 0 | DISCHARGE

## 2023-01-01 RX ORDER — PREGABALIN 225 MG/1
1 CAPSULE ORAL
Refills: 0 | DISCHARGE

## 2023-01-01 RX ORDER — SPIRONOLACTONE 25 MG/1
25 TABLET, FILM COATED ORAL DAILY
Refills: 0 | Status: DISCONTINUED | OUTPATIENT
Start: 2023-01-01 | End: 2023-01-01

## 2023-01-01 RX ORDER — INSULIN LISPRO 100/ML
12 VIAL (ML) SUBCUTANEOUS ONCE
Refills: 0 | Status: COMPLETED | OUTPATIENT
Start: 2023-01-01 | End: 2023-01-01

## 2023-01-01 RX ORDER — FUROSEMIDE 40 MG
40 TABLET ORAL ONCE
Refills: 0 | Status: COMPLETED | OUTPATIENT
Start: 2023-01-01 | End: 2023-01-01

## 2023-01-01 RX ORDER — INSULIN HUMAN 100 [IU]/ML
12 INJECTION, SOLUTION SUBCUTANEOUS ONCE
Refills: 0 | Status: DISCONTINUED | OUTPATIENT
Start: 2023-01-01 | End: 2023-01-01

## 2023-01-01 RX ORDER — ASPIRIN/CALCIUM CARB/MAGNESIUM 324 MG
81 TABLET ORAL DAILY
Refills: 0 | Status: DISCONTINUED | OUTPATIENT
Start: 2023-01-01 | End: 2023-01-01

## 2023-01-01 RX ORDER — IBUPROFEN 200 MG
400 TABLET ORAL ONCE
Refills: 0 | Status: COMPLETED | OUTPATIENT
Start: 2023-01-01 | End: 2023-01-01

## 2023-01-01 RX ORDER — POTASSIUM CHLORIDE 20 MEQ
10 PACKET (EA) ORAL
Refills: 0 | Status: COMPLETED | OUTPATIENT
Start: 2023-01-01 | End: 2023-01-01

## 2023-01-01 RX ORDER — METOPROLOL TARTRATE 50 MG
25 TABLET ORAL DAILY
Refills: 0 | Status: DISCONTINUED | OUTPATIENT
Start: 2023-01-01 | End: 2023-01-01

## 2023-01-01 RX ORDER — INSULIN GLARGINE 100 [IU]/ML
15 INJECTION, SOLUTION SUBCUTANEOUS AT BEDTIME
Refills: 0 | Status: DISCONTINUED | OUTPATIENT
Start: 2023-01-01 | End: 2023-01-01

## 2023-01-01 RX ORDER — IRON SUCROSE 20 MG/ML
100 INJECTION, SOLUTION INTRAVENOUS EVERY 24 HOURS
Refills: 0 | Status: COMPLETED | OUTPATIENT
Start: 2023-01-01 | End: 2023-01-01

## 2023-01-01 RX ORDER — HEPARIN SODIUM 5000 [USP'U]/ML
5000 INJECTION INTRAVENOUS; SUBCUTANEOUS EVERY 6 HOURS
Refills: 0 | Status: DISCONTINUED | OUTPATIENT
Start: 2023-01-01 | End: 2023-01-01

## 2023-01-01 RX ORDER — PANTOPRAZOLE SODIUM 20 MG/1
40 TABLET, DELAYED RELEASE ORAL EVERY 12 HOURS
Refills: 0 | Status: DISCONTINUED | OUTPATIENT
Start: 2023-01-01 | End: 2023-01-01

## 2023-01-01 RX ORDER — WARFARIN SODIUM 2.5 MG/1
4 TABLET ORAL ONCE
Refills: 0 | Status: DISCONTINUED | OUTPATIENT
Start: 2023-01-01 | End: 2023-01-01

## 2023-01-01 RX ORDER — CEFAZOLIN SODIUM 1 G
2 VIAL (EA) INJECTION
Qty: 0 | Refills: 0 | DISCHARGE
Start: 2023-01-01 | End: 2023-01-01

## 2023-01-01 RX ORDER — ATORVASTATIN CALCIUM 80 MG/1
40 TABLET, FILM COATED ORAL AT BEDTIME
Refills: 0 | Status: DISCONTINUED | OUTPATIENT
Start: 2023-01-01 | End: 2023-01-01

## 2023-01-01 RX ORDER — FUROSEMIDE 40 MG
40 TABLET ORAL DAILY
Refills: 0 | Status: DISCONTINUED | OUTPATIENT
Start: 2023-01-01 | End: 2023-01-01

## 2023-01-01 RX ORDER — WARFARIN SODIUM 2.5 MG/1
2 TABLET ORAL ONCE
Refills: 0 | Status: DISCONTINUED | OUTPATIENT
Start: 2023-01-01 | End: 2023-01-01

## 2023-01-01 RX ORDER — ONDANSETRON 8 MG/1
4 TABLET, FILM COATED ORAL ONCE
Refills: 0 | Status: DISCONTINUED | OUTPATIENT
Start: 2023-01-01 | End: 2023-01-01

## 2023-01-01 RX ORDER — DILTIAZEM HCL 120 MG
240 CAPSULE, EXT RELEASE 24 HR ORAL DAILY
Refills: 0 | Status: DISCONTINUED | OUTPATIENT
Start: 2023-01-01 | End: 2023-01-01

## 2023-01-01 RX ORDER — INSULIN GLARGINE 100 [IU]/ML
5 INJECTION, SOLUTION SUBCUTANEOUS AT BEDTIME
Refills: 0 | Status: DISCONTINUED | OUTPATIENT
Start: 2023-01-01 | End: 2023-01-01

## 2023-01-01 RX ORDER — DEXTROSE 50 % IN WATER 50 %
15 SYRINGE (ML) INTRAVENOUS ONCE
Refills: 0 | Status: DISCONTINUED | OUTPATIENT
Start: 2023-01-01 | End: 2023-01-01

## 2023-01-01 RX ORDER — ACETAMINOPHEN 500 MG
325 TABLET ORAL ONCE
Refills: 0 | Status: COMPLETED | OUTPATIENT
Start: 2023-01-01 | End: 2023-01-01

## 2023-01-01 RX ORDER — POVIDONE-IODINE 5 %
1 AEROSOL (ML) TOPICAL DAILY
Refills: 0 | Status: DISCONTINUED | OUTPATIENT
Start: 2023-01-01 | End: 2023-01-01

## 2023-01-01 RX ORDER — HYDROMORPHONE HYDROCHLORIDE 2 MG/ML
0.5 INJECTION INTRAMUSCULAR; INTRAVENOUS; SUBCUTANEOUS
Refills: 0 | Status: DISCONTINUED | OUTPATIENT
Start: 2023-01-01 | End: 2023-01-01

## 2023-01-01 RX ORDER — HYDROMORPHONE HYDROCHLORIDE 2 MG/ML
0.25 INJECTION INTRAMUSCULAR; INTRAVENOUS; SUBCUTANEOUS
Refills: 0 | Status: DISCONTINUED | OUTPATIENT
Start: 2023-01-01 | End: 2023-01-01

## 2023-01-01 RX ORDER — POTASSIUM CHLORIDE 20 MEQ
10 PACKET (EA) ORAL ONCE
Refills: 0 | Status: COMPLETED | OUTPATIENT
Start: 2023-01-01 | End: 2023-01-01

## 2023-01-01 RX ORDER — INSULIN LISPRO 100/ML
5 VIAL (ML) SUBCUTANEOUS
Refills: 0 | Status: DISCONTINUED | OUTPATIENT
Start: 2023-01-01 | End: 2023-01-01

## 2023-01-01 RX ORDER — OXYCODONE AND ACETAMINOPHEN 5; 325 MG/1; MG/1
1 TABLET ORAL EVERY 6 HOURS
Refills: 0 | Status: DISCONTINUED | OUTPATIENT
Start: 2023-01-01 | End: 2023-01-01

## 2023-01-01 RX ORDER — LEVETIRACETAM 250 MG/1
750 TABLET, FILM COATED ORAL
Refills: 0 | Status: DISCONTINUED | OUTPATIENT
Start: 2023-01-01 | End: 2023-01-01

## 2023-01-01 RX ORDER — PHYTONADIONE (VIT K1) 5 MG
10 TABLET ORAL ONCE
Refills: 0 | Status: COMPLETED | OUTPATIENT
Start: 2023-01-01 | End: 2023-01-01

## 2023-01-01 RX ORDER — SODIUM CHLORIDE 9 MG/ML
1000 INJECTION INTRAMUSCULAR; INTRAVENOUS; SUBCUTANEOUS
Refills: 0 | Status: DISCONTINUED | OUTPATIENT
Start: 2023-01-01 | End: 2023-01-01

## 2023-01-01 RX ORDER — WARFARIN SODIUM 2.5 MG/1
2 TABLET ORAL ONCE
Refills: 0 | Status: COMPLETED | OUTPATIENT
Start: 2023-01-01 | End: 2023-01-01

## 2023-01-01 RX ORDER — INSULIN LISPRO 100/ML
3 VIAL (ML) SUBCUTANEOUS
Refills: 0 | Status: DISCONTINUED | OUTPATIENT
Start: 2023-01-01 | End: 2023-01-01

## 2023-01-01 RX ORDER — SUCRALFATE 1 G
1 TABLET ORAL
Refills: 0 | Status: DISCONTINUED | OUTPATIENT
Start: 2023-01-01 | End: 2023-01-01

## 2023-01-01 RX ORDER — FAMOTIDINE 10 MG/ML
1 INJECTION INTRAVENOUS
Refills: 0 | DISCHARGE

## 2023-01-01 RX ORDER — DEXTROSE 50 % IN WATER 50 %
25 SYRINGE (ML) INTRAVENOUS ONCE
Refills: 0 | Status: COMPLETED | OUTPATIENT
Start: 2023-01-01 | End: 2023-01-01

## 2023-01-01 RX ORDER — TAMSULOSIN HYDROCHLORIDE 0.4 MG/1
0.8 CAPSULE ORAL DAILY
Refills: 0 | Status: DISCONTINUED | OUTPATIENT
Start: 2023-01-01 | End: 2023-01-01

## 2023-01-01 RX ORDER — HEPARIN SODIUM 5000 [USP'U]/ML
2500 INJECTION INTRAVENOUS; SUBCUTANEOUS EVERY 6 HOURS
Refills: 0 | Status: DISCONTINUED | OUTPATIENT
Start: 2023-01-01 | End: 2023-01-01

## 2023-01-01 RX ORDER — INSULIN LISPRO 100/ML
7 VIAL (ML) SUBCUTANEOUS
Refills: 0 | Status: DISCONTINUED | OUTPATIENT
Start: 2023-01-01 | End: 2023-01-01

## 2023-01-01 RX ORDER — FINASTERIDE 5 MG/1
1 TABLET, FILM COATED ORAL
Qty: 30 | Refills: 0
Start: 2023-01-01 | End: 2023-01-01

## 2023-01-01 RX ORDER — WARFARIN SODIUM 2.5 MG/1
1 TABLET ORAL
Refills: 0 | DISCHARGE

## 2023-01-01 RX ORDER — CEFAZOLIN SODIUM 1 G
2000 VIAL (EA) INJECTION ONCE
Refills: 0 | Status: DISCONTINUED | OUTPATIENT
Start: 2023-01-01 | End: 2023-01-01

## 2023-01-01 RX ORDER — INSULIN GLARGINE 100 [IU]/ML
20 INJECTION, SOLUTION SUBCUTANEOUS AT BEDTIME
Refills: 0 | Status: DISCONTINUED | OUTPATIENT
Start: 2023-01-01 | End: 2023-01-01

## 2023-01-01 RX ORDER — CADEXOMER IODINE 0.9 %
1 PADS, MEDICATED (EA) TOPICAL DAILY
Refills: 0 | Status: DISCONTINUED | OUTPATIENT
Start: 2023-01-01 | End: 2023-01-01

## 2023-01-01 RX ORDER — ACETAMINOPHEN 500 MG
1000 TABLET ORAL ONCE
Refills: 0 | Status: COMPLETED | OUTPATIENT
Start: 2023-01-01 | End: 2023-01-01

## 2023-01-01 RX ORDER — DIPHENHYDRAMINE HCL 50 MG
12 CAPSULE ORAL DAILY
Refills: 0 | Status: COMPLETED | OUTPATIENT
Start: 2023-01-01 | End: 2023-01-01

## 2023-01-01 RX ORDER — ATORVASTATIN CALCIUM 80 MG/1
1 TABLET, FILM COATED ORAL
Qty: 0 | Refills: 0 | DISCHARGE

## 2023-01-01 RX ORDER — MORPHINE SULFATE 50 MG/1
2 CAPSULE, EXTENDED RELEASE ORAL EVERY 4 HOURS
Refills: 0 | Status: DISCONTINUED | OUTPATIENT
Start: 2023-01-01 | End: 2023-01-01

## 2023-01-01 RX ORDER — CHOLECALCIFEROL (VITAMIN D3) 125 MCG
1 CAPSULE ORAL
Refills: 0 | DISCHARGE

## 2023-01-01 RX ORDER — IPRATROPIUM/ALBUTEROL SULFATE 18-103MCG
3 AEROSOL WITH ADAPTER (GRAM) INHALATION EVERY 8 HOURS
Refills: 0 | Status: DISCONTINUED | OUTPATIENT
Start: 2023-01-01 | End: 2023-01-01

## 2023-01-01 RX ORDER — WARFARIN SODIUM 2.5 MG/1
2.5 TABLET ORAL ONCE
Refills: 0 | Status: COMPLETED | OUTPATIENT
Start: 2023-01-01 | End: 2023-01-01

## 2023-01-01 RX ORDER — INSULIN LISPRO 100/ML
4 VIAL (ML) SUBCUTANEOUS ONCE
Refills: 0 | Status: COMPLETED | OUTPATIENT
Start: 2023-01-01 | End: 2023-01-01

## 2023-01-01 RX ORDER — INSULIN GLARGINE 100 [IU]/ML
30 INJECTION, SOLUTION SUBCUTANEOUS AT BEDTIME
Refills: 0 | Status: DISCONTINUED | OUTPATIENT
Start: 2023-01-01 | End: 2023-01-01

## 2023-01-01 RX ORDER — INSULIN GLARGINE 100 [IU]/ML
10 INJECTION, SOLUTION SUBCUTANEOUS AT BEDTIME
Refills: 0 | Status: DISCONTINUED | OUTPATIENT
Start: 2023-01-01 | End: 2023-01-01

## 2023-01-01 RX ORDER — WARFARIN SODIUM 2.5 MG/1
5 TABLET ORAL DAILY
Refills: 0 | Status: COMPLETED | OUTPATIENT
Start: 2023-01-01 | End: 2023-01-01

## 2023-01-01 RX ORDER — CEFTRIAXONE 500 MG/1
2000 INJECTION, POWDER, FOR SOLUTION INTRAMUSCULAR; INTRAVENOUS EVERY 24 HOURS
Refills: 0 | Status: COMPLETED | OUTPATIENT
Start: 2023-01-01 | End: 2023-01-01

## 2023-01-01 RX ORDER — ACETAMINOPHEN 500 MG
1000 TABLET ORAL ONCE
Refills: 0 | Status: DISCONTINUED | OUTPATIENT
Start: 2023-01-01 | End: 2023-01-01

## 2023-01-01 RX ORDER — METOPROLOL TARTRATE 50 MG
1 TABLET ORAL
Refills: 0 | DISCHARGE

## 2023-01-01 RX ORDER — DILTIAZEM HCL 120 MG
15 CAPSULE, EXT RELEASE 24 HR ORAL ONCE
Refills: 0 | Status: COMPLETED | OUTPATIENT
Start: 2023-01-01 | End: 2023-01-01

## 2023-01-01 RX ORDER — ENOXAPARIN SODIUM 100 MG/ML
60 INJECTION SUBCUTANEOUS
Refills: 0 | DISCHARGE

## 2023-01-01 RX ORDER — PROTHROMBIN COMPLEX CONCENTRATE (HUMAN) 25.5; 16.5; 24; 22; 22; 26 [IU]/ML; [IU]/ML; [IU]/ML; [IU]/ML; [IU]/ML; [IU]/ML
1500 POWDER, FOR SOLUTION INTRAVENOUS ONCE
Refills: 0 | Status: COMPLETED | OUTPATIENT
Start: 2023-01-01 | End: 2023-01-01

## 2023-01-01 RX ORDER — VANCOMYCIN HCL 1 G
1000 VIAL (EA) INTRAVENOUS EVERY 24 HOURS
Refills: 0 | Status: DISCONTINUED | OUTPATIENT
Start: 2023-01-01 | End: 2023-01-01

## 2023-01-01 RX ORDER — CEFAZOLIN SODIUM 1 G
2000 VIAL (EA) INJECTION EVERY 8 HOURS
Refills: 0 | Status: DISCONTINUED | OUTPATIENT
Start: 2023-01-01 | End: 2023-01-01

## 2023-01-01 RX ORDER — SPIRONOLACTONE 25 MG/1
1 TABLET, FILM COATED ORAL
Refills: 0 | DISCHARGE

## 2023-01-01 RX ADMIN — WARFARIN SODIUM 3 MILLIGRAM(S): 2.5 TABLET ORAL at 21:28

## 2023-01-01 RX ADMIN — SODIUM CHLORIDE 1 GRAM(S): 9 INJECTION INTRAMUSCULAR; INTRAVENOUS; SUBCUTANEOUS at 05:55

## 2023-01-01 RX ADMIN — Medication 40 MILLIGRAM(S): at 05:56

## 2023-01-01 RX ADMIN — ENOXAPARIN SODIUM 60 MILLIGRAM(S): 100 INJECTION SUBCUTANEOUS at 17:08

## 2023-01-01 RX ADMIN — Medication 3 MILLILITER(S): at 19:45

## 2023-01-01 RX ADMIN — Medication 1 APPLICATION(S): at 12:51

## 2023-01-01 RX ADMIN — Medication 40 MILLIGRAM(S): at 06:09

## 2023-01-01 RX ADMIN — Medication 3 UNIT(S): at 08:36

## 2023-01-01 RX ADMIN — INSULIN GLARGINE 10 UNIT(S): 100 INJECTION, SOLUTION SUBCUTANEOUS at 21:47

## 2023-01-01 RX ADMIN — PANTOPRAZOLE SODIUM 40 MILLIGRAM(S): 20 TABLET, DELAYED RELEASE ORAL at 17:46

## 2023-01-01 RX ADMIN — CEFTRIAXONE 100 MILLIGRAM(S): 500 INJECTION, POWDER, FOR SOLUTION INTRAMUSCULAR; INTRAVENOUS at 17:04

## 2023-01-01 RX ADMIN — FINASTERIDE 5 MILLIGRAM(S): 5 TABLET, FILM COATED ORAL at 21:47

## 2023-01-01 RX ADMIN — SODIUM CHLORIDE 1 GRAM(S): 9 INJECTION INTRAMUSCULAR; INTRAVENOUS; SUBCUTANEOUS at 17:34

## 2023-01-01 RX ADMIN — Medication 100 MILLIGRAM(S): at 22:11

## 2023-01-01 RX ADMIN — Medication 3 UNIT(S): at 13:05

## 2023-01-01 RX ADMIN — Medication 1 GRAM(S): at 17:34

## 2023-01-01 RX ADMIN — Medication 240 MILLIGRAM(S): at 05:47

## 2023-01-01 RX ADMIN — Medication 3 MILLILITER(S): at 14:32

## 2023-01-01 RX ADMIN — Medication 1 GRAM(S): at 05:37

## 2023-01-01 RX ADMIN — ATORVASTATIN CALCIUM 40 MILLIGRAM(S): 80 TABLET, FILM COATED ORAL at 22:12

## 2023-01-01 RX ADMIN — HEPARIN SODIUM 800 UNIT(S)/HR: 5000 INJECTION INTRAVENOUS; SUBCUTANEOUS at 07:15

## 2023-01-01 RX ADMIN — ATORVASTATIN CALCIUM 40 MILLIGRAM(S): 80 TABLET, FILM COATED ORAL at 21:10

## 2023-01-01 RX ADMIN — Medication 2: at 08:52

## 2023-01-01 RX ADMIN — LEVETIRACETAM 750 MILLIGRAM(S): 250 TABLET, FILM COATED ORAL at 17:28

## 2023-01-01 RX ADMIN — Medication 3: at 17:30

## 2023-01-01 RX ADMIN — Medication 3 MILLILITER(S): at 08:02

## 2023-01-01 RX ADMIN — ATORVASTATIN CALCIUM 40 MILLIGRAM(S): 80 TABLET, FILM COATED ORAL at 21:42

## 2023-01-01 RX ADMIN — LEVETIRACETAM 750 MILLIGRAM(S): 250 TABLET, FILM COATED ORAL at 06:53

## 2023-01-01 RX ADMIN — Medication 240 MILLIGRAM(S): at 05:22

## 2023-01-01 RX ADMIN — Medication 1: at 17:09

## 2023-01-01 RX ADMIN — Medication 25 MILLIGRAM(S): at 05:39

## 2023-01-01 RX ADMIN — Medication 4: at 12:54

## 2023-01-01 RX ADMIN — ATORVASTATIN CALCIUM 40 MILLIGRAM(S): 80 TABLET, FILM COATED ORAL at 21:26

## 2023-01-01 RX ADMIN — HEPARIN SODIUM 1100 UNIT(S)/HR: 5000 INJECTION INTRAVENOUS; SUBCUTANEOUS at 20:17

## 2023-01-01 RX ADMIN — Medication 15 GRAM(S): at 12:14

## 2023-01-01 RX ADMIN — Medication 1 GRAM(S): at 23:42

## 2023-01-01 RX ADMIN — HEPARIN SODIUM 800 UNIT(S)/HR: 5000 INJECTION INTRAVENOUS; SUBCUTANEOUS at 19:21

## 2023-01-01 RX ADMIN — LEVETIRACETAM 750 MILLIGRAM(S): 250 TABLET, FILM COATED ORAL at 05:47

## 2023-01-01 RX ADMIN — Medication 3 UNIT(S): at 08:25

## 2023-01-01 RX ADMIN — SODIUM CHLORIDE 1 GRAM(S): 9 INJECTION INTRAMUSCULAR; INTRAVENOUS; SUBCUTANEOUS at 18:01

## 2023-01-01 RX ADMIN — Medication 100 MILLIGRAM(S): at 13:36

## 2023-01-01 RX ADMIN — Medication 3 MILLILITER(S): at 20:53

## 2023-01-01 RX ADMIN — Medication 3 MILLILITER(S): at 14:40

## 2023-01-01 RX ADMIN — Medication 1 GRAM(S): at 22:10

## 2023-01-01 RX ADMIN — Medication 1 GRAM(S): at 00:21

## 2023-01-01 RX ADMIN — Medication 1: at 21:11

## 2023-01-01 RX ADMIN — Medication 3: at 17:47

## 2023-01-01 RX ADMIN — ATORVASTATIN CALCIUM 40 MILLIGRAM(S): 80 TABLET, FILM COATED ORAL at 22:31

## 2023-01-01 RX ADMIN — Medication 100 MILLIGRAM(S): at 20:52

## 2023-01-01 RX ADMIN — PANTOPRAZOLE SODIUM 10 MG/HR: 20 TABLET, DELAYED RELEASE ORAL at 12:12

## 2023-01-01 RX ADMIN — ATORVASTATIN CALCIUM 40 MILLIGRAM(S): 80 TABLET, FILM COATED ORAL at 21:36

## 2023-01-01 RX ADMIN — HEPARIN SODIUM 800 UNIT(S)/HR: 5000 INJECTION INTRAVENOUS; SUBCUTANEOUS at 19:20

## 2023-01-01 RX ADMIN — Medication 7 UNIT(S): at 13:02

## 2023-01-01 RX ADMIN — Medication 15 GRAM(S): at 12:06

## 2023-01-01 RX ADMIN — SODIUM CHLORIDE 1 GRAM(S): 9 INJECTION INTRAMUSCULAR; INTRAVENOUS; SUBCUTANEOUS at 22:39

## 2023-01-01 RX ADMIN — Medication 100 MILLIGRAM(S): at 13:17

## 2023-01-01 RX ADMIN — HEPARIN SODIUM 800 UNIT(S)/HR: 5000 INJECTION INTRAVENOUS; SUBCUTANEOUS at 20:48

## 2023-01-01 RX ADMIN — LEVETIRACETAM 750 MILLIGRAM(S): 250 TABLET, FILM COATED ORAL at 05:26

## 2023-01-01 RX ADMIN — HEPARIN SODIUM 1100 UNIT(S)/HR: 5000 INJECTION INTRAVENOUS; SUBCUTANEOUS at 12:39

## 2023-01-01 RX ADMIN — Medication 100 MILLIGRAM(S): at 05:36

## 2023-01-01 RX ADMIN — Medication 1 GRAM(S): at 05:55

## 2023-01-01 RX ADMIN — Medication 3 MILLILITER(S): at 14:30

## 2023-01-01 RX ADMIN — Medication 1 GRAM(S): at 22:17

## 2023-01-01 RX ADMIN — ATORVASTATIN CALCIUM 40 MILLIGRAM(S): 80 TABLET, FILM COATED ORAL at 21:49

## 2023-01-01 RX ADMIN — SODIUM CHLORIDE 1 GRAM(S): 9 INJECTION INTRAMUSCULAR; INTRAVENOUS; SUBCUTANEOUS at 17:46

## 2023-01-01 RX ADMIN — Medication 1 GRAM(S): at 12:35

## 2023-01-01 RX ADMIN — WARFARIN SODIUM 3 MILLIGRAM(S): 2.5 TABLET ORAL at 21:33

## 2023-01-01 RX ADMIN — SODIUM CHLORIDE 1 GRAM(S): 9 INJECTION INTRAMUSCULAR; INTRAVENOUS; SUBCUTANEOUS at 17:39

## 2023-01-01 RX ADMIN — Medication 40 MILLIGRAM(S): at 05:47

## 2023-01-01 RX ADMIN — Medication 240 MILLIGRAM(S): at 05:23

## 2023-01-01 RX ADMIN — WARFARIN SODIUM 1 MILLIGRAM(S): 2.5 TABLET ORAL at 21:46

## 2023-01-01 RX ADMIN — Medication 2: at 12:11

## 2023-01-01 RX ADMIN — LEVETIRACETAM 750 MILLIGRAM(S): 250 TABLET, FILM COATED ORAL at 05:30

## 2023-01-01 RX ADMIN — HEPARIN SODIUM 600 UNIT(S)/HR: 5000 INJECTION INTRAVENOUS; SUBCUTANEOUS at 17:28

## 2023-01-01 RX ADMIN — ENOXAPARIN SODIUM 60 MILLIGRAM(S): 100 INJECTION SUBCUTANEOUS at 05:42

## 2023-01-01 RX ADMIN — PANTOPRAZOLE SODIUM 10 MG/HR: 20 TABLET, DELAYED RELEASE ORAL at 12:52

## 2023-01-01 RX ADMIN — Medication 1 GRAM(S): at 05:43

## 2023-01-01 RX ADMIN — Medication 1 GRAM(S): at 17:31

## 2023-01-01 RX ADMIN — Medication 15 GRAM(S): at 11:43

## 2023-01-01 RX ADMIN — PANTOPRAZOLE SODIUM 40 MILLIGRAM(S): 20 TABLET, DELAYED RELEASE ORAL at 05:16

## 2023-01-01 RX ADMIN — LEVETIRACETAM 750 MILLIGRAM(S): 250 TABLET, FILM COATED ORAL at 18:01

## 2023-01-01 RX ADMIN — Medication 5 UNIT(S): at 11:52

## 2023-01-01 RX ADMIN — ATORVASTATIN CALCIUM 40 MILLIGRAM(S): 80 TABLET, FILM COATED ORAL at 21:46

## 2023-01-01 RX ADMIN — Medication 4: at 12:24

## 2023-01-01 RX ADMIN — Medication 100 MILLIEQUIVALENT(S): at 13:19

## 2023-01-01 RX ADMIN — Medication 5 UNIT(S): at 17:09

## 2023-01-01 RX ADMIN — Medication 3 UNIT(S): at 12:46

## 2023-01-01 RX ADMIN — Medication 1 GRAM(S): at 17:35

## 2023-01-01 RX ADMIN — INSULIN GLARGINE 30 UNIT(S): 100 INJECTION, SOLUTION SUBCUTANEOUS at 22:02

## 2023-01-01 RX ADMIN — Medication 1 APPLICATION(S): at 12:22

## 2023-01-01 RX ADMIN — Medication 40 MILLIGRAM(S): at 06:15

## 2023-01-01 RX ADMIN — Medication 3 UNIT(S): at 17:24

## 2023-01-01 RX ADMIN — Medication 25 MILLIGRAM(S): at 05:30

## 2023-01-01 RX ADMIN — Medication 1 GRAM(S): at 21:48

## 2023-01-01 RX ADMIN — SODIUM CHLORIDE 1 GRAM(S): 9 INJECTION INTRAMUSCULAR; INTRAVENOUS; SUBCUTANEOUS at 17:35

## 2023-01-01 RX ADMIN — Medication 5 UNIT(S): at 17:28

## 2023-01-01 RX ADMIN — LEVETIRACETAM 750 MILLIGRAM(S): 250 TABLET, FILM COATED ORAL at 17:40

## 2023-01-01 RX ADMIN — Medication 3 MILLILITER(S): at 16:21

## 2023-01-01 RX ADMIN — ENOXAPARIN SODIUM 60 MILLIGRAM(S): 100 INJECTION SUBCUTANEOUS at 05:26

## 2023-01-01 RX ADMIN — TAMSULOSIN HYDROCHLORIDE 0.8 MILLIGRAM(S): 0.4 CAPSULE ORAL at 12:29

## 2023-01-01 RX ADMIN — Medication 1 GRAM(S): at 17:23

## 2023-01-01 RX ADMIN — LEVETIRACETAM 750 MILLIGRAM(S): 250 TABLET, FILM COATED ORAL at 06:10

## 2023-01-01 RX ADMIN — Medication 3: at 12:41

## 2023-01-01 RX ADMIN — Medication 1 APPLICATION(S): at 12:00

## 2023-01-01 RX ADMIN — ATORVASTATIN CALCIUM 40 MILLIGRAM(S): 80 TABLET, FILM COATED ORAL at 21:48

## 2023-01-01 RX ADMIN — LEVETIRACETAM 750 MILLIGRAM(S): 250 TABLET, FILM COATED ORAL at 05:39

## 2023-01-01 RX ADMIN — Medication 3 UNIT(S): at 17:48

## 2023-01-01 RX ADMIN — WARFARIN SODIUM 2.5 MILLIGRAM(S): 2.5 TABLET ORAL at 22:32

## 2023-01-01 RX ADMIN — PANTOPRAZOLE SODIUM 40 MILLIGRAM(S): 20 TABLET, DELAYED RELEASE ORAL at 17:34

## 2023-01-01 RX ADMIN — Medication 81 MILLIGRAM(S): at 12:19

## 2023-01-01 RX ADMIN — LEVETIRACETAM 750 MILLIGRAM(S): 250 TABLET, FILM COATED ORAL at 17:35

## 2023-01-01 RX ADMIN — Medication 200 MILLIGRAM(S): at 22:30

## 2023-01-01 RX ADMIN — Medication 3 MILLILITER(S): at 08:51

## 2023-01-01 RX ADMIN — PANTOPRAZOLE SODIUM 40 MILLIGRAM(S): 20 TABLET, DELAYED RELEASE ORAL at 05:26

## 2023-01-01 RX ADMIN — LEVETIRACETAM 750 MILLIGRAM(S): 250 TABLET, FILM COATED ORAL at 05:50

## 2023-01-01 RX ADMIN — Medication 240 MILLIGRAM(S): at 05:34

## 2023-01-01 RX ADMIN — Medication 3 MILLILITER(S): at 19:25

## 2023-01-01 RX ADMIN — Medication 3 UNIT(S): at 08:19

## 2023-01-01 RX ADMIN — Medication 81 MILLIGRAM(S): at 18:08

## 2023-01-01 RX ADMIN — Medication 3 MILLILITER(S): at 23:18

## 2023-01-01 RX ADMIN — LEVETIRACETAM 750 MILLIGRAM(S): 250 TABLET, FILM COATED ORAL at 05:08

## 2023-01-01 RX ADMIN — Medication 81 MILLIGRAM(S): at 12:20

## 2023-01-01 RX ADMIN — PANTOPRAZOLE SODIUM 40 MILLIGRAM(S): 20 TABLET, DELAYED RELEASE ORAL at 06:11

## 2023-01-01 RX ADMIN — WARFARIN SODIUM 5 MILLIGRAM(S): 2.5 TABLET ORAL at 21:28

## 2023-01-01 RX ADMIN — Medication 1 GRAM(S): at 06:30

## 2023-01-01 RX ADMIN — Medication 25 MILLIGRAM(S): at 05:49

## 2023-01-01 RX ADMIN — PANTOPRAZOLE SODIUM 10 MG/HR: 20 TABLET, DELAYED RELEASE ORAL at 02:33

## 2023-01-01 RX ADMIN — Medication 1 GRAM(S): at 23:23

## 2023-01-01 RX ADMIN — HEPARIN SODIUM 600 UNIT(S)/HR: 5000 INJECTION INTRAVENOUS; SUBCUTANEOUS at 10:45

## 2023-01-01 RX ADMIN — Medication 6: at 08:41

## 2023-01-01 RX ADMIN — Medication 3 MILLILITER(S): at 23:05

## 2023-01-01 RX ADMIN — Medication 6: at 12:06

## 2023-01-01 RX ADMIN — Medication 1: at 12:07

## 2023-01-01 RX ADMIN — Medication 240 MILLIGRAM(S): at 05:24

## 2023-01-01 RX ADMIN — Medication 3 UNIT(S): at 17:39

## 2023-01-01 RX ADMIN — PANTOPRAZOLE SODIUM 40 MILLIGRAM(S): 20 TABLET, DELAYED RELEASE ORAL at 17:40

## 2023-01-01 RX ADMIN — Medication 3 MILLILITER(S): at 08:12

## 2023-01-01 RX ADMIN — Medication 100 MILLIGRAM(S): at 21:10

## 2023-01-01 RX ADMIN — Medication 8: at 17:34

## 2023-01-01 RX ADMIN — WARFARIN SODIUM 3 MILLIGRAM(S): 2.5 TABLET ORAL at 21:48

## 2023-01-01 RX ADMIN — CLOPIDOGREL BISULFATE 75 MILLIGRAM(S): 75 TABLET, FILM COATED ORAL at 12:12

## 2023-01-01 RX ADMIN — HEPARIN SODIUM 800 UNIT(S)/HR: 5000 INJECTION INTRAVENOUS; SUBCUTANEOUS at 13:20

## 2023-01-01 RX ADMIN — Medication 3 MILLILITER(S): at 08:07

## 2023-01-01 RX ADMIN — LEVETIRACETAM 750 MILLIGRAM(S): 250 TABLET, FILM COATED ORAL at 06:15

## 2023-01-01 RX ADMIN — INSULIN GLARGINE 8 UNIT(S): 100 INJECTION, SOLUTION SUBCUTANEOUS at 21:10

## 2023-01-01 RX ADMIN — Medication 3 UNIT(S): at 09:07

## 2023-01-01 RX ADMIN — Medication 3 UNIT(S): at 08:53

## 2023-01-01 RX ADMIN — HEPARIN SODIUM 0 UNIT(S)/HR: 5000 INJECTION INTRAVENOUS; SUBCUTANEOUS at 00:36

## 2023-01-01 RX ADMIN — Medication 6: at 12:12

## 2023-01-01 RX ADMIN — LEVETIRACETAM 750 MILLIGRAM(S): 250 TABLET, FILM COATED ORAL at 05:15

## 2023-01-01 RX ADMIN — LEVETIRACETAM 750 MILLIGRAM(S): 250 TABLET, FILM COATED ORAL at 05:24

## 2023-01-01 RX ADMIN — Medication 1 GRAM(S): at 05:24

## 2023-01-01 RX ADMIN — Medication 4 UNIT(S): at 12:51

## 2023-01-01 RX ADMIN — Medication 1 GRAM(S): at 05:08

## 2023-01-01 RX ADMIN — Medication 3 MILLILITER(S): at 07:53

## 2023-01-01 RX ADMIN — Medication 3 UNIT(S): at 12:51

## 2023-01-01 RX ADMIN — Medication 1 GRAM(S): at 21:42

## 2023-01-01 RX ADMIN — PANTOPRAZOLE SODIUM 40 MILLIGRAM(S): 20 TABLET, DELAYED RELEASE ORAL at 06:07

## 2023-01-01 RX ADMIN — SODIUM CHLORIDE 1 GRAM(S): 9 INJECTION INTRAMUSCULAR; INTRAVENOUS; SUBCUTANEOUS at 05:47

## 2023-01-01 RX ADMIN — Medication 15 GRAM(S): at 12:46

## 2023-01-01 RX ADMIN — Medication 3 MILLILITER(S): at 07:43

## 2023-01-01 RX ADMIN — TAMSULOSIN HYDROCHLORIDE 0.8 MILLIGRAM(S): 0.4 CAPSULE ORAL at 12:01

## 2023-01-01 RX ADMIN — HEPARIN SODIUM 0 UNIT(S)/HR: 5000 INJECTION INTRAVENOUS; SUBCUTANEOUS at 09:40

## 2023-01-01 RX ADMIN — Medication 1: at 18:00

## 2023-01-01 RX ADMIN — INSULIN GLARGINE 20 UNIT(S): 100 INJECTION, SOLUTION SUBCUTANEOUS at 22:15

## 2023-01-01 RX ADMIN — Medication 40 MILLIGRAM(S): at 05:26

## 2023-01-01 RX ADMIN — Medication 81 MILLIGRAM(S): at 11:37

## 2023-01-01 RX ADMIN — Medication 2: at 09:06

## 2023-01-01 RX ADMIN — ATORVASTATIN CALCIUM 40 MILLIGRAM(S): 80 TABLET, FILM COATED ORAL at 22:02

## 2023-01-01 RX ADMIN — INSULIN GLARGINE 5 UNIT(S): 100 INJECTION, SOLUTION SUBCUTANEOUS at 22:11

## 2023-01-01 RX ADMIN — Medication 2: at 11:52

## 2023-01-01 RX ADMIN — LEVETIRACETAM 750 MILLIGRAM(S): 250 TABLET, FILM COATED ORAL at 09:20

## 2023-01-01 RX ADMIN — Medication 100 MILLIGRAM(S): at 21:43

## 2023-01-01 RX ADMIN — Medication 1 GRAM(S): at 12:55

## 2023-01-01 RX ADMIN — Medication 15 GRAM(S): at 12:27

## 2023-01-01 RX ADMIN — Medication 250 MILLIGRAM(S): at 21:31

## 2023-01-01 RX ADMIN — INSULIN GLARGINE 30 UNIT(S): 100 INJECTION, SOLUTION SUBCUTANEOUS at 21:36

## 2023-01-01 RX ADMIN — Medication 1 GRAM(S): at 17:47

## 2023-01-01 RX ADMIN — LEVETIRACETAM 750 MILLIGRAM(S): 250 TABLET, FILM COATED ORAL at 17:31

## 2023-01-01 RX ADMIN — Medication 1 GRAM(S): at 22:33

## 2023-01-01 RX ADMIN — Medication 6: at 12:19

## 2023-01-01 RX ADMIN — PANTOPRAZOLE SODIUM 40 MILLIGRAM(S): 20 TABLET, DELAYED RELEASE ORAL at 17:45

## 2023-01-01 RX ADMIN — Medication 6: at 08:04

## 2023-01-01 RX ADMIN — Medication 3 UNIT(S): at 12:35

## 2023-01-01 RX ADMIN — PANTOPRAZOLE SODIUM 40 MILLIGRAM(S): 20 TABLET, DELAYED RELEASE ORAL at 05:34

## 2023-01-01 RX ADMIN — Medication 81 MILLIGRAM(S): at 12:11

## 2023-01-01 RX ADMIN — Medication 1: at 22:33

## 2023-01-01 RX ADMIN — Medication 5 UNIT(S): at 12:15

## 2023-01-01 RX ADMIN — PANTOPRAZOLE SODIUM 40 MILLIGRAM(S): 20 TABLET, DELAYED RELEASE ORAL at 05:24

## 2023-01-01 RX ADMIN — ATORVASTATIN CALCIUM 40 MILLIGRAM(S): 80 TABLET, FILM COATED ORAL at 22:13

## 2023-01-01 RX ADMIN — INSULIN GLARGINE 20 UNIT(S): 100 INJECTION, SOLUTION SUBCUTANEOUS at 21:33

## 2023-01-01 RX ADMIN — Medication 4 UNIT(S): at 08:04

## 2023-01-01 RX ADMIN — Medication 325 MILLIGRAM(S): at 20:51

## 2023-01-01 RX ADMIN — ATORVASTATIN CALCIUM 40 MILLIGRAM(S): 80 TABLET, FILM COATED ORAL at 22:04

## 2023-01-01 RX ADMIN — HEPARIN SODIUM 800 UNIT(S)/HR: 5000 INJECTION INTRAVENOUS; SUBCUTANEOUS at 09:25

## 2023-01-01 RX ADMIN — Medication 1 GRAM(S): at 18:01

## 2023-01-01 RX ADMIN — SODIUM CHLORIDE 1 GRAM(S): 9 INJECTION INTRAMUSCULAR; INTRAVENOUS; SUBCUTANEOUS at 05:37

## 2023-01-01 RX ADMIN — Medication 81 MILLIGRAM(S): at 12:05

## 2023-01-01 RX ADMIN — Medication 100 MILLIGRAM(S): at 05:21

## 2023-01-01 RX ADMIN — PANTOPRAZOLE SODIUM 10 MG/HR: 20 TABLET, DELAYED RELEASE ORAL at 16:30

## 2023-01-01 RX ADMIN — Medication 1 GRAM(S): at 12:25

## 2023-01-01 RX ADMIN — Medication 1: at 21:40

## 2023-01-01 RX ADMIN — INSULIN GLARGINE 5 UNIT(S): 100 INJECTION, SOLUTION SUBCUTANEOUS at 21:41

## 2023-01-01 RX ADMIN — HEPARIN SODIUM 600 UNIT(S)/HR: 5000 INJECTION INTRAVENOUS; SUBCUTANEOUS at 19:19

## 2023-01-01 RX ADMIN — SPIRONOLACTONE 25 MILLIGRAM(S): 25 TABLET, FILM COATED ORAL at 05:42

## 2023-01-01 RX ADMIN — Medication 4: at 08:47

## 2023-01-01 RX ADMIN — Medication 1 GRAM(S): at 06:53

## 2023-01-01 RX ADMIN — INSULIN GLARGINE 5 UNIT(S): 100 INJECTION, SOLUTION SUBCUTANEOUS at 22:13

## 2023-01-01 RX ADMIN — Medication 100 MILLIGRAM(S): at 15:36

## 2023-01-01 RX ADMIN — ATORVASTATIN CALCIUM 40 MILLIGRAM(S): 80 TABLET, FILM COATED ORAL at 21:35

## 2023-01-01 RX ADMIN — INSULIN GLARGINE 20 UNIT(S): 100 INJECTION, SOLUTION SUBCUTANEOUS at 21:49

## 2023-01-01 RX ADMIN — Medication 1 GRAM(S): at 11:23

## 2023-01-01 RX ADMIN — Medication 25 MILLIGRAM(S): at 05:50

## 2023-01-01 RX ADMIN — LEVETIRACETAM 750 MILLIGRAM(S): 250 TABLET, FILM COATED ORAL at 05:42

## 2023-01-01 RX ADMIN — Medication 4 UNIT(S): at 12:28

## 2023-01-01 RX ADMIN — PANTOPRAZOLE SODIUM 40 MILLIGRAM(S): 20 TABLET, DELAYED RELEASE ORAL at 05:08

## 2023-01-01 RX ADMIN — SODIUM CHLORIDE 1 GRAM(S): 9 INJECTION INTRAMUSCULAR; INTRAVENOUS; SUBCUTANEOUS at 06:13

## 2023-01-01 RX ADMIN — Medication 1 GRAM(S): at 12:26

## 2023-01-01 RX ADMIN — SODIUM CHLORIDE 1 GRAM(S): 9 INJECTION INTRAMUSCULAR; INTRAVENOUS; SUBCUTANEOUS at 05:24

## 2023-01-01 RX ADMIN — Medication 102 MILLIGRAM(S): at 14:23

## 2023-01-01 RX ADMIN — Medication 25 MILLIGRAM(S): at 05:26

## 2023-01-01 RX ADMIN — HEPARIN SODIUM 400 UNIT(S)/HR: 5000 INJECTION INTRAVENOUS; SUBCUTANEOUS at 07:13

## 2023-01-01 RX ADMIN — ATORVASTATIN CALCIUM 40 MILLIGRAM(S): 80 TABLET, FILM COATED ORAL at 22:15

## 2023-01-01 RX ADMIN — Medication 7 UNIT(S): at 17:59

## 2023-01-01 RX ADMIN — Medication 5 UNIT(S): at 12:11

## 2023-01-01 RX ADMIN — INSULIN GLARGINE 30 UNIT(S): 100 INJECTION, SOLUTION SUBCUTANEOUS at 22:05

## 2023-01-01 RX ADMIN — LEVETIRACETAM 750 MILLIGRAM(S): 250 TABLET, FILM COATED ORAL at 17:23

## 2023-01-01 RX ADMIN — Medication 1 GRAM(S): at 05:35

## 2023-01-01 RX ADMIN — Medication 2: at 17:24

## 2023-01-01 RX ADMIN — CLOPIDOGREL BISULFATE 75 MILLIGRAM(S): 75 TABLET, FILM COATED ORAL at 12:20

## 2023-01-01 RX ADMIN — Medication 15 GRAM(S): at 12:08

## 2023-01-01 RX ADMIN — Medication 1 GRAM(S): at 12:12

## 2023-01-01 RX ADMIN — Medication 3 MILLILITER(S): at 21:21

## 2023-01-01 RX ADMIN — Medication 15 GRAM(S): at 11:23

## 2023-01-01 RX ADMIN — Medication 240 MILLIGRAM(S): at 06:09

## 2023-01-01 RX ADMIN — Medication 3 UNIT(S): at 08:47

## 2023-01-01 RX ADMIN — Medication 1: at 21:27

## 2023-01-01 RX ADMIN — PIPERACILLIN AND TAZOBACTAM 25 GRAM(S): 4; .5 INJECTION, POWDER, LYOPHILIZED, FOR SOLUTION INTRAVENOUS at 05:24

## 2023-01-01 RX ADMIN — ATORVASTATIN CALCIUM 40 MILLIGRAM(S): 80 TABLET, FILM COATED ORAL at 21:28

## 2023-01-01 RX ADMIN — WARFARIN SODIUM 3 MILLIGRAM(S): 2.5 TABLET ORAL at 21:10

## 2023-01-01 RX ADMIN — HEPARIN SODIUM 800 UNIT(S)/HR: 5000 INJECTION INTRAVENOUS; SUBCUTANEOUS at 04:01

## 2023-01-01 RX ADMIN — Medication 200 MILLIGRAM(S): at 08:47

## 2023-01-01 RX ADMIN — WARFARIN SODIUM 5 MILLIGRAM(S): 2.5 TABLET ORAL at 21:36

## 2023-01-01 RX ADMIN — Medication 4 UNIT(S): at 16:57

## 2023-01-01 RX ADMIN — Medication 100 MILLIEQUIVALENT(S): at 16:34

## 2023-01-01 RX ADMIN — LEVETIRACETAM 750 MILLIGRAM(S): 250 TABLET, FILM COATED ORAL at 20:41

## 2023-01-01 RX ADMIN — Medication 3 MILLILITER(S): at 15:05

## 2023-01-01 RX ADMIN — Medication 3 MILLILITER(S): at 19:29

## 2023-01-01 RX ADMIN — Medication 3 MILLILITER(S): at 07:36

## 2023-01-01 RX ADMIN — Medication 6: at 16:57

## 2023-01-01 RX ADMIN — LEVETIRACETAM 750 MILLIGRAM(S): 250 TABLET, FILM COATED ORAL at 17:34

## 2023-01-01 RX ADMIN — Medication 5: at 08:16

## 2023-01-01 RX ADMIN — Medication 1: at 12:05

## 2023-01-01 RX ADMIN — Medication 3 UNIT(S): at 11:58

## 2023-01-01 RX ADMIN — Medication 240 MILLIGRAM(S): at 08:48

## 2023-01-01 RX ADMIN — WARFARIN SODIUM 3 MILLIGRAM(S): 2.5 TABLET ORAL at 22:11

## 2023-01-01 RX ADMIN — Medication 100 MILLIGRAM(S): at 05:23

## 2023-01-01 RX ADMIN — ENOXAPARIN SODIUM 60 MILLIGRAM(S): 100 INJECTION SUBCUTANEOUS at 17:10

## 2023-01-01 RX ADMIN — CEFTRIAXONE 100 MILLIGRAM(S): 500 INJECTION, POWDER, FOR SOLUTION INTRAMUSCULAR; INTRAVENOUS at 17:08

## 2023-01-01 RX ADMIN — Medication 240 MILLIGRAM(S): at 05:08

## 2023-01-01 RX ADMIN — Medication 1 GRAM(S): at 12:18

## 2023-01-01 RX ADMIN — Medication 15 GRAM(S): at 12:55

## 2023-01-01 RX ADMIN — PIPERACILLIN AND TAZOBACTAM 25 GRAM(S): 4; .5 INJECTION, POWDER, LYOPHILIZED, FOR SOLUTION INTRAVENOUS at 22:16

## 2023-01-01 RX ADMIN — Medication 1 GRAM(S): at 21:47

## 2023-01-01 RX ADMIN — SPIRONOLACTONE 25 MILLIGRAM(S): 25 TABLET, FILM COATED ORAL at 05:31

## 2023-01-01 RX ADMIN — PANTOPRAZOLE SODIUM 40 MILLIGRAM(S): 20 TABLET, DELAYED RELEASE ORAL at 18:02

## 2023-01-01 RX ADMIN — INSULIN GLARGINE 30 UNIT(S): 100 INJECTION, SOLUTION SUBCUTANEOUS at 21:27

## 2023-01-01 RX ADMIN — LEVETIRACETAM 750 MILLIGRAM(S): 250 TABLET, FILM COATED ORAL at 21:19

## 2023-01-01 RX ADMIN — Medication 5 UNIT(S): at 08:28

## 2023-01-01 RX ADMIN — Medication 15 GRAM(S): at 11:58

## 2023-01-01 RX ADMIN — Medication 1 GRAM(S): at 21:09

## 2023-01-01 RX ADMIN — Medication 3: at 16:51

## 2023-01-01 RX ADMIN — Medication 40 MILLIGRAM(S): at 08:11

## 2023-01-01 RX ADMIN — Medication 3 MILLILITER(S): at 19:23

## 2023-01-01 RX ADMIN — ENOXAPARIN SODIUM 60 MILLIGRAM(S): 100 INJECTION SUBCUTANEOUS at 18:08

## 2023-01-01 RX ADMIN — HEPARIN SODIUM 800 UNIT(S)/HR: 5000 INJECTION INTRAVENOUS; SUBCUTANEOUS at 07:24

## 2023-01-01 RX ADMIN — PANTOPRAZOLE SODIUM 40 MILLIGRAM(S): 20 TABLET, DELAYED RELEASE ORAL at 05:23

## 2023-01-01 RX ADMIN — LEVETIRACETAM 750 MILLIGRAM(S): 250 TABLET, FILM COATED ORAL at 05:55

## 2023-01-01 RX ADMIN — INSULIN GLARGINE 5 UNIT(S): 100 INJECTION, SOLUTION SUBCUTANEOUS at 23:53

## 2023-01-01 RX ADMIN — LEVETIRACETAM 750 MILLIGRAM(S): 250 TABLET, FILM COATED ORAL at 05:27

## 2023-01-01 RX ADMIN — Medication 2: at 12:46

## 2023-01-01 RX ADMIN — LEVETIRACETAM 750 MILLIGRAM(S): 250 TABLET, FILM COATED ORAL at 06:31

## 2023-01-01 RX ADMIN — Medication 81 MILLIGRAM(S): at 12:14

## 2023-01-01 RX ADMIN — Medication 200 MILLIGRAM(S): at 22:12

## 2023-01-01 RX ADMIN — Medication 1 GRAM(S): at 06:10

## 2023-01-01 RX ADMIN — Medication 100 MILLIGRAM(S): at 05:34

## 2023-01-01 RX ADMIN — LEVETIRACETAM 750 MILLIGRAM(S): 250 TABLET, FILM COATED ORAL at 17:45

## 2023-01-01 RX ADMIN — Medication 1 GRAM(S): at 11:43

## 2023-01-01 RX ADMIN — INSULIN GLARGINE 5 UNIT(S): 100 INJECTION, SOLUTION SUBCUTANEOUS at 22:31

## 2023-01-01 RX ADMIN — Medication 4: at 21:36

## 2023-01-01 RX ADMIN — Medication 5 UNIT(S): at 08:11

## 2023-01-01 RX ADMIN — PROTHROMBIN COMPLEX CONCENTRATE (HUMAN) 400 INTERNATIONAL UNIT(S): 25.5; 16.5; 24; 22; 22; 26 POWDER, FOR SOLUTION INTRAVENOUS at 14:53

## 2023-01-01 RX ADMIN — Medication 10 UNIT(S): at 12:57

## 2023-01-01 RX ADMIN — Medication 4 UNIT(S): at 21:46

## 2023-01-01 RX ADMIN — HEPARIN SODIUM 800 UNIT(S)/HR: 5000 INJECTION INTRAVENOUS; SUBCUTANEOUS at 19:14

## 2023-01-01 RX ADMIN — INSULIN GLARGINE 5 UNIT(S): 100 INJECTION, SOLUTION SUBCUTANEOUS at 21:45

## 2023-01-01 RX ADMIN — Medication 100 MILLIEQUIVALENT(S): at 13:02

## 2023-01-01 RX ADMIN — Medication 3: at 13:02

## 2023-01-01 RX ADMIN — PANTOPRAZOLE SODIUM 40 MILLIGRAM(S): 20 TABLET, DELAYED RELEASE ORAL at 17:23

## 2023-01-01 RX ADMIN — SODIUM CHLORIDE 1 GRAM(S): 9 INJECTION INTRAMUSCULAR; INTRAVENOUS; SUBCUTANEOUS at 17:31

## 2023-01-01 RX ADMIN — Medication 15 GRAM(S): at 16:51

## 2023-01-01 RX ADMIN — PIPERACILLIN AND TAZOBACTAM 25 GRAM(S): 4; .5 INJECTION, POWDER, LYOPHILIZED, FOR SOLUTION INTRAVENOUS at 21:48

## 2023-01-01 RX ADMIN — CEFTRIAXONE 100 MILLIGRAM(S): 500 INJECTION, POWDER, FOR SOLUTION INTRAMUSCULAR; INTRAVENOUS at 16:51

## 2023-01-01 RX ADMIN — Medication 3 UNIT(S): at 12:42

## 2023-01-01 RX ADMIN — SODIUM CHLORIDE 1 GRAM(S): 9 INJECTION INTRAMUSCULAR; INTRAVENOUS; SUBCUTANEOUS at 06:53

## 2023-01-01 RX ADMIN — Medication 2: at 21:48

## 2023-01-01 RX ADMIN — Medication 2: at 11:36

## 2023-01-01 RX ADMIN — Medication 4 UNIT(S): at 17:34

## 2023-01-01 RX ADMIN — SODIUM CHLORIDE 1 GRAM(S): 9 INJECTION INTRAMUSCULAR; INTRAVENOUS; SUBCUTANEOUS at 05:08

## 2023-01-01 RX ADMIN — Medication 15 GRAM(S): at 12:34

## 2023-01-01 RX ADMIN — Medication 100 MILLIEQUIVALENT(S): at 11:40

## 2023-01-01 RX ADMIN — Medication 200 MILLIGRAM(S): at 22:18

## 2023-01-01 RX ADMIN — Medication 100 MILLIGRAM(S): at 14:16

## 2023-01-01 RX ADMIN — Medication 2: at 17:39

## 2023-01-01 RX ADMIN — SODIUM CHLORIDE 1 GRAM(S): 9 INJECTION INTRAMUSCULAR; INTRAVENOUS; SUBCUTANEOUS at 05:34

## 2023-01-01 RX ADMIN — Medication 100 MILLIGRAM(S): at 05:46

## 2023-01-01 RX ADMIN — PIPERACILLIN AND TAZOBACTAM 25 GRAM(S): 4; .5 INJECTION, POWDER, LYOPHILIZED, FOR SOLUTION INTRAVENOUS at 05:07

## 2023-01-01 RX ADMIN — ATORVASTATIN CALCIUM 40 MILLIGRAM(S): 80 TABLET, FILM COATED ORAL at 21:33

## 2023-01-01 RX ADMIN — Medication 100 MILLIGRAM(S): at 06:30

## 2023-01-01 RX ADMIN — WARFARIN SODIUM 5 MILLIGRAM(S): 2.5 TABLET ORAL at 22:17

## 2023-01-01 RX ADMIN — ATORVASTATIN CALCIUM 40 MILLIGRAM(S): 80 TABLET, FILM COATED ORAL at 22:16

## 2023-01-01 RX ADMIN — Medication 81 MILLIGRAM(S): at 11:53

## 2023-01-01 RX ADMIN — Medication 100 MILLIGRAM(S): at 21:48

## 2023-01-01 RX ADMIN — Medication 1 GRAM(S): at 11:57

## 2023-01-01 RX ADMIN — LEVETIRACETAM 750 MILLIGRAM(S): 250 TABLET, FILM COATED ORAL at 17:15

## 2023-01-01 RX ADMIN — WARFARIN SODIUM 5 MILLIGRAM(S): 2.5 TABLET ORAL at 21:26

## 2023-01-01 RX ADMIN — Medication 100 MILLIGRAM(S): at 14:07

## 2023-01-01 RX ADMIN — Medication 3 MILLILITER(S): at 21:00

## 2023-01-01 RX ADMIN — HEPARIN SODIUM 800 UNIT(S)/HR: 5000 INJECTION INTRAVENOUS; SUBCUTANEOUS at 07:38

## 2023-01-01 RX ADMIN — Medication 4: at 12:52

## 2023-01-01 RX ADMIN — ENOXAPARIN SODIUM 60 MILLIGRAM(S): 100 INJECTION SUBCUTANEOUS at 17:28

## 2023-01-01 RX ADMIN — Medication 40 MILLIGRAM(S): at 05:35

## 2023-01-01 RX ADMIN — Medication 6: at 12:28

## 2023-01-01 RX ADMIN — Medication 5 UNIT(S): at 17:15

## 2023-01-01 RX ADMIN — CLOPIDOGREL BISULFATE 75 MILLIGRAM(S): 75 TABLET, FILM COATED ORAL at 11:37

## 2023-01-01 RX ADMIN — Medication 3 MILLILITER(S): at 08:30

## 2023-01-01 RX ADMIN — SODIUM CHLORIDE 75 MILLILITER(S): 9 INJECTION, SOLUTION INTRAVENOUS at 12:59

## 2023-01-01 RX ADMIN — Medication 7 UNIT(S): at 08:32

## 2023-01-01 RX ADMIN — Medication 3 UNIT(S): at 09:06

## 2023-01-01 RX ADMIN — Medication 81 MILLIGRAM(S): at 12:08

## 2023-01-01 RX ADMIN — ENOXAPARIN SODIUM 60 MILLIGRAM(S): 100 INJECTION SUBCUTANEOUS at 17:29

## 2023-01-01 RX ADMIN — Medication 400 MILLIGRAM(S): at 09:18

## 2023-01-01 RX ADMIN — Medication 1 GRAM(S): at 05:25

## 2023-01-01 RX ADMIN — PIPERACILLIN AND TAZOBACTAM 25 GRAM(S): 4; .5 INJECTION, POWDER, LYOPHILIZED, FOR SOLUTION INTRAVENOUS at 05:38

## 2023-01-01 RX ADMIN — PIPERACILLIN AND TAZOBACTAM 25 GRAM(S): 4; .5 INJECTION, POWDER, LYOPHILIZED, FOR SOLUTION INTRAVENOUS at 13:19

## 2023-01-01 RX ADMIN — Medication 5: at 08:06

## 2023-01-01 RX ADMIN — Medication 3 MILLILITER(S): at 07:57

## 2023-01-01 RX ADMIN — Medication 40 MILLIGRAM(S): at 05:24

## 2023-01-01 RX ADMIN — HEPARIN SODIUM 400 UNIT(S)/HR: 5000 INJECTION INTRAVENOUS; SUBCUTANEOUS at 01:59

## 2023-01-01 RX ADMIN — SODIUM CHLORIDE 1 GRAM(S): 9 INJECTION INTRAMUSCULAR; INTRAVENOUS; SUBCUTANEOUS at 17:23

## 2023-01-01 RX ADMIN — IRON SUCROSE 100 MILLIGRAM(S): 20 INJECTION, SOLUTION INTRAVENOUS at 12:46

## 2023-01-01 RX ADMIN — HEPARIN SODIUM 800 UNIT(S)/HR: 5000 INJECTION INTRAVENOUS; SUBCUTANEOUS at 09:34

## 2023-01-01 RX ADMIN — Medication 3 MILLILITER(S): at 07:56

## 2023-01-01 RX ADMIN — PIPERACILLIN AND TAZOBACTAM 25 GRAM(S): 4; .5 INJECTION, POWDER, LYOPHILIZED, FOR SOLUTION INTRAVENOUS at 13:30

## 2023-01-01 RX ADMIN — PANTOPRAZOLE SODIUM 40 MILLIGRAM(S): 20 TABLET, DELAYED RELEASE ORAL at 05:42

## 2023-01-01 RX ADMIN — Medication 15 GRAM(S): at 12:24

## 2023-01-01 RX ADMIN — Medication 4 UNIT(S): at 08:32

## 2023-01-01 RX ADMIN — PIPERACILLIN AND TAZOBACTAM 25 GRAM(S): 4; .5 INJECTION, POWDER, LYOPHILIZED, FOR SOLUTION INTRAVENOUS at 06:58

## 2023-01-01 RX ADMIN — Medication 15 GRAM(S): at 13:04

## 2023-01-01 RX ADMIN — Medication 1 GRAM(S): at 22:11

## 2023-01-01 RX ADMIN — Medication 1 GRAM(S): at 18:06

## 2023-01-01 RX ADMIN — PANTOPRAZOLE SODIUM 40 MILLIGRAM(S): 20 TABLET, DELAYED RELEASE ORAL at 17:33

## 2023-01-01 RX ADMIN — HEPARIN SODIUM 800 UNIT(S)/HR: 5000 INJECTION INTRAVENOUS; SUBCUTANEOUS at 19:22

## 2023-01-01 RX ADMIN — PIPERACILLIN AND TAZOBACTAM 25 GRAM(S): 4; .5 INJECTION, POWDER, LYOPHILIZED, FOR SOLUTION INTRAVENOUS at 16:27

## 2023-01-01 RX ADMIN — Medication 25 MILLIGRAM(S): at 05:27

## 2023-01-01 RX ADMIN — ENOXAPARIN SODIUM 60 MILLIGRAM(S): 100 INJECTION SUBCUTANEOUS at 05:15

## 2023-01-01 RX ADMIN — IRON SUCROSE 100 MILLIGRAM(S): 20 INJECTION, SOLUTION INTRAVENOUS at 11:18

## 2023-01-01 RX ADMIN — Medication 1 GRAM(S): at 00:47

## 2023-01-01 RX ADMIN — Medication 7 UNIT(S): at 17:34

## 2023-01-01 RX ADMIN — Medication 100 MILLIGRAM(S): at 06:10

## 2023-01-01 RX ADMIN — SPIRONOLACTONE 25 MILLIGRAM(S): 25 TABLET, FILM COATED ORAL at 05:15

## 2023-01-01 RX ADMIN — PANTOPRAZOLE SODIUM 40 MILLIGRAM(S): 20 TABLET, DELAYED RELEASE ORAL at 06:00

## 2023-01-01 RX ADMIN — Medication 3 MILLILITER(S): at 20:54

## 2023-01-01 RX ADMIN — Medication 40 MILLIGRAM(S): at 05:22

## 2023-01-01 RX ADMIN — SPIRONOLACTONE 25 MILLIGRAM(S): 25 TABLET, FILM COATED ORAL at 11:53

## 2023-01-01 RX ADMIN — LEVETIRACETAM 750 MILLIGRAM(S): 250 TABLET, FILM COATED ORAL at 18:06

## 2023-01-01 RX ADMIN — WARFARIN SODIUM 2 MILLIGRAM(S): 2.5 TABLET ORAL at 21:41

## 2023-01-01 RX ADMIN — HEPARIN SODIUM 800 UNIT(S)/HR: 5000 INJECTION INTRAVENOUS; SUBCUTANEOUS at 08:39

## 2023-01-01 RX ADMIN — Medication 100 MILLIGRAM(S): at 06:09

## 2023-01-01 RX ADMIN — PIPERACILLIN AND TAZOBACTAM 200 GRAM(S): 4; .5 INJECTION, POWDER, LYOPHILIZED, FOR SOLUTION INTRAVENOUS at 08:16

## 2023-01-01 RX ADMIN — Medication 3: at 08:54

## 2023-01-01 RX ADMIN — Medication 1000 MILLIGRAM(S): at 08:05

## 2023-01-01 RX ADMIN — Medication 6: at 18:06

## 2023-01-01 RX ADMIN — SODIUM CHLORIDE 1 GRAM(S): 9 INJECTION INTRAMUSCULAR; INTRAVENOUS; SUBCUTANEOUS at 17:36

## 2023-01-01 RX ADMIN — Medication 40 MILLIGRAM(S): at 05:50

## 2023-01-01 RX ADMIN — LEVETIRACETAM 750 MILLIGRAM(S): 250 TABLET, FILM COATED ORAL at 17:41

## 2023-01-01 RX ADMIN — Medication 3 UNIT(S): at 17:30

## 2023-01-01 RX ADMIN — ATORVASTATIN CALCIUM 40 MILLIGRAM(S): 80 TABLET, FILM COATED ORAL at 22:39

## 2023-01-01 RX ADMIN — HEPARIN SODIUM 800 UNIT(S)/HR: 5000 INJECTION INTRAVENOUS; SUBCUTANEOUS at 17:06

## 2023-01-01 RX ADMIN — PANTOPRAZOLE SODIUM 40 MILLIGRAM(S): 20 TABLET, DELAYED RELEASE ORAL at 06:15

## 2023-01-01 RX ADMIN — PANTOPRAZOLE SODIUM 40 MILLIGRAM(S): 20 TABLET, DELAYED RELEASE ORAL at 17:56

## 2023-01-01 RX ADMIN — SODIUM CHLORIDE 1 GRAM(S): 9 INJECTION INTRAMUSCULAR; INTRAVENOUS; SUBCUTANEOUS at 18:02

## 2023-01-01 RX ADMIN — Medication 1 APPLICATION(S): at 16:51

## 2023-01-01 RX ADMIN — Medication 40 MILLIGRAM(S): at 05:15

## 2023-01-01 RX ADMIN — Medication 240 MILLIGRAM(S): at 05:56

## 2023-01-01 RX ADMIN — PANTOPRAZOLE SODIUM 10 MG/HR: 20 TABLET, DELAYED RELEASE ORAL at 17:00

## 2023-01-01 RX ADMIN — PANTOPRAZOLE SODIUM 40 MILLIGRAM(S): 20 TABLET, DELAYED RELEASE ORAL at 05:46

## 2023-01-01 RX ADMIN — PANTOPRAZOLE SODIUM 10 MG/HR: 20 TABLET, DELAYED RELEASE ORAL at 05:37

## 2023-01-01 RX ADMIN — Medication 40 MILLIGRAM(S): at 12:39

## 2023-01-01 RX ADMIN — TAMSULOSIN HYDROCHLORIDE 0.8 MILLIGRAM(S): 0.4 CAPSULE ORAL at 12:34

## 2023-01-01 RX ADMIN — Medication 240 MILLIGRAM(S): at 06:15

## 2023-01-01 RX ADMIN — ENOXAPARIN SODIUM 60 MILLIGRAM(S): 100 INJECTION SUBCUTANEOUS at 17:14

## 2023-01-01 RX ADMIN — HEPARIN SODIUM 800 UNIT(S)/HR: 5000 INJECTION INTRAVENOUS; SUBCUTANEOUS at 16:51

## 2023-01-01 RX ADMIN — Medication 3 UNIT(S): at 18:00

## 2023-01-01 RX ADMIN — HEPARIN SODIUM 5000 UNIT(S): 5000 INJECTION INTRAVENOUS; SUBCUTANEOUS at 12:39

## 2023-01-01 RX ADMIN — Medication 15 GRAM(S): at 12:13

## 2023-01-01 RX ADMIN — ENOXAPARIN SODIUM 60 MILLIGRAM(S): 100 INJECTION SUBCUTANEOUS at 17:04

## 2023-01-01 RX ADMIN — CEFTRIAXONE 100 MILLIGRAM(S): 500 INJECTION, POWDER, FOR SOLUTION INTRAMUSCULAR; INTRAVENOUS at 17:15

## 2023-01-01 RX ADMIN — Medication 100 MILLIGRAM(S): at 12:45

## 2023-01-01 RX ADMIN — ENOXAPARIN SODIUM 60 MILLIGRAM(S): 100 INJECTION SUBCUTANEOUS at 06:02

## 2023-01-01 RX ADMIN — WARFARIN SODIUM 3 MILLIGRAM(S): 2.5 TABLET ORAL at 21:47

## 2023-01-01 RX ADMIN — PANTOPRAZOLE SODIUM 80 MILLIGRAM(S): 20 TABLET, DELAYED RELEASE ORAL at 15:47

## 2023-01-01 RX ADMIN — LEVETIRACETAM 750 MILLIGRAM(S): 250 TABLET, FILM COATED ORAL at 17:29

## 2023-01-01 RX ADMIN — Medication 1 GRAM(S): at 11:15

## 2023-01-01 RX ADMIN — ENOXAPARIN SODIUM 60 MILLIGRAM(S): 100 INJECTION SUBCUTANEOUS at 05:38

## 2023-01-01 RX ADMIN — Medication 15 GRAM(S): at 14:16

## 2023-01-01 RX ADMIN — SPIRONOLACTONE 25 MILLIGRAM(S): 25 TABLET, FILM COATED ORAL at 05:50

## 2023-01-01 RX ADMIN — HEPARIN SODIUM 800 UNIT(S)/HR: 5000 INJECTION INTRAVENOUS; SUBCUTANEOUS at 09:38

## 2023-01-01 RX ADMIN — Medication 3 MILLILITER(S): at 13:45

## 2023-01-01 RX ADMIN — Medication 100 MILLIGRAM(S): at 21:45

## 2023-01-01 RX ADMIN — Medication 1 GRAM(S): at 13:02

## 2023-01-01 RX ADMIN — Medication 25 MILLIGRAM(S): at 05:15

## 2023-01-01 RX ADMIN — Medication 40 MILLIGRAM(S): at 08:35

## 2023-01-01 RX ADMIN — HEPARIN SODIUM 800 UNIT(S)/HR: 5000 INJECTION INTRAVENOUS; SUBCUTANEOUS at 07:59

## 2023-01-01 RX ADMIN — LEVETIRACETAM 750 MILLIGRAM(S): 250 TABLET, FILM COATED ORAL at 05:43

## 2023-01-01 RX ADMIN — SPIRONOLACTONE 25 MILLIGRAM(S): 25 TABLET, FILM COATED ORAL at 05:27

## 2023-01-01 RX ADMIN — INSULIN GLARGINE 8 UNIT(S): 100 INJECTION, SOLUTION SUBCUTANEOUS at 21:49

## 2023-01-01 RX ADMIN — Medication 1 GRAM(S): at 12:51

## 2023-01-01 RX ADMIN — LEVETIRACETAM 750 MILLIGRAM(S): 250 TABLET, FILM COATED ORAL at 05:49

## 2023-01-01 RX ADMIN — Medication 1 GRAM(S): at 23:00

## 2023-01-01 RX ADMIN — PIPERACILLIN AND TAZOBACTAM 25 GRAM(S): 4; .5 INJECTION, POWDER, LYOPHILIZED, FOR SOLUTION INTRAVENOUS at 21:35

## 2023-01-01 RX ADMIN — Medication 100 MILLIGRAM(S): at 06:16

## 2023-01-01 RX ADMIN — SODIUM CHLORIDE 1 GRAM(S): 9 INJECTION INTRAMUSCULAR; INTRAVENOUS; SUBCUTANEOUS at 06:15

## 2023-01-01 RX ADMIN — SODIUM CHLORIDE 1 GRAM(S): 9 INJECTION INTRAMUSCULAR; INTRAVENOUS; SUBCUTANEOUS at 17:45

## 2023-01-01 RX ADMIN — Medication 3 UNIT(S): at 12:24

## 2023-01-01 RX ADMIN — Medication 1 APPLICATION(S): at 12:12

## 2023-01-01 RX ADMIN — Medication 4: at 12:20

## 2023-01-01 RX ADMIN — Medication 12 UNIT(S): at 12:39

## 2023-01-01 RX ADMIN — Medication 1 GRAM(S): at 05:48

## 2023-01-01 RX ADMIN — Medication 15 GRAM(S): at 11:15

## 2023-01-01 RX ADMIN — Medication 100 MILLIGRAM(S): at 22:30

## 2023-01-01 RX ADMIN — Medication 6: at 08:33

## 2023-01-01 RX ADMIN — Medication 1 GRAM(S): at 17:40

## 2023-01-01 RX ADMIN — HEPARIN SODIUM 800 UNIT(S)/HR: 5000 INJECTION INTRAVENOUS; SUBCUTANEOUS at 08:15

## 2023-01-01 RX ADMIN — ENOXAPARIN SODIUM 60 MILLIGRAM(S): 100 INJECTION SUBCUTANEOUS at 05:30

## 2023-01-01 RX ADMIN — PIPERACILLIN AND TAZOBACTAM 25 GRAM(S): 4; .5 INJECTION, POWDER, LYOPHILIZED, FOR SOLUTION INTRAVENOUS at 13:29

## 2023-01-01 RX ADMIN — Medication 400 MILLIGRAM(S): at 06:32

## 2023-01-01 RX ADMIN — Medication 10: at 13:02

## 2023-01-01 RX ADMIN — LEVETIRACETAM 750 MILLIGRAM(S): 250 TABLET, FILM COATED ORAL at 17:39

## 2023-01-01 RX ADMIN — PANTOPRAZOLE SODIUM 40 MILLIGRAM(S): 20 TABLET, DELAYED RELEASE ORAL at 05:30

## 2023-01-01 RX ADMIN — Medication 1 GRAM(S): at 20:26

## 2023-01-01 RX ADMIN — SODIUM CHLORIDE 30 MILLILITER(S): 9 INJECTION INTRAMUSCULAR; INTRAVENOUS; SUBCUTANEOUS at 05:38

## 2023-01-01 RX ADMIN — Medication 25 GRAM(S): at 12:48

## 2023-01-01 RX ADMIN — CEFTRIAXONE 100 MILLIGRAM(S): 500 INJECTION, POWDER, FOR SOLUTION INTRAMUSCULAR; INTRAVENOUS at 17:41

## 2023-01-01 RX ADMIN — CLOPIDOGREL BISULFATE 75 MILLIGRAM(S): 75 TABLET, FILM COATED ORAL at 11:53

## 2023-01-01 RX ADMIN — Medication 2: at 21:46

## 2023-01-01 RX ADMIN — Medication 1 GRAM(S): at 06:15

## 2023-01-01 RX ADMIN — Medication 5 UNIT(S): at 12:20

## 2023-01-01 RX ADMIN — Medication 100 MILLIGRAM(S): at 22:17

## 2023-01-01 RX ADMIN — PANTOPRAZOLE SODIUM 40 MILLIGRAM(S): 20 TABLET, DELAYED RELEASE ORAL at 17:35

## 2023-01-01 RX ADMIN — Medication 1 GRAM(S): at 12:34

## 2023-01-01 RX ADMIN — SODIUM CHLORIDE 1 GRAM(S): 9 INJECTION INTRAMUSCULAR; INTRAVENOUS; SUBCUTANEOUS at 06:31

## 2023-01-01 RX ADMIN — Medication 3 MILLILITER(S): at 08:18

## 2023-01-01 RX ADMIN — CLOPIDOGREL BISULFATE 75 MILLIGRAM(S): 75 TABLET, FILM COATED ORAL at 12:08

## 2023-01-01 RX ADMIN — LEVETIRACETAM 750 MILLIGRAM(S): 250 TABLET, FILM COATED ORAL at 18:07

## 2023-01-01 RX ADMIN — Medication 40 MILLIGRAM(S): at 05:40

## 2023-01-01 RX ADMIN — Medication 5 UNIT(S): at 08:23

## 2023-01-01 RX ADMIN — CLOPIDOGREL BISULFATE 75 MILLIGRAM(S): 75 TABLET, FILM COATED ORAL at 12:15

## 2023-01-01 RX ADMIN — Medication 0: at 17:16

## 2023-01-01 RX ADMIN — Medication 1 GRAM(S): at 05:22

## 2023-01-01 RX ADMIN — Medication 240 MILLIGRAM(S): at 06:53

## 2023-01-01 RX ADMIN — Medication 4: at 12:35

## 2023-01-01 RX ADMIN — Medication 240 MILLIGRAM(S): at 06:12

## 2023-01-01 RX ADMIN — HEPARIN SODIUM 800 UNIT(S)/HR: 5000 INJECTION INTRAVENOUS; SUBCUTANEOUS at 11:32

## 2023-01-01 RX ADMIN — LEVETIRACETAM 750 MILLIGRAM(S): 250 TABLET, FILM COATED ORAL at 17:36

## 2023-01-01 RX ADMIN — Medication 100 MILLIEQUIVALENT(S): at 17:28

## 2023-01-01 RX ADMIN — Medication 100 MILLIGRAM(S): at 13:13

## 2023-01-01 RX ADMIN — Medication 100 MILLIGRAM(S): at 05:55

## 2023-01-01 RX ADMIN — HEPARIN SODIUM 800 UNIT(S)/HR: 5000 INJECTION INTRAVENOUS; SUBCUTANEOUS at 08:00

## 2023-01-01 RX ADMIN — Medication 40 MILLIGRAM(S): at 05:51

## 2023-01-01 RX ADMIN — Medication 40 MILLIGRAM(S): at 05:07

## 2023-01-01 RX ADMIN — PANTOPRAZOLE SODIUM 40 MILLIGRAM(S): 20 TABLET, DELAYED RELEASE ORAL at 06:14

## 2023-01-01 RX ADMIN — Medication 3 MILLILITER(S): at 05:41

## 2023-01-01 RX ADMIN — Medication 12 MILLIGRAM(S): at 20:54

## 2023-01-01 RX ADMIN — LEVETIRACETAM 750 MILLIGRAM(S): 250 TABLET, FILM COATED ORAL at 17:47

## 2023-01-01 RX ADMIN — Medication 4: at 21:40

## 2023-01-01 RX ADMIN — SPIRONOLACTONE 25 MILLIGRAM(S): 25 TABLET, FILM COATED ORAL at 05:25

## 2023-01-01 RX ADMIN — Medication 325 MILLIGRAM(S): at 21:51

## 2023-01-01 RX ADMIN — ATORVASTATIN CALCIUM 40 MILLIGRAM(S): 80 TABLET, FILM COATED ORAL at 22:11

## 2023-01-01 RX ADMIN — Medication 8: at 12:51

## 2023-01-01 RX ADMIN — Medication 3 UNIT(S): at 17:45

## 2023-01-01 RX ADMIN — Medication 3 UNIT(S): at 12:52

## 2023-01-01 RX ADMIN — ENOXAPARIN SODIUM 60 MILLIGRAM(S): 100 INJECTION SUBCUTANEOUS at 05:50

## 2023-01-01 RX ADMIN — Medication 4: at 22:14

## 2023-01-01 RX ADMIN — Medication 15 GRAM(S): at 13:02

## 2023-01-01 RX ADMIN — ATORVASTATIN CALCIUM 40 MILLIGRAM(S): 80 TABLET, FILM COATED ORAL at 21:20

## 2023-01-01 RX ADMIN — Medication 2: at 18:00

## 2023-01-01 RX ADMIN — Medication 1 GRAM(S): at 05:39

## 2023-01-01 RX ADMIN — CEFTRIAXONE 100 MILLIGRAM(S): 500 INJECTION, POWDER, FOR SOLUTION INTRAMUSCULAR; INTRAVENOUS at 17:28

## 2023-01-01 RX ADMIN — INSULIN GLARGINE 20 UNIT(S): 100 INJECTION, SOLUTION SUBCUTANEOUS at 20:53

## 2023-01-01 RX ADMIN — ENOXAPARIN SODIUM 60 MILLIGRAM(S): 100 INJECTION SUBCUTANEOUS at 17:40

## 2023-01-01 RX ADMIN — Medication 1 GRAM(S): at 17:39

## 2023-01-01 RX ADMIN — Medication 5: at 17:40

## 2023-01-01 RX ADMIN — Medication 40 MILLIGRAM(S): at 05:25

## 2023-01-01 RX ADMIN — Medication 15 MILLIGRAM(S): at 06:19

## 2023-01-01 RX ADMIN — Medication 1 GRAM(S): at 22:18

## 2023-01-01 RX ADMIN — Medication 400 MILLIGRAM(S): at 10:00

## 2023-01-01 RX ADMIN — PANTOPRAZOLE SODIUM 40 MILLIGRAM(S): 20 TABLET, DELAYED RELEASE ORAL at 05:50

## 2023-01-01 RX ADMIN — HEPARIN SODIUM 800 UNIT(S)/HR: 5000 INJECTION INTRAVENOUS; SUBCUTANEOUS at 07:28

## 2023-01-01 RX ADMIN — Medication 3 MILLILITER(S): at 21:09

## 2023-01-01 RX ADMIN — SPIRONOLACTONE 25 MILLIGRAM(S): 25 TABLET, FILM COATED ORAL at 05:39

## 2023-01-01 RX ADMIN — CLOPIDOGREL BISULFATE 75 MILLIGRAM(S): 75 TABLET, FILM COATED ORAL at 12:05

## 2023-01-01 RX ADMIN — INSULIN GLARGINE 20 UNIT(S): 100 INJECTION, SOLUTION SUBCUTANEOUS at 21:35

## 2023-01-01 RX ADMIN — Medication 3 MILLILITER(S): at 13:16

## 2023-01-01 RX ADMIN — SODIUM CHLORIDE 1 GRAM(S): 9 INJECTION INTRAMUSCULAR; INTRAVENOUS; SUBCUTANEOUS at 06:10

## 2023-01-01 RX ADMIN — Medication 1: at 08:19

## 2023-01-01 RX ADMIN — Medication 240 MILLIGRAM(S): at 06:31

## 2023-01-01 RX ADMIN — Medication 3 MILLILITER(S): at 19:20

## 2023-01-01 RX ADMIN — Medication 3 MILLILITER(S): at 07:29

## 2023-01-01 RX ADMIN — PANTOPRAZOLE SODIUM 40 MILLIGRAM(S): 20 TABLET, DELAYED RELEASE ORAL at 06:29

## 2023-01-01 RX ADMIN — LEVETIRACETAM 750 MILLIGRAM(S): 250 TABLET, FILM COATED ORAL at 05:34

## 2023-01-01 RX ADMIN — Medication 200 MILLIGRAM(S): at 22:14

## 2023-01-01 RX ADMIN — ATORVASTATIN CALCIUM 40 MILLIGRAM(S): 80 TABLET, FILM COATED ORAL at 20:51

## 2023-01-01 RX ADMIN — WARFARIN SODIUM 5 MILLIGRAM(S): 2.5 TABLET ORAL at 22:18

## 2023-01-01 RX ADMIN — Medication 2: at 08:25

## 2023-01-01 RX ADMIN — Medication 100 MILLIEQUIVALENT(S): at 18:57

## 2023-01-01 RX ADMIN — INSULIN GLARGINE 30 UNIT(S): 100 INJECTION, SOLUTION SUBCUTANEOUS at 21:26

## 2023-01-01 RX ADMIN — LEVETIRACETAM 750 MILLIGRAM(S): 250 TABLET, FILM COATED ORAL at 05:37

## 2023-01-01 RX ADMIN — Medication 15 GRAM(S): at 12:50

## 2023-01-01 RX ADMIN — LEVETIRACETAM 750 MILLIGRAM(S): 250 TABLET, FILM COATED ORAL at 17:09

## 2023-01-01 RX ADMIN — Medication 1: at 17:04

## 2023-01-01 RX ADMIN — Medication 3 MILLILITER(S): at 08:19

## 2023-01-01 RX ADMIN — PANTOPRAZOLE SODIUM 40 MILLIGRAM(S): 20 TABLET, DELAYED RELEASE ORAL at 05:27

## 2023-01-01 RX ADMIN — Medication 2: at 11:57

## 2023-01-01 RX ADMIN — SODIUM CHLORIDE 1 GRAM(S): 9 INJECTION INTRAMUSCULAR; INTRAVENOUS; SUBCUTANEOUS at 17:40

## 2023-01-01 RX ADMIN — INSULIN GLARGINE 30 UNIT(S): 100 INJECTION, SOLUTION SUBCUTANEOUS at 21:19

## 2023-01-01 RX ADMIN — LEVETIRACETAM 750 MILLIGRAM(S): 250 TABLET, FILM COATED ORAL at 05:22

## 2023-01-01 RX ADMIN — Medication 200 MILLIGRAM(S): at 11:23

## 2023-01-01 RX ADMIN — Medication 100 MILLIGRAM(S): at 22:12

## 2023-01-01 RX ADMIN — Medication 100 MILLIGRAM(S): at 15:59

## 2023-01-01 RX ADMIN — Medication 3 MILLILITER(S): at 16:24

## 2023-01-01 RX ADMIN — Medication 1 GRAM(S): at 17:45

## 2023-01-01 RX ADMIN — PANTOPRAZOLE SODIUM 10 MG/HR: 20 TABLET, DELAYED RELEASE ORAL at 12:38

## 2023-01-01 RX ADMIN — PANTOPRAZOLE SODIUM 40 MILLIGRAM(S): 20 TABLET, DELAYED RELEASE ORAL at 18:01

## 2023-01-01 RX ADMIN — Medication 3 MILLILITER(S): at 13:33

## 2023-01-01 RX ADMIN — Medication 40 MILLIGRAM(S): at 06:30

## 2023-01-01 RX ADMIN — SODIUM CHLORIDE 1 GRAM(S): 9 INJECTION INTRAMUSCULAR; INTRAVENOUS; SUBCUTANEOUS at 05:22

## 2023-01-01 RX ADMIN — LEVETIRACETAM 750 MILLIGRAM(S): 250 TABLET, FILM COATED ORAL at 17:04

## 2023-01-01 RX ADMIN — SODIUM CHLORIDE 1 GRAM(S): 9 INJECTION INTRAMUSCULAR; INTRAVENOUS; SUBCUTANEOUS at 05:39

## 2023-01-01 RX ADMIN — Medication 3 MILLILITER(S): at 22:49

## 2023-01-01 RX ADMIN — CEFTRIAXONE 100 MILLIGRAM(S): 500 INJECTION, POWDER, FOR SOLUTION INTRAMUSCULAR; INTRAVENOUS at 17:10

## 2023-01-01 RX ADMIN — LEVETIRACETAM 750 MILLIGRAM(S): 250 TABLET, FILM COATED ORAL at 06:12

## 2023-01-01 RX ADMIN — Medication 40 MILLIGRAM(S): at 05:30

## 2023-01-01 RX ADMIN — Medication 5 UNIT(S): at 12:05

## 2023-01-01 RX ADMIN — HEPARIN SODIUM 800 UNIT(S)/HR: 5000 INJECTION INTRAVENOUS; SUBCUTANEOUS at 19:29

## 2023-01-01 RX ADMIN — Medication 1 GRAM(S): at 17:36

## 2023-01-01 RX ADMIN — Medication 15 GRAM(S): at 11:14

## 2023-01-01 RX ADMIN — PANTOPRAZOLE SODIUM 40 MILLIGRAM(S): 20 TABLET, DELAYED RELEASE ORAL at 17:31

## 2023-01-01 RX ADMIN — Medication 1 GRAM(S): at 12:45

## 2023-01-01 RX ADMIN — Medication 15 GRAM(S): at 12:18

## 2023-01-01 RX ADMIN — HEPARIN SODIUM 800 UNIT(S)/HR: 5000 INJECTION INTRAVENOUS; SUBCUTANEOUS at 02:17

## 2023-01-01 RX ADMIN — Medication 1 GRAM(S): at 11:14

## 2023-01-01 RX ADMIN — CLOPIDOGREL BISULFATE 75 MILLIGRAM(S): 75 TABLET, FILM COATED ORAL at 18:07

## 2023-01-01 RX ADMIN — Medication 100 MILLIEQUIVALENT(S): at 20:43

## 2023-01-01 RX ADMIN — HEPARIN SODIUM 0 UNIT(S)/HR: 5000 INJECTION INTRAVENOUS; SUBCUTANEOUS at 02:34

## 2023-01-01 RX ADMIN — HEPARIN SODIUM 1000 UNIT(S)/HR: 5000 INJECTION INTRAVENOUS; SUBCUTANEOUS at 20:20

## 2023-04-18 ENCOUNTER — INPATIENT (INPATIENT)
Facility: HOSPITAL | Age: 68
LOS: 7 days | Discharge: HOME | DRG: 264 | End: 2023-04-26
Attending: INTERNAL MEDICINE | Admitting: INTERNAL MEDICINE
Payer: MEDICARE

## 2023-04-18 VITALS
OXYGEN SATURATION: 99 % | WEIGHT: 160.06 LBS | DIASTOLIC BLOOD PRESSURE: 78 MMHG | TEMPERATURE: 97 F | SYSTOLIC BLOOD PRESSURE: 125 MMHG | RESPIRATION RATE: 94 BRPM | HEIGHT: 70 IN | HEART RATE: 94 BPM

## 2023-04-18 DIAGNOSIS — Z95.1 PRESENCE OF AORTOCORONARY BYPASS GRAFT: Chronic | ICD-10-CM

## 2023-04-18 DIAGNOSIS — T14.8XXA OTHER INJURY OF UNSPECIFIED BODY REGION, INITIAL ENCOUNTER: ICD-10-CM

## 2023-04-18 DIAGNOSIS — Z98.890 OTHER SPECIFIED POSTPROCEDURAL STATES: Chronic | ICD-10-CM

## 2023-04-18 LAB
ALBUMIN SERPL ELPH-MCNC: 2.9 G/DL — LOW (ref 3.3–5)
ALP SERPL-CCNC: 667 U/L — HIGH (ref 40–120)
ALT FLD-CCNC: 35 U/L — SIGNIFICANT CHANGE UP (ref 12–78)
ANION GAP SERPL CALC-SCNC: 4 MMOL/L — LOW (ref 5–17)
APPEARANCE UR: CLEAR — SIGNIFICANT CHANGE UP
APTT BLD: 71.2 SEC — HIGH (ref 27.5–35.5)
AST SERPL-CCNC: 42 U/L — HIGH (ref 15–37)
BASOPHILS # BLD AUTO: 0.02 K/UL — SIGNIFICANT CHANGE UP (ref 0–0.2)
BASOPHILS NFR BLD AUTO: 0.2 % — SIGNIFICANT CHANGE UP (ref 0–2)
BILIRUB SERPL-MCNC: 0.7 MG/DL — SIGNIFICANT CHANGE UP (ref 0.2–1.2)
BILIRUB UR-MCNC: NEGATIVE — SIGNIFICANT CHANGE UP
BUN SERPL-MCNC: 15 MG/DL — SIGNIFICANT CHANGE UP (ref 7–23)
CALCIUM SERPL-MCNC: 9.4 MG/DL — SIGNIFICANT CHANGE UP (ref 8.5–10.1)
CHLORIDE SERPL-SCNC: 94 MMOL/L — LOW (ref 96–108)
CO2 SERPL-SCNC: 30 MMOL/L — SIGNIFICANT CHANGE UP (ref 22–31)
COLOR SPEC: YELLOW — SIGNIFICANT CHANGE UP
CREAT SERPL-MCNC: 0.97 MG/DL — SIGNIFICANT CHANGE UP (ref 0.5–1.3)
DIFF PNL FLD: ABNORMAL
EGFR: 87 ML/MIN/1.73M2 — SIGNIFICANT CHANGE UP
EOSINOPHIL # BLD AUTO: 0.15 K/UL — SIGNIFICANT CHANGE UP (ref 0–0.5)
EOSINOPHIL NFR BLD AUTO: 1.4 % — SIGNIFICANT CHANGE UP (ref 0–6)
ERYTHROCYTE [SEDIMENTATION RATE] IN BLOOD: 79 MM/HR — HIGH (ref 0–20)
GLUCOSE SERPL-MCNC: 303 MG/DL — HIGH (ref 70–99)
GLUCOSE UR QL: 250
HCT VFR BLD CALC: 38.1 % — LOW (ref 39–50)
HGB BLD-MCNC: 12.6 G/DL — LOW (ref 13–17)
IMM GRANULOCYTES NFR BLD AUTO: 0.4 % — SIGNIFICANT CHANGE UP (ref 0–0.9)
INR BLD: 7.16 RATIO — CRITICAL HIGH (ref 0.88–1.16)
KETONES UR-MCNC: NEGATIVE — SIGNIFICANT CHANGE UP
LACTATE SERPL-SCNC: 2 MMOL/L — SIGNIFICANT CHANGE UP (ref 0.7–2)
LEUKOCYTE ESTERASE UR-ACNC: NEGATIVE — SIGNIFICANT CHANGE UP
LYMPHOCYTES # BLD AUTO: 1.06 K/UL — SIGNIFICANT CHANGE UP (ref 1–3.3)
LYMPHOCYTES # BLD AUTO: 9.7 % — LOW (ref 13–44)
MCHC RBC-ENTMCNC: 26.9 PG — LOW (ref 27–34)
MCHC RBC-ENTMCNC: 33.1 GM/DL — SIGNIFICANT CHANGE UP (ref 32–36)
MCV RBC AUTO: 81.2 FL — SIGNIFICANT CHANGE UP (ref 80–100)
MONOCYTES # BLD AUTO: 1 K/UL — HIGH (ref 0–0.9)
MONOCYTES NFR BLD AUTO: 9.2 % — SIGNIFICANT CHANGE UP (ref 2–14)
NEUTROPHILS # BLD AUTO: 8.64 K/UL — HIGH (ref 1.8–7.4)
NEUTROPHILS NFR BLD AUTO: 79.1 % — HIGH (ref 43–77)
NITRITE UR-MCNC: NEGATIVE — SIGNIFICANT CHANGE UP
NRBC # BLD: 0 /100 WBCS — SIGNIFICANT CHANGE UP (ref 0–0)
PH UR: 6.5 — SIGNIFICANT CHANGE UP (ref 5–8)
PLATELET # BLD AUTO: 267 K/UL — SIGNIFICANT CHANGE UP (ref 150–400)
POTASSIUM SERPL-MCNC: 5 MMOL/L — SIGNIFICANT CHANGE UP (ref 3.5–5.3)
POTASSIUM SERPL-SCNC: 5 MMOL/L — SIGNIFICANT CHANGE UP (ref 3.5–5.3)
PROT SERPL-MCNC: 8.2 G/DL — SIGNIFICANT CHANGE UP (ref 6–8.3)
PROT UR-MCNC: NEGATIVE — SIGNIFICANT CHANGE UP
PROTHROM AB SERPL-ACNC: 85.5 SEC — HIGH (ref 10.5–13.4)
RBC # BLD: 4.69 M/UL — SIGNIFICANT CHANGE UP (ref 4.2–5.8)
RBC # FLD: 18.6 % — HIGH (ref 10.3–14.5)
SARS-COV-2 RNA SPEC QL NAA+PROBE: SIGNIFICANT CHANGE UP
SODIUM SERPL-SCNC: 128 MMOL/L — LOW (ref 135–145)
SP GR SPEC: 1 — LOW (ref 1.01–1.02)
UROBILINOGEN FLD QL: NEGATIVE — SIGNIFICANT CHANGE UP
WBC # BLD: 10.91 K/UL — HIGH (ref 3.8–10.5)
WBC # FLD AUTO: 10.91 K/UL — HIGH (ref 3.8–10.5)

## 2023-04-18 PROCEDURE — 73630 X-RAY EXAM OF FOOT: CPT | Mod: 26,LT

## 2023-04-18 PROCEDURE — 99285 EMERGENCY DEPT VISIT HI MDM: CPT

## 2023-04-18 PROCEDURE — 93010 ELECTROCARDIOGRAM REPORT: CPT

## 2023-04-18 PROCEDURE — 71045 X-RAY EXAM CHEST 1 VIEW: CPT | Mod: 26

## 2023-04-18 PROCEDURE — 99222 1ST HOSP IP/OBS MODERATE 55: CPT

## 2023-04-18 RX ORDER — DEXTROSE 50 % IN WATER 50 %
25 SYRINGE (ML) INTRAVENOUS ONCE
Refills: 0 | Status: DISCONTINUED | OUTPATIENT
Start: 2023-04-18 | End: 2023-04-26

## 2023-04-18 RX ORDER — PANTOPRAZOLE SODIUM 20 MG/1
40 TABLET, DELAYED RELEASE ORAL DAILY
Refills: 0 | Status: DISCONTINUED | OUTPATIENT
Start: 2023-04-18 | End: 2023-04-26

## 2023-04-18 RX ORDER — ATORVASTATIN CALCIUM 80 MG/1
10 TABLET, FILM COATED ORAL AT BEDTIME
Refills: 0 | Status: DISCONTINUED | OUTPATIENT
Start: 2023-04-18 | End: 2023-04-26

## 2023-04-18 RX ORDER — GLUCAGON INJECTION, SOLUTION 0.5 MG/.1ML
1 INJECTION, SOLUTION SUBCUTANEOUS ONCE
Refills: 0 | Status: DISCONTINUED | OUTPATIENT
Start: 2023-04-18 | End: 2023-04-26

## 2023-04-18 RX ORDER — INSULIN LISPRO 100/ML
VIAL (ML) SUBCUTANEOUS
Refills: 0 | Status: DISCONTINUED | OUTPATIENT
Start: 2023-04-18 | End: 2023-04-20

## 2023-04-18 RX ORDER — FUROSEMIDE 40 MG
40 TABLET ORAL DAILY
Refills: 0 | Status: DISCONTINUED | OUTPATIENT
Start: 2023-04-18 | End: 2023-04-26

## 2023-04-18 RX ORDER — DEXTROSE 50 % IN WATER 50 %
15 SYRINGE (ML) INTRAVENOUS ONCE
Refills: 0 | Status: DISCONTINUED | OUTPATIENT
Start: 2023-04-18 | End: 2023-04-26

## 2023-04-18 RX ORDER — PIPERACILLIN AND TAZOBACTAM 4; .5 G/20ML; G/20ML
3.38 INJECTION, POWDER, LYOPHILIZED, FOR SOLUTION INTRAVENOUS ONCE
Refills: 0 | Status: COMPLETED | OUTPATIENT
Start: 2023-04-18 | End: 2023-04-18

## 2023-04-18 RX ORDER — VANCOMYCIN HCL 1 G
1000 VIAL (EA) INTRAVENOUS ONCE
Refills: 0 | Status: COMPLETED | OUTPATIENT
Start: 2023-04-18 | End: 2023-04-18

## 2023-04-18 RX ORDER — LEVETIRACETAM 250 MG/1
750 TABLET, FILM COATED ORAL
Refills: 0 | Status: DISCONTINUED | OUTPATIENT
Start: 2023-04-18 | End: 2023-04-26

## 2023-04-18 RX ORDER — DEXTROSE 50 % IN WATER 50 %
12.5 SYRINGE (ML) INTRAVENOUS ONCE
Refills: 0 | Status: DISCONTINUED | OUTPATIENT
Start: 2023-04-18 | End: 2023-04-26

## 2023-04-18 RX ORDER — ASPIRIN/CALCIUM CARB/MAGNESIUM 324 MG
81 TABLET ORAL DAILY
Refills: 0 | Status: DISCONTINUED | OUTPATIENT
Start: 2023-04-18 | End: 2023-04-26

## 2023-04-18 RX ORDER — SODIUM CHLORIDE 9 MG/ML
1000 INJECTION, SOLUTION INTRAVENOUS
Refills: 0 | Status: DISCONTINUED | OUTPATIENT
Start: 2023-04-18 | End: 2023-04-26

## 2023-04-18 RX ORDER — ACETAMINOPHEN 500 MG
650 TABLET ORAL EVERY 6 HOURS
Refills: 0 | Status: DISCONTINUED | OUTPATIENT
Start: 2023-04-18 | End: 2023-04-26

## 2023-04-18 RX ORDER — SODIUM CHLORIDE 9 MG/ML
1000 INJECTION INTRAMUSCULAR; INTRAVENOUS; SUBCUTANEOUS ONCE
Refills: 0 | Status: COMPLETED | OUTPATIENT
Start: 2023-04-18 | End: 2023-04-18

## 2023-04-18 RX ORDER — OXYCODONE AND ACETAMINOPHEN 5; 325 MG/1; MG/1
1 TABLET ORAL EVERY 6 HOURS
Refills: 0 | Status: DISCONTINUED | OUTPATIENT
Start: 2023-04-18 | End: 2023-04-18

## 2023-04-18 RX ADMIN — Medication 250 MILLIGRAM(S): at 21:24

## 2023-04-18 RX ADMIN — SODIUM CHLORIDE 1000 MILLILITER(S): 9 INJECTION INTRAMUSCULAR; INTRAVENOUS; SUBCUTANEOUS at 21:42

## 2023-04-18 RX ADMIN — Medication 4: at 22:49

## 2023-04-18 RX ADMIN — ATORVASTATIN CALCIUM 10 MILLIGRAM(S): 80 TABLET, FILM COATED ORAL at 22:47

## 2023-04-18 RX ADMIN — Medication 1000 MILLIGRAM(S): at 22:24

## 2023-04-18 RX ADMIN — SODIUM CHLORIDE 1000 MILLILITER(S): 9 INJECTION INTRAMUSCULAR; INTRAVENOUS; SUBCUTANEOUS at 22:42

## 2023-04-18 RX ADMIN — LEVETIRACETAM 750 MILLIGRAM(S): 250 TABLET, FILM COATED ORAL at 22:47

## 2023-04-18 RX ADMIN — PIPERACILLIN AND TAZOBACTAM 3.38 GRAM(S): 4; .5 INJECTION, POWDER, LYOPHILIZED, FOR SOLUTION INTRAVENOUS at 20:43

## 2023-04-18 RX ADMIN — PIPERACILLIN AND TAZOBACTAM 200 GRAM(S): 4; .5 INJECTION, POWDER, LYOPHILIZED, FOR SOLUTION INTRAVENOUS at 20:13

## 2023-04-18 NOTE — H&P ADULT - ASSESSMENT
67 year old male with PMHx CVA (right sided hemiparesis and aphasia), AFib (on coumadin), IDDM, HTN, CAD s/p CABG, s/p AVR, presenting to [place] ED with wound to left 4th metatarsal.  Patient unable to provide history due to aphasia, history obtained from daughter at beside and per chart review.  Patient developed hematoma to left 4th metatarsal on Friday, it then ruptured and patient presented to Podiatry office (Dr. Maegan Jin) earlier today who instructed patient to come to ED for evaluation.  Patient has now new complaints at this time.  Patient does not follow with a vascular surgeon.      1 Toe gangrene, diabetic foot ulcer  - cw abx  - ID,, vascular and podiatry fu  - fu cultures  - pain control     2 DM  - monitor FS  - Basal bolus  - diabetic diet    3 Hx of CVA  - cw AC  - cw asa, statin    4 Afib, AVR  - INR suratherapeutic  - hold coumadin    5 CAD, CABG  - cw asa, statin  - cards fu

## 2023-04-18 NOTE — ED ADULT NURSE NOTE - OBJECTIVE STATEMENT
Pt sent by Dr Jin Podiatrist for admission, IV abx, xray, MRI and vascular consult. Pt with Left 4th toe gangrene and left foot cellulitis HX PAD, CVA with right sided weakness, Aortic valve replacement and DM. Daughter at bedside, Denies fever. safety maintained, call bell within reach. Nursing care ongoing.

## 2023-04-18 NOTE — CONSULT NOTE ADULT - SUBJECTIVE AND OBJECTIVE BOX
VASCULAR SURGERY PA CONSULT NOTE:    CHIEF COMPLAINT:  Patient is a 67y old  Male who presents with a chief complaint of left foot wound    HPI:  Patient is 67 year old male with PMHx CVA (right sided hemiparesis and aphasia), AFib (on coumadin), IDDM, HTN, CAD s/p CABG, s/p AVR, presenting to [place] ED with wound to left 4th metatarsal.  Patient unable to provide history due to aphasia, history obtained from daughter at beside and per chart review.  Patient developed hematoma to left 4th metatarsal on Friday, it then ruptured and patient presented to Podiatry office (Dr. Maegan Jin) earlier today who instructed patient to come to ED for evaluation.  Patient has now new complaints at this time.  Patient does not follow with a vascular surgeon.      PAST MEDICAL HISTORY:  PAST MEDICAL & SURGICAL HISTORY:  CVA (cerebral vascular accident)      HTN (hypertension)      DM (diabetes mellitus)      Arrhythmia      History of atrial fibrillation      S/P CABG (coronary artery bypass graft)    VA  S/P brain surgery          PAST SURGICAL HISTORY:    REVIEW OF SYSTEMS:  General/Constitutional: No acute distress, no headache, weakness, fevers, or chills   HEENT: Denies auditory or visual changes/disturbances, no vertigo, no throat pain, no dysphagia    Neck: Denies neck pain/stiffness, denies swelling/lumps/hoarseness   Lymphatic: Denies lumps or swelling in the axillae, groin, or neck bilaterally   Respiratory: Denies cough/hemoptysis, denies wheezing/SOB/dyspnea  Cardiac: Denies chest pain, palpitations  Abdomen: Denies abdominal bloating/fullness, nausea or vomiting, denies abdominal pain  Extremities: Denies sores, swelling, discoloration bilat UE/LE  Genitourinary: Denies urinary issues or complaints, denies dysuria/hematuria  Neuro: Denies weakness, paraesthesias, paralysis, syncope, loss of vision  Skin: Denies pruritus, pain, rashes  Psych: Denies hallucinations, visual disturbances, or depression    MEDICATIONS:  Home Medications:  aspirin 81 mg oral delayed release tablet: 1 tab(s) orally once a day (2022 16:19)  atorvastatin 10 mg oral tablet: 1 tab(s) orally once a day (2022 16:19)  furosemide 40 mg oral tablet: 1 tab(s) orally once a day (2022 16:19)  insulin glargine 100 units/mL subcutaneous solution:  (2020 16:16)  levETIRAcetam 750 mg oral tablet: 1 tab(s) orally 2 times a day (2020 16:16)  metFORMIN 500 mg oral tablet: 1 tab(s) orally 2 times a day (2020 16:16)  multivitamin: 1 tab(s) orally once a day (2020 16:16)  NovoLOG FlexPen 100 units/mL injectable solution:  (2020 16:16)  propranolol 20 mg oral tablet: 1 tab(s) orally 2 times a day (2022 16:19)  raNITIdine 150 mg oral tablet: 1 tab(s) orally once a day (2020 16:16)  spironolactone 25 mg oral tablet: 1 tab(s) orally 2 times a day (2022 16:19)  warfarin 2 mg oral tablet: 1.5 tab(s) orally once a day (04 Dec 2022 10:15)    MEDICATIONS  (STANDING):  aspirin enteric coated 81 milliGRAM(s) Oral daily  atorvastatin 10 milliGRAM(s) Oral at bedtime  dextrose 5%. 1000 milliLiter(s) (50 mL/Hr) IV Continuous <Continuous>  dextrose 5%. 1000 milliLiter(s) (100 mL/Hr) IV Continuous <Continuous>  dextrose 50% Injectable 12.5 Gram(s) IV Push once  dextrose 50% Injectable 25 Gram(s) IV Push once  dextrose 50% Injectable 25 Gram(s) IV Push once  furosemide    Tablet 40 milliGRAM(s) Oral daily  glucagon  Injectable 1 milliGRAM(s) IntraMuscular once  insulin lispro (ADMELOG) corrective regimen sliding scale   SubCutaneous three times a day before meals  levETIRAcetam 750 milliGRAM(s) Oral two times a day  pantoprazole   Suspension 40 milliGRAM(s) Oral daily  propranolol 20 milliGRAM(s) Oral two times a day    MEDICATIONS  (PRN):  acetaminophen     Tablet .. 650 milliGRAM(s) Oral every 6 hours PRN Temp greater or equal to 38C (100.4F), Mild Pain (1 - 3)  dextrose Oral Gel 15 Gram(s) Oral once PRN Blood Glucose LESS THAN 70 milliGRAM(s)/deciliter  oxycodone    5 mG/acetaminophen 325 mG 1 Tablet(s) Oral every 6 hours PRN Moderate Pain (4 - 6)      ALLERGIES:  Allergies    No Known Allergies    Intolerances        SOCIAL HISTORY:  Social History:    Smoking: Yes [ ]  No [X]   ______pk yrs  ETOH  Yes [ ]  No [x]  Social [ ]  DRUGS:  Yes [ ]  No [x]  if so what______________    FAMILY HISTORY:  FAMILY HISTORY:  No pertinent family history in first degree relatives        VITAL SIGNS:  Vital Signs Last 24 Hrs  T(C): 36.5 (2023 23:01), Max: 36.5 (2023 23:01)  T(F): 97.7 (2023 23:01), Max: 97.7 (2023 23:01)  HR: 80 (:) (80 - 94)  BP: 123/76 (:) (123/76 - 125/78)  BP(mean): --  RR: 20 (:) (16 - 20)  SpO2: 95% (:) (95% - 99%)    Parameters below as of 2023 23:01  Patient On (Oxygen Delivery Method): room air        PHYSICAL EXAM:  General: No acute distress, appears comfortable  Chest: Lungs are clear to P&A, no wheezing, no rales, no ronchi, with good inspiratory effort  Heart: Heart irrhythm regular, no murmurs  Abdomen: Soft, non tender, good bowel sounds present in all four quadrants.  No guarding, rebound, and no peritoneal signs.  No evidence of hepatosplenomegaly.  No evidence of abdominal wall hernias.  Inguinal regions are unremarkable with no evidence of hernias.   Extremity: B/L lower extremities warm to touch.  Left 4th metatarsal center of erythremic tissue to exposed nail bed with outer area hyperemic, dark coloring.  Area of erythema to anterior foot.  B/L palpable POP, AT, PT pulses.  B/L dopplerable DP pulses.    Neuro: Aphasic.  Grossly moves LUE and LLE.  RLE and RUE hemiparesis.        LABS:                        12.6   10.91 )-----------( 267      ( 2023 20:06 )             38.1     04-18    128<L>  |  94<L>  |  15  ----------------------------<  303<H>  5.0   |  30  |  0.97    Ca    9.4      2023 20:06    TPro  8.2  /  Alb  2.9<L>  /  TBili  0.7  /  DBili  x   /  AST  42<H>  /  ALT  35  /  AlkPhos  667<H>  04-18    PT/INR - ( 2023 20:06 )   PT: 85.5 sec;   INR: 7.16 ratio         PTT - ( 2023 20:06 )  PTT:71.2 sec  Urinalysis Basic - ( 2023 21:20 )    Color: Yellow / Appearance: Clear / S.005 / pH: x  Gluc: x / Ketone: Negative  / Bili: Negative / Urobili: Negative   Blood: x / Protein: Negative / Nitrite: Negative   Leuk Esterase: Negative / RBC: 3-5 /HPF / WBC 0-2   Sq Epi: x / Non Sq Epi: x / Bacteria: Occasional      LIVER FUNCTIONS - ( 2023 20:06 )  Alb: 2.9 g/dL / Pro: 8.2 g/dL / ALK PHOS: 667 U/L / ALT: 35 U/L / AST: 42 U/L / GGT: x               ASSESSMENT:  67 year old male with PMHx CVA (right sided hemiparesis and aphasia), AFib (on coumadin), IDDM, HTN, CAD s/p CABG, s/p AVR, a/w wound to left 4th metatarsal s/p ruptured hematoma.  Left 4th metatarsal center of erythremic tissue to exposed nail bed with surrounding area of hyperemic, dark coloring tissue.  Area of erythema to anterior foot.  B/L palpable POP, AT, PT pulses.  B/L non-palpable, dopplerable DP pulses. INR noted to be 7.16.        PLAN:  - Recommend ABIs/PVR to assess degree of arterial disease  - F/u Podiatry evaluation  - Admission to medicine team  - Vascular surgery will follow   - Discussed with Dr. Mora

## 2023-04-18 NOTE — CONSULT NOTE ADULT - SUBJECTIVE AND OBJECTIVE BOX
Patient is a 67y Male whom presented to the hospital with     PAST MEDICAL & SURGICAL HISTORY:  CVA (cerebral vascular accident)      HTN (hypertension)      DM (diabetes mellitus)      Arrhythmia      History of atrial fibrillation      S/P CABG (coronary artery bypass graft)      S/P brain surgery          MEDICATIONS  (STANDING):  aspirin enteric coated 81 milliGRAM(s) Oral daily  atorvastatin 10 milliGRAM(s) Oral at bedtime  dextrose 5%. 1000 milliLiter(s) (50 mL/Hr) IV Continuous <Continuous>  dextrose 5%. 1000 milliLiter(s) (100 mL/Hr) IV Continuous <Continuous>  dextrose 50% Injectable 25 Gram(s) IV Push once  dextrose 50% Injectable 12.5 Gram(s) IV Push once  dextrose 50% Injectable 25 Gram(s) IV Push once  furosemide    Tablet 40 milliGRAM(s) Oral daily  glucagon  Injectable 1 milliGRAM(s) IntraMuscular once  insulin glargine Injectable (LANTUS) 7 Unit(s) SubCutaneous at bedtime  insulin lispro (ADMELOG) corrective regimen sliding scale   SubCutaneous three times a day before meals  insulin lispro Injectable (ADMELOG) 2 Unit(s) SubCutaneous three times a day before meals  levETIRAcetam 750 milliGRAM(s) Oral two times a day  pantoprazole   Suspension 40 milliGRAM(s) Oral daily  piperacillin/tazobactam IVPB.. 3.375 Gram(s) IV Intermittent every 8 hours  propranolol 20 milliGRAM(s) Oral two times a day  vancomycin  IVPB 1000 milliGRAM(s) IV Intermittent every 12 hours      Allergies    No Known Allergies    Intolerances        SOCIAL HISTORY:  Denies ETOh,Smoking,     FAMILY HISTORY:  No pertinent family history in first degree relatives        REVIEW OF SYSTEMS:    CONSTITUTIONAL: No weakness, fevers or chills  EYES/ENT: No visual changes;  no throat pain   NECK: No pain or stiffness  RESPIRATORY: No cough, wheezing, hemoptysis; No shortness of breath  CARDIOVASCULAR: No chest pain or palpitations  GASTROINTESTINAL: No abdominal or epigastric pain. No nausea, vomiting,     No diarrhea or constipation. No melena   GENITOURINARY: No dysuria, frequency or hematuria  NEUROLOGICAL: No numbness or weakness  SKIN: dry      VITAL:  T(C): , Max: 36.8 (23 @ 04:43)  T(F): , Max: 98.2 (23 @ 04:43)  HR: 73 (23 @ 04:43)  BP: 103/66 (23 @ 04:43)  BP(mean): --  RR: 18 (23 @ 04:43)  SpO2: 97% (23 @ 04:43)  Wt(kg): --    I and O's:    Height (cm): 177.8 ( @ 18:24)  Weight (kg): 72.6 ( @ 18:24)  BMI (kg/m2): 23 ( @ 18:24)  BSA (m2): 1.9 ( @ 18:24)    PHYSICAL EXAM:    Constitutional: NAD  HEENT: conjunctive   clear   Neck:  No JVD  Respiratory: CTAB  Cardiovascular: S1 and S2  Gastrointestinal: BS+, soft, NT/ND  Extremities: No peripheral edema  Neurological: A/O x 3, no focal deficits  Psychiatric: Normal mood, normal affect  : No Adams  Skin: No rashes  Access: Not applicable    LABS:                        11.4   11.33 )-----------( 252      ( 2023 07:30 )             34.8         132<L>  |  96  |  13  ----------------------------<  247<H>  3.9   |  31  |  0.78    Ca    9.0      2023 07:30    TPro  7.2  /  Alb  2.9<L>  /  TBili  0.6  /  DBili  x   /  AST  23  /  ALT  27  /  AlkPhos  518<H>        Urine Studies:  Urinalysis Basic - ( 2023 21:20 )    Color: Yellow / Appearance: Clear / S.005 / pH: x  Gluc: x / Ketone: Negative  / Bili: Negative / Urobili: Negative   Blood: x / Protein: Negative / Nitrite: Negative   Leuk Esterase: Negative / RBC: 3-5 /HPF / WBC 0-2   Sq Epi: x / Non Sq Epi: x / Bacteria: Occasional            RADIOLOGY & ADDITIONAL STUDIES:            MEDICATIONS  (STANDING):  aspirin enteric coated 81 milliGRAM(s) Oral daily  atorvastatin 10 milliGRAM(s) Oral at bedtime  dextrose 5%. 1000 milliLiter(s) (50 mL/Hr) IV Continuous <Continuous>  dextrose 5%. 1000 milliLiter(s) (100 mL/Hr) IV Continuous <Continuous>  dextrose 50% Injectable 25 Gram(s) IV Push once  dextrose 50% Injectable 12.5 Gram(s) IV Push once  dextrose 50% Injectable 25 Gram(s) IV Push once  furosemide    Tablet 40 milliGRAM(s) Oral daily  glucagon  Injectable 1 milliGRAM(s) IntraMuscular once  insulin glargine Injectable (LANTUS) 7 Unit(s) SubCutaneous at bedtime  insulin lispro (ADMELOG) corrective regimen sliding scale   SubCutaneous three times a day before meals  insulin lispro Injectable (ADMELOG) 2 Unit(s) SubCutaneous three times a day before meals  levETIRAcetam 750 milliGRAM(s) Oral two times a day  pantoprazole   Suspension 40 milliGRAM(s) Oral daily  piperacillin/tazobactam IVPB.. 3.375 Gram(s) IV Intermittent every 8 hours  propranolol 20 milliGRAM(s) Oral two times a day  vancomycin  IVPB 1000 milliGRAM(s) IV Intermittent every 12 hours         Patient is a 67y Male whom presented to the hospital with cristo ,     PAST MEDICAL & SURGICAL HISTORY:  CVA (cerebral vascular accident)      HTN (hypertension)      DM (diabetes mellitus)      Arrhythmia      History of atrial fibrillation      S/P CABG (coronary artery bypass graft)      S/P brain surgery          MEDICATIONS  (STANDING):  aspirin enteric coated 81 milliGRAM(s) Oral daily  atorvastatin 10 milliGRAM(s) Oral at bedtime  dextrose 5%. 1000 milliLiter(s) (50 mL/Hr) IV Continuous <Continuous>  dextrose 5%. 1000 milliLiter(s) (100 mL/Hr) IV Continuous <Continuous>  dextrose 50% Injectable 25 Gram(s) IV Push once  dextrose 50% Injectable 12.5 Gram(s) IV Push once  dextrose 50% Injectable 25 Gram(s) IV Push once  furosemide    Tablet 40 milliGRAM(s) Oral daily  glucagon  Injectable 1 milliGRAM(s) IntraMuscular once  insulin glargine Injectable (LANTUS) 7 Unit(s) SubCutaneous at bedtime  insulin lispro (ADMELOG) corrective regimen sliding scale   SubCutaneous three times a day before meals  insulin lispro Injectable (ADMELOG) 2 Unit(s) SubCutaneous three times a day before meals  levETIRAcetam 750 milliGRAM(s) Oral two times a day  pantoprazole   Suspension 40 milliGRAM(s) Oral daily  piperacillin/tazobactam IVPB.. 3.375 Gram(s) IV Intermittent every 8 hours  propranolol 20 milliGRAM(s) Oral two times a day  vancomycin  IVPB 1000 milliGRAM(s) IV Intermittent every 12 hours      Allergies    No Known Allergies    Intolerances        SOCIAL HISTORY:  Denies ETOh,Smoking,     FAMILY HISTORY:  No pertinent family history in first degree relatives        REVIEW OF SYSTEMS:    CONSTITUTIONAL: No weakness, fevers or chills  EYES/ENT: No visual changes;  no throat pain   NECK: No pain or stiffness  RESPIRATORY: No cough, wheezing, hemoptysis; No shortness of breath  CARDIOVASCULAR: No chest pain or palpitations  GASTROINTESTINAL: No abdominal or epigastric pain. No nausea, vomiting,     No diarrhea or constipation. No melena   GENITOURINARY: No dysuria, frequency or hematuria  NEUROLOGICAL: No numbness or weakness  SKIN: dry      VITAL:  T(C): , Max: 36.8 (23 @ 04:43)  T(F): , Max: 98.2 (23 @ 04:43)  HR: 73 (23 @ 04:43)  BP: 103/66 (23 @ 04:43)  BP(mean): --  RR: 18 (23 @ 04:43)  SpO2: 97% (23 @ 04:43)  Wt(kg): --    I and O's:    Height (cm): 177.8 ( @ 18:24)  Weight (kg): 72.6 ( @ 18:24)  BMI (kg/m2): 23 ( @ 18:24)  BSA (m2): 1.9 ( @ 18:24)    PHYSICAL EXAM:    Constitutional: NAD  HEENT: conjunctive   clear   Neck:  No JVD  Respiratory: CTAB  Cardiovascular: S1 and S2  Gastrointestinal: BS+, soft, NT/ND  Extremities: No peripheral edema  Neurological: A/O x 3, no focal deficits  Psychiatric: Normal mood, normal affect  : No Adams  Skin: No rashes  Access: Not applicable    LABS:                        11.4   11.33 )-----------( 252      ( 2023 07:30 )             34.8         132<L>  |  96  |  13  ----------------------------<  247<H>  3.9   |  31  |  0.78    Ca    9.0      2023 07:30    TPro  7.2  /  Alb  2.9<L>  /  TBili  0.6  /  DBili  x   /  AST  23  /  ALT  27  /  AlkPhos  518<H>        Urine Studies:  Urinalysis Basic - ( 2023 21:20 )    Color: Yellow / Appearance: Clear / S.005 / pH: x  Gluc: x / Ketone: Negative  / Bili: Negative / Urobili: Negative   Blood: x / Protein: Negative / Nitrite: Negative   Leuk Esterase: Negative / RBC: 3-5 /HPF / WBC 0-2   Sq Epi: x / Non Sq Epi: x / Bacteria: Occasional            RADIOLOGY & ADDITIONAL STUDIES:            MEDICATIONS  (STANDING):  aspirin enteric coated 81 milliGRAM(s) Oral daily  atorvastatin 10 milliGRAM(s) Oral at bedtime  dextrose 5%. 1000 milliLiter(s) (50 mL/Hr) IV Continuous <Continuous>  dextrose 5%. 1000 milliLiter(s) (100 mL/Hr) IV Continuous <Continuous>  dextrose 50% Injectable 25 Gram(s) IV Push once  dextrose 50% Injectable 12.5 Gram(s) IV Push once  dextrose 50% Injectable 25 Gram(s) IV Push once  furosemide    Tablet 40 milliGRAM(s) Oral daily  glucagon  Injectable 1 milliGRAM(s) IntraMuscular once  insulin glargine Injectable (LANTUS) 7 Unit(s) SubCutaneous at bedtime  insulin lispro (ADMELOG) corrective regimen sliding scale   SubCutaneous three times a day before meals  insulin lispro Injectable (ADMELOG) 2 Unit(s) SubCutaneous three times a day before meals  levETIRAcetam 750 milliGRAM(s) Oral two times a day  pantoprazole   Suspension 40 milliGRAM(s) Oral daily  piperacillin/tazobactam IVPB.. 3.375 Gram(s) IV Intermittent every 8 hours  propranolol 20 milliGRAM(s) Oral two times a day  vancomycin  IVPB 1000 milliGRAM(s) IV Intermittent every 12 hours         Patient is a 67y Male whom presented to the hospital with hyponatremia ,     PAST MEDICAL & SURGICAL HISTORY:  CVA (cerebral vascular accident)      HTN (hypertension)      DM (diabetes mellitus)      Arrhythmia      History of atrial fibrillation      S/P CABG (coronary artery bypass graft)      S/P brain surgery          MEDICATIONS  (STANDING):  aspirin enteric coated 81 milliGRAM(s) Oral daily  atorvastatin 10 milliGRAM(s) Oral at bedtime  dextrose 5%. 1000 milliLiter(s) (50 mL/Hr) IV Continuous <Continuous>  dextrose 5%. 1000 milliLiter(s) (100 mL/Hr) IV Continuous <Continuous>  dextrose 50% Injectable 25 Gram(s) IV Push once  dextrose 50% Injectable 12.5 Gram(s) IV Push once  dextrose 50% Injectable 25 Gram(s) IV Push once  furosemide    Tablet 40 milliGRAM(s) Oral daily  glucagon  Injectable 1 milliGRAM(s) IntraMuscular once  insulin glargine Injectable (LANTUS) 7 Unit(s) SubCutaneous at bedtime  insulin lispro (ADMELOG) corrective regimen sliding scale   SubCutaneous three times a day before meals  insulin lispro Injectable (ADMELOG) 2 Unit(s) SubCutaneous three times a day before meals  levETIRAcetam 750 milliGRAM(s) Oral two times a day  pantoprazole   Suspension 40 milliGRAM(s) Oral daily  piperacillin/tazobactam IVPB.. 3.375 Gram(s) IV Intermittent every 8 hours  propranolol 20 milliGRAM(s) Oral two times a day  vancomycin  IVPB 1000 milliGRAM(s) IV Intermittent every 12 hours      Allergies    No Known Allergies    Intolerances        SOCIAL HISTORY:  Denies ETOh,Smoking,     FAMILY HISTORY:  No pertinent family history in first degree relatives        REVIEW OF SYSTEMS:    CONSTITUTIONAL: No weakness, fevers or chills  RESPIRATORY: No cough, wheezing, hemoptysis; No shortness of breath  CARDIOVASCULAR: No chest pain or palpitations  GASTROINTESTINAL: No abdominal or epigastric pain. No nausea, vomiting,     No diarrhea or constipation. No melena   GENITOURINARY: No dysuria, frequency or hematuria  NEUROLOGICAL: No numbness or weakness  SKIN: dry      VITAL:  T(C): , Max: 36.8 (23 @ 04:43)  T(F): , Max: 98.2 (23 @ 04:43)  HR: 73 (23 @ 04:43)  BP: 103/66 (23 @ 04:43)  BP(mean): --  RR: 18 (23 @ 04:43)  SpO2: 97% (23 @ 04:43)  Wt(kg): --    I and O's:    Height (cm): 177.8 ( @ 18:24)  Weight (kg): 72.6 ( @ 18:24)  BMI (kg/m2): 23 ( @ 18:24)  BSA (m2): 1.9 ( @ 18:24)    PHYSICAL EXAM:    Constitutional: NAD  HEENT: conjunctive   clear   Neck:  No JVD  Respiratory: CTAB  Cardiovascular: S1 and S2  Gastrointestinal: BS+, soft, NT/ND  Extremities: No peripheral edema  Neurological: no focal deficits  Psychiatric: Normal mood, normal affect  : No Adams  Skin: dry   Access: Not applicable    LABS:                        11.4   11.33 )-----------( 252      ( 2023 07:30 )             34.8         132<L>  |  96  |  13  ----------------------------<  247<H>  3.9   |  31  |  0.78    Ca    9.0      2023 07:30    TPro  7.2  /  Alb  2.9<L>  /  TBili  0.6  /  DBili  x   /  AST  23  /  ALT  27  /  AlkPhos  518<H>        Urine Studies:  Urinalysis Basic - ( 2023 21:20 )    Color: Yellow / Appearance: Clear / S.005 / pH: x  Gluc: x / Ketone: Negative  / Bili: Negative / Urobili: Negative   Blood: x / Protein: Negative / Nitrite: Negative   Leuk Esterase: Negative / RBC: 3-5 /HPF / WBC 0-2   Sq Epi: x / Non Sq Epi: x / Bacteria: Occasional            RADIOLOGY & ADDITIONAL STUDIES:            MEDICATIONS  (STANDING):  aspirin enteric coated 81 milliGRAM(s) Oral daily  atorvastatin 10 milliGRAM(s) Oral at bedtime  dextrose 5%. 1000 milliLiter(s) (50 mL/Hr) IV Continuous <Continuous>  dextrose 5%. 1000 milliLiter(s) (100 mL/Hr) IV Continuous <Continuous>  dextrose 50% Injectable 25 Gram(s) IV Push once  dextrose 50% Injectable 12.5 Gram(s) IV Push once  dextrose 50% Injectable 25 Gram(s) IV Push once  furosemide    Tablet 40 milliGRAM(s) Oral daily  glucagon  Injectable 1 milliGRAM(s) IntraMuscular once  insulin glargine Injectable (LANTUS) 7 Unit(s) SubCutaneous at bedtime  insulin lispro (ADMELOG) corrective regimen sliding scale   SubCutaneous three times a day before meals  insulin lispro Injectable (ADMELOG) 2 Unit(s) SubCutaneous three times a day before meals  levETIRAcetam 750 milliGRAM(s) Oral two times a day  pantoprazole   Suspension 40 milliGRAM(s) Oral daily  piperacillin/tazobactam IVPB.. 3.375 Gram(s) IV Intermittent every 8 hours  propranolol 20 milliGRAM(s) Oral two times a day  vancomycin  IVPB 1000 milliGRAM(s) IV Intermittent every 12 hours

## 2023-04-18 NOTE — ED PROVIDER NOTE - DIFFERENTIAL DIAGNOSIS
Differential Diagnosis Patient presenting to the emergency room with concern for wound infections to the foot.  Will obtain screening septic work-up check ESR CRP begin broad-spectrum antibiotics obtain x-ray imaging and monitor.  Patient will require admission.

## 2023-04-18 NOTE — ED PROVIDER NOTE - PHYSICAL EXAMINATION
Ulceration Left 4th toe  ,- hyperkeratotic border, wound base Necrotic base with fibrogranular nail bed wound, no nail(avulsion was done at podiatry office) / , wound size (4.0 cm X 4.0 cm X 0.2cm) no edema, no lydia-wound erythema, - purulence, - fluctuance, - tracking/tunneling, - probe to bone.   Vascular: Dorsalis Pedis and Posterior Tibial pulses 0/4.  Capillary re-fill time less then 5 seconds digits 1-5 bilateral.

## 2023-04-18 NOTE — ED ADULT NURSE NOTE - NSIMPLEMENTINTERV_GEN_ALL_ED
Implemented All Fall with Harm Risk Interventions:  [place] to call system. Call bell, personal items and telephone within reach. Instruct patient to call for assistance. Room bathroom lighting operational. Non-slip footwear when patient is off stretcher. Physically safe environment: no spills, clutter or unnecessary equipment. Stretcher in lowest position, wheels locked, appropriate side rails in place. Provide visual cue, wrist band, yellow gown, etc. Monitor gait and stability. Monitor for mental status changes and reorient to person, place, and time. Review medications for side effects contributing to fall risk. Reinforce activity limits and safety measures with patient and family. Provide visual clues: red socks.

## 2023-04-18 NOTE — CONSULT NOTE ADULT - ASSESSMENT
HPI:   67 year old male with PMHx CVA (right sided hemiparesis and aphasia), AFib (on coumadin), IDDM, HTN, CAD s/p CABG, s/p AVR, presenting to [place] ED with wound to left 4th metatarsal.  Patient unable to provide history due to aphasia, history obtained from daughter at beside and per chart review.  Patient developed hematoma to left 4th metatarsal on Friday, it then ruptured and patient presented to Podiatry office (Dr. Maegan Jin) earlier today who instructed patient to come to ED for evaluation.  Patient has now new complaints at this time.  Patient does not follow with a vascular surgeon.   (18 Apr 2023 21:55)      will check ua , urine osmolality , urine sodium , urine uric acid , serum sodium , serum osmolality , serum uric acid , f/u with hyponatremia work up , f/u with bmp , monitor i and o        67 year old male with PMHx CVA (right sided hemiparesis and aphasia), AFib (on coumadin), IDDM, HTN, CAD s/p CABG, s/p AVR, presenting to [place] ED with wound to left 4th metatarsal.  Patient unable to provide history due to aphasia, history obtained from daughter at beside and per chart review.  Patient developed hematoma to left 4th metatarsal on Friday, it then ruptured and patient presented to Podiatry office (Dr. Maegan Jin) earlier today who instructed patient to come to ED for evaluation.  Patient has now new complaints at this time.  Patient does not follow with a vascular surgeon.   (18 Apr 2023 21:55)    hyponatremia   will check ua , urine osmolality , urine sodium , urine uric acid , serum sodium , serum osmolality , serum uric acid , f/u with hyponatremia work up , f/u with bmp , monitor i and o    BP monitoring,continue current antihypertensive meds, low salt diet,followup with PMD in 1-2 weeks

## 2023-04-18 NOTE — H&P ADULT - HISTORY OF PRESENT ILLNESS
67 year old male with PMHx CVA (right sided hemiparesis and aphasia), AFib (on coumadin), IDDM, HTN, CAD s/p CABG, s/p AVR, presenting to [place] ED with wound to left 4th metatarsal.  Patient unable to provide history due to aphasia, history obtained from daughter at beside and per chart review.  Patient developed hematoma to left 4th metatarsal on Friday, it then ruptured and patient presented to Podiatry office (Dr. Maegan Jin) earlier today who instructed patient to come to ED for evaluation.  Patient has now new complaints at this time.  Patient does not follow with a vascular surgeon.    67 year old male with PMHx CVA (right sided hemiparesis and aphasia), AFib (on coumadin), T2DM, HTN, CAD s/p CABG, s/p AVR, presenting to [place] ED with wound to left 4th metatarsal.  Patient unable to provide history due to aphasia, history obtained from daughter at beside and per chart review.  Patient developed hematoma to left 4th metatarsal on Friday, it then ruptured and patient presented to Podiatry office (Dr. Maegan Jin) earlier today who instructed patient to come to ED for evaluation.  Patient has now new complaints at this time.  Patient does not follow with a vascular surgeon.

## 2023-04-18 NOTE — ED ADULT TRIAGE NOTE - CHIEF COMPLAINT QUOTE
Left 4th toe gangrene and left foot cellulitis HX PAD, CVA with right sided weakness, Aortic valve replacement and DM

## 2023-04-18 NOTE — H&P ADULT - NSHPPHYSICALEXAM_GEN_ALL_CORE
General: frail  PERRLA  Respiratory: CTA B/L  CV: RRR, S1S2, no murmurs, rubs or gallops  Abdominal: Soft, NT, ND +BS, Last BM  Extremities: dressing +  Skin Normal

## 2023-04-18 NOTE — PATIENT PROFILE ADULT - FUNCTIONAL ASSESSMENT - BASIC MOBILITY 6.
1-calculated by average/Not able to assess (calculate score using [facility] averaging method)

## 2023-04-18 NOTE — H&P ADULT - NSHPLABSRESULTS_GEN_ALL_CORE
Lab Results:  CBC  CBC Full  -  ( 2023 20:06 )  WBC Count : 10.91 K/uL  RBC Count : 4.69 M/uL  Hemoglobin : 12.6 g/dL  Hematocrit : 38.1 %  Platelet Count - Automated : 267 K/uL  Mean Cell Volume : 81.2 fl  Mean Cell Hemoglobin : 26.9 pg  Mean Cell Hemoglobin Concentration : 33.1 gm/dL  Auto Neutrophil # : 8.64 K/uL  Auto Lymphocyte # : 1.06 K/uL  Auto Monocyte # : 1.00 K/uL  Auto Eosinophil # : 0.15 K/uL  Auto Basophil # : 0.02 K/uL  Auto Neutrophil % : 79.1 %  Auto Lymphocyte % : 9.7 %  Auto Monocyte % : 9.2 %  Auto Eosinophil % : 1.4 %  Auto Basophil % : 0.2 %    .		Differential:	[] Automated		[] Manual  Chemistry                        12.6   10 )-----------( 267      ( 2023 20:06 )             38.1     04-18    128<L>  |  94<L>  |  15  ----------------------------<  303<H>  5.0   |  30  |  0.97    Ca    9.4      2023 20:06    TPro  8.2  /  Alb  2.9<L>  /  TBili  0.7  /  DBili  x   /  AST  42<H>  /  ALT  35  /  AlkPhos  667<H>  04-18    LIVER FUNCTIONS - ( 2023 20:06 )  Alb: 2.9 g/dL / Pro: 8.2 g/dL / ALK PHOS: 667 U/L / ALT: 35 U/L / AST: 42 U/L / GGT: x           PT/INR - ( 2023 20:06 )   PT: 85.5 sec;   INR: 7.16 ratio         PTT - ( 2023 20:06 )  PTT:71.2 sec  Urinalysis Basic - ( 2023 21:20 )    Color: Yellow / Appearance: Clear / S.005 / pH: x  Gluc: x / Ketone: Negative  / Bili: Negative / Urobili: Negative   Blood: x / Protein: Negative / Nitrite: Negative   Leuk Esterase: Negative / RBC: 3-5 /HPF / WBC 0-2   Sq Epi: x / Non Sq Epi: x / Bacteria: Occasional            MICROBIOLOGY/CULTURES:      RADIOLOGY RESULTS: reviewed

## 2023-04-18 NOTE — PATIENT PROFILE ADULT - NSPROREFERSVCHOMEDIABETES_GEN_A_NUR
Pt. 13wks pregnant, c/o non-radiating lower back pain starting this AM. Denies any injury. Denies abdominal pain, vaginal bleeding, dysuria, n/v/d or fevers. US done to confirm IUP. Due 6/20/18 no

## 2023-04-18 NOTE — ED PROVIDER NOTE - CLINICAL SUMMARY MEDICAL DECISION MAKING FREE TEXT BOX
Patient is a 67-year-old male who presents to the emergency room with a chief complaint of possible infected digits.  Past medical history of an arrhythmia, history of CVA with, diabetes, history of A-fib, hypertension.  Patient is unable to provide accurate history secondary to cognitive impairment.  Daughter reports that she first noted ulcerations on his left foot on Friday.  They followed up with podiatry today and were sent to the emergency room for concern for infection and need for admission for IV antibiotics. Patient presenting to the emergency room with concern for wound infections to the foot.  Will obtain screening septic work-up check ESR CRP begin broad-spectrum antibiotics obtain x-ray imaging and monitor.  Patient will require admission.  Results of labs reviewed patient with an elevated sed rate of 79 a white count of 10.9 and INR of 7.16 low no active bleeding we will continue to monitor initial serum sodium of 128 patient receiving gentle hydration normal creatinine alk phos is elevated raising suspicion for possible osteomyelitis UA does not appear to be infected independent review of chest x-ray reveals a persistent effusion in the left midlung field similar to previous independent review of foot x-ray reveals no acute osteomyelitis independent review of EKG reveals A-fib at 72 bpm.  Patient started on broad-spectrum antibiotics.  Vascular consulted.  Podiatry will see the patient in the morning will admit for further work-up and management.

## 2023-04-18 NOTE — PATIENT PROFILE ADULT - FALL HARM RISK - HARM RISK INTERVENTIONS

## 2023-04-18 NOTE — ED PROVIDER NOTE - OBJECTIVE STATEMENT
Patient is a 67-year-old male who presents to the emergency room with a chief complaint of possible infected digits.  Past medical history of an arrhythmia, history of CVA with, diabetes, history of A-fib on coumadin, hypertension.  Patient is unable to provide accurate history secondary to cognitive impairment.  Daughter reports that she first noted ulcerations on his left foot on Friday.  They followed up with podiatry today and were sent to the emergency room for concern for infection and need for admission for IV antibiotics.

## 2023-04-19 LAB
A1C WITH ESTIMATED AVERAGE GLUCOSE RESULT: 13 % — HIGH (ref 4–5.6)
ALBUMIN SERPL ELPH-MCNC: 2.9 G/DL — LOW (ref 3.3–5)
ALP SERPL-CCNC: 518 U/L — HIGH (ref 40–120)
ALT FLD-CCNC: 27 U/L — SIGNIFICANT CHANGE UP (ref 12–78)
ANION GAP SERPL CALC-SCNC: 5 MMOL/L — SIGNIFICANT CHANGE UP (ref 5–17)
AST SERPL-CCNC: 23 U/L — SIGNIFICANT CHANGE UP (ref 15–37)
BILIRUB SERPL-MCNC: 0.6 MG/DL — SIGNIFICANT CHANGE UP (ref 0.2–1.2)
BUN SERPL-MCNC: 13 MG/DL — SIGNIFICANT CHANGE UP (ref 7–23)
CALCIUM SERPL-MCNC: 9 MG/DL — SIGNIFICANT CHANGE UP (ref 8.5–10.1)
CHLORIDE SERPL-SCNC: 96 MMOL/L — SIGNIFICANT CHANGE UP (ref 96–108)
CO2 SERPL-SCNC: 31 MMOL/L — SIGNIFICANT CHANGE UP (ref 22–31)
CREAT SERPL-MCNC: 0.78 MG/DL — SIGNIFICANT CHANGE UP (ref 0.5–1.3)
CRP SERPL-MCNC: 89 MG/L — HIGH
CULTURE RESULTS: SIGNIFICANT CHANGE UP
EGFR: 99 ML/MIN/1.73M2 — SIGNIFICANT CHANGE UP
ESTIMATED AVERAGE GLUCOSE: 326 MG/DL — HIGH (ref 68–114)
GLUCOSE SERPL-MCNC: 247 MG/DL — HIGH (ref 70–99)
HCT VFR BLD CALC: 34.8 % — LOW (ref 39–50)
HGB BLD-MCNC: 11.4 G/DL — LOW (ref 13–17)
INR BLD: 7.11 RATIO — CRITICAL HIGH (ref 0.88–1.16)
MCHC RBC-ENTMCNC: 26.6 PG — LOW (ref 27–34)
MCHC RBC-ENTMCNC: 32.8 GM/DL — SIGNIFICANT CHANGE UP (ref 32–36)
MCV RBC AUTO: 81.1 FL — SIGNIFICANT CHANGE UP (ref 80–100)
NRBC # BLD: 0 /100 WBCS — SIGNIFICANT CHANGE UP (ref 0–0)
PLATELET # BLD AUTO: 252 K/UL — SIGNIFICANT CHANGE UP (ref 150–400)
POTASSIUM SERPL-MCNC: 3.9 MMOL/L — SIGNIFICANT CHANGE UP (ref 3.5–5.3)
POTASSIUM SERPL-SCNC: 3.9 MMOL/L — SIGNIFICANT CHANGE UP (ref 3.5–5.3)
PROT SERPL-MCNC: 7.2 G/DL — SIGNIFICANT CHANGE UP (ref 6–8.3)
PROTHROM AB SERPL-ACNC: 84.9 SEC — HIGH (ref 10.5–13.4)
RBC # BLD: 4.29 M/UL — SIGNIFICANT CHANGE UP (ref 4.2–5.8)
RBC # FLD: 18.6 % — HIGH (ref 10.3–14.5)
SODIUM SERPL-SCNC: 132 MMOL/L — LOW (ref 135–145)
SPECIMEN SOURCE: SIGNIFICANT CHANGE UP
WBC # BLD: 11.33 K/UL — HIGH (ref 3.8–10.5)
WBC # FLD AUTO: 11.33 K/UL — HIGH (ref 3.8–10.5)

## 2023-04-19 PROCEDURE — 93923 UPR/LXTR ART STDY 3+ LVLS: CPT | Mod: 26

## 2023-04-19 RX ORDER — VANCOMYCIN HCL 1 G
1000 VIAL (EA) INTRAVENOUS EVERY 12 HOURS
Refills: 0 | Status: DISCONTINUED | OUTPATIENT
Start: 2023-04-19 | End: 2023-04-19

## 2023-04-19 RX ORDER — METOPROLOL TARTRATE 50 MG
25 TABLET ORAL DAILY
Refills: 0 | Status: DISCONTINUED | OUTPATIENT
Start: 2023-04-19 | End: 2023-04-26

## 2023-04-19 RX ORDER — INSULIN GLARGINE 100 [IU]/ML
7 INJECTION, SOLUTION SUBCUTANEOUS AT BEDTIME
Refills: 0 | Status: DISCONTINUED | OUTPATIENT
Start: 2023-04-19 | End: 2023-04-19

## 2023-04-19 RX ORDER — INSULIN LISPRO 100/ML
10 VIAL (ML) SUBCUTANEOUS
Refills: 0 | Status: DISCONTINUED | OUTPATIENT
Start: 2023-04-19 | End: 2023-04-26

## 2023-04-19 RX ORDER — SPIRONOLACTONE 25 MG/1
25 TABLET, FILM COATED ORAL DAILY
Refills: 0 | Status: DISCONTINUED | OUTPATIENT
Start: 2023-04-19 | End: 2023-04-26

## 2023-04-19 RX ORDER — SPIRONOLACTONE 25 MG/1
1 TABLET, FILM COATED ORAL
Qty: 0 | Refills: 0 | DISCHARGE

## 2023-04-19 RX ORDER — PROPRANOLOL HCL 160 MG
1 CAPSULE, EXTENDED RELEASE 24HR ORAL
Qty: 0 | Refills: 0 | DISCHARGE

## 2023-04-19 RX ORDER — INSULIN ASPART 100 [IU]/ML
0 INJECTION, SOLUTION SUBCUTANEOUS
Qty: 0 | Refills: 0 | DISCHARGE

## 2023-04-19 RX ORDER — PIPERACILLIN AND TAZOBACTAM 4; .5 G/20ML; G/20ML
3.38 INJECTION, POWDER, LYOPHILIZED, FOR SOLUTION INTRAVENOUS EVERY 8 HOURS
Refills: 0 | Status: DISCONTINUED | OUTPATIENT
Start: 2023-04-19 | End: 2023-04-19

## 2023-04-19 RX ORDER — INSULIN GLARGINE 100 [IU]/ML
30 INJECTION, SOLUTION SUBCUTANEOUS AT BEDTIME
Refills: 0 | Status: DISCONTINUED | OUTPATIENT
Start: 2023-04-19 | End: 2023-04-23

## 2023-04-19 RX ORDER — RANITIDINE HYDROCHLORIDE 150 MG/1
1 TABLET, FILM COATED ORAL
Qty: 0 | Refills: 0 | DISCHARGE

## 2023-04-19 RX ORDER — POVIDONE-IODINE 5 %
1 AEROSOL (ML) TOPICAL DAILY
Refills: 0 | Status: DISCONTINUED | OUTPATIENT
Start: 2023-04-19 | End: 2023-04-26

## 2023-04-19 RX ORDER — INSULIN LISPRO 100/ML
2 VIAL (ML) SUBCUTANEOUS
Refills: 0 | Status: DISCONTINUED | OUTPATIENT
Start: 2023-04-19 | End: 2023-04-19

## 2023-04-19 RX ORDER — WARFARIN SODIUM 2.5 MG/1
1.5 TABLET ORAL
Qty: 0 | Refills: 0 | DISCHARGE

## 2023-04-19 RX ORDER — INSULIN GLARGINE 100 [IU]/ML
0 INJECTION, SOLUTION SUBCUTANEOUS
Qty: 0 | Refills: 0 | DISCHARGE

## 2023-04-19 RX ADMIN — INSULIN GLARGINE 30 UNIT(S): 100 INJECTION, SOLUTION SUBCUTANEOUS at 21:53

## 2023-04-19 RX ADMIN — Medication 250 MILLIGRAM(S): at 09:16

## 2023-04-19 RX ADMIN — Medication 81 MILLIGRAM(S): at 10:59

## 2023-04-19 RX ADMIN — Medication 2 UNIT(S): at 12:01

## 2023-04-19 RX ADMIN — LEVETIRACETAM 750 MILLIGRAM(S): 250 TABLET, FILM COATED ORAL at 05:42

## 2023-04-19 RX ADMIN — Medication 3: at 17:57

## 2023-04-19 RX ADMIN — Medication 3: at 07:54

## 2023-04-19 RX ADMIN — Medication 2 UNIT(S): at 07:54

## 2023-04-19 RX ADMIN — Medication 2 UNIT(S): at 17:57

## 2023-04-19 RX ADMIN — ATORVASTATIN CALCIUM 10 MILLIGRAM(S): 80 TABLET, FILM COATED ORAL at 21:53

## 2023-04-19 RX ADMIN — PIPERACILLIN AND TAZOBACTAM 25 GRAM(S): 4; .5 INJECTION, POWDER, LYOPHILIZED, FOR SOLUTION INTRAVENOUS at 11:00

## 2023-04-19 RX ADMIN — LEVETIRACETAM 750 MILLIGRAM(S): 250 TABLET, FILM COATED ORAL at 17:56

## 2023-04-19 RX ADMIN — PANTOPRAZOLE SODIUM 40 MILLIGRAM(S): 20 TABLET, DELAYED RELEASE ORAL at 11:00

## 2023-04-19 RX ADMIN — PIPERACILLIN AND TAZOBACTAM 25 GRAM(S): 4; .5 INJECTION, POWDER, LYOPHILIZED, FOR SOLUTION INTRAVENOUS at 06:45

## 2023-04-19 RX ADMIN — Medication 4: at 12:00

## 2023-04-19 NOTE — CARE COORDINATION ASSESSMENT. - NSCAREPROVIDERS_GEN_ALL_CORE_FT
CARE PROVIDERS:  Accepting Physician: Tamika Harmon  Administration: Dileep Espinal  Admitting: Tamika Harmon  Attending: Tamika Harmon  Case Management: Erica Snow  Case Management: Estella Jorge  Consultant: Keri Woodruff  Consultant: Maegan Jin  Consultant: Guru Suarez  Consultant: Anirudh Treadwell  Consultant: Frieda Ace  Consultant: Ike Ornelas  Covering Team: Heidy Lopez  ED Attending: Onelia Ruano  ED Nurse: Keegan Olivera  Nurse: Duran Potter  Nurse: Cristy Moss  Nurse: Joe Medina  Oncology: Francisco J,   Ordered: ADM, User  Ordered: Doctor, Unknown  Outpatient Provider: Pahlavan, Mohsen  Outpatient Provider: Ulises Pham  Outpatient Provider: Anirudh Treadwell  Override: Yonis Bearden  Override: Cristy Moss  PCA/Nursing Assistant: Chelo George  Primary Team: Yeimi Beatty  Primary Team: Khai Myers  Primary Team: Tamika Harmon  Registered Dietitian: Courtney Sarabia  Registered Dietitian: Cynthia Guzman  Respiratory Therapy: Kiko Flor

## 2023-04-19 NOTE — PROGRESS NOTE ADULT - ASSESSMENT
67 year old male with PMHx CVA (right sided hemiparesis and aphasia), AFib (on coumadin), IDDM, HTN, CAD s/p CABG, s/p AVR, presenting to [place] ED with wound to left 4th metatarsal.  Patient unable to provide history due to aphasia, history obtained from daughter at beside and per chart review.  Patient developed hematoma to left 4th metatarsal on Friday, it then ruptured and patient presented to Podiatry office (Dr. Maegan Jin) earlier today who instructed patient to come to ED for evaluation.  Patient has now new complaints at this time.  Patient does not follow with a vascular surgeon.   (18 Apr 2023 21:55)      will check ua , urine osmolality , urine sodium , urine uric acid , serum sodium , serum osmolality , serum uric acid , f/u with hyponatremia work up , f/u with bmp , monitor i and o      67 year old male with PMHx CVA (right sided hemiparesis and aphasia), AFib (on coumadin), IDDM, HTN, CAD s/p CABG, s/p AVR, presenting to [place] ED with wound to left 4th metatarsal.  Patient unable to provide history due to aphasia, history obtained from daughter at beside and per chart review.  Patient developed hematoma to left 4th metatarsal on Friday, it then ruptured and patient presented to Podiatry office (Dr. Maegan Jin) earlier today who instructed patient to come to ED for evaluation.  Patient has now new complaints at this time.  Patient does not follow with a vascular surgeon.   (18 Apr 2023 21:55)      will check ua , urine osmolality , urine sodium , urine uric acid , serum sodium , serum osmolality , serum uric acid , f/u with hyponatremia work up , f/u with bmp , monitor i and o      BP monitoring,continue current antihypertensive meds, low salt diet,followup with PMD in 1-2 weeks      Admit to telemetry unit for monitoring,send 3 sets of cardiac ensymes to rule out coronary event,obtain ECHO to evaluate LVEF,cardiology consult,continue current managmnet,O2 supply,anticoagulation plan as per cardiology consult

## 2023-04-19 NOTE — CONSULT NOTE ADULT - ASSESSMENT
67 year old male with PMHx CVA (right sided hemiparesis and aphasia), AFib (on coumadin), IDDM, HTN, CAD s/p CABG, s/p AVR, presenting to [place] ED with wound to left 4th metatarsal 67 year old male with PMHx CVA (right sided hemiparesis and aphasia), AFib (on coumadin), IDDM, HTN, CAD s/p CABG, s/p AVR, presenting to [place] ED with wound to left 4th metatarsal    cva hx of craniectomy  AF  OP  OA  foot wound  CAD  DM  HTN  left eff - atelectasis  coagulopathy     cxr noted - consistent with prior CT imaging and CXR - rounded Atelectasis - chr change  no evidence of PNA  wound infection - emp ABX - ID eval - Podiatric eval  pain assessment  DM care  cvs rx regimen  serial COAGS -   assist with needs  fall prec  dietary discretion  old records reviewed - labs - imaging - notes  spoke with family on admission at bedside

## 2023-04-19 NOTE — CARE COORDINATION ASSESSMENT. - OTHER PERTINENT DISCHARGE PLANNING INFORMATION:
Met with patient and daughter Martell at bedside. Role of CM/transition planning explained. Daughter verbalizes understanding. Transition planning information provided.  Patient lives in private house w/ wife, son & 2 daughters, 0 JAMAL, 0 inside. Pt is primarily W/C bound &requires assist w/ all ADL's. Pt has shower chair, W/C and does not ambulate. Supportive family provides all assist/care including insulin admin. family is present 100% of time. No HCS present PTA, pt had HCS in past but did not like and does not want HCS. Patient's daughter Jeffrey has applied to be CDPAP but not yet approved. CM contact information provided. CM will continue to follow throughout hospital stay.

## 2023-04-19 NOTE — PROGRESS NOTE ADULT - SUBJECTIVE AND OBJECTIVE BOX
Patient is a 67y Male whom presented to the hospital with     PAST MEDICAL & SURGICAL HISTORY:  CVA (cerebral vascular accident)      HTN (hypertension)      DM (diabetes mellitus)      Arrhythmia      History of atrial fibrillation      S/P CABG (coronary artery bypass graft)      S/P brain surgery          MEDICATIONS  (STANDING):  aspirin enteric coated 81 milliGRAM(s) Oral daily  atorvastatin 10 milliGRAM(s) Oral at bedtime  dextrose 5%. 1000 milliLiter(s) (50 mL/Hr) IV Continuous <Continuous>  dextrose 5%. 1000 milliLiter(s) (100 mL/Hr) IV Continuous <Continuous>  dextrose 50% Injectable 25 Gram(s) IV Push once  dextrose 50% Injectable 12.5 Gram(s) IV Push once  dextrose 50% Injectable 25 Gram(s) IV Push once  furosemide    Tablet 40 milliGRAM(s) Oral daily  glucagon  Injectable 1 milliGRAM(s) IntraMuscular once  insulin glargine Injectable (LANTUS) 7 Unit(s) SubCutaneous at bedtime  insulin lispro (ADMELOG) corrective regimen sliding scale   SubCutaneous three times a day before meals  insulin lispro Injectable (ADMELOG) 2 Unit(s) SubCutaneous three times a day before meals  levETIRAcetam 750 milliGRAM(s) Oral two times a day  pantoprazole   Suspension 40 milliGRAM(s) Oral daily  piperacillin/tazobactam IVPB.. 3.375 Gram(s) IV Intermittent every 8 hours  propranolol 20 milliGRAM(s) Oral two times a day  vancomycin  IVPB 1000 milliGRAM(s) IV Intermittent every 12 hours      Allergies    No Known Allergies    Intolerances        SOCIAL HISTORY:  Denies ETOh,Smoking,     FAMILY HISTORY:  No pertinent family history in first degree relatives        REVIEW OF SYSTEMS:    CONSTITUTIONAL: No weakness, fevers or chills  EYES/ENT: No visual changes;  no throat pain   NECK: No pain or stiffness  RESPIRATORY: No cough, wheezing, hemoptysis; No shortness of breath  CARDIOVASCULAR: No chest pain or palpitations  GASTROINTESTINAL: No abdominal or epigastric pain. No nausea, vomiting,     No diarrhea or constipation. No melena   GENITOURINARY: No dysuria, frequency or hematuria  NEUROLOGICAL: No numbness or weakness  SKIN: dry      VITAL:  T(C): , Max: 36.8 (23 @ 04:43)  T(F): , Max: 98.2 (23 @ 04:43)  HR: 73 (23 @ 04:43)  BP: 103/66 (23 @ 04:43)  BP(mean): --  RR: 18 (23 @ 04:43)  SpO2: 97% (23 @ 04:43)  Wt(kg): --    I and O's:    Height (cm): 177.8 ( @ 18:24)  Weight (kg): 72.6 ( @ 18:24)  BMI (kg/m2): 23 ( @ 18:24)  BSA (m2): 1.9 ( @ 18:24)    PHYSICAL EXAM:    Constitutional: NAD  HEENT: conjunctive   clear   Neck:  No JVD  Respiratory: CTAB  Cardiovascular: S1 and S2  Gastrointestinal: BS+, soft, NT/ND  Extremities: No peripheral edema  Neurological: A/O x 3, no focal deficits  Psychiatric: Normal mood, normal affect  : No Adams  Skin: No rashes  Access: Not applicable    LABS:                        11.4   11.33 )-----------( 252      ( 2023 07:30 )             34.8         132<L>  |  96  |  13  ----------------------------<  247<H>  3.9   |  31  |  0.78    Ca    9.0      2023 07:30    TPro  7.2  /  Alb  2.9<L>  /  TBili  0.6  /  DBili  x   /  AST  23  /  ALT  27  /  AlkPhos  518<H>        Urine Studies:  Urinalysis Basic - ( 2023 21:20 )    Color: Yellow / Appearance: Clear / S.005 / pH: x  Gluc: x / Ketone: Negative  / Bili: Negative / Urobili: Negative   Blood: x / Protein: Negative / Nitrite: Negative   Leuk Esterase: Negative / RBC: 3-5 /HPF / WBC 0-2   Sq Epi: x / Non Sq Epi: x / Bacteria: Occasional            RADIOLOGY & ADDITIONAL STUDIES:                   Patient is a 67y Male whom presented to the hospital with hyponatremia     PAST MEDICAL & SURGICAL HISTORY:  CVA (cerebral vascular accident)      HTN (hypertension)      DM (diabetes mellitus)      Arrhythmia      History of atrial fibrillation      S/P CABG (coronary artery bypass graft)      S/P brain surgery          MEDICATIONS  (STANDING):  aspirin enteric coated 81 milliGRAM(s) Oral daily  atorvastatin 10 milliGRAM(s) Oral at bedtime  dextrose 5%. 1000 milliLiter(s) (50 mL/Hr) IV Continuous <Continuous>  dextrose 5%. 1000 milliLiter(s) (100 mL/Hr) IV Continuous <Continuous>  dextrose 50% Injectable 25 Gram(s) IV Push once  dextrose 50% Injectable 12.5 Gram(s) IV Push once  dextrose 50% Injectable 25 Gram(s) IV Push once  furosemide    Tablet 40 milliGRAM(s) Oral daily  glucagon  Injectable 1 milliGRAM(s) IntraMuscular once  insulin glargine Injectable (LANTUS) 7 Unit(s) SubCutaneous at bedtime  insulin lispro (ADMELOG) corrective regimen sliding scale   SubCutaneous three times a day before meals  insulin lispro Injectable (ADMELOG) 2 Unit(s) SubCutaneous three times a day before meals  levETIRAcetam 750 milliGRAM(s) Oral two times a day  pantoprazole   Suspension 40 milliGRAM(s) Oral daily  piperacillin/tazobactam IVPB.. 3.375 Gram(s) IV Intermittent every 8 hours  propranolol 20 milliGRAM(s) Oral two times a day  vancomycin  IVPB 1000 milliGRAM(s) IV Intermittent every 12 hours      Allergies    No Known Allergies    Intolerances        SOCIAL HISTORY:  Denies ETOh,Smoking,     FAMILY HISTORY:  No pertinent family history in first degree relatives        REVIEW OF SYSTEMS:    CONSTITUTIONAL: No weakness, fevers or chills  EYES/ENT: No visual changes;  no throat pain   NECK: No pain or stiffness  RESPIRATORY: No cough, wheezing, hemoptysis; No shortness of breath  CARDIOVASCULAR: No chest pain or palpitations  GASTROINTESTINAL: No abdominal or epigastric pain. No nausea, vomiting,     No diarrhea or constipation. No melena   GENITOURINARY: No dysuria, frequency or hematuria  NEUROLOGICAL: No numbness or weakness  SKIN: dry      VITAL:  T(C): , Max: 36.8 (23 @ 04:43)  T(F): , Max: 98.2 (23 @ 04:43)  HR: 73 (23 @ 04:43)  BP: 103/66 (23 @ 04:43)  BP(mean): --  RR: 18 (23 @ 04:43)  SpO2: 97% (23 @ 04:43)  Wt(kg): --    I and O's:    Height (cm): 177.8 ( @ 18:24)  Weight (kg): 72.6 ( @ 18:24)  BMI (kg/m2): 23 ( @ 18:24)  BSA (m2): 1.9 ( @ 18:24)    PHYSICAL EXAM:    Constitutional: NAD  HEENT: conjunctive   clear   Neck:  No JVD  Respiratory: CTAB  Cardiovascular: S1 and S2  Gastrointestinal: BS+, soft, NT/ND  Extremities: No peripheral edema  Neurological: A/O x 3, no focal deficits  Psychiatric: Normal mood, normal affect  : No Adams  Skin: No rashes  Access: Not applicable    LABS:                        11.4   11.33 )-----------( 252      ( 2023 07:30 )             34.8         132<L>  |  96  |  13  ----------------------------<  247<H>  3.9   |  31  |  0.78    Ca    9.0      2023 07:30    TPro  7.2  /  Alb  2.9<L>  /  TBili  0.6  /  DBili  x   /  AST  23  /  ALT  27  /  AlkPhos  518<H>        Urine Studies:  Urinalysis Basic - ( 2023 21:20 )    Color: Yellow / Appearance: Clear / S.005 / pH: x  Gluc: x / Ketone: Negative  / Bili: Negative / Urobili: Negative   Blood: x / Protein: Negative / Nitrite: Negative   Leuk Esterase: Negative / RBC: 3-5 /HPF / WBC 0-2   Sq Epi: x / Non Sq Epi: x / Bacteria: Occasional            RADIOLOGY & ADDITIONAL STUDIES:

## 2023-04-19 NOTE — CONSULT NOTE ADULT - SUBJECTIVE AND OBJECTIVE BOX
S : 67y year old Male seen at bedside for  Left foot 4th toe ulceration/gangrene.  Patient' s daughter states that pt started having blister around Friday on left 4th toe and daughter applied betadine dressing. by next day daughter noticed black toe discoloration and since yesterday foot started getting swelling, redness and warmth. Pt was seen at podiatry office yesterday and was sent to hospital for admission. Pt daughter denied any F/C/N/V/SOB. family denies any new foot related complaint, reports pt eating well and feeling better, no report of foot pain or F/C/N/V/SOB today.  Chief Complaint : Patient is a 67y old  Male who presents with a chief complaint of toe gangrene (19 Apr 2023 15:39)    HPI : HPI:   67 year old male with PMHx CVA (right sided hemiparesis and aphasia), AFib (on coumadin), IDDM, HTN, CAD s/p CABG, s/p AVR, presenting to [place] ED with wound to left 4th metatarsal.  Patient unable to provide history due to aphasia, history obtained from daughter at beside and per chart review.  Patient developed hematoma to left 4th metatarsal on Friday, it then ruptured and patient presented to Podiatry office (Dr. Maegan Jin) earlier today who instructed patient to come to ED for evaluation.  Patient has now new complaints at this time.  Patient does not follow with a vascular surgeon.   (18 Apr 2023 21:55)      Patient admits to  (-) Fevers, (-) Chills, (-) Nausea, (-) Vomiting, (-) Shortness of Breath      PMH: CVA (cerebral vascular accident)    HTN (hypertension)    DM (diabetes mellitus)    Arrhythmia    History of atrial fibrillation      PSH:S/P CABG (coronary artery bypass graft)    S/P brain surgery        Allergies:No Known Allergies      Labs:                          11.4   11.33 )-----------( 252      ( 19 Apr 2023 07:30 )             34.8     WBC Trend  11.33<H> Date (04-19 @ 07:30)  10.91<H> Date (04-18 @ 20:06)      Chem  04-19    132<L>  |  96  |  13  ----------------------------<  247<H>  3.9   |  31  |  0.78    Ca    9.0      19 Apr 2023 07:30    TPro  7.2  /  Alb  2.9<L>  /  TBili  0.6  /  DBili  x   /  AST  23  /  ALT  27  /  AlkPhos  518<H>  04-19          T(F): 98.5 (04-19-23 @ 12:55), Max: 98.5 (04-19-23 @ 12:55)  HR: 91 (04-19-23 @ 12:55) (73 - 96)  BP: 109/72 (04-19-23 @ 12:55) (103/66 - 125/78)  RR: 19 (04-19-23 @ 12:55) (16 - 20)  SpO2: 99% (04-19-23 @ 12:55) (95% - 99%)  Wt(kg): --    O:   General: Pleasant  male wheel chair bound, unable to provide history     Ulceration Left 4th toe  ,- hyperkeratotic border, wound base Necrotic base with fibrogranular nail bed wound, no nail(avulsion was done at office) / , wound size (4.0 cm X 4.0 cm X 0.2cm) ++ edema, ++ lydia-wound erythema, - purulence, - fluctuance, - tracking/tunneling, - probe to bone.   Vascular: Dorsalis Pedis and Posterior Tibial pulses 0/4.  Capillary re-fill time less then 5 seconds digits 1-5 bilateral.    Neuro: unable to assess  MSK: unable to asses      A: Left foot 4th toe  ulceration      Left foot cellulitis      Left foot abscess      Diabetes Mellitus      PAD      Left 4th toe gangrene      P:   Chart reviewed and Patient evaluated  Discussed diagnosis and treatment with patient' family  Wound flush with normal saline  Excisional debridement with 15 blade of left 4th toe  base down to subcutaneous tissueLeft foot ulceration  Applied adaptic with dry sterile dressing  X-rays reviewed -wnl  Ordered Betadine   Ordered left foot MRI to rule out 4th  toe OM, Left foot abscess  Continue with IV antibiotics   Reviewed  JUAN A- Apppreciate vascular consult  Weight bearing a stolerated  Offloading to bilateral Heels.   Discussed importance of daily foot examinations and proper shoe gear and to importance of lower Fasting Blood Glucose levels.   Podiatry(Dr Jin) will follow while in house.

## 2023-04-19 NOTE — CONSULT NOTE ADULT - SUBJECTIVE AND OBJECTIVE BOX
Date/Time Patient Seen:  		  Referring MD:   Data Reviewed	       Patient is a 67y old  Male who presents with a chief complaint of toe gangrene (18 Apr 2023 21:55)      Subjective/HPI   67 year old male with PMHx CVA (right sided hemiparesis and aphasia), AFib (on coumadin), IDDM, HTN, CAD s/p CABG, s/p AVR, presenting to [place] ED with wound to left 4th metatarsal.  Patient unable to provide history due to aphasia, history obtained from daughter at beside and per chart review.  Patient developed hematoma to left 4th metatarsal on Friday, it then ruptured and patient presented to Podiatry office (Dr. Maegan Jin) earlier today who instructed patient to come to ED for evaluation.  Patient has now new complaints at this time.  Patient does not follow with a vascular surgeon.        PAST MEDICAL & SURGICAL HISTORY:  CVA (cerebral vascular accident)    HTN (hypertension)    DM (diabetes mellitus)    Arrhythmia    History of atrial fibrillation    S/P CABG (coronary artery bypass graft)    S/P brain surgery    PAST SURGICAL HISTORY:  S/P brain surgery     S/P CABG (coronary artery bypass graft).     FAMILY HISTORY:  No pertinent family history in first degree relatives. No pertinent family history of: toe gangrene.     Social History:  · Substance use	No   · Social History (marital status, living situation, occupation, and sexual history)	neg     Tobacco Screening:  · Core Measure Site	Yes  · Has the patient used tobacco in the past 30 days?	No     Risk Assessment:    Present on Admission:  Deep Venous Thrombosis	no   Pulmonary Embolus	no      HIV Screening:  · In accordance with [place] State law, we offer every patient who comes to our ED an HIV test. Would you like to be tested today?	Opt out         Medication list         MEDICATIONS  (STANDING):  aspirin enteric coated 81 milliGRAM(s) Oral daily  atorvastatin 10 milliGRAM(s) Oral at bedtime  dextrose 5%. 1000 milliLiter(s) (50 mL/Hr) IV Continuous <Continuous>  dextrose 5%. 1000 milliLiter(s) (100 mL/Hr) IV Continuous <Continuous>  dextrose 50% Injectable 25 Gram(s) IV Push once  dextrose 50% Injectable 12.5 Gram(s) IV Push once  dextrose 50% Injectable 25 Gram(s) IV Push once  furosemide    Tablet 40 milliGRAM(s) Oral daily  glucagon  Injectable 1 milliGRAM(s) IntraMuscular once  insulin glargine Injectable (LANTUS) 7 Unit(s) SubCutaneous at bedtime  insulin lispro (ADMELOG) corrective regimen sliding scale   SubCutaneous three times a day before meals  insulin lispro Injectable (ADMELOG) 2 Unit(s) SubCutaneous three times a day before meals  levETIRAcetam 750 milliGRAM(s) Oral two times a day  pantoprazole   Suspension 40 milliGRAM(s) Oral daily  piperacillin/tazobactam IVPB.. 3.375 Gram(s) IV Intermittent every 8 hours  propranolol 20 milliGRAM(s) Oral two times a day  vancomycin  IVPB 1000 milliGRAM(s) IV Intermittent every 12 hours    MEDICATIONS  (PRN):  acetaminophen     Tablet .. 650 milliGRAM(s) Oral every 6 hours PRN Temp greater or equal to 38C (100.4F), Mild Pain (1 - 3)  dextrose Oral Gel 15 Gram(s) Oral once PRN Blood Glucose LESS THAN 70 milliGRAM(s)/deciliter  oxycodone    5 mG/acetaminophen 325 mG 1 Tablet(s) Oral every 6 hours PRN Moderate Pain (4 - 6)         Vitals log        ICU Vital Signs Last 24 Hrs  T(C): 36.8 (19 Apr 2023 04:43), Max: 36.8 (19 Apr 2023 04:43)  T(F): 98.2 (19 Apr 2023 04:43), Max: 98.2 (19 Apr 2023 04:43)  HR: 73 (19 Apr 2023 04:43) (73 - 96)  BP: 103/66 (19 Apr 2023 04:43) (103/66 - 125/78)  BP(mean): --  ABP: --  ABP(mean): --  RR: 18 (19 Apr 2023 04:43) (16 - 20)  SpO2: 97% (19 Apr 2023 04:43) (95% - 99%)    O2 Parameters below as of 19 Apr 2023 04:43  Patient On (Oxygen Delivery Method): room air                 Input and Output:  I&O's Detail      Lab Data                        12.6   10.91 )-----------( 267      ( 18 Apr 2023 20:06 )             38.1     04-18    128<L>  |  94<L>  |  15  ----------------------------<  303<H>  5.0   |  30  |  0.97    Ca    9.4      18 Apr 2023 20:06    TPro  8.2  /  Alb  2.9<L>  /  TBili  0.7  /  DBili  x   /  AST  42<H>  /  ALT  35  /  AlkPhos  667<H>  04-18            Review of Systems	      Objective     Physical Examination        Pertinent Lab findings & Imaging      Adams:  NO   Adequate UO     I&O's Detail           Discussed with:     Cultures:	        Radiology    Impression:  Normal LV systolic function EF 55%, history of aortic valve replacement,   mean aortic gradient 19 mmHg, mild aortic stenosis suggested, biatrial   enlargement, normal pericardium, RVSP 34 mmHg    --- End of Report ---            MIKE PAULINO MD; Attending Cardiologist  This document has been electronically signed. Nov 24 2022  9:53AM                         Date/Time Patient Seen:  		  Referring MD:   Data Reviewed	       Patient is a 67y old  Male who presents with a chief complaint of toe gangrene (18 Apr 2023 21:55)      Subjective/HPI  in bed  seen and examined  vs noted  labs reviewed  family at bedside     67 year old male with PMHx CVA (right sided hemiparesis and aphasia), AFib (on coumadin), IDDM, HTN, CAD s/p CABG, s/p AVR, presenting to [place] ED with wound to left 4th metatarsal.  Patient unable to provide history due to aphasia, history obtained from daughter at beside and per chart review.  Patient developed hematoma to left 4th metatarsal on Friday, it then ruptured and patient presented to Podiatry office (Dr. Maegan Jin) earlier today who instructed patient to come to ED for evaluation.  Patient has now new complaints at this time.  Patient does not follow with a vascular surgeon.        PAST MEDICAL & SURGICAL HISTORY:  CVA (cerebral vascular accident)    HTN (hypertension)    DM (diabetes mellitus)    Arrhythmia    History of atrial fibrillation    S/P CABG (coronary artery bypass graft)    S/P brain surgery    PAST SURGICAL HISTORY:  S/P brain surgery     S/P CABG (coronary artery bypass graft).     FAMILY HISTORY:  No pertinent family history in first degree relatives. No pertinent family history of: toe gangrene.     Social History:  · Substance use	No   · Social History (marital status, living situation, occupation, and sexual history)	neg     Tobacco Screening:  · Core Measure Site	Yes  · Has the patient used tobacco in the past 30 days?	No     Risk Assessment:    Present on Admission:  Deep Venous Thrombosis	no   Pulmonary Embolus	no      HIV Screening:  · In accordance with [place] State law, we offer every patient who comes to our ED an HIV test. Would you like to be tested today?	Opt out         Medication list         MEDICATIONS  (STANDING):  aspirin enteric coated 81 milliGRAM(s) Oral daily  atorvastatin 10 milliGRAM(s) Oral at bedtime  dextrose 5%. 1000 milliLiter(s) (50 mL/Hr) IV Continuous <Continuous>  dextrose 5%. 1000 milliLiter(s) (100 mL/Hr) IV Continuous <Continuous>  dextrose 50% Injectable 25 Gram(s) IV Push once  dextrose 50% Injectable 12.5 Gram(s) IV Push once  dextrose 50% Injectable 25 Gram(s) IV Push once  furosemide    Tablet 40 milliGRAM(s) Oral daily  glucagon  Injectable 1 milliGRAM(s) IntraMuscular once  insulin glargine Injectable (LANTUS) 7 Unit(s) SubCutaneous at bedtime  insulin lispro (ADMELOG) corrective regimen sliding scale   SubCutaneous three times a day before meals  insulin lispro Injectable (ADMELOG) 2 Unit(s) SubCutaneous three times a day before meals  levETIRAcetam 750 milliGRAM(s) Oral two times a day  pantoprazole   Suspension 40 milliGRAM(s) Oral daily  piperacillin/tazobactam IVPB.. 3.375 Gram(s) IV Intermittent every 8 hours  propranolol 20 milliGRAM(s) Oral two times a day  vancomycin  IVPB 1000 milliGRAM(s) IV Intermittent every 12 hours    MEDICATIONS  (PRN):  acetaminophen     Tablet .. 650 milliGRAM(s) Oral every 6 hours PRN Temp greater or equal to 38C (100.4F), Mild Pain (1 - 3)  dextrose Oral Gel 15 Gram(s) Oral once PRN Blood Glucose LESS THAN 70 milliGRAM(s)/deciliter  oxycodone    5 mG/acetaminophen 325 mG 1 Tablet(s) Oral every 6 hours PRN Moderate Pain (4 - 6)         Vitals log        ICU Vital Signs Last 24 Hrs  T(C): 36.8 (19 Apr 2023 04:43), Max: 36.8 (19 Apr 2023 04:43)  T(F): 98.2 (19 Apr 2023 04:43), Max: 98.2 (19 Apr 2023 04:43)  HR: 73 (19 Apr 2023 04:43) (73 - 96)  BP: 103/66 (19 Apr 2023 04:43) (103/66 - 125/78)  BP(mean): --  ABP: --  ABP(mean): --  RR: 18 (19 Apr 2023 04:43) (16 - 20)  SpO2: 97% (19 Apr 2023 04:43) (95% - 99%)    O2 Parameters below as of 19 Apr 2023 04:43  Patient On (Oxygen Delivery Method): room air                 Input and Output:  I&O's Detail      Lab Data                        12.6   10.91 )-----------( 267      ( 18 Apr 2023 20:06 )             38.1     04-18    128<L>  |  94<L>  |  15  ----------------------------<  303<H>  5.0   |  30  |  0.97    Ca    9.4      18 Apr 2023 20:06    TPro  8.2  /  Alb  2.9<L>  /  TBili  0.7  /  DBili  x   /  AST  42<H>  /  ALT  35  /  AlkPhos  667<H>  04-18            Review of Systems	  weakness  pain  wound      Objective     Physical Examination  heart s1s2  lung dec BS  head deformity on left side        Pertinent Lab findings & Imaging      Adams:  NO   Adequate UO     I&O's Detail           Discussed with:     Cultures:	        Radiology    Impression:  Normal LV systolic function EF 55%, history of aortic valve replacement,   mean aortic gradient 19 mmHg, mild aortic stenosis suggested, biatrial   enlargement, normal pericardium, RVSP 34 mmHg    --- End of Report ---            MIKE PAULINO MD; Attending Cardiologist  This document has been electronically signed. Nov 24 2022  9:53AM

## 2023-04-19 NOTE — PROGRESS NOTE ADULT - SUBJECTIVE AND OBJECTIVE BOX
Patient is a 67y old  Male who presents with a chief complaint of toe gangrene (2023 09:53)    Date of servie : 23 @ 13:12  INTERVAL HPI/OVERNIGHT EVENTS:  T(C): 36.8 (23 @ 04:43), Max: 36.8 (23 @ 04:43)  HR: 73 (23 @ 04:43) (73 - 96)  BP: 103/66 (23 @ 04:43) (103/66 - 125/78)  RR: 18 (23 @ 04:43) (16 - 20)  SpO2: 97% (23 @ 04:43) (95% - 99%)  Wt(kg): --  I&O's Summary    2023 07:01  -  2023 13:12  --------------------------------------------------------  IN: 240 mL / OUT: 0 mL / NET: 240 mL        LABS:                        11.4   11.33 )-----------( 252      ( 2023 07:30 )             34.8     0419    132<L>  |  96  |  13  ----------------------------<  247<H>  3.9   |  31  |  0.78    Ca    9.0      2023 07:30    TPro  7.2  /  Alb  2.9<L>  /  TBili  0.6  /  DBili  x   /  AST  23  /  ALT  27  /  AlkPhos  518<H>      PT/INR - ( 2023 07:30 )   PT: 84.9 sec;   INR: 7.11 ratio         PTT - ( 2023 20:06 )  PTT:71.2 sec  Urinalysis Basic - ( 2023 21:20 )    Color: Yellow / Appearance: Clear / S.005 / pH: x  Gluc: x / Ketone: Negative  / Bili: Negative / Urobili: Negative   Blood: x / Protein: Negative / Nitrite: Negative   Leuk Esterase: Negative / RBC: 3-5 /HPF / WBC 0-2   Sq Epi: x / Non Sq Epi: x / Bacteria: Occasional      CAPILLARY BLOOD GLUCOSE      POCT Blood Glucose.: 341 mg/dL (2023 11:57)  POCT Blood Glucose.: 288 mg/dL (2023 07:37)  POCT Blood Glucose.: 346 mg/dL (2023 22:19)        Urinalysis Basic - ( 2023 21:20 )    Color: Yellow / Appearance: Clear / S.005 / pH: x  Gluc: x / Ketone: Negative  / Bili: Negative / Urobili: Negative   Blood: x / Protein: Negative / Nitrite: Negative   Leuk Esterase: Negative / RBC: 3-5 /HPF / WBC 0-2   Sq Epi: x / Non Sq Epi: x / Bacteria: Occasional        MEDICATIONS  (STANDING):  aspirin enteric coated 81 milliGRAM(s) Oral daily  atorvastatin 10 milliGRAM(s) Oral at bedtime  dextrose 5%. 1000 milliLiter(s) (50 mL/Hr) IV Continuous <Continuous>  dextrose 5%. 1000 milliLiter(s) (100 mL/Hr) IV Continuous <Continuous>  dextrose 50% Injectable 25 Gram(s) IV Push once  dextrose 50% Injectable 12.5 Gram(s) IV Push once  dextrose 50% Injectable 25 Gram(s) IV Push once  furosemide    Tablet 40 milliGRAM(s) Oral daily  glucagon  Injectable 1 milliGRAM(s) IntraMuscular once  insulin glargine Injectable (LANTUS) 7 Unit(s) SubCutaneous at bedtime  insulin lispro (ADMELOG) corrective regimen sliding scale   SubCutaneous three times a day before meals  insulin lispro Injectable (ADMELOG) 2 Unit(s) SubCutaneous three times a day before meals  levETIRAcetam 750 milliGRAM(s) Oral two times a day  pantoprazole   Suspension 40 milliGRAM(s) Oral daily  piperacillin/tazobactam IVPB.. 3.375 Gram(s) IV Intermittent every 8 hours  propranolol 20 milliGRAM(s) Oral two times a day  vancomycin  IVPB 1000 milliGRAM(s) IV Intermittent every 12 hours    MEDICATIONS  (PRN):  acetaminophen     Tablet .. 650 milliGRAM(s) Oral every 6 hours PRN Temp greater or equal to 38C (100.4F), Mild Pain (1 - 3)  dextrose Oral Gel 15 Gram(s) Oral once PRN Blood Glucose LESS THAN 70 milliGRAM(s)/deciliter  oxycodone    5 mG/acetaminophen 325 mG 1 Tablet(s) Oral every 6 hours PRN Moderate Pain (4 - 6)          PHYSICAL EXAM:  GENERAL: NAD, well-groomed, well-developed  HEAD:  Atraumatic, Normocephalic  CHEST/LUNG: Clear to percussion bilaterally; No rales, rhonchi, wheezing, or rubs  HEART: Regular rate and rhythm; No murmurs, rubs, or gallops  ABDOMEN: Soft, Nontender, Nondistended; Bowel sounds present  EXTREMITIES: left 4th metatarsal gangrene   LYMPH: No lymphadenopathy noted  SKIN: No rashes or lesions    Care Discussed with Consultants/Other Providers [x ] YES  [ ] NO

## 2023-04-19 NOTE — CONSULT NOTE ADULT - ASSESSMENT
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      Pt a/w wound to left 4th metatarsal s/p ruptured hematoma.  Left 4th metatarsal center of erythremic tissue to exposed nail bed with surrounding area of hyperemic, dark coloring tissue.  Area of erythema to anterior foot.  B/L palpable POP, AT, PT pulses.  B/L non-palpable, dopplerable DP pulses. INR noted to be 7.16.        Suggest:    1. Cellulitis, gangrenous 4th metatarsal  - Workup per vascular  - If pt needs revascularization procedure, he is stable from cardiac standpoint once INR reaches acceptable level.     2. Mech Ao Valve/Afib  - Coumadin with Goal INR 2.5- 3.5.  Supratherapeutic INR to 7.   - Pt with h/o cerebral hemorrhagic CVA from coumadin toxicity   - Holding coumadin and monitor INR      3. CAD CABG  - C/w Lipitor and aspirin    4. TTE  TTE Echo Complete w/o Contrast w/ Doppler --11.22.22 @ 19:15  Normal LV systolic function EF 55%, history of aortic valve replacement,   mean aortic gradient 19 mmHg, mild aortic stenosis suggested, biatrial   enlargement, normal pericardium, RVSP 34 mmHg    Will follow.       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      Pt a/w wound to left 4th metatarsal s/p ruptured hematoma.  Left 4th metatarsal center of erythremic tissue to exposed nail bed with surrounding area of hyperemic, dark coloring tissue.  Area of erythema to anterior foot.  B/L palpable POP, AT, PT pulses.  B/L non-palpable, dopplerable DP pulses. INR noted to be 7.16.        Suggest:    1. Cellulitis, gangrenous 4th metatarsal  - Workup per vascular  - If pt needs debridement procedure, he is stable from cardiac standpoint once INR reaches acceptable level.     2. Mech Ao Valve/Afib  - Coumadin with Goal INR 2.5- 3.5.  Supratherapeutic INR to 7.   - Pt with h/o cerebral hemorrhagic CVA from coumadin toxicity   - Holding coumadin and monitor INR      3. CAD CABG  - C/w Lipitor and aspirin    4. TTE  TTE Echo Complete w/o Contrast w/ Doppler --11.22.22 @ 19:15  Normal LV systolic function EF 55%, history of aortic valve replacement,   mean aortic gradient 19 mmHg, mild aortic stenosis suggested, biatrial   enlargement, normal pericardium, RVSP 34 mmHg    Will follow.

## 2023-04-19 NOTE — CONSULT NOTE ADULT - SUBJECTIVE AND OBJECTIVE BOX
[facility] of INFECTIOUS DISEASE  Guru Michel MD PhD, Lisa James MD, Becca Acosta MD, Karen Grimes MD, Corky Perez MD  and providing coverage with Carlos Marx MD  Providing Infectious Disease Consultations at [facility], [facility], [place], [facility], [facility]'s    Office# [phone] to schedule follow up appointments  Answering Service for urgent calls or New Consults [phone]  Cell# to text for urgent issues Guru Michel [phone]     HPI:   67 year old male with PMHx CVA (right sided hemiparesis and aphasia), AFib (on coumadin), IDDM, HTN, CAD s/p CABG, s/p AVR, presenting to [place] ED with wound to left 4th metatarsal.  Patient unable to provide history due to aphasia, history obtained from daughter at [facility] and per chart review.  Patient developed hematoma to left 4th metatarsal on Friday, it then ruptured and patient presented to Podiatry office (Dr. Maegan Jin) who instructed patient to come to ED for evaluation.  Patient has now new complaints at this time.  Patient does not follow with a vascular surgeon.        PAST MEDICAL & SURGICAL HISTORY:  CVA (cerebral vascular accident)      HTN (hypertension)      DM (diabetes mellitus)      Arrhythmia      History of atrial fibrillation      S/P CABG (coronary artery bypass graft)      S/P brain surgery          Antimicrobials  piperacillin/tazobactam IVPB.. 3.375 Gram(s) IV Intermittent every 8 hours  vancomycin  IVPB 1000 milliGRAM(s) IV Intermittent every 12 hours      Immunological      Other  acetaminophen     Tablet .. 650 milliGRAM(s) Oral every 6 hours PRN  aspirin enteric coated 81 milliGRAM(s) Oral daily  atorvastatin 10 milliGRAM(s) Oral at bedtime  dextrose 5%. 1000 milliLiter(s) IV Continuous <Continuous>  dextrose 5%. 1000 milliLiter(s) IV Continuous <Continuous>  dextrose 50% Injectable 25 Gram(s) IV Push once  dextrose 50% Injectable 12.5 Gram(s) IV Push once  dextrose 50% Injectable 25 Gram(s) IV Push once  dextrose Oral Gel 15 Gram(s) Oral once PRN  furosemide    Tablet 40 milliGRAM(s) Oral daily  glucagon  Injectable 1 milliGRAM(s) IntraMuscular once  insulin glargine Injectable (LANTUS) 7 Unit(s) SubCutaneous at bedtime  insulin lispro (ADMELOG) corrective regimen sliding scale   SubCutaneous three times a day before meals  insulin lispro Injectable (ADMELOG) 2 Unit(s) SubCutaneous three times a day before meals  levETIRAcetam 750 milliGRAM(s) Oral two times a day  metoprolol succinate ER 25 milliGRAM(s) Oral daily  oxycodone    5 mG/acetaminophen 325 mG 1 Tablet(s) Oral every 6 hours PRN  pantoprazole   Suspension 40 milliGRAM(s) Oral daily  spironolactone 25 milliGRAM(s) Oral daily      Allergies    No Known Allergies    Intolerances        SOCIAL HISTORY:  Social History:  neg (2023 21:55)      FAMILY HISTORY:  No pertinent family history in first degree relatives        ROS:    EYES:  Negative  blurry vision or double vision  GASTROINTESTINAL:  Negative for nausea, vomiting, diarrhea  -otherwise negative except for subjective    Vital Signs Last 24 Hrs  T(C): 36.9 (2023 12:55), Max: 36.9 (2023 12:55)  T(F): 98.5 (2023 12:55), Max: 98.5 (2023 12:55)  HR: 91 (2023 12:55) (73 - 96)  BP: 109/72 (2023 12:55) (103/66 - 125/78)  BP(mean): --  RR: 19 (2023 12:55) (16 - 20)  SpO2: 99% (2023 12:55) (95% - 99%)    Parameters below as of 2023 12:55  Patient On (Oxygen Delivery Method): room air        PE:  In no distress  HEENT:  NC, PERRL, sclerae anicteric, conjunctivae clear, EOMI.  Sinuses nontender, no nasal exudate.  No buccal or pharyngeal lesions, erythema or exudate  Neck:  Supple, no adenopathy  Lungs:  No accessory muscle use, bilaterally clear to auscultation  Cor:  distant  Abd:  Symmetric, normoactive BS.  Soft, nontender, no masses, guarding or rebound.  Liver and spleen not enlarged  Extrem:  No cyanosis foot wrapped  Skin:  No rashes.  Neuro: limited as noted but also language issue  Musc: moving all limbs freely, no focal deficits        LABS:                        11.4   11.33 )-----------( 252      ( 2023 07:30 )             34.8       WBC Count: 11.33 K/uL (23 @ 07:30)  WBC Count: 10.91 K/uL (23 @ 20:06)          132<L>  |  96  |  13  ----------------------------<  247<H>  3.9   |  31  |  0.78    Ca    9.0      2023 07:30    TPro  7.2  /  Alb  2.9<L>  /  TBili  0.6  /  DBili  x   /  AST  23  /  ALT  27  /  AlkPhos  518<H>        Creatinine, Serum: 0.78 mg/dL (23 @ 07:30)  Creatinine, Serum: 0.97 mg/dL (23 @ 20:06)      Urinalysis Basic - ( 2023 21:20 )    Color: Yellow / Appearance: Clear / S.005 / pH: x  Gluc: x / Ketone: Negative  / Bili: Negative / Urobili: Negative   Blood: x / Protein: Negative / Nitrite: Negative   Leuk Esterase: Negative / RBC: 3-5 /HPF / WBC 0-2   Sq Epi: x / Non Sq Epi: x / Bacteria: Occasional              MICROBIOLOGY:      RADIOLOGY & ADDITIONAL STUDIES:    --

## 2023-04-19 NOTE — CONSULT NOTE ADULT - SUBJECTIVE AND OBJECTIVE BOX
ANALYSIS AND PLAN:  This is a 67-year-old with history of cerebrovascular accident with , atrial fibrillation, and heart valve replacement.  1.For history of cerebrovascular accident and atrial fibrillation, risks versus benefits hold AC goal INR 2-3  From neurology standpoint, the patient does not need to be on antiplatelet--- for possible surgical intervention neurologic wise stable   2.For history of seizure prophylaxis, continue the patient on Keppra.  3.For history of diabetes, strict control of blood sugars.  4.For hypertension, monitor systolic blood pressure.    Spoke with daughter, Jeffrey, at bedside.  Her telephone number is [phone].  She understands my reasoning and thought process.

## 2023-04-19 NOTE — CONSULT NOTE ADULT - SUBJECTIVE AND OBJECTIVE BOX
CARDIOLOGY CONSULT NOTE    Patient is a 67y Male with a known history of :    HPI:   67 year old male with PMHx CVA (right sided hemiparesis and aphasia), AFib (on coumadin), IDDM, HTN, CAD s/p CABG, s/p AVR, presenting to [place] ED with wound to left 4th metatarsal.  Patient unable to provide history due to aphasia, history obtained from daughter at beside and per chart review.  Patient developed hematoma to left 4th metatarsal on Friday, it then ruptured and patient presented to Podiatry office (Dr. Maegan Jin) earlier today who instructed patient to come to ED for evaluation.  Patient has now new complaints at this time.  Patient does not follow with a vascular surgeon.   (18 Apr 2023 21:55)      REVIEW OF SYSTEMS:    CONSTITUTIONAL: No weakness, fevers or chills.   EYES/ENT: No visual changes;    NECK: No pain or stiffness  RESPIRATORY: No cough, wheezing, No shortness of breath  CARDIOVASCULAR: No chest pain or palpitations  GASTROINTESTINAL: No abdominal pain, or hematochezia.  GENITOURINARY: No dysuria orhematuria  NEUROLOGICAL: No numbness or weakness  SKIN: No itching, burning, rashes  All other review of systems is negative unless indicated above      MEDICATIONS  (STANDING):  aspirin enteric coated 81 milliGRAM(s) Oral daily  atorvastatin 10 milliGRAM(s) Oral at bedtime  dextrose 5%. 1000 milliLiter(s) (50 mL/Hr) IV Continuous <Continuous>  dextrose 5%. 1000 milliLiter(s) (100 mL/Hr) IV Continuous <Continuous>  dextrose 50% Injectable 25 Gram(s) IV Push once  dextrose 50% Injectable 12.5 Gram(s) IV Push once  dextrose 50% Injectable 25 Gram(s) IV Push once  furosemide    Tablet 40 milliGRAM(s) Oral daily  glucagon  Injectable 1 milliGRAM(s) IntraMuscular once  insulin glargine Injectable (LANTUS) 7 Unit(s) SubCutaneous at bedtime  insulin lispro (ADMELOG) corrective regimen sliding scale   SubCutaneous three times a day before meals  insulin lispro Injectable (ADMELOG) 2 Unit(s) SubCutaneous three times a day before meals  levETIRAcetam 750 milliGRAM(s) Oral two times a day  pantoprazole   Suspension 40 milliGRAM(s) Oral daily  piperacillin/tazobactam IVPB.. 3.375 Gram(s) IV Intermittent every 8 hours  propranolol 20 milliGRAM(s) Oral two times a day  vancomycin  IVPB 1000 milliGRAM(s) IV Intermittent every 12 hours    MEDICATIONS  (PRN):  acetaminophen     Tablet .. 650 milliGRAM(s) Oral every 6 hours PRN Temp greater or equal to 38C (100.4F), Mild Pain (1 - 3)  dextrose Oral Gel 15 Gram(s) Oral once PRN Blood Glucose LESS THAN 70 milliGRAM(s)/deciliter  oxycodone    5 mG/acetaminophen 325 mG 1 Tablet(s) Oral every 6 hours PRN Moderate Pain (4 - 6)      ALLERGIES: No Known Allergies      FAMILY HISTORY:  No pertinent family history in first degree relatives        PHYSICAL EXAMINATION:  -----------------------------  T(C): 36.8 (04-19-23 @ 04:43), Max: 36.8 (04-19-23 @ 04:43)  HR: 73 (04-19-23 @ 04:43) (73 - 96)  BP: 103/66 (04-19-23 @ 04:43) (103/66 - 125/78)  RR: 18 (04-19-23 @ 04:43) (16 - 20)  SpO2: 97% (04-19-23 @ 04:43) (95% - 99%)  Wt(kg): --    Height (cm): 177.8 (04-18 @ 18:24)  Weight (kg): 72.6 (04-18 @ 18:24)  BMI (kg/m2): 23 (04-18 @ 18:24)  BSA (m2): 1.9 (04-18 @ 18:24)    PHYSICAL EXAM:  Constitutional: NAD  Neurological: Alert,   HEENT: no JVD, EOMI  Cardiovascular: Regular, S1 and S2, no murmur  Pulmonary: breath sounds bilaterally  Gastrointestinal: Bowel Sounds present, soft, nontender  EXT:  no peripheral edema  Skin: No rashes.  Psych:  Mood calm  LABS: All Labs Reviewed:      LABS:   --------  04-18    128<L>  |  94<L>  |  15  ----------------------------<  303<H>  5.0   |  30  |  0.97    Ca    9.4      18 Apr 2023 20:06    TPro  8.2  /  Alb  2.9<L>  /  TBili  0.7  /  DBili  x   /  AST  42<H>  /  ALT  35  /  AlkPhos  667<H>  04-18                         12.6   10.91 )-----------( 267      ( 18 Apr 2023 20:06 )             38.1     PT/INR - ( 18 Apr 2023 20:06 )   PT: 85.5 sec;   INR: 7.16 ratio         PTT - ( 18 Apr 2023 20:06 )  PTT:71.2 sec            RADIOLOGY:  -----------------        ECG:

## 2023-04-19 NOTE — CARE COORDINATION ASSESSMENT. - NSPASTMEDSURGHISTORY_GEN_ALL_CORE_FT
PAST MEDICAL & SURGICAL HISTORY:  Arrhythmia      DM (diabetes mellitus)      HTN (hypertension)      CVA (cerebral vascular accident)      S/P brain surgery      S/P CABG (coronary artery bypass graft)      History of atrial fibrillation

## 2023-04-19 NOTE — CONSULT NOTE ADULT - ASSESSMENT
elevated lfts    suspect lft elevation 2/2 osteo  cont diet as tolerated  antibiotics per ID  will follow

## 2023-04-19 NOTE — PROGRESS NOTE ADULT - ASSESSMENT
67 year old male with PMHx CVA (right sided hemiparesis and aphasia), AFib (on coumadin), IDDM, HTN, CAD s/p CABG, s/p AVR, presenting to [place] ED with wound to left 4th metatarsal.  Patient unable to provide history due to aphasia, history obtained from daughter at beside and per chart review.  Patient developed hematoma to left 4th metatarsal on Friday, it then ruptured and patient presented to Podiatry office (Dr. Maegan Jin) earlier today who instructed patient to come to ED for evaluation.  Patient has now new complaints at this time.  Patient does not follow with a vascular surgeon.      1 Toe gangrene, diabetic foot ulcer  - cw abx  - ID,vascular and podiatry fu  - fu cultures  - pain control     2 DM  - monitor FS  - Basal bolus  - diabetic diet    3 Hx of CVA  - cw AC  - cw asa, statin    4 Afib, AVR  - INR suratherapeutic  - hold coumadin    5 CAD, CABG  - cw asa, statin  - cards fu

## 2023-04-19 NOTE — PHARMACOTHERAPY INTERVENTION NOTE - COMMENTS
Medication reconciliation updated and completed. Multiple discrepancies noted and discussed with Dr. Harmon. MD made aware and medications adjusted as per discussion

## 2023-04-19 NOTE — CONSULT NOTE ADULT - SUBJECTIVE AND OBJECTIVE BOX
[facility]  Shane Murray PA-C  [address] [place] [id number]  [phone]      Chief Complaint:  Patient is a 67y old  Male who presents with a chief complaint of toe gangrene (2023 15:24)      HPI:67 year old male with PMHx CVA (right sided hemiparesis and aphasia), AFib (on coumadin), IDDM, HTN, CAD s/p CABG, s/p AVR, presenting to [place] ED with wound to left 4th metatarsal.  Patient unable to provide history due to aphasia, history obtained from daughter at beside and per chart review.  Patient developed hematoma to left 4th metatarsal on Friday, it then ruptured and patient presented to Podiatry office (Dr. Maegan Jin) earlier today who instructed patient to come to ED for evaluation.  Patient has now new complaints at this time.  Patient does not follow with a vascular surgeon.     Allergies:  No Known Allergies      Medications:  acetaminophen     Tablet .. 650 milliGRAM(s) Oral every 6 hours PRN  aspirin enteric coated 81 milliGRAM(s) Oral daily  atorvastatin 10 milliGRAM(s) Oral at bedtime  dextrose 5%. 1000 milliLiter(s) IV Continuous <Continuous>  dextrose 5%. 1000 milliLiter(s) IV Continuous <Continuous>  dextrose 50% Injectable 25 Gram(s) IV Push once  dextrose 50% Injectable 12.5 Gram(s) IV Push once  dextrose 50% Injectable 25 Gram(s) IV Push once  dextrose Oral Gel 15 Gram(s) Oral once PRN  furosemide    Tablet 40 milliGRAM(s) Oral daily  glucagon  Injectable 1 milliGRAM(s) IntraMuscular once  insulin glargine Injectable (LANTUS) 7 Unit(s) SubCutaneous at bedtime  insulin lispro (ADMELOG) corrective regimen sliding scale   SubCutaneous three times a day before meals  insulin lispro Injectable (ADMELOG) 2 Unit(s) SubCutaneous three times a day before meals  levETIRAcetam 750 milliGRAM(s) Oral two times a day  metoprolol succinate ER 25 milliGRAM(s) Oral daily  oxycodone    5 mG/acetaminophen 325 mG 1 Tablet(s) Oral every 6 hours PRN  pantoprazole   Suspension 40 milliGRAM(s) Oral daily  spironolactone 25 milliGRAM(s) Oral daily      PMHX/PSHX:  CVA (cerebral vascular accident)    HTN (hypertension)    DM (diabetes mellitus)    Arrhythmia    History of atrial fibrillation    S/P CABG (coronary artery bypass graft)    S/P brain surgery        Family history:  No pertinent family history in first degree relatives    No pertinent family history in first degree relatives        Social History:     ROS:     General:  no fevers, chills, night sweats, fatigue,   Eyes:  Good vision, no reported pain  ENT:  No sore throat, pain, runny nose, dysphagia  CV:  No pain, palpitations, hypo/hypertension  Resp:  No dyspnea, cough, tachypnea, wheezing  GI:  No pain, No nausea, No vomiting, No diarrhea, No constipation, No weight loss, No fever, No pruritis, No rectal bleeding, No tarry stools, No dysphagia,  :  No pain, bleeding, incontinence, nocturia  Muscle:  No pain, weakness  Neuro:  No weakness, tingling, memory problems  Psych:  No fatigue, insomnia, mood problems, depression  Endocrine:  No polyuria, polydipsia, cold/heat intolerance  Heme:  No petechiae, ecchymosis, easy bruisability  Skin:  No rash, tattoos, scars, edema      PHYSICAL EXAM:   Vital Signs:  Vital Signs Last 24 Hrs  T(C): 36.9 (2023 12:55), Max: 36.9 (2023 12:55)  T(F): 98.5 (2023 12:55), Max: 98.5 (2023 12:55)  HR: 91 (2023 12:55) (73 - 96)  BP: 109/72 (2023 12:55) (103/66 - 125/78)  BP(mean): --  RR: 19 (2023 12:55) (16 - 20)  SpO2: 99% (2023 12:55) (95% - 99%)    Parameters below as of 2023 12:55  Patient On (Oxygen Delivery Method): room air      Daily Height in cm: 177.8 (2023 18:24)    Daily     GENERAL:  Appears stated age,   HEENT:  NC/AT,    CHEST:  Full & symmetric excursion,   HEART:  Regular rhythm  ABDOMEN:  Soft, non-tender, non-distended,   EXTEREMITIES:  no cyanosis,clubbing or edema  SKIN:  No rash  NEURO:  Alert,    LABS:                        11.4   11.33 )-----------( 252      ( 2023 07:30 )             34.8         132<L>  |  96  |  13  ----------------------------<  247<H>  3.9   |  31  |  0.78    Ca    9.0      2023 07:30    TPro  7.2  /  Alb  2.9<L>  /  TBili  0.6  /  DBili  x   /  AST  23  /  ALT  27  /  AlkPhos  518<H>      LIVER FUNCTIONS - ( 2023 07:30 )  Alb: 2.9 g/dL / Pro: 7.2 g/dL / ALK PHOS: 518 U/L / ALT: 27 U/L / AST: 23 U/L / GGT: x           PT/INR - ( 2023 07:30 )   PT: 84.9 sec;   INR: 7.11 ratio         PTT - ( 2023 20:06 )  PTT:71.2 sec  Urinalysis Basic - ( 2023 21:20 )    Color: Yellow / Appearance: Clear / S.005 / pH: x  Gluc: x / Ketone: Negative  / Bili: Negative / Urobili: Negative   Blood: x / Protein: Negative / Nitrite: Negative   Leuk Esterase: Negative / RBC: 3-5 /HPF / WBC 0-2   Sq Epi: x / Non Sq Epi: x / Bacteria: Occasional          Imaging:

## 2023-04-19 NOTE — CONSULT NOTE ADULT - ASSESSMENT
67 year old male with PMHx CVA (right sided hemiparesis and aphasia), AFib (on coumadin), IDDM, HTN, CAD s/p CABG, s/p AVR, presenting to [place] ED with wound to left 4th metatarsal.  Presented to Podiatry office (Dr. Maegan Jin) who instructed patient to come to ED for evaluation.    Vascular recommending Recommend ABIs/PVR to assess degree of arterial disease    RECOMMENDATIONS  Will await podiatry recommendations and any need for inpatient imaging. Reviewed prior micro and no prior MRSA so have adjusted abx to:  Ceftriaxone 2 gram IV daily  recs to follow     Thank you for consulting us and involving us in the management of this most interesting and challenging case.  We will follow along in the care of this patient. Please call us at [phone] or text me directly on my cell# at [phone] with any concerns.

## 2023-04-20 DIAGNOSIS — E11.9 TYPE 2 DIABETES MELLITUS WITHOUT COMPLICATIONS: ICD-10-CM

## 2023-04-20 LAB
A1C WITH ESTIMATED AVERAGE GLUCOSE RESULT: 13.5 % — HIGH (ref 4–5.6)
ALBUMIN SERPL ELPH-MCNC: 2.5 G/DL — LOW (ref 3.3–5)
ALP SERPL-CCNC: 531 U/L — HIGH (ref 40–120)
ALT FLD-CCNC: 29 U/L — SIGNIFICANT CHANGE UP (ref 12–78)
ANION GAP SERPL CALC-SCNC: 4 MMOL/L — LOW (ref 5–17)
AST SERPL-CCNC: 29 U/L — SIGNIFICANT CHANGE UP (ref 15–37)
BILIRUB SERPL-MCNC: 0.7 MG/DL — SIGNIFICANT CHANGE UP (ref 0.2–1.2)
BUN SERPL-MCNC: 13 MG/DL — SIGNIFICANT CHANGE UP (ref 7–23)
CALCIUM SERPL-MCNC: 8.9 MG/DL — SIGNIFICANT CHANGE UP (ref 8.5–10.1)
CHLORIDE SERPL-SCNC: 99 MMOL/L — SIGNIFICANT CHANGE UP (ref 96–108)
CO2 SERPL-SCNC: 30 MMOL/L — SIGNIFICANT CHANGE UP (ref 22–31)
CREAT SERPL-MCNC: 0.74 MG/DL — SIGNIFICANT CHANGE UP (ref 0.5–1.3)
EGFR: 101 ML/MIN/1.73M2 — SIGNIFICANT CHANGE UP
ESTIMATED AVERAGE GLUCOSE: 341 MG/DL — HIGH (ref 68–114)
GLUCOSE SERPL-MCNC: 199 MG/DL — HIGH (ref 70–99)
HCT VFR BLD CALC: 34 % — LOW (ref 39–50)
HGB BLD-MCNC: 11.1 G/DL — LOW (ref 13–17)
INR BLD: 2.5 RATIO — HIGH (ref 0.88–1.16)
MCHC RBC-ENTMCNC: 26.9 PG — LOW (ref 27–34)
MCHC RBC-ENTMCNC: 32.6 GM/DL — SIGNIFICANT CHANGE UP (ref 32–36)
MCV RBC AUTO: 82.5 FL — SIGNIFICANT CHANGE UP (ref 80–100)
NRBC # BLD: 0 /100 WBCS — SIGNIFICANT CHANGE UP (ref 0–0)
PLATELET # BLD AUTO: 252 K/UL — SIGNIFICANT CHANGE UP (ref 150–400)
POTASSIUM SERPL-MCNC: 4.2 MMOL/L — SIGNIFICANT CHANGE UP (ref 3.5–5.3)
POTASSIUM SERPL-SCNC: 4.2 MMOL/L — SIGNIFICANT CHANGE UP (ref 3.5–5.3)
PROT SERPL-MCNC: 6.9 G/DL — SIGNIFICANT CHANGE UP (ref 6–8.3)
PROTHROM AB SERPL-ACNC: 29.5 SEC — HIGH (ref 10.5–13.4)
RBC # BLD: 4.12 M/UL — LOW (ref 4.2–5.8)
RBC # FLD: 18.4 % — HIGH (ref 10.3–14.5)
SODIUM SERPL-SCNC: 133 MMOL/L — LOW (ref 135–145)
WBC # BLD: 9.36 K/UL — SIGNIFICANT CHANGE UP (ref 3.8–10.5)
WBC # FLD AUTO: 9.36 K/UL — SIGNIFICANT CHANGE UP (ref 3.8–10.5)

## 2023-04-20 PROCEDURE — 99222 1ST HOSP IP/OBS MODERATE 55: CPT

## 2023-04-20 PROCEDURE — 73718 MRI LOWER EXTREMITY W/O DYE: CPT | Mod: 26,LT

## 2023-04-20 PROCEDURE — 99232 SBSQ HOSP IP/OBS MODERATE 35: CPT

## 2023-04-20 RX ORDER — INSULIN LISPRO 100/ML
VIAL (ML) SUBCUTANEOUS
Refills: 0 | Status: DISCONTINUED | OUTPATIENT
Start: 2023-04-20 | End: 2023-04-23

## 2023-04-20 RX ORDER — SPIRONOLACTONE 25 MG/1
1 TABLET, FILM COATED ORAL
Refills: 0 | DISCHARGE

## 2023-04-20 RX ORDER — WARFARIN SODIUM 2.5 MG/1
4 TABLET ORAL ONCE
Refills: 0 | Status: COMPLETED | OUTPATIENT
Start: 2023-04-20 | End: 2023-04-20

## 2023-04-20 RX ORDER — MULTIVIT-MIN/FERROUS GLUCONATE 9 MG/15 ML
1 LIQUID (ML) ORAL DAILY
Refills: 0 | Status: CANCELLED | OUTPATIENT
Start: 2023-04-20 | End: 2023-04-26

## 2023-04-20 RX ORDER — CEFTRIAXONE 500 MG/1
2000 INJECTION, POWDER, FOR SOLUTION INTRAMUSCULAR; INTRAVENOUS EVERY 24 HOURS
Refills: 0 | Status: COMPLETED | OUTPATIENT
Start: 2023-04-20 | End: 2023-04-26

## 2023-04-20 RX ADMIN — Medication 10 UNIT(S): at 08:30

## 2023-04-20 RX ADMIN — LEVETIRACETAM 750 MILLIGRAM(S): 250 TABLET, FILM COATED ORAL at 05:37

## 2023-04-20 RX ADMIN — INSULIN GLARGINE 30 UNIT(S): 100 INJECTION, SOLUTION SUBCUTANEOUS at 21:31

## 2023-04-20 RX ADMIN — Medication 40 MILLIGRAM(S): at 05:37

## 2023-04-20 RX ADMIN — LEVETIRACETAM 750 MILLIGRAM(S): 250 TABLET, FILM COATED ORAL at 17:13

## 2023-04-20 RX ADMIN — SPIRONOLACTONE 25 MILLIGRAM(S): 25 TABLET, FILM COATED ORAL at 05:37

## 2023-04-20 RX ADMIN — Medication 10 UNIT(S): at 17:13

## 2023-04-20 RX ADMIN — Medication 10 UNIT(S): at 12:24

## 2023-04-20 RX ADMIN — Medication 1 APPLICATION(S): at 12:29

## 2023-04-20 RX ADMIN — Medication 1: at 08:29

## 2023-04-20 RX ADMIN — Medication 25 MILLIGRAM(S): at 05:37

## 2023-04-20 RX ADMIN — Medication 0: at 12:25

## 2023-04-20 RX ADMIN — Medication 81 MILLIGRAM(S): at 12:25

## 2023-04-20 RX ADMIN — ATORVASTATIN CALCIUM 10 MILLIGRAM(S): 80 TABLET, FILM COATED ORAL at 21:31

## 2023-04-20 RX ADMIN — WARFARIN SODIUM 4 MILLIGRAM(S): 2.5 TABLET ORAL at 21:31

## 2023-04-20 RX ADMIN — CEFTRIAXONE 100 MILLIGRAM(S): 500 INJECTION, POWDER, FOR SOLUTION INTRAMUSCULAR; INTRAVENOUS at 14:06

## 2023-04-20 RX ADMIN — PANTOPRAZOLE SODIUM 40 MILLIGRAM(S): 20 TABLET, DELAYED RELEASE ORAL at 12:25

## 2023-04-20 NOTE — PROGRESS NOTE ADULT - SUBJECTIVE AND OBJECTIVE BOX
[facility] DIVISION of INFECTIOUS DISEASE  Guru Michel MD PhD, Lisa James MD, Becca Acosta MD, Karen Grimes MD, Corky Perez MD  and providing coverage with Carlos Marx MD  Providing Infectious Disease Consultations at [facility], [facility], [place], [facility], [facility]    Office# [phone] to schedule follow up appointments  Answering Service for urgent calls or New Consults [phone]  Cell# to text for urgent issues Guru Michel [phone]     infectious diseases progress note:    ARNIE DELGADILLO is a 67y y. o. Male patient    Overnight and events of the last 24hrs reviewed    Allergies    No Known Allergies    Intolerances        ANTIBIOTICS/RELEVANT:  antimicrobials  cefTRIAXone   IVPB 2000 milliGRAM(s) IV Intermittent every 24 hours    immunologic:    OTHER:  acetaminophen     Tablet .. 650 milliGRAM(s) Oral every 6 hours PRN  aspirin enteric coated 81 milliGRAM(s) Oral daily  atorvastatin 10 milliGRAM(s) Oral at bedtime  dextrose 5%. 1000 milliLiter(s) IV Continuous <Continuous>  dextrose 5%. 1000 milliLiter(s) IV Continuous <Continuous>  dextrose 50% Injectable 25 Gram(s) IV Push once  dextrose 50% Injectable 12.5 Gram(s) IV Push once  dextrose 50% Injectable 25 Gram(s) IV Push once  dextrose Oral Gel 15 Gram(s) Oral once PRN  furosemide    Tablet 40 milliGRAM(s) Oral daily  glucagon  Injectable 1 milliGRAM(s) IntraMuscular once  insulin glargine Injectable (LANTUS) 30 Unit(s) SubCutaneous at bedtime  insulin lispro (ADMELOG) corrective regimen sliding scale   SubCutaneous Before meals and at bedtime  insulin lispro Injectable (ADMELOG) 10 Unit(s) SubCutaneous three times a day before meals  levETIRAcetam 750 milliGRAM(s) Oral two times a day  metoprolol succinate ER 25 milliGRAM(s) Oral daily  oxycodone    5 mG/acetaminophen 325 mG 1 Tablet(s) Oral every 6 hours PRN  pantoprazole   Suspension 40 milliGRAM(s) Oral daily  povidone iodine 10% Solution 1 Application(s) Topical daily  spironolactone 25 milliGRAM(s) Oral daily      Objective:  Vital Signs Last 24 Hrs  T(C): 36.9 (2023 05:02), Max: 37 (2023 20:35)  T(F): 98.4 (2023 05:02), Max: 98.6 (2023 20:35)  HR: 75 (2023 05:02) (75 - 99)  BP: 114/72 (2023 05:02) (109/72 - 126/92)  BP(mean): --  RR: 18 (2023 05:02) (18 - 19)  SpO2: 92% (2023 05:02) (92% - 99%)    Parameters below as of 2023 05:02  Patient On (Oxygen Delivery Method): room air        T(C): 36.9 (23 @ 05:02), Max: 37 (23 @ 20:35)  T(C): 36.9 (23 @ 05:02), Max: 37 (23 @ 20:35)  T(C): 36.9 (23 @ 05:02), Max: 37 (23 @ 20:35)    PHYSICAL EXAM:  HEENT: NC atraumatic  Neck: supple  Respiratory: no accessory muscle use, breathing comfortably  Cardiovascular: distant  Gastrointestinal: normal appearing, nondistended  Extremities: no clubbing, no cyanosis, foot/toes dressed        LABS:                          11.1   9.36  )-----------( 252      ( 2023 06:42 )             34.0       WBC  9.36  @ 06:42  11.33  @ 07:30  10.91  @ 20:06      04    133<L>  |  99  |  13  ----------------------------<  199<H>  4.2   |  30  |  0.74    Ca    8.9      2023 06:42    TPro  6.9  /  Alb  2.5<L>  /  TBili  0.7  /  DBili  x   /  AST  29  /  ALT  29  /  AlkPhos  531<H>  20      Creatinine, Serum: 0.74 mg/dL (23 @ 06:42)  Creatinine, Serum: 0.78 mg/dL (23 @ 07:30)  Creatinine, Serum: 0.97 mg/dL (23 @ 20:06)      PT/INR - ( 2023 07:30 )   PT: 84.9 sec;   INR: 7.11 ratio         PTT - ( 2023 20:06 )  PTT:71.2 sec  Urinalysis Basic - ( 2023 21:20 )    Color: Yellow / Appearance: Clear / S.005 / pH: x  Gluc: x / Ketone: Negative  / Bili: Negative / Urobili: Negative   Blood: x / Protein: Negative / Nitrite: Negative   Leuk Esterase: Negative / RBC: 3-5 /HPF / WBC 0-2   Sq Epi: x / Non Sq Epi: x / Bacteria: Occasional            INFLAMMATORY MARKERS      MICROBIOLOGY:              RADIOLOGY & ADDITIONAL STUDIES:  < from: MR Foot No Cont, Left (23 @ 10:44) >    ACC: [id number] EXAM:  MR FOOT LT   ORDERED BY: MARTHA HERNÁNDEZ     PROCEDURE DATE:  2023          INTERPRETATION:  Exam Type: MR FOOT LEFT  Exam Date and Time: 2023 10:44 AM  Indication: Left foot pain with cellulitis. Concern for osteomyelitis of   the left fourth toe  Comparison: Left foot radiograph on 2023    TECHNIQUE:  Multiplanar multisequence MRI of the left foot was performedusing a   standard non-contrast protocol.    FINDINGS:    BONES/JOINTS:  Osseous edema within the distal phalanx of the fourth digit without loss   of normal T1 fatty marrow possibly representing early osteomyelitis   versus reactive edema. There is marked soft tissue swelling around the   fourth phalanx. No soft tissue ulcer extending to bone is visualized.   Correlation with physical exam is advised. Alignment is anatomic. Mild   osteoarthritis of first metatarsophalangeal joint. No joint effusions.   Hallux sesamoid interval is normal.    PLANTAR PLATES:  Intact plantar plates without tear.    INTERMETATARSAL SPACES:  No intermetatarsal neuroma. Second and third intermetatarsal bursitis.    TENDONS:  Visualized portions of the flexor, extensor, and peroneal tendons are   intact without tendinosis or tear. No tenosynovitis.    LISFRANC LIGAMENT:  Plantar (pC1-M2M3), interosseous (C1-M2), and dorsal bands intact.    MUSCLES/SOFT TISSUES:  Mild edema throughout the muscles of the forefoot.    IMPRESSION:  1.  Osseous edema within the distal phalanx of the fourth digit without   loss of normal T1 fatty marrow possibly representing early osteomyelitis   versus reactive edema. There is marked soft tissue swelling around the   fourth phalanx. No soft tissue ulcer extending to bone is visualized.   Correlation with physical exam is advised.  2.  Second and third intermetatarsal bursitis.

## 2023-04-20 NOTE — PROGRESS NOTE ADULT - ASSESSMENT
67 year old male with PMHx CVA (right sided hemiparesis and aphasia), AFib (on coumadin), IDDM, HTN, CAD s/p CABG, s/p AVR, presenting to [place] ED with wound to left 4th metatarsal.  Patient unable to provide history due to aphasia, history obtained from daughter at beside and per chart review.  Patient developed hematoma to left 4th metatarsal on Friday, it then ruptured and patient presented to Podiatry office (Dr. Maegan Jin) earlier today who instructed patient to come to ED for evaluation.  Patient has now new complaints at this time.  Patient does not follow with a vascular surgeon.      1 Toe gangrene, diabetic foot ulcer  - cw abx  - ID,vascular and podiatry fu  - fu cultures  - pain control   JUAN A/PVR's reviewed; likely small vessel disease    2 DM  - monitor FS  - Basal bolus  - diabetic diet  - uncontrolled     3 Hx of CVA  - cw AC  - cw asa, statin    4 Afib, AVR  - INR suratherapeutic  - hold coumadin    5 CAD, CABG  - cw asa, statin  - cards fu

## 2023-04-20 NOTE — CONSULT NOTE ADULT - ASSESSMENT
Physical Exam:   Vital Signs Last 24 Hrs  T(C): 36.3 (20 Apr 2023 13:10), Max: 37 (19 Apr 2023 20:35)  T(F): 97.4 (20 Apr 2023 13:10), Max: 98.6 (19 Apr 2023 20:35)  HR: 76 (20 Apr 2023 13:10) (75 - 99)  BP: 123/75 (20 Apr 2023 13:10) (114/72 - 126/92)  BP(mean): --  RR: 18 (20 Apr 2023 13:10) (18 - 18)  SpO2: 99% (20 Apr 2023 13:10) (92% - 99%)    Parameters below as of 20 Apr 2023 13:10  Patient On (Oxygen Delivery Method): room air             CAPILLARY BLOOD GLUCOSE      POCT Blood Glucose.: 142 mg/dL (20 Apr 2023 12:00)  POCT Blood Glucose.: 193 mg/dL (20 Apr 2023 08:26)  POCT Blood Glucose.: 279 mg/dL (19 Apr 2023 21:36)  POCT Blood Glucose.: 281 mg/dL (19 Apr 2023 17:09)      Cholesterol, Serum: 113 mg/dL (05.19.21 @ 08:36)     HDL Cholesterol, Serum: 22 mg/dL (05.19.21 @ 08:36)     LDL Cholesterol Calculated: 66 mg/dL (05.19.21 @ 08:36)     DIET: CC  >50%

## 2023-04-20 NOTE — PROGRESS NOTE ADULT - ASSESSMENT
67 year old male with PMHx CVA (right sided hemiparesis and aphasia), AFib (on coumadin), IDDM, HTN, CAD s/p CABG, s/p AVR, presenting to [place] ED with wound to left 4th metatarsal.  Presented to Podiatry office (Dr. Maegan Jin) who instructed patient to come to ED for evaluation.    Vascular recommending Recommend ABIs/PVR to assess degree of arterial disease    RECOMMENDATIONS  Podiatry: MRI with Osseous edema within the distal phalanx of the fourth digit without   loss of normal T1 fatty marrow possibly representing early osteomyelitis   versus reactive edema - will follow for any surgical plan per podiatry    Vascular: JUAN A/PVR's reviewed; likely small vessel disease  Podiatry may proceed with intervention as indicated  Cont local wound care and close monitoring for healing  Discussed with Dr Mora  No vascular surgical intervention    Reviewed prior micro and no prior MRSA so have adjusted abx to:  Ceftriaxone 2 gram IV daily -continue for now  recs to follow     Thank you for consulting us and involving us in the management of this most interesting and challenging case.  We will follow along in the care of this patient. Please call us at [phone] or text me directly on my cell# at [phone] with any concerns.

## 2023-04-20 NOTE — DIETITIAN INITIAL EVALUATION ADULT - PERTINENT LABORATORY DATA
04-20    133<L>  |  99  |  13  ----------------------------<  199<H>  4.2   |  30  |  0.74    Ca    8.9      20 Apr 2023 06:42    TPro  6.9  /  Alb  2.5<L>  /  TBili  0.7  /  DBili  x   /  AST  29  /  ALT  29  /  AlkPhos  531<H>  04-20  POCT Blood Glucose.: 142 mg/dL (04-20-23 @ 12:00)  A1C with Estimated Average Glucose Result: 13.5 % (04-20-23 @ 06:42)  A1C with Estimated Average Glucose Result: 13.0 % (04-19-23 @ 07:30)  A1C with Estimated Average Glucose Result: 11.1 % (11-23-22 @ 06:37)

## 2023-04-20 NOTE — PROGRESS NOTE ADULT - SUBJECTIVE AND OBJECTIVE BOX
Date/Time Patient Seen:  		  Referring MD:   Data Reviewed	       Patient is a 67y old  Male who presents with a chief complaint of toe gangrene (19 Apr 2023 16:54)      Subjective/HPI     PAST MEDICAL & SURGICAL HISTORY:  CVA (cerebral vascular accident)    HTN (hypertension)    DM (diabetes mellitus)    Arrhythmia    History of atrial fibrillation    S/P CABG (coronary artery bypass graft)    S/P brain surgery          Medication list         MEDICATIONS  (STANDING):  aspirin enteric coated 81 milliGRAM(s) Oral daily  atorvastatin 10 milliGRAM(s) Oral at bedtime  dextrose 5%. 1000 milliLiter(s) (50 mL/Hr) IV Continuous <Continuous>  dextrose 5%. 1000 milliLiter(s) (100 mL/Hr) IV Continuous <Continuous>  dextrose 50% Injectable 25 Gram(s) IV Push once  dextrose 50% Injectable 12.5 Gram(s) IV Push once  dextrose 50% Injectable 25 Gram(s) IV Push once  furosemide    Tablet 40 milliGRAM(s) Oral daily  glucagon  Injectable 1 milliGRAM(s) IntraMuscular once  insulin glargine Injectable (LANTUS) 30 Unit(s) SubCutaneous at bedtime  insulin lispro (ADMELOG) corrective regimen sliding scale   SubCutaneous three times a day before meals  insulin lispro Injectable (ADMELOG) 10 Unit(s) SubCutaneous three times a day before meals  levETIRAcetam 750 milliGRAM(s) Oral two times a day  metoprolol succinate ER 25 milliGRAM(s) Oral daily  pantoprazole   Suspension 40 milliGRAM(s) Oral daily  povidone iodine 10% Solution 1 Application(s) Topical daily  spironolactone 25 milliGRAM(s) Oral daily    MEDICATIONS  (PRN):  acetaminophen     Tablet .. 650 milliGRAM(s) Oral every 6 hours PRN Temp greater or equal to 38C (100.4F), Mild Pain (1 - 3)  dextrose Oral Gel 15 Gram(s) Oral once PRN Blood Glucose LESS THAN 70 milliGRAM(s)/deciliter  oxycodone    5 mG/acetaminophen 325 mG 1 Tablet(s) Oral every 6 hours PRN Moderate Pain (4 - 6)         Vitals log        ICU Vital Signs Last 24 Hrs  T(C): 36.9 (20 Apr 2023 05:02), Max: 37 (19 Apr 2023 20:35)  T(F): 98.4 (20 Apr 2023 05:02), Max: 98.6 (19 Apr 2023 20:35)  HR: 75 (20 Apr 2023 05:02) (75 - 99)  BP: 114/72 (20 Apr 2023 05:02) (109/72 - 126/92)  BP(mean): --  ABP: --  ABP(mean): --  RR: 18 (20 Apr 2023 05:02) (18 - 19)  SpO2: 92% (20 Apr 2023 05:02) (92% - 99%)    O2 Parameters below as of 20 Apr 2023 05:02  Patient On (Oxygen Delivery Method): room air                 Input and Output:  I&O's Detail    19 Apr 2023 07:01  -  20 Apr 2023 06:16  --------------------------------------------------------  IN:    Oral Fluid: 240 mL  Total IN: 240 mL    OUT:    Voided (mL): 600 mL  Total OUT: 600 mL    Total NET: -360 mL          Lab Data                        11.4   11.33 )-----------( 252      ( 19 Apr 2023 07:30 )             34.8     04-19    132<L>  |  96  |  13  ----------------------------<  247<H>  3.9   |  31  |  0.78    Ca    9.0      19 Apr 2023 07:30    TPro  7.2  /  Alb  2.9<L>  /  TBili  0.6  /  DBili  x   /  AST  23  /  ALT  27  /  AlkPhos  518<H>  04-19            Review of Systems	      Objective     Physical Examination    heart s1s2  lung dc BS  head nc      Pertinent Lab findings & Imaging      Bryan:  NO   Adequate UO     I&O's Detail    19 Apr 2023 07:01  -  20 Apr 2023 06:16  --------------------------------------------------------  IN:    Oral Fluid: 240 mL  Total IN: 240 mL    OUT:    Voided (mL): 600 mL  Total OUT: 600 mL    Total NET: -360 mL               Discussed with:     Cultures:	        Radiology

## 2023-04-20 NOTE — CONSULT NOTE ADULT - SUBJECTIVE AND OBJECTIVE BOX
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 known complications: CVA asphasia. Dtr at the bedside- defers to her sister for the diabetes management- Jeffrey. Spoke with Jeffrey will meet with her in am to discuss diabetes management.   ( Endocrine Last seen Rx home Hx DKA/HHS, Glucometer checks, needs, weight, diet, exercise. diabetes education provided, Hx ASCVD, CKD, HF)  will complete assessment tomorrow.      HPI:   67 year old male with PMHx CVA (right sided hemiparesis and aphasia), AFib (on coumadin), T2DM, HTN, CAD s/p CABG, s/p AVR, presenting to [place] ED with wound to left 4th metatarsal.  Patient unable to provide history due to aphasia, history obtained from daughter at beside and per chart review.  Patient developed hematoma to left 4th metatarsal on Friday, it then ruptured and patient presented to Podiatry office (Dr. Maegan Jin) earlier today who instructed patient to come to ED for evaluation.  Patient has now new complaints at this time.  Patient does not follow with a vascular surgeon.   (18 Apr 2023 21:55)      PAST MEDICAL & SURGICAL HISTORY:  CVA (cerebral vascular accident)      HTN (hypertension)      DM (diabetes mellitus)      Arrhythmia      History of atrial fibrillation      S/P CABG (coronary artery bypass graft)      S/P brain surgery          Allergies    No Known Allergies    Intolerances        MEDICATIONS  (STANDING):  aspirin enteric coated 81 milliGRAM(s) Oral daily  atorvastatin 10 milliGRAM(s) Oral at bedtime  cefTRIAXone   IVPB 2000 milliGRAM(s) IV Intermittent every 24 hours  dextrose 5%. 1000 milliLiter(s) (100 mL/Hr) IV Continuous <Continuous>  dextrose 5%. 1000 milliLiter(s) (50 mL/Hr) IV Continuous <Continuous>  dextrose 50% Injectable 25 Gram(s) IV Push once  dextrose 50% Injectable 12.5 Gram(s) IV Push once  dextrose 50% Injectable 25 Gram(s) IV Push once  furosemide    Tablet 40 milliGRAM(s) Oral daily  glucagon  Injectable 1 milliGRAM(s) IntraMuscular once  insulin glargine Injectable (LANTUS) 30 Unit(s) SubCutaneous at bedtime  insulin lispro (ADMELOG) corrective regimen sliding scale   SubCutaneous Before meals and at bedtime  insulin lispro Injectable (ADMELOG) 10 Unit(s) SubCutaneous three times a day before meals  levETIRAcetam 750 milliGRAM(s) Oral two times a day  metoprolol succinate ER 25 milliGRAM(s) Oral daily  pantoprazole   Suspension 40 milliGRAM(s) Oral daily  povidone iodine 10% Solution 1 Application(s) Topical daily  spironolactone 25 milliGRAM(s) Oral daily       Patient is a 67y old  Male who presents with a chief complaint of toe gangrene (20 Apr 2023 14:23)    Type:2 DX > 10 years. known complications: CVA asphasia. Dtr at the bedside- defers to her sister for the diabetes management- Jeffrey. Spoke with Jeffrey will meet with her in am to discuss diabetes management.   4/21-  dtr Jeffrey- states there is difficulty giving her father insulin- he cries and does not let them. we discussed use of iram monitoring to assist with insulin timing and dosing.        HPI:   67 year old male with PMHx CVA (right sided hemiparesis and aphasia), AFib (on coumadin), T2DM, HTN, CAD s/p CABG, s/p AVR, presenting to [place] ED with wound to left 4th metatarsal.  Patient unable to provide history due to aphasia, history obtained from daughter at beside and per chart review.  Patient developed hematoma to left 4th metatarsal on Friday, it then ruptured and patient presented to Podiatry office (Dr. Maegan Jin) earlier today who instructed patient to come to ED for evaluation.  Patient has now new complaints at this time.  Patient does not follow with a vascular surgeon.   (18 Apr 2023 21:55)      PAST MEDICAL & SURGICAL HISTORY:  CVA (cerebral vascular accident)      HTN (hypertension)      DM (diabetes mellitus)      Arrhythmia      History of atrial fibrillation      S/P CABG (coronary artery bypass graft)      S/P brain surgery          Allergies    No Known Allergies    Intolerances        MEDICATIONS  (STANDING):  aspirin enteric coated 81 milliGRAM(s) Oral daily  atorvastatin 10 milliGRAM(s) Oral at bedtime  cefTRIAXone   IVPB 2000 milliGRAM(s) IV Intermittent every 24 hours  dextrose 5%. 1000 milliLiter(s) (100 mL/Hr) IV Continuous <Continuous>  dextrose 5%. 1000 milliLiter(s) (50 mL/Hr) IV Continuous <Continuous>  dextrose 50% Injectable 25 Gram(s) IV Push once  dextrose 50% Injectable 12.5 Gram(s) IV Push once  dextrose 50% Injectable 25 Gram(s) IV Push once  furosemide    Tablet 40 milliGRAM(s) Oral daily  glucagon  Injectable 1 milliGRAM(s) IntraMuscular once  insulin glargine Injectable (LANTUS) 30 Unit(s) SubCutaneous at bedtime  insulin lispro (ADMELOG) corrective regimen sliding scale   SubCutaneous Before meals and at bedtime  insulin lispro Injectable (ADMELOG) 10 Unit(s) SubCutaneous three times a day before meals  levETIRAcetam 750 milliGRAM(s) Oral two times a day  metoprolol succinate ER 25 milliGRAM(s) Oral daily  pantoprazole   Suspension 40 milliGRAM(s) Oral daily  povidone iodine 10% Solution 1 Application(s) Topical daily  spironolactone 25 milliGRAM(s) Oral daily

## 2023-04-20 NOTE — PROGRESS NOTE ADULT - SUBJECTIVE AND OBJECTIVE BOX
[facility]  Shane Murray PA-C  [address] [place] [address]  [phone]      INTERVAL HPI/OVERNIGHT EVENTS:  Pt s/e  No reported GI events    MEDICATIONS  (STANDING):  aspirin enteric coated 81 milliGRAM(s) Oral daily  atorvastatin 10 milliGRAM(s) Oral at bedtime  cefTRIAXone   IVPB 2000 milliGRAM(s) IV Intermittent every 24 hours  dextrose 5%. 1000 milliLiter(s) (100 mL/Hr) IV Continuous <Continuous>  dextrose 5%. 1000 milliLiter(s) (50 mL/Hr) IV Continuous <Continuous>  dextrose 50% Injectable 25 Gram(s) IV Push once  dextrose 50% Injectable 12.5 Gram(s) IV Push once  dextrose 50% Injectable 25 Gram(s) IV Push once  furosemide    Tablet 40 milliGRAM(s) Oral daily  glucagon  Injectable 1 milliGRAM(s) IntraMuscular once  insulin glargine Injectable (LANTUS) 30 Unit(s) SubCutaneous at bedtime  insulin lispro (ADMELOG) corrective regimen sliding scale   SubCutaneous Before meals and at bedtime  insulin lispro Injectable (ADMELOG) 10 Unit(s) SubCutaneous three times a day before meals  levETIRAcetam 750 milliGRAM(s) Oral two times a day  metoprolol succinate ER 25 milliGRAM(s) Oral daily  pantoprazole   Suspension 40 milliGRAM(s) Oral daily  povidone iodine 10% Solution 1 Application(s) Topical daily  spironolactone 25 milliGRAM(s) Oral daily    MEDICATIONS  (PRN):  acetaminophen     Tablet .. 650 milliGRAM(s) Oral every 6 hours PRN Temp greater or equal to 38C (100.4F), Mild Pain (1 - 3)  dextrose Oral Gel 15 Gram(s) Oral once PRN Blood Glucose LESS THAN 70 milliGRAM(s)/deciliter  oxycodone    5 mG/acetaminophen 325 mG 1 Tablet(s) Oral every 6 hours PRN Moderate Pain (4 - 6)      Allergies    No Known Allergies      PHYSICAL EXAM:   Vital Signs:  Vital Signs Last 24 Hrs  T(C): 36.9 (2023 05:02), Max: 37 (2023 20:35)  T(F): 98.4 (2023 05:02), Max: 98.6 (2023 20:35)  HR: 75 (2023 05:02) (75 - 99)  BP: 114/72 (2023 05:02) (109/72 - 126/92)  BP(mean): --  RR: 18 (2023 05:02) (18 - 19)  SpO2: 92% (2023 05:02) (92% - 99%)    Parameters below as of 2023 05:02  Patient On (Oxygen Delivery Method): room air    GENERAL:  Appears stated age  HEENT:  NC/AT  CHEST:  Full & symmetric excursion  HEART:  Regular rhythm  ABDOMEN:  Soft, non-tender, non-distended  EXTEREMITIES:  no cyanosis  SKIN:  No rash  NEURO:  Alert      LABS:                        11.1   9.36  )-----------( 252      ( 2023 06:42 )             34.0     04-20    133<L>  |  99  |  13  ----------------------------<  199<H>  4.2   |  30  |  0.74    Ca    8.9      2023 06:42    TPro  6.9  /  Alb  2.5<L>  /  TBili  0.7  /  DBili  x   /  AST  29  /  ALT  29  /  AlkPhos  531<H>  04-20    PT/INR - ( 2023 07:30 )   PT: 84.9 sec;   INR: 7.11 ratio         PTT - ( 2023 20:06 )  PTT:71.2 sec  Urinalysis Basic - ( 2023 21:20 )    Color: Yellow / Appearance: Clear / S.005 / pH: x  Gluc: x / Ketone: Negative  / Bili: Negative / Urobili: Negative   Blood: x / Protein: Negative / Nitrite: Negative   Leuk Esterase: Negative / RBC: 3-5 /HPF / WBC 0-2   Sq Epi: x / Non Sq Epi: x / Bacteria: Occasional

## 2023-04-20 NOTE — PROGRESS NOTE ADULT - SUBJECTIVE AND OBJECTIVE BOX
Patient is a 67y old  Male who presents with a chief complaint of toe gangrene (20 Apr 2023 10:09)    No acute events  Family at bedside  Pt without complaints    MEDICATIONS  (STANDING):  aspirin enteric coated 81 milliGRAM(s) Oral daily  atorvastatin 10 milliGRAM(s) Oral at bedtime  cefTRIAXone   IVPB 2000 milliGRAM(s) IV Intermittent every 24 hours  dextrose 5%. 1000 milliLiter(s) (100 mL/Hr) IV Continuous <Continuous>  dextrose 5%. 1000 milliLiter(s) (50 mL/Hr) IV Continuous <Continuous>  dextrose 50% Injectable 25 Gram(s) IV Push once  dextrose 50% Injectable 12.5 Gram(s) IV Push once  dextrose 50% Injectable 25 Gram(s) IV Push once  furosemide    Tablet 40 milliGRAM(s) Oral daily  glucagon  Injectable 1 milliGRAM(s) IntraMuscular once  insulin glargine Injectable (LANTUS) 30 Unit(s) SubCutaneous at bedtime  insulin lispro (ADMELOG) corrective regimen sliding scale   SubCutaneous three times a day before meals  insulin lispro Injectable (ADMELOG) 10 Unit(s) SubCutaneous three times a day before meals  levETIRAcetam 750 milliGRAM(s) Oral two times a day  metoprolol succinate ER 25 milliGRAM(s) Oral daily  pantoprazole   Suspension 40 milliGRAM(s) Oral daily  povidone iodine 10% Solution 1 Application(s) Topical daily  spironolactone 25 milliGRAM(s) Oral daily    MEDICATIONS  (PRN):  acetaminophen     Tablet .. 650 milliGRAM(s) Oral every 6 hours PRN Temp greater or equal to 38C (100.4F), Mild Pain (1 - 3)  dextrose Oral Gel 15 Gram(s) Oral once PRN Blood Glucose LESS THAN 70 milliGRAM(s)/deciliter  oxycodone    5 mG/acetaminophen 325 mG 1 Tablet(s) Oral every 6 hours PRN Moderate Pain (4 - 6)    Allergies  No Known Allergies    Vital Signs Last 24 Hrs  T(C): 36.9 (20 Apr 2023 05:02), Max: 37 (19 Apr 2023 20:35)  T(F): 98.4 (20 Apr 2023 05:02), Max: 98.6 (19 Apr 2023 20:35)  HR: 75 (20 Apr 2023 05:02) (75 - 99)  BP: 114/72 (20 Apr 2023 05:02) (109/72 - 126/92)  BP(mean): --  RR: 18 (20 Apr 2023 05:02) (18 - 19)  SpO2: 92% (20 Apr 2023 05:02) (92% - 99%)    Parameters below as of 20 Apr 2023 05:02  Patient On (Oxygen Delivery Method): room air      I&O's Detail    19 Apr 2023 07:01  -  20 Apr 2023 07:00  --------------------------------------------------------  IN:    Oral Fluid: 240 mL  Total IN: 240 mL    OUT:    Voided (mL): 600 mL  Total OUT: 600 mL    Total NET: -360 mL          Physical Exam:  General: NAD, resting comfortably in bed  Extremities: Left 4th toe with dry gangrenous changes; dorsum of foot mary with mild edema  Pulses:   Left:  FEM [x ]2+ [ ]1+ [ ]doppler                                               POP [x ]2+ [ ]1+ [ ]doppler    DP [ ]2+ [ ]1+ [x ]doppler                                                   PT[ ]2+ [ ]1+ [ ]doppler                                                         LABS:                        11.1   9.36  )-----------( 252      ( 20 Apr 2023 06:42 )             34.0     04-20    133<L>  |  99  |  13  ----------------------------<  199<H>  4.2   |  30  |  0.74    Ca    8.9      20 Apr 2023 06:42    TPro  6.9  /  Alb  2.5<L>  /  TBili  0.7  /  DBili  x   /  AST  29  /  ALT  29  /  AlkPhos  531<H>  04-20    PT/INR - ( 19 Apr 2023 07:30 )   PT: 84.9 sec;   INR: 7.11 ratio         PTT - ( 18 Apr 2023 20:06 )  PTT:71.2 sec    CAPILLARY BLOOD GLUCOSE  POCT Blood Glucose.: 193 mg/dL (20 Apr 2023 08:26)  POCT Blood Glucose.: 279 mg/dL (19 Apr 2023 21:36)  POCT Blood Glucose.: 281 mg/dL (19 Apr 2023 17:09)  POCT Blood Glucose.: 341 mg/dL (19 Apr 2023 11:57)    Radiology and Additional Studies:  < from: [facility] Physiol Extremity Lower 3+ Level, BI (04.19.23 @ 10:23) >  ACC: [id number] EXAM:  PHYSIOL EXTREM LOW 3+ LEV BI   ORDERED BY: RAJ LANE     PROCEDURE DATE:  04/19/2023          INTERPRETATION:  History: Diminished pedal pulses    The blood pressure measurements on the right are in excess of 240 mmHg in  the upper thigh, 142 in the lower thigh, 137 in the upper calf, and at   the right ankle 118 in the posterior tibial artery and 158 in the dorsal   pedis artery.    The right ankle-brachial index equals 1.32.    The right toe blood pressure measurement is 29 mmHg, and the right   toe-brachial index equals 0.24. This is a low abnormal number.    The blood pressure measurements on the left are 190 mmHg in the upper   thigh, 162 in the lower thigh, in excess of 240 mmHg in the upper calf,   and atthe left ankle in excess of 240 mmHg in both the posterior tibial   and dorsal pedis arteries.    Artificially elevated blood pressure measurements are characteristic for   calcified, noncompressible arteries, often found in patients with   diabetes and/or end-stage kidney disease. As such, a meaningful left   ankle-brachial index cannot be calculated.    The blood pressure measurement at the left great toe is 43 mmHg and the   left toe-brachial index equals 0.36. This too is an abnormally low value.    The pulse volume recordings at the right upper and lower thigh and left   upper thigh are degraded by motion, but the amplitudes are normal.    The amplitude of the pulse volume recordings at the right calf are   reduced, and further reduced at the levels of the right ankle,   metatarsals and toes.    The amplitude of the pulse volume recordings on the left are normal or   near normal at the levels of the upper thigh through the metatarsals, but   reduced at the toes.    IMPRESSION:    Theright JUAN A of 1.32 normal. The right TBI of 0.24 is evidence for   disease affecting the small arteries of the right foot.    The quality of this examination is adversely impacted on the left by the   noncompressible nature of the calcified arteries.    The left TBI of 0.36 is abnormal and suggests disease affecting the small   arteries of this foot.    < end of copied text >

## 2023-04-20 NOTE — PROGRESS NOTE ADULT - SUBJECTIVE AND OBJECTIVE BOX
Neurology follow up note    ARNIE [id number]      Interval History:    Patient feels ok no new complaints.    Allergies    No Known Allergies    Intolerances        MEDICATIONS    acetaminophen     Tablet .. 650 milliGRAM(s) Oral every 6 hours PRN  aspirin enteric coated 81 milliGRAM(s) Oral daily  atorvastatin 10 milliGRAM(s) Oral at bedtime  dextrose 5%. 1000 milliLiter(s) IV Continuous <Continuous>  dextrose 5%. 1000 milliLiter(s) IV Continuous <Continuous>  dextrose 50% Injectable 25 Gram(s) IV Push once  dextrose 50% Injectable 12.5 Gram(s) IV Push once  dextrose 50% Injectable 25 Gram(s) IV Push once  dextrose Oral Gel 15 Gram(s) Oral once PRN  furosemide    Tablet 40 milliGRAM(s) Oral daily  glucagon  Injectable 1 milliGRAM(s) IntraMuscular once  insulin glargine Injectable (LANTUS) 30 Unit(s) SubCutaneous at bedtime  insulin lispro (ADMELOG) corrective regimen sliding scale   SubCutaneous three times a day before meals  insulin lispro Injectable (ADMELOG) 10 Unit(s) SubCutaneous three times a day before meals  levETIRAcetam 750 milliGRAM(s) Oral two times a day  metoprolol succinate ER 25 milliGRAM(s) Oral daily  oxycodone    5 mG/acetaminophen 325 mG 1 Tablet(s) Oral every 6 hours PRN  pantoprazole   Suspension 40 milliGRAM(s) Oral daily  povidone iodine 10% Solution 1 Application(s) Topical daily  spironolactone 25 milliGRAM(s) Oral daily              Vital Signs Last 24 Hrs  T(C): 36.9 (2023 05:02), Max: 37 (2023 20:35)  T(F): 98.4 (2023 05:02), Max: 98.6 (2023 20:35)  HR: 75 (2023 05:02) (75 - 99)  BP: 114/72 (2023 05:02) (109/72 - 126/92)  BP(mean): --  RR: 18 (2023 05:02) (18 - 19)  SpO2: 92% (2023 05:02) (92% - 99%)    Parameters below as of 2023 05:02  Patient On (Oxygen Delivery Method): room air    REVIEW OF SYSTEMS:  Completely limited secondary to the patient repeats words over, has severe expressive aphasia, but had been in his normal state of health as per daughter.    PHYSICAL EXAMINATION:  VHEENT:  Head:  Normocephalic, atraumatic.  Eyes:  No scleral icterus.  Ears:  Hard to evaluate secondary to he could not answer questions accordingly but appears to follow commands.  NECK:  Supple.  RESPIRATORY:  Air entry bilaterally.  CARDIOVASCULAR:  S1 and S2 heard.  ABDOMEN:  Soft and nontender.  EXTREMITIES:  Positive discoloration was noted on the left toe.      NEUROLOGIC:  The patient was awake, alert.  On-off blink to visual threat.  Positive expressive aphasia.  Will say a few words but repeat the same words over.  Right upper and right lower were 0/5 with increased tone.  Right hemiplegia is his baseline.  Left upper and left lower were 4/5.                  LABS:  CBC Full  -  ( 2023 06:42 )  WBC Count : 9.36 K/uL  RBC Count : 4.12 M/uL  Hemoglobin : 11.1 g/dL  Hematocrit : 34.0 %  Platelet Count - Automated : 252 K/uL  Mean Cell Volume : 82.5 fl  Mean Cell Hemoglobin : 26.9 pg  Mean Cell Hemoglobin Concentration : 32.6 gm/dL  Auto Neutrophil # : x  Auto Lymphocyte # : x  Auto Monocyte # : x  Auto Eosinophil # : x  Auto Basophil # : x  Auto Neutrophil % : x  Auto Lymphocyte % : x  Auto Monocyte % : x  Auto Eosinophil % : x  Auto Basophil % : x    Urinalysis Basic - ( 2023 21:20 )    Color: Yellow / Appearance: Clear / S.005 / pH: x  Gluc: x / Ketone: Negative  / Bili: Negative / Urobili: Negative   Blood: x / Protein: Negative / Nitrite: Negative   Leuk Esterase: Negative / RBC: 3-5 /HPF / WBC 0-2   Sq Epi: x / Non Sq Epi: x / Bacteria: Occasional          132<L>  |  96  |  13  ----------------------------<  247<H>  3.9   |  31  |  0.78    Ca    9.0      2023 07:30    TPro  7.2  /  Alb  2.9<L>  /  TBili  0.6  /  DBili  x   /  AST  23  /  ALT  27  /  AlkPhos  518<H>      Hemoglobin A1C:     LIVER FUNCTIONS - ( 2023 07:30 )  Alb: 2.9 g/dL / Pro: 7.2 g/dL / ALK PHOS: 518 U/L / ALT: 27 U/L / AST: 23 U/L / GGT: x           Vitamin B12   PT/INR - ( 2023 07:30 )   PT: 84.9 sec;   INR: 7.11 ratio         PTT - ( 2023 20:06 )  PTT:71.2 sec      RADIOLOGY    ANALYSIS AND PLAN:  This is a 67-year-old with a history of atrial fibrillation, cerebrovascular accident, and seizure.  For history of atrial fibrillation and cerebrovascular accident, risks versus benefits.  The patient at present does have elevated INR, would recommend to hold for now with a goal INR between 2 and 3.  Questionable the patient will need any type of surgical intervention for his toe.  For history of seizure prophylaxis, continue the patient on Keppra.  For history of diabetes, strict control of blood sugars.  For history of hypertension, monitor systolic blood pressure.  Spoke with one daughter at bedside.  Other daughter is Jeffrey.  Her telephone number is [phone].  She understands my reasoning and thought process.  Greater than 45 minutes of time was spent with the patient, plan of care, reviewing data, with greater than 50% of the visit was spent counseling and/or coordinating care with multidisciplinary healthcare team   Neurology follow up note    ARNIE [id number]      Interval History:    Patient feels ok no new complaints.    Allergies    No Known Allergies    Intolerances        MEDICATIONS    acetaminophen     Tablet .. 650 milliGRAM(s) Oral every 6 hours PRN  aspirin enteric coated 81 milliGRAM(s) Oral daily  atorvastatin 10 milliGRAM(s) Oral at bedtime  dextrose 5%. 1000 milliLiter(s) IV Continuous <Continuous>  dextrose 5%. 1000 milliLiter(s) IV Continuous <Continuous>  dextrose 50% Injectable 25 Gram(s) IV Push once  dextrose 50% Injectable 12.5 Gram(s) IV Push once  dextrose 50% Injectable 25 Gram(s) IV Push once  dextrose Oral Gel 15 Gram(s) Oral once PRN  furosemide    Tablet 40 milliGRAM(s) Oral daily  glucagon  Injectable 1 milliGRAM(s) IntraMuscular once  insulin glargine Injectable (LANTUS) 30 Unit(s) SubCutaneous at bedtime  insulin lispro (ADMELOG) corrective regimen sliding scale   SubCutaneous three times a day before meals  insulin lispro Injectable (ADMELOG) 10 Unit(s) SubCutaneous three times a day before meals  levETIRAcetam 750 milliGRAM(s) Oral two times a day  metoprolol succinate ER 25 milliGRAM(s) Oral daily  oxycodone    5 mG/acetaminophen 325 mG 1 Tablet(s) Oral every 6 hours PRN  pantoprazole   Suspension 40 milliGRAM(s) Oral daily  povidone iodine 10% Solution 1 Application(s) Topical daily  spironolactone 25 milliGRAM(s) Oral daily              Vital Signs Last 24 Hrs  T(C): 36.9 (2023 05:02), Max: 37 (2023 20:35)  T(F): 98.4 (2023 05:02), Max: 98.6 (2023 20:35)  HR: 75 (2023 05:02) (75 - 99)  BP: 114/72 (2023 05:02) (109/72 - 126/92)  BP(mean): --  RR: 18 (2023 05:02) (18 - 19)  SpO2: 92% (2023 05:02) (92% - 99%)    Parameters below as of 2023 05:02  Patient On (Oxygen Delivery Method): room air    REVIEW OF SYSTEMS:  Completely limited secondary to the patient repeats words over, has severe expressive aphasia, but had been in his normal state of health as per daughter.    PHYSICAL EXAMINATION:  HEENT:  Head:  Normocephalic, atraumatic.  Eyes:  No scleral icterus.  Ears:  Hard to evaluate secondary to he could not answer questions accordingly but appears to follow commands.  NECK:  Supple.  RESPIRATORY:  Air entry bilaterally.  CARDIOVASCULAR:  S1 and S2 heard.  ABDOMEN:  Soft and nontender.  EXTREMITIES:  Positive discoloration was noted on the left toe.      NEUROLOGIC:  The patient was awake, alert.  On-off blink to visual threat.  Positive expressive aphasia.  Will say a few words but repeat the same words over.  Right upper and right lower were 0/5 with increased tone.  Right hemiplegia is his baseline.  Left upper and left lower were 4/5.                  LABS:  CBC Full  -  ( 2023 06:42 )  WBC Count : 9.36 K/uL  RBC Count : 4.12 M/uL  Hemoglobin : 11.1 g/dL  Hematocrit : 34.0 %  Platelet Count - Automated : 252 K/uL  Mean Cell Volume : 82.5 fl  Mean Cell Hemoglobin : 26.9 pg  Mean Cell Hemoglobin Concentration : 32.6 gm/dL  Auto Neutrophil # : x  Auto Lymphocyte # : x  Auto Monocyte # : x  Auto Eosinophil # : x  Auto Basophil # : x  Auto Neutrophil % : x  Auto Lymphocyte % : x  Auto Monocyte % : x  Auto Eosinophil % : x  Auto Basophil % : x    Urinalysis Basic - ( 2023 21:20 )    Color: Yellow / Appearance: Clear / S.005 / pH: x  Gluc: x / Ketone: Negative  / Bili: Negative / Urobili: Negative   Blood: x / Protein: Negative / Nitrite: Negative   Leuk Esterase: Negative / RBC: 3-5 /HPF / WBC 0-2   Sq Epi: x / Non Sq Epi: x / Bacteria: Occasional          132<L>  |  96  |  13  ----------------------------<  247<H>  3.9   |  31  |  0.78    Ca    9.0      2023 07:30    TPro  7.2  /  Alb  2.9<L>  /  TBili  0.6  /  DBili  x   /  AST  23  /  ALT  27  /  AlkPhos  518<H>  04-19    Hemoglobin A1C:     LIVER FUNCTIONS - ( 2023 07:30 )  Alb: 2.9 g/dL / Pro: 7.2 g/dL / ALK PHOS: 518 U/L / ALT: 27 U/L / AST: 23 U/L / GGT: x           Vitamin B12   PT/INR - ( 2023 07:30 )   PT: 84.9 sec;   INR: 7.11 ratio         PTT - ( 2023 20:06 )  PTT:71.2 sec      RADIOLOGY    ANALYSIS AND PLAN:  This is a 67-year-old with a history of atrial fibrillation, cerebrovascular accident, and seizure.  For history of atrial fibrillation and cerebrovascular accident, risks versus benefits.  The patient at present does have elevated INR, would recommend to hold for now with a goal INR between 2 and 3.  Questionable the patient will need any type of surgical intervention for his toe.  For history of seizure prophylaxis, continue the patient on Keppra.  For history of diabetes, strict control of blood sugars.  For history of hypertension, monitor systolic blood pressure.  Excisional debridement with 15 blade of left 4th toe  base down to subcutaneous tissueLeft foot ulceration  Spoke with one daughter at bedside.  Other daughter is Jeffrey.  Her telephone number is [phone] .  She understands my reasoning and thought process.  Greater than 45 minutes of time was spent with the patient, plan of care, reviewing data, with greater than 50% of the visit was spent counseling and/or coordinating care with multidisciplinary healthcare team

## 2023-04-20 NOTE — PROGRESS NOTE ADULT - SUBJECTIVE AND OBJECTIVE BOX
[facility] Cardiology    CHIEF COMPLAINT: Patient is a 67y old  Male who presents with a chief complaint of toe gangrene (20 Apr 2023 07:46)      Follow Up: [ ] Chest Pain      [ ] Dyspnea     [ ] Palpitations    [ ] Atrial Fibrillation     [ ] Ventricular Dysrhythmia    [ ] Abnormal EKG                      [ ] Abnormal Cardiac Enzymes     [ ] Valvular Disease    HPI:   67 year old male with PMHx CVA (right sided hemiparesis and aphasia), AFib (on coumadin), IDDM, HTN, CAD s/p CABG, s/p AVR, presenting to [place] ED with wound to left 4th metatarsal.  Patient unable to provide history due to aphasia, history obtained from daughter at beside and per chart review.  Patient developed hematoma to left 4th metatarsal on Friday, it then ruptured and patient presented to Podiatry office (Dr. Maegan Jin) earlier today who instructed patient to come to ED for evaluation.  Patient has now new complaints at this time.  Patient does not follow with a vascular surgeon.   (18 Apr 2023 21:55)    PAST MEDICAL & SURGICAL HISTORY:  CVA (cerebral vascular accident)      HTN (hypertension)      DM (diabetes mellitus)      Arrhythmia      History of atrial fibrillation      S/P CABG (coronary artery bypass graft)      S/P brain surgery        MEDICATIONS  (STANDING):  aspirin enteric coated 81 milliGRAM(s) Oral daily  atorvastatin 10 milliGRAM(s) Oral at bedtime  dextrose 5%. 1000 milliLiter(s) (50 mL/Hr) IV Continuous <Continuous>  dextrose 5%. 1000 milliLiter(s) (100 mL/Hr) IV Continuous <Continuous>  dextrose 50% Injectable 25 Gram(s) IV Push once  dextrose 50% Injectable 12.5 Gram(s) IV Push once  dextrose 50% Injectable 25 Gram(s) IV Push once  furosemide    Tablet 40 milliGRAM(s) Oral daily  glucagon  Injectable 1 milliGRAM(s) IntraMuscular once  insulin glargine Injectable (LANTUS) 30 Unit(s) SubCutaneous at bedtime  insulin lispro (ADMELOG) corrective regimen sliding scale   SubCutaneous three times a day before meals  insulin lispro Injectable (ADMELOG) 10 Unit(s) SubCutaneous three times a day before meals  levETIRAcetam 750 milliGRAM(s) Oral two times a day  metoprolol succinate ER 25 milliGRAM(s) Oral daily  pantoprazole   Suspension 40 milliGRAM(s) Oral daily  povidone iodine 10% Solution 1 Application(s) Topical daily  spironolactone 25 milliGRAM(s) Oral daily    MEDICATIONS  (PRN):  acetaminophen     Tablet .. 650 milliGRAM(s) Oral every 6 hours PRN Temp greater or equal to 38C (100.4F), Mild Pain (1 - 3)  dextrose Oral Gel 15 Gram(s) Oral once PRN Blood Glucose LESS THAN 70 milliGRAM(s)/deciliter  oxycodone    5 mG/acetaminophen 325 mG 1 Tablet(s) Oral every 6 hours PRN Moderate Pain (4 - 6)    Allergies    No Known Allergies    Intolerances        REVIEW OF SYSTEMS:    CONSTITUTIONAL: No weakness, fevers or chills.   EYES/ENT: No visual changes;    NECK: No pain or stiffness  RESPIRATORY: No cough, wheezing, No shortness of breath  CARDIOVASCULAR: No chest pain or palpitations  GASTROINTESTINAL: No abdominal pain, or hematochezia.  GENITOURINARY: No dysuria orhematuria  NEUROLOGICAL: No numbness or weakness  SKIN: No itching, burning, rashes  All other review of systems is negative unless indicated above    Vital Signs Last 24 Hrs  T(C): 36.9 (20 Apr 2023 05:02), Max: 37 (19 Apr 2023 20:35)  T(F): 98.4 (20 Apr 2023 05:02), Max: 98.6 (19 Apr 2023 20:35)  HR: 75 (20 Apr 2023 05:02) (75 - 99)  BP: 114/72 (20 Apr 2023 05:02) (109/72 - 126/92)  BP(mean): --  RR: 18 (20 Apr 2023 05:02) (18 - 19)  SpO2: 92% (20 Apr 2023 05:02) (92% - 99%)    Parameters below as of 20 Apr 2023 05:02  Patient On (Oxygen Delivery Method): room air    I&O's Summary    19 Apr 2023 07:01  -  20 Apr 2023 07:00  --------------------------------------------------------  IN: 240 mL / OUT: 600 mL / NET: -360 mL    PHYSICAL EXAM:  Constitutional: NAD  Neurological: Alert, no focal deficits  HEENT: no JVD, EOMI  Cardiovascular: Regular, S1 and S2, no murmur  Pulmonary: breath sounds bilaterally  Gastrointestinal: Bowel Sounds present, soft, nontender  EXT:  no peripheral edema  Skin: No rashes.  Psych:  Mood calm  LABS: All Labs Reviewed:                          11.1   9.36  )-----------( 252      ( 20 Apr 2023 06:42 )             34.0     04-20    133<L>  |  99  |  13  ----------------------------<  199<H>  4.2   |  30  |  0.74    Ca    8.9      20 Apr 2023 06:42    TPro  6.9  /  Alb  2.5<L>  /  TBili  0.7  /  DBili  x   /  AST  29  /  ALT  29  /  AlkPhos  531<H>  04-20    PT/INR - ( 19 Apr 2023 07:30 )   PT: 84.9 sec;   INR: 7.11 ratio         PTT - ( 18 Apr 2023 20:06 )  PTT:71.2 sec    12 Lead ECG:   Ventricular Rate 69 BPM    QRS Duration 72 ms    Q-T Interval 396 ms    QTC Calculation(Bazett) 424 ms    R Axis 20 degrees    T Axis 154 degrees    Diagnosis Line Atrial fibrillation  ST & T wave abnormality, consider anterolateral ischemia  Abnormal ECG  When compared with ECG of 18-APR-2023 20:38, (Unconfirmed)  No significant change was found  Confirmed by Ketty Martin ([id number]) on 4/19/2023 12:27:26 PM (04-18-23 @ 20:39)  12 Lead ECG:   Ventricular Rate 80 BPM    QRS Duration 72 ms    Q-T Interval 388 ms    QTC Calculation(Bazett) 447 ms    R Axis 23 degrees    T Axis 154 degrees    Diagnosis Line Atrial fibrillation  ST & T wave abnormality, consider inferolateral ischemia  Abnormal ECG  When compared with ECG of 28-NOV-2022 10:18,  T wave inversion now evident in Anterior leads  Confirmed by Ketty Martin ([id number]) on 4/19/2023 7:37:08 AM (04-18-23 @ 20:38)  Xray Chest 1 View-PORTABLE IMMEDIATE:   ACC: [id number] EXAM:  XR FOOT COMP MIN 3 VIEWS LT   ORDERED BY: NEGRO RAMIREZ     ACC: [id number] EXAM:  XR CHEST PORTABLE IMMED 1V   ORDERED BY: NEGRO RAMIREZ   PROCEDURE DATE:  04/18/2023      INTERPRETATION:  AP chest on April 18, 2023 at 8:14 PM. Patient has   sepsis.    Heart magnified by technique.    Sternotomy and prosthetic heart valve again noted.    Scattered loculated effusions and infiltrates in the left mid lower lung   field are similar to November 28, 2022. On the November study there was   an infiltrate in the right midlung which has cleared. Otherwise similar   findings.    Left foot. Patient has a toe infection. 3 views.    There is a mild hallux valgus with degeneration. No bone destruction or   fracture.    IMPRESSION: No acute finding.    Persistent loculated effusion and scattered infiltrate in the left mid   lower lung field.    --- End of Report ---  RADHAMES ROBERTS MD; Attending Radiologist  This document has been electronically signed.Apr 19 2023  9:34AM (04-18-23 @ 20:10)    · Assessment    67 year old male with PMHx CVA (cerebral hemorrhage from coumadin toxicity with right sided hemiparesis and aphasia), AFib (on coumadin), IDDM, HTN, CAD s/p CABG, s/p mechanical AVR, h/o GI bleed from esophageal varices.      Pt a/w wound to left 4th metatarsal s/p ruptured hematoma.  Left 4th metatarsal center of erythremic tissue to exposed nail bed with surrounding area of hyperemic, dark coloring tissue.  Area of erythema to anterior foot.  B/L palpable POP, AT, PT pulses.  B/L non-palpable, dopplerable DP pulses. INR noted to be 7.16.      Suggest:  1. Cellulitis, gangrenous 4th metatarsal  - Workup per vascular  - If pt needs debridement procedure, he is stable from cardiac standpoint once INR reaches acceptable level.   2. Mech Ao Valve/Afib  - Coumadin with Goal INR 2.5- 3.5.  Supratherapeutic INR to 7.   - Pt with h/o cerebral hemorrhagic CVA from coumadin toxicity   - Holding coumadin and monitor INR    3. CAD CABG  - C/w Lipitor and aspirin  4. TTE  TTE Echo Complete w/o Contrast w/ Doppler --11.22.22 @ 19:15  Normal LV systolic function EF 55%, history of aortic valve replacement,   mean aortic gradient 19 mmHg, mild aortic stenosis suggested, biatrial   enlargement, normal pericardium, RVSP 34 mmHg  Will follow here

## 2023-04-20 NOTE — PROGRESS NOTE ADULT - SUBJECTIVE AND OBJECTIVE BOX
Patient is a 67y old  Male who presents with a chief complaint of toe gangrene (2023 12:51)    Date of servie : 23 @ 14:23  INTERVAL HPI/OVERNIGHT EVENTS:  T(C): 36.3 (23 @ 13:10), Max: 37 (23 @ 20:35)  HR: 76 (23 @ 13:10) (75 - 99)  BP: 123/75 (23 @ 13:10) (114/72 - 126/92)  RR: 18 (23 @ 13:10) (18 - 18)  SpO2: 99% (23 @ 13:10) (92% - 99%)  Wt(kg): --  I&O's Summary    2023 07:01  -  2023 07:00  --------------------------------------------------------  IN: 240 mL / OUT: 600 mL / NET: -360 mL        LABS:                        11.1   9.36  )-----------( 252      ( 2023 06:42 )             34.0     0420    133<L>  |  99  |  13  ----------------------------<  199<H>  4.2   |  30  |  0.74    Ca    8.9      2023 06:42    TPro  6.9  /  Alb  2.5<L>  /  TBili  0.7  /  DBili  x   /  AST  29  /  ALT  29  /  AlkPhos  531<H>  04-20    PT/INR - ( 2023 07:30 )   PT: 84.9 sec;   INR: 7.11 ratio         PTT - ( 2023 20:06 )  PTT:71.2 sec  Urinalysis Basic - ( 2023 21:20 )    Color: Yellow / Appearance: Clear / S.005 / pH: x  Gluc: x / Ketone: Negative  / Bili: Negative / Urobili: Negative   Blood: x / Protein: Negative / Nitrite: Negative   Leuk Esterase: Negative / RBC: 3-5 /HPF / WBC 0-2   Sq Epi: x / Non Sq Epi: x / Bacteria: Occasional      CAPILLARY BLOOD GLUCOSE      POCT Blood Glucose.: 142 mg/dL (2023 12:00)  POCT Blood Glucose.: 193 mg/dL (2023 08:26)  POCT Blood Glucose.: 279 mg/dL (2023 21:36)  POCT Blood Glucose.: 281 mg/dL (2023 17:09)        Urinalysis Basic - ( 2023 21:20 )    Color: Yellow / Appearance: Clear / S.005 / pH: x  Gluc: x / Ketone: Negative  / Bili: Negative / Urobili: Negative   Blood: x / Protein: Negative / Nitrite: Negative   Leuk Esterase: Negative / RBC: 3-5 /HPF / WBC 0-2   Sq Epi: x / Non Sq Epi: x / Bacteria: Occasional        MEDICATIONS  (STANDING):  aspirin enteric coated 81 milliGRAM(s) Oral daily  atorvastatin 10 milliGRAM(s) Oral at bedtime  cefTRIAXone   IVPB 2000 milliGRAM(s) IV Intermittent every 24 hours  dextrose 5%. 1000 milliLiter(s) (100 mL/Hr) IV Continuous <Continuous>  dextrose 5%. 1000 milliLiter(s) (50 mL/Hr) IV Continuous <Continuous>  dextrose 50% Injectable 25 Gram(s) IV Push once  dextrose 50% Injectable 12.5 Gram(s) IV Push once  dextrose 50% Injectable 25 Gram(s) IV Push once  furosemide    Tablet 40 milliGRAM(s) Oral daily  glucagon  Injectable 1 milliGRAM(s) IntraMuscular once  insulin glargine Injectable (LANTUS) 30 Unit(s) SubCutaneous at bedtime  insulin lispro (ADMELOG) corrective regimen sliding scale   SubCutaneous Before meals and at bedtime  insulin lispro Injectable (ADMELOG) 10 Unit(s) SubCutaneous three times a day before meals  levETIRAcetam 750 milliGRAM(s) Oral two times a day  metoprolol succinate ER 25 milliGRAM(s) Oral daily  pantoprazole   Suspension 40 milliGRAM(s) Oral daily  povidone iodine 10% Solution 1 Application(s) Topical daily  spironolactone 25 milliGRAM(s) Oral daily    MEDICATIONS  (PRN):  acetaminophen     Tablet .. 650 milliGRAM(s) Oral every 6 hours PRN Temp greater or equal to 38C (100.4F), Mild Pain (1 - 3)  dextrose Oral Gel 15 Gram(s) Oral once PRN Blood Glucose LESS THAN 70 milliGRAM(s)/deciliter  oxycodone    5 mG/acetaminophen 325 mG 1 Tablet(s) Oral every 6 hours PRN Moderate Pain (4 - 6)          PHYSICAL EXAM:  GENERAL: frail  CHEST/LUNG: Clear to percussion bilaterally; No rales, rhonchi, wheezing, or rubs  HEART: Regular rate and rhythm; No murmurs, rubs, or gallops  ABDOMEN: Soft, Nontender, Nondistended; Bowel sounds present  EXTREMITIES:  foot dressing +    Care Discussed with Consultants/Other Providers [x ] YES  [ ] NO

## 2023-04-20 NOTE — PROGRESS NOTE ADULT - ASSESSMENT
67 year old male with PMHx CVA (right sided hemiparesis and aphasia), AFib (on coumadin), IDDM, HTN, CAD s/p CABG, s/p AVR, presenting to [place] ED with wound to left 4th metatarsal    cva hx of craniectomy  AF  OP  OA  foot wound  CAD  DM  HTN  left eff - atelectasis  coagulopathy     ID - Cardio - Podiatry eval noted  on LASIX  vs noted  labs reviewed    cxr noted - consistent with prior CT imaging and CXR - rounded Atelectasis - chr change  no evidence of PNA  wound infection - ID eval - Podiatric eval  pain assessment  DM care  cvs rx regimen  serial COAGS -   assist with needs  fall prec  dietary discretion  old records reviewed - labs - imaging - notes  spoke with family on admission at bedside

## 2023-04-20 NOTE — PHARMACOTHERAPY INTERVENTION NOTE - COMMENTS
Patient w/ PMH A Fib (mechanical aortic valve) is ordered for warfarin 4mg X 1 (alternating between 2 and 3 mg). Patient came in with supra-therapeutic INR of 7.16 yesterday. INR today is 2.5 (goal of 2.5-3.5). Discussed w/ Dr. Harmon and recommended to decrease dose to 2mg. MD would like to continue with current dose due to clinical judgment.  Patient w/ PMH A Fib (mechanical aortic valve) is ordered for warfarin 4mg X 1 (alternating between 2 and 3 mg). Patient came in with supra-therapeutic INR of 7.11 on 4/18. INR today is 2.5 (goal of 2.5-3.5). Discussed w/ Dr. Harmon and recommended to decrease dose to 2mg. MD would like to continue with current dose due to clinical judgment.

## 2023-04-20 NOTE — DIETITIAN INITIAL EVALUATION ADULT - ADD RECOMMEND
Glucerna 8oz po daily. MVI with minerals daily.  Glucerna 8oz po daily. MVI with minerals daily. Insulin adjustments per MD/CDE.

## 2023-04-20 NOTE — PROGRESS NOTE ADULT - ASSESSMENT
elevated lfts    suspect lft elevation 2/2 osteo  cont diet as tolerated  antibiotics per ID  will follow     I reviewed the overnight course of events on the unit, re-confirming the patient history. I discussed the care with the patient and their family  Differential diagnosis and plan of care discussed with patient after the evaluation  50 minutes spent on total encounter of which more than fifty percent of the encounter was spent counseling and/or coordinating care by the attending physician.  Advanced care planning was discussed with patient and family.  Advanced care planning forms were reviewed and discussed.  Risks, benefits and alternatives of gastroenterologic procedures were discussed in detail and all questions were answered.

## 2023-04-20 NOTE — DIETITIAN INITIAL EVALUATION ADULT - OTHER INFO
Pt sleeping soundly at time of visit; noted pt with hx CVA/aphasia. Pts daughter at bedside with fair English and declined  services for father. Additional telephone conversation with pts second daughter for subjective information and during nutrition education. Pt on consistent carbohydrate diet. Well tolerated with good appetite/po intake since admission; % documented on 4/19 and 4/20. Pta relatively good appetite per daughter. , stable. No report difficulty chewing/swallowing. Pta pt lives with wife, son and 2 daughter. They try to watch the sugar in pts diet. Does not consume juices/sodas/sweets. Fresh fruit as snack. No beef/no pork. Hx uncontrolled DM, Hgba1c 13.5%. Takes 50units lantus q hs plus sliding scale novolog 2x/day (roughly 25units at breakfast and dinner). Per daughter does not typically give insulin with lunch as pt has lighter meal? Recommend insulin adjustments per Endo/CDE for improved glycemia control. Verbal/written DM, heart healthy nutrition therapy provided to pts daughter. Questionable comprehension however pts daughters declined  services. Will remain available.  Pt sleeping soundly at time of visit; noted pt with hx CVA/aphasia. Pts daughter at bedside with fair English and declined  services for father. Additional telephone conversation with pts second daughter for subjective information and during nutrition education. Pt on consistent carbohydrate diet. Well tolerated with good appetite/po intake since admission; % documented on 4/19 and 4/20. Pta relatively good appetite per daughter. , stable. No report difficulty chewing/swallowing. Pta pt lives with wife, son and 2 daughters. They try to watch the sugar in pts diet. Does not consume juices/sodas/sweets. Fresh fruit as snack. No beef/no pork. Hx uncontrolled DM, Hgba1c 13.5%. Takes 50units lantus q hs plus sliding scale novolog 2x/day (roughly 25units at breakfast and dinner). Per daughter does not typically give insulin with lunch as pt has lighter meal? Recommend insulin adjustments per Endo/CDE for improved glycemia control. Currently on 30units lantus q hs & 10units admelog TID. Endo consult pending. Verbal/written DM, heart healthy nutrition therapy provided to pts daughter. Questionable comprehension however pts daughters declined  services. Will remain available.

## 2023-04-20 NOTE — PROGRESS NOTE ADULT - SUBJECTIVE AND OBJECTIVE BOX
Patient is a 67y Male whom presented to the hospital with hyponatremia     PAST MEDICAL & SURGICAL HISTORY:  CVA (cerebral vascular accident)      HTN (hypertension)      DM (diabetes mellitus)      Arrhythmia      History of atrial fibrillation      S/P CABG (coronary artery bypass graft)      S/P brain surgery          MEDICATIONS  (STANDING):  aspirin enteric coated 81 milliGRAM(s) Oral daily  atorvastatin 10 milliGRAM(s) Oral at bedtime  dextrose 5%. 1000 milliLiter(s) (50 mL/Hr) IV Continuous <Continuous>  dextrose 5%. 1000 milliLiter(s) (100 mL/Hr) IV Continuous <Continuous>  dextrose 50% Injectable 25 Gram(s) IV Push once  dextrose 50% Injectable 12.5 Gram(s) IV Push once  dextrose 50% Injectable 25 Gram(s) IV Push once  furosemide    Tablet 40 milliGRAM(s) Oral daily  glucagon  Injectable 1 milliGRAM(s) IntraMuscular once  insulin glargine Injectable (LANTUS) 7 Unit(s) SubCutaneous at bedtime  insulin lispro (ADMELOG) corrective regimen sliding scale   SubCutaneous three times a day before meals  insulin lispro Injectable (ADMELOG) 2 Unit(s) SubCutaneous three times a day before meals  levETIRAcetam 750 milliGRAM(s) Oral two times a day  pantoprazole   Suspension 40 milliGRAM(s) Oral daily  piperacillin/tazobactam IVPB.. 3.375 Gram(s) IV Intermittent every 8 hours  propranolol 20 milliGRAM(s) Oral two times a day  vancomycin  IVPB 1000 milliGRAM(s) IV Intermittent every 12 hours      Allergies    No Known Allergies    Intolerances        SOCIAL HISTORY:  Denies ETOh,Smoking,     FAMILY HISTORY:  No pertinent family history in first degree relatives        REVIEW OF SYSTEMS:    CONSTITUTIONAL: No weakness, fevers or chills  EYES/ENT: No visual changes;  no throat pain   NECK: No pain or stiffness  RESPIRATORY: No cough, wheezing, hemoptysis; No shortness of breath  CARDIOVASCULAR: No chest pain or palpitations  GASTROINTESTINAL: No abdominal or epigastric pain. No nausea, vomiting,     No diarrhea or constipation. No melena   GENITOURINARY: No dysuria, frequency or hematuria  NEUROLOGICAL: No numbness or weakness  SKIN: dry                          11.1   9.36  )-----------( 252      ( 2023 06:42 )             34.0       CBC Full  -  ( 2023 06:42 )  WBC Count : 9.36 K/uL  RBC Count : 4.12 M/uL  Hemoglobin : 11.1 g/dL  Hematocrit : 34.0 %  Platelet Count - Automated : 252 K/uL  Mean Cell Volume : 82.5 fl  Mean Cell Hemoglobin : 26.9 pg  Mean Cell Hemoglobin Concentration : 32.6 gm/dL  Auto Neutrophil # : x  Auto Lymphocyte # : x  Auto Monocyte # : x  Auto Eosinophil # : x  Auto Basophil # : x  Auto Neutrophil % : x  Auto Lymphocyte % : x  Auto Monocyte % : x  Auto Eosinophil % : x  Auto Basophil % : x          133<L>  |  99  |  13  ----------------------------<  199<H>  4.2   |  30  |  0.74    Ca    8.9      2023 06:42    TPro  6.9  /  Alb  2.5<L>  /  TBili  0.7  /  DBili  x   /  AST  29  /  ALT  29  /  AlkPhos  531<H>        CAPILLARY BLOOD GLUCOSE      POCT Blood Glucose.: 98 mg/dL (2023 16:47)  POCT Blood Glucose.: 142 mg/dL (2023 12:00)  POCT Blood Glucose.: 193 mg/dL (2023 08:26)  POCT Blood Glucose.: 279 mg/dL (2023 21:36)      Vital Signs Last 24 Hrs  T(C): 36.3 (2023 13:10), Max: 37 (2023 20:35)  T(F): 97.4 (2023 13:10), Max: 98.6 (2023 20:35)  HR: 76 (2023 13:10) (75 - 99)  BP: 123/75 (2023 13:10) (114/72 - 126/92)  BP(mean): --  RR: 18 (2023 13:10) (18 - 18)  SpO2: 99% (2023 13:10) (92% - 99%)    Parameters below as of 2023 13:10  Patient On (Oxygen Delivery Method): room air        Urinalysis Basic - ( 2023 21:20 )    Color: Yellow / Appearance: Clear / S.005 / pH: x  Gluc: x / Ketone: Negative  / Bili: Negative / Urobili: Negative   Blood: x / Protein: Negative / Nitrite: Negative   Leuk Esterase: Negative / RBC: 3-5 /HPF / WBC 0-2   Sq Epi: x / Non Sq Epi: x / Bacteria: Occasional        PT/INR - ( 2023 15:48 )   PT: 29.5 sec;   INR: 2.50 ratio               PHYSICAL EXAM:    Constitutional: NAD  HEENT: conjunctive   clear   Neck:  No JVD  Respiratory: CTAB  Cardiovascular: S1 and S2  Gastrointestinal: BS+, soft, NT/ND  Extremities: No peripheral edema  Neurological: no focal deficits  Psychiatric: Normal mood, normal affect  : No Adams  Skin: dry   Access: Not applicable

## 2023-04-20 NOTE — DIETITIAN INITIAL EVALUATION ADULT - PERTINENT MEDS FT
MEDICATIONS  (STANDING):  aspirin enteric coated 81 milliGRAM(s) Oral daily  atorvastatin 10 milliGRAM(s) Oral at bedtime  cefTRIAXone   IVPB 2000 milliGRAM(s) IV Intermittent every 24 hours  dextrose 5%. 1000 milliLiter(s) (100 mL/Hr) IV Continuous <Continuous>  dextrose 5%. 1000 milliLiter(s) (50 mL/Hr) IV Continuous <Continuous>  dextrose 50% Injectable 25 Gram(s) IV Push once  dextrose 50% Injectable 12.5 Gram(s) IV Push once  dextrose 50% Injectable 25 Gram(s) IV Push once  furosemide    Tablet 40 milliGRAM(s) Oral daily  glucagon  Injectable 1 milliGRAM(s) IntraMuscular once  insulin glargine Injectable (LANTUS) 30 Unit(s) SubCutaneous at bedtime  insulin lispro (ADMELOG) corrective regimen sliding scale   SubCutaneous Before meals and at bedtime  insulin lispro Injectable (ADMELOG) 10 Unit(s) SubCutaneous three times a day before meals  levETIRAcetam 750 milliGRAM(s) Oral two times a day  metoprolol succinate ER 25 milliGRAM(s) Oral daily  pantoprazole   Suspension 40 milliGRAM(s) Oral daily  povidone iodine 10% Solution 1 Application(s) Topical daily  spironolactone 25 milliGRAM(s) Oral daily    MEDICATIONS  (PRN):  acetaminophen     Tablet .. 650 milliGRAM(s) Oral every 6 hours PRN Temp greater or equal to 38C (100.4F), Mild Pain (1 - 3)  dextrose Oral Gel 15 Gram(s) Oral once PRN Blood Glucose LESS THAN 70 milliGRAM(s)/deciliter  oxycodone    5 mG/acetaminophen 325 mG 1 Tablet(s) Oral every 6 hours PRN Moderate Pain (4 - 6)

## 2023-04-20 NOTE — DIETITIAN INITIAL EVALUATION ADULT - PERSON TAUGHT/METHOD
DM, heart healthy nutrition therapy provided to pts two daughters. Family declined  services during counseling session. RDs name/phone number left with patients daughter if questions/concerns arise.

## 2023-04-20 NOTE — PROGRESS NOTE ADULT - ASSESSMENT
67 year old male with PMHx CVA (right sided hemiparesis and aphasia), AFib (on coumadin), IDDM, HTN, CAD s/p CABG, s/p AVR, a/w wound to left 4th metatarsal s/p ruptured hematoma.  Left 4th metatarsal center of erythremic tissue to exposed nail bed with surrounding area of hyperemic, dark coloring tissue.  Area of erythema to anterior foot.  B/L palpable POP, AT, PT pulses.  B/L non-palpable, dopplerable DP pulses.   Pt is non ambulatory  JUAN A/PVR's reviewed; likely small vessel disease  Podiatry may proceed with intervention as indicated  Cont local wound care and close monitoring for healing  Discussed with Dr Mora  No vascular surgical intervention  Will sign off

## 2023-04-20 NOTE — PROGRESS NOTE ADULT - ASSESSMENT
67 year old male with PMHx CVA (right sided hemiparesis and aphasia), AFib (on coumadin), IDDM, HTN, CAD s/p CABG, s/p AVR, presenting to [place] ED with wound to left 4th metatarsal.  Patient unable to provide history due to aphasia, history obtained from daughter at beside and per chart review.  Patient developed hematoma to left 4th metatarsal on Friday, it then ruptured and patient presented to Podiatry office (Dr. Maegan Jin) earlier today who instructed patient to come to ED for evaluation.  Patient has now new complaints at this time.  Patient does not follow with a vascular surgeon.   (18 Apr 2023 21:55)      will check ua , urine osmolality , urine sodium , urine uric acid , serum sodium , serum osmolality , serum uric acid , f/u with hyponatremia work up , f/u with bmp , monitor i and o      BP monitoring,continue current antihypertensive meds, low salt diet,followup with PMD in 1-2 weeks      Admit to telemetry unit for monitoring,send 3 sets of cardiac ensymes to rule out coronary event,obtain ECHO to evaluate LVEF,cardiology consult,continue current managmnet,O2 supply,anticoagulation plan as per cardiology consult

## 2023-04-21 LAB
ALBUMIN SERPL ELPH-MCNC: 2.7 G/DL — LOW (ref 3.3–5)
ALP SERPL-CCNC: 750 U/L — HIGH (ref 40–120)
ALT FLD-CCNC: 46 U/L — SIGNIFICANT CHANGE UP (ref 12–78)
ANION GAP SERPL CALC-SCNC: 5 MMOL/L — SIGNIFICANT CHANGE UP (ref 5–17)
AST SERPL-CCNC: 66 U/L — HIGH (ref 15–37)
BILIRUB SERPL-MCNC: 0.5 MG/DL — SIGNIFICANT CHANGE UP (ref 0.2–1.2)
BUN SERPL-MCNC: 15 MG/DL — SIGNIFICANT CHANGE UP (ref 7–23)
CALCIUM SERPL-MCNC: 9.4 MG/DL — SIGNIFICANT CHANGE UP (ref 8.5–10.1)
CHLORIDE SERPL-SCNC: 99 MMOL/L — SIGNIFICANT CHANGE UP (ref 96–108)
CO2 SERPL-SCNC: 29 MMOL/L — SIGNIFICANT CHANGE UP (ref 22–31)
CREAT SERPL-MCNC: 0.91 MG/DL — SIGNIFICANT CHANGE UP (ref 0.5–1.3)
EGFR: 94 ML/MIN/1.73M2 — SIGNIFICANT CHANGE UP
GLUCOSE SERPL-MCNC: 161 MG/DL — HIGH (ref 70–99)
HCT VFR BLD CALC: 36.3 % — LOW (ref 39–50)
HGB BLD-MCNC: 12 G/DL — LOW (ref 13–17)
INR BLD: 1.8 RATIO — HIGH (ref 0.88–1.16)
MCHC RBC-ENTMCNC: 27.1 PG — SIGNIFICANT CHANGE UP (ref 27–34)
MCHC RBC-ENTMCNC: 33.1 GM/DL — SIGNIFICANT CHANGE UP (ref 32–36)
MCV RBC AUTO: 81.9 FL — SIGNIFICANT CHANGE UP (ref 80–100)
NRBC # BLD: 0 /100 WBCS — SIGNIFICANT CHANGE UP (ref 0–0)
PLATELET # BLD AUTO: 275 K/UL — SIGNIFICANT CHANGE UP (ref 150–400)
POTASSIUM SERPL-MCNC: 3.9 MMOL/L — SIGNIFICANT CHANGE UP (ref 3.5–5.3)
POTASSIUM SERPL-SCNC: 3.9 MMOL/L — SIGNIFICANT CHANGE UP (ref 3.5–5.3)
PROT SERPL-MCNC: 7.5 G/DL — SIGNIFICANT CHANGE UP (ref 6–8.3)
PROTHROM AB SERPL-ACNC: 21.2 SEC — HIGH (ref 10.5–13.4)
RBC # BLD: 4.43 M/UL — SIGNIFICANT CHANGE UP (ref 4.2–5.8)
RBC # FLD: 17.9 % — HIGH (ref 10.3–14.5)
SODIUM SERPL-SCNC: 133 MMOL/L — LOW (ref 135–145)
WBC # BLD: 10.73 K/UL — HIGH (ref 3.8–10.5)
WBC # FLD AUTO: 10.73 K/UL — HIGH (ref 3.8–10.5)

## 2023-04-21 RX ORDER — WARFARIN SODIUM 2.5 MG/1
4 TABLET ORAL ONCE
Refills: 0 | Status: COMPLETED | OUTPATIENT
Start: 2023-04-21 | End: 2023-04-21

## 2023-04-21 RX ORDER — ENOXAPARIN SODIUM 100 MG/ML
70 INJECTION SUBCUTANEOUS EVERY 12 HOURS
Refills: 0 | Status: DISCONTINUED | OUTPATIENT
Start: 2023-04-21 | End: 2023-04-23

## 2023-04-21 RX ADMIN — INSULIN GLARGINE 30 UNIT(S): 100 INJECTION, SOLUTION SUBCUTANEOUS at 21:37

## 2023-04-21 RX ADMIN — PANTOPRAZOLE SODIUM 40 MILLIGRAM(S): 20 TABLET, DELAYED RELEASE ORAL at 12:47

## 2023-04-21 RX ADMIN — WARFARIN SODIUM 4 MILLIGRAM(S): 2.5 TABLET ORAL at 21:36

## 2023-04-21 RX ADMIN — LEVETIRACETAM 750 MILLIGRAM(S): 250 TABLET, FILM COATED ORAL at 05:29

## 2023-04-21 RX ADMIN — ENOXAPARIN SODIUM 70 MILLIGRAM(S): 100 INJECTION SUBCUTANEOUS at 17:16

## 2023-04-21 RX ADMIN — Medication 10 UNIT(S): at 07:52

## 2023-04-21 RX ADMIN — Medication 40 MILLIGRAM(S): at 05:29

## 2023-04-21 RX ADMIN — CEFTRIAXONE 100 MILLIGRAM(S): 500 INJECTION, POWDER, FOR SOLUTION INTRAMUSCULAR; INTRAVENOUS at 14:17

## 2023-04-21 RX ADMIN — Medication 81 MILLIGRAM(S): at 12:48

## 2023-04-21 RX ADMIN — Medication 1 APPLICATION(S): at 12:21

## 2023-04-21 RX ADMIN — ATORVASTATIN CALCIUM 10 MILLIGRAM(S): 80 TABLET, FILM COATED ORAL at 21:36

## 2023-04-21 RX ADMIN — Medication 10 UNIT(S): at 17:15

## 2023-04-21 RX ADMIN — Medication 1: at 17:15

## 2023-04-21 RX ADMIN — Medication 10 UNIT(S): at 12:47

## 2023-04-21 RX ADMIN — LEVETIRACETAM 750 MILLIGRAM(S): 250 TABLET, FILM COATED ORAL at 17:16

## 2023-04-21 NOTE — PROGRESS NOTE ADULT - SUBJECTIVE AND OBJECTIVE BOX
[facility]    CHIEF COMPLAINT: Patient is a 67y old  Male who presents with a chief complaint of toe gangrene (21 Apr 2023 07:56)      Follow Up: [ ] Chest Pain      [ ] Dyspnea     [ ] Palpitations    [ ] Atrial Fibrillation     [ ] Ventricular Dysrhythmia    [ ] Abnormal EKG                      [ ] Abnormal Cardiac Enzymes     [ ] Valvular Disease    HPI:   67 year old male with PMHx CVA (right sided hemiparesis and aphasia), AFib (on coumadin), T2DM, HTN, CAD s/p CABG, s/p AVR, presenting to [place] ED with wound to left 4th metatarsal.  Patient unable to provide history due to aphasia, history obtained from daughter at beside and per chart review.  Patient developed hematoma to left 4th metatarsal on Friday, it then ruptured and patient presented to Podiatry office (Dr. Maegan Jin) earlier today who instructed patient to come to ED for evaluation.  Patient has now new complaints at this time.  Patient does not follow with a vascular surgeon.   (18 Apr 2023 21:55)    PAST MEDICAL & SURGICAL HISTORY:  CVA (cerebral vascular accident)      HTN (hypertension)      DM (diabetes mellitus)      Arrhythmia      History of atrial fibrillation      S/P CABG (coronary artery bypass graft)      S/P brain surgery        MEDICATIONS  (STANDING):  aspirin enteric coated 81 milliGRAM(s) Oral daily  atorvastatin 10 milliGRAM(s) Oral at bedtime  cefTRIAXone   IVPB 2000 milliGRAM(s) IV Intermittent every 24 hours  dextrose 5%. 1000 milliLiter(s) (100 mL/Hr) IV Continuous <Continuous>  dextrose 5%. 1000 milliLiter(s) (50 mL/Hr) IV Continuous <Continuous>  dextrose 50% Injectable 25 Gram(s) IV Push once  dextrose 50% Injectable 12.5 Gram(s) IV Push once  dextrose 50% Injectable 25 Gram(s) IV Push once  furosemide    Tablet 40 milliGRAM(s) Oral daily  glucagon  Injectable 1 milliGRAM(s) IntraMuscular once  insulin glargine Injectable (LANTUS) 30 Unit(s) SubCutaneous at bedtime  insulin lispro (ADMELOG) corrective regimen sliding scale   SubCutaneous Before meals and at bedtime  insulin lispro Injectable (ADMELOG) 10 Unit(s) SubCutaneous three times a day before meals  levETIRAcetam 750 milliGRAM(s) Oral two times a day  metoprolol succinate ER 25 milliGRAM(s) Oral daily  pantoprazole   Suspension 40 milliGRAM(s) Oral daily  povidone iodine 10% Solution 1 Application(s) Topical daily  spironolactone 25 milliGRAM(s) Oral daily    MEDICATIONS  (PRN):  acetaminophen     Tablet .. 650 milliGRAM(s) Oral every 6 hours PRN Temp greater or equal to 38C (100.4F), Mild Pain (1 - 3)  dextrose Oral Gel 15 Gram(s) Oral once PRN Blood Glucose LESS THAN 70 milliGRAM(s)/deciliter  oxycodone    5 mG/acetaminophen 325 mG 1 Tablet(s) Oral every 6 hours PRN Moderate Pain (4 - 6)    Allergies    No Known Allergies    Intolerances        REVIEW OF SYSTEMS:    CONSTITUTIONAL: No weakness, fevers or chills.   EYES/ENT: No visual changes;    NECK: No pain or stiffness  RESPIRATORY: No cough, wheezing, No shortness of breath  CARDIOVASCULAR: No chest pain or palpitations  GASTROINTESTINAL: No abdominal pain, or hematochezia.  GENITOURINARY: No dysuria orhematuria  NEUROLOGICAL: No numbness or weakness  SKIN: No itching, burning, rashes  All other review of systems is negative unless indicated above    Vital Signs Last 24 Hrs  T(C): 36.3 (21 Apr 2023 04:32), Max: 36.9 (20 Apr 2023 20:42)  T(F): 97.4 (21 Apr 2023 04:32), Max: 98.4 (20 Apr 2023 20:42)  HR: 77 (21 Apr 2023 04:32) (68 - 77)  BP: 102/64 (21 Apr 2023 04:32) (102/64 - 123/75)  BP(mean): --  RR: 18 (21 Apr 2023 04:32) (17 - 18)  SpO2: 96% (21 Apr 2023 04:32) (94% - 99%)    Parameters below as of 21 Apr 2023 04:32  Patient On (Oxygen Delivery Method): room air      I&O's Summary    20 Apr 2023 07:01  -  21 Apr 2023 07:00  --------------------------------------------------------  IN: 0 mL / OUT: 400 mL / NET: -400 mL        PHYSICAL EXAM:  Constitutional: NAD  Neurological:CALM  HEENT: no JVD, EOMI  Cardiovascular: Regular, S1 and S2, no murmur  Pulmonary: breath sounds bilaterally  Gastrointestinal: Bowel Sounds present, soft, nontender  EXT:  no peripheral edema  Skin: No rashes.  Psych:  Mood calm  LABS: All Labs Reviewed:                          12.0   10.73 )-----------( 275      ( 21 Apr 2023 08:11 )             36.3     04-21    133<L>  |  99  |  15  ----------------------------<  161<H>  3.9   |  29  |  0.91    Ca    9.4      21 Apr 2023 08:11    TPro  7.5  /  Alb  2.7<L>  /  TBili  0.5  /  DBili  x   /  AST  66<H>  /  ALT  46  /  AlkPhos  750<H>  04-21    PT/INR - ( 21 Apr 2023 08:11 )   PT: 21.2 sec;   INR: 1.80 ratio       12 Lead ECG:   Ventricular Rate 69 BPM    QRS Duration 72 ms    Q-T Interval 396 ms    QTC Calculation(Bazett) 424 ms    R Axis 20 degrees    T Axis 154 degrees    Diagnosis Line Atrial fibrillation  ST & T wave abnormality, consider anterolateral ischemia  Abnormal ECG  When compared with ECG of 18-APR-2023 20:38, (Unconfirmed)  No significant change was found  Confirmed by Ketty Martin ([id number]) on 4/19/2023 12:27:26 PM (04-18-23 @ 20:39)  12 Lead ECG:   Ventricular Rate 80 BPM    QRS Duration 72 ms    Q-T Interval 388 ms    QTC Calculation(Bazett) 447 ms    R Axis 23 degrees    T Axis 154 degrees    Diagnosis Line Atrial fibrillation  ST & T wave abnormality, consider inferolateral ischemia  Abnormal ECG  When compared with ECG of 28-NOV-2022 10:18,  T wave inversion now evident in Anterior leads  Confirmed by Ketty Martin ([id number]) on 4/19/2023 7:37:08 AM (04-18-23 @ 20:38)  Xray Chest 1 View-PORTABLE IMMEDIATE:   ACC: [id number] EXAM:  XR FOOT COMP MIN 3 VIEWS LT   ORDERED BY: NEGRO RAMIREZ     ACC: [id number] EXAM:  XR CHEST PORTABLE IMMED 1V   ORDERED BY: NEGRO RAMIREZ     PROCEDURE DATE:  04/18/2023          INTERPRETATION:  AP chest on April 18, 2023 at 8:14 PM. Patient has   sepsis.    Heart magnified by technique.    Sternotomy and prosthetic heart valve again noted.    Scattered loculated effusions and infiltrates in the left mid lower lung   field are similar to November 28, 2022. On the November study there was   an infiltrate in the right midlung which has cleared. Otherwise similar   findings.    Left foot. Patient has a toe infection. 3 views.    There is a mild hallux valgus with degeneration. No bone destruction or   fracture.    IMPRESSION: No acute finding.    Persistent loculated effusion and scattered infiltrate in the left mid   lower lung field.    --- End of Report ---    67 year old male with PMHx CVA (cerebral hemorrhage from coumadin toxicity with right sided hemiparesis and aphasia), AFib (on coumadin), IDDM, HTN, CAD s/p CABG, s/p mechanical AVR, h/o GI bleed from esophageal varices.      Pt a/w wound to left 4th metatarsal s/p ruptured hematoma.  Left 4th metatarsal center of erythremic tissue to exposed nail bed with surrounding area of hyperemic, dark coloring tissue.  Area of erythema to anterior foot.  B/L palpable POP, AT, PT pulses.  B/L non-palpable, dopplerable DP pulses. INR noted to be 7.16.      Suggest:  1. Cellulitis, gangrenous 4th metatarsal  - Workup per vascular  - If pt needs debridement procedure, he is stable from cardiac standpoint once INR reaches acceptable level.   2. Mech Ao Valve/Afib  - Coumadin with Goal INR 2.5- 3.5.  Supratherapeutic INR to 7. NOW 1.8  - Pt with h/o cerebral hemorrhagic CVA from coumadin toxicity   - Holding coumadin and monitor INR    3. CAD CABG  - C/w Lipitor and aspirin  4. TTE  TTE Echo Complete w/o Contrast w/ Doppler --11.22.22 @ 19:15  Normal LV systolic function EF 55%, history of aortic valve replacement,   mean aortic gradient 19 mmHg, mild aortic stenosis suggested, biatrial   enlargement, normal pericardium, RVSP 34 mmHg  Will follow here PRN, CALL IF NEEDED    PT CAN SEE US OUTPT [phone]                  RADHAMES ROBERTS MD; Attending Radiologist  This document has been electronically signed.Apr 19 2023  9:34AM (04-18-23 @ 20:10)

## 2023-04-21 NOTE — PROGRESS NOTE ADULT - SUBJECTIVE AND OBJECTIVE BOX
Patient is a 67y old  Male who presents with a chief complaint of toe gangrene (21 Apr 2023 12:30)    Date of servie : 04-21-23 @ 14:21  INTERVAL HPI/OVERNIGHT EVENTS:  T(C): 36.4 (04-21-23 @ 12:05), Max: 36.9 (04-20-23 @ 20:42)  HR: 80 (04-21-23 @ 12:05) (68 - 80)  BP: 130/81 (04-21-23 @ 12:05) (102/64 - 130/81)  RR: 18 (04-21-23 @ 12:05) (17 - 18)  SpO2: 97% (04-21-23 @ 12:05) (94% - 97%)  Wt(kg): --  I&O's Summary    20 Apr 2023 07:01  -  21 Apr 2023 07:00  --------------------------------------------------------  IN: 0 mL / OUT: 400 mL / NET: -400 mL        LABS:                        12.0   10.73 )-----------( 275      ( 21 Apr 2023 08:11 )             36.3     04-21    133<L>  |  99  |  15  ----------------------------<  161<H>  3.9   |  29  |  0.91    Ca    9.4      21 Apr 2023 08:11    TPro  7.5  /  Alb  2.7<L>  /  TBili  0.5  /  DBili  x   /  AST  66<H>  /  ALT  46  /  AlkPhos  750<H>  04-21    PT/INR - ( 21 Apr 2023 08:11 )   PT: 21.2 sec;   INR: 1.80 ratio             CAPILLARY BLOOD GLUCOSE      POCT Blood Glucose.: 147 mg/dL (21 Apr 2023 12:36)  POCT Blood Glucose.: 120 mg/dL (21 Apr 2023 07:43)  POCT Blood Glucose.: 142 mg/dL (20 Apr 2023 21:04)  POCT Blood Glucose.: 98 mg/dL (20 Apr 2023 16:47)            MEDICATIONS  (STANDING):  aspirin enteric coated 81 milliGRAM(s) Oral daily  atorvastatin 10 milliGRAM(s) Oral at bedtime  cefTRIAXone   IVPB 2000 milliGRAM(s) IV Intermittent every 24 hours  dextrose 5%. 1000 milliLiter(s) (100 mL/Hr) IV Continuous <Continuous>  dextrose 5%. 1000 milliLiter(s) (50 mL/Hr) IV Continuous <Continuous>  dextrose 50% Injectable 25 Gram(s) IV Push once  dextrose 50% Injectable 12.5 Gram(s) IV Push once  dextrose 50% Injectable 25 Gram(s) IV Push once  enoxaparin Injectable 70 milliGRAM(s) SubCutaneous every 12 hours  furosemide    Tablet 40 milliGRAM(s) Oral daily  glucagon  Injectable 1 milliGRAM(s) IntraMuscular once  insulin glargine Injectable (LANTUS) 30 Unit(s) SubCutaneous at bedtime  insulin lispro (ADMELOG) corrective regimen sliding scale   SubCutaneous Before meals and at bedtime  insulin lispro Injectable (ADMELOG) 10 Unit(s) SubCutaneous three times a day before meals  levETIRAcetam 750 milliGRAM(s) Oral two times a day  metoprolol succinate ER 25 milliGRAM(s) Oral daily  pantoprazole   Suspension 40 milliGRAM(s) Oral daily  povidone iodine 10% Solution 1 Application(s) Topical daily  spironolactone 25 milliGRAM(s) Oral daily  warfarin 4 milliGRAM(s) Oral once    MEDICATIONS  (PRN):  acetaminophen     Tablet .. 650 milliGRAM(s) Oral every 6 hours PRN Temp greater or equal to 38C (100.4F), Mild Pain (1 - 3)  dextrose Oral Gel 15 Gram(s) Oral once PRN Blood Glucose LESS THAN 70 milliGRAM(s)/deciliter  oxycodone    5 mG/acetaminophen 325 mG 1 Tablet(s) Oral every 6 hours PRN Moderate Pain (4 - 6)          PHYSICAL EXAM:  GENERAL: NAD, well-groomed, well-developed  HEAD:  Atraumatic, Normocephalic  CHEST/LUNG: Clear to percussion bilaterally; No rales, rhonchi, wheezing, or rubs  HEART: Regular rate and rhythm; No murmurs, rubs, or gallops  ABDOMEN: Soft, Nontender, Nondistended; Bowel sounds present  EXTREMITIES:  2+ Peripheral Pulses, No clubbing, cyanosis, or edema  LYMPH: No lymphadenopathy noted  SKIN: No rashes or lesions    Care Discussed with Consultants/Other Providers [ ] YES  [ ] NO

## 2023-04-21 NOTE — PROGRESS NOTE ADULT - SUBJECTIVE AND OBJECTIVE BOX
Neurology follow up note    ARNIE [id number]      Interval History:    Patient feels ok no new complaints.    Allergies    No Known Allergies    Intolerances        MEDICATIONS    acetaminophen     Tablet .. 650 milliGRAM(s) Oral every 6 hours PRN  aspirin enteric coated 81 milliGRAM(s) Oral daily  atorvastatin 10 milliGRAM(s) Oral at bedtime  cefTRIAXone   IVPB 2000 milliGRAM(s) IV Intermittent every 24 hours  dextrose 5%. 1000 milliLiter(s) IV Continuous <Continuous>  dextrose 5%. 1000 milliLiter(s) IV Continuous <Continuous>  dextrose 50% Injectable 25 Gram(s) IV Push once  dextrose 50% Injectable 12.5 Gram(s) IV Push once  dextrose 50% Injectable 25 Gram(s) IV Push once  dextrose Oral Gel 15 Gram(s) Oral once PRN  furosemide    Tablet 40 milliGRAM(s) Oral daily  glucagon  Injectable 1 milliGRAM(s) IntraMuscular once  insulin glargine Injectable (LANTUS) 30 Unit(s) SubCutaneous at bedtime  insulin lispro (ADMELOG) corrective regimen sliding scale   SubCutaneous Before meals and at bedtime  insulin lispro Injectable (ADMELOG) 10 Unit(s) SubCutaneous three times a day before meals  levETIRAcetam 750 milliGRAM(s) Oral two times a day  metoprolol succinate ER 25 milliGRAM(s) Oral daily  oxycodone    5 mG/acetaminophen 325 mG 1 Tablet(s) Oral every 6 hours PRN  pantoprazole   Suspension 40 milliGRAM(s) Oral daily  povidone iodine 10% Solution 1 Application(s) Topical daily  spironolactone 25 milliGRAM(s) Oral daily              Vital Signs Last 24 Hrs  T(C): 36.3 (21 Apr 2023 04:32), Max: 36.9 (20 Apr 2023 20:42)  T(F): 97.4 (21 Apr 2023 04:32), Max: 98.4 (20 Apr 2023 20:42)  HR: 77 (21 Apr 2023 04:32) (68 - 77)  BP: 102/64 (21 Apr 2023 04:32) (102/64 - 123/75)  BP(mean): --  RR: 18 (21 Apr 2023 04:32) (17 - 18)  SpO2: 96% (21 Apr 2023 04:32) (94% - 99%)    Parameters below as of 21 Apr 2023 04:32  Patient On (Oxygen Delivery Method): room air      REVIEW OF SYSTEMS:  Completely limited secondary to the patient repeats words over, has severe expressive aphasia, but had been in his normal state of health as per daughter.    PHYSICAL EXAMINATION:  HEENT:  Head:  Normocephalic, atraumatic.  Eyes:  No scleral icterus.  Ears:  Hard to evaluate secondary to he could not answer questions accordingly but appears to follow commands.  NECK:  Supple.  RESPIRATORY:  Air entry bilaterally.  CARDIOVASCULAR:  S1 and S2 heard.  ABDOMEN:  Soft and nontender.  EXTREMITIES:  Positive discoloration was noted on the left toe.     NEUROLOGIC:  The patient was awake, alert.  On-off blink to visual threat.  Positive expressive aphasia.  Will say a few words but repeat the same words over.  Right upper and right lower were 0/5 with increased tone.  Right hemiplegia is his baseline.  Left upper and left lower were 4/5.      LABS:  CBC Full  -  ( 20 Apr 2023 06:42 )  WBC Count : 9.36 K/uL  RBC Count : 4.12 M/uL  Hemoglobin : 11.1 g/dL  Hematocrit : 34.0 %  Platelet Count - Automated : 252 K/uL  Mean Cell Volume : 82.5 fl  Mean Cell Hemoglobin : 26.9 pg  Mean Cell Hemoglobin Concentration : 32.6 gm/dL  Auto Neutrophil # : x  Auto Lymphocyte # : x  Auto Monocyte # : x  Auto Eosinophil # : x  Auto Basophil # : x  Auto Neutrophil % : x  Auto Lymphocyte % : x  Auto Monocyte % : x  Auto Eosinophil % : x  Auto Basophil % : x      04-20    133<L>  |  99  |  13  ----------------------------<  199<H>  4.2   |  30  |  0.74    Ca    8.9      20 Apr 2023 06:42    TPro  6.9  /  Alb  2.5<L>  /  TBili  0.7  /  DBili  x   /  AST  29  /  ALT  29  /  AlkPhos  531<H>  04-20    Hemoglobin A1C:     LIVER FUNCTIONS - ( 20 Apr 2023 06:42 )  Alb: 2.5 g/dL / Pro: 6.9 g/dL / ALK PHOS: 531 U/L / ALT: 29 U/L / AST: 29 U/L / GGT: x           Vitamin B12   PT/INR - ( 20 Apr 2023 15:48 )   PT: 29.5 sec;   INR: 2.50 ratio               RADIOLOGY      ANALYSIS AND PLAN:  This is a 67-year-old with a history of atrial fibrillation, cerebrovascular accident, and seizure.  For history of atrial fibrillation and cerebrovascular accident, risks versus benefits.  The patient at present does have elevated INR, would recommend to hold for now with a goal INR between 2 and 3.  Questionable the patient will need any type of surgical intervention for his toe.  For history of seizure prophylaxis, continue the patient on Keppra.  For history of diabetes, strict control of blood sugars.  For history of hypertension, monitor systolic blood pressure.  Excisional debridement with 15 blade of left 4th toe  base down to subcutaneous tissueLeft foot ulceration  Spoke with one daughter at bedside.  Other daughter is Jeffrey.  Her telephone number is [phone] 4/20.  She understands my reasoning and thought process.  Greater than 40 minutes of time was spent with the patient, plan of care, reviewing data, with greater than 50% of the visit was spent counseling and/or coordinating care with multidisciplinary healthcare team Neurology follow up note    ARNIE [id number]      Interval History:    Patient feels ok no new complaints.    Allergies    No Known Allergies    Intolerances        MEDICATIONS    acetaminophen     Tablet .. 650 milliGRAM(s) Oral every 6 hours PRN  aspirin enteric coated 81 milliGRAM(s) Oral daily  atorvastatin 10 milliGRAM(s) Oral at bedtime  cefTRIAXone   IVPB 2000 milliGRAM(s) IV Intermittent every 24 hours  dextrose 5%. 1000 milliLiter(s) IV Continuous <Continuous>  dextrose 5%. 1000 milliLiter(s) IV Continuous <Continuous>  dextrose 50% Injectable 25 Gram(s) IV Push once  dextrose 50% Injectable 12.5 Gram(s) IV Push once  dextrose 50% Injectable 25 Gram(s) IV Push once  dextrose Oral Gel 15 Gram(s) Oral once PRN  furosemide    Tablet 40 milliGRAM(s) Oral daily  glucagon  Injectable 1 milliGRAM(s) IntraMuscular once  insulin glargine Injectable (LANTUS) 30 Unit(s) SubCutaneous at bedtime  insulin lispro (ADMELOG) corrective regimen sliding scale   SubCutaneous Before meals and at bedtime  insulin lispro Injectable (ADMELOG) 10 Unit(s) SubCutaneous three times a day before meals  levETIRAcetam 750 milliGRAM(s) Oral two times a day  metoprolol succinate ER 25 milliGRAM(s) Oral daily  oxycodone    5 mG/acetaminophen 325 mG 1 Tablet(s) Oral every 6 hours PRN  pantoprazole   Suspension 40 milliGRAM(s) Oral daily  povidone iodine 10% Solution 1 Application(s) Topical daily  spironolactone 25 milliGRAM(s) Oral daily              Vital Signs Last 24 Hrs  T(C): 36.3 (21 Apr 2023 04:32), Max: 36.9 (20 Apr 2023 20:42)  T(F): 97.4 (21 Apr 2023 04:32), Max: 98.4 (20 Apr 2023 20:42)  HR: 77 (21 Apr 2023 04:32) (68 - 77)  BP: 102/64 (21 Apr 2023 04:32) (102/64 - 123/75)  BP(mean): --  RR: 18 (21 Apr 2023 04:32) (17 - 18)  SpO2: 96% (21 Apr 2023 04:32) (94% - 99%)    Parameters below as of 21 Apr 2023 04:32  Patient On (Oxygen Delivery Method): room air      REVIEW OF SYSTEMS:  Completely limited secondary to the patient repeats words over, has severe expressive aphasia, but had been in his normal state of health as per daughter.    PHYSICAL EXAMINATION:  HEENT:  Head:  Normocephalic, atraumatic.  Eyes:  No scleral icterus.  Ears:  Hard to evaluate secondary to he could not answer questions accordingly but appears to follow commands.  NECK:  Supple.  RESPIRATORY:  Air entry bilaterally.  CARDIOVASCULAR:  S1 and S2 heard.  ABDOMEN:  Soft and nontender.  EXTREMITIES:  Positive discoloration was noted on the left toe.     NEUROLOGIC:  The patient was awake, alert.  On-off blink to visual threat.  Positive expressive aphasia.  Will say a few words but repeat the same words over.  Right upper and right lower were 0/5 with increased tone.  Right hemiplegia is his baseline.  Left upper and left lower were 4/5.      LABS:  CBC Full  -  ( 20 Apr 2023 06:42 )  WBC Count : 9.36 K/uL  RBC Count : 4.12 M/uL  Hemoglobin : 11.1 g/dL  Hematocrit : 34.0 %  Platelet Count - Automated : 252 K/uL  Mean Cell Volume : 82.5 fl  Mean Cell Hemoglobin : 26.9 pg  Mean Cell Hemoglobin Concentration : 32.6 gm/dL  Auto Neutrophil # : x  Auto Lymphocyte # : x  Auto Monocyte # : x  Auto Eosinophil # : x  Auto Basophil # : x  Auto Neutrophil % : x  Auto Lymphocyte % : x  Auto Monocyte % : x  Auto Eosinophil % : x  Auto Basophil % : x      04-20    133<L>  |  99  |  13  ----------------------------<  199<H>  4.2   |  30  |  0.74    Ca    8.9      20 Apr 2023 06:42    TPro  6.9  /  Alb  2.5<L>  /  TBili  0.7  /  DBili  x   /  AST  29  /  ALT  29  /  AlkPhos  531<H>  04-20    Hemoglobin A1C:     LIVER FUNCTIONS - ( 20 Apr 2023 06:42 )  Alb: 2.5 g/dL / Pro: 6.9 g/dL / ALK PHOS: 531 U/L / ALT: 29 U/L / AST: 29 U/L / GGT: x           Vitamin B12   PT/INR - ( 20 Apr 2023 15:48 )   PT: 29.5 sec;   INR: 2.50 ratio               RADIOLOGY      ANALYSIS AND PLAN:  This is a 67-year-old with a history of atrial fibrillation, cerebrovascular accident, and seizure.  For history of atrial fibrillation and cerebrovascular accident, risks versus benefits.  The patient at present does have elevated INR, would recommend to hold for now with a goal INR between 2 and 3.  Questionable the patient will need any type of surgical intervention for his toe.  For history of seizure prophylaxis, continue the patient on Keppra.  For history of diabetes, strict control of blood sugars.  For history of hypertension, monitor systolic blood pressure.  Excisional debridement with 15 blade of left 4th toe  base down to subcutaneous tissue Left foot ulceration    neurologic wise stable     Spoke with one daughter at bedside 4/21.  Other daughter is Jeffrey.  Her telephone number is [phone] 4/20.  She understands my reasoning and thought process.    Greater than 40 minutes of time was spent with the patient, plan of care, reviewing data, with greater than 50% of the visit was spent counseling and/or coordinating care with multidisciplinary healthcare team

## 2023-04-21 NOTE — PROGRESS NOTE ADULT - ASSESSMENT
67 year old male with PMHx CVA (right sided hemiparesis and aphasia), AFib (on coumadin), IDDM, HTN, CAD s/p CABG, s/p AVR, presenting to [place] ED with wound to left 4th metatarsal.  Patient unable to provide history due to aphasia, history obtained from daughter at beside and per chart review.  Patient developed hematoma to left 4th metatarsal on Friday, it then ruptured and patient presented to Podiatry office (Dr. Maegan Jin) earlier today who instructed patient to come to ED for evaluation.  Patient has now new complaints at this time.  Patient does not follow with a vascular surgeon.      1 Toe gangrene, diabetic foot ulcer  - cw abx  - ID,vascular and podiatry fu  - fu cultures  - pain control   JUAN A/PVR's reviewed; likely small vessel disease    2 DM  - monitor FS  - Basal bolus  - diabetic diet  - uncontrolled     3 Hx of CVA  - cw AC  - cw asa, statin    4 Afib, AVR  - INR and dose coumadin daily  - started lovenox     5 CAD, CABG  - cw asa, statin  - cards fu

## 2023-04-21 NOTE — PROGRESS NOTE ADULT - SUBJECTIVE AND OBJECTIVE BOX
[facility] DIV[facility] of INFECTIOUS DISEASE  Guru Michel MD PhD, Lisa James MD, Becca Acosta MD, Karen Grimes MD, Corky Perez MD  and providing coverage with Carlos Marx MD  Providing Infectious Disease Consultations at [facility], [facility], [place], [facility], [facility]'s    Office# [phone] to schedule follow up appointments  Answering Service for urgent calls or New Consults [phone]  Cell# to text for urgent issues Guru Michel [phone]     infectious diseases progress note:    ARNIE DELGADILLO is a 67y y. o. Male patient    Overnight and events of the last 24hrs reviewed    Allergies    No Known Allergies    Intolerances        ANTIBIOTICS/RELEVANT:  antimicrobials  cefTRIAXone   IVPB 2000 milliGRAM(s) IV Intermittent every 24 hours    immunologic:    OTHER:  acetaminophen     Tablet .. 650 milliGRAM(s) Oral every 6 hours PRN  aspirin enteric coated 81 milliGRAM(s) Oral daily  atorvastatin 10 milliGRAM(s) Oral at bedtime  dextrose 5%. 1000 milliLiter(s) IV Continuous <Continuous>  dextrose 5%. 1000 milliLiter(s) IV Continuous <Continuous>  dextrose 50% Injectable 25 Gram(s) IV Push once  dextrose 50% Injectable 12.5 Gram(s) IV Push once  dextrose 50% Injectable 25 Gram(s) IV Push once  dextrose Oral Gel 15 Gram(s) Oral once PRN  furosemide    Tablet 40 milliGRAM(s) Oral daily  glucagon  Injectable 1 milliGRAM(s) IntraMuscular once  insulin glargine Injectable (LANTUS) 30 Unit(s) SubCutaneous at bedtime  insulin lispro (ADMELOG) corrective regimen sliding scale   SubCutaneous Before meals and at bedtime  insulin lispro Injectable (ADMELOG) 10 Unit(s) SubCutaneous three times a day before meals  levETIRAcetam 750 milliGRAM(s) Oral two times a day  metoprolol succinate ER 25 milliGRAM(s) Oral daily  oxycodone    5 mG/acetaminophen 325 mG 1 Tablet(s) Oral every 6 hours PRN  pantoprazole   Suspension 40 milliGRAM(s) Oral daily  povidone iodine 10% Solution 1 Application(s) Topical daily  spironolactone 25 milliGRAM(s) Oral daily      Objective:  Vital Signs Last 24 Hrs  T(C): 36.4 (21 Apr 2023 12:05), Max: 36.9 (20 Apr 2023 20:42)  T(F): 97.6 (21 Apr 2023 12:05), Max: 98.4 (20 Apr 2023 20:42)  HR: 80 (21 Apr 2023 12:05) (68 - 80)  BP: 130/81 (21 Apr 2023 12:05) (102/64 - 130/81)  BP(mean): --  RR: 18 (21 Apr 2023 12:05) (17 - 18)  SpO2: 97% (21 Apr 2023 12:05) (94% - 99%)    Parameters below as of 21 Apr 2023 12:05  Patient On (Oxygen Delivery Method): room air        T(C): 36.4 (04-21-23 @ 12:05), Max: 37 (04-19-23 @ 20:35)  T(C): 36.4 (04-21-23 @ 12:05), Max: 37 (04-19-23 @ 20:35)  T(C): 36.4 (04-21-23 @ 12:05), Max: 37 (04-19-23 @ 20:35)    PHYSICAL EXAM:  HEENT: NC atraumatic  Neck: supple  Respiratory: no accessory muscle use, breathing comfortably  Cardiovascular: distant  Gastrointestinal: normal appearing, nondistended  Extremities: no clubbing, no cyanosis,        LABS:                          12.0   10.73 )-----------( 275      ( 21 Apr 2023 08:11 )             36.3       WBC  10.73 04-21 @ 08:11  9.36 04-20 @ 06:42  11.33 04-19 @ 07:30  10.91 04-18 @ 20:06      04-21    133<L>  |  99  |  15  ----------------------------<  161<H>  3.9   |  29  |  0.91    Ca    9.4      21 Apr 2023 08:11    TPro  7.5  /  Alb  2.7<L>  /  TBili  0.5  /  DBili  x   /  AST  66<H>  /  ALT  46  /  AlkPhos  750<H>  04-21      Creatinine, Serum: 0.91 mg/dL (04-21-23 @ 08:11)  Creatinine, Serum: 0.74 mg/dL (04-20-23 @ 06:42)  Creatinine, Serum: 0.78 mg/dL (04-19-23 @ 07:30)  Creatinine, Serum: 0.97 mg/dL (04-18-23 @ 20:06)      PT/INR - ( 21 Apr 2023 08:11 )   PT: 21.2 sec;   INR: 1.80 ratio                   INFLAMMATORY MARKERS      MICROBIOLOGY:              RADIOLOGY & ADDITIONAL STUDIES:

## 2023-04-21 NOTE — PROGRESS NOTE ADULT - SUBJECTIVE AND OBJECTIVE BOX
[facility]  Shane Murray PA-C  [address] [place] [address]  [phone]      INTERVAL HPI/OVERNIGHT EVENTS:  Pt s/e  No new GI events  LFTs noted    MEDICATIONS  (STANDING):  aspirin enteric coated 81 milliGRAM(s) Oral daily  atorvastatin 10 milliGRAM(s) Oral at bedtime  cefTRIAXone   IVPB 2000 milliGRAM(s) IV Intermittent every 24 hours  dextrose 5%. 1000 milliLiter(s) (100 mL/Hr) IV Continuous <Continuous>  dextrose 5%. 1000 milliLiter(s) (50 mL/Hr) IV Continuous <Continuous>  dextrose 50% Injectable 25 Gram(s) IV Push once  dextrose 50% Injectable 12.5 Gram(s) IV Push once  dextrose 50% Injectable 25 Gram(s) IV Push once  furosemide    Tablet 40 milliGRAM(s) Oral daily  glucagon  Injectable 1 milliGRAM(s) IntraMuscular once  insulin glargine Injectable (LANTUS) 30 Unit(s) SubCutaneous at bedtime  insulin lispro (ADMELOG) corrective regimen sliding scale   SubCutaneous Before meals and at bedtime  insulin lispro Injectable (ADMELOG) 10 Unit(s) SubCutaneous three times a day before meals  levETIRAcetam 750 milliGRAM(s) Oral two times a day  metoprolol succinate ER 25 milliGRAM(s) Oral daily  pantoprazole   Suspension 40 milliGRAM(s) Oral daily  povidone iodine 10% Solution 1 Application(s) Topical daily  spironolactone 25 milliGRAM(s) Oral daily    MEDICATIONS  (PRN):  acetaminophen     Tablet .. 650 milliGRAM(s) Oral every 6 hours PRN Temp greater or equal to 38C (100.4F), Mild Pain (1 - 3)  dextrose Oral Gel 15 Gram(s) Oral once PRN Blood Glucose LESS THAN 70 milliGRAM(s)/deciliter  oxycodone    5 mG/acetaminophen 325 mG 1 Tablet(s) Oral every 6 hours PRN Moderate Pain (4 - 6)      Allergies    No Known Allergies    PHYSICAL EXAM:   Vital Signs:  Vital Signs Last 24 Hrs  T(C): 36.3 (2023 04:32), Max: 36.9 (2023 20:42)  T(F): 97.4 (2023 04:32), Max: 98.4 (2023 20:42)  HR: 77 (:32) (68 - 77)  BP: 102/64 (2023 04:32) (102/64 - 123/75)  BP(mean): --  RR: 18 (:32) (17 - 18)  SpO2: 96% (:32) (94% - 99%)    Parameters below as of 2023 04:32  Patient On (Oxygen Delivery Method): room air      Daily     Daily Weight in k.1 (:32)    GENERAL:  Appears stated age  HEENT:  NC/AT  CHEST:  Full & symmetric excursion  HEART:  Regular rhythm  ABDOMEN:  Soft, non-tender, non-distended  EXTEREMITIES:  no cyanosis  SKIN:  No rash  NEURO:  Alert      LABS:                        12.0   10.73 )-----------( 275      ( 2023 08:11 )             36.3     04-    133<L>  |  99  |  15  ----------------------------<  161<H>  3.9   |  29  |  0.91    Ca    9.4      2023 08:11    TPro  7.5  /  Alb  2.7<L>  /  TBili  0.5  /  DBili  x   /  AST  66<H>  /  ALT  46  /  AlkPhos  750<H>      PT/INR - ( 2023 08:11 )   PT: 21.2 sec;   INR: 1.80 ratio

## 2023-04-21 NOTE — PROGRESS NOTE ADULT - SUBJECTIVE AND OBJECTIVE BOX
Patient is a 67y Male whom presented to the hospital with hyponatremia     PAST MEDICAL & SURGICAL HISTORY:  CVA (cerebral vascular accident)      HTN (hypertension)      DM (diabetes mellitus)      Arrhythmia      History of atrial fibrillation      S/P CABG (coronary artery bypass graft)      S/P brain surgery          MEDICATIONS  (STANDING):  aspirin enteric coated 81 milliGRAM(s) Oral daily  atorvastatin 10 milliGRAM(s) Oral at bedtime  dextrose 5%. 1000 milliLiter(s) (50 mL/Hr) IV Continuous <Continuous>  dextrose 5%. 1000 milliLiter(s) (100 mL/Hr) IV Continuous <Continuous>  dextrose 50% Injectable 25 Gram(s) IV Push once  dextrose 50% Injectable 12.5 Gram(s) IV Push once  dextrose 50% Injectable 25 Gram(s) IV Push once  furosemide    Tablet 40 milliGRAM(s) Oral daily  glucagon  Injectable 1 milliGRAM(s) IntraMuscular once  insulin glargine Injectable (LANTUS) 7 Unit(s) SubCutaneous at bedtime  insulin lispro (ADMELOG) corrective regimen sliding scale   SubCutaneous three times a day before meals  insulin lispro Injectable (ADMELOG) 2 Unit(s) SubCutaneous three times a day before meals  levETIRAcetam 750 milliGRAM(s) Oral two times a day  pantoprazole   Suspension 40 milliGRAM(s) Oral daily  piperacillin/tazobactam IVPB.. 3.375 Gram(s) IV Intermittent every 8 hours  propranolol 20 milliGRAM(s) Oral two times a day  vancomycin  IVPB 1000 milliGRAM(s) IV Intermittent every 12 hours      Allergies    No Known Allergies    Intolerances        SOCIAL HISTORY:  Denies ETOh,Smoking,     FAMILY HISTORY:  No pertinent family history in first degree relatives        REVIEW OF SYSTEMS:    CONSTITUTIONAL: No weakness, fevers or chills  EYES/ENT: No visual changes;  no throat pain   NECK: No pain or stiffness  RESPIRATORY: No cough, wheezing, hemoptysis; No shortness of breath  CARDIOVASCULAR: No chest pain or palpitations  GASTROINTESTINAL: No abdominal or epigastric pain. No nausea, vomiting,                             12.0   10.73 )-----------( 275      ( 21 Apr 2023 08:11 )             36.3       CBC Full  -  ( 21 Apr 2023 08:11 )  WBC Count : 10.73 K/uL  RBC Count : 4.43 M/uL  Hemoglobin : 12.0 g/dL  Hematocrit : 36.3 %  Platelet Count - Automated : 275 K/uL  Mean Cell Volume : 81.9 fl  Mean Cell Hemoglobin : 27.1 pg  Mean Cell Hemoglobin Concentration : 33.1 gm/dL  Auto Neutrophil # : x  Auto Lymphocyte # : x  Auto Monocyte # : x  Auto Eosinophil # : x  Auto Basophil # : x  Auto Neutrophil % : x  Auto Lymphocyte % : x  Auto Monocyte % : x  Auto Eosinophil % : x  Auto Basophil % : x      04-21    133<L>  |  99  |  15  ----------------------------<  161<H>  3.9   |  29  |  0.91    Ca    9.4      21 Apr 2023 08:11    TPro  7.5  /  Alb  2.7<L>  /  TBili  0.5  /  DBili  x   /  AST  66<H>  /  ALT  46  /  AlkPhos  750<H>  04-21      CAPILLARY BLOOD GLUCOSE      POCT Blood Glucose.: 147 mg/dL (21 Apr 2023 12:36)  POCT Blood Glucose.: 120 mg/dL (21 Apr 2023 07:43)  POCT Blood Glucose.: 142 mg/dL (20 Apr 2023 21:04)  POCT Blood Glucose.: 98 mg/dL (20 Apr 2023 16:47)      Vital Signs Last 24 Hrs  T(C): 36.4 (21 Apr 2023 12:05), Max: 36.9 (20 Apr 2023 20:42)  T(F): 97.6 (21 Apr 2023 12:05), Max: 98.4 (20 Apr 2023 20:42)  HR: 80 (21 Apr 2023 12:05) (68 - 80)  BP: 130/81 (21 Apr 2023 12:05) (102/64 - 130/81)  BP(mean): --  RR: 18 (21 Apr 2023 12:05) (17 - 18)  SpO2: 97% (21 Apr 2023 12:05) (94% - 97%)    Parameters below as of 21 Apr 2023 12:05  Patient On (Oxygen Delivery Method): room air            PT/INR - ( 21 Apr 2023 08:11 )   PT: 21.2 sec;   INR: 1.80 ratio               PHYSICAL EXAM:    Constitutional: NAD  HEENT: conjunctive   clear   Neck:  No JVD  Respiratory: CTAB  Cardiovascular: S1 and S2  Gastrointestinal: BS+, soft, NT/ND  Extremities: No peripheral edema  Neurological: no focal deficits  Psychiatric: Normal mood, normal affect  : No Adams  Skin: dry   Access: Not applicable

## 2023-04-21 NOTE — PHARMACOTHERAPY INTERVENTION NOTE - COMMENTS
Patient w/ PMH A Fib (mechanical aortic valve) is ordered for warfarin 4mg X 1 (alternating between 2 and 3 mg). Patient came in with supra-theraputic INR of 7.16. Patient received 4mg yesterday (INR was 2.5). INR today is 1.8 (goal of 2.5-3.5). Discussed w/ Dr. Tamika Harmon that loading the patient with 4mg may result in supra-therapeutic INR which may not show until 2-3 days. Recommended to continue with home dose of 2mg. MD would like to continue with current dose due to clinical judgment.  Patient w/ PMH A Fib (mechanical aortic valve) is ordered for warfarin 4mg X 1 (alternating between 2 and 3 mg). Patient came in with supra-therapeutic INR of 7.16. Patient received 4mg yesterday (INR was 2.5). INR today is 1.8 (goal of 2.5-3.5). Discussed w/ Dr. Tamika Harmon that loading the patient with 4mg may result in supra-therapeutic INR which may not show until 2-3 days. Recommended to continue with home dose of 2mg. MD would like to continue with current dose due to clinical judgment.

## 2023-04-21 NOTE — PROGRESS NOTE ADULT - ASSESSMENT
67 year old male with PMHx CVA (right sided hemiparesis and aphasia), AFib (on coumadin), IDDM, HTN, CAD s/p CABG, s/p AVR, presenting to [place] ED with wound to left 4th metatarsal.  Presented to Podiatry office (Dr. Maegan Jin) who instructed patient to come to ED for evaluation.    Vascular recommending Recommend ABIs/PVR to assess degree of arterial disease    RECOMMENDATIONS  Podiatry: MRI with Osseous edema within the distal phalanx of the fourth digit without   loss of normal T1 fatty marrow possibly representing early osteomyelitis   versus reactive edema - will follow for any surgical plan per podiatry -continue abx for now    Vascular: JUAN A/PVR's reviewed; likely small vessel disease  Podiatry may proceed with intervention as indicated  Cont local wound care and close monitoring for healing  Discussed with Dr Mora  No vascular surgical intervention    Reviewed prior micro and no prior MRSA so have adjusted abx to:  Ceftriaxone 2 gram IV daily -continue for now  recs to follow     Thank you for consulting us and involving us in the management of this most interesting and challenging case.  We will follow along in the care of this patient. Please call us at [phone] or text me directly on my cell# at [phone] with any concerns.

## 2023-04-21 NOTE — PROGRESS NOTE ADULT - ASSESSMENT
67 year old male with PMHx CVA (right sided hemiparesis and aphasia), AFib (on coumadin), IDDM, HTN, CAD s/p CABG, s/p AVR, presenting to [place] ED with wound to left 4th metatarsal.  Patient unable to provide history due to aphasia, history obtained from daughter at beside and per chart review.  Patient developed hematoma to left 4th metatarsal on Friday, it then ruptured and patient presented to Podiatry office (Dr. Maegan Jin) earlier today who instructed patient to come to ED for evaluation.  Patient has now new complaints at this time.  Patient does not follow with a vascular surgeon.   (18 Apr 2023 21:55)      will check ua , urine osmolality , urine sodium , urine uric acid , serum sodium , serum osmolality , serum uric acid , f/u with hyponatremia work up , f/u with bmp , monitor i and o      BP monitoring,continue current antihypertensive meds, low salt diet,followup with PMD in 1-2 weeks      Admit to telemetry unit for monitoring,send 3 sets of cardiac ensymes to rule out coronary event,obtain ECHO to evaluate LVEF,cardiology consult,continue current managmnet,O2 supply,anticoagulation plan as per cardiology consult

## 2023-04-21 NOTE — PROGRESS NOTE ADULT - SUBJECTIVE AND OBJECTIVE BOX
Date/Time Patient Seen:  		  Referring MD:   Data Reviewed	       Patient is a 67y old  Male who presents with a chief complaint of toe gangrene (20 Apr 2023 20:26)      Subjective/HPI     PAST MEDICAL & SURGICAL HISTORY:  CVA (cerebral vascular accident)    HTN (hypertension)    DM (diabetes mellitus)    Arrhythmia    History of atrial fibrillation    S/P CABG (coronary artery bypass graft)    S/P brain surgery          Medication list         MEDICATIONS  (STANDING):  aspirin enteric coated 81 milliGRAM(s) Oral daily  atorvastatin 10 milliGRAM(s) Oral at bedtime  cefTRIAXone   IVPB 2000 milliGRAM(s) IV Intermittent every 24 hours  dextrose 5%. 1000 milliLiter(s) (100 mL/Hr) IV Continuous <Continuous>  dextrose 5%. 1000 milliLiter(s) (50 mL/Hr) IV Continuous <Continuous>  dextrose 50% Injectable 25 Gram(s) IV Push once  dextrose 50% Injectable 12.5 Gram(s) IV Push once  dextrose 50% Injectable 25 Gram(s) IV Push once  furosemide    Tablet 40 milliGRAM(s) Oral daily  glucagon  Injectable 1 milliGRAM(s) IntraMuscular once  insulin glargine Injectable (LANTUS) 30 Unit(s) SubCutaneous at bedtime  insulin lispro (ADMELOG) corrective regimen sliding scale   SubCutaneous Before meals and at bedtime  insulin lispro Injectable (ADMELOG) 10 Unit(s) SubCutaneous three times a day before meals  levETIRAcetam 750 milliGRAM(s) Oral two times a day  metoprolol succinate ER 25 milliGRAM(s) Oral daily  pantoprazole   Suspension 40 milliGRAM(s) Oral daily  povidone iodine 10% Solution 1 Application(s) Topical daily  spironolactone 25 milliGRAM(s) Oral daily    MEDICATIONS  (PRN):  acetaminophen     Tablet .. 650 milliGRAM(s) Oral every 6 hours PRN Temp greater or equal to 38C (100.4F), Mild Pain (1 - 3)  dextrose Oral Gel 15 Gram(s) Oral once PRN Blood Glucose LESS THAN 70 milliGRAM(s)/deciliter  oxycodone    5 mG/acetaminophen 325 mG 1 Tablet(s) Oral every 6 hours PRN Moderate Pain (4 - 6)         Vitals log        ICU Vital Signs Last 24 Hrs  T(C): 36.3 (21 Apr 2023 04:32), Max: 36.9 (20 Apr 2023 20:42)  T(F): 97.4 (21 Apr 2023 04:32), Max: 98.4 (20 Apr 2023 20:42)  HR: 77 (21 Apr 2023 04:32) (68 - 77)  BP: 102/64 (21 Apr 2023 04:32) (102/64 - 123/75)  BP(mean): --  ABP: --  ABP(mean): --  RR: 18 (21 Apr 2023 04:32) (17 - 18)  SpO2: 96% (21 Apr 2023 04:32) (94% - 99%)    O2 Parameters below as of 21 Apr 2023 04:32  Patient On (Oxygen Delivery Method): room air                 Input and Output:  I&O's Detail    19 Apr 2023 07:01  -  20 Apr 2023 07:00  --------------------------------------------------------  IN:    Oral Fluid: 240 mL  Total IN: 240 mL    OUT:    Voided (mL): 600 mL  Total OUT: 600 mL    Total NET: -360 mL      20 Apr 2023 07:01  -  21 Apr 2023 05:36  --------------------------------------------------------  IN:  Total IN: 0 mL    OUT:    Voided (mL): 400 mL  Total OUT: 400 mL    Total NET: -400 mL          Lab Data                        11.1   9.36  )-----------( 252      ( 20 Apr 2023 06:42 )             34.0     04-20    133<L>  |  99  |  13  ----------------------------<  199<H>  4.2   |  30  |  0.74    Ca    8.9      20 Apr 2023 06:42    TPro  6.9  /  Alb  2.5<L>  /  TBili  0.7  /  DBili  x   /  AST  29  /  ALT  29  /  AlkPhos  531<H>  04-20            Review of Systems	      Objective     Physical Examination    heart s1s2  lung dc BS  head nc      Pertinent Lab findings & Imaging      Bryan:  NO   Adequate UO     I&O's Detail    19 Apr 2023 07:01  -  20 Apr 2023 07:00  --------------------------------------------------------  IN:    Oral Fluid: 240 mL  Total IN: 240 mL    OUT:    Voided (mL): 600 mL  Total OUT: 600 mL    Total NET: -360 mL      20 Apr 2023 07:01  -  21 Apr 2023 05:36  --------------------------------------------------------  IN:  Total IN: 0 mL    OUT:    Voided (mL): 400 mL  Total OUT: 400 mL    Total NET: -400 mL               Discussed with:     Cultures:	        Radiology

## 2023-04-21 NOTE — CASE MANAGEMENT PROGRESS NOTE - NSCMPROGRESSNOTE_GEN_ALL_CORE
Discussed patient on rounds this morning and with Dr. Harmon. Per Dr. Harmon discharge date unknown at present. Chart reviewed. Patient remains acute on IV Rocephin. CM remains available.

## 2023-04-21 NOTE — PROGRESS NOTE ADULT - ASSESSMENT
67 year old male with PMHx CVA (right sided hemiparesis and aphasia), AFib (on coumadin), IDDM, HTN, CAD s/p CABG, s/p AVR, presenting to [place] ED with wound to left 4th metatarsal    cva hx of craniectomy  AF  OP  OA  foot wound  CAD  DM  HTN  left eff - atelectasis  coagulopathy     mri - c/w OM  vs noted  on ABX  on LASIX  ID follow up  Podiatry follow up    cxr noted - consistent with prior CT imaging and CXR - rounded Atelectasis - chr change  no evidence of PNA  wound infection - ID eval - Podiatric eval  pain assessment  DM care  cvs rx regimen  serial COAGS -   assist with needs  fall prec  dietary discretion  old records reviewed - labs - imaging - notes  spoke with family on admission at bedside

## 2023-04-22 LAB
ALBUMIN SERPL ELPH-MCNC: 2.9 G/DL — LOW (ref 3.3–5)
ALP SERPL-CCNC: 862 U/L — HIGH (ref 40–120)
ALT FLD-CCNC: 51 U/L — SIGNIFICANT CHANGE UP (ref 12–78)
ANION GAP SERPL CALC-SCNC: 5 MMOL/L — SIGNIFICANT CHANGE UP (ref 5–17)
AST SERPL-CCNC: 68 U/L — HIGH (ref 15–37)
BILIRUB SERPL-MCNC: 0.5 MG/DL — SIGNIFICANT CHANGE UP (ref 0.2–1.2)
BUN SERPL-MCNC: 13 MG/DL — SIGNIFICANT CHANGE UP (ref 7–23)
CALCIUM SERPL-MCNC: 9.5 MG/DL — SIGNIFICANT CHANGE UP (ref 8.5–10.1)
CHLORIDE SERPL-SCNC: 100 MMOL/L — SIGNIFICANT CHANGE UP (ref 96–108)
CO2 SERPL-SCNC: 30 MMOL/L — SIGNIFICANT CHANGE UP (ref 22–31)
CREAT SERPL-MCNC: 0.95 MG/DL — SIGNIFICANT CHANGE UP (ref 0.5–1.3)
EGFR: 89 ML/MIN/1.73M2 — SIGNIFICANT CHANGE UP
GLUCOSE SERPL-MCNC: 140 MG/DL — HIGH (ref 70–99)
HCT VFR BLD CALC: 38 % — LOW (ref 39–50)
HGB BLD-MCNC: 12.3 G/DL — LOW (ref 13–17)
INR BLD: 2.34 RATIO — HIGH (ref 0.88–1.16)
MCHC RBC-ENTMCNC: 27 PG — SIGNIFICANT CHANGE UP (ref 27–34)
MCHC RBC-ENTMCNC: 32.4 GM/DL — SIGNIFICANT CHANGE UP (ref 32–36)
MCV RBC AUTO: 83.5 FL — SIGNIFICANT CHANGE UP (ref 80–100)
NRBC # BLD: 0 /100 WBCS — SIGNIFICANT CHANGE UP (ref 0–0)
PLATELET # BLD AUTO: 300 K/UL — SIGNIFICANT CHANGE UP (ref 150–400)
POTASSIUM SERPL-MCNC: 4.3 MMOL/L — SIGNIFICANT CHANGE UP (ref 3.5–5.3)
POTASSIUM SERPL-SCNC: 4.3 MMOL/L — SIGNIFICANT CHANGE UP (ref 3.5–5.3)
PROT SERPL-MCNC: 8.2 G/DL — SIGNIFICANT CHANGE UP (ref 6–8.3)
PROTHROM AB SERPL-ACNC: 27.6 SEC — HIGH (ref 10.5–13.4)
RBC # BLD: 4.55 M/UL — SIGNIFICANT CHANGE UP (ref 4.2–5.8)
RBC # FLD: 18.1 % — HIGH (ref 10.3–14.5)
SODIUM SERPL-SCNC: 135 MMOL/L — SIGNIFICANT CHANGE UP (ref 135–145)
WBC # BLD: 10.28 K/UL — SIGNIFICANT CHANGE UP (ref 3.8–10.5)
WBC # FLD AUTO: 10.28 K/UL — SIGNIFICANT CHANGE UP (ref 3.8–10.5)

## 2023-04-22 RX ORDER — WARFARIN SODIUM 2.5 MG/1
2 TABLET ORAL ONCE
Refills: 0 | Status: COMPLETED | OUTPATIENT
Start: 2023-04-22 | End: 2023-04-22

## 2023-04-22 RX ADMIN — LEVETIRACETAM 750 MILLIGRAM(S): 250 TABLET, FILM COATED ORAL at 17:09

## 2023-04-22 RX ADMIN — LEVETIRACETAM 750 MILLIGRAM(S): 250 TABLET, FILM COATED ORAL at 05:38

## 2023-04-22 RX ADMIN — ATORVASTATIN CALCIUM 10 MILLIGRAM(S): 80 TABLET, FILM COATED ORAL at 22:29

## 2023-04-22 RX ADMIN — Medication 1: at 12:05

## 2023-04-22 RX ADMIN — Medication 10 UNIT(S): at 17:06

## 2023-04-22 RX ADMIN — Medication 10 UNIT(S): at 12:05

## 2023-04-22 RX ADMIN — Medication 25 MILLIGRAM(S): at 05:37

## 2023-04-22 RX ADMIN — Medication 1 APPLICATION(S): at 11:19

## 2023-04-22 RX ADMIN — Medication 81 MILLIGRAM(S): at 11:16

## 2023-04-22 RX ADMIN — WARFARIN SODIUM 2 MILLIGRAM(S): 2.5 TABLET ORAL at 22:29

## 2023-04-22 RX ADMIN — CEFTRIAXONE 100 MILLIGRAM(S): 500 INJECTION, POWDER, FOR SOLUTION INTRAMUSCULAR; INTRAVENOUS at 14:24

## 2023-04-22 RX ADMIN — ENOXAPARIN SODIUM 70 MILLIGRAM(S): 100 INJECTION SUBCUTANEOUS at 17:09

## 2023-04-22 RX ADMIN — INSULIN GLARGINE 30 UNIT(S): 100 INJECTION, SOLUTION SUBCUTANEOUS at 22:29

## 2023-04-22 RX ADMIN — Medication 10 UNIT(S): at 08:14

## 2023-04-22 RX ADMIN — PANTOPRAZOLE SODIUM 40 MILLIGRAM(S): 20 TABLET, DELAYED RELEASE ORAL at 11:17

## 2023-04-22 RX ADMIN — SPIRONOLACTONE 25 MILLIGRAM(S): 25 TABLET, FILM COATED ORAL at 05:37

## 2023-04-22 RX ADMIN — Medication 40 MILLIGRAM(S): at 05:38

## 2023-04-22 RX ADMIN — ENOXAPARIN SODIUM 70 MILLIGRAM(S): 100 INJECTION SUBCUTANEOUS at 05:38

## 2023-04-22 NOTE — PROGRESS NOTE ADULT - SUBJECTIVE AND OBJECTIVE BOX
Date/Time Patient Seen:  		  Referring MD:   Data Reviewed	       Patient is a 67y old  Male who presents with a chief complaint of toe gangrene (21 Apr 2023 15:50)      Subjective/HPI     PAST MEDICAL & SURGICAL HISTORY:  CVA (cerebral vascular accident)    HTN (hypertension)    DM (diabetes mellitus)    Arrhythmia    History of atrial fibrillation    S/P CABG (coronary artery bypass graft)    S/P brain surgery          Medication list         MEDICATIONS  (STANDING):  aspirin enteric coated 81 milliGRAM(s) Oral daily  atorvastatin 10 milliGRAM(s) Oral at bedtime  cefTRIAXone   IVPB 2000 milliGRAM(s) IV Intermittent every 24 hours  dextrose 5%. 1000 milliLiter(s) (100 mL/Hr) IV Continuous <Continuous>  dextrose 5%. 1000 milliLiter(s) (50 mL/Hr) IV Continuous <Continuous>  dextrose 50% Injectable 25 Gram(s) IV Push once  dextrose 50% Injectable 12.5 Gram(s) IV Push once  dextrose 50% Injectable 25 Gram(s) IV Push once  enoxaparin Injectable 70 milliGRAM(s) SubCutaneous every 12 hours  furosemide    Tablet 40 milliGRAM(s) Oral daily  glucagon  Injectable 1 milliGRAM(s) IntraMuscular once  insulin glargine Injectable (LANTUS) 30 Unit(s) SubCutaneous at bedtime  insulin lispro (ADMELOG) corrective regimen sliding scale   SubCutaneous Before meals and at bedtime  insulin lispro Injectable (ADMELOG) 10 Unit(s) SubCutaneous three times a day before meals  levETIRAcetam 750 milliGRAM(s) Oral two times a day  metoprolol succinate ER 25 milliGRAM(s) Oral daily  pantoprazole   Suspension 40 milliGRAM(s) Oral daily  povidone iodine 10% Solution 1 Application(s) Topical daily  spironolactone 25 milliGRAM(s) Oral daily    MEDICATIONS  (PRN):  acetaminophen     Tablet .. 650 milliGRAM(s) Oral every 6 hours PRN Temp greater or equal to 38C (100.4F), Mild Pain (1 - 3)  dextrose Oral Gel 15 Gram(s) Oral once PRN Blood Glucose LESS THAN 70 milliGRAM(s)/deciliter  oxycodone    5 mG/acetaminophen 325 mG 1 Tablet(s) Oral every 6 hours PRN Moderate Pain (4 - 6)         Vitals log        ICU Vital Signs Last 24 Hrs  T(C): 36.3 (22 Apr 2023 04:01), Max: 36.7 (21 Apr 2023 20:39)  T(F): 97.4 (22 Apr 2023 04:01), Max: 98.1 (21 Apr 2023 20:39)  HR: 78 (22 Apr 2023 04:01) (78 - 80)  BP: 116/72 (22 Apr 2023 04:01) (116/72 - 130/81)  BP(mean): --  ABP: --  ABP(mean): --  RR: 18 (22 Apr 2023 04:01) (17 - 18)  SpO2: 93% (22 Apr 2023 04:01) (93% - 97%)    O2 Parameters below as of 21 Apr 2023 20:39  Patient On (Oxygen Delivery Method): room air                 Input and Output:  I&O's Detail    21 Apr 2023 07:01  -  22 Apr 2023 07:00  --------------------------------------------------------  IN:  Total IN: 0 mL    OUT:    Voided (mL): 750 mL  Total OUT: 750 mL    Total NET: -750 mL          Lab Data                        12.0   10.73 )-----------( 275      ( 21 Apr 2023 08:11 )             36.3     04-21    133<L>  |  99  |  15  ----------------------------<  161<H>  3.9   |  29  |  0.91    Ca    9.4      21 Apr 2023 08:11    TPro  7.5  /  Alb  2.7<L>  /  TBili  0.5  /  DBili  x   /  AST  66<H>  /  ALT  46  /  AlkPhos  750<H>  04-21            Review of Systems	      Objective     Physical Examination    heart s1s2  lung dec BS  head nc      Pertinent Lab findings & Imaging      Bryan:  NO   Adequate UO     I&O's Detail    21 Apr 2023 07:01  -  22 Apr 2023 07:00  --------------------------------------------------------  IN:  Total IN: 0 mL    OUT:    Voided (mL): 750 mL  Total OUT: 750 mL    Total NET: -750 mL               Discussed with:     Cultures:	        Radiology

## 2023-04-22 NOTE — PROGRESS NOTE ADULT - SUBJECTIVE AND OBJECTIVE BOX
Patient is a 67y old  Male who presents with a chief complaint of toe gangrene (22 Apr 2023 12:29)    Date of servie : 04-22-23 @ 13:17  INTERVAL HPI/OVERNIGHT EVENTS:  T(C): 37.1 (04-22-23 @ 11:55), Max: 37.1 (04-22-23 @ 11:55)  HR: 72 (04-22-23 @ 11:55) (72 - 80)  BP: 93/70 (04-22-23 @ 11:55) (93/70 - 121/70)  RR: 18 (04-22-23 @ 11:55) (17 - 18)  SpO2: 97% (04-22-23 @ 11:55) (93% - 97%)  Wt(kg): --  I&O's Summary    21 Apr 2023 07:01  -  22 Apr 2023 07:00  --------------------------------------------------------  IN: 0 mL / OUT: 750 mL / NET: -750 mL    22 Apr 2023 07:01  -  22 Apr 2023 13:17  --------------------------------------------------------  IN: 270 mL / OUT: 0 mL / NET: 270 mL        LABS:                        12.3   10.28 )-----------( 300      ( 22 Apr 2023 07:55 )             38.0     04-22    135  |  100  |  13  ----------------------------<  140<H>  4.3   |  30  |  0.95    Ca    9.5      22 Apr 2023 07:55    TPro  8.2  /  Alb  2.9<L>  /  TBili  0.5  /  DBili  x   /  AST  68<H>  /  ALT  51  /  AlkPhos  862<H>  04-22    PT/INR - ( 22 Apr 2023 07:55 )   PT: 27.6 sec;   INR: 2.34 ratio             CAPILLARY BLOOD GLUCOSE      POCT Blood Glucose.: 183 mg/dL (22 Apr 2023 11:54)  POCT Blood Glucose.: 142 mg/dL (22 Apr 2023 08:05)  POCT Blood Glucose.: 100 mg/dL (21 Apr 2023 21:32)  POCT Blood Glucose.: 173 mg/dL (21 Apr 2023 16:48)            MEDICATIONS  (STANDING):  aspirin enteric coated 81 milliGRAM(s) Oral daily  atorvastatin 10 milliGRAM(s) Oral at bedtime  cefTRIAXone   IVPB 2000 milliGRAM(s) IV Intermittent every 24 hours  dextrose 5%. 1000 milliLiter(s) (100 mL/Hr) IV Continuous <Continuous>  dextrose 5%. 1000 milliLiter(s) (50 mL/Hr) IV Continuous <Continuous>  dextrose 50% Injectable 25 Gram(s) IV Push once  dextrose 50% Injectable 12.5 Gram(s) IV Push once  dextrose 50% Injectable 25 Gram(s) IV Push once  enoxaparin Injectable 70 milliGRAM(s) SubCutaneous every 12 hours  furosemide    Tablet 40 milliGRAM(s) Oral daily  glucagon  Injectable 1 milliGRAM(s) IntraMuscular once  insulin glargine Injectable (LANTUS) 30 Unit(s) SubCutaneous at bedtime  insulin lispro (ADMELOG) corrective regimen sliding scale   SubCutaneous Before meals and at bedtime  insulin lispro Injectable (ADMELOG) 10 Unit(s) SubCutaneous three times a day before meals  levETIRAcetam 750 milliGRAM(s) Oral two times a day  metoprolol succinate ER 25 milliGRAM(s) Oral daily  pantoprazole   Suspension 40 milliGRAM(s) Oral daily  povidone iodine 10% Solution 1 Application(s) Topical daily  spironolactone 25 milliGRAM(s) Oral daily  warfarin 2 milliGRAM(s) Oral once    MEDICATIONS  (PRN):  acetaminophen     Tablet .. 650 milliGRAM(s) Oral every 6 hours PRN Temp greater or equal to 38C (100.4F), Mild Pain (1 - 3)  dextrose Oral Gel 15 Gram(s) Oral once PRN Blood Glucose LESS THAN 70 milliGRAM(s)/deciliter  oxycodone    5 mG/acetaminophen 325 mG 1 Tablet(s) Oral every 6 hours PRN Moderate Pain (4 - 6)          PHYSICAL EXAM:  GENERAL: frail  HEAD:  Atraumatic, Normocephalic  CHEST/LUNG: Clear to percussion bilaterally; No rales, rhonchi, wheezing, or rubs  HEART: Regular rate and rhythm; No murmurs, rubs, or gallops  ABDOMEN: Soft, Nontender, Nondistended; Bowel sounds present  EXTREMITIES:  foot dressing +    Care Discussed with Consultants/Other Providers [ x] YES  [ ] NO

## 2023-04-22 NOTE — PROGRESS NOTE ADULT - SUBJECTIVE AND OBJECTIVE BOX
[facility]  Shane Murray PA-C  [address] [place] [address]  [phone]      INTERVAL HPI/OVERNIGHT EVENTS:  Pt s/e  No new GI events  LFTs noted          MEDICATIONS  (STANDING):  aspirin enteric coated 81 milliGRAM(s) Oral daily  atorvastatin 10 milliGRAM(s) Oral at bedtime  cefTRIAXone   IVPB 2000 milliGRAM(s) IV Intermittent every 24 hours  dextrose 5%. 1000 milliLiter(s) (100 mL/Hr) IV Continuous <Continuous>  dextrose 5%. 1000 milliLiter(s) (50 mL/Hr) IV Continuous <Continuous>  dextrose 50% Injectable 25 Gram(s) IV Push once  dextrose 50% Injectable 12.5 Gram(s) IV Push once  dextrose 50% Injectable 25 Gram(s) IV Push once  enoxaparin Injectable 70 milliGRAM(s) SubCutaneous every 12 hours  furosemide    Tablet 40 milliGRAM(s) Oral daily  glucagon  Injectable 1 milliGRAM(s) IntraMuscular once  insulin glargine Injectable (LANTUS) 30 Unit(s) SubCutaneous at bedtime  insulin lispro (ADMELOG) corrective regimen sliding scale   SubCutaneous Before meals and at bedtime  insulin lispro Injectable (ADMELOG) 10 Unit(s) SubCutaneous three times a day before meals  levETIRAcetam 750 milliGRAM(s) Oral two times a day  metoprolol succinate ER 25 milliGRAM(s) Oral daily  pantoprazole   Suspension 40 milliGRAM(s) Oral daily  povidone iodine 10% Solution 1 Application(s) Topical daily  spironolactone 25 milliGRAM(s) Oral daily    MEDICATIONS  (PRN):  acetaminophen     Tablet .. 650 milliGRAM(s) Oral every 6 hours PRN Temp greater or equal to 38C (100.4F), Mild Pain (1 - 3)  dextrose Oral Gel 15 Gram(s) Oral once PRN Blood Glucose LESS THAN 70 milliGRAM(s)/deciliter  oxycodone    5 mG/acetaminophen 325 mG 1 Tablet(s) Oral every 6 hours PRN Moderate Pain (4 - 6)      Allergies    No Known Allergies    Intolerances          PHYSICAL EXAM  Vital Signs Last 24 Hrs  T(C): 37.1 (22 Apr 2023 11:55), Max: 37.1 (22 Apr 2023 11:55)  T(F): 98.8 (22 Apr 2023 11:55), Max: 98.8 (22 Apr 2023 11:55)  HR: 72 (22 Apr 2023 11:55) (72 - 80)  BP: 93/70 (22 Apr 2023 11:55) (93/70 - 121/70)  BP(mean): --  RR: 18 (22 Apr 2023 11:55) (17 - 18)  SpO2: 97% (22 Apr 2023 11:55) (93% - 97%)    Parameters below as of 22 Apr 2023 11:55  Patient On (Oxygen Delivery Method): room air                  GENERAL:  Appears stated age  HEENT:  NC/AT  CHEST:  Full & symmetric excursion  HEART:  Regular rhythm  ABDOMEN:  Soft, non-tender, non-distended  EXTEREMITIES:  no cyanosis  SKIN:  No rash  NEURO:  Alert        LABS:                        12.3   10.28 )-----------( 300      ( 22 Apr 2023 07:55 )             38.0     04-22    135  |  100  |  13  ----------------------------<  140<H>  4.3   |  30  |  0.95    Ca    9.5      22 Apr 2023 07:55    TPro  8.2  /  Alb  2.9<L>  /  TBili  0.5  /  DBili  x   /  AST  68<H>  /  ALT  51  /  AlkPhos  862<H>  04-22    PT/INR - ( 22 Apr 2023 07:55 )   PT: 27.6 sec;   INR: 2.34 ratio               04-21-23 @ 07:01  -  04-22-23 @ 07:00  --------------------------------------------------------  IN: 0 mL / OUT: 750 mL / NET: -750 mL    04-22-23 @ 07:01  -  04-22-23 @ 12:30  --------------------------------------------------------  IN: 270 mL / OUT: 0 mL / NET: 270 mL

## 2023-04-22 NOTE — PROGRESS NOTE ADULT - ASSESSMENT
67 year old male with PMHx CVA (right sided hemiparesis and aphasia), AFib (on coumadin), IDDM, HTN, CAD s/p CABG, s/p AVR, presenting to [facility] ED with wound to left 4th metatarsal    cva hx of craniectomy  AF  OP  OA  foot wound  CAD  DM  HTN  left eff - atelectasis  coagulopathy     mri - c/w OM  vs noted  on ABX  on LASIX  ID follow up  Podiatry follow up    cxr noted - consistent with prior CT imaging and CXR - rounded Atelectasis - chr change  no evidence of PNA  wound infection - ID eval - Podiatric eval  pain assessment  DM care  cvs rx regimen  serial COAGS -   assist with needs  fall prec  dietary discretion  old records reviewed - labs - imaging - notes  spoke with family on admission at bedside

## 2023-04-22 NOTE — PROGRESS NOTE ADULT - SUBJECTIVE AND OBJECTIVE BOX
Neurology follow up note    ARNIE [id number]      Interval History:    Patient feels ok no new complaints.    Allergies    No Known Allergies    Intolerances        MEDICATIONS    acetaminophen     Tablet .. 650 milliGRAM(s) Oral every 6 hours PRN  aspirin enteric coated 81 milliGRAM(s) Oral daily  atorvastatin 10 milliGRAM(s) Oral at bedtime  cefTRIAXone   IVPB 2000 milliGRAM(s) IV Intermittent every 24 hours  dextrose 5%. 1000 milliLiter(s) IV Continuous <Continuous>  dextrose 5%. 1000 milliLiter(s) IV Continuous <Continuous>  dextrose 50% Injectable 25 Gram(s) IV Push once  dextrose 50% Injectable 12.5 Gram(s) IV Push once  dextrose 50% Injectable 25 Gram(s) IV Push once  dextrose Oral Gel 15 Gram(s) Oral once PRN  enoxaparin Injectable 70 milliGRAM(s) SubCutaneous every 12 hours  furosemide    Tablet 40 milliGRAM(s) Oral daily  glucagon  Injectable 1 milliGRAM(s) IntraMuscular once  insulin glargine Injectable (LANTUS) 30 Unit(s) SubCutaneous at bedtime  insulin lispro (ADMELOG) corrective regimen sliding scale   SubCutaneous Before meals and at bedtime  insulin lispro Injectable (ADMELOG) 10 Unit(s) SubCutaneous three times a day before meals  levETIRAcetam 750 milliGRAM(s) Oral two times a day  metoprolol succinate ER 25 milliGRAM(s) Oral daily  oxycodone    5 mG/acetaminophen 325 mG 1 Tablet(s) Oral every 6 hours PRN  pantoprazole   Suspension 40 milliGRAM(s) Oral daily  povidone iodine 10% Solution 1 Application(s) Topical daily  spironolactone 25 milliGRAM(s) Oral daily              Vital Signs Last 24 Hrs  T(C): 36.3 (22 Apr 2023 04:01), Max: 36.7 (21 Apr 2023 20:39)  T(F): 97.4 (22 Apr 2023 04:01), Max: 98.1 (21 Apr 2023 20:39)  HR: 78 (22 Apr 2023 04:01) (78 - 80)  BP: 116/72 (22 Apr 2023 04:01) (116/72 - 130/81)  BP(mean): --  RR: 18 (22 Apr 2023 04:01) (17 - 18)  SpO2: 93% (22 Apr 2023 04:01) (93% - 97%)    Parameters below as of 21 Apr 2023 20:39  Patient On (Oxygen Delivery Method): room air      REVIEW OF SYSTEMS:  Completely limited secondary to the patient repeats words over, has severe expressive aphasia, but had been in his normal state of health as per daughter.    PHYSICAL EXAMINATION:  HEENT:  Head:  Normocephalic, atraumatic.  Eyes:  No scleral icterus.  Ears:  Hard to evaluate secondary to he could not answer questions accordingly but appears to follow commands.  NECK:  Supple.  RESPIRATORY:  Air entry bilaterally.  CARDIOVASCULAR:  S1 and S2 heard.  ABDOMEN:  Soft and nontender.  EXTREMITIES:  Positive discoloration was noted on the left toe.     NEUROLOGIC:  The patient was awake, alert.  On-off blink to visual threat.  Positive expressive aphasia.  Will say a few words but repeat the same words over.  Right upper and right lower were 0/5 with increased tone.  Right hemiplegia is his baseline.  Left upper and left lower were 4/5.        LABS:  CBC Full  -  ( 21 Apr 2023 08:11 )  WBC Count : 10.73 K/uL  RBC Count : 4.43 M/uL  Hemoglobin : 12.0 g/dL  Hematocrit : 36.3 %  Platelet Count - Automated : 275 K/uL  Mean Cell Volume : 81.9 fl  Mean Cell Hemoglobin : 27.1 pg  Mean Cell Hemoglobin Concentration : 33.1 gm/dL  Auto Neutrophil # : x  Auto Lymphocyte # : x  Auto Monocyte # : x  Auto Eosinophil # : x  Auto Basophil # : x  Auto Neutrophil % : x  Auto Lymphocyte % : x  Auto Monocyte % : x  Auto Eosinophil % : x  Auto Basophil % : x      04-21    133<L>  |  99  |  15  ----------------------------<  161<H>  3.9   |  29  |  0.91    Ca    9.4      21 Apr 2023 08:11    TPro  7.5  /  Alb  2.7<L>  /  TBili  0.5  /  DBili  x   /  AST  66<H>  /  ALT  46  /  AlkPhos  750<H>  04-21    Hemoglobin A1C:     LIVER FUNCTIONS - ( 21 Apr 2023 08:11 )  Alb: 2.7 g/dL / Pro: 7.5 g/dL / ALK PHOS: 750 U/L / ALT: 46 U/L / AST: 66 U/L / GGT: x           Vitamin B12   PT/INR - ( 21 Apr 2023 08:11 )   PT: 21.2 sec;   INR: 1.80 ratio               RADIOLOGY      ANALYSIS AND PLAN:  This is a 67-year-old with a history of atrial fibrillation, cerebrovascular accident, and seizure.  For history of atrial fibrillation and cerebrovascular accident, risks versus benefits.  The patient at present does have elevated INR, would recommend to hold for now with a goal INR between 2 and 3.  Questionable the patient will need any type of surgical intervention for his toe.  For history of seizure prophylaxis, continue the patient on Keppra.  For history of diabetes, strict control of blood sugars.  For history of hypertension, monitor systolic blood pressure.  Excisional debridement with 15 blade of left 4th toe  base down to subcutaneous tissue Left foot ulceration    neurologic wise stable     Spoke with one daughter at bedside 4/21.  Other daughter is Jeffrey.  Her telephone number is [phone] 4/20.  She understands my reasoning and thought process.    Greater than 40 minutes of time was spent with the patient, plan of care, reviewing data, with greater than 50% of the visit was spent counseling and/or coordinating care with multidisciplinary healthcare team   Neurology follow up note    ARNIE [id number]      Interval History:    Patient feels ok no new complaints seen with daughter     Allergies    No Known Allergies    Intolerances        MEDICATIONS    acetaminophen     Tablet .. 650 milliGRAM(s) Oral every 6 hours PRN  aspirin enteric coated 81 milliGRAM(s) Oral daily  atorvastatin 10 milliGRAM(s) Oral at bedtime  cefTRIAXone   IVPB 2000 milliGRAM(s) IV Intermittent every 24 hours  dextrose 5%. 1000 milliLiter(s) IV Continuous <Continuous>  dextrose 5%. 1000 milliLiter(s) IV Continuous <Continuous>  dextrose 50% Injectable 25 Gram(s) IV Push once  dextrose 50% Injectable 12.5 Gram(s) IV Push once  dextrose 50% Injectable 25 Gram(s) IV Push once  dextrose Oral Gel 15 Gram(s) Oral once PRN  enoxaparin Injectable 70 milliGRAM(s) SubCutaneous every 12 hours  furosemide    Tablet 40 milliGRAM(s) Oral daily  glucagon  Injectable 1 milliGRAM(s) IntraMuscular once  insulin glargine Injectable (LANTUS) 30 Unit(s) SubCutaneous at bedtime  insulin lispro (ADMELOG) corrective regimen sliding scale   SubCutaneous Before meals and at bedtime  insulin lispro Injectable (ADMELOG) 10 Unit(s) SubCutaneous three times a day before meals  levETIRAcetam 750 milliGRAM(s) Oral two times a day  metoprolol succinate ER 25 milliGRAM(s) Oral daily  oxycodone    5 mG/acetaminophen 325 mG 1 Tablet(s) Oral every 6 hours PRN  pantoprazole   Suspension 40 milliGRAM(s) Oral daily  povidone iodine 10% Solution 1 Application(s) Topical daily  spironolactone 25 milliGRAM(s) Oral daily              Vital Signs Last 24 Hrs  T(C): 36.3 (22 Apr 2023 04:01), Max: 36.7 (21 Apr 2023 20:39)  T(F): 97.4 (22 Apr 2023 04:01), Max: 98.1 (21 Apr 2023 20:39)  HR: 78 (22 Apr 2023 04:01) (78 - 80)  BP: 116/72 (22 Apr 2023 04:01) (116/72 - 130/81)  BP(mean): --  RR: 18 (22 Apr 2023 04:01) (17 - 18)  SpO2: 93% (22 Apr 2023 04:01) (93% - 97%)    Parameters below as of 21 Apr 2023 20:39  Patient On (Oxygen Delivery Method): room air      REVIEW OF SYSTEMS:  Completely limited secondary to the patient repeats words over, has severe expressive aphasia, but had been in his normal state of health as per daughter.    PHYSICAL EXAMINATION:  HEENT:  Head:  Normocephalic, atraumatic.  Eyes:  No scleral icterus.  Ears:  Hard to evaluate secondary to he could not answer questions accordingly but appears to follow commands.  NECK:  Supple.  RESPIRATORY:  Air entry bilaterally.  CARDIOVASCULAR:  S1 and S2 heard.  ABDOMEN:  Soft and nontender.  EXTREMITIES:  Positive discoloration was noted on the left toe.     NEUROLOGIC:  The patient was awake, alert.  On-off blink to visual threat.  Positive expressive aphasia.  Will say a few words but repeat the same words over.  Right upper and right lower were 0/5 with increased tone.  Right hemiplegia is his baseline.  Left upper and left lower were 4/5.        LABS:  CBC Full  -  ( 21 Apr 2023 08:11 )  WBC Count : 10.73 K/uL  RBC Count : 4.43 M/uL  Hemoglobin : 12.0 g/dL  Hematocrit : 36.3 %  Platelet Count - Automated : 275 K/uL  Mean Cell Volume : 81.9 fl  Mean Cell Hemoglobin : 27.1 pg  Mean Cell Hemoglobin Concentration : 33.1 gm/dL  Auto Neutrophil # : x  Auto Lymphocyte # : x  Auto Monocyte # : x  Auto Eosinophil # : x  Auto Basophil # : x  Auto Neutrophil % : x  Auto Lymphocyte % : x  Auto Monocyte % : x  Auto Eosinophil % : x  Auto Basophil % : x      04-21    133<L>  |  99  |  15  ----------------------------<  161<H>  3.9   |  29  |  0.91    Ca    9.4      21 Apr 2023 08:11    TPro  7.5  /  Alb  2.7<L>  /  TBili  0.5  /  DBili  x   /  AST  66<H>  /  ALT  46  /  AlkPhos  750<H>  04-21    Hemoglobin A1C:     LIVER FUNCTIONS - ( 21 Apr 2023 08:11 )  Alb: 2.7 g/dL / Pro: 7.5 g/dL / ALK PHOS: 750 U/L / ALT: 46 U/L / AST: 66 U/L / GGT: x           Vitamin B12   PT/INR - ( 21 Apr 2023 08:11 )   PT: 21.2 sec;   INR: 1.80 ratio               RADIOLOGY      ANALYSIS AND PLAN:  This is a 67-year-old with a history of atrial fibrillation, cerebrovascular accident, and seizure.  For history of atrial fibrillation and cerebrovascular accident, risks versus benefits.  The patient at present does have elevated INR, would recommend to hold for now with a goal INR between 2 and 3.  Questionable the patient will need any type of surgical intervention for his toe.  For history of seizure prophylaxis, continue the patient on Keppra.  For history of diabetes, strict control of blood sugars.  For history of hypertension, monitor systolic blood pressure.  Excisional debridement with 15 blade of left 4th toe  base down to subcutaneous tissue Left foot ulceration  monitor oral intake as needed     neurologic wise stable     Spoke with one daughter at bedside 4/22  Other daughter is Jeffrey.  Her telephone number is [phone] 4/20.  She understands my reasoning and thought process.    Greater than 40 minutes of time was spent with the patient, plan of care, reviewing data, with greater than 50% of the visit was spent counseling and/or coordinating care with multidisciplinary healthcare team

## 2023-04-22 NOTE — PROGRESS NOTE ADULT - SUBJECTIVE AND OBJECTIVE BOX
Patient is a 67y Male whom presented to the hospital with hyponatremia     PAST MEDICAL & SURGICAL HISTORY:  CVA (cerebral vascular accident)      HTN (hypertension)      DM (diabetes mellitus)      Arrhythmia      History of atrial fibrillation      S/P CABG (coronary artery bypass graft)      S/P brain surgery          MEDICATIONS  (STANDING):  aspirin enteric coated 81 milliGRAM(s) Oral daily  atorvastatin 10 milliGRAM(s) Oral at bedtime  dextrose 5%. 1000 milliLiter(s) (50 mL/Hr) IV Continuous <Continuous>  dextrose 5%. 1000 milliLiter(s) (100 mL/Hr) IV Continuous <Continuous>  dextrose 50% Injectable 25 Gram(s) IV Push once  dextrose 50% Injectable 12.5 Gram(s) IV Push once  dextrose 50% Injectable 25 Gram(s) IV Push once  furosemide    Tablet 40 milliGRAM(s) Oral daily  glucagon  Injectable 1 milliGRAM(s) IntraMuscular once  insulin glargine Injectable (LANTUS) 7 Unit(s) SubCutaneous at bedtime  insulin lispro (ADMELOG) corrective regimen sliding scale   SubCutaneous three times a day before meals  insulin lispro Injectable (ADMELOG) 2 Unit(s) SubCutaneous three times a day before meals  levETIRAcetam 750 milliGRAM(s) Oral two times a day  pantoprazole   Suspension 40 milliGRAM(s) Oral daily  piperacillin/tazobactam IVPB.. 3.375 Gram(s) IV Intermittent every 8 hours  propranolol 20 milliGRAM(s) Oral two times a day  vancomycin  IVPB 1000 milliGRAM(s) IV Intermittent every 12 hours      Allergies    No Known Allergies    Intolerances        SOCIAL HISTORY:  Denies ETOh,Smoking,     FAMILY HISTORY:  No pertinent family history in first degree relatives        REVIEW OF SYSTEMS:    CONSTITUTIONAL: No weakness, fevers or chills  EYES/ENT: No visual changes;  no throat pain   NECK: No pain or stiffness  RESPIRATORY: No cough, wheezing, hemoptysis; No shortness of breath  CARDIOVASCULAR: No chest pain or palpitations  GASTROINTESTINAL: No abdominal or epigastric pain. No nausea, vomiting,                                      12.3   10.28 )-----------( 300      ( 22 Apr 2023 07:55 )             38.0       CBC Full  -  ( 22 Apr 2023 07:55 )  WBC Count : 10.28 K/uL  RBC Count : 4.55 M/uL  Hemoglobin : 12.3 g/dL  Hematocrit : 38.0 %  Platelet Count - Automated : 300 K/uL  Mean Cell Volume : 83.5 fl  Mean Cell Hemoglobin : 27.0 pg  Mean Cell Hemoglobin Concentration : 32.4 gm/dL  Auto Neutrophil # : x  Auto Lymphocyte # : x  Auto Monocyte # : x  Auto Eosinophil # : x  Auto Basophil # : x  Auto Neutrophil % : x  Auto Lymphocyte % : x  Auto Monocyte % : x  Auto Eosinophil % : x  Auto Basophil % : x      04-22    135  |  100  |  13  ----------------------------<  140<H>  4.3   |  30  |  0.95    Ca    9.5      22 Apr 2023 07:55    TPro  8.2  /  Alb  2.9<L>  /  TBili  0.5  /  DBili  x   /  AST  68<H>  /  ALT  51  /  AlkPhos  862<H>  04-22      CAPILLARY BLOOD GLUCOSE      POCT Blood Glucose.: 183 mg/dL (22 Apr 2023 11:54)  POCT Blood Glucose.: 142 mg/dL (22 Apr 2023 08:05)  POCT Blood Glucose.: 100 mg/dL (21 Apr 2023 21:32)  POCT Blood Glucose.: 173 mg/dL (21 Apr 2023 16:48)      Vital Signs Last 24 Hrs  T(C): 37.1 (22 Apr 2023 11:55), Max: 37.1 (22 Apr 2023 11:55)  T(F): 98.8 (22 Apr 2023 11:55), Max: 98.8 (22 Apr 2023 11:55)  HR: 72 (22 Apr 2023 11:55) (72 - 80)  BP: 93/70 (22 Apr 2023 11:55) (93/70 - 121/70)  BP(mean): --  RR: 18 (22 Apr 2023 11:55) (17 - 18)  SpO2: 97% (22 Apr 2023 11:55) (93% - 97%)    Parameters below as of 22 Apr 2023 11:55  Patient On (Oxygen Delivery Method): room air            PT/INR - ( 22 Apr 2023 07:55 )   PT: 27.6 sec;   INR: 2.34 ratio                    PHYSICAL EXAM:    Constitutional: NAD  HEENT: conjunctive   clear   Neck:  No JVD  Respiratory: CTAB  Cardiovascular: S1 and S2  Gastrointestinal: BS+, soft, NT/ND  Extremities: No peripheral edema  Neurological: no focal deficits  Psychiatric: Normal mood, normal affect  : No Adams  Skin: dry   Access: Not applicable

## 2023-04-22 NOTE — PROGRESS NOTE ADULT - ASSESSMENT
67 year old male with PMHx CVA (right sided hemiparesis and aphasia), AFib (on coumadin), IDDM, HTN, CAD s/p CABG, s/p AVR, presenting to [facility] ED with wound to left 4th metatarsal.  Patient unable to provide history due to aphasia, history obtained from daughter at beside and per chart review.  Patient developed hematoma to left 4th metatarsal on Friday, it then ruptured and patient presented to Podiatry office (Dr. Maegan Jin) earlier today who instructed patient to come to ED for evaluation.  Patient has now new complaints at this time.  Patient does not follow with a vascular surgeon.      1 Toe gangrene, diabetic foot ulcer  - cw abx  - ID,vascular and podiatry fu  - fu cultures  - pain control   JUAN A/PVR's reviewed; likely small vessel disease    2 DM  - monitor FS  - Basal bolus- diabetic diet  - uncontrolled     3 Hx of CVA  - cw AC  - cw asa, statin    4 Afib, AVR  - INR and dose coumadin daily  - started lovenox     5 CAD, CABG  - cw asa, statin  - cards fu

## 2023-04-22 NOTE — PROGRESS NOTE ADULT - ASSESSMENT
67 year old male with PMHx CVA (right sided hemiparesis and aphasia), AFib (on coumadin), IDDM, HTN, CAD s/p CABG, s/p AVR, presenting to [facility] ED with wound to left 4th metatarsal.  Patient unable to provide history due to aphasia, history obtained from daughter at beside and per chart review.  Patient developed hematoma to left 4th metatarsal on Friday, it then ruptured and patient presented to Podiatry office (Dr. Maegan Jin) earlier today who instructed patient to come to ED for evaluation.  Patient has now new complaints at this time.  Patient does not follow with a vascular surgeon.   (18 Apr 2023 21:55)      will check ua , urine osmolality , urine sodium , urine uric acid , serum sodium , serum osmolality , serum uric acid , f/u with hyponatremia work up , f/u with bmp , monitor i and o      BP monitoring,continue current antihypertensive meds, low salt diet,followup with PMD in 1-2 weeks      Admit to telemetry unit for monitoring,send 3 sets of cardiac ensymes to rule out coronary event,obtain ECHO to evaluate LVEF,cardiology consult,continue current managmnet,O2 supply,anticoagulation plan as per cardiology consult

## 2023-04-22 NOTE — PROGRESS NOTE ADULT - SUBJECTIVE AND OBJECTIVE BOX
HPI Follow up: Pt was seen as follow up for left foot 4th toe gangrene. Pt's daughter reports that pt is not complaining about any foot pain any more ans is feeling better. No new foot related complaint.    S : 67y year old Male seen at bedside for  Left foot 4th toe ulceration/gangrene.  Patient' s daughter states that pt started having blister around Friday on left 4th toe and daughter applied betadine dressing. by next day daughter noticed black toe discoloration and since yesterday foot started getting swelling, redness and warmth. Pt was seen at podiatry office yesterday and was sent to hospital for admission. Pt daughter denied any F/C/N/V/SOB. family denies any new foot related complaint, reports pt eating well and feeling better, no report of foot pain or F/C/N/V/SOB today.  Chief Complaint : Patient is a 67y old  Male who presents with a chief complaint of toe gangrene (19 Apr 2023 15:39)    HPI : HPI:   67 year old male with PMHx CVA (right sided hemiparesis and aphasia), AFib (on coumadin), IDDM, HTN, CAD s/p CABG, s/p AVR, presenting to [place] ED with wound to left 4th metatarsal.  Patient unable to provide history due to aphasia, history obtained from daughter at beside and per chart review.  Patient developed hematoma to left 4th metatarsal on Friday, it then ruptured and patient presented to Podiatry office (Dr. Maegan Jin) earlier today who instructed patient to come to ED for evaluation.  Patient has now new complaints at this time.  Patient does not follow with a vascular surgeon.   (18 Apr 2023 21:55)      Patient admits to  (-) Fevers, (-) Chills, (-) Nausea, (-) Vomiting, (-) Shortness of Breath      PMH: CVA (cerebral vascular accident)    HTN (hypertension)    DM (diabetes mellitus)    Arrhythmia    History of atrial fibrillation      PSH:S/P CABG (coronary artery bypass graft)    S/P brain surgery        Allergies:No Known Allergies      Labs:                                     12.3   10.28 )-----------( 300      ( 22 Apr 2023 07:55 )             38.0         Chem  04-22    135  |  100  |  13  ----------------------------<  140<H>  4.3   |  30  |  0.95    Ca    9.5      22 Apr 2023 07:55    TPro  8.2  /  Alb  2.9<L>  /  TBili  0.5  /  DBili  x   /  AST  68<H>  /  ALT  51  /  AlkPhos  862<H>  04-22      Vital Signs Last 24 Hrs  T(C): 36.3 (04-22-23 @ 04:01), Max: 36.7 (04-21-23 @ 20:39)  T(F): 97.4 (04-22-23 @ 04:01), Max: 98.1 (04-21-23 @ 20:39)  HR: 78 (04-22-23 @ 04:01) (78 - 80)  BP: 116/72 (04-22-23 @ 04:01) (116/72 - 130/81)  BP(mean): --  RR: 18 (04-22-23 @ 04:01) (17 - 18)  SpO2: 93% (04-22-23 @ 04:01) (93% - 97%)        O:   General: Pleasant  male wheel chair bound, unable to provide history     Ulceration Left 4th toe  ,- hyperkeratotic border, wound base Necrotic base with fibrogranular nail bed wound, no nail(avulsion was done at office) / , wound size (4.0 cm X 4.0 cm X 0.2cm) ++ edema, ++ lydia-wound erythema, - purulence, - fluctuance, - tracking/tunneling, - probe to bone.   Vascular: Dorsalis Pedis and Posterior Tibial pulses 0/4.  Capillary re-fill time less then 5 seconds digits 1-5 bilateral.    Neuro: unable to assess  MSK: unable to asses      FINDINGS:    BONES/JOINTS:  Osseous edema within the distal phalanx of the fourth digit without loss of normal T1 fatty marrow possibly representing early osteomyelitis versus reactive edema. There is marked soft tissue swelling around the fourth phalanx. No soft tissue ulcer extending to bone is visualized. Correlation with physical exam is advised. Alignment is anatomic. Mild osteoarthritis of first metatarsophalangeal joint. No joint effusions. Hallux sesamoid interval is normal.    PLANTAR PLATES:  Intact plantar plates without tear.    INTERMETATARSAL SPACES:  No intermetatarsal neuroma. Second and third intermetatarsal bursitis.    TENDONS:  Visualized portions of the flexor, extensor, and peroneal tendons are intact without tendinosis or tear. No tenosynovitis.    LISFRANC LIGAMENT:  Plantar (pC1-M2M3), interosseous (C1-M2), and dorsal bands intact.    MUSCLES/SOFT TISSUES:  Mild edema throughout the muscles of the forefoot.    IMPRESSION:  1. Osseous edema within the distal phalanx of the fourth digit without loss of normal T1 fatty marrow possibly representing FINDINGS:    BONES/JOINTS:  Osseous edema within the distal phalanx of the fourth digit without loss of normal T1 fatty marrow possibly representing early osteomyelitis versus reactive edema. There is marked soft tissue swelling around the fourth phalanx. No soft tissue ulcer extending to bone is visualized. Correlation with physical exam is advised. Alignment is anatomic. Mild osteoarthritis of first metatarsophalangeal joint. No joint effusions. Hallux sesamoid interval is normal.    PLANTAR PLATES:  Intact plantar plates without tear.    INTERMETATARSAL SPACES:  No intermetatarsal neuroma. Second and third intermetatarsal bursitis.    TENDONS:  Visualized portions of the flexor, extensor, and peroneal tendons are intact without tendinosis or tear. No tenosynovitis.    LISFRANC LIGAMENT:  Plantar (pC1-M2M3), interosseous (C1-M2), and dorsal bands intact.    MUSCLES/SOFT TISSUES:  Mild edema throughout the muscles of the forefoot.    IMPRESSION:  1. Osseous edema within the distal phalanx of the fourth digit without loss of normal T1 fatty marrow possibly representing early osteomyelitis versus reactive edema. There is marked soft tissue swelling around the fourth phalanx. No soft tissue ulcer extending to bone is visualized. Correlation with physical exam is advised.  2. Second and third intermetatarsal bursitis.    --- End of Report ---            NEGRO SERNA DO; Attending Radiologist  This document has been electronically signed. Apr 20 2023 10:58AM. There is marked soft tissue swelling around the fourth phalanx. No soft tissue ulcer extending to bone is visualized. Correlation with physical exam is advised.  2. Second and third intermetatarsal bursitis.    --- End of Report ---            NEGRO SERNA DO; Attending Radiologist  This document has been electronically signed. Apr 20 2023 10:58AM      A: Left foot 4th toe  ulceration      Left foot cellulitis      Left foot abscess      Diabetes Mellitus      PAD      Left 4th toe gangrene      P:   Chart reviewed and Patient evaluated  Discussed diagnosis and treatment with patient' family  Wound flush with normal saline  Excisional debridement with 15 blade of left 4th toe  base down to subcutaneous tissueLeft foot ulceration  Applied adaptic with dry sterile dressing  X-rays reviewed -wnl  MRI: early osteomyelitis versus reactive edema.  No surgical intervention from podiatry at this point.  Pt can be discharged on 6 weeks of IV abx as per ID.  ID consult is appreciated  Continue with IV antibiotics   Reviewed  JUAN A- Apppreciate vascular consult- no intervention  Weight bearing as tolerated  Offloading to bilateral Heels.   Discussed importance of daily foot examinations and proper shoe gear and to importance of lower Fasting Blood Glucose levels.   Podiatry(Dr Jin) will follow while in house.  Stable for discharge from podiatry stand point.  Pt will be seen in podiatry office with in one week after discharge. Pt's daughter to call office to make appointment- phone [phone]    wound care:   clean wound with sterile saline.  Apply betadine to gauze and secure gauze to left 4th toe with help of jamel and tape.  keep dressing dry, intact and clean.   change dressing daily.

## 2023-04-23 LAB
ALBUMIN SERPL ELPH-MCNC: 2.7 G/DL — LOW (ref 3.3–5)
ALP SERPL-CCNC: 944 U/L — HIGH (ref 40–120)
ALT FLD-CCNC: 65 U/L — SIGNIFICANT CHANGE UP (ref 12–78)
ANION GAP SERPL CALC-SCNC: 4 MMOL/L — LOW (ref 5–17)
AST SERPL-CCNC: 83 U/L — HIGH (ref 15–37)
BILIRUB SERPL-MCNC: 0.5 MG/DL — SIGNIFICANT CHANGE UP (ref 0.2–1.2)
BUN SERPL-MCNC: 23 MG/DL — SIGNIFICANT CHANGE UP (ref 7–23)
CALCIUM SERPL-MCNC: 9.6 MG/DL — SIGNIFICANT CHANGE UP (ref 8.5–10.1)
CHLORIDE SERPL-SCNC: 100 MMOL/L — SIGNIFICANT CHANGE UP (ref 96–108)
CO2 SERPL-SCNC: 33 MMOL/L — HIGH (ref 22–31)
CREAT SERPL-MCNC: 0.93 MG/DL — SIGNIFICANT CHANGE UP (ref 0.5–1.3)
EGFR: 91 ML/MIN/1.73M2 — SIGNIFICANT CHANGE UP
GLUCOSE SERPL-MCNC: 84 MG/DL — SIGNIFICANT CHANGE UP (ref 70–99)
HCT VFR BLD CALC: 36.1 % — LOW (ref 39–50)
HGB BLD-MCNC: 11.7 G/DL — LOW (ref 13–17)
INR BLD: 3.12 RATIO — HIGH (ref 0.88–1.16)
MCHC RBC-ENTMCNC: 27.1 PG — SIGNIFICANT CHANGE UP (ref 27–34)
MCHC RBC-ENTMCNC: 32.4 GM/DL — SIGNIFICANT CHANGE UP (ref 32–36)
MCV RBC AUTO: 83.8 FL — SIGNIFICANT CHANGE UP (ref 80–100)
NRBC # BLD: 0 /100 WBCS — SIGNIFICANT CHANGE UP (ref 0–0)
PLATELET # BLD AUTO: 298 K/UL — SIGNIFICANT CHANGE UP (ref 150–400)
POTASSIUM SERPL-MCNC: 4.3 MMOL/L — SIGNIFICANT CHANGE UP (ref 3.5–5.3)
POTASSIUM SERPL-SCNC: 4.3 MMOL/L — SIGNIFICANT CHANGE UP (ref 3.5–5.3)
PROT SERPL-MCNC: 8 G/DL — SIGNIFICANT CHANGE UP (ref 6–8.3)
PROTHROM AB SERPL-ACNC: 36.9 SEC — HIGH (ref 10.5–13.4)
RBC # BLD: 4.31 M/UL — SIGNIFICANT CHANGE UP (ref 4.2–5.8)
RBC # FLD: 18 % — HIGH (ref 10.3–14.5)
SODIUM SERPL-SCNC: 137 MMOL/L — SIGNIFICANT CHANGE UP (ref 135–145)
WBC # BLD: 8.43 K/UL — SIGNIFICANT CHANGE UP (ref 3.8–10.5)
WBC # FLD AUTO: 8.43 K/UL — SIGNIFICANT CHANGE UP (ref 3.8–10.5)

## 2023-04-23 RX ORDER — INSULIN LISPRO 100/ML
VIAL (ML) SUBCUTANEOUS
Refills: 0 | Status: DISCONTINUED | OUTPATIENT
Start: 2023-04-23 | End: 2023-04-26

## 2023-04-23 RX ORDER — INSULIN GLARGINE 100 [IU]/ML
25 INJECTION, SOLUTION SUBCUTANEOUS AT BEDTIME
Refills: 0 | Status: DISCONTINUED | OUTPATIENT
Start: 2023-04-23 | End: 2023-04-26

## 2023-04-23 RX ORDER — INSULIN LISPRO 100/ML
VIAL (ML) SUBCUTANEOUS AT BEDTIME
Refills: 0 | Status: DISCONTINUED | OUTPATIENT
Start: 2023-04-23 | End: 2023-04-26

## 2023-04-23 RX ORDER — WARFARIN SODIUM 2.5 MG/1
2 TABLET ORAL ONCE
Refills: 0 | Status: COMPLETED | OUTPATIENT
Start: 2023-04-23 | End: 2023-04-23

## 2023-04-23 RX ADMIN — Medication 81 MILLIGRAM(S): at 11:22

## 2023-04-23 RX ADMIN — Medication 40 MILLIGRAM(S): at 05:13

## 2023-04-23 RX ADMIN — Medication 10 UNIT(S): at 08:14

## 2023-04-23 RX ADMIN — Medication 10 UNIT(S): at 17:15

## 2023-04-23 RX ADMIN — PANTOPRAZOLE SODIUM 40 MILLIGRAM(S): 20 TABLET, DELAYED RELEASE ORAL at 11:22

## 2023-04-23 RX ADMIN — CEFTRIAXONE 100 MILLIGRAM(S): 500 INJECTION, POWDER, FOR SOLUTION INTRAMUSCULAR; INTRAVENOUS at 14:20

## 2023-04-23 RX ADMIN — Medication 1 APPLICATION(S): at 11:23

## 2023-04-23 RX ADMIN — ATORVASTATIN CALCIUM 10 MILLIGRAM(S): 80 TABLET, FILM COATED ORAL at 22:16

## 2023-04-23 RX ADMIN — Medication 25 MILLIGRAM(S): at 05:13

## 2023-04-23 RX ADMIN — SPIRONOLACTONE 25 MILLIGRAM(S): 25 TABLET, FILM COATED ORAL at 05:13

## 2023-04-23 RX ADMIN — INSULIN GLARGINE 25 UNIT(S): 100 INJECTION, SOLUTION SUBCUTANEOUS at 22:16

## 2023-04-23 RX ADMIN — Medication 10 UNIT(S): at 11:58

## 2023-04-23 RX ADMIN — ENOXAPARIN SODIUM 70 MILLIGRAM(S): 100 INJECTION SUBCUTANEOUS at 05:13

## 2023-04-23 RX ADMIN — LEVETIRACETAM 750 MILLIGRAM(S): 250 TABLET, FILM COATED ORAL at 17:14

## 2023-04-23 RX ADMIN — WARFARIN SODIUM 2 MILLIGRAM(S): 2.5 TABLET ORAL at 22:16

## 2023-04-23 RX ADMIN — Medication 3: at 11:58

## 2023-04-23 RX ADMIN — LEVETIRACETAM 750 MILLIGRAM(S): 250 TABLET, FILM COATED ORAL at 05:13

## 2023-04-23 RX ADMIN — Medication 2: at 17:15

## 2023-04-23 NOTE — CONSULT NOTE ADULT - PROBLEM SELECTOR RECOMMENDATION 9
decrease lantus 25 units qhs  cont admelog 10 units 3x/day before meals  cont mod dose admelog corrective scale coverage qac/qhs  cont cons cho diet  goal bg 100-180 in hosp setting
Type 2 A1c 13%   Recommend endocrine-Perlman on consult  FU appt: YANIQUE  DSC recommendations: return to home with modified regimen and glucose monitoring possibly CGM monitoring  Diabetes support info and cell # [phone] given   Goal 100-180 mg/dL; 140-180 mg/dL in critical care areas

## 2023-04-23 NOTE — PROGRESS NOTE ADULT - SUBJECTIVE AND OBJECTIVE BOX
Patient is a 67y Male whom presented to the hospital with hyponatremia     PAST MEDICAL & SURGICAL HISTORY:  CVA (cerebral vascular accident)      HTN (hypertension)      DM (diabetes mellitus)      Arrhythmia      History of atrial fibrillation      S/P CABG (coronary artery bypass graft)      S/P brain surgery          MEDICATIONS  (STANDING):  aspirin enteric coated 81 milliGRAM(s) Oral daily  atorvastatin 10 milliGRAM(s) Oral at bedtime  dextrose 5%. 1000 milliLiter(s) (50 mL/Hr) IV Continuous <Continuous>  dextrose 5%. 1000 milliLiter(s) (100 mL/Hr) IV Continuous <Continuous>  dextrose 50% Injectable 25 Gram(s) IV Push once  dextrose 50% Injectable 12.5 Gram(s) IV Push once  dextrose 50% Injectable 25 Gram(s) IV Push once  furosemide    Tablet 40 milliGRAM(s) Oral daily  glucagon  Injectable 1 milliGRAM(s) IntraMuscular once  insulin glargine Injectable (LANTUS) 7 Unit(s) SubCutaneous at bedtime  insulin lispro (ADMELOG) corrective regimen sliding scale   SubCutaneous three times a day before meals  insulin lispro Injectable (ADMELOG) 2 Unit(s) SubCutaneous three times a day before meals  levETIRAcetam 750 milliGRAM(s) Oral two times a day  pantoprazole   Suspension 40 milliGRAM(s) Oral daily  piperacillin/tazobactam IVPB.. 3.375 Gram(s) IV Intermittent every 8 hours  propranolol 20 milliGRAM(s) Oral two times a day  vancomycin  IVPB 1000 milliGRAM(s) IV Intermittent every 12 hours      Allergies    No Known Allergies    Intolerances        SOCIAL HISTORY:  Denies ETOh,Smoking,     FAMILY HISTORY:  No pertinent family history in first degree relatives        REVIEW OF SYSTEMS:    CONSTITUTIONAL: No weakness, fevers or chills  EYES/ENT: No visual changes;  no throat pain   NECK: No pain or stiffness  RESPIRATORY: No cough, wheezing, hemoptysis; No shortness of breath  CARDIOVASCULAR: No chest pain or palpitations  GASTROINTESTINAL: No abdominal or epigastric pain. No nausea, vomiting,                                                        11.7   8.43  )-----------( 298      ( 23 Apr 2023 07:11 )             36.1       CBC Full  -  ( 23 Apr 2023 07:11 )  WBC Count : 8.43 K/uL  RBC Count : 4.31 M/uL  Hemoglobin : 11.7 g/dL  Hematocrit : 36.1 %  Platelet Count - Automated : 298 K/uL  Mean Cell Volume : 83.8 fl  Mean Cell Hemoglobin : 27.1 pg  Mean Cell Hemoglobin Concentration : 32.4 gm/dL  Auto Neutrophil # : x  Auto Lymphocyte # : x  Auto Monocyte # : x  Auto Eosinophil # : x  Auto Basophil # : x  Auto Neutrophil % : x  Auto Lymphocyte % : x  Auto Monocyte % : x  Auto Eosinophil % : x  Auto Basophil % : x      04-23    137  |  100  |  23  ----------------------------<  84  4.3   |  33<H>  |  0.93    Ca    9.6      23 Apr 2023 07:11    TPro  8.0  /  Alb  2.7<L>  /  TBili  0.5  /  DBili  x   /  AST  83<H>  /  ALT  65  /  AlkPhos  944<H>  04-23      CAPILLARY BLOOD GLUCOSE      POCT Blood Glucose.: 279 mg/dL (23 Apr 2023 11:54)  POCT Blood Glucose.: 86 mg/dL (23 Apr 2023 07:42)  POCT Blood Glucose.: 126 mg/dL (22 Apr 2023 22:24)      Vital Signs Last 24 Hrs  T(C): 36.8 (23 Apr 2023 09:35), Max: 36.8 (23 Apr 2023 09:35)  T(F): 98.3 (23 Apr 2023 09:35), Max: 98.3 (23 Apr 2023 09:35)  HR: 70 (23 Apr 2023 09:35) (63 - 94)  BP: 114/75 (23 Apr 2023 09:35) (114/75 - 118/72)  BP(mean): --  RR: 18 (23 Apr 2023 09:35) (18 - 18)  SpO2: 94% (23 Apr 2023 09:35) (91% - 94%)    Parameters below as of 23 Apr 2023 09:35  Patient On (Oxygen Delivery Method): room air            PT/INR - ( 23 Apr 2023 07:11 )   PT: 36.9 sec;   INR: 3.12 ratio                         PHYSICAL EXAM:    Constitutional: NAD  HEENT: conjunctive   clear   Neck:  No JVD  Respiratory: CTAB  Cardiovascular: S1 and S2  Gastrointestinal: BS+, soft, NT/ND  Extremities: No peripheral edema  Neurological: no focal deficits  Psychiatric: Normal mood, normal affect  : No Adams  Skin: dry   Access: Not applicable

## 2023-04-23 NOTE — PROGRESS NOTE ADULT - SUBJECTIVE AND OBJECTIVE BOX
[facility]  Shane Murray PA-C  [address] [place] [address]  [phone]      INTERVAL HPI/OVERNIGHT EVENTS:  Pt s/e  No new GI events  LFTs noted AST/ ALT improved Alk phos uptrend          MEDICATIONS  (STANDING):  aspirin enteric coated 81 milliGRAM(s) Oral daily  atorvastatin 10 milliGRAM(s) Oral at bedtime  cefTRIAXone   IVPB 2000 milliGRAM(s) IV Intermittent every 24 hours  dextrose 5%. 1000 milliLiter(s) (50 mL/Hr) IV Continuous <Continuous>  dextrose 5%. 1000 milliLiter(s) (100 mL/Hr) IV Continuous <Continuous>  dextrose 50% Injectable 12.5 Gram(s) IV Push once  dextrose 50% Injectable 25 Gram(s) IV Push once  dextrose 50% Injectable 25 Gram(s) IV Push once  furosemide    Tablet 40 milliGRAM(s) Oral daily  glucagon  Injectable 1 milliGRAM(s) IntraMuscular once  insulin glargine Injectable (LANTUS) 30 Unit(s) SubCutaneous at bedtime  insulin lispro (ADMELOG) corrective regimen sliding scale   SubCutaneous Before meals and at bedtime  insulin lispro Injectable (ADMELOG) 10 Unit(s) SubCutaneous three times a day before meals  levETIRAcetam 750 milliGRAM(s) Oral two times a day  metoprolol succinate ER 25 milliGRAM(s) Oral daily  pantoprazole   Suspension 40 milliGRAM(s) Oral daily  povidone iodine 10% Solution 1 Application(s) Topical daily  spironolactone 25 milliGRAM(s) Oral daily  warfarin 2 milliGRAM(s) Oral once    MEDICATIONS  (PRN):  acetaminophen     Tablet .. 650 milliGRAM(s) Oral every 6 hours PRN Temp greater or equal to 38C (100.4F), Mild Pain (1 - 3)  dextrose Oral Gel 15 Gram(s) Oral once PRN Blood Glucose LESS THAN 70 milliGRAM(s)/deciliter  oxycodone    5 mG/acetaminophen 325 mG 1 Tablet(s) Oral every 6 hours PRN Moderate Pain (4 - 6)      Allergies    No Known Allergies    Intolerances          PHYSICAL EXAM  Vital Signs Last 24 Hrs  T(C): 36.8 (23 Apr 2023 09:35), Max: 37.1 (22 Apr 2023 11:55)  T(F): 98.3 (23 Apr 2023 09:35), Max: 98.8 (22 Apr 2023 11:55)  HR: 70 (23 Apr 2023 09:35) (63 - 94)  BP: 114/75 (23 Apr 2023 09:35) (93/70 - 118/72)  BP(mean): --  RR: 18 (23 Apr 2023 09:35) (18 - 18)  SpO2: 94% (23 Apr 2023 09:35) (91% - 97%)    Parameters below as of 23 Apr 2023 09:35  Patient On (Oxygen Delivery Method): room air            GENERAL:  Appears stated age  HEENT:  NC/AT  CHEST:  Full & symmetric excursion  HEART:  Regular rhythm  ABDOMEN:  Soft, non tender non distended   EXTEREMITIES:  no cyanosis  SKIN:  No rash  NEURO:  Alert      LABS:                        11.7   8.43  )-----------( 298      ( 23 Apr 2023 07:11 )             36.1     04-23    137  |  100  |  23  ----------------------------<  84  4.3   |  33<H>  |  0.93    Ca    9.6      23 Apr 2023 07:11    TPro  8.0  /  Alb  2.7<L>  /  TBili  0.5  /  DBili  x   /  AST  83<H>  /  ALT  65  /  AlkPhos  944<H>  04-23    PT/INR - ( 23 Apr 2023 07:11 )   PT: 36.9 sec;   INR: 3.12 ratio               04-22-23 @ 07:01  -  04-23-23 @ 07:00  --------------------------------------------------------  IN: 570 mL / OUT: 900 mL / NET: -330 mL    04-23-23 @ 07:01  -  04-23-23 @ 11:42  --------------------------------------------------------  IN: 220 mL / OUT: 0 mL / NET: 220 mL

## 2023-04-23 NOTE — PROGRESS NOTE ADULT - SUBJECTIVE AND OBJECTIVE BOX
Date/Time Patient Seen:  		  Referring MD:   Data Reviewed	       Patient is a 67y old  Male who presents with a chief complaint of toe gangrene (22 Apr 2023 15:14)      Subjective/HPI     PAST MEDICAL & SURGICAL HISTORY:  CVA (cerebral vascular accident)    HTN (hypertension)    DM (diabetes mellitus)    Arrhythmia    History of atrial fibrillation    S/P CABG (coronary artery bypass graft)    S/P brain surgery          Medication list         MEDICATIONS  (STANDING):  aspirin enteric coated 81 milliGRAM(s) Oral daily  atorvastatin 10 milliGRAM(s) Oral at bedtime  cefTRIAXone   IVPB 2000 milliGRAM(s) IV Intermittent every 24 hours  dextrose 5%. 1000 milliLiter(s) (50 mL/Hr) IV Continuous <Continuous>  dextrose 5%. 1000 milliLiter(s) (100 mL/Hr) IV Continuous <Continuous>  dextrose 50% Injectable 12.5 Gram(s) IV Push once  dextrose 50% Injectable 25 Gram(s) IV Push once  dextrose 50% Injectable 25 Gram(s) IV Push once  enoxaparin Injectable 70 milliGRAM(s) SubCutaneous every 12 hours  furosemide    Tablet 40 milliGRAM(s) Oral daily  glucagon  Injectable 1 milliGRAM(s) IntraMuscular once  insulin glargine Injectable (LANTUS) 30 Unit(s) SubCutaneous at bedtime  insulin lispro (ADMELOG) corrective regimen sliding scale   SubCutaneous Before meals and at bedtime  insulin lispro Injectable (ADMELOG) 10 Unit(s) SubCutaneous three times a day before meals  levETIRAcetam 750 milliGRAM(s) Oral two times a day  metoprolol succinate ER 25 milliGRAM(s) Oral daily  pantoprazole   Suspension 40 milliGRAM(s) Oral daily  povidone iodine 10% Solution 1 Application(s) Topical daily  spironolactone 25 milliGRAM(s) Oral daily    MEDICATIONS  (PRN):  acetaminophen     Tablet .. 650 milliGRAM(s) Oral every 6 hours PRN Temp greater or equal to 38C (100.4F), Mild Pain (1 - 3)  dextrose Oral Gel 15 Gram(s) Oral once PRN Blood Glucose LESS THAN 70 milliGRAM(s)/deciliter  oxycodone    5 mG/acetaminophen 325 mG 1 Tablet(s) Oral every 6 hours PRN Moderate Pain (4 - 6)         Vitals log        ICU Vital Signs Last 24 Hrs  T(C): 36.4 (23 Apr 2023 04:49), Max: 37.1 (22 Apr 2023 11:55)  T(F): 97.5 (23 Apr 2023 04:49), Max: 98.8 (22 Apr 2023 11:55)  HR: 63 (23 Apr 2023 04:49) (63 - 94)  BP: 116/76 (23 Apr 2023 04:49) (93/70 - 118/72)  BP(mean): --  ABP: --  ABP(mean): --  RR: 18 (23 Apr 2023 04:49) (18 - 18)  SpO2: 91% (23 Apr 2023 04:49) (91% - 97%)    O2 Parameters below as of 23 Apr 2023 04:49  Patient On (Oxygen Delivery Method): room air                 Input and Output:  I&O's Detail    22 Apr 2023 07:01  -  23 Apr 2023 07:00  --------------------------------------------------------  IN:    Oral Fluid: 570 mL  Total IN: 570 mL    OUT:    Voided (mL): 900 mL  Total OUT: 900 mL    Total NET: -330 mL          Lab Data                        12.3   10.28 )-----------( 300      ( 22 Apr 2023 07:55 )             38.0     04-22    135  |  100  |  13  ----------------------------<  140<H>  4.3   |  30  |  0.95    Ca    9.5      22 Apr 2023 07:55    TPro  8.2  /  Alb  2.9<L>  /  TBili  0.5  /  DBili  x   /  AST  68<H>  /  ALT  51  /  AlkPhos  862<H>  04-22            Review of Systems	      Objective     Physical Examination    heart s1s2  lung dec BS  head nc      Pertinent Lab findings & Imaging      Bryan:  NO   Adequate UO     I&O's Detail    22 Apr 2023 07:01  -  23 Apr 2023 07:00  --------------------------------------------------------  IN:    Oral Fluid: 570 mL  Total IN: 570 mL    OUT:    Voided (mL): 900 mL  Total OUT: 900 mL    Total NET: -330 mL               Discussed with:     Cultures:	        Radiology

## 2023-04-23 NOTE — CONSULT NOTE ADULT - SUBJECTIVE AND OBJECTIVE BOX
Patient is a 67y old  Male who presents with a chief complaint of toe gangrene (23 Apr 2023 17:03)      Reason For Consult: dm2 uncontrolled    HPI:   67 year old male with PMHx CVA (right sided hemiparesis and aphasia), AFib (on coumadin), T2DM, HTN, CAD s/p CABG, s/p AVR, presenting to [facility] ED with wound to left 4th metatarsal.  Patient unable to provide history due to aphasia, history obtained from daughter at beside and per chart review.  Patient developed hematoma to left 4th metatarsal on Friday, it then ruptured and patient presented to Podiatry office (Dr. Maegan Jin) earlier today who instructed patient to come to ED for evaluation.  Patient has now new complaints at this time.  Patient does not follow with a vascular surgeon.   (18 Apr 2023 21:55)      PAST MEDICAL & SURGICAL HISTORY:  CVA (cerebral vascular accident)      HTN (hypertension)      DM (diabetes mellitus)      Arrhythmia      History of atrial fibrillation      S/P CABG (coronary artery bypass graft)      S/P brain surgery          FAMILY HISTORY:  No pertinent family history in first degree relatives          Social History:    MEDICATIONS  (STANDING):  aspirin enteric coated 81 milliGRAM(s) Oral daily  atorvastatin 10 milliGRAM(s) Oral at bedtime  cefTRIAXone   IVPB 2000 milliGRAM(s) IV Intermittent every 24 hours  dextrose 5%. 1000 milliLiter(s) (50 mL/Hr) IV Continuous <Continuous>  dextrose 5%. 1000 milliLiter(s) (100 mL/Hr) IV Continuous <Continuous>  dextrose 50% Injectable 12.5 Gram(s) IV Push once  dextrose 50% Injectable 25 Gram(s) IV Push once  dextrose 50% Injectable 25 Gram(s) IV Push once  furosemide    Tablet 40 milliGRAM(s) Oral daily  glucagon  Injectable 1 milliGRAM(s) IntraMuscular once  insulin glargine Injectable (LANTUS) 30 Unit(s) SubCutaneous at bedtime  insulin lispro (ADMELOG) corrective regimen sliding scale   SubCutaneous Before meals and at bedtime  insulin lispro Injectable (ADMELOG) 10 Unit(s) SubCutaneous three times a day before meals  levETIRAcetam 750 milliGRAM(s) Oral two times a day  metoprolol succinate ER 25 milliGRAM(s) Oral daily  pantoprazole   Suspension 40 milliGRAM(s) Oral daily  povidone iodine 10% Solution 1 Application(s) Topical daily  spironolactone 25 milliGRAM(s) Oral daily  warfarin 2 milliGRAM(s) Oral once    MEDICATIONS  (PRN):  acetaminophen     Tablet .. 650 milliGRAM(s) Oral every 6 hours PRN Temp greater or equal to 38C (100.4F), Mild Pain (1 - 3)  dextrose Oral Gel 15 Gram(s) Oral once PRN Blood Glucose LESS THAN 70 milliGRAM(s)/deciliter  oxycodone    5 mG/acetaminophen 325 mG 1 Tablet(s) Oral every 6 hours PRN Moderate Pain (4 - 6)        T(C): 36.8 (04-23-23 @ 09:35), Max: 36.8 (04-23-23 @ 09:35)  HR: 70 (04-23-23 @ 09:35) (63 - 70)  BP: 114/75 (04-23-23 @ 09:35) (114/75 - 116/76)  RR: 18 (04-23-23 @ 09:35) (18 - 18)  SpO2: 94% (04-23-23 @ 09:35) (91% - 94%)  Wt(kg): --    PHYSICAL EXAM:  HEAD:  Atraumatic, Normocephalic  NECK: Supple, No JVD, Normal thyroid  CHEST/LUNG: Clear to percussion bilaterally; No rales, rhonchi, wheezing, or rubs  HEART: Regular rate and rhythm; No murmurs, rubs, or gallops  ABDOMEN: Soft, Nontender, Nondistended; Bowel sounds present  EXTREMITIES:  le foot dsg intact    CAPILLARY BLOOD GLUCOSE      POCT Blood Glucose.: 204 mg/dL (23 Apr 2023 17:11)  POCT Blood Glucose.: 279 mg/dL (23 Apr 2023 11:54)  POCT Blood Glucose.: 86 mg/dL (23 Apr 2023 07:42)  POCT Blood Glucose.: 126 mg/dL (22 Apr 2023 22:24)                            11.7   8.43  )-----------( 298      ( 23 Apr 2023 07:11 )             36.1       CMP:  04-23 @ 07:11  SGPT 65  Albumin 2.7   Alk Phos 944   Anion Gap 4   SGOT 83   Total Bili 0.5   BUN 23   Calcium Total 9.6   CO2 33   Chloride 100   Creatinine 0.93   eGFR if AA --   eGFR if non AA --   Glucose 84   Potassium 4.3   Protein 8.0   Sodium 137      Thyroid Function Tests:      Diabetes Tests:       Radiology:

## 2023-04-23 NOTE — PROGRESS NOTE ADULT - ASSESSMENT
67 year old male with PMHx CVA (right sided hemiparesis and aphasia), AFib (on coumadin), IDDM, HTN, CAD s/p CABG, s/p AVR, presenting to [facility] ED with wound to left 4th metatarsal.  Patient unable to provide history due to aphasia, history obtained from daughter at beside and per chart review.  Patient developed hematoma to left 4th metatarsal on Friday, it then ruptured and patient presented to Podiatry office (Dr. Maegan Jin) earlier today who instructed patient to come to ED for evaluation.  Patient has now new complaints at this time.  Patient does not follow with a vascular surgeon.   (18 Apr 2023 21:55)      will check ua , urine osmolality , urine sodium , urine uric acid , serum sodium , serum osmolality , serum uric acid , f/u with hyponatremia work up , f/u with bmp , monitor i and o      BP monitoring,continue current antihypertensive meds, low salt diet,followup with PMD in 1-2 weeks      Admit to telemetry unit for monitoring,send 3 sets of cardiac ensymes to rule out coronary event,obtain ECHO to evaluate LVEF,cardiology consult,continue current managmnet,O2 supply,anticoagulation plan as per cardiology consult

## 2023-04-23 NOTE — PROGRESS NOTE ADULT - ASSESSMENT
67 year old male with PMHx CVA (right sided hemiparesis and aphasia), AFib (on coumadin), IDDM, HTN, CAD s/p CABG, s/p AVR, presenting to [facility] ED with wound to left 4th metatarsal.  Patient unable to provide history due to aphasia, history obtained from daughter at beside and per chart review.  Patient developed hematoma to left 4th metatarsal on Friday, it then ruptured and patient presented to Podiatry office (Dr. Maegan Jin) earlier today who instructed patient to come to ED for evaluation.  Patient has now new complaints at this time.  Patient does not follow with a vascular surgeon.      1 Toe gangrene, diabetic foot ulcer  - cw abx  - ID,vascular and podiatry fu  - fu cultures  - pain control   JUAN A/PVR's reviewed; likely small vessel disease    2 DM  - monitor FS  - Basal bolus  - diabetic diet  - uncontrolled     3 Hx of CVA  - cw AC  - cw asa, statin    4 Afib, AVR  - INR and dose coumadin daily  - dc lovenox     5 CAD, CABG  - cw asa, statin  - cards fu

## 2023-04-23 NOTE — PHARMACOTHERAPY INTERVENTION NOTE - COMMENTS
Patient w/ PMH A Fib (mechanical aortic valve) is ordered for warfarin 2mg X 1 (alternating between 2 and 3 mg). Patient INR increased from 2.34 to 3.12 today. (INR goal of 2.5-3.5). Discussed w/ Dr. Tamika Harmon INR increased >0.7 and recommended holding today's dose or decreasing to 1mg. MD would like to continue with current dose due to clinical judgment.

## 2023-04-23 NOTE — CONSULT NOTE ADULT - CONSULT REQUESTED DATE/TIME
18-Apr-2023 23:14
19-Apr-2023 09:07
19-Apr-2023 10:00
23-Apr-2023 21:12
18-Apr-2023 16:54
18-Apr-2023 21:57
19-Apr-2023 06:11
19-Apr-2023 15:40
19-Apr-2023 15:24
20-Apr-2023 15:25

## 2023-04-23 NOTE — CONSULT NOTE ADULT - REASON FOR ADMISSION
toe gangrene

## 2023-04-23 NOTE — CONSULT NOTE ADULT - PROVIDER SPECIALTY LIST ADULT
Gastroenterology
Nephrology
Podiatry
Pulmonology
Infectious Disease
Vascular Surgery
Endocrinology
Neurology
Cardiology
Diabetes

## 2023-04-23 NOTE — CONSULT NOTE ADULT - CONSULT REASON
assess PAD, left 4th metatarsal wound
cellulitis
dm2 uncontrolled
toe gangrene
.
hyponatremia
left foot wound cellulitis
elevated lfts
wound  head deformity
67y A1C with Estimated Average Glucose Result: 13.5 % (04-20-23 @ 06:42)  A1C with Estimated Average Glucose Result: 13.0 % (04-19-23 @ 07:30)   diabetes mellitus uncontrolled type 2

## 2023-04-23 NOTE — PROGRESS NOTE ADULT - SUBJECTIVE AND OBJECTIVE BOX
Patient is a 67y old  Male who presents with a chief complaint of toe gangrene (23 Apr 2023 07:47)    Date of servie : 04-23-23 @ 11:33  INTERVAL HPI/OVERNIGHT EVENTS:  T(C): 36.8 (04-23-23 @ 09:35), Max: 37.1 (04-22-23 @ 11:55)  HR: 70 (04-23-23 @ 09:35) (63 - 94)  BP: 114/75 (04-23-23 @ 09:35) (93/70 - 118/72)  RR: 18 (04-23-23 @ 09:35) (18 - 18)  SpO2: 94% (04-23-23 @ 09:35) (91% - 97%)  Wt(kg): --  I&O's Summary    22 Apr 2023 07:01  -  23 Apr 2023 07:00  --------------------------------------------------------  IN: 570 mL / OUT: 900 mL / NET: -330 mL    23 Apr 2023 07:01  -  23 Apr 2023 11:33  --------------------------------------------------------  IN: 220 mL / OUT: 0 mL / NET: 220 mL        LABS:                        11.7   8.43  )-----------( 298      ( 23 Apr 2023 07:11 )             36.1     04-23    137  |  100  |  23  ----------------------------<  84  4.3   |  33<H>  |  0.93    Ca    9.6      23 Apr 2023 07:11    TPro  8.0  /  Alb  2.7<L>  /  TBili  0.5  /  DBili  x   /  AST  83<H>  /  ALT  65  /  AlkPhos  944<H>  04-23    PT/INR - ( 23 Apr 2023 07:11 )   PT: 36.9 sec;   INR: 3.12 ratio             CAPILLARY BLOOD GLUCOSE      POCT Blood Glucose.: 86 mg/dL (23 Apr 2023 07:42)  POCT Blood Glucose.: 126 mg/dL (22 Apr 2023 22:24)  POCT Blood Glucose.: 111 mg/dL (22 Apr 2023 15:55)  POCT Blood Glucose.: 183 mg/dL (22 Apr 2023 11:54)            MEDICATIONS  (STANDING):  aspirin enteric coated 81 milliGRAM(s) Oral daily  atorvastatin 10 milliGRAM(s) Oral at bedtime  cefTRIAXone   IVPB 2000 milliGRAM(s) IV Intermittent every 24 hours  dextrose 5%. 1000 milliLiter(s) (50 mL/Hr) IV Continuous <Continuous>  dextrose 5%. 1000 milliLiter(s) (100 mL/Hr) IV Continuous <Continuous>  dextrose 50% Injectable 12.5 Gram(s) IV Push once  dextrose 50% Injectable 25 Gram(s) IV Push once  dextrose 50% Injectable 25 Gram(s) IV Push once  furosemide    Tablet 40 milliGRAM(s) Oral daily  glucagon  Injectable 1 milliGRAM(s) IntraMuscular once  insulin glargine Injectable (LANTUS) 30 Unit(s) SubCutaneous at bedtime  insulin lispro (ADMELOG) corrective regimen sliding scale   SubCutaneous Before meals and at bedtime  insulin lispro Injectable (ADMELOG) 10 Unit(s) SubCutaneous three times a day before meals  levETIRAcetam 750 milliGRAM(s) Oral two times a day  metoprolol succinate ER 25 milliGRAM(s) Oral daily  pantoprazole   Suspension 40 milliGRAM(s) Oral daily  povidone iodine 10% Solution 1 Application(s) Topical daily  spironolactone 25 milliGRAM(s) Oral daily  warfarin 2 milliGRAM(s) Oral once    MEDICATIONS  (PRN):  acetaminophen     Tablet .. 650 milliGRAM(s) Oral every 6 hours PRN Temp greater or equal to 38C (100.4F), Mild Pain (1 - 3)  dextrose Oral Gel 15 Gram(s) Oral once PRN Blood Glucose LESS THAN 70 milliGRAM(s)/deciliter  oxycodone    5 mG/acetaminophen 325 mG 1 Tablet(s) Oral every 6 hours PRN Moderate Pain (4 - 6)          PHYSICAL EXAM:  GENERAL: NAD, well-groomed, well-developed  HEAD:  Atraumatic, Normocephalic  CHEST/LUNG: Clear to percussion bilaterally; No rales, rhonchi, wheezing, or rubs  HEART: Regular rate and rhythm; No murmurs, rubs, or gallops  ABDOMEN: Soft, Nontender, Nondistended; Bowel sounds present  EXTREMITIES:  2+ Peripheral Pulses, No clubbing, cyanosis, or edema  LYMPH: No lymphadenopathy noted  SKIN: No rashes or lesions    Care Discussed with Consultants/Other Providers [ ] YES  [ ] NO

## 2023-04-23 NOTE — PROGRESS NOTE ADULT - SUBJECTIVE AND OBJECTIVE BOX
Neurology follow up note    ARNIE [id number]      Interval History:    Patient feels ok no new complaints.    Allergies    No Known Allergies    Intolerances        MEDICATIONS    acetaminophen     Tablet .. 650 milliGRAM(s) Oral every 6 hours PRN  aspirin enteric coated 81 milliGRAM(s) Oral daily  atorvastatin 10 milliGRAM(s) Oral at bedtime  cefTRIAXone   IVPB 2000 milliGRAM(s) IV Intermittent every 24 hours  dextrose 5%. 1000 milliLiter(s) IV Continuous <Continuous>  dextrose 5%. 1000 milliLiter(s) IV Continuous <Continuous>  dextrose 50% Injectable 12.5 Gram(s) IV Push once  dextrose 50% Injectable 25 Gram(s) IV Push once  dextrose 50% Injectable 25 Gram(s) IV Push once  dextrose Oral Gel 15 Gram(s) Oral once PRN  enoxaparin Injectable 70 milliGRAM(s) SubCutaneous every 12 hours  furosemide    Tablet 40 milliGRAM(s) Oral daily  glucagon  Injectable 1 milliGRAM(s) IntraMuscular once  insulin glargine Injectable (LANTUS) 30 Unit(s) SubCutaneous at bedtime  insulin lispro (ADMELOG) corrective regimen sliding scale   SubCutaneous Before meals and at bedtime  insulin lispro Injectable (ADMELOG) 10 Unit(s) SubCutaneous three times a day before meals  levETIRAcetam 750 milliGRAM(s) Oral two times a day  metoprolol succinate ER 25 milliGRAM(s) Oral daily  oxycodone    5 mG/acetaminophen 325 mG 1 Tablet(s) Oral every 6 hours PRN  pantoprazole   Suspension 40 milliGRAM(s) Oral daily  povidone iodine 10% Solution 1 Application(s) Topical daily  spironolactone 25 milliGRAM(s) Oral daily              Vital Signs Last 24 Hrs  T(C): 36.4 (23 Apr 2023 04:49), Max: 37.1 (22 Apr 2023 11:55)  T(F): 97.5 (23 Apr 2023 04:49), Max: 98.8 (22 Apr 2023 11:55)  HR: 63 (23 Apr 2023 04:49) (63 - 94)  BP: 116/76 (23 Apr 2023 04:49) (93/70 - 118/72)  BP(mean): --  RR: 18 (23 Apr 2023 04:49) (18 - 18)  SpO2: 91% (23 Apr 2023 04:49) (91% - 97%)    Parameters below as of 23 Apr 2023 04:49  Patient On (Oxygen Delivery Method): room air      REVIEW OF SYSTEMS:  Completely limited secondary to the patient repeats words over, has severe expressive aphasia, but had been in his normal state of health as per daughter.    PHYSICAL EXAMINATION:  HEENT:  Head:  Normocephalic, atraumatic.  Eyes:  No scleral icterus.  Ears:  Hard to evaluate secondary to he could not answer questions accordingly but appears to follow commands.  NECK:  Supple.  RESPIRATORY:  Air entry bilaterally.  CARDIOVASCULAR:  S1 and S2 heard.  ABDOMEN:  Soft and nontender.  EXTREMITIES:  Positive discoloration was noted on the left toe.     NEUROLOGIC:  The patient was awake, alert.  On-off blink to visual threat.  Positive expressive aphasia.  Will say a few words but repeat the same words over.  Right upper and right lower were 0/5 with increased tone.  Right hemiplegia is his baseline.  Left upper and left lower were 4/5.      LABS:  CBC Full  -  ( 22 Apr 2023 07:55 )  WBC Count : 10.28 K/uL  RBC Count : 4.55 M/uL  Hemoglobin : 12.3 g/dL  Hematocrit : 38.0 %  Platelet Count - Automated : 300 K/uL  Mean Cell Volume : 83.5 fl  Mean Cell Hemoglobin : 27.0 pg  Mean Cell Hemoglobin Concentration : 32.4 gm/dL  Auto Neutrophil # : x  Auto Lymphocyte # : x  Auto Monocyte # : x  Auto Eosinophil # : x  Auto Basophil # : x  Auto Neutrophil % : x  Auto Lymphocyte % : x  Auto Monocyte % : x  Auto Eosinophil % : x  Auto Basophil % : x      04-22    135  |  100  |  13  ----------------------------<  140<H>  4.3   |  30  |  0.95    Ca    9.5      22 Apr 2023 07:55    TPro  8.2  /  Alb  2.9<L>  /  TBili  0.5  /  DBili  x   /  AST  68<H>  /  ALT  51  /  AlkPhos  862<H>  04-22    Hemoglobin A1C:     LIVER FUNCTIONS - ( 22 Apr 2023 07:55 )  Alb: 2.9 g/dL / Pro: 8.2 g/dL / ALK PHOS: 862 U/L / ALT: 51 U/L / AST: 68 U/L / GGT: x           Vitamin B12   PT/INR - ( 22 Apr 2023 07:55 )   PT: 27.6 sec;   INR: 2.34 ratio               RADIOLOGY    ANALYSIS AND PLAN:  This is a 67-year-old with a history of atrial fibrillation, cerebrovascular accident, and seizure.  For history of atrial fibrillation and cerebrovascular accident, risks versus benefits.  The patient at present does have elevated INR, would recommend to hold for now with a goal INR between 2 and 3.  Questionable the patient will need any type of surgical intervention for his toe.  For history of seizure prophylaxis, continue the patient on Keppra.  For history of diabetes, strict control of blood sugars.  For history of hypertension, monitor systolic blood pressure.  Excisional debridement with 15 blade of left 4th toe  base down to subcutaneous tissue Left foot ulceration  monitor oral intake as needed     neurologic wise stable     Spoke with one daughter at bedside 4/22  Other daughter is Jeffrey.  Her telephone number is [phone] 4/20.  She understands my reasoning and thought process.    Greater than 40 minutes of time was spent with the patient, plan of care, reviewing data, with greater than 50% of the visit was spent counseling and/or coordinating care with multidisciplinary healthcare team     Neurology follow up note    ARNIE [id number]      Interval History:    Patient feels ok no new complaints seen with son     Allergies    No Known Allergies    Intolerances        MEDICATIONS    acetaminophen     Tablet .. 650 milliGRAM(s) Oral every 6 hours PRN  aspirin enteric coated 81 milliGRAM(s) Oral daily  atorvastatin 10 milliGRAM(s) Oral at bedtime  cefTRIAXone   IVPB 2000 milliGRAM(s) IV Intermittent every 24 hours  dextrose 5%. 1000 milliLiter(s) IV Continuous <Continuous>  dextrose 5%. 1000 milliLiter(s) IV Continuous <Continuous>  dextrose 50% Injectable 12.5 Gram(s) IV Push once  dextrose 50% Injectable 25 Gram(s) IV Push once  dextrose 50% Injectable 25 Gram(s) IV Push once  dextrose Oral Gel 15 Gram(s) Oral once PRN  enoxaparin Injectable 70 milliGRAM(s) SubCutaneous every 12 hours  furosemide    Tablet 40 milliGRAM(s) Oral daily  glucagon  Injectable 1 milliGRAM(s) IntraMuscular once  insulin glargine Injectable (LANTUS) 30 Unit(s) SubCutaneous at bedtime  insulin lispro (ADMELOG) corrective regimen sliding scale   SubCutaneous Before meals and at bedtime  insulin lispro Injectable (ADMELOG) 10 Unit(s) SubCutaneous three times a day before meals  levETIRAcetam 750 milliGRAM(s) Oral two times a day  metoprolol succinate ER 25 milliGRAM(s) Oral daily  oxycodone    5 mG/acetaminophen 325 mG 1 Tablet(s) Oral every 6 hours PRN  pantoprazole   Suspension 40 milliGRAM(s) Oral daily  povidone iodine 10% Solution 1 Application(s) Topical daily  spironolactone 25 milliGRAM(s) Oral daily              Vital Signs Last 24 Hrs  T(C): 36.4 (23 Apr 2023 04:49), Max: 37.1 (22 Apr 2023 11:55)  T(F): 97.5 (23 Apr 2023 04:49), Max: 98.8 (22 Apr 2023 11:55)  HR: 63 (23 Apr 2023 04:49) (63 - 94)  BP: 116/76 (23 Apr 2023 04:49) (93/70 - 118/72)  BP(mean): --  RR: 18 (23 Apr 2023 04:49) (18 - 18)  SpO2: 91% (23 Apr 2023 04:49) (91% - 97%)    Parameters below as of 23 Apr 2023 04:49  Patient On (Oxygen Delivery Method): room air      REVIEW OF SYSTEMS:  Completely limited secondary to the patient repeats words over, has severe expressive aphasia, but had been in his normal state of health as per daughter.    PHYSICAL EXAMINATION:  HEENT:  Head:  Normocephalic, atraumatic.  Eyes:  No scleral icterus.  Ears:  Hard to evaluate secondary to he could not answer questions accordingly but appears to follow commands.  NECK:  Supple.  RESPIRATORY:  Air entry bilaterally.  CARDIOVASCULAR:  S1 and S2 heard.  ABDOMEN:  Soft and nontender.  EXTREMITIES:  Positive discoloration was noted on the left toe.     NEUROLOGIC:  The patient was awake, alert.  On-off blink to visual threat.  Positive expressive aphasia.  Will say a few words but repeat the same words over.  Right upper and right lower were 0/5 with increased tone.  Right hemiplegia is his baseline.  Left upper and left lower were 4/5.      LABS:  CBC Full  -  ( 22 Apr 2023 07:55 )  WBC Count : 10.28 K/uL  RBC Count : 4.55 M/uL  Hemoglobin : 12.3 g/dL  Hematocrit : 38.0 %  Platelet Count - Automated : 300 K/uL  Mean Cell Volume : 83.5 fl  Mean Cell Hemoglobin : 27.0 pg  Mean Cell Hemoglobin Concentration : 32.4 gm/dL  Auto Neutrophil # : x  Auto Lymphocyte # : x  Auto Monocyte # : x  Auto Eosinophil # : x  Auto Basophil # : x  Auto Neutrophil % : x  Auto Lymphocyte % : x  Auto Monocyte % : x  Auto Eosinophil % : x  Auto Basophil % : x      04-22    135  |  100  |  13  ----------------------------<  140<H>  4.3   |  30  |  0.95    Ca    9.5      22 Apr 2023 07:55    TPro  8.2  /  Alb  2.9<L>  /  TBili  0.5  /  DBili  x   /  AST  68<H>  /  ALT  51  /  AlkPhos  862<H>  04-22    Hemoglobin A1C:     LIVER FUNCTIONS - ( 22 Apr 2023 07:55 )  Alb: 2.9 g/dL / Pro: 8.2 g/dL / ALK PHOS: 862 U/L / ALT: 51 U/L / AST: 68 U/L / GGT: x           Vitamin B12   PT/INR - ( 22 Apr 2023 07:55 )   PT: 27.6 sec;   INR: 2.34 ratio               RADIOLOGY    ANALYSIS AND PLAN:  This is a 67-year-old with a history of atrial fibrillation, cerebrovascular accident, and seizure.  For history of atrial fibrillation and cerebrovascular accident, risks versus benefits.  The patient at present does have elevated INR, would recommend to hold for now with a goal INR between 2 and 3.  Questionable the patient will need any type of surgical intervention for his toe.  For history of seizure prophylaxis, continue the patient on Keppra.  For history of diabetes, strict control of blood sugars.  For history of hypertension, monitor systolic blood pressure.  Excisional debridement with 15 blade of left 4th toe  base down to subcutaneous tissue Left foot ulceration  monitor oral intake as needed     neurologic wise stable     Spoke with son today at bedside 4/23  Other daughter is Jeffrey.  Her telephone number is [phone] 4/20.  She understands my reasoning and thought process.    Greater than 40 minutes of time was spent with the patient, plan of care, reviewing data, with greater than 50% of the visit was spent counseling and/or coordinating care with multidisciplinary healthcare team

## 2023-04-23 NOTE — PROGRESS NOTE ADULT - ASSESSMENT
67 year old male with PMHx CVA (right sided hemiparesis and aphasia), AFib (on coumadin), IDDM, HTN, CAD s/p CABG, s/p AVR, presenting to [facility] ED with wound to left 4th metatarsal    cva hx of craniectomy  AF  OP  OA  foot wound  CAD  DM  HTN  left eff - atelectasis  coagulopathy     mri - c/w OM  vs noted  on ABX  on LASIX - Aldactone  ID follow up  Podiatry follow up    cxr noted - consistent with prior CT imaging and CXR - rounded Atelectasis - chr change  no evidence of PNA  wound infection - ID eval - Podiatric eval  pain assessment  DM care  cvs rx regimen  serial COAGS -   assist with needs  fall prec  dietary discretion  old records reviewed - labs - imaging - notes  spoke with family on admission at bedside

## 2023-04-24 LAB
ALBUMIN SERPL ELPH-MCNC: 2.8 G/DL — LOW (ref 3.3–5)
ALP SERPL-CCNC: 915 U/L — HIGH (ref 40–120)
ALT FLD-CCNC: 63 U/L — SIGNIFICANT CHANGE UP (ref 12–78)
ANION GAP SERPL CALC-SCNC: 3 MMOL/L — LOW (ref 5–17)
AST SERPL-CCNC: 63 U/L — HIGH (ref 15–37)
BILIRUB SERPL-MCNC: 0.4 MG/DL — SIGNIFICANT CHANGE UP (ref 0.2–1.2)
BUN SERPL-MCNC: 22 MG/DL — SIGNIFICANT CHANGE UP (ref 7–23)
CALCIUM SERPL-MCNC: 9.5 MG/DL — SIGNIFICANT CHANGE UP (ref 8.5–10.1)
CHLORIDE SERPL-SCNC: 99 MMOL/L — SIGNIFICANT CHANGE UP (ref 96–108)
CO2 SERPL-SCNC: 33 MMOL/L — HIGH (ref 22–31)
CREAT SERPL-MCNC: 0.85 MG/DL — SIGNIFICANT CHANGE UP (ref 0.5–1.3)
CULTURE RESULTS: SIGNIFICANT CHANGE UP
CULTURE RESULTS: SIGNIFICANT CHANGE UP
EGFR: 96 ML/MIN/1.73M2 — SIGNIFICANT CHANGE UP
GLUCOSE SERPL-MCNC: 120 MG/DL — HIGH (ref 70–99)
HCT VFR BLD CALC: 36.4 % — LOW (ref 39–50)
HGB BLD-MCNC: 11.4 G/DL — LOW (ref 13–17)
INR BLD: 2.79 RATIO — HIGH (ref 0.88–1.16)
MCHC RBC-ENTMCNC: 26.8 PG — LOW (ref 27–34)
MCHC RBC-ENTMCNC: 31.3 GM/DL — LOW (ref 32–36)
MCV RBC AUTO: 85.6 FL — SIGNIFICANT CHANGE UP (ref 80–100)
NRBC # BLD: 0 /100 WBCS — SIGNIFICANT CHANGE UP (ref 0–0)
PLATELET # BLD AUTO: 307 K/UL — SIGNIFICANT CHANGE UP (ref 150–400)
POTASSIUM SERPL-MCNC: 4.9 MMOL/L — SIGNIFICANT CHANGE UP (ref 3.5–5.3)
POTASSIUM SERPL-SCNC: 4.9 MMOL/L — SIGNIFICANT CHANGE UP (ref 3.5–5.3)
PROT SERPL-MCNC: 7.8 G/DL — SIGNIFICANT CHANGE UP (ref 6–8.3)
PROTHROM AB SERPL-ACNC: 33 SEC — HIGH (ref 10.5–13.4)
RBC # BLD: 4.25 M/UL — SIGNIFICANT CHANGE UP (ref 4.2–5.8)
RBC # FLD: 17.6 % — HIGH (ref 10.3–14.5)
SODIUM SERPL-SCNC: 135 MMOL/L — SIGNIFICANT CHANGE UP (ref 135–145)
SPECIMEN SOURCE: SIGNIFICANT CHANGE UP
SPECIMEN SOURCE: SIGNIFICANT CHANGE UP
WBC # BLD: 8.83 K/UL — SIGNIFICANT CHANGE UP (ref 3.8–10.5)
WBC # FLD AUTO: 8.83 K/UL — SIGNIFICANT CHANGE UP (ref 3.8–10.5)

## 2023-04-24 RX ORDER — CEFTRIAXONE 500 MG/1
2 INJECTION, POWDER, FOR SOLUTION INTRAMUSCULAR; INTRAVENOUS
Qty: 35 | Refills: 0
Start: 2023-04-24 | End: 2023-01-01

## 2023-04-24 RX ORDER — WARFARIN SODIUM 2.5 MG/1
2 TABLET ORAL ONCE
Refills: 0 | Status: COMPLETED | OUTPATIENT
Start: 2023-04-24 | End: 2023-04-24

## 2023-04-24 RX ADMIN — PANTOPRAZOLE SODIUM 40 MILLIGRAM(S): 20 TABLET, DELAYED RELEASE ORAL at 11:52

## 2023-04-24 RX ADMIN — LEVETIRACETAM 750 MILLIGRAM(S): 250 TABLET, FILM COATED ORAL at 18:44

## 2023-04-24 RX ADMIN — Medication 4: at 12:18

## 2023-04-24 RX ADMIN — INSULIN GLARGINE 25 UNIT(S): 100 INJECTION, SOLUTION SUBCUTANEOUS at 21:42

## 2023-04-24 RX ADMIN — Medication 1: at 21:40

## 2023-04-24 RX ADMIN — Medication 10 UNIT(S): at 09:14

## 2023-04-24 RX ADMIN — ATORVASTATIN CALCIUM 10 MILLIGRAM(S): 80 TABLET, FILM COATED ORAL at 21:39

## 2023-04-24 RX ADMIN — WARFARIN SODIUM 2 MILLIGRAM(S): 2.5 TABLET ORAL at 21:39

## 2023-04-24 RX ADMIN — CEFTRIAXONE 100 MILLIGRAM(S): 500 INJECTION, POWDER, FOR SOLUTION INTRAMUSCULAR; INTRAVENOUS at 18:44

## 2023-04-24 RX ADMIN — Medication 81 MILLIGRAM(S): at 11:52

## 2023-04-24 RX ADMIN — Medication 40 MILLIGRAM(S): at 06:01

## 2023-04-24 RX ADMIN — Medication 10 UNIT(S): at 12:18

## 2023-04-24 RX ADMIN — LEVETIRACETAM 750 MILLIGRAM(S): 250 TABLET, FILM COATED ORAL at 06:01

## 2023-04-24 NOTE — PROGRESS NOTE ADULT - ASSESSMENT
67 year old male with PMHx CVA (right sided hemiparesis and aphasia), AFib (on coumadin), IDDM, HTN, CAD s/p CABG, s/p AVR, presenting to [place] ED with wound to left 4th metatarsal.  Presented to Podiatry office (Dr. Maegan Jin) who instructed patient to come to ED for evaluation.    Vascular recommending Recommend ABIs/PVR to assess degree of arterial disease    RECOMMENDATIONS  Podiatry: MRI with Osseous edema within the distal phalanx of the fourth digit without   loss of normal T1 fatty marrow possibly representing early osteomyelitis   versus reactive edema - will follow for any surgical plan per podiatry -continue abx for now    Vascular: JUAN A/PVR's reviewed; likely small vessel disease  Podiatry with no plan for surgical intervention at this time  Cont local wound care and close monitoring for healing  Noted no plan for vascular surgical intervention  Reviewed prior micro and no prior MRSA so have adjusted abx to ceftriaxone     Given imaging with concern for possible early OM and no Podiatric intervention, will treat prolonged course IV antibiotics  Place PICC Line - order placed   Continue on Ceftriaxone 2 gram IV daily to complete 6 week course on 5/29/2023  Will need weekly labs CBC/CMP/CRP/ESR while on IV antibiotics - fax  results to [phone].  Patient will follow up with me in ID office 1 week post discharge       D/w patient and daughter at bedside  Karen Grimes M.D.  [facility], Division of Infectious Diseases  [phone]  After 5pm on weekdays and all day on weekends - please call [phone]

## 2023-04-24 NOTE — PROGRESS NOTE ADULT - ASSESSMENT
67 year old male with PMHx CVA (right sided hemiparesis and aphasia), AFib (on coumadin), IDDM, HTN, CAD s/p CABG, s/p AVR, presenting to [place] ED with wound to left 4th metatarsal.  Patient unable to provide history due to aphasia, history obtained from daughter at beside and per chart review.  Patient developed hematoma to left 4th metatarsal on Friday, it then ruptured and patient presented to Podiatry office (Dr. Maegan Jin) earlier today who instructed patient to come to ED for evaluation.  Patient has now new complaints at this time.  Patient does not follow with a vascular surgeon.   (18 Apr 2023 21:55)      will check ua , urine osmolality , urine sodium , urine uric acid , serum sodium , serum osmolality , serum uric acid , f/u with hyponatremia work up , f/u with bmp , monitor i and o      BP monitoring,continue current antihypertensive meds, low salt diet,followup with PMD in 1-2 weeks      Admit to telemetry unit for monitoring,send 3 sets of cardiac ensymes to rule out coronary event,obtain ECHO to evaluate LVEF,cardiology consult,continue current managmnet,O2 supply,anticoagulation plan as per cardiology consult

## 2023-04-24 NOTE — PROGRESS NOTE ADULT - SUBJECTIVE AND OBJECTIVE BOX
[facility]  Shane Murray PA-C  [address] [place] [address]  [phone]      INTERVAL HPI/OVERNIGHT EVENTS:  Pt s/e  Tolerating diet  Pt reports no N/V/D or further GI complaints    MEDICATIONS  (STANDING):  aspirin enteric coated 81 milliGRAM(s) Oral daily  atorvastatin 10 milliGRAM(s) Oral at bedtime  cefTRIAXone   IVPB 2000 milliGRAM(s) IV Intermittent every 24 hours  dextrose 5%. 1000 milliLiter(s) (100 mL/Hr) IV Continuous <Continuous>  dextrose 5%. 1000 milliLiter(s) (50 mL/Hr) IV Continuous <Continuous>  dextrose 50% Injectable 12.5 Gram(s) IV Push once  dextrose 50% Injectable 25 Gram(s) IV Push once  dextrose 50% Injectable 25 Gram(s) IV Push once  furosemide    Tablet 40 milliGRAM(s) Oral daily  glucagon  Injectable 1 milliGRAM(s) IntraMuscular once  insulin glargine Injectable (LANTUS) 25 Unit(s) SubCutaneous at bedtime  insulin lispro (ADMELOG) corrective regimen sliding scale   SubCutaneous at bedtime  insulin lispro (ADMELOG) corrective regimen sliding scale   SubCutaneous three times a day before meals  insulin lispro Injectable (ADMELOG) 10 Unit(s) SubCutaneous three times a day before meals  levETIRAcetam 750 milliGRAM(s) Oral two times a day  metoprolol succinate ER 25 milliGRAM(s) Oral daily  pantoprazole   Suspension 40 milliGRAM(s) Oral daily  povidone iodine 10% Solution 1 Application(s) Topical daily  spironolactone 25 milliGRAM(s) Oral daily    MEDICATIONS  (PRN):  acetaminophen     Tablet .. 650 milliGRAM(s) Oral every 6 hours PRN Temp greater or equal to 38C (100.4F), Mild Pain (1 - 3)  dextrose Oral Gel 15 Gram(s) Oral once PRN Blood Glucose LESS THAN 70 milliGRAM(s)/deciliter  oxycodone    5 mG/acetaminophen 325 mG 1 Tablet(s) Oral every 6 hours PRN Moderate Pain (4 - 6)      Allergies    No Known Allergies    PHYSICAL EXAM:   Vital Signs:  Vital Signs Last 24 Hrs  T(C): 36.9 (2023 11:56), Max: 36.9 (2023 11:56)  T(F): 98.4 (2023 11:56), Max: 98.4 (2023 11:56)  HR: 84 (2023 11:56) (71 - 84)  BP: 122/73 (2023 11:56) (92/57 - 122/73)  BP(mean): --  RR: 18 (2023 11:56) (17 - 18)  SpO2: 99% (2023 11:56) (93% - 99%)    Parameters below as of 2023 04:47  Patient On (Oxygen Delivery Method): room air      Daily     Daily Weight in k.5 (2023 04:47)    GENERAL:  Appears stated age  HEENT:  NC/AT  CHEST:  Full & symmetric excursion  HEART:  Regular rhythm  ABDOMEN:  Soft, non-tender, non-distended  EXTEREMITIES:  no cyanosis  SKIN:  No rash  NEURO:  Alert      LABS:                        11.4   8.83  )-----------( 307      ( 2023 06:40 )             36.4     04-24    135  |  99  |  22  ----------------------------<  120<H>  4.9   |  33<H>  |  0.85    Ca    9.5      2023 06:40    TPro  7.8  /  Alb  2.8<L>  /  TBili  0.4  /  DBili  x   /  AST  63<H>  /  ALT  63  /  AlkPhos  915<H>  0424    PT/INR - ( 2023 06:40 )   PT: 33.0 sec;   INR: 2.79 ratio

## 2023-04-24 NOTE — CASE MANAGEMENT PROGRESS NOTE - NSCMPROGRESSNOTE_GEN_ALL_CORE
Discussed patient on rounds this morning. Per ID notes, the patient will require IV Rocephin 2grams daily x 6 weeks. PICC ordered. CM met with the patient's daughter at the bedside who requested CM to contact the patient's other daughter Jeffrey to discuss transition plan. CM attempted to contact Jeffrey [phone]- no answer and unable to leave message as the voice mail box is full.

## 2023-04-24 NOTE — PROGRESS NOTE ADULT - SUBJECTIVE AND OBJECTIVE BOX
Patient is a 67y old  Male who presents with a chief complaint of toe gangrene (24 Apr 2023 13:18)    Date of servie : 04-24-23 @ 14:42  INTERVAL HPI/OVERNIGHT EVENTS:  T(C): 36.9 (04-24-23 @ 11:56), Max: 36.9 (04-24-23 @ 11:56)  HR: 84 (04-24-23 @ 11:56) (71 - 84)  BP: 122/73 (04-24-23 @ 11:56) (92/57 - 122/73)  RR: 18 (04-24-23 @ 11:56) (17 - 18)  SpO2: 99% (04-24-23 @ 11:56) (93% - 99%)  Wt(kg): --  I&O's Summary    23 Apr 2023 07:01  -  24 Apr 2023 07:00  --------------------------------------------------------  IN: 220 mL / OUT: 0 mL / NET: 220 mL        LABS:                        11.4   8.83  )-----------( 307      ( 24 Apr 2023 06:40 )             36.4     04-24    135  |  99  |  22  ----------------------------<  120<H>  4.9   |  33<H>  |  0.85    Ca    9.5      24 Apr 2023 06:40    TPro  7.8  /  Alb  2.8<L>  /  TBili  0.4  /  DBili  x   /  AST  63<H>  /  ALT  63  /  AlkPhos  915<H>  04-24    PT/INR - ( 24 Apr 2023 06:40 )   PT: 33.0 sec;   INR: 2.79 ratio             CAPILLARY BLOOD GLUCOSE      POCT Blood Glucose.: 206 mg/dL (24 Apr 2023 12:03)  POCT Blood Glucose.: 118 mg/dL (24 Apr 2023 08:33)  POCT Blood Glucose.: 138 mg/dL (23 Apr 2023 22:11)  POCT Blood Glucose.: 204 mg/dL (23 Apr 2023 17:11)            MEDICATIONS  (STANDING):  aspirin enteric coated 81 milliGRAM(s) Oral daily  atorvastatin 10 milliGRAM(s) Oral at bedtime  cefTRIAXone   IVPB 2000 milliGRAM(s) IV Intermittent every 24 hours  dextrose 5%. 1000 milliLiter(s) (100 mL/Hr) IV Continuous <Continuous>  dextrose 5%. 1000 milliLiter(s) (50 mL/Hr) IV Continuous <Continuous>  dextrose 50% Injectable 25 Gram(s) IV Push once  dextrose 50% Injectable 25 Gram(s) IV Push once  dextrose 50% Injectable 12.5 Gram(s) IV Push once  furosemide    Tablet 40 milliGRAM(s) Oral daily  glucagon  Injectable 1 milliGRAM(s) IntraMuscular once  insulin glargine Injectable (LANTUS) 25 Unit(s) SubCutaneous at bedtime  insulin lispro (ADMELOG) corrective regimen sliding scale   SubCutaneous at bedtime  insulin lispro (ADMELOG) corrective regimen sliding scale   SubCutaneous three times a day before meals  insulin lispro Injectable (ADMELOG) 10 Unit(s) SubCutaneous three times a day before meals  levETIRAcetam 750 milliGRAM(s) Oral two times a day  metoprolol succinate ER 25 milliGRAM(s) Oral daily  pantoprazole   Suspension 40 milliGRAM(s) Oral daily  povidone iodine 10% Solution 1 Application(s) Topical daily  spironolactone 25 milliGRAM(s) Oral daily  warfarin 2 milliGRAM(s) Oral once    MEDICATIONS  (PRN):  acetaminophen     Tablet .. 650 milliGRAM(s) Oral every 6 hours PRN Temp greater or equal to 38C (100.4F), Mild Pain (1 - 3)  dextrose Oral Gel 15 Gram(s) Oral once PRN Blood Glucose LESS THAN 70 milliGRAM(s)/deciliter  oxycodone    5 mG/acetaminophen 325 mG 1 Tablet(s) Oral every 6 hours PRN Moderate Pain (4 - 6)          PHYSICAL EXAM:  GENERAL: frail  CHEST/LUNG: Clear to percussion bilaterally; No rales, rhonchi, wheezing, or rubs  HEART: Regular rate and rhythm; No murmurs, rubs, or gallops  ABDOMEN: Soft, Nontender, Nondistended; Bowel sounds present  EXTREMITIES:  dressing +    Care Discussed with Consultants/Other Providers [ ] YES  [ ] NO

## 2023-04-24 NOTE — PROGRESS NOTE ADULT - SUBJECTIVE AND OBJECTIVE BOX
Neurology follow up note    ARNIE [id number]      Interval History:    Patient feels ok no new complaints seen with dtr     Allergies    No Known Allergies    Intolerances        MEDICATIONS    acetaminophen     Tablet .. 650 milliGRAM(s) Oral every 6 hours PRN  aspirin enteric coated 81 milliGRAM(s) Oral daily  atorvastatin 10 milliGRAM(s) Oral at bedtime  cefTRIAXone   IVPB 2000 milliGRAM(s) IV Intermittent every 24 hours  dextrose 5%. 1000 milliLiter(s) IV Continuous <Continuous>  dextrose 5%. 1000 milliLiter(s) IV Continuous <Continuous>  dextrose 50% Injectable 12.5 Gram(s) IV Push once  dextrose 50% Injectable 25 Gram(s) IV Push once  dextrose 50% Injectable 25 Gram(s) IV Push once  dextrose Oral Gel 15 Gram(s) Oral once PRN  furosemide    Tablet 40 milliGRAM(s) Oral daily  glucagon  Injectable 1 milliGRAM(s) IntraMuscular once  insulin glargine Injectable (LANTUS) 25 Unit(s) SubCutaneous at bedtime  insulin lispro (ADMELOG) corrective regimen sliding scale   SubCutaneous at bedtime  insulin lispro (ADMELOG) corrective regimen sliding scale   SubCutaneous three times a day before meals  insulin lispro Injectable (ADMELOG) 10 Unit(s) SubCutaneous three times a day before meals  levETIRAcetam 750 milliGRAM(s) Oral two times a day  metoprolol succinate ER 25 milliGRAM(s) Oral daily  oxycodone    5 mG/acetaminophen 325 mG 1 Tablet(s) Oral every 6 hours PRN  pantoprazole   Suspension 40 milliGRAM(s) Oral daily  povidone iodine 10% Solution 1 Application(s) Topical daily  spironolactone 25 milliGRAM(s) Oral daily              Vital Signs Last 24 Hrs  T(C): 36.7 (24 Apr 2023 04:47), Max: 36.7 (24 Apr 2023 04:47)  T(F): 98.1 (24 Apr 2023 04:47), Max: 98.1 (24 Apr 2023 04:47)  HR: 71 (24 Apr 2023 04:47) (71 - 73)  BP: 92/57 (24 Apr 2023 04:47) (92/57 - 105/47)  BP(mean): --  RR: 17 (24 Apr 2023 04:47) (17 - 17)  SpO2: 95% (24 Apr 2023 04:47) (93% - 95%)    Parameters below as of 24 Apr 2023 04:47  Patient On (Oxygen Delivery Method): room air      REVIEW OF SYSTEMS:  Completely limited secondary to the patient repeats words over, has severe expressive aphasia, but had been in his normal state of health as per daughter.    PHYSICAL EXAMINATION:  HEENT:  Head:  Normocephalic, atraumatic.  Eyes:  No scleral icterus.  Ears:  Hard to evaluate secondary to he could not answer questions accordingly but appears to follow commands.  NECK:  Supple.  RESPIRATORY:  Air entry bilaterally.  CARDIOVASCULAR:  S1 and S2 heard.  ABDOMEN:  Soft and nontender.  EXTREMITIES:  Positive discoloration was noted on the left toe.     NEUROLOGIC:  The patient was awake, alert.  On-off blink to visual threat.  Positive expressive aphasia.  Will say a few words but repeat the same words over.  Right upper and right lower were 0/5 with increased tone.  Right hemiplegia is his baseline.  Left upper and left lower were 4/5.       LABS:  CBC Full  -  ( 24 Apr 2023 06:40 )  WBC Count : 8.83 K/uL  RBC Count : 4.25 M/uL  Hemoglobin : 11.4 g/dL  Hematocrit : 36.4 %  Platelet Count - Automated : 307 K/uL  Mean Cell Volume : 85.6 fl  Mean Cell Hemoglobin : 26.8 pg  Mean Cell Hemoglobin Concentration : 31.3 gm/dL  Auto Neutrophil # : x  Auto Lymphocyte # : x  Auto Monocyte # : x  Auto Eosinophil # : x  Auto Basophil # : x  Auto Neutrophil % : x  Auto Lymphocyte % : x  Auto Monocyte % : x  Auto Eosinophil % : x  Auto Basophil % : x      04-24    135  |  99  |  22  ----------------------------<  120<H>  4.9   |  33<H>  |  0.85    Ca    9.5      24 Apr 2023 06:40    TPro  7.8  /  Alb  2.8<L>  /  TBili  0.4  /  DBili  x   /  AST  63<H>  /  ALT  63  /  AlkPhos  915<H>  04-24    Hemoglobin A1C:     LIVER FUNCTIONS - ( 24 Apr 2023 06:40 )  Alb: 2.8 g/dL / Pro: 7.8 g/dL / ALK PHOS: 915 U/L / ALT: 63 U/L / AST: 63 U/L / GGT: x           Vitamin B12   PT/INR - ( 24 Apr 2023 06:40 )   PT: 33.0 sec;   INR: 2.79 ratio               RADIOLOGY        ANALYSIS AND PLAN:  This is a 67-year-old with a history of atrial fibrillation, cerebrovascular accident, and seizure.  For history of atrial fibrillation and cerebrovascular accident, risks versus benefits.  The patient at present does have elevated INR, would recommend to hold for now with a goal INR between 2 and 3.  Questionable the patient will need any type of surgical intervention for his toe.  For history of seizure prophylaxis, continue the patient on Keppra.  For history of diabetes, strict control of blood sugars.  For history of hypertension, monitor systolic blood pressure.  Excisional debridement with 15 blade of left 4th toe  base down to subcutaneous tissue Left foot ulceration  monitor oral intake as needed     neurologic wise stable     Spoke with daughter  at bedside 4/24  Other daughter is Jeffrey.  Her telephone number is [phone] 4/20.  She understands my reasoning and thought process.    Greater than 40 minutes of time was spent with the patient, plan of care, reviewing data, with greater than 50% of the visit was spent counseling and/or coordinating care with multidisciplinary healthcare team

## 2023-04-24 NOTE — PROGRESS NOTE ADULT - SUBJECTIVE AND OBJECTIVE BOX
Date/Time Patient Seen:  		  Referring MD:   Data Reviewed	       Patient is a 67y old  Male who presents with a chief complaint of toe gangrene (23 Apr 2023 21:07)      Subjective/HPI     PAST MEDICAL & SURGICAL HISTORY:  CVA (cerebral vascular accident)    HTN (hypertension)    DM (diabetes mellitus)    Arrhythmia    History of atrial fibrillation    S/P CABG (coronary artery bypass graft)    S/P brain surgery          Medication list         MEDICATIONS  (STANDING):  aspirin enteric coated 81 milliGRAM(s) Oral daily  atorvastatin 10 milliGRAM(s) Oral at bedtime  cefTRIAXone   IVPB 2000 milliGRAM(s) IV Intermittent every 24 hours  dextrose 5%. 1000 milliLiter(s) (50 mL/Hr) IV Continuous <Continuous>  dextrose 5%. 1000 milliLiter(s) (100 mL/Hr) IV Continuous <Continuous>  dextrose 50% Injectable 12.5 Gram(s) IV Push once  dextrose 50% Injectable 25 Gram(s) IV Push once  dextrose 50% Injectable 25 Gram(s) IV Push once  furosemide    Tablet 40 milliGRAM(s) Oral daily  glucagon  Injectable 1 milliGRAM(s) IntraMuscular once  insulin glargine Injectable (LANTUS) 25 Unit(s) SubCutaneous at bedtime  insulin lispro (ADMELOG) corrective regimen sliding scale   SubCutaneous at bedtime  insulin lispro (ADMELOG) corrective regimen sliding scale   SubCutaneous three times a day before meals  insulin lispro Injectable (ADMELOG) 10 Unit(s) SubCutaneous three times a day before meals  levETIRAcetam 750 milliGRAM(s) Oral two times a day  metoprolol succinate ER 25 milliGRAM(s) Oral daily  pantoprazole   Suspension 40 milliGRAM(s) Oral daily  povidone iodine 10% Solution 1 Application(s) Topical daily  spironolactone 25 milliGRAM(s) Oral daily    MEDICATIONS  (PRN):  acetaminophen     Tablet .. 650 milliGRAM(s) Oral every 6 hours PRN Temp greater or equal to 38C (100.4F), Mild Pain (1 - 3)  dextrose Oral Gel 15 Gram(s) Oral once PRN Blood Glucose LESS THAN 70 milliGRAM(s)/deciliter  oxycodone    5 mG/acetaminophen 325 mG 1 Tablet(s) Oral every 6 hours PRN Moderate Pain (4 - 6)         Vitals log        ICU Vital Signs Last 24 Hrs  T(C): 36.7 (24 Apr 2023 04:47), Max: 36.8 (23 Apr 2023 09:35)  T(F): 98.1 (24 Apr 2023 04:47), Max: 98.3 (23 Apr 2023 09:35)  HR: 71 (24 Apr 2023 04:47) (70 - 73)  BP: 92/57 (24 Apr 2023 04:47) (92/57 - 114/75)  BP(mean): --  ABP: --  ABP(mean): --  RR: 17 (24 Apr 2023 04:47) (17 - 18)  SpO2: 95% (24 Apr 2023 04:47) (93% - 95%)    O2 Parameters below as of 24 Apr 2023 04:47  Patient On (Oxygen Delivery Method): room air                 Input and Output:  I&O's Detail    22 Apr 2023 07:01  -  23 Apr 2023 07:00  --------------------------------------------------------  IN:    Oral Fluid: 570 mL  Total IN: 570 mL    OUT:    Voided (mL): 900 mL  Total OUT: 900 mL    Total NET: -330 mL      23 Apr 2023 07:01  -  24 Apr 2023 06:01  --------------------------------------------------------  IN:    Oral Fluid: 220 mL  Total IN: 220 mL    OUT:  Total OUT: 0 mL    Total NET: 220 mL          Lab Data                        11.7   8.43  )-----------( 298      ( 23 Apr 2023 07:11 )             36.1     04-23    137  |  100  |  23  ----------------------------<  84  4.3   |  33<H>  |  0.93    Ca    9.6      23 Apr 2023 07:11    TPro  8.0  /  Alb  2.7<L>  /  TBili  0.5  /  DBili  x   /  AST  83<H>  /  ALT  65  /  AlkPhos  944<H>  04-23            Review of Systems	      Objective     Physical Examination    heart s1s2  lung dc BS  head nc      Pertinent Lab findings & Imaging      Bryan:  NO   Adequate UO     I&O's Detail    22 Apr 2023 07:01  -  23 Apr 2023 07:00  --------------------------------------------------------  IN:    Oral Fluid: 570 mL  Total IN: 570 mL    OUT:    Voided (mL): 900 mL  Total OUT: 900 mL    Total NET: -330 mL      23 Apr 2023 07:01  -  24 Apr 2023 06:01  --------------------------------------------------------  IN:    Oral Fluid: 220 mL  Total IN: 220 mL    OUT:  Total OUT: 0 mL    Total NET: 220 mL               Discussed with:     Cultures:	        Radiology

## 2023-04-24 NOTE — PROGRESS NOTE ADULT - SUBJECTIVE AND OBJECTIVE BOX
[facility]  ADAM Nava Y. Patel, S. Shah, G. Chris  [phone]  ([phone] - weekdays after 5pm and weekends)    Name: ARNIE DELGADILLO  Age/Gender: 67y Male  MRN: [id number]    Interval History:  Patient seen and examined.  Feels well, no new complaints.   Notes reviewed  No concerning overnight events  Afebrile. Family at bedside  Allergies: No Known Allergies      Objective:  Vitals:   T(F): 98.4 (04-24-23 @ 11:56), Max: 98.4 (04-24-23 @ 11:56)  HR: 84 (04-24-23 @ 11:56) (71 - 84)  BP: 122/73 (04-24-23 @ 11:56) (92/57 - 122/73)  RR: 18 (04-24-23 @ 11:56) (17 - 18)  SpO2: 99% (04-24-23 @ 11:56) (93% - 99%)  Physical Examination:  General: no acute distress  HEENT: NC/AT, anicteric, neck supple  Respiratory: no acc muscle use, breathing comfortably  Cardiovascular: S1 and S2 present  Gastrointestinal: normal appearing, nondistended  Extremities: L foot dressing, no edema  Skin: no visible rash    Laboratory Studies:  CBC:                       11.4   8.83  )-----------( 307      ( 24 Apr 2023 06:40 )             36.4     WBC Trend:  8.83 04-24-23 @ 06:40  8.43 04-23-23 @ 07:11  10.28 04-22-23 @ 07:55  10.73 04-21-23 @ 08:11  9.36 04-20-23 @ 06:42  11.33 04-19-23 @ 07:30  10.91 04-18-23 @ 20:06    CMP: 04-24    135  |  99  |  22  ----------------------------<  120<H>  4.9   |  33<H>  |  0.85    Ca    9.5      24 Apr 2023 06:40    TPro  7.8  /  Alb  2.8<L>  /  TBili  0.4  /  DBili  x   /  AST  63<H>  /  ALT  63  /  AlkPhos  915<H>  04-24    Creatinine, Serum: 0.85 mg/dL (04-24-23 @ 06:40)  Creatinine, Serum: 0.93 mg/dL (04-23-23 @ 07:11)  Creatinine, Serum: 0.95 mg/dL (04-22-23 @ 07:55)  Creatinine, Serum: 0.91 mg/dL (04-21-23 @ 08:11)  Creatinine, Serum: 0.74 mg/dL (04-20-23 @ 06:42)  Creatinine, Serum: 0.78 mg/dL (04-19-23 @ 07:30)  Creatinine, Serum: 0.97 mg/dL (04-18-23 @ 20:06)      LIVER FUNCTIONS - ( 24 Apr 2023 06:40 )  Alb: 2.8 g/dL / Pro: 7.8 g/dL / ALK PHOS: 915 U/L / ALT: 63 U/L / AST: 63 U/L / GGT: x               Microbiology: reviewed     Culture - Urine (collected 04-18-23 @ 21:20)  Source: Clean Catch Clean Catch (Midstream)  Final Report (04-19-23 @ 23:11):    <10,000 CFU/mL Normal Urogenital Beth    Culture - Blood (collected 04-18-23 @ 20:27)  Source: .Blood Blood-Peripheral  Final Report (04-24-23 @ 01:00):    No Growth Final    Culture - Blood (collected 04-18-23 @ 20:21)  Source: .Blood Blood-Peripheral  Final Report (04-24-23 @ 01:00):    No Growth Final        COVID-19 PCR: NotDetec (18 Apr 2023 20:06)    Radiology: reviewed     Medications:  acetaminophen     Tablet .. 650 milliGRAM(s) Oral every 6 hours PRN  aspirin enteric coated 81 milliGRAM(s) Oral daily  atorvastatin 10 milliGRAM(s) Oral at bedtime  cefTRIAXone   IVPB 2000 milliGRAM(s) IV Intermittent every 24 hours  dextrose 5%. 1000 milliLiter(s) IV Continuous <Continuous>  dextrose 5%. 1000 milliLiter(s) IV Continuous <Continuous>  dextrose 50% Injectable 12.5 Gram(s) IV Push once  dextrose 50% Injectable 25 Gram(s) IV Push once  dextrose 50% Injectable 25 Gram(s) IV Push once  dextrose Oral Gel 15 Gram(s) Oral once PRN  furosemide    Tablet 40 milliGRAM(s) Oral daily  glucagon  Injectable 1 milliGRAM(s) IntraMuscular once  insulin glargine Injectable (LANTUS) 25 Unit(s) SubCutaneous at bedtime  insulin lispro (ADMELOG) corrective regimen sliding scale   SubCutaneous three times a day before meals  insulin lispro (ADMELOG) corrective regimen sliding scale   SubCutaneous at bedtime  insulin lispro Injectable (ADMELOG) 10 Unit(s) SubCutaneous three times a day before meals  levETIRAcetam 750 milliGRAM(s) Oral two times a day  metoprolol succinate ER 25 milliGRAM(s) Oral daily  oxycodone    5 mG/acetaminophen 325 mG 1 Tablet(s) Oral every 6 hours PRN  pantoprazole   Suspension 40 milliGRAM(s) Oral daily  povidone iodine 10% Solution 1 Application(s) Topical daily  spironolactone 25 milliGRAM(s) Oral daily    Current Antimicrobials:  cefTRIAXone   IVPB 2000 milliGRAM(s) IV Intermittent every 24 hours    Prior/Completed Antimicrobials:  piperacillin/tazobactam IVPB...  vancomycin  IVPB.   Home

## 2023-04-24 NOTE — PROGRESS NOTE ADULT - SUBJECTIVE AND OBJECTIVE BOX
Patient is a 67y Male whom presented to the hospital with hyponatremia     PAST MEDICAL & SURGICAL HISTORY:  CVA (cerebral vascular accident)      HTN (hypertension)      DM (diabetes mellitus)      Arrhythmia      History of atrial fibrillation      S/P CABG (coronary artery bypass graft)      S/P brain surgery          MEDICATIONS  (STANDING):  aspirin enteric coated 81 milliGRAM(s) Oral daily  atorvastatin 10 milliGRAM(s) Oral at bedtime  dextrose 5%. 1000 milliLiter(s) (50 mL/Hr) IV Continuous <Continuous>  dextrose 5%. 1000 milliLiter(s) (100 mL/Hr) IV Continuous <Continuous>  dextrose 50% Injectable 25 Gram(s) IV Push once  dextrose 50% Injectable 12.5 Gram(s) IV Push once  dextrose 50% Injectable 25 Gram(s) IV Push once  furosemide    Tablet 40 milliGRAM(s) Oral daily  glucagon  Injectable 1 milliGRAM(s) IntraMuscular once  insulin glargine Injectable (LANTUS) 7 Unit(s) SubCutaneous at bedtime  insulin lispro (ADMELOG) corrective regimen sliding scale   SubCutaneous three times a day before meals  insulin lispro Injectable (ADMELOG) 2 Unit(s) SubCutaneous three times a day before meals  levETIRAcetam 750 milliGRAM(s) Oral two times a day  pantoprazole   Suspension 40 milliGRAM(s) Oral daily  piperacillin/tazobactam IVPB.. 3.375 Gram(s) IV Intermittent every 8 hours  propranolol 20 milliGRAM(s) Oral two times a day  vancomycin  IVPB 1000 milliGRAM(s) IV Intermittent every 12 hours      Allergies    No Known Allergies    Intolerances        SOCIAL HISTORY:  Denies ETOh,Smoking,     FAMILY HISTORY:  No pertinent family history in first degree relatives        REVIEW OF SYSTEMS:    CONSTITUTIONAL: No weakness, fevers or chills  EYES/ENT: No visual changes;  no throat pain   NECK: No pain or stiffness  RESPIRATORY: No cough, wheezing, hemoptysis; No shortness of breath  CARDIOVASCULAR: No chest pain or palpitations  GASTROINTESTINAL: No abdominal or epigastric pain. No nausea, vomiting,                                           11.4   8.83  )-----------( 307      ( 24 Apr 2023 06:40 )             36.4       CBC Full  -  ( 24 Apr 2023 06:40 )  WBC Count : 8.83 K/uL  RBC Count : 4.25 M/uL  Hemoglobin : 11.4 g/dL  Hematocrit : 36.4 %  Platelet Count - Automated : 307 K/uL  Mean Cell Volume : 85.6 fl  Mean Cell Hemoglobin : 26.8 pg  Mean Cell Hemoglobin Concentration : 31.3 gm/dL  Auto Neutrophil # : x  Auto Lymphocyte # : x  Auto Monocyte # : x  Auto Eosinophil # : x  Auto Basophil # : x  Auto Neutrophil % : x  Auto Lymphocyte % : x  Auto Monocyte % : x  Auto Eosinophil % : x  Auto Basophil % : x      04-24    135  |  99  |  22  ----------------------------<  120<H>  4.9   |  33<H>  |  0.85    Ca    9.5      24 Apr 2023 06:40    TPro  7.8  /  Alb  2.8<L>  /  TBili  0.4  /  DBili  x   /  AST  63<H>  /  ALT  63  /  AlkPhos  915<H>  04-24      CAPILLARY BLOOD GLUCOSE      POCT Blood Glucose.: 118 mg/dL (24 Apr 2023 08:33)  POCT Blood Glucose.: 138 mg/dL (23 Apr 2023 22:11)  POCT Blood Glucose.: 204 mg/dL (23 Apr 2023 17:11)  POCT Blood Glucose.: 279 mg/dL (23 Apr 2023 11:54)      Vital Signs Last 24 Hrs  T(C): 36.7 (24 Apr 2023 04:47), Max: 36.7 (24 Apr 2023 04:47)  T(F): 98.1 (24 Apr 2023 04:47), Max: 98.1 (24 Apr 2023 04:47)  HR: 71 (24 Apr 2023 04:47) (71 - 73)  BP: 92/57 (24 Apr 2023 04:47) (92/57 - 105/47)  BP(mean): --  RR: 17 (24 Apr 2023 04:47) (17 - 17)  SpO2: 95% (24 Apr 2023 04:47) (93% - 95%)    Parameters below as of 24 Apr 2023 04:47  Patient On (Oxygen Delivery Method): room air            PT/INR - ( 24 Apr 2023 06:40 )   PT: 33.0 sec;   INR: 2.79 ratio                  PHYSICAL EXAM:    Constitutional: NAD  HEENT: conjunctive   clear   Neck:  No JVD  Respiratory: CTAB  Cardiovascular: S1 and S2  Gastrointestinal: BS+, soft, NT/ND  Extremities: No peripheral edema  Neurological: no focal deficits  Psychiatric: Normal mood, normal affect  : No Adams  Skin: dry   Access: Not applicable

## 2023-04-24 NOTE — PROGRESS NOTE ADULT - ASSESSMENT
67 year old male with PMHx CVA (right sided hemiparesis and aphasia), AFib (on coumadin), IDDM, HTN, CAD s/p CABG, s/p AVR, presenting to [place] ED with wound to left 4th metatarsal.  Patient unable to provide history due to aphasia, history obtained from daughter at beside and per chart review.  Patient developed hematoma to left 4th metatarsal on Friday, it then ruptured and patient presented to Podiatry office (Dr. Maegan Jin) earlier today who instructed patient to come to ED for evaluation.  Patient has now new complaints at this time.  Patient does not follow with a vascular surgeon.      1 Toe gangrene, diabetic foot ulcer  - cw abx  - ID,vascular and podiatry fu  - fu cultures  - pain control   - JUAN A/PVR's reviewed; likely small vessel disease    2 DM  - monitor FS  - Basal bolus  - diabetic diet  - uncontrolled     3 Hx of CVA  - cw AC  - cw asa, statin    4 Afib, AVR  - INR and dose coumadin daily    5 CAD, CABG  - cw asa, statin  - cards fu         dc planning on the 27th after completing abx     case dw endocrine and family   67 year old male with PMHx CVA (right sided hemiparesis and aphasia), AFib (on coumadin), IDDM, HTN, CAD s/p CABG, s/p AVR, presenting to [place] ED with wound to left 4th metatarsal.  Patient unable to provide history due to aphasia, history obtained from daughter at beside and per chart review.  Patient developed hematoma to left 4th metatarsal on Friday, it then ruptured and patient presented to Podiatry office (Dr. Maegan Jin) earlier today who instructed patient to come to ED for evaluation.  Patient has now new complaints at this time.  Patient does not follow with a vascular surgeon.      1 Toe gangrene, diabetic foot ulcer  - cw abx  - ID,vascular and podiatry fu  - fu cultures  - pain control   - JUAN A/PVR's reviewed; likely small vessel disease    2 DM  - monitor FS  - Basal bolus  - diabetic diet  - uncontrolled     3 Hx of CVA  - cw AC  - cw asa, statin    4 Afib, AVR  - INR and dose coumadin daily    5 CAD, CABG  - cw asa, statin  - cards fu

## 2023-04-24 NOTE — PROGRESS NOTE ADULT - ASSESSMENT
67 year old male with PMHx CVA (right sided hemiparesis and aphasia), AFib (on coumadin), IDDM, HTN, CAD s/p CABG, s/p AVR, presenting to [place] ED with wound to left 4th metatarsal    cva hx of craniectomy  AF  OP  OA  foot wound  CAD  DM  HTN  left eff - atelectasis  coagulopathy     mri - c/w OM  vs noted  on ABX  on LASIX - Aldactone  ID follow up  Podiatry follow up    cxr noted - consistent with prior CT imaging and CXR - rounded Atelectasis - chr change  no evidence of PNA  wound infection - ID eval - Podiatric eval  pain assessment  DM care  cvs rx regimen  serial COAGS -   assist with needs  fall prec  dietary discretion  old records reviewed - labs - imaging - notes  spoke with family on admission at bedside

## 2023-04-25 LAB
ALBUMIN SERPL ELPH-MCNC: 2.6 G/DL — LOW (ref 3.3–5)
ALP SERPL-CCNC: 846 U/L — HIGH (ref 40–120)
ALT FLD-CCNC: 54 U/L — SIGNIFICANT CHANGE UP (ref 12–78)
ANION GAP SERPL CALC-SCNC: 6 MMOL/L — SIGNIFICANT CHANGE UP (ref 5–17)
AST SERPL-CCNC: 48 U/L — HIGH (ref 15–37)
BILIRUB SERPL-MCNC: 0.4 MG/DL — SIGNIFICANT CHANGE UP (ref 0.2–1.2)
BUN SERPL-MCNC: 19 MG/DL — SIGNIFICANT CHANGE UP (ref 7–23)
CALCIUM SERPL-MCNC: 9 MG/DL — SIGNIFICANT CHANGE UP (ref 8.5–10.1)
CHLORIDE SERPL-SCNC: 100 MMOL/L — SIGNIFICANT CHANGE UP (ref 96–108)
CO2 SERPL-SCNC: 30 MMOL/L — SIGNIFICANT CHANGE UP (ref 22–31)
CREAT SERPL-MCNC: 0.87 MG/DL — SIGNIFICANT CHANGE UP (ref 0.5–1.3)
EGFR: 96 ML/MIN/1.73M2 — SIGNIFICANT CHANGE UP
GLUCOSE SERPL-MCNC: 112 MG/DL — HIGH (ref 70–99)
HCT VFR BLD CALC: 34.6 % — LOW (ref 39–50)
HGB BLD-MCNC: 11 G/DL — LOW (ref 13–17)
INR BLD: 2.91 RATIO — HIGH (ref 0.88–1.16)
MCHC RBC-ENTMCNC: 26.9 PG — LOW (ref 27–34)
MCHC RBC-ENTMCNC: 31.8 GM/DL — LOW (ref 32–36)
MCV RBC AUTO: 84.6 FL — SIGNIFICANT CHANGE UP (ref 80–100)
NRBC # BLD: 0 /100 WBCS — SIGNIFICANT CHANGE UP (ref 0–0)
PLATELET # BLD AUTO: 304 K/UL — SIGNIFICANT CHANGE UP (ref 150–400)
POTASSIUM SERPL-MCNC: 3.8 MMOL/L — SIGNIFICANT CHANGE UP (ref 3.5–5.3)
POTASSIUM SERPL-SCNC: 3.8 MMOL/L — SIGNIFICANT CHANGE UP (ref 3.5–5.3)
PROT SERPL-MCNC: 7.4 G/DL — SIGNIFICANT CHANGE UP (ref 6–8.3)
PROTHROM AB SERPL-ACNC: 34.4 SEC — HIGH (ref 10.5–13.4)
RBC # BLD: 4.09 M/UL — LOW (ref 4.2–5.8)
RBC # FLD: 17.5 % — HIGH (ref 10.3–14.5)
SODIUM SERPL-SCNC: 136 MMOL/L — SIGNIFICANT CHANGE UP (ref 135–145)
WBC # BLD: 8.83 K/UL — SIGNIFICANT CHANGE UP (ref 3.8–10.5)
WBC # FLD AUTO: 8.83 K/UL — SIGNIFICANT CHANGE UP (ref 3.8–10.5)

## 2023-04-25 RX ORDER — WARFARIN SODIUM 2.5 MG/1
2 TABLET ORAL ONCE
Refills: 0 | Status: COMPLETED | OUTPATIENT
Start: 2023-04-25 | End: 2023-04-25

## 2023-04-25 RX ORDER — CEFTRIAXONE 500 MG/1
2 INJECTION, POWDER, FOR SOLUTION INTRAMUSCULAR; INTRAVENOUS
Qty: 35 | Refills: 0
Start: 2023-04-25 | End: 2023-01-01

## 2023-04-25 RX ADMIN — Medication 2: at 21:26

## 2023-04-25 RX ADMIN — WARFARIN SODIUM 2 MILLIGRAM(S): 2.5 TABLET ORAL at 21:27

## 2023-04-25 RX ADMIN — Medication 10 UNIT(S): at 08:04

## 2023-04-25 RX ADMIN — Medication 1 APPLICATION(S): at 11:23

## 2023-04-25 RX ADMIN — LEVETIRACETAM 750 MILLIGRAM(S): 250 TABLET, FILM COATED ORAL at 17:18

## 2023-04-25 RX ADMIN — INSULIN GLARGINE 25 UNIT(S): 100 INJECTION, SOLUTION SUBCUTANEOUS at 21:27

## 2023-04-25 RX ADMIN — Medication 2: at 17:18

## 2023-04-25 RX ADMIN — Medication 40 MILLIGRAM(S): at 05:34

## 2023-04-25 RX ADMIN — LEVETIRACETAM 750 MILLIGRAM(S): 250 TABLET, FILM COATED ORAL at 05:34

## 2023-04-25 RX ADMIN — Medication 10 UNIT(S): at 17:18

## 2023-04-25 RX ADMIN — ATORVASTATIN CALCIUM 10 MILLIGRAM(S): 80 TABLET, FILM COATED ORAL at 21:27

## 2023-04-25 RX ADMIN — SPIRONOLACTONE 25 MILLIGRAM(S): 25 TABLET, FILM COATED ORAL at 05:34

## 2023-04-25 RX ADMIN — Medication 25 MILLIGRAM(S): at 05:34

## 2023-04-25 RX ADMIN — PANTOPRAZOLE SODIUM 40 MILLIGRAM(S): 20 TABLET, DELAYED RELEASE ORAL at 11:29

## 2023-04-25 RX ADMIN — Medication 81 MILLIGRAM(S): at 11:23

## 2023-04-25 RX ADMIN — CEFTRIAXONE 100 MILLIGRAM(S): 500 INJECTION, POWDER, FOR SOLUTION INTRAMUSCULAR; INTRAVENOUS at 13:55

## 2023-04-25 NOTE — CAREGIVER ENGAGEMENT NOTE - CAREGIVER OUTREACH NOTES - FREE TEXT
Discussed patient on rounds and chart reviewed. Per ID, patient will require Ceftriaxone 2 gram IV daily to complete 6 week course on 5/29/2023. PICC line ordered. ROGER met with the patient and his daughter Martell [phone] at the bedside to discuss discharge plan. Martell stated she will contact her sister Jeffrey and advise her to call CM. CM received a call from Jeffrey to discuss transition plan. CM advised Jeffrey that the patient will require IV antibiotics until 5/29/23 and discussed discharge options including home with IV infusion vs [facility]. ROGER explained that the infusion nurse will teach the family how to administer IV abx at home, but will not come daily. Jeffrey stated she will learn and prefers for CM to arrange home IV abx. Declines for the patient to be discharged to a [facility]. CM discussed home care/infusion choice. Jeffrey is in agreement for CM to send referral to [facility]. Referral has been sent to [facility] and [facility] for SOC 4/27/23. Jeffrey verbalized understanding of the transition plan and is in agreeement. Per Jeffrye family will transport the patient home. ROGER has left a message with Fredi at ID office [phone] requesting MD to call in prescription to [facility]. CM remains available.

## 2023-04-25 NOTE — PROGRESS NOTE ADULT - SUBJECTIVE AND OBJECTIVE BOX
[facility]  Shane Murray PA-C  [address] [place] [address]  [phone]      INTERVAL HPI/OVERNIGHT EVENTS:  Pt s/e  Reports no new GI events    MEDICATIONS  (STANDING):  aspirin enteric coated 81 milliGRAM(s) Oral daily  atorvastatin 10 milliGRAM(s) Oral at bedtime  cefTRIAXone   IVPB 2000 milliGRAM(s) IV Intermittent every 24 hours  dextrose 5%. 1000 milliLiter(s) (100 mL/Hr) IV Continuous <Continuous>  dextrose 5%. 1000 milliLiter(s) (50 mL/Hr) IV Continuous <Continuous>  dextrose 50% Injectable 12.5 Gram(s) IV Push once  dextrose 50% Injectable 25 Gram(s) IV Push once  dextrose 50% Injectable 25 Gram(s) IV Push once  furosemide    Tablet 40 milliGRAM(s) Oral daily  glucagon  Injectable 1 milliGRAM(s) IntraMuscular once  insulin glargine Injectable (LANTUS) 25 Unit(s) SubCutaneous at bedtime  insulin lispro (ADMELOG) corrective regimen sliding scale   SubCutaneous three times a day before meals  insulin lispro (ADMELOG) corrective regimen sliding scale   SubCutaneous at bedtime  insulin lispro Injectable (ADMELOG) 10 Unit(s) SubCutaneous three times a day before meals  levETIRAcetam 750 milliGRAM(s) Oral two times a day  metoprolol succinate ER 25 milliGRAM(s) Oral daily  pantoprazole   Suspension 40 milliGRAM(s) Oral daily  povidone iodine 10% Solution 1 Application(s) Topical daily  spironolactone 25 milliGRAM(s) Oral daily    MEDICATIONS  (PRN):  acetaminophen     Tablet .. 650 milliGRAM(s) Oral every 6 hours PRN Temp greater or equal to 38C (100.4F), Mild Pain (1 - 3)  dextrose Oral Gel 15 Gram(s) Oral once PRN Blood Glucose LESS THAN 70 milliGRAM(s)/deciliter  oxycodone    5 mG/acetaminophen 325 mG 1 Tablet(s) Oral every 6 hours PRN Moderate Pain (4 - 6)      Allergies    No Known Allergies      PHYSICAL EXAM:   Vital Signs:  Vital Signs Last 24 Hrs  T(C): 36.4 (2023 11:53), Max: 36.7 (2023 20:58)  T(F): 97.5 (2023 11:53), Max: 98 (2023 20:58)  HR: 75 (2023 11:53) (75 - 93)  BP: 125/85 (2023 11:53) (122/76 - 138/81)  BP(mean): --  RR: 18 (2023 11:53) (17 - 18)  SpO2: 95% (2023 11:53) (92% - 96%)    Parameters below as of 2023 02:45  Patient On (Oxygen Delivery Method): room air      Daily     Daily Weight in k.4 (2023 02:45)    GENERAL:  Appears stated age  HEENT:  NC/AT  CHEST:  Full & symmetric excursion  HEART:  Regular rhythm  ABDOMEN:  Soft, non-tender, non-distended  EXTEREMITIES:  no cyanosis  SKIN:  No rash  NEURO:  Alert      LABS:                        11.0   8.83  )-----------( 304      ( 2023 06:30 )             34.6     04-25    136  |  100  |  19  ----------------------------<  112<H>  3.8   |  30  |  0.87    Ca    9.0      2023 06:30    TPro  7.4  /  Alb  2.6<L>  /  TBili  0.4  /  DBili  x   /  AST  48<H>  /  ALT  54  /  AlkPhos  846<H>  04-25    PT/INR - ( 2023 06:30 )   PT: 34.4 sec;   INR: 2.91 ratio

## 2023-04-25 NOTE — PROGRESS NOTE ADULT - ASSESSMENT
67 year old male with PMHx CVA (right sided hemiparesis and aphasia), AFib (on coumadin), IDDM, HTN, CAD s/p CABG, s/p AVR, presenting to [place] ED with wound to left 4th metatarsal.  Patient unable to provide history due to aphasia, history obtained from daughter at beside and per chart review.  Patient developed hematoma to left 4th metatarsal on Friday, it then ruptured and patient presented to Podiatry office (Dr. Maegan Jin) earlier today who instructed patient to come to ED for evaluation.  Patient has now new complaints at this time.  Patient does not follow with a vascular surgeon.   (18 Apr 2023 21:55)      will check ua , urine osmolality , urine sodium , urine uric acid , serum sodium , serum osmolality , serum uric acid , f/u with hyponatremia work up , f/u with bmp , monitor i and o      BP monitoring,continue current antihypertensive meds, low salt diet,followup with PMD in 1-2 weeks      continue current managmnet,O2 supply,anticoagulation plan as per cardiology consult

## 2023-04-25 NOTE — PROGRESS NOTE ADULT - PROBLEM SELECTOR PLAN 1
cont lantus 25 units qhs  cont admelog 10 units 3x/day before meals  cont mod dose admelog corrective scale coverage qac/qhs  cont cons cho diet  goal bg 100-180 in hosp setting

## 2023-04-25 NOTE — PROGRESS NOTE ADULT - SUBJECTIVE AND OBJECTIVE BOX
Patient is a 67y old  Male who presents with a chief complaint of toe gangrene (25 Apr 2023 14:06)    Date of servie : 04-25-23 @ 14:54  INTERVAL HPI/OVERNIGHT EVENTS:  T(C): 36.4 (04-25-23 @ 11:53), Max: 36.7 (04-24-23 @ 20:58)  HR: 75 (04-25-23 @ 11:53) (75 - 93)  BP: 125/85 (04-25-23 @ 11:53) (122/76 - 138/81)  RR: 18 (04-25-23 @ 11:53) (17 - 18)  SpO2: 95% (04-25-23 @ 11:53) (92% - 96%)  Wt(kg): --  I&O's Summary    24 Apr 2023 07:01  -  25 Apr 2023 07:00  --------------------------------------------------------  IN: 100 mL / OUT: 550 mL / NET: -450 mL        LABS:                        11.0   8.83  )-----------( 304      ( 25 Apr 2023 06:30 )             34.6     04-25    136  |  100  |  19  ----------------------------<  112<H>  3.8   |  30  |  0.87    Ca    9.0      25 Apr 2023 06:30    TPro  7.4  /  Alb  2.6<L>  /  TBili  0.4  /  DBili  x   /  AST  48<H>  /  ALT  54  /  AlkPhos  846<H>  04-25    PT/INR - ( 25 Apr 2023 06:30 )   PT: 34.4 sec;   INR: 2.91 ratio             CAPILLARY BLOOD GLUCOSE      POCT Blood Glucose.: 75 mg/dL (25 Apr 2023 11:38)  POCT Blood Glucose.: 109 mg/dL (25 Apr 2023 07:52)  POCT Blood Glucose.: 259 mg/dL (24 Apr 2023 21:36)  POCT Blood Glucose.: 87 mg/dL (24 Apr 2023 16:57)            MEDICATIONS  (STANDING):  aspirin enteric coated 81 milliGRAM(s) Oral daily  atorvastatin 10 milliGRAM(s) Oral at bedtime  cefTRIAXone   IVPB 2000 milliGRAM(s) IV Intermittent every 24 hours  dextrose 5%. 1000 milliLiter(s) (50 mL/Hr) IV Continuous <Continuous>  dextrose 5%. 1000 milliLiter(s) (100 mL/Hr) IV Continuous <Continuous>  dextrose 50% Injectable 12.5 Gram(s) IV Push once  dextrose 50% Injectable 25 Gram(s) IV Push once  dextrose 50% Injectable 25 Gram(s) IV Push once  furosemide    Tablet 40 milliGRAM(s) Oral daily  glucagon  Injectable 1 milliGRAM(s) IntraMuscular once  insulin glargine Injectable (LANTUS) 25 Unit(s) SubCutaneous at bedtime  insulin lispro (ADMELOG) corrective regimen sliding scale   SubCutaneous at bedtime  insulin lispro (ADMELOG) corrective regimen sliding scale   SubCutaneous three times a day before meals  insulin lispro Injectable (ADMELOG) 10 Unit(s) SubCutaneous three times a day before meals  levETIRAcetam 750 milliGRAM(s) Oral two times a day  metoprolol succinate ER 25 milliGRAM(s) Oral daily  pantoprazole   Suspension 40 milliGRAM(s) Oral daily  povidone iodine 10% Solution 1 Application(s) Topical daily  spironolactone 25 milliGRAM(s) Oral daily    MEDICATIONS  (PRN):  acetaminophen     Tablet .. 650 milliGRAM(s) Oral every 6 hours PRN Temp greater or equal to 38C (100.4F), Mild Pain (1 - 3)  dextrose Oral Gel 15 Gram(s) Oral once PRN Blood Glucose LESS THAN 70 milliGRAM(s)/deciliter  oxycodone    5 mG/acetaminophen 325 mG 1 Tablet(s) Oral every 6 hours PRN Moderate Pain (4 - 6)          PHYSICAL EXAM:  GENERAL: NAD, well-groomed, well-developed  HEAD:  Atraumatic, Normocephalic  CHEST/LUNG: Clear to percussion bilaterally; No rales, rhonchi, wheezing, or rubs  HEART: Regular rate and rhythm; No murmurs, rubs, or gallops  ABDOMEN: Soft, Nontender, Nondistended; Bowel sounds present  EXTREMITIES: L foot dressing +    Care Discussed with Consultants/Other Providers [x ] YES  [ ] NO

## 2023-04-25 NOTE — PROGRESS NOTE ADULT - SUBJECTIVE AND OBJECTIVE BOX
Neurology follow up note    ARNIE [id number]      Interval History:    Patient feels ok no new complaints.    Allergies    No Known Allergies    Intolerances        MEDICATIONS    acetaminophen     Tablet .. 650 milliGRAM(s) Oral every 6 hours PRN  aspirin enteric coated 81 milliGRAM(s) Oral daily  atorvastatin 10 milliGRAM(s) Oral at bedtime  cefTRIAXone   IVPB 2000 milliGRAM(s) IV Intermittent every 24 hours  dextrose 5%. 1000 milliLiter(s) IV Continuous <Continuous>  dextrose 5%. 1000 milliLiter(s) IV Continuous <Continuous>  dextrose 50% Injectable 12.5 Gram(s) IV Push once  dextrose 50% Injectable 25 Gram(s) IV Push once  dextrose 50% Injectable 25 Gram(s) IV Push once  dextrose Oral Gel 15 Gram(s) Oral once PRN  furosemide    Tablet 40 milliGRAM(s) Oral daily  glucagon  Injectable 1 milliGRAM(s) IntraMuscular once  insulin glargine Injectable (LANTUS) 25 Unit(s) SubCutaneous at bedtime  insulin lispro (ADMELOG) corrective regimen sliding scale   SubCutaneous three times a day before meals  insulin lispro (ADMELOG) corrective regimen sliding scale   SubCutaneous at bedtime  insulin lispro Injectable (ADMELOG) 10 Unit(s) SubCutaneous three times a day before meals  levETIRAcetam 750 milliGRAM(s) Oral two times a day  metoprolol succinate ER 25 milliGRAM(s) Oral daily  oxycodone    5 mG/acetaminophen 325 mG 1 Tablet(s) Oral every 6 hours PRN  pantoprazole   Suspension 40 milliGRAM(s) Oral daily  povidone iodine 10% Solution 1 Application(s) Topical daily  spironolactone 25 milliGRAM(s) Oral daily              Vital Signs Last 24 Hrs  T(C): 36.3 (25 Apr 2023 02:45), Max: 36.9 (24 Apr 2023 11:56)  T(F): 97.4 (25 Apr 2023 02:45), Max: 98.4 (24 Apr 2023 11:56)  HR: 93 (25 Apr 2023 02:45) (84 - 93)  BP: 138/81 (25 Apr 2023 02:45) (122/73 - 138/81)  BP(mean): --  RR: 18 (25 Apr 2023 02:45) (17 - 18)  SpO2: 96% (25 Apr 2023 02:45) (92% - 99%)    Parameters below as of 25 Apr 2023 02:45  Patient On (Oxygen Delivery Method): room air        REVIEW OF SYSTEMS:  Completely limited secondary to the patient repeats words over, has severe expressive aphasia, but had been in his normal state of health as per daughter.    PHYSICAL EXAMINATION:  HEENT:  Head:  Normocephalic, atraumatic.  Eyes:  No scleral icterus.  Ears:  Hard to evaluate secondary to he could not answer questions accordingly but appears to follow commands.  NECK:  Supple.  RESPIRATORY:  Air entry bilaterally.  CARDIOVASCULAR:  S1 and S2 heard.  ABDOMEN:  Soft and nontender.  EXTREMITIES:  Positive discoloration was noted on the left toe.     NEUROLOGIC:  The patient was awake, alert.  On-off blink to visual threat.  Positive expressive aphasia.  Will say a few words but repeat the same words over.  Right upper and right lower were 0/5 with increased tone.  Right hemiplegia is his baseline.  Left upper and left lower were 4/5.    LABS:  CBC Full  -  ( 24 Apr 2023 06:40 )  WBC Count : 8.83 K/uL  RBC Count : 4.25 M/uL  Hemoglobin : 11.4 g/dL  Hematocrit : 36.4 %  Platelet Count - Automated : 307 K/uL  Mean Cell Volume : 85.6 fl  Mean Cell Hemoglobin : 26.8 pg  Mean Cell Hemoglobin Concentration : 31.3 gm/dL  Auto Neutrophil # : x  Auto Lymphocyte # : x  Auto Monocyte # : x  Auto Eosinophil # : x  Auto Basophil # : x  Auto Neutrophil % : x  Auto Lymphocyte % : x  Auto Monocyte % : x  Auto Eosinophil % : x  Auto Basophil % : x      04-24    135  |  99  |  22  ----------------------------<  120<H>  4.9   |  33<H>  |  0.85    Ca    9.5      24 Apr 2023 06:40    TPro  7.8  /  Alb  2.8<L>  /  TBili  0.4  /  DBili  x   /  AST  63<H>  /  ALT  63  /  AlkPhos  915<H>  04-24    Hemoglobin A1C:     LIVER FUNCTIONS - ( 24 Apr 2023 06:40 )  Alb: 2.8 g/dL / Pro: 7.8 g/dL / ALK PHOS: 915 U/L / ALT: 63 U/L / AST: 63 U/L / GGT: x           Vitamin B12   PT/INR - ( 24 Apr 2023 06:40 )   PT: 33.0 sec;   INR: 2.79 ratio               RADIOLOGY      ANALYSIS AND PLAN:  This is a 67-year-old with a history of atrial fibrillation, cerebrovascular accident, and seizure.  For history of atrial fibrillation and cerebrovascular accident, risks versus benefits.  The patient at present does have elevated INR, would recommend to hold for now with a goal INR between 2 and 3.  Questionable the patient will need any type of surgical intervention for his toe.  For history of seizure prophylaxis, continue the patient on Keppra.  For history of diabetes, strict control of blood sugars.  For history of hypertension, monitor systolic blood pressure.  Excisional debridement with 15 blade of left 4th toe  base down to subcutaneous tissue Left foot ulceration  monitor oral intake as needed     neurologic wise stable     Spoke with daughter  at bedside 4/24  Other daughter is Jeffrey.  Her telephone number is [phone] 4/20.  She understands my reasoning and thought process.    Greater than 40 minutes of time was spent with the patient, plan of care, reviewing data, with greater than 50% of the visit was spent counseling and/or coordinating care with multidisciplinary healthcare team Neurology follow up note    ARNIE [id number]      Interval History:    Patient feels ok no new complaints seen with daughter having breakfast     Allergies    No Known Allergies    Intolerances        MEDICATIONS    acetaminophen     Tablet .. 650 milliGRAM(s) Oral every 6 hours PRN  aspirin enteric coated 81 milliGRAM(s) Oral daily  atorvastatin 10 milliGRAM(s) Oral at bedtime  cefTRIAXone   IVPB 2000 milliGRAM(s) IV Intermittent every 24 hours  dextrose 5%. 1000 milliLiter(s) IV Continuous <Continuous>  dextrose 5%. 1000 milliLiter(s) IV Continuous <Continuous>  dextrose 50% Injectable 12.5 Gram(s) IV Push once  dextrose 50% Injectable 25 Gram(s) IV Push once  dextrose 50% Injectable 25 Gram(s) IV Push once  dextrose Oral Gel 15 Gram(s) Oral once PRN  furosemide    Tablet 40 milliGRAM(s) Oral daily  glucagon  Injectable 1 milliGRAM(s) IntraMuscular once  insulin glargine Injectable (LANTUS) 25 Unit(s) SubCutaneous at bedtime  insulin lispro (ADMELOG) corrective regimen sliding scale   SubCutaneous three times a day before meals  insulin lispro (ADMELOG) corrective regimen sliding scale   SubCutaneous at bedtime  insulin lispro Injectable (ADMELOG) 10 Unit(s) SubCutaneous three times a day before meals  levETIRAcetam 750 milliGRAM(s) Oral two times a day  metoprolol succinate ER 25 milliGRAM(s) Oral daily  oxycodone    5 mG/acetaminophen 325 mG 1 Tablet(s) Oral every 6 hours PRN  pantoprazole   Suspension 40 milliGRAM(s) Oral daily  povidone iodine 10% Solution 1 Application(s) Topical daily  spironolactone 25 milliGRAM(s) Oral daily              Vital Signs Last 24 Hrs  T(C): 36.3 (25 Apr 2023 02:45), Max: 36.9 (24 Apr 2023 11:56)  T(F): 97.4 (25 Apr 2023 02:45), Max: 98.4 (24 Apr 2023 11:56)  HR: 93 (25 Apr 2023 02:45) (84 - 93)  BP: 138/81 (25 Apr 2023 02:45) (122/73 - 138/81)  BP(mean): --  RR: 18 (25 Apr 2023 02:45) (17 - 18)  SpO2: 96% (25 Apr 2023 02:45) (92% - 99%)    Parameters below as of 25 Apr 2023 02:45  Patient On (Oxygen Delivery Method): room air        REVIEW OF SYSTEMS:  Completely limited secondary to the patient repeats words over, has severe expressive aphasia, but had been in his normal state of health as per daughter.    PHYSICAL EXAMINATION:  HEENT:  Head:  Normocephalic, atraumatic.  Eyes:  No scleral icterus.  Ears:  Hard to evaluate secondary to he could not answer questions accordingly but appears to follow commands.  NECK:  Supple.  RESPIRATORY:  Air entry bilaterally.  CARDIOVASCULAR:  S1 and S2 heard.  ABDOMEN:  Soft and nontender.  EXTREMITIES:  Positive discoloration was noted on the left toe.     NEUROLOGIC:  The patient was awake, alert.  On-off blink to visual threat.  Positive expressive aphasia.  Will say a few words but repeat the same words over.  Right upper and right lower were 0/5 with increased tone.  Right hemiplegia is his baseline.  Left upper and left lower were 4/5.    LABS:  CBC Full  -  ( 24 Apr 2023 06:40 )  WBC Count : 8.83 K/uL  RBC Count : 4.25 M/uL  Hemoglobin : 11.4 g/dL  Hematocrit : 36.4 %  Platelet Count - Automated : 307 K/uL  Mean Cell Volume : 85.6 fl  Mean Cell Hemoglobin : 26.8 pg  Mean Cell Hemoglobin Concentration : 31.3 gm/dL  Auto Neutrophil # : x  Auto Lymphocyte # : x  Auto Monocyte # : x  Auto Eosinophil # : x  Auto Basophil # : x  Auto Neutrophil % : x  Auto Lymphocyte % : x  Auto Monocyte % : x  Auto Eosinophil % : x  Auto Basophil % : x      04-24    135  |  99  |  22  ----------------------------<  120<H>  4.9   |  33<H>  |  0.85    Ca    9.5      24 Apr 2023 06:40    TPro  7.8  /  Alb  2.8<L>  /  TBili  0.4  /  DBili  x   /  AST  63<H>  /  ALT  63  /  AlkPhos  915<H>  04-24    Hemoglobin A1C:     LIVER FUNCTIONS - ( 24 Apr 2023 06:40 )  Alb: 2.8 g/dL / Pro: 7.8 g/dL / ALK PHOS: 915 U/L / ALT: 63 U/L / AST: 63 U/L / GGT: x           Vitamin B12   PT/INR - ( 24 Apr 2023 06:40 )   PT: 33.0 sec;   INR: 2.79 ratio               RADIOLOGY      ANALYSIS AND PLAN:  This is a 67-year-old with a history of atrial fibrillation, cerebrovascular accident, and seizure.  For history of atrial fibrillation and cerebrovascular accident, risks versus benefits.  The patient at present does have elevated INR, would recommend to hold for now with a goal INR between 2 and 3.  Questionable the patient will need any type of surgical intervention for his toe.  For history of seizure prophylaxis, continue the patient on Keppra.  For history of diabetes, strict control of blood sugars.  For history of hypertension, monitor systolic blood pressure.  Excisional debridement with 15 blade of left 4th toe  base down to subcutaneous tissue Left foot ulceration  monitor oral intake as needed   PICC line and antibiotics as needed     neurologic wise stable     Spoke with daughter  at bedside 4/25  Other daughter is Jeffrey.  Her telephone number is [phone] 4/20.  She understands my reasoning and thought process.    Greater than 34 minutes of time was spent with the patient, plan of care, reviewing data, with greater than 50% of the visit was spent counseling and/or coordinating care with multidisciplinary healthcare team

## 2023-04-25 NOTE — PROGRESS NOTE ADULT - ASSESSMENT
67 year old male with PMHx CVA (right sided hemiparesis and aphasia), AFib (on coumadin), IDDM, HTN, CAD s/p CABG, s/p AVR, presenting to [place] ED with wound to left 4th metatarsal.  Presented to Podiatry office (Dr. Maegan Jin) who instructed patient to come to ED for evaluation.    Vascular recommending Recommend ABIs/PVR to assess degree of arterial disease    RECOMMENDATIONS  Podiatry: MRI with Osseous edema within the distal phalanx of the fourth digit without   loss of normal T1 fatty marrow possibly representing early osteomyelitis   versus reactive edema - will follow for any surgical plan per podiatry -continue abx for now    Vascular: JUAN A/PVR's reviewed; likely small vessel disease  Podiatry with no plan for surgical intervention at this time  Cont local wound care and close monitoring for healing  Noted no plan for vascular surgical intervention  Reviewed prior micro and no prior MRSA so have adjusted abx to ceftriaxone     Given imaging with concern for possible early OM and no Podiatric intervention, will treat with prolonged course IV antibiotics  Place PICC Line - order placed   Continue on Ceftriaxone 2 gram IV daily to complete 6 week course on 5/29/2023  Will need weekly labs CBC/CMP/CRP/ESR while on IV antibiotics - fax results to [phone]   -- called [facility] today and above orders given   Patient will follow up with me in ID office 1 week post discharge       D/w patient and daughter at bedside  Karen Grimes M.D.  [facility], Division of Infectious Diseases  [phone]  After 5pm on weekdays and all day on weekends - please call [phone]

## 2023-04-25 NOTE — PROGRESS NOTE ADULT - SUBJECTIVE AND OBJECTIVE BOX
HPI Follow up: Pt was seen as follow up for left foot 4th toe gangrene. Pt's daughter reports that pt is not complaining about any foot pain any more and is feeling better. No new foot related complaint. PICC line in place.    S : 67y year old Male seen at bedside for  Left foot 4th toe ulceration/gangrene.  Patient' s daughter states that pt started having blister around Friday on left 4th toe and daughter applied betadine dressing. by next day daughter noticed black toe discoloration and since yesterday foot started getting swelling, redness and warmth. Pt was seen at podiatry office yesterday and was sent to hospital for admission. Pt daughter denied any F/C/N/V/SOB. family denies any new foot related complaint, reports pt eating well and feeling better, no report of foot pain or F/C/N/V/SOB today.  Chief Complaint : Patient is a 67y old  Male who presents with a chief complaint of toe gangrene (19 Apr 2023 15:39)    HPI : HPI:   67 year old male with PMHx CVA (right sided hemiparesis and aphasia), AFib (on coumadin), IDDM, HTN, CAD s/p CABG, s/p AVR, presenting to [place] ED with wound to left 4th metatarsal.  Patient unable to provide history due to aphasia, history obtained from daughter at beside and per chart review.  Patient developed hematoma to left 4th metatarsal on Friday, it then ruptured and patient presented to Podiatry office (Dr. Maegan Jin) earlier today who instructed patient to come to ED for evaluation.  Patient has now new complaints at this time.  Patient does not follow with a vascular surgeon.   (18 Apr 2023 21:55)      Patient admits to  (-) Fevers, (-) Chills, (-) Nausea, (-) Vomiting, (-) Shortness of Breath      PMH: CVA (cerebral vascular accident)    HTN (hypertension)    DM (diabetes mellitus)    Arrhythmia    History of atrial fibrillation      PSH:S/P CABG (coronary artery bypass graft)    S/P brain surgery        Allergies:No Known Allergies      Labs:                          11.0   8.83  )-----------( 304      ( 25 Apr 2023 06:30 )             34.6   04-25    136  |  100  |  19  ----------------------------<  112<H>  3.8   |  30  |  0.87    Ca    9.0      25 Apr 2023 06:30    TPro  7.4  /  Alb  2.6<L>  /  TBili  0.4  /  DBili  x   /  AST  48<H>  /  ALT  54  /  AlkPhos  846<H>  04-25  Vital Signs Last 24 Hrs  T(C): 36.4 (04-25-23 @ 11:53), Max: 36.7 (04-24-23 @ 20:58)  T(F): 97.5 (04-25-23 @ 11:53), Max: 98 (04-24-23 @ 20:58)  HR: 75 (04-25-23 @ 11:53) (75 - 93)  BP: 125/85 (04-25-23 @ 11:53) (122/76 - 138/81)  BP(mean): --  RR: 18 (04-25-23 @ 11:53) (17 - 18)  SpO2: 95% (04-25-23 @ 11:53) (92% - 96%)          O:   General: Pleasant  male wheel chair bound, unable to provide history     Ulceration Left 4th toe  ,- hyperkeratotic border, wound base Necrotic base with fibrogranular nail bed wound, no nail(avulsion was done at office) / , wound size (4.0 cm X 4.0 cm X 0.2cm) no edema, no lydia-wound erythema, - purulence, - fluctuance, - tracking/tunneling, - probe to bone.   Vascular: Dorsalis Pedis and Posterior Tibial pulses 0/4.  Capillary re-fill time less then 5 seconds digits 1-5 bilateral.    Neuro: unable to assess  MSK: unable to asses      FINDINGS:    BONES/JOINTS:  Osseous edema within the distal phalanx of the fourth digit without loss of normal T1 fatty marrow possibly representing early osteomyelitis versus reactive edema. There is marked soft tissue swelling around the fourth phalanx. No soft tissue ulcer extending to bone is visualized. Correlation with physical exam is advised. Alignment is anatomic. Mild osteoarthritis of first metatarsophalangeal joint. No joint effusions. Hallux sesamoid interval is normal.    PLANTAR PLATES:  Intact plantar plates without tear.    INTERMETATARSAL SPACES:  No intermetatarsal neuroma. Second and third intermetatarsal bursitis.    TENDONS:  Visualized portions of the flexor, extensor, and peroneal tendons are intact without tendinosis or tear. No tenosynovitis.    LISFRANC LIGAMENT:  Plantar (pC1-M2M3), interosseous (C1-M2), and dorsal bands intact.    MUSCLES/SOFT TISSUES:  Mild edema throughout the muscles of the forefoot.    IMPRESSION:  1. Osseous edema within the distal phalanx of the fourth digit without loss of normal T1 fatty marrow possibly representing FINDINGS:    BONES/JOINTS:  Osseous edema within the distal phalanx of the fourth digit without loss of normal T1 fatty marrow possibly representing early osteomyelitis versus reactive edema. There is marked soft tissue swelling around the fourth phalanx. No soft tissue ulcer extending to bone is visualized. Correlation with physical exam is advised. Alignment is anatomic. Mild osteoarthritis of first metatarsophalangeal joint. No joint effusions. Hallux sesamoid interval is normal.    PLANTAR PLATES:  Intact plantar plates without tear.    INTERMETATARSAL SPACES:  No intermetatarsal neuroma. Second and third intermetatarsal bursitis.    TENDONS:  Visualized portions of the flexor, extensor, and peroneal tendons are intact without tendinosis or tear. No tenosynovitis.    LISFRANC LIGAMENT:  Plantar (pC1-M2M3), interosseous (C1-M2), and dorsal bands intact.    MUSCLES/SOFT TISSUES:  Mild edema throughout the muscles of the forefoot.    IMPRESSION:  1. Osseous edema within the distal phalanx of the fourth digit without loss of normal T1 fatty marrow possibly representing early osteomyelitis versus reactive edema. There is marked soft tissue swelling around the fourth phalanx. No soft tissue ulcer extending to bone is visualized. Correlation with physical exam is advised.  2. Second and third intermetatarsal bursitis.    --- End of Report ---            NEGRO SERNA DO; Attending Radiologist  This document has been electronically signed. Apr 20 2023 10:58AM. There is marked soft tissue swelling around the fourth phalanx. No soft tissue ulcer extending to bone is visualized. Correlation with physical exam is advised.  2. Second and third intermetatarsal bursitis.    --- End of Report ---            NEGRO SERNA DO; Attending Radiologist  This document has been electronically signed. Apr 20 2023 10:58AM      A: Left foot 4th toe  ulceration      Left foot cellulitis      Left foot abscess      Diabetes Mellitus      PAD      Left 4th toe gangrene      P:   Chart reviewed and Patient evaluated  Discussed diagnosis and treatment with patient' family  Wound flush with normal saline  Excisional debridement with 15 blade of left 4th toe  base down to subcutaneous tissueLeft foot ulceration  Applied adaptic with dry sterile dressing  X-rays reviewed -wnl  MRI: early osteomyelitis versus reactive edema.  No surgical intervention from podiatry at this point.  Pt can be discharged on 6 weeks of IV abx as per ID.  ID consult is appreciated  Continue with IV antibiotics   Reviewed  JUAN A- Apppreciate vascular consult- no intervention  Weight bearing as tolerated  Offloading to bilateral Heels.   Discussed importance of daily foot examinations and proper shoe gear and to importance of lower Fasting Blood Glucose levels.   Podiatry(Dr Jin) will follow while in house.  Stable for discharge from podiatry stand point.  Pt will be seen in podiatry office with in one week after discharge. Pt's daughter to call office to make appointment- phone [phone]    wound care:   clean wound with sterile saline.  Apply betadine to gauze and secure gauze to left 4th toe with help of jamel and tape.  keep dressing dry, intact and clean.   change dressing daily.

## 2023-04-25 NOTE — PROGRESS NOTE ADULT - SUBJECTIVE AND OBJECTIVE BOX
CAPILLARY BLOOD GLUCOSE      POCT Blood Glucose.: 259 mg/dL (24 Apr 2023 21:36)  POCT Blood Glucose.: 87 mg/dL (24 Apr 2023 16:57)  POCT Blood Glucose.: 206 mg/dL (24 Apr 2023 12:03)  POCT Blood Glucose.: 118 mg/dL (24 Apr 2023 08:33)      Vital Signs Last 24 Hrs  T(C): 36.3 (25 Apr 2023 02:45), Max: 36.9 (24 Apr 2023 11:56)  T(F): 97.4 (25 Apr 2023 02:45), Max: 98.4 (24 Apr 2023 11:56)  HR: 93 (25 Apr 2023 02:45) (84 - 93)  BP: 138/81 (25 Apr 2023 02:45) (122/73 - 138/81)  BP(mean): --  RR: 18 (25 Apr 2023 02:45) (17 - 18)  SpO2: 96% (25 Apr 2023 02:45) (92% - 99%)    Parameters below as of 25 Apr 2023 02:45  Patient On (Oxygen Delivery Method): room air      Respiratory: CTA B/L  CV: RRR, S1S2, no murmurs, rubs or gallops  Abdominal: Soft, NT, ND +BS, Last BM  Extremities: No edema, + peripheral pulses     04-24    135  |  99  |  22  ----------------------------<  120<H>  4.9   |  33<H>  |  0.85    Ca    9.5      24 Apr 2023 06:40    TPro  7.8  /  Alb  2.8<L>  /  TBili  0.4  /  DBili  x   /  AST  63<H>  /  ALT  63  /  AlkPhos  915<H>  04-24      atorvastatin 10 milliGRAM(s) Oral at bedtime  dextrose 50% Injectable 12.5 Gram(s) IV Push once  dextrose 50% Injectable 25 Gram(s) IV Push once  dextrose 50% Injectable 25 Gram(s) IV Push once  dextrose Oral Gel 15 Gram(s) Oral once PRN  glucagon  Injectable 1 milliGRAM(s) IntraMuscular once  insulin glargine Injectable (LANTUS) 25 Unit(s) SubCutaneous at bedtime  insulin lispro (ADMELOG) corrective regimen sliding scale   SubCutaneous at bedtime  insulin lispro (ADMELOG) corrective regimen sliding scale   SubCutaneous three times a day before meals  insulin lispro Injectable (ADMELOG) 10 Unit(s) SubCutaneous three times a day before meals

## 2023-04-25 NOTE — PATIENT CHOICE NOTE. - NSPTCHOICESTATE_GEN_ALL_CORE

## 2023-04-25 NOTE — PROGRESS NOTE ADULT - ASSESSMENT
67 year old male with PMHx CVA (right sided hemiparesis and aphasia), AFib (on coumadin), IDDM, HTN, CAD s/p CABG, s/p AVR, presenting to [place] ED with wound to left 4th metatarsal.  Patient unable to provide history due to aphasia, history obtained from daughter at beside and per chart review.  Patient developed hematoma to left 4th metatarsal on Friday, it then ruptured and patient presented to Podiatry office (Dr. Maegan Jin) earlier today who instructed patient to come to ED for evaluation.  Patient has now new complaints at this time.  Patient does not follow with a vascular surgeon.      1 Toe gangrene, diabetic foot ulcer  - cw abx  - ID,vascular and podiatry fu  - fu cultures  - pain control   - JUAN A/PVR's reviewed; likely small vessel disease  - 6 weeks of abx as per ID  - PICC LINE order in    2 DM  - monitor FS  - Basal bolus  - diabetic diet  - uncontrolled     3 Hx of CVA  - cw AC  - cw asa, statin    4 Afib, AVR- INR and dose coumadin daily    5 CAD, CABG  - cw asa, statin  - cards fu         dc in am after PICC

## 2023-04-25 NOTE — PROGRESS NOTE ADULT - SUBJECTIVE AND OBJECTIVE BOX
Patient is a 67y Male whom presented to the hospital with hyponatremia     PAST MEDICAL & SURGICAL HISTORY:  CVA (cerebral vascular accident)      HTN (hypertension)      DM (diabetes mellitus)      Arrhythmia      History of atrial fibrillation      S/P CABG (coronary artery bypass graft)      S/P brain surgery          MEDICATIONS  (STANDING):  aspirin enteric coated 81 milliGRAM(s) Oral daily  atorvastatin 10 milliGRAM(s) Oral at bedtime  dextrose 5%. 1000 milliLiter(s) (50 mL/Hr) IV Continuous <Continuous>  dextrose 5%. 1000 milliLiter(s) (100 mL/Hr) IV Continuous <Continuous>  dextrose 50% Injectable 25 Gram(s) IV Push once  dextrose 50% Injectable 12.5 Gram(s) IV Push once  dextrose 50% Injectable 25 Gram(s) IV Push once  furosemide    Tablet 40 milliGRAM(s) Oral daily  glucagon  Injectable 1 milliGRAM(s) IntraMuscular once  insulin glargine Injectable (LANTUS) 7 Unit(s) SubCutaneous at bedtime  insulin lispro (ADMELOG) corrective regimen sliding scale   SubCutaneous three times a day before meals  insulin lispro Injectable (ADMELOG) 2 Unit(s) SubCutaneous three times a day before meals  levETIRAcetam 750 milliGRAM(s) Oral two times a day  pantoprazole   Suspension 40 milliGRAM(s) Oral daily  piperacillin/tazobactam IVPB.. 3.375 Gram(s) IV Intermittent every 8 hours  propranolol 20 milliGRAM(s) Oral two times a day  vancomycin  IVPB 1000 milliGRAM(s) IV Intermittent every 12 hours      Allergies    No Known Allergies    Intolerances        SOCIAL HISTORY:  Denies ETOh,Smoking,     FAMILY HISTORY:  No pertinent family history in first degree relatives        REVIEW OF SYSTEMS:    CONSTITUTIONAL: No weakness, fevers or chills  EYES/ENT: No visual changes;  no throat pain   NECK: No pain or stiffness  RESPIRATORY: No cough, wheezing, hemoptysis; No shortness of breath  CARDIOVASCULAR: No chest pain or palpitations  GASTROINTESTINAL: No abdominal or epigastric pain. No nausea, vomiting,                           11.0   8.83  )-----------( 304      ( 25 Apr 2023 06:30 )             34.6       CBC Full  -  ( 25 Apr 2023 06:30 )  WBC Count : 8.83 K/uL  RBC Count : 4.09 M/uL  Hemoglobin : 11.0 g/dL  Hematocrit : 34.6 %  Platelet Count - Automated : 304 K/uL  Mean Cell Volume : 84.6 fl  Mean Cell Hemoglobin : 26.9 pg  Mean Cell Hemoglobin Concentration : 31.8 gm/dL  Auto Neutrophil # : x  Auto Lymphocyte # : x  Auto Monocyte # : x  Auto Eosinophil # : x  Auto Basophil # : x  Auto Neutrophil % : x  Auto Lymphocyte % : x  Auto Monocyte % : x  Auto Eosinophil % : x  Auto Basophil % : x      04-25    136  |  100  |  19  ----------------------------<  112<H>  3.8   |  30  |  0.87    Ca    9.0      25 Apr 2023 06:30    TPro  7.4  /  Alb  2.6<L>  /  TBili  0.4  /  DBili  x   /  AST  48<H>  /  ALT  54  /  AlkPhos  846<H>  04-25      CAPILLARY BLOOD GLUCOSE      POCT Blood Glucose.: 109 mg/dL (25 Apr 2023 07:52)  POCT Blood Glucose.: 259 mg/dL (24 Apr 2023 21:36)  POCT Blood Glucose.: 87 mg/dL (24 Apr 2023 16:57)  POCT Blood Glucose.: 206 mg/dL (24 Apr 2023 12:03)      Vital Signs Last 24 Hrs  T(C): 36.3 (25 Apr 2023 02:45), Max: 36.9 (24 Apr 2023 11:56)  T(F): 97.4 (25 Apr 2023 02:45), Max: 98.4 (24 Apr 2023 11:56)  HR: 93 (25 Apr 2023 02:45) (84 - 93)  BP: 138/81 (25 Apr 2023 02:45) (122/73 - 138/81)  BP(mean): --  RR: 18 (25 Apr 2023 02:45) (17 - 18)  SpO2: 96% (25 Apr 2023 02:45) (92% - 99%)    Parameters below as of 25 Apr 2023 02:45  Patient On (Oxygen Delivery Method): room air            PT/INR - ( 25 Apr 2023 06:30 )   PT: 34.4 sec;   INR: 2.91 ratio                                          PHYSICAL EXAM:    Constitutional: NAD  HEENT: conjunctive   clear   Neck:  No JVD  Respiratory: CTAB  Cardiovascular: S1 and S2  Gastrointestinal: BS+, soft, NT/ND  Extremities: No peripheral edema  Neurological: no focal deficits

## 2023-04-25 NOTE — PROGRESS NOTE ADULT - SUBJECTIVE AND OBJECTIVE BOX
Date/Time Patient Seen:  		  Referring MD:   Data Reviewed	       Patient is a 67y old  Male who presents with a chief complaint of toe gangrene (24 Apr 2023 14:42)      Subjective/HPI     PAST MEDICAL & SURGICAL HISTORY:  CVA (cerebral vascular accident)    HTN (hypertension)    DM (diabetes mellitus)    Arrhythmia    History of atrial fibrillation    S/P CABG (coronary artery bypass graft)    S/P brain surgery          Medication list         MEDICATIONS  (STANDING):  aspirin enteric coated 81 milliGRAM(s) Oral daily  atorvastatin 10 milliGRAM(s) Oral at bedtime  cefTRIAXone   IVPB 2000 milliGRAM(s) IV Intermittent every 24 hours  dextrose 5%. 1000 milliLiter(s) (50 mL/Hr) IV Continuous <Continuous>  dextrose 5%. 1000 milliLiter(s) (100 mL/Hr) IV Continuous <Continuous>  dextrose 50% Injectable 25 Gram(s) IV Push once  dextrose 50% Injectable 12.5 Gram(s) IV Push once  dextrose 50% Injectable 25 Gram(s) IV Push once  furosemide    Tablet 40 milliGRAM(s) Oral daily  glucagon  Injectable 1 milliGRAM(s) IntraMuscular once  insulin glargine Injectable (LANTUS) 25 Unit(s) SubCutaneous at bedtime  insulin lispro (ADMELOG) corrective regimen sliding scale   SubCutaneous at bedtime  insulin lispro (ADMELOG) corrective regimen sliding scale   SubCutaneous three times a day before meals  insulin lispro Injectable (ADMELOG) 10 Unit(s) SubCutaneous three times a day before meals  levETIRAcetam 750 milliGRAM(s) Oral two times a day  metoprolol succinate ER 25 milliGRAM(s) Oral daily  pantoprazole   Suspension 40 milliGRAM(s) Oral daily  povidone iodine 10% Solution 1 Application(s) Topical daily  spironolactone 25 milliGRAM(s) Oral daily    MEDICATIONS  (PRN):  acetaminophen     Tablet .. 650 milliGRAM(s) Oral every 6 hours PRN Temp greater or equal to 38C (100.4F), Mild Pain (1 - 3)  dextrose Oral Gel 15 Gram(s) Oral once PRN Blood Glucose LESS THAN 70 milliGRAM(s)/deciliter  oxycodone    5 mG/acetaminophen 325 mG 1 Tablet(s) Oral every 6 hours PRN Moderate Pain (4 - 6)         Vitals log        ICU Vital Signs Last 24 Hrs  T(C): 36.3 (25 Apr 2023 02:45), Max: 36.9 (24 Apr 2023 11:56)  T(F): 97.4 (25 Apr 2023 02:45), Max: 98.4 (24 Apr 2023 11:56)  HR: 93 (25 Apr 2023 02:45) (84 - 93)  BP: 138/81 (25 Apr 2023 02:45) (122/73 - 138/81)  BP(mean): --  ABP: --  ABP(mean): --  RR: 18 (25 Apr 2023 02:45) (17 - 18)  SpO2: 96% (25 Apr 2023 02:45) (92% - 99%)    O2 Parameters below as of 25 Apr 2023 02:45  Patient On (Oxygen Delivery Method): room air                 Input and Output:  I&O's Detail    23 Apr 2023 07:01  -  24 Apr 2023 07:00  --------------------------------------------------------  IN:    Oral Fluid: 220 mL  Total IN: 220 mL    OUT:  Total OUT: 0 mL    Total NET: 220 mL          Lab Data                        11.4   8.83  )-----------( 307      ( 24 Apr 2023 06:40 )             36.4     04-24    135  |  99  |  22  ----------------------------<  120<H>  4.9   |  33<H>  |  0.85    Ca    9.5      24 Apr 2023 06:40    TPro  7.8  /  Alb  2.8<L>  /  TBili  0.4  /  DBili  x   /  AST  63<H>  /  ALT  63  /  AlkPhos  915<H>  04-24            Review of Systems	      Objective     Physical Examination    heart s1s2  lung dec BS  head nc      Pertinent Lab findings & Imaging      Bryan:  NO   Adequate UO     I&O's Detail    23 Apr 2023 07:01  -  24 Apr 2023 07:00  --------------------------------------------------------  IN:    Oral Fluid: 220 mL  Total IN: 220 mL    OUT:  Total OUT: 0 mL    Total NET: 220 mL               Discussed with:     Cultures:	        Radiology

## 2023-04-25 NOTE — PROGRESS NOTE ADULT - ASSESSMENT
67 year old male with PMHx CVA (right sided hemiparesis and aphasia), AFib (on coumadin), IDDM, HTN, CAD s/p CABG, s/p AVR, presenting to [place] ED with wound to left 4th metatarsal    cva hx of craniectomy  AF  OP  OA  foot wound  CAD  DM  HTN  left eff - atelectasis  coagulopathy     mri - c/w OM  vs noted  on ABX  on LASIX - Aldactone  ID follow up  Podiatry follow up    cxr noted - consistent with prior CT imaging and CXR - rounded Atelectasis - chr change  no evidence of PNA  wound infection - ID eval - Podiatric eval  pain assessment  DM care  cvs rx regimen  serial COAGS -   assist with needs  fall prec  dietary discretion  old records reviewed - labs - imaging - notes  spoke with family on admission at bedside

## 2023-04-25 NOTE — PROGRESS NOTE ADULT - SUBJECTIVE AND OBJECTIVE BOX
[facility] INF[facility]IOUS DISEASES  ADAM Nava Y. Patel, S. Shah, G. Chris  [phone]  ([phone] - weekdays after 5pm and weekends)    Name: ARNIE DELGADILLO  Age/Gender: 67y Male  MRN: [id number]    Interval History:  Patient seen and examined.  Feels well, no new complaints.   Notes reviewed  No concerning overnight events  Afebrile. Daughter at bedside.   Allergies: No Known Allergies      Objective:  Vitals:   T(F): 97.5 (04-25-23 @ 11:53), Max: 98 (04-24-23 @ 20:58)  HR: 75 (04-25-23 @ 11:53) (75 - 93)  BP: 125/85 (04-25-23 @ 11:53) (122/76 - 138/81)  RR: 18 (04-25-23 @ 11:53) (17 - 18)  SpO2: 95% (04-25-23 @ 11:53) (92% - 96%)  Physical Examination:  General: no acute distress, nontoxic   HEENT: NC/AT, anicteric, neck supple  Respiratory: no acc muscle use, breathing comfortably  Cardiovascular: S1 and S2 present  Gastrointestinal: normal appearing, nondistended  Extremities: L foot dressing with no TTP, no edema  Skin: no visible rash    Laboratory Studies:  CBC:                       11.0   8.83  )-----------( 304      ( 25 Apr 2023 06:30 )             34.6     WBC Trend:  8.83 04-25-23 @ 06:30  8.83 04-24-23 @ 06:40  8.43 04-23-23 @ 07:11  10.28 04-22-23 @ 07:55  10.73 04-21-23 @ 08:11  9.36 04-20-23 @ 06:42  11.33 04-19-23 @ 07:30  10.91 04-18-23 @ 20:06    CMP: 04-25    136  |  100  |  19  ----------------------------<  112<H>  3.8   |  30  |  0.87    Ca    9.0      25 Apr 2023 06:30    TPro  7.4  /  Alb  2.6<L>  /  TBili  0.4  /  DBili  x   /  AST  48<H>  /  ALT  54  /  AlkPhos  846<H>  04-25    Creatinine, Serum: 0.87 mg/dL (04-25-23 @ 06:30)  Creatinine, Serum: 0.85 mg/dL (04-24-23 @ 06:40)  Creatinine, Serum: 0.93 mg/dL (04-23-23 @ 07:11)  Creatinine, Serum: 0.95 mg/dL (04-22-23 @ 07:55)  Creatinine, Serum: 0.91 mg/dL (04-21-23 @ 08:11)  Creatinine, Serum: 0.74 mg/dL (04-20-23 @ 06:42)  Creatinine, Serum: 0.78 mg/dL (04-19-23 @ 07:30)  Creatinine, Serum: 0.97 mg/dL (04-18-23 @ 20:06)    LIVER FUNCTIONS - ( 25 Apr 2023 06:30 )  Alb: 2.6 g/dL / Pro: 7.4 g/dL / ALK PHOS: 846 U/L / ALT: 54 U/L / AST: 48 U/L / GGT: x           Microbiology: reviewed   Culture - Urine (collected 04-18-23 @ 21:20)  Source: Clean Catch Clean Catch (Midstream)  Final Report (04-19-23 @ 23:11):    <10,000 CFU/mL Normal Urogenital Beth    Culture - Blood (collected 04-18-23 @ 20:27)  Source: .Blood Blood-Peripheral  Final Report (04-24-23 @ 01:00):    No Growth Final    Culture - Blood (collected 04-18-23 @ 20:21)  Source: .Blood Blood-Peripheral  Final Report (04-24-23 @ 01:00):    No Growth Final    COVID-19 PCR: NotDetec (18 Apr 2023 20:06)    Radiology: reviewed     Medications:  acetaminophen     Tablet .. 650 milliGRAM(s) Oral every 6 hours PRN  aspirin enteric coated 81 milliGRAM(s) Oral daily  atorvastatin 10 milliGRAM(s) Oral at bedtime  cefTRIAXone   IVPB 2000 milliGRAM(s) IV Intermittent every 24 hours  dextrose 5%. 1000 milliLiter(s) IV Continuous <Continuous>  dextrose 5%. 1000 milliLiter(s) IV Continuous <Continuous>  dextrose 50% Injectable 12.5 Gram(s) IV Push once  dextrose 50% Injectable 25 Gram(s) IV Push once  dextrose 50% Injectable 25 Gram(s) IV Push once  dextrose Oral Gel 15 Gram(s) Oral once PRN  furosemide    Tablet 40 milliGRAM(s) Oral daily  glucagon  Injectable 1 milliGRAM(s) IntraMuscular once  insulin glargine Injectable (LANTUS) 25 Unit(s) SubCutaneous at bedtime  insulin lispro (ADMELOG) corrective regimen sliding scale   SubCutaneous at bedtime  insulin lispro (ADMELOG) corrective regimen sliding scale   SubCutaneous three times a day before meals  insulin lispro Injectable (ADMELOG) 10 Unit(s) SubCutaneous three times a day before meals  levETIRAcetam 750 milliGRAM(s) Oral two times a day  metoprolol succinate ER 25 milliGRAM(s) Oral daily  oxycodone    5 mG/acetaminophen 325 mG 1 Tablet(s) Oral every 6 hours PRN  pantoprazole   Suspension 40 milliGRAM(s) Oral daily  povidone iodine 10% Solution 1 Application(s) Topical daily  spironolactone 25 milliGRAM(s) Oral daily    Current Antimicrobials:  cefTRIAXone   IVPB 2000 milliGRAM(s) IV Intermittent every 24 hours    Prior/Completed Antimicrobials:  piperacillin/tazobactam IVPB...  vancomycin  IVPB.

## 2023-04-26 VITALS
DIASTOLIC BLOOD PRESSURE: 71 MMHG | SYSTOLIC BLOOD PRESSURE: 126 MMHG | RESPIRATION RATE: 18 BRPM | OXYGEN SATURATION: 95 % | TEMPERATURE: 98 F | HEART RATE: 54 BPM

## 2023-04-26 LAB
ALBUMIN SERPL ELPH-MCNC: 2.7 G/DL — LOW (ref 3.3–5)
ALP SERPL-CCNC: 949 U/L — HIGH (ref 40–120)
ALT FLD-CCNC: 56 U/L — SIGNIFICANT CHANGE UP (ref 12–78)
ANION GAP SERPL CALC-SCNC: 5 MMOL/L — SIGNIFICANT CHANGE UP (ref 5–17)
AST SERPL-CCNC: 56 U/L — HIGH (ref 15–37)
BILIRUB SERPL-MCNC: 0.5 MG/DL — SIGNIFICANT CHANGE UP (ref 0.2–1.2)
BUN SERPL-MCNC: 24 MG/DL — HIGH (ref 7–23)
CALCIUM SERPL-MCNC: 9.4 MG/DL — SIGNIFICANT CHANGE UP (ref 8.5–10.1)
CHLORIDE SERPL-SCNC: 99 MMOL/L — SIGNIFICANT CHANGE UP (ref 96–108)
CO2 SERPL-SCNC: 31 MMOL/L — SIGNIFICANT CHANGE UP (ref 22–31)
CREAT SERPL-MCNC: 0.91 MG/DL — SIGNIFICANT CHANGE UP (ref 0.5–1.3)
EGFR: 94 ML/MIN/1.73M2 — SIGNIFICANT CHANGE UP
GLUCOSE SERPL-MCNC: 183 MG/DL — HIGH (ref 70–99)
HCT VFR BLD CALC: 35.6 % — LOW (ref 39–50)
HGB BLD-MCNC: 11.5 G/DL — LOW (ref 13–17)
INR BLD: 2.33 RATIO — HIGH (ref 0.88–1.16)
MCHC RBC-ENTMCNC: 27.7 PG — SIGNIFICANT CHANGE UP (ref 27–34)
MCHC RBC-ENTMCNC: 32.3 GM/DL — SIGNIFICANT CHANGE UP (ref 32–36)
MCV RBC AUTO: 85.8 FL — SIGNIFICANT CHANGE UP (ref 80–100)
NRBC # BLD: 0 /100 WBCS — SIGNIFICANT CHANGE UP (ref 0–0)
PLATELET # BLD AUTO: 314 K/UL — SIGNIFICANT CHANGE UP (ref 150–400)
POTASSIUM SERPL-MCNC: 4.2 MMOL/L — SIGNIFICANT CHANGE UP (ref 3.5–5.3)
POTASSIUM SERPL-SCNC: 4.2 MMOL/L — SIGNIFICANT CHANGE UP (ref 3.5–5.3)
PROT SERPL-MCNC: 7.6 G/DL — SIGNIFICANT CHANGE UP (ref 6–8.3)
PROTHROM AB SERPL-ACNC: 27.5 SEC — HIGH (ref 10.5–13.4)
RBC # BLD: 4.15 M/UL — LOW (ref 4.2–5.8)
RBC # FLD: 17.4 % — HIGH (ref 10.3–14.5)
SODIUM SERPL-SCNC: 135 MMOL/L — SIGNIFICANT CHANGE UP (ref 135–145)
WBC # BLD: 8.95 K/UL — SIGNIFICANT CHANGE UP (ref 3.8–10.5)
WBC # FLD AUTO: 8.95 K/UL — SIGNIFICANT CHANGE UP (ref 3.8–10.5)

## 2023-04-26 PROCEDURE — 73718 MRI LOWER EXTREMITY W/O DYE: CPT

## 2023-04-26 PROCEDURE — 76937 US GUIDE VASCULAR ACCESS: CPT | Mod: 26

## 2023-04-26 PROCEDURE — 83036 HEMOGLOBIN GLYCOSYLATED A1C: CPT

## 2023-04-26 PROCEDURE — 81001 URINALYSIS AUTO W/SCOPE: CPT

## 2023-04-26 PROCEDURE — 77001 FLUOROGUIDE FOR VEIN DEVICE: CPT

## 2023-04-26 PROCEDURE — 85730 THROMBOPLASTIN TIME PARTIAL: CPT

## 2023-04-26 PROCEDURE — 93923 UPR/LXTR ART STDY 3+ LVLS: CPT

## 2023-04-26 PROCEDURE — 82962 GLUCOSE BLOOD TEST: CPT

## 2023-04-26 PROCEDURE — 76937 US GUIDE VASCULAR ACCESS: CPT

## 2023-04-26 PROCEDURE — 87086 URINE CULTURE/COLONY COUNT: CPT

## 2023-04-26 PROCEDURE — 85025 COMPLETE CBC W/AUTO DIFF WBC: CPT

## 2023-04-26 PROCEDURE — 73630 X-RAY EXAM OF FOOT: CPT

## 2023-04-26 PROCEDURE — 87040 BLOOD CULTURE FOR BACTERIA: CPT

## 2023-04-26 PROCEDURE — 71045 X-RAY EXAM CHEST 1 VIEW: CPT

## 2023-04-26 PROCEDURE — 85027 COMPLETE CBC AUTOMATED: CPT

## 2023-04-26 PROCEDURE — 80053 COMPREHEN METABOLIC PANEL: CPT

## 2023-04-26 PROCEDURE — 85610 PROTHROMBIN TIME: CPT

## 2023-04-26 PROCEDURE — 93005 ELECTROCARDIOGRAM TRACING: CPT

## 2023-04-26 PROCEDURE — 86140 C-REACTIVE PROTEIN: CPT

## 2023-04-26 PROCEDURE — 83605 ASSAY OF LACTIC ACID: CPT

## 2023-04-26 PROCEDURE — 36573 INSJ PICC RS&I 5 YR+: CPT

## 2023-04-26 PROCEDURE — 85652 RBC SED RATE AUTOMATED: CPT

## 2023-04-26 PROCEDURE — 96366 THER/PROPH/DIAG IV INF ADDON: CPT

## 2023-04-26 PROCEDURE — 96365 THER/PROPH/DIAG IV INF INIT: CPT

## 2023-04-26 PROCEDURE — 87635 SARS-COV-2 COVID-19 AMP PRB: CPT

## 2023-04-26 PROCEDURE — 36415 COLL VENOUS BLD VENIPUNCTURE: CPT

## 2023-04-26 PROCEDURE — 99285 EMERGENCY DEPT VISIT HI MDM: CPT

## 2023-04-26 RX ORDER — SODIUM CHLORIDE 9 MG/ML
10 INJECTION INTRAMUSCULAR; INTRAVENOUS; SUBCUTANEOUS
Refills: 0 | Status: DISCONTINUED | OUTPATIENT
Start: 2023-04-26 | End: 2023-04-26

## 2023-04-26 RX ORDER — INSULIN LISPRO 100/ML
10 VIAL (ML) SUBCUTANEOUS
Qty: 0 | Refills: 0 | DISCHARGE
Start: 2023-04-26

## 2023-04-26 RX ORDER — INSULIN GLARGINE 100 [IU]/ML
25 INJECTION, SOLUTION SUBCUTANEOUS
Qty: 5 | Refills: 0
Start: 2023-04-26 | End: 2023-01-01

## 2023-04-26 RX ORDER — INSULIN GLARGINE 100 [IU]/ML
25 INJECTION, SOLUTION SUBCUTANEOUS
Qty: 0 | Refills: 0 | DISCHARGE
Start: 2023-04-26

## 2023-04-26 RX ORDER — INSULIN ASPART 100 [IU]/ML
10 INJECTION, SOLUTION SUBCUTANEOUS
Qty: 5 | Refills: 0
Start: 2023-04-26 | End: 2023-01-01

## 2023-04-26 RX ORDER — INSULIN LISPRO 100/ML
10 VIAL (ML) SUBCUTANEOUS
Qty: 0 | Refills: 0
Start: 2023-04-26

## 2023-04-26 RX ORDER — INSULIN GLARGINE 100 [IU]/ML
60 INJECTION, SOLUTION SUBCUTANEOUS
Refills: 0 | DISCHARGE

## 2023-04-26 RX ORDER — INSULIN GLARGINE 100 [IU]/ML
45 INJECTION, SOLUTION SUBCUTANEOUS
Qty: 5 | Refills: 0 | DISCHARGE
Start: 2023-04-26 | End: 2023-01-01

## 2023-04-26 RX ORDER — PREGABALIN 225 MG/1
0 CAPSULE ORAL
Refills: 0 | DISCHARGE

## 2023-04-26 RX ORDER — ATORVASTATIN CALCIUM 80 MG/1
1 TABLET, FILM COATED ORAL
Qty: 0 | Refills: 0 | DISCHARGE
Start: 2023-04-26

## 2023-04-26 RX ORDER — CHLORHEXIDINE GLUCONATE 213 G/1000ML
1 SOLUTION TOPICAL
Refills: 0 | Status: DISCONTINUED | OUTPATIENT
Start: 2023-04-26 | End: 2023-04-26

## 2023-04-26 RX ORDER — INSULIN GLARGINE 100 [IU]/ML
25 INJECTION, SOLUTION SUBCUTANEOUS
Refills: 0
Start: 2023-04-26

## 2023-04-26 RX ORDER — ASPIRIN/CALCIUM CARB/MAGNESIUM 324 MG
1 TABLET ORAL
Qty: 0 | Refills: 0 | DISCHARGE
Start: 2023-04-26

## 2023-04-26 RX ORDER — ISOPROPYL ALCOHOL, BENZOCAINE .7; .06 ML/ML; ML/ML
0 SWAB TOPICAL
Qty: 100 | Refills: 1
Start: 2023-04-26

## 2023-04-26 RX ORDER — CEFTRIAXONE 500 MG/1
2 INJECTION, POWDER, FOR SOLUTION INTRAMUSCULAR; INTRAVENOUS
Qty: 35 | Refills: 0
Start: 2023-04-26 | End: 2023-01-01

## 2023-04-26 RX ORDER — INSULIN ASPART 100 [IU]/ML
30 INJECTION, SOLUTION SUBCUTANEOUS
Refills: 0 | DISCHARGE

## 2023-04-26 RX ADMIN — CEFTRIAXONE 100 MILLIGRAM(S): 500 INJECTION, POWDER, FOR SOLUTION INTRAMUSCULAR; INTRAVENOUS at 14:32

## 2023-04-26 RX ADMIN — LEVETIRACETAM 750 MILLIGRAM(S): 250 TABLET, FILM COATED ORAL at 06:25

## 2023-04-26 RX ADMIN — Medication 2: at 08:24

## 2023-04-26 RX ADMIN — Medication 25 MILLIGRAM(S): at 06:25

## 2023-04-26 RX ADMIN — PANTOPRAZOLE SODIUM 40 MILLIGRAM(S): 20 TABLET, DELAYED RELEASE ORAL at 14:31

## 2023-04-26 RX ADMIN — Medication 10 UNIT(S): at 08:23

## 2023-04-26 RX ADMIN — Medication 40 MILLIGRAM(S): at 06:25

## 2023-04-26 RX ADMIN — SPIRONOLACTONE 25 MILLIGRAM(S): 25 TABLET, FILM COATED ORAL at 06:25

## 2023-04-26 RX ADMIN — Medication 81 MILLIGRAM(S): at 14:32

## 2023-04-26 RX ADMIN — Medication 1 APPLICATION(S): at 14:31

## 2023-04-26 NOTE — DISCHARGE NOTE PROVIDER - NSDCMRMEDTOKEN_GEN_ALL_CORE_FT
aspirin 81 mg oral delayed release tablet: 1 tab(s) orally once a day  atorvastatin 10 mg oral tablet: 1 tab(s) orally once a day  atorvastatin 10 mg oral tablet: 1 tab(s) orally once a day (at bedtime)  D3-50 1250 mcg (50,000 intl units) oral capsule: 1 cap(s) orally once a week  famotidine 20 mg oral tablet: 1 tab(s) orally once a day  furosemide 40 mg oral tablet: 1 tab(s) orally once a day  insulin glargine 100 units/mL subcutaneous solution: 25 unit(s) subcutaneous once a day (at bedtime)  insulin lispro 100 units/mL injectable solution: 10 unit(s) injectable 3 times a day  levETIRAcetam 750 mg oral tablet: 1 tab(s) orally 2 times a day  metFORMIN 500 mg oral tablet: 1 tab(s) orally 2 times a day  metoprolol succinate 25 mg oral tablet, extended release: 1 tab(s) orally once a day  multivitamin: 1 tab(s) orally once a day  pantoprazole 40 mg oral granule, delayed release: 1 each orally 2 times a day   spironolactone 25 mg oral tablet: 1 tab(s) orally 3 times a day  warfarin 2 mg oral tablet: 1 tab(s) orally once a day Alternates with 3 mg accordingly to patient&#x27;s daughter

## 2023-04-26 NOTE — DISCHARGE NOTE PROVIDER - HOSPITAL COURSE
67 year old male with PMHx CVA (right sided hemiparesis and aphasia), AFib (on coumadin), IDDM, HTN, CAD s/p CABG, s/p AVR, presenting to [place] ED with wound to left 4th metatarsal.  Patient unable to provide history due to aphasia, history obtained from daughter at beside and per chart review.  Patient developed hematoma to left 4th metatarsal on Friday, it then ruptured and patient presented to Podiatry office (Dr. Maegan Jin) earlier today who instructed patient to come to ED for evaluation.  Patient has now new complaints at this time.  Patient does not follow with a vascular surgeon.      1 Toe gangrene, diabetic foot ulcer  - cw abx  - Id, vascular and podiatry fu  - fu cultures  - pain control   - JUAN A/PVR's reviewed; likely small vessel disease  - 6 weeks of abx as per ID  - PICC LINE PLACED     2 DM  - monitor FS  - Basal bolus  - diabetic diet  - uncontrolled     3 Hx of CVA  - cw AC  - cw asa, statin    4 Afib, AVR- INR and dose coumadin daily    5 CAD, CABG  - cw asa, statin  - cards fu

## 2023-04-26 NOTE — PROGRESS NOTE ADULT - SUBJECTIVE AND OBJECTIVE BOX
[facility], Division of Infectious Diseases  ADAM Nava Y. Patel, S. Shah, G. [facility]  [phone]    Name: ARNIE DELGADILLO  Age: 67y  Gender: Male  MRN: [id number]    Interval History:  Patient seen and examined at bedside this morning  No acute overnight events.   Notes reviewed    Antibiotics:  cefTRIAXone   IVPB 2000 milliGRAM(s) IV Intermittent every 24 hours      Medications:  acetaminophen     Tablet .. 650 milliGRAM(s) Oral every 6 hours PRN  aspirin enteric coated 81 milliGRAM(s) Oral daily  atorvastatin 10 milliGRAM(s) Oral at bedtime  cefTRIAXone   IVPB 2000 milliGRAM(s) IV Intermittent every 24 hours  dextrose 5%. 1000 milliLiter(s) IV Continuous <Continuous>  dextrose 5%. 1000 milliLiter(s) IV Continuous <Continuous>  dextrose 50% Injectable 12.5 Gram(s) IV Push once  dextrose 50% Injectable 25 Gram(s) IV Push once  dextrose 50% Injectable 25 Gram(s) IV Push once  dextrose Oral Gel 15 Gram(s) Oral once PRN  furosemide    Tablet 40 milliGRAM(s) Oral daily  glucagon  Injectable 1 milliGRAM(s) IntraMuscular once  insulin glargine Injectable (LANTUS) 25 Unit(s) SubCutaneous at bedtime  insulin lispro (ADMELOG) corrective regimen sliding scale   SubCutaneous three times a day before meals  insulin lispro (ADMELOG) corrective regimen sliding scale   SubCutaneous at bedtime  insulin lispro Injectable (ADMELOG) 10 Unit(s) SubCutaneous three times a day before meals  levETIRAcetam 750 milliGRAM(s) Oral two times a day  metoprolol succinate ER 25 milliGRAM(s) Oral daily  pantoprazole   Suspension 40 milliGRAM(s) Oral daily  povidone iodine 10% Solution 1 Application(s) Topical daily  spironolactone 25 milliGRAM(s) Oral daily      Review of Systems:  A 10-point review of systems was obtained.     Pertinent positives and negatives--  Constitutional: No fevers. No Chills. No Rigors.   Cardiovascular: No chest pain. No palpitations.  Respiratory: No shortness of breath. No cough.  Gastrointestinal: No nausea or vomiting. No diarrhea or constipation.   Psychiatric: Pleasant. Appropriate affect.    Review of systems otherwise negative except as previously noted.    Allergies: No Known Allergies    For details regarding the patient's past medical history, social history, family history, and other miscellaneous elements, please refer the initial infectious diseases consultation and/or the admitting history and physical examination for this admission.    Objective:  Vitals:   T(C): 36.5 (04-26-23 @ 11:14), Max: 36.8 (04-26-23 @ 04:10)  HR: 54 (04-26-23 @ 11:14) (54 - 77)  BP: 126/71 (04-26-23 @ 11:14) (118/69 - 127/73)  RR: 18 (04-26-23 @ 11:14) (17 - 18)  SpO2: 95% (04-26-23 @ 11:14) (94% - 95%)    Physical Examination:  General: no acute distress  HEENT: NC/AT, EOMI, anicteric, no oral lesions  Neck: supple, no palpable LAD  Cardio: S1, S2 heard, RRR, no murmurs  Resp: breath sounds heard bilaterally, no rales, wheezes or rhonchi  Abd: soft, NT, ND, + bowel sounds  Neuro: no obvious focal deficits  Ext: no edema or cyanosis  Skin: warm, dry, no visible rash      Laboratory Studies:  CBC:                       11.5   8.95  )-----------( 314      ( 26 Apr 2023 07:10 )             35.6     CMP: 04-26    135  |  99  |  24<H>  ----------------------------<  183<H>  4.2   |  31  |  0.91    Ca    9.4      26 Apr 2023 07:10    TPro  7.6  /  Alb  2.7<L>  /  TBili  0.5  /  DBili  x   /  AST  56<H>  /  ALT  56  /  AlkPhos  949<H>  04-26    LIVER FUNCTIONS - ( 26 Apr 2023 07:10 )  Alb: 2.7 g/dL / Pro: 7.6 g/dL / ALK PHOS: 949 U/L / ALT: 56 U/L / AST: 56 U/L / GGT: x               Microbiology: reviewed    Culture - Urine (collected 04-18-23 @ 21:20)  Source: Clean Catch Clean Catch (Midstream)  Final Report (04-19-23 @ 23:11):    <10,000 CFU/mL Normal Urogenital Beth    Culture - Blood (collected 04-18-23 @ 20:27)  Source: .Blood Blood-Peripheral  Final Report (04-24-23 @ 01:00):    No Growth Final    Culture - Blood (collected 04-18-23 @ 20:21)  Source: .Blood Blood-Peripheral  Final Report (04-24-23 @ 01:00):    No Growth Final          Radiology: reviewed       [facility], Division of Infectious Diseases  ADAM Nava Y. Patel, S. Shah, G. [facility]  [phone]    Name: ARNIE DELGADILLO  Age: 67y  Gender: Male  MRN: [id number]    Interval History: Pt getting PICC in IR    Antibiotics:  cefTRIAXone   IVPB 2000 milliGRAM(s) IV Intermittent every 24 hours      Medications:  acetaminophen     Tablet .. 650 milliGRAM(s) Oral every 6 hours PRN  aspirin enteric coated 81 milliGRAM(s) Oral daily  atorvastatin 10 milliGRAM(s) Oral at bedtime  cefTRIAXone   IVPB 2000 milliGRAM(s) IV Intermittent every 24 hours  dextrose 5%. 1000 milliLiter(s) IV Continuous <Continuous>  dextrose 5%. 1000 milliLiter(s) IV Continuous <Continuous>  dextrose 50% Injectable 12.5 Gram(s) IV Push once  dextrose 50% Injectable 25 Gram(s) IV Push once  dextrose 50% Injectable 25 Gram(s) IV Push once  dextrose Oral Gel 15 Gram(s) Oral once PRN  furosemide    Tablet 40 milliGRAM(s) Oral daily  glucagon  Injectable 1 milliGRAM(s) IntraMuscular once  insulin glargine Injectable (LANTUS) 25 Unit(s) SubCutaneous at bedtime  insulin lispro (ADMELOG) corrective regimen sliding scale   SubCutaneous three times a day before meals  insulin lispro (ADMELOG) corrective regimen sliding scale   SubCutaneous at bedtime  insulin lispro Injectable (ADMELOG) 10 Unit(s) SubCutaneous three times a day before meals  levETIRAcetam 750 milliGRAM(s) Oral two times a day  metoprolol succinate ER 25 milliGRAM(s) Oral daily  pantoprazole   Suspension 40 milliGRAM(s) Oral daily  povidone iodine 10% Solution 1 Application(s) Topical daily  spironolactone 25 milliGRAM(s) Oral daily      Review of Systems:  unable to obtain    Allergies: No Known Allergies    For details regarding the patient's past medical history, social history, family history, and other miscellaneous elements, please refer the initial infectious diseases consultation and/or the admitting history and physical examination for this admission.    Objective:  Vitals:   T(C): 36.5 (04-26-23 @ 11:14), Max: 36.8 (04-26-23 @ 04:10)  HR: 54 (04-26-23 @ 11:14) (54 - 77)  BP: 126/71 (04-26-23 @ 11:14) (118/69 - 127/73)  RR: 18 (04-26-23 @ 11:14) (17 - 18)  SpO2: 95% (04-26-23 @ 11:14) (94% - 95%)    Physical Examination:    Laboratory Studies:  CBC:                       11.5   8.95  )-----------( 314      ( 26 Apr 2023 07:10 )             35.6     CMP: 04-26    135  |  99  |  24<H>  ----------------------------<  183<H>  4.2   |  31  |  0.91    Ca    9.4      26 Apr 2023 07:10    TPro  7.6  /  Alb  2.7<L>  /  TBili  0.5  /  DBili  x   /  AST  56<H>  /  ALT  56  /  AlkPhos  949<H>  04-26    LIVER FUNCTIONS - ( 26 Apr 2023 07:10 )  Alb: 2.7 g/dL / Pro: 7.6 g/dL / ALK PHOS: 949 U/L / ALT: 56 U/L / AST: 56 U/L / GGT: x               Microbiology: reviewed    Culture - Urine (collected 04-18-23 @ 21:20)  Source: Clean Catch Clean Catch (Midstream)  Final Report (04-19-23 @ 23:11):    <10,000 CFU/mL Normal Urogenital Beth    Culture - Blood (collected 04-18-23 @ 20:27)  Source: .Blood Blood-Peripheral  Final Report (04-24-23 @ 01:00):    No Growth Final    Culture - Blood (collected 04-18-23 @ 20:21)  Source: .Blood Blood-Peripheral  Final Report (04-24-23 @ 01:00):    No Growth Final          Radiology: reviewed       [facility], Division of Infectious Diseases  ADAM Nava Y. Patel, S. Shah, G. [facility]  [phone]    Name: ARNIE DELGADILLO  Age: 67y  Gender: Male  MRN: [id number]    Interval History: Pt getting PICC in IR  went back to see pt following procedure  No complaints    Antibiotics:  cefTRIAXone   IVPB 2000 milliGRAM(s) IV Intermittent every 24 hours      Medications:  acetaminophen     Tablet .. 650 milliGRAM(s) Oral every 6 hours PRN  aspirin enteric coated 81 milliGRAM(s) Oral daily  atorvastatin 10 milliGRAM(s) Oral at bedtime  cefTRIAXone   IVPB 2000 milliGRAM(s) IV Intermittent every 24 hours  dextrose 5%. 1000 milliLiter(s) IV Continuous <Continuous>  dextrose 5%. 1000 milliLiter(s) IV Continuous <Continuous>  dextrose 50% Injectable 12.5 Gram(s) IV Push once  dextrose 50% Injectable 25 Gram(s) IV Push once  dextrose 50% Injectable 25 Gram(s) IV Push once  dextrose Oral Gel 15 Gram(s) Oral once PRN  furosemide    Tablet 40 milliGRAM(s) Oral daily  glucagon  Injectable 1 milliGRAM(s) IntraMuscular once  insulin glargine Injectable (LANTUS) 25 Unit(s) SubCutaneous at bedtime  insulin lispro (ADMELOG) corrective regimen sliding scale   SubCutaneous three times a day before meals  insulin lispro (ADMELOG) corrective regimen sliding scale   SubCutaneous at bedtime  insulin lispro Injectable (ADMELOG) 10 Unit(s) SubCutaneous three times a day before meals  levETIRAcetam 750 milliGRAM(s) Oral two times a day  metoprolol succinate ER 25 milliGRAM(s) Oral daily  pantoprazole   Suspension 40 milliGRAM(s) Oral daily  povidone iodine 10% Solution 1 Application(s) Topical daily  spironolactone 25 milliGRAM(s) Oral daily      Review of Systems:  10 pt ROS performed  negative unless stated above    Allergies: No Known Allergies    For details regarding the patient's past medical history, social history, family history, and other miscellaneous elements, please refer the initial infectious diseases consultation and/or the admitting history and physical examination for this admission.    Objective:  Vitals:   T(C): 36.5 (04-26-23 @ 11:14), Max: 36.8 (04-26-23 @ 04:10)  HR: 54 (04-26-23 @ 11:14) (54 - 77)  BP: 126/71 (04-26-23 @ 11:14) (118/69 - 127/73)  RR: 18 (04-26-23 @ 11:14) (17 - 18)  SpO2: 95% (04-26-23 @ 11:14) (94% - 95%)    Physical Examination:  General: no acute distress, nontoxic   HEENT: NC/AT, anicteric, neck supple  Respiratory: no acc muscle use, breathing comfortably  Cardiovascular: S1 and S2 present  Gastrointestinal: normal appearing, nondistended  Extremities: L foot dressing with no TTP, no edema  Skin: no visible rash    Laboratory Studies:  CBC:                       11.5   8.95  )-----------( 314      ( 26 Apr 2023 07:10 )             35.6     CMP: 04-26    135  |  99  |  24<H>  ----------------------------<  183<H>  4.2   |  31  |  0.91    Ca    9.4      26 Apr 2023 07:10    TPro  7.6  /  Alb  2.7<L>  /  TBili  0.5  /  DBili  x   /  AST  56<H>  /  ALT  56  /  AlkPhos  949<H>  04-26    LIVER FUNCTIONS - ( 26 Apr 2023 07:10 )  Alb: 2.7 g/dL / Pro: 7.6 g/dL / ALK PHOS: 949 U/L / ALT: 56 U/L / AST: 56 U/L / GGT: x               Microbiology: reviewed    Culture - Urine (collected 04-18-23 @ 21:20)  Source: Clean Catch Clean Catch (Midstream)  Final Report (04-19-23 @ 23:11):    <10,000 CFU/mL Normal Urogenital Beth    Culture - Blood (collected 04-18-23 @ 20:27)  Source: .Blood Blood-Peripheral  Final Report (04-24-23 @ 01:00):    No Growth Final    Culture - Blood (collected 04-18-23 @ 20:21)  Source: .Blood Blood-Peripheral  Final Report (04-24-23 @ 01:00):    No Growth Final          Radiology: reviewed

## 2023-04-26 NOTE — PROCEDURE NOTE - PROCEDURE FINDINGS AND DETAILS
42cm bard single lumen power picc inserted into patent right basilic vein under sterile conditions and image guidance.  Tip is in the SVC.  PICC can be accessed.

## 2023-04-26 NOTE — CHART NOTE - NSCHARTNOTEFT_GEN_A_CORE
Assessment:   Pt seen for nutrition follow up.  Chart reviewed, hospital course noted.     Brief History:   "67 year old male with PMHx CVA (right sided hemiparesis and aphasia), AFib (on coumadin), IDDM, HTN, CAD s/p CABG, s/p AVR, presenting to [place] ED with wound to left 4th metatarsal."  Admitted for gangrene.     Pt seen at bedside, dtr present and hx provided by her.  Pt seen sleeping at foot side of bed?  Dtr reports pt eating, eats fish, dairy, eggs only.  Meal pref for today obtained.  No bagels as too hard to eat.  +BM 4/23.     Factors impacting intake: [x] none [ ] nausea  [ ] vomiting [ ] diarrhea [ ] constipation  [ ]chewing problems [ ] swallowing issues  [ ] other:     Diet Prescription: Diet, Consistent Carbohydrate/No Snacks (04-18-23 @ 21:50)    Intake: 50-75%    Current Weight: 4/21 174.3#, 4/23 147#, 4/26 145.9#      Pertinent Medications: MEDICATIONS  (STANDING):  aspirin enteric coated 81 milliGRAM(s) Oral daily  atorvastatin 10 milliGRAM(s) Oral at bedtime  cefTRIAXone   IVPB 2000 milliGRAM(s) IV Intermittent every 24 hours  dextrose 5%. 1000 milliLiter(s) (50 mL/Hr) IV Continuous <Continuous>  dextrose 5%. 1000 milliLiter(s) (100 mL/Hr) IV Continuous <Continuous>  dextrose 50% Injectable 25 Gram(s) IV Push once  dextrose 50% Injectable 12.5 Gram(s) IV Push once  dextrose 50% Injectable 25 Gram(s) IV Push once  furosemide    Tablet 40 milliGRAM(s) Oral daily  glucagon  Injectable 1 milliGRAM(s) IntraMuscular once  insulin glargine Injectable (LANTUS) 25 Unit(s) SubCutaneous at bedtime  insulin lispro (ADMELOG) corrective regimen sliding scale   SubCutaneous at bedtime  insulin lispro (ADMELOG) corrective regimen sliding scale   SubCutaneous three times a day before meals  insulin lispro Injectable (ADMELOG) 10 Unit(s) SubCutaneous three times a day before meals  levETIRAcetam 750 milliGRAM(s) Oral two times a day  metoprolol succinate ER 25 milliGRAM(s) Oral daily  pantoprazole   Suspension 40 milliGRAM(s) Oral daily  povidone iodine 10% Solution 1 Application(s) Topical daily  spironolactone 25 milliGRAM(s) Oral daily    MEDICATIONS  (PRN):  acetaminophen     Tablet .. 650 milliGRAM(s) Oral every 6 hours PRN Temp greater or equal to 38C (100.4F), Mild Pain (1 - 3)  dextrose Oral Gel 15 Gram(s) Oral once PRN Blood Glucose LESS THAN 70 milliGRAM(s)/deciliter    Pertinent Labs: 04-26 Na135 mmol/L Glu 183 mg/dL<H> K+ 4.2 mmol/L Cr  0.91 mg/dL BUN 24 mg/dL<H> 04-26 Alb 2.7 g/dL<L>     CAPILLARY BLOOD GLUCOSE      POCT Blood Glucose.: 179 mg/dL (26 Apr 2023 07:57)  POCT Blood Glucose.: 308 mg/dL (25 Apr 2023 21:14)  POCT Blood Glucose.: 193 mg/dL (25 Apr 2023 16:51)  POCT Blood Glucose.: 75 mg/dL (25 Apr 2023 11:38)      Skin:  L foot 4th toe wound  Edema: none noted    Estimated Needs:   [ ] no change since previous assessment on:  [x] recalculated: based on current wt trend, 146#  kcal/day: 7724-5390 (25-30 maci/kg)  gms protein/day: 79-93 (1.2-1.4 gm/kg)    Previous Nutrition Diagnosis:   [x] Altered Nutrition Related Labs (Glu)    Nutrition Diagnosis is [x] ongoing  [ ] resolved [ ] not applicable     New Nutrition Diagnosis: [x] not applicable       Interventions:   Recommend  [ ] Continue current diet  [x] Change Diet To:  CC +snack, low Na,  [id number]  [x] Nutrition Supplement-  add 8oz Glucerna QD  [ ] Nutrition Support  [x] Other: daily MVI, Vit C 500mg bid      Monitoring and Evaluation:   [x] PO intake [ x ] Tolerance to diet prescription [ x ] weights [ x ] labs [ x ] follow up per protocol  [x] other: s/s GI distress, bowel function, skin integrity/ edema

## 2023-04-26 NOTE — DISCHARGE NOTE PROVIDER - NSDCCAREPROVSEEN_GEN_ALL_CORE_FT
Eloy, Shane Harmon, Tamika Beatty, Yeimi Grimes, Yera Perlman, Ryan Burks, Teodora Woodruff, Keri Michel, Guru Humphries, Nida Treadwell, Anirudh Myers, Khai Acosta, Becca Ornelas, Charlie Muller

## 2023-04-26 NOTE — PROGRESS NOTE ADULT - PROVIDER SPECIALTY LIST ADULT
Cardiology
Hospitalist
Hospitalist
Infectious Disease
Infectious Disease
Nephrology
Nephrology
Neurology
Podiatry
Pulmonology
Vascular Surgery
Cardiology
Gastroenterology
Hospitalist
Hospitalist
Nephrology
Neurology
Podiatry
Pulmonology
Gastroenterology
Gastroenterology
Hospitalist
Hospitalist
Infectious Disease
Infectious Disease
Nephrology
Neurology
Pulmonology
Endocrinology
Gastroenterology
Hospitalist
Infectious Disease
Nephrology

## 2023-04-26 NOTE — PROGRESS NOTE ADULT - SUBJECTIVE AND OBJECTIVE BOX
{\rtf1\rqomlw83811\ansi\hzqixxp6633\ftnbj\uc1\deff0  {\fonttbl{\f0 \fnil Segoe UI;}{\f1 \fnil Busby;}{\f2 \fnil \fcharset0 Segoe UI;}{\f3 \fnil Symbol;}{\f4 \fnil Wingdings;}}  {\colortbl ;\ntj140\kaxxc341\aoia472 ;\red0\green0\blue0 ;\red0\green0\blue0 ;}  {\stylesheet{\f0\fs20 Normal;}{\cs1 Default Paragraph Font;}{\cs2\f0\fs16 Line Number;}{\cs3\f1\fs24 Hyperlink;}}  {\*\revtbl{Unknown;}}  {\*\listtable  {\list\listtemplateid-1  {\listlevel\levelnfc0\levelfollow0\levelstartat1\levelnorestart{\leveltext \'02\'00.}{\levelnumbers \'01}\f1\fs24}  {\listlevel\levelnfc4\levelfollow0\levelstartat1\levelnorestart{\leveltext \'02\'01.}{\levelnumbers \'01}\f1\fs24}  {\listlevel\levelnfc0\levelfollow0\levelstartat1\levelnorestart{\leveltext \'02\'02.}{\levelnumbers \'01}\f1\fs24}  {\listlevel\levelnfc0\levelfollow0\levelstartat1\levelnorestart{\leveltext \'02\'03.}{\levelnumbers \'01}\f1\fs24}  {\listlevel\levelnfc0\levelfollow0\levelstartat1\levelnorestart{\leveltext \'02\'04.}{\levelnumbers \'01}\f1\fs24}  {\listlevel\levelnfc0\levelfollow0\levelstartat1\levelnorestart{\leveltext \'02\'05.}{\levelnumbers \'01}\f1\fs24}  {\listlevel\levelnfc0\levelfollow0\levelstartat1\levelnorestart{\leveltext \'02\'06.}{\levelnumbers \'01}\f1\fs24}  {\listlevel\levelnfc0\levelfollow0\levelstartat1\levelnorestart{\leveltext \'02\'07.}{\levelnumbers \'01}\f1\fs24}  {\listlevel\levelnfc0\levelfollow0\levelstartat1\levelnorestart{\leveltext \'02\'08.}{\levelnumbers \'01}\f1\fs24}  {\listname ;}\[id number]  }  }  {\*\listoverridetable  {\listoverride\[id number]\listoverridecount0\ls1}  }  \wnqbkh06084\hkngxq71043\syhzp5367\csavp1199\fmrag2311\fjoqz3867\udcbpne482\rsatqlp000\nogrowautofit\lprqdd640\formshade\nofeaturethrottle1\dntblnsbdb\fet4\aendnotes\aftnnrlc\pgbrdrhead\pgbrdrfoot  \sectd\iqobhf98379\tzwnku71734\guttersxn0\oofpitll7359\wxgpqpmd2947\hpuvutjy3763\wwquomfn2131\bnrvxyg576\eyhqhsg530\sbkpage\pgncont\pgndec  \plain\plain\f0\fs24\pard\plain\f0\fs24\plain\f0\fs20\xudg9287\hich\f0\dbch\f0\loch\f0\fs20 Neurology follow up note\par  \par  NAIB [id number]\par  \par  \par  Interval History:\par  \par  Patient feels ok no new complaints.\par  \par  Allergies\par  \par  No Known Allergies\par  \par  Intolerances\par  \par  \par  \par  MEDICATIONS\par  \par  acetaminophen     Tablet .. 650 milliGRAM(s) Oral every 6 hours PRN\par  aspirin enteric coated 81 milliGRAM(s) Oral daily\par  atorvastatin 10 milliGRAM(s) Oral at bedtime\par  cefTRIAXone   IVPB 2000 milliGRAM(s) IV Intermittent every 24 hours\par  dextrose 5%. 1000 milliLiter(s) IV Continuous <Continuous>\par  dextrose 5%. 1000 milliLiter(s) IV Continuous <Continuous>\par  dextrose 50% Injectable 12.5 Gram(s) IV Push once\par  dextrose 50% Injectable 25 Gram(s) IV Push once\par  dextrose 50% Injectable 25 Gram(s) IV Push once\par  dextrose Oral Gel 15 Gram(s) Oral once PRN\par  furosemide    Tablet 40 milliGRAM(s) Oral daily\par  glucagon  Injectable 1 milliGRAM(s) IntraMuscular once\par  insulin glargine Injectable (LANTUS) 25 Unit(s) SubCutaneous at bedtime\par  insulin lispro (ADMELOG) corrective regimen sliding scale   SubCutaneous three times a day before meals\par  insulin lispro (ADMELOG) corrective regimen sliding scale   SubCutaneous at bedtime\par  insulin lispro Injectable (ADMELOG) 10 Unit(s) SubCutaneous three times a day before meals\par  levETIRAcetam 750 milliGRAM(s) Oral two times a day\par  metoprolol succinate ER 25 milliGRAM(s) Oral daily\par  pantoprazole   Suspension 40 milliGRAM(s) Oral daily\par  povidone iodine 10% Solution 1 Application(s) Topical daily\par  spironolactone 25 milliGRAM(s) Oral daily\par  \par  \par  \par  \par  \par  \par  Vital Signs Last 24 Hrs\par  T(C): 36.8 (26 Apr 2023 04:10), Max: 36.8 (26 Apr 2023 04:10)\par  T(F): 98.2 (26 Apr 2023 04:10), Max: 98.2 (26 Apr 2023 04:10)\par  HR: 69 (26 Apr 2023 04:10) (69 - 77)\par  BP: 127/73 (26 Apr 2023 04:10) (118/69 - 127/73)\par  BP(mean): --\par  RR: 18 (26 Apr 2023 04:10) (17 - 18)\par  SpO2: 94% (26 Apr 2023 04:10) (94% - 95%)\par  \par  Parameters below as of 26 Apr 2023 04:10\par  Patient On (Oxygen Delivery Method): room air\par  \par  \par  \ql\plain\f0\fs24\plain\f0\fs20\fsmt0130\hich\f0\dbch\f0\loch\f0\fs20\par  REVIEW OF SYSTEMS:  Completely limited secondary to the patient repeats words over, has severe expressive aphasia, but had been in his normal state of health as per daughter.\par  \par  PHYSICAL EXAMINATION:  HEENT:  Head:  Normocephalic, atraumatic.  Eyes:  No scleral icterus.  Ears:  Hard to evaluate secondary to he could not answer questions accordingly but appears to follow commands.  NECK:  Supple.  RESPIRATORY:  Air entry bilaterally.    CARDIOVASCULAR:  S1 and S2 heard.  ABDOMEN:  Soft and nontender.  EXTREMITIES:  Positive discoloration was noted on the left toe. \par  \par  NEUROLOGIC:  The patient was awake, alert.  On-off blink to visual threat.  Positive expressive aphasia.  Will say a few words but repeat the same words over.  Right upper and right lower were 0/5 with increased tone.  Right hemiplegia is his baseline.    Left upper and left lower were 4/5.\par  \pard\plain\f0\fs24\plain\f0\fs20\bxce9022\hich\f0\dbch\f0\loch\f0\fs20\par  LABS:\par  CBC Full  -  ( 26 Apr 2023 07:10 )\par  WBC Count : 8.95 K/uL\par  RBC Count : 4.15 M/uL\par  Hemoglobin : 11.5 g/dL\par  Hematocrit : 35.6 %\par  Platelet Count - Automated : 314 K/uL\par  Mean Cell Volume : 85.8 fl\par  Mean Cell Hemoglobin : 27.7 pg\par  Mean Cell Hemoglobin Concentration : 32.3 gm/dL\par  Auto Neutrophil # : x\par  Auto Lymphocyte # : x\par  Auto Monocyte # : x\par  Auto Eosinophil # : x\par  Auto Basophil # : x\par  Auto Neutrophil % : x\par  Auto Lymphocyte % : x\par  Auto Monocyte % : x\par  Auto Eosinophil % : x\par  Auto Basophil % : x\par  \par  \par  04-25\par  \par  136  |  100  |  19\par  ----------------------------<  112<H>\par  3.8   |  30  |  0.87\par  \par  Ca    9.0      25 Apr 2023 06:30\par  \par  TPro  7.4  /  Alb  2.6<L>  /  TBili  0.4  /  DBili  x   /  AST  48<H>  /  ALT  54  /  AlkPhos  846<H>  04-25\par  \par  Hemoglobin A1C: \par  \par  LIVER FUNCTIONS - ( 25 Apr 2023 06:30 )\par  Alb: 2.6 g/dL / Pro: 7.4 g/dL / ALK PHOS: 846 U/L / ALT: 54 U/L / AST: 48 U/L / GGT: x       \par  \par  Vitamin B12 \par  PT/INR - ( 26 Apr 2023 07:10 )   PT: 27.5 sec;   INR: 2.33 ratio  \par  \par   \par  \par  \par  \par  RADIOLOGY\par  \par  \par  \par  \ql\plain\f0\fs24\plain\f0\fs20\dydh4452\hich\f0\dbch\f0\loch\f0\fs20 ANALYSIS AND PLAN:  This is a 67-year-old with a history of atrial fibrillation, cerebrovascular accident, and seizure.\par  {\listtext\pard\plain\f1\fs24 1.\tab}  \pard\ssparaaux0\s0\tx360\ls1\ilvl0\fi-360\li360\ql\plain\f0\fs24\plain\f0\fs20\kokg2039\hich\f0\dbch\f0\loch\f0\fs20 For history of atrial fibrillation and cerebrovascular accident, risks versus benefits.  The patient at present does have elevated INR,   would recommend to hold for now with a goal INR between 2 and 3.  Questionable the patient will need any type of surgical intervention for his toe.\par  {\listtext\pard\plain\f1\fs24 2.\tab}  For history of seizure prophylaxis, continue the patient on Keppra.\par  {\listtext\pard\plain\f1\fs24 3.\tab}  For history of diabetes, strict control of blood sugars.\par  {\listtext\pard\plain\f1\fs24 4.\tab}  For history of hypertension, monitor systolic blood pressure.\par  {\listtext\pard\plain\f1\fs24 5.\tab}  Excisional debridement with 15 blade of left 4th toe  base down to subcutaneous tissue Left foot ulceration\par  {\listtext\pard\plain\f1\fs24 6.\tab}  monitor oral intake as needed \par  {\listtext\pard\plain\f1\fs24 7.\tab}  PICC line and antibiotics as needed \par  \pard\ssparaaux0\s0\tx360\ql\plain\f0\fs24\plain\f0\fs20\czzw3569\hich\f0\dbch\f0\loch\f0\fs20\par  neurologic wise stable \par  \par  \pard\ssparaaux0\s0\ql\plain\f0\fs24\plain\f0\fs20\zlkj8133\hich\f0\dbch\f0\loch\f0\fs20 Spoke with daughter  at bedside 4/25  Other daughter is Jeffrey.  Her telephone number is [phone] 4/20.  She understands my reasoning and thought process.\par  \par  Greater than 34 minutes of time was spent with the patient, plan of care, reviewing data, with greater than 50% of the visit was spent counseling and/or coordinating care with multidisciplinary healthcare team\plain\f2\fs16\avcd3402\hich\f2\dbch\f2\loch\f2\cf2\fs16\par  \par  \plain\f2\fs16\nafg2499\hich\f2\dbch\f2\loch\f2\cf2\fs16\strike\plain\f0\fs20\jocr3822\hich\f0\dbch\f0\loch\f0\fs20\par  }   Neurology follow up note    ARNIE [id number]      Interval History:    Patient feels ok no new complaints.    Allergies    No Known Allergies    Intolerances        MEDICATIONS    acetaminophen     Tablet .. 650 milliGRAM(s) Oral every 6 hours PRN  aspirin enteric coated 81 milliGRAM(s) Oral daily  atorvastatin 10 milliGRAM(s) Oral at bedtime  cefTRIAXone   IVPB 2000 milliGRAM(s) IV Intermittent every 24 hours  dextrose 5%. 1000 milliLiter(s) IV Continuous <Continuous>  dextrose 5%. 1000 milliLiter(s) IV Continuous <Continuous>  dextrose 50% Injectable 12.5 Gram(s) IV Push once  dextrose 50% Injectable 25 Gram(s) IV Push once  dextrose 50% Injectable 25 Gram(s) IV Push once  dextrose Oral Gel 15 Gram(s) Oral once PRN  furosemide    Tablet 40 milliGRAM(s) Oral daily  glucagon  Injectable 1 milliGRAM(s) IntraMuscular once  insulin glargine Injectable (LANTUS) 25 Unit(s) SubCutaneous at bedtime  insulin lispro (ADMELOG) corrective regimen sliding scale   SubCutaneous three times a day before meals  insulin lispro (ADMELOG) corrective regimen sliding scale   SubCutaneous at bedtime  insulin lispro Injectable (ADMELOG) 10 Unit(s) SubCutaneous three times a day before meals  levETIRAcetam 750 milliGRAM(s) Oral two times a day  metoprolol succinate ER 25 milliGRAM(s) Oral daily  pantoprazole   Suspension 40 milliGRAM(s) Oral daily  povidone iodine 10% Solution 1 Application(s) Topical daily  spironolactone 25 milliGRAM(s) Oral daily              Vital Signs Last 24 Hrs  T(C): 36.8 (26 Apr 2023 04:10), Max: 36.8 (26 Apr 2023 04:10)  T(F): 98.2 (26 Apr 2023 04:10), Max: 98.2 (26 Apr 2023 04:10)  HR: 69 (26 Apr 2023 04:10) (69 - 77)  BP: 127/73 (26 Apr 2023 04:10) (118/69 - 127/73)  BP(mean): --  RR: 18 (26 Apr 2023 04:10) (17 - 18)  SpO2: 94% (26 Apr 2023 04:10) (94% - 95%)    Parameters below as of 26 Apr 2023 04:10  Patient On (Oxygen Delivery Method): room air        REVIEW OF SYSTEMS:  Completely limited secondary to the patient repeats words over, has severe expressive aphasia, but had been in his normal state of health as per daughter.    PHYSICAL EXAMINATION:  HEENT:  Head:  Normocephalic, atraumatic.  Eyes:  No scleral icterus.  Ears:  Hard to evaluate secondary to he could not answer questions accordingly but appears to follow commands.  NECK:  Supple.  RESPIRATORY:  Air entry bilaterally.  CARDIOVASCULAR:  S1 and S2 heard.  ABDOMEN:  Soft and nontender.  EXTREMITIES:  Positive discoloration was noted on the left toe.     NEUROLOGIC:  The patient was awake, alert.  On-off blink to visual threat.  Positive expressive aphasia.  Will say a few words but repeat the same words over.  Right upper and right lower were 0/5 with increased tone.  Right hemiplegia is his baseline.  Left upper and left lower were 4/5.    LABS:  CBC Full  -  ( 26 Apr 2023 07:10 )  WBC Count : 8.95 K/uL  RBC Count : 4.15 M/uL  Hemoglobin : 11.5 g/dL  Hematocrit : 35.6 %  Platelet Count - Automated : 314 K/uL  Mean Cell Volume : 85.8 fl  Mean Cell Hemoglobin : 27.7 pg  Mean Cell Hemoglobin Concentration : 32.3 gm/dL  Auto Neutrophil # : x  Auto Lymphocyte # : x  Auto Monocyte # : x  Auto Eosinophil # : x  Auto Basophil # : x  Auto Neutrophil % : x  Auto Lymphocyte % : x  Auto Monocyte % : x  Auto Eosinophil % : x  Auto Basophil % : x      04-25    136  |  100  |  19  ----------------------------<  112<H>  3.8   |  30  |  0.87    Ca    9.0      25 Apr 2023 06:30    TPro  7.4  /  Alb  2.6<L>  /  TBili  0.4  /  DBili  x   /  AST  48<H>  /  ALT  54  /  AlkPhos  846<H>  04-25    Hemoglobin A1C:     LIVER FUNCTIONS - ( 25 Apr 2023 06:30 )  Alb: 2.6 g/dL / Pro: 7.4 g/dL / ALK PHOS: 846 U/L / ALT: 54 U/L / AST: 48 U/L / GGT: x           Vitamin B12   PT/INR - ( 26 Apr 2023 07:10 )   PT: 27.5 sec;   INR: 2.33 ratio               RADIOLOGY        ANALYSIS AND PLAN:  This is a 67-year-old with a history of atrial fibrillation, cerebrovascular accident, and seizure.  For history of atrial fibrillation and cerebrovascular accident, risks versus benefits.  The patient at present does have elevated INR, would recommend to hold for now with a goal INR between 2 and 3.  Questionable the patient will need any type of surgical intervention for his toe.  For history of seizure prophylaxis, continue the patient on Keppra.  For history of diabetes, strict control of blood sugars.  For history of hypertension, monitor systolic blood pressure.  Excisional debridement with 15 blade of left 4th toe  base down to subcutaneous tissue Left foot ulceration  monitor oral intake as needed   PICC line and antibiotics as needed     neurologic wise stable     Spoke with daughter  at bedside 4/26  Other daughter is Jeffrey.  Her telephone number is [phone] 4/20.  She understands my reasoning and thought process.    Greater than 31 minutes of time was spent with the patient, plan of care, reviewing data, with greater than 50% of the visit was spent counseling and/or coordinating care with multidisciplinary healthcare team

## 2023-04-26 NOTE — DISCHARGE NOTE NURSING/CASE MANAGEMENT/SOCIAL WORK - PATIENT PORTAL LINK FT
You can access the FollowMyHealth Patient Portal offered by [facility] by registering at the following website: [email or web address]. By joining [employer]’s FollowMyHealth portal, you will also be able to view your health information using other applications (apps) compatible with our system.

## 2023-04-26 NOTE — PROGRESS NOTE ADULT - ASSESSMENT
67 year old male with PMHx CVA (right sided hemiparesis and aphasia), AFib (on coumadin), IDDM, HTN, CAD s/p CABG, s/p AVR, presenting to [place] ED with wound to left 4th metatarsal.  Patient unable to provide history due to aphasia, history obtained from daughter at beside and per chart review.  Patient developed hematoma to left 4th metatarsal on Friday, it then ruptured and patient presented to Podiatry office (Dr. Maegan Jin) earlier today who instructed patient to come to ED for evaluation.  Patient has now new complaints at this time.  Patient does not follow with a vascular surgeon.   (18 Apr 2023 21:55)      will check ua , urine osmolality , urine sodium , urine uric acid , serum sodium , serum osmolality , serum uric acid , f/u with hyponatremia work up , f/u with bmp , monitor i and o      BP monitoring,continue current antihypertensive meds, low salt diet,followup with PMD in 1-2 weeks      continue current managmnet,O2 supply,anticoagulation plan as per cardiology consult

## 2023-04-26 NOTE — PROGRESS NOTE ADULT - REASON FOR ADMISSION
toe gangrene

## 2023-04-26 NOTE — PROGRESS NOTE ADULT - ASSESSMENT
67 year old male with PMHx CVA (right sided hemiparesis and aphasia), AFib (on coumadin), IDDM, HTN, CAD s/p CABG, s/p AVR, presenting to [place] ED with wound to left 4th metatarsal.  Presented to Podiatry office (Dr. Maegan Jin) who instructed patient to come to ED for evaluation.    Vascular recommending Recommend ABIs/PVR to assess degree of arterial disease    RECOMMENDATIONS  Podiatry: MRI with Osseous edema within the distal phalanx of the fourth digit without   loss of normal T1 fatty marrow possibly representing early osteomyelitis   versus reactive edema - will follow for any surgical plan per podiatry -continue abx for now    Vascular: JUAN A/PVR's reviewed; likely small vessel disease  Podiatry with no plan for surgical intervention at this time  Cont local wound care and close monitoring for healing  Noted no plan for vascular surgical intervention  Reviewed prior micro and no prior MRSA so have adjusted abx to ceftriaxone     Given imaging with concern for possible early OM and no Podiatric intervention, will treat with prolonged course IV antibiotics  Place PICC Line - order placed   Continue on Ceftriaxone 2 gram IV daily to complete 6 week course on 5/29/2023  Will need weekly labs CBC/CMP/CRP/ESR while on IV antibiotics - fax results to [phone]   -- called [facility] today and above orders given   Patient will follow up with me in ID office 1 week post discharge  67 year old male with PMHx CVA (right sided hemiparesis and aphasia), AFib (on coumadin), IDDM, HTN, CAD s/p CABG, s/p AVR, presenting to [place] ED with wound to left 4th metatarsal.  Presented to Podiatry office (Dr. Maegan Jin) who instructed patient to come to ED for evaluation.    Vascular recommending Recommend ABIs/PVR to assess degree of arterial disease    RECOMMENDATIONS  Podiatry: MRI with Osseous edema within the distal phalanx of the fourth digit without   loss of normal T1 fatty marrow possibly representing early osteomyelitis versus reactive edema    Vascular: JUAN A/PVR's reviewed; likely small vessel disease  Podiatry with no plan for surgical intervention at this time  Cont local wound care and close monitoring for healing  Noted no plan for vascular surgical intervention  Reviewed prior micro and no prior MRSA so have adjusted abx to ceftriaxone     Given imaging with concern for possible early OM and no Podiatric intervention, will treat with prolonged course IV antibiotics  PICC today  Continue on Ceftriaxone 2 gram IV daily to complete 6 week course on 5/29/2023  Will need weekly labs CBC/CMP/CRP/ESR while on IV antibiotics - fax results to [phone] -- orders already called into [facility] today    Infectious Diseases will continue to follow. Please call with any questions.   Becca Acosta M.D.  [facility] Division of Infectious Diseases [phone]

## 2023-04-26 NOTE — DISCHARGE NOTE PROVIDER - CARE PROVIDER_API CALL
Perlman, Craig D (DO)  Internal Medicine  [facility] [facility], [address]  [place] [address]  Phone: [phone]  Fax: [phone]  Follow Up Time:     Becca Acosta (MD)  Internal Medicine  [address] [place] [address], [address]  [place] [address]  Phone: [phone]  Fax: [phone]  Follow Up Time:

## 2023-04-26 NOTE — PROGRESS NOTE ADULT - SUBJECTIVE AND OBJECTIVE BOX
Patient is a 67y Male whom presented to the hospital with hyponatremia     PAST MEDICAL & SURGICAL HISTORY:  CVA (cerebral vascular accident)      HTN (hypertension)      DM (diabetes mellitus)      Arrhythmia      History of atrial fibrillation      S/P CABG (coronary artery bypass graft)      S/P brain surgery          MEDICATIONS  (STANDING):  aspirin enteric coated 81 milliGRAM(s) Oral daily  atorvastatin 10 milliGRAM(s) Oral at bedtime  dextrose 5%. 1000 milliLiter(s) (50 mL/Hr) IV Continuous <Continuous>  dextrose 5%. 1000 milliLiter(s) (100 mL/Hr) IV Continuous <Continuous>  dextrose 50% Injectable 25 Gram(s) IV Push once  dextrose 50% Injectable 12.5 Gram(s) IV Push once  dextrose 50% Injectable 25 Gram(s) IV Push once  furosemide    Tablet 40 milliGRAM(s) Oral daily  glucagon  Injectable 1 milliGRAM(s) IntraMuscular once  insulin glargine Injectable (LANTUS) 7 Unit(s) SubCutaneous at bedtime  insulin lispro (ADMELOG) corrective regimen sliding scale   SubCutaneous three times a day before meals  insulin lispro Injectable (ADMELOG) 2 Unit(s) SubCutaneous three times a day before meals  levETIRAcetam 750 milliGRAM(s) Oral two times a day  pantoprazole   Suspension 40 milliGRAM(s) Oral daily  piperacillin/tazobactam IVPB.. 3.375 Gram(s) IV Intermittent every 8 hours  propranolol 20 milliGRAM(s) Oral two times a day  vancomycin  IVPB 1000 milliGRAM(s) IV Intermittent every 12 hours      Allergies    No Known Allergies    Intolerances        SOCIAL HISTORY:  Denies ETOh,Smoking,     FAMILY HISTORY:  No pertinent family history in first degree relatives        REVIEW OF SYSTEMS:    CONSTITUTIONAL: No weakness, fevers or chills  EYES/ENT: No visual changes;  no throat pain   NECK: No pain or stiffness  RESPIRATORY: No cough, wheezing, hemoptysis; No shortness of breath  CARDIOVASCULAR: No chest pain or palpitations  GASTROINTESTINAL: No abdominal or epigastric pain. No nausea, vomiting,                                                 11.5   8.95  )-----------( 314      ( 26 Apr 2023 07:10 )             35.6       CBC Full  -  ( 26 Apr 2023 07:10 )  WBC Count : 8.95 K/uL  RBC Count : 4.15 M/uL  Hemoglobin : 11.5 g/dL  Hematocrit : 35.6 %  Platelet Count - Automated : 314 K/uL  Mean Cell Volume : 85.8 fl  Mean Cell Hemoglobin : 27.7 pg  Mean Cell Hemoglobin Concentration : 32.3 gm/dL  Auto Neutrophil # : x  Auto Lymphocyte # : x  Auto Monocyte # : x  Auto Eosinophil # : x  Auto Basophil # : x  Auto Neutrophil % : x  Auto Lymphocyte % : x  Auto Monocyte % : x  Auto Eosinophil % : x  Auto Basophil % : x      04-26    135  |  99  |  24<H>  ----------------------------<  183<H>  4.2   |  31  |  0.91    Ca    9.4      26 Apr 2023 07:10    TPro  7.6  /  Alb  2.7<L>  /  TBili  0.5  /  DBili  x   /  AST  56<H>  /  ALT  56  /  AlkPhos  949<H>  04-26      CAPILLARY BLOOD GLUCOSE      POCT Blood Glucose.: 179 mg/dL (26 Apr 2023 07:57)  POCT Blood Glucose.: 308 mg/dL (25 Apr 2023 21:14)  POCT Blood Glucose.: 193 mg/dL (25 Apr 2023 16:51)      Vital Signs Last 24 Hrs  T(C): 36.5 (26 Apr 2023 11:14), Max: 36.8 (26 Apr 2023 04:10)  T(F): 97.7 (26 Apr 2023 11:14), Max: 98.2 (26 Apr 2023 04:10)  HR: 54 (26 Apr 2023 11:14) (54 - 77)  BP: 126/71 (26 Apr 2023 11:14) (118/69 - 127/73)  BP(mean): --  RR: 18 (26 Apr 2023 11:14) (17 - 18)  SpO2: 95% (26 Apr 2023 11:14) (94% - 95%)    Parameters below as of 26 Apr 2023 11:14  Patient On (Oxygen Delivery Method): room air            PT/INR - ( 26 Apr 2023 07:10 )   PT: 27.5 sec;   INR: 2.33 ratio             PHYSICAL EXAM:    Constitutional: NAD  HEENT: conjunctive   clear   Neck:  No JVD  Respiratory: CTAB  Cardiovascular: S1 and S2  Gastrointestinal: BS+, soft, NT/ND  Extremities: No peripheral edema  Neurological: no focal deficits

## 2023-04-26 NOTE — DISCHARGE NOTE PROVIDER - NSDCCPCAREPLAN_GEN_ALL_CORE_FT
PRINCIPAL DISCHARGE DIAGNOSIS  Diagnosis: Infected wound  Assessment and Plan of Treatment: OM of the foot, to complete 6 weeks of antibiotics      SECONDARY DISCHARGE DIAGNOSES  Diagnosis: Hyponatremia  Assessment and Plan of Treatment:

## 2023-04-26 NOTE — PROGRESS NOTE ADULT - SUBJECTIVE AND OBJECTIVE BOX
Date/Time Patient Seen:  		  Referring MD:   Data Reviewed	       Patient is a 67y old  Male who presents with a chief complaint of toe gangrene (25 Apr 2023 17:52)      Subjective/HPI     PAST MEDICAL & SURGICAL HISTORY:  CVA (cerebral vascular accident)    HTN (hypertension)    DM (diabetes mellitus)    Arrhythmia    History of atrial fibrillation    S/P CABG (coronary artery bypass graft)    S/P brain surgery          Medication list         MEDICATIONS  (STANDING):  aspirin enteric coated 81 milliGRAM(s) Oral daily  atorvastatin 10 milliGRAM(s) Oral at bedtime  cefTRIAXone   IVPB 2000 milliGRAM(s) IV Intermittent every 24 hours  dextrose 5%. 1000 milliLiter(s) (100 mL/Hr) IV Continuous <Continuous>  dextrose 5%. 1000 milliLiter(s) (50 mL/Hr) IV Continuous <Continuous>  dextrose 50% Injectable 12.5 Gram(s) IV Push once  dextrose 50% Injectable 25 Gram(s) IV Push once  dextrose 50% Injectable 25 Gram(s) IV Push once  furosemide    Tablet 40 milliGRAM(s) Oral daily  glucagon  Injectable 1 milliGRAM(s) IntraMuscular once  insulin glargine Injectable (LANTUS) 25 Unit(s) SubCutaneous at bedtime  insulin lispro (ADMELOG) corrective regimen sliding scale   SubCutaneous at bedtime  insulin lispro (ADMELOG) corrective regimen sliding scale   SubCutaneous three times a day before meals  insulin lispro Injectable (ADMELOG) 10 Unit(s) SubCutaneous three times a day before meals  levETIRAcetam 750 milliGRAM(s) Oral two times a day  metoprolol succinate ER 25 milliGRAM(s) Oral daily  pantoprazole   Suspension 40 milliGRAM(s) Oral daily  povidone iodine 10% Solution 1 Application(s) Topical daily  spironolactone 25 milliGRAM(s) Oral daily    MEDICATIONS  (PRN):  acetaminophen     Tablet .. 650 milliGRAM(s) Oral every 6 hours PRN Temp greater or equal to 38C (100.4F), Mild Pain (1 - 3)  dextrose Oral Gel 15 Gram(s) Oral once PRN Blood Glucose LESS THAN 70 milliGRAM(s)/deciliter         Vitals log        ICU Vital Signs Last 24 Hrs  T(C): 36.8 (26 Apr 2023 04:10), Max: 36.8 (26 Apr 2023 04:10)  T(F): 98.2 (26 Apr 2023 04:10), Max: 98.2 (26 Apr 2023 04:10)  HR: 69 (26 Apr 2023 04:10) (69 - 77)  BP: 127/73 (26 Apr 2023 04:10) (118/69 - 127/73)  BP(mean): --  ABP: --  ABP(mean): --  RR: 18 (26 Apr 2023 04:10) (17 - 18)  SpO2: 94% (26 Apr 2023 04:10) (94% - 95%)    O2 Parameters below as of 26 Apr 2023 04:10  Patient On (Oxygen Delivery Method): room air                 Input and Output:  I&O's Detail    24 Apr 2023 07:01  -  25 Apr 2023 07:00  --------------------------------------------------------  IN:    Oral Fluid: 100 mL  Total IN: 100 mL    OUT:    Voided (mL): 550 mL  Total OUT: 550 mL    Total NET: -450 mL      25 Apr 2023 07:01  -  26 Apr 2023 05:57  --------------------------------------------------------  IN:    IV PiggyBack: 100 mL    Oral Fluid: 440 mL  Total IN: 540 mL    OUT:    Voided (mL): 200 mL  Total OUT: 200 mL    Total NET: 340 mL          Lab Data                        11.0   8.83  )-----------( 304      ( 25 Apr 2023 06:30 )             34.6     04-25    136  |  100  |  19  ----------------------------<  112<H>  3.8   |  30  |  0.87    Ca    9.0      25 Apr 2023 06:30    TPro  7.4  /  Alb  2.6<L>  /  TBili  0.4  /  DBili  x   /  AST  48<H>  /  ALT  54  /  AlkPhos  846<H>  04-25            Review of Systems	      Objective     Physical Examination  heart s1s2  lung dc BS  head nc        Pertinent Lab findings & Imaging      Bryan:  NO   Adequate UO     I&O's Detail    24 Apr 2023 07:01  -  25 Apr 2023 07:00  --------------------------------------------------------  IN:    Oral Fluid: 100 mL  Total IN: 100 mL    OUT:    Voided (mL): 550 mL  Total OUT: 550 mL    Total NET: -450 mL      25 Apr 2023 07:01  -  26 Apr 2023 05:57  --------------------------------------------------------  IN:    IV PiggyBack: 100 mL    Oral Fluid: 440 mL  Total IN: 540 mL    OUT:    Voided (mL): 200 mL  Total OUT: 200 mL    Total NET: 340 mL               Discussed with:     Cultures:	        Radiology

## 2023-04-26 NOTE — PROGRESS NOTE ADULT - NUTRITIONAL ASSESSMENT
MEDICATIONS  (STANDING):  aspirin enteric coated 81 milliGRAM(s) Oral daily  atorvastatin 10 milliGRAM(s) Oral at bedtime  cefTRIAXone   IVPB 2000 milliGRAM(s) IV Intermittent every 24 hours  dextrose 5%. 1000 milliLiter(s) (100 mL/Hr) IV Continuous <Continuous>  dextrose 5%. 1000 milliLiter(s) (50 mL/Hr) IV Continuous <Continuous>  dextrose 50% Injectable 12.5 Gram(s) IV Push once  dextrose 50% Injectable 25 Gram(s) IV Push once  dextrose 50% Injectable 25 Gram(s) IV Push once  furosemide    Tablet 40 milliGRAM(s) Oral daily  glucagon  Injectable 1 milliGRAM(s) IntraMuscular once  insulin glargine Injectable (LANTUS) 25 Unit(s) SubCutaneous at bedtime  insulin lispro (ADMELOG) corrective regimen sliding scale   SubCutaneous at bedtime  insulin lispro (ADMELOG) corrective regimen sliding scale   SubCutaneous three times a day before meals  insulin lispro Injectable (ADMELOG) 10 Unit(s) SubCutaneous three times a day before meals  levETIRAcetam 750 milliGRAM(s) Oral two times a day  metoprolol succinate ER 25 milliGRAM(s) Oral daily  pantoprazole   Suspension 40 milliGRAM(s) Oral daily  povidone iodine 10% Solution 1 Application(s) Topical daily  spironolactone 25 milliGRAM(s) Oral daily
MEDICATIONS  (STANDING):  aspirin enteric coated 81 milliGRAM(s) Oral daily  atorvastatin 10 milliGRAM(s) Oral at bedtime  dextrose 5%. 1000 milliLiter(s) (50 mL/Hr) IV Continuous <Continuous>  dextrose 5%. 1000 milliLiter(s) (100 mL/Hr) IV Continuous <Continuous>  dextrose 50% Injectable 25 Gram(s) IV Push once  dextrose 50% Injectable 12.5 Gram(s) IV Push once  dextrose 50% Injectable 25 Gram(s) IV Push once  furosemide    Tablet 40 milliGRAM(s) Oral daily  glucagon  Injectable 1 milliGRAM(s) IntraMuscular once  insulin glargine Injectable (LANTUS) 7 Unit(s) SubCutaneous at bedtime  insulin lispro (ADMELOG) corrective regimen sliding scale   SubCutaneous three times a day before meals  insulin lispro Injectable (ADMELOG) 2 Unit(s) SubCutaneous three times a day before meals  levETIRAcetam 750 milliGRAM(s) Oral two times a day  pantoprazole   Suspension 40 milliGRAM(s) Oral daily  piperacillin/tazobactam IVPB.. 3.375 Gram(s) IV Intermittent every 8 hours  propranolol 20 milliGRAM(s) Oral two times a day  vancomycin  IVPB 1000 milliGRAM(s) IV Intermittent every 12 hours
MEDICATIONS  (STANDING):  aspirin enteric coated 81 milliGRAM(s) Oral daily  atorvastatin 10 milliGRAM(s) Oral at bedtime  cefTRIAXone   IVPB 2000 milliGRAM(s) IV Intermittent every 24 hours  dextrose 5%. 1000 milliLiter(s) (100 mL/Hr) IV Continuous <Continuous>  dextrose 5%. 1000 milliLiter(s) (50 mL/Hr) IV Continuous <Continuous>  dextrose 50% Injectable 12.5 Gram(s) IV Push once  dextrose 50% Injectable 25 Gram(s) IV Push once  dextrose 50% Injectable 25 Gram(s) IV Push once  furosemide    Tablet 40 milliGRAM(s) Oral daily  glucagon  Injectable 1 milliGRAM(s) IntraMuscular once  insulin glargine Injectable (LANTUS) 25 Unit(s) SubCutaneous at bedtime  insulin lispro (ADMELOG) corrective regimen sliding scale   SubCutaneous at bedtime  insulin lispro (ADMELOG) corrective regimen sliding scale   SubCutaneous three times a day before meals  insulin lispro Injectable (ADMELOG) 10 Unit(s) SubCutaneous three times a day before meals  levETIRAcetam 750 milliGRAM(s) Oral two times a day  metoprolol succinate ER 25 milliGRAM(s) Oral daily  pantoprazole   Suspension 40 milliGRAM(s) Oral daily  povidone iodine 10% Solution 1 Application(s) Topical daily  spironolactone 25 milliGRAM(s) Oral daily
MEDICATIONS  (STANDING):  aspirin enteric coated 81 milliGRAM(s) Oral daily  atorvastatin 10 milliGRAM(s) Oral at bedtime  dextrose 5%. 1000 milliLiter(s) (50 mL/Hr) IV Continuous <Continuous>  dextrose 5%. 1000 milliLiter(s) (100 mL/Hr) IV Continuous <Continuous>  dextrose 50% Injectable 25 Gram(s) IV Push once  dextrose 50% Injectable 12.5 Gram(s) IV Push once  dextrose 50% Injectable 25 Gram(s) IV Push once  furosemide    Tablet 40 milliGRAM(s) Oral daily  glucagon  Injectable 1 milliGRAM(s) IntraMuscular once  insulin glargine Injectable (LANTUS) 7 Unit(s) SubCutaneous at bedtime  insulin lispro (ADMELOG) corrective regimen sliding scale   SubCutaneous three times a day before meals  insulin lispro Injectable (ADMELOG) 2 Unit(s) SubCutaneous three times a day before meals  levETIRAcetam 750 milliGRAM(s) Oral two times a day  pantoprazole   Suspension 40 milliGRAM(s) Oral daily  piperacillin/tazobactam IVPB.. 3.375 Gram(s) IV Intermittent every 8 hours  propranolol 20 milliGRAM(s) Oral two times a day  vancomycin  IVPB 1000 milliGRAM(s) IV Intermittent every 12 hours
MEDICATIONS  (STANDING):  aspirin enteric coated 81 milliGRAM(s) Oral daily  atorvastatin 10 milliGRAM(s) Oral at bedtime  dextrose 5%. 1000 milliLiter(s) (50 mL/Hr) IV Continuous <Continuous>  dextrose 5%. 1000 milliLiter(s) (100 mL/Hr) IV Continuous <Continuous>  dextrose 50% Injectable 25 Gram(s) IV Push once  dextrose 50% Injectable 12.5 Gram(s) IV Push once  dextrose 50% Injectable 25 Gram(s) IV Push once  furosemide    Tablet 40 milliGRAM(s) Oral daily  glucagon  Injectable 1 milliGRAM(s) IntraMuscular once  insulin glargine Injectable (LANTUS) 7 Unit(s) SubCutaneous at bedtime  insulin lispro (ADMELOG) corrective regimen sliding scale   SubCutaneous three times a day before meals  insulin lispro Injectable (ADMELOG) 2 Unit(s) SubCutaneous three times a day before meals  levETIRAcetam 750 milliGRAM(s) Oral two times a day  pantoprazole   Suspension 40 milliGRAM(s) Oral daily  piperacillin/tazobactam IVPB.. 3.375 Gram(s) IV Intermittent every 8 hours  propranolol 20 milliGRAM(s) Oral two times a day  vancomycin  IVPB 1000 milliGRAM(s) IV Intermittent every 12 hours

## 2023-04-26 NOTE — PROGRESS NOTE ADULT - ASSESSMENT
67 year old male with PMHx CVA (right sided hemiparesis and aphasia), AFib (on coumadin), IDDM, HTN, CAD s/p CABG, s/p AVR, presenting to [place] ED with wound to left 4th metatarsal    cva hx of craniectomy  AF  OP  OA  foot wound  CAD  DM  HTN  left eff - atelectasis  coagulopathy     mri - c/w OM  vs noted  on ABX  on LASIX - Aldactone  ID follow up  Podiatry follow up    cxr noted - consistent with prior CT imaging and CXR - rounded Atelectasis - chr change  no evidence of PNA  wound infection - ID eval - Podiatric eval  pain assessment  DM care  cvs rx regimen  serial COAGS -   assist with needs  fall prec  dietary discretion  old records reviewed - labs - imaging - notes  spoke with family on admission at bedside

## 2023-04-26 NOTE — CASE MANAGEMENT PROGRESS NOTE - NSCMPROGRESSNOTE_GEN_ALL_CORE
Discussed patient on rounds this morning and with Dr. Harmon. Per Dr. Harmon patient is medically cleared for discharge home today after 2pm dose of IV Ceftriaxone. Referral to [facility] has been updated and SOC has been confirmed for SOC 4/27/23. Referral has been sent to [facility] for a visiting nurse SOC 4/27/23. CM met with the patient and his daughter Martell and contacted the patient's daughter Jeffrey [phone] to reinforce the transition plan for today. ROGER advised patient's daughters that SOC for [facility] has been confirmed for tomorrow and Jeffrey confirmed again that she will be there to learn IV abx. Jeffrey stated she will transport the patient home today. Bedside RN aware of discharge plan. Patient's daughters verbalized understanding of the transition plan for today and are in agreement. CM remains available.

## 2023-05-11 ENCOUNTER — EMERGENCY (EMERGENCY)
Facility: HOSPITAL | Age: 68
LOS: 1 days | Discharge: HOME | End: 2023-05-11
Attending: EMERGENCY MEDICINE | Admitting: EMERGENCY MEDICINE
Payer: MEDICARE

## 2023-05-11 VITALS
SYSTOLIC BLOOD PRESSURE: 122 MMHG | TEMPERATURE: 97 F | DIASTOLIC BLOOD PRESSURE: 70 MMHG | RESPIRATION RATE: 16 BRPM | HEIGHT: 70 IN | OXYGEN SATURATION: 98 % | HEART RATE: 73 BPM | WEIGHT: 149.91 LBS

## 2023-05-11 DIAGNOSIS — Z98.890 OTHER SPECIFIED POSTPROCEDURAL STATES: Chronic | ICD-10-CM

## 2023-05-11 DIAGNOSIS — Z95.1 PRESENCE OF AORTOCORONARY BYPASS GRAFT: Chronic | ICD-10-CM

## 2023-05-11 LAB
ALBUMIN SERPL ELPH-MCNC: 2.8 G/DL — LOW (ref 3.3–5)
ALP SERPL-CCNC: 688 U/L — HIGH (ref 40–120)
ALT FLD-CCNC: 51 U/L — SIGNIFICANT CHANGE UP (ref 12–78)
ANION GAP SERPL CALC-SCNC: 3 MMOL/L — LOW (ref 5–17)
APTT BLD: 36.8 SEC — HIGH (ref 27.5–35.5)
AST SERPL-CCNC: 61 U/L — HIGH (ref 15–37)
BASOPHILS # BLD AUTO: 0.04 K/UL — SIGNIFICANT CHANGE UP (ref 0–0.2)
BASOPHILS NFR BLD AUTO: 0.5 % — SIGNIFICANT CHANGE UP (ref 0–2)
BILIRUB SERPL-MCNC: 0.5 MG/DL — SIGNIFICANT CHANGE UP (ref 0.2–1.2)
BUN SERPL-MCNC: 17 MG/DL — SIGNIFICANT CHANGE UP (ref 7–23)
CALCIUM SERPL-MCNC: 9 MG/DL — SIGNIFICANT CHANGE UP (ref 8.5–10.1)
CHLORIDE SERPL-SCNC: 96 MMOL/L — SIGNIFICANT CHANGE UP (ref 96–108)
CO2 SERPL-SCNC: 31 MMOL/L — SIGNIFICANT CHANGE UP (ref 22–31)
CREAT SERPL-MCNC: 0.91 MG/DL — SIGNIFICANT CHANGE UP (ref 0.5–1.3)
EGFR: 94 ML/MIN/1.73M2 — SIGNIFICANT CHANGE UP
EOSINOPHIL # BLD AUTO: 0.26 K/UL — SIGNIFICANT CHANGE UP (ref 0–0.5)
EOSINOPHIL NFR BLD AUTO: 3.4 % — SIGNIFICANT CHANGE UP (ref 0–6)
GLUCOSE SERPL-MCNC: 245 MG/DL — HIGH (ref 70–99)
HCT VFR BLD CALC: 35.2 % — LOW (ref 39–50)
HGB BLD-MCNC: 11.5 G/DL — LOW (ref 13–17)
IMM GRANULOCYTES NFR BLD AUTO: 0.1 % — SIGNIFICANT CHANGE UP (ref 0–0.9)
INR BLD: 1.95 RATIO — HIGH (ref 0.88–1.16)
LYMPHOCYTES # BLD AUTO: 1.14 K/UL — SIGNIFICANT CHANGE UP (ref 1–3.3)
LYMPHOCYTES # BLD AUTO: 15.1 % — SIGNIFICANT CHANGE UP (ref 13–44)
MCHC RBC-ENTMCNC: 28 PG — SIGNIFICANT CHANGE UP (ref 27–34)
MCHC RBC-ENTMCNC: 32.7 GM/DL — SIGNIFICANT CHANGE UP (ref 32–36)
MCV RBC AUTO: 85.9 FL — SIGNIFICANT CHANGE UP (ref 80–100)
MONOCYTES # BLD AUTO: 0.78 K/UL — SIGNIFICANT CHANGE UP (ref 0–0.9)
MONOCYTES NFR BLD AUTO: 10.3 % — SIGNIFICANT CHANGE UP (ref 2–14)
NEUTROPHILS # BLD AUTO: 5.31 K/UL — SIGNIFICANT CHANGE UP (ref 1.8–7.4)
NEUTROPHILS NFR BLD AUTO: 70.6 % — SIGNIFICANT CHANGE UP (ref 43–77)
NRBC # BLD: 0 /100 WBCS — SIGNIFICANT CHANGE UP (ref 0–0)
PLATELET # BLD AUTO: 293 K/UL — SIGNIFICANT CHANGE UP (ref 150–400)
POTASSIUM SERPL-MCNC: 4.8 MMOL/L — SIGNIFICANT CHANGE UP (ref 3.5–5.3)
POTASSIUM SERPL-SCNC: 4.8 MMOL/L — SIGNIFICANT CHANGE UP (ref 3.5–5.3)
PROT SERPL-MCNC: 7.9 G/DL — SIGNIFICANT CHANGE UP (ref 6–8.3)
PROTHROM AB SERPL-ACNC: 23 SEC — HIGH (ref 10.5–13.4)
RBC # BLD: 4.1 M/UL — LOW (ref 4.2–5.8)
RBC # FLD: 14.9 % — HIGH (ref 10.3–14.5)
SODIUM SERPL-SCNC: 130 MMOL/L — LOW (ref 135–145)
WBC # BLD: 7.54 K/UL — SIGNIFICANT CHANGE UP (ref 3.8–10.5)
WBC # FLD AUTO: 7.54 K/UL — SIGNIFICANT CHANGE UP (ref 3.8–10.5)

## 2023-05-11 PROCEDURE — 99285 EMERGENCY DEPT VISIT HI MDM: CPT

## 2023-05-11 PROCEDURE — 71045 X-RAY EXAM CHEST 1 VIEW: CPT | Mod: 26

## 2023-05-11 RX ORDER — CEFTRIAXONE 500 MG/1
2000 INJECTION, POWDER, FOR SOLUTION INTRAMUSCULAR; INTRAVENOUS ONCE
Refills: 0 | Status: COMPLETED | OUTPATIENT
Start: 2023-05-11 | End: 2023-05-11

## 2023-05-11 RX ADMIN — CEFTRIAXONE 100 MILLIGRAM(S): 500 INJECTION, POWDER, FOR SOLUTION INTRAMUSCULAR; INTRAVENOUS at 20:29

## 2023-05-11 RX ADMIN — CEFTRIAXONE 2000 MILLIGRAM(S): 500 INJECTION, POWDER, FOR SOLUTION INTRAMUSCULAR; INTRAVENOUS at 21:27

## 2023-05-11 NOTE — ED PROVIDER NOTE - CLINICAL SUMMARY MEDICAL DECISION MAKING FREE TEXT BOX
History per daughter  Patient with osteomyelitis of his foot who has a PICC line in order to receive ceftriaxone 2 g every night brought in by daughter due to having pulled out his PICC line today.    Plan we will give ceftriaxone via peripheral IV get chest x-ray labs and call radiology to arrange IR placement of new PICC in morning

## 2023-05-11 NOTE — ED ADULT NURSE NOTE - NSFALLHARMRISKINTERV_ED_ALL_ED
Assistance OOB with selected safe patient handling equipment if applicable/Communicate risk of Fall with Harm to all staff, patient, and family/Monitor gait and stability/Monitor for mental status changes and reorient to person, place, and time, as needed/Move patient closer to nursing station/within visual sight of ED staff/Provide patient with walking aids/Provide visual cue: red socks, yellow wristband, yellow gown, etc/Reinforce activity limits and safety measures with patient and family/Toileting schedule using arm’s reach rule for commode and bathroom/Use of alarms - bed, stretcher, chair and/or video monitoring/Bed in lowest position, wheels locked, appropriate side rails in place/Call bell, personal items and telephone in reach/Instruct patient to call for assistance before getting out of bed/chair/stretcher/Non-slip footwear applied when patient is off stretcher/[place] to call system/Physically safe environment - no spills, clutter or unnecessary equipment/Purposeful Proactive Rounding/Room/bathroom lighting operational, light cord in reach

## 2023-05-11 NOTE — ED PROVIDER NOTE - OBJECTIVE STATEMENT
History per daughter as she relates pt unable to provide due to stroke  Patient with osteomyelitis of his foot who has a PICC line in order to receive ceftriaxone 2 g every night brought in by daughter due to having pulled out his PICC line today.

## 2023-05-11 NOTE — ED PROVIDER NOTE - NSFOLLOWUPINSTRUCTIONS_ED_ALL_ED_FT
PICC Insertion  A person's arm and chest showing the heart, with a close-up of a PICC placed in a vein in the arm.  A peripherally inserted central catheter (PICC) is a form of IV access that allows medicines and IV fluids to be quickly put into the blood and spread throughout the body. The PICC is a long, thin, flexible tube (catheter) that is put into a vein in a person's arm or leg. The PICC is guided through the vein until the tip or end of it is in a large vein called the superior vena cava (SVC) just outside the heart. After the PICC is put in, a chest X-ray may be done to make sure that it is in the right place.    Only a health care provider trained in PICC insertion will do this procedure.    A PICC may be inserted for use in a short-term facility, in a long-term facility, or at home with home health nursing care.    You may have a PICC placed:  To give medicines and nutrition for a few weeks, months, or even longer.  To give fluids or blood products quickly.  To take blood samples often.  If there is a problem placing a peripheral intravenous (PIV) catheter.  Tell a health care provider about:  Any allergies you have.  All medicines you are taking, including vitamins, herbs, eye drops, creams, and over-the-counter medicines.  Any problems you or family members have had with anesthetic medicines.  Any bleeding problems you have.  Any surgeries you have had.  Any medical conditions you have.  Whether you are pregnant or may be pregnant.  What are the risks?  Generally, this is a safe procedure. However, problems may occur, including:  Bleeding. This may happen at the insertion site or internally.  Infection. This may occur at the insertion site or in the blood.  PICC malposition. This is movement or poor placement of the PICC.  Inflammation of the vein (phlebitis).  Nerve injury or irritation.  Clot formation at the tip of the PICC.  Other risks include:  Blood clot in the lung (pulmonary embolus).  Injury or collapse of the lung (pneumothorax).  Injury to the large blood vessels or the heart.  What happens before the procedure?  Ask your health care provider about:  Changing or stopping your regular medicines. This is especially important if you are taking diabetes medicines or blood thinners.  Taking medicines such as aspirin and ibuprofen. These medicines can thin your blood. Do not take these medicines unless your health care provider tells you to take them.  Taking over-the-counter medicines, vitamins, herbs, and supplements.  You may have blood tests. These tests can help tell how well your blood clots.  Follow instructions from your health care provider about eating or drinking restrictions.  If you will be going home right after the procedure, plan to have a responsible adult:  Take you home from the hospital or clinic. You will not be allowed to drive.  Care for you for the time you are told.  Ask your health care provider what steps will be taken to help prevent infection. These steps may include:  Removing hair at the surgery site.  Washing skin with a germ-killing soap.  What happens during the procedure?  Your health care provider will find a vein into which the PICC will be inserted. This may be done using ultrasound or X-ray guidance (fluoroscopy).  You might be given one or more of the following:  A medicine to help you relax (sedative).  A medicine to numb the area (local anesthetic).  A tourniquet will be placed on your arm to control blood flow to the area.  Large drapes might be placed over your body where the PICC will be inserted.  A small needle will be put into your vein, and then a small guidewire will be slid into your SVC.  The catheter will be advanced over the guidewire and moved into place. The guidewire will then be removed.  The catheter will be flushed, and blood will be drawn back to make sure the catheter is in the vein.  If the catheter was placed without X-ray guidance, an X-ray will be done to make sure that the catheter tip is in the correct position.  The PICC will be secured to your skin using stitches (sutures), tape, or a device that sticks to your skin.  A type of germ-free bandage (sterile dressing), such as an airtight (occlusive) sterile dressing, will be placed over the PICC insertion site.  The procedure may vary among health care providers and hospitals.    What happens after the procedure?  Your blood pressure, heart rate, breathing rate, and blood oxygen level will be monitored until you leave the hospital or clinic.  You will be told how to care for your PICC.  If you were given a sedative during the procedure, it can affect you for several hours. Do not drive or operate machinery until your health care provider says that it is safe.  Summary  A peripherally inserted central catheter (PICC) is a form of IV access that allows medicines and IV fluids to be quickly put into the blood and spread throughout the body.  The PICC is a long, thin, flexible tube that is put into a vein in your arm or leg and then guided to a large vein just outside your heart.  You will be told how to care for your PICC.  This information is not intended to replace advice given to you by your health care provider. Make sure you discuss any questions you have with your health care provider.

## 2023-05-11 NOTE — ED ADULT TRIAGE NOTE - CHIEF COMPLAINT QUOTE
Pt's daughter states that pt pulled his PICC line on his RUE today. Pt's daughter states that pt has been getting IV ABX through the PICC line for osteomyelitis.

## 2023-05-11 NOTE — ED PROVIDER NOTE - PATIENT PORTAL LINK FT
You can access the FollowMyHealth Patient Portal offered by [facility] by registering at the following website: [email or web address]. By joining [employer]’s FollowMyHealth portal, you will also be able to view your health information using other applications (apps) compatible with our system.

## 2023-05-11 NOTE — ED ADULT NURSE NOTE - OBJECTIVE STATEMENT
received pt from triage awake brought by daughter for removal of picc line by pt  at home pt evaluated and blood work done xray done and medicated as ordered awaiting received pt from triage awake brought by daughter for removal of picc line by pt  at home pt evaluated and blood work done xray done and medicated as ordered awaiting pt with rt arm old picc site and bilateral feet with toes wounds covered and smelling foul odor noted

## 2023-05-11 NOTE — ED PROVIDER NOTE - PROGRESS NOTE DETAILS
called radiology on-call supervisor, he will contact DANIEL WOODWARD radiology supervisor relates can put in order for IR PICC, they will do in AM PICC placed by wojciech SANCHEZ to d/c home

## 2023-05-12 VITALS
TEMPERATURE: 98 F | HEART RATE: 67 BPM | DIASTOLIC BLOOD PRESSURE: 63 MMHG | RESPIRATION RATE: 18 BRPM | OXYGEN SATURATION: 97 % | SYSTOLIC BLOOD PRESSURE: 119 MMHG

## 2023-05-12 PROCEDURE — 85610 PROTHROMBIN TIME: CPT

## 2023-05-12 PROCEDURE — 36415 COLL VENOUS BLD VENIPUNCTURE: CPT

## 2023-05-12 PROCEDURE — 99284 EMERGENCY DEPT VISIT MOD MDM: CPT | Mod: 25

## 2023-05-12 PROCEDURE — 96365 THER/PROPH/DIAG IV INF INIT: CPT

## 2023-05-12 PROCEDURE — 36573 INSJ PICC RS&I 5 YR+: CPT

## 2023-05-12 PROCEDURE — C1769: CPT

## 2023-05-12 PROCEDURE — C1751: CPT

## 2023-05-12 PROCEDURE — 76937 US GUIDE VASCULAR ACCESS: CPT | Mod: 26

## 2023-05-12 PROCEDURE — 80053 COMPREHEN METABOLIC PANEL: CPT

## 2023-05-12 PROCEDURE — 85025 COMPLETE CBC W/AUTO DIFF WBC: CPT

## 2023-05-12 PROCEDURE — 85730 THROMBOPLASTIN TIME PARTIAL: CPT

## 2023-05-12 PROCEDURE — 71045 X-RAY EXAM CHEST 1 VIEW: CPT

## 2023-05-12 PROCEDURE — 76937 US GUIDE VASCULAR ACCESS: CPT

## 2023-05-12 PROCEDURE — 77001 FLUOROGUIDE FOR VEIN DEVICE: CPT

## 2023-05-12 NOTE — BRIEF OPERATIVE NOTE - NSICDXBRIEFPROCEDURE_GEN_ALL_CORE_FT
PROCEDURES:  Insertion of peripherally inserted central catheter (PICC) with imaging guidance 12-May-2023 09:26:26  Sidney Weathers

## 2023-06-01 NOTE — H&P PST ADULT - PROBLEM SELECTOR PLAN 4
Labs CBC, CMP, A1C, PTT/PT/INR, and T&S   EKG from 4/18/2023 on chart  Medical, Endocrine, and Cardiology/Vascular clearance necessary  Pre op instructions reviewed and given.   Take routine am meds with sip of water.    Hold Metformin 6/5, reduce Glargine to 36 units night prior to procedure, may take usual dose Aspart at dinner 6/5.  Stop ASA 5/30, Stop Plavix & Coumadin 6/1, and Hold enoxaparin 12 hrs pre-op.  Instructed to hold and/or avoid other NSAIDs and OTC supplements. Tylenol is ok. Verbalized understanding

## 2023-06-01 NOTE — H&P PST ADULT - PSYCHIATRIC
negative AXOX1 person, unable to assess d/t neurological deficits/normal/normal affect/normal behavior

## 2023-06-01 NOTE — H&P PST ADULT - HISTORY OF PRESENT ILLNESS
66 y/o male here with daughter who is providing information with [facility] of CVA ( 2015 with right sided hemiparesis and aphasia), Seizure (following stroke, last seizure 2015), Atrial Fibrillation with multiple DVT (on Coumadin, Enoxaparin, Plavix, & ASA), DM2 (last A1C 4/20/2023 was 13.5, referred to Endo and placed on insulin), HTN, CAD s/p CABG, & Atrial Valve Replacement and SHx CABG, AVR, and embolectomy for PST having Amputation of Left 4th Toe by Dr. Jin on 6/6/2023.  Patient recently hospitalized for upper GI bleed 11/2022 and was transfused 3 units PRBCs 1/2023.  Here for elective procedure.  68 y/o male here with daughter who is providing information with [facility] of CVA ( 2015 with right sided hemiparesis and aphasia), Seizure (following stroke, last seizure 2015), Atrial Fibrillation with multiple DVT (on Coumadin, Enoxaparin, Plavix, & ASA), DM2 (last A1C 4/20/2023 was 13.5, referred to Endo and placed on insulin), HTN, CAD s/p CABG, & Atrial Valve Replacement and SHx CABG, AVR, and embolectomy for PST having Amputation of Left 4th Toe by Dr. Jin on 6/6/2023.  Patient recently hospitalized for upper GI bleed 11/2022 and was transfused 3 units PRBCs 1/2023.  Had an additional hospitalization 4/18/2023 for osteomyelitis, discharged with PICC and IV antibiotics.  PICC was removed 5/31/2023.  Here for elective procedure.  68 y/o male here with daughter who is providing information with [facility] of CVA ( 2015 with right sided hemiparesis and aphasia), Seizure (following stroke, last seizure 2015), Atrial Fibrillation with multiple DVT (on Coumadin, Enoxaparin, Plavix, & ASA), DM2 (last A1C 4/20/2023 was 13.5, referred to Endo and placed on insulin), Osteomyelitis, HTN, CAD s/p CABG, & Atrial Valve Replacement and SHx CABG, AVR, and embolectomy for PST having Amputation of Left 4th Toe by Dr. Jin on 6/6/2023.  Patient recently hospitalized for upper GI bleed 11/2022 and was transfused 3 units PRBCs 1/2023.  Had an additional hospitalization 4/18/2023 for osteomyelitis, discharged with PICC and IV antibiotics.  PICC was removed 5/31/2023.  Here for elective procedure.  68 y/o male here with daughter who is providing information with [facility] of CVA ( in 2015 following Intercranial Hemorrhage s/p Craniotomy with right sided hemiparesis and aphasia), Seizure (following CVA while hospitalized, last seizure 2015), Atrial Fibrillation ?multiple DVTs? denies bloo0d clotting disorder (on Coumadin, Enoxaparin, Plavix, & ASA), DM2 (last A1C 4/20/2023 was 13.5, referred to Endo and placed on insulin), Osteomyelitis (completed IV antibiotics 5/29/2023), HTN, CAD s/p CABG, & Metallic Atrial Valve Replacement and SHx CABG, AVR, and embolectomy for PST having Amputation of Left 4th Toe by Dr. Jin on 6/6/2023.  Patient recently hospitalized for upper GI bleed 12/3/2022 -12/4/2022 and was transfused 3 units PRBCs, pharmacy notified provider regarding supre-therapeutic INR.  Had an additional hospitalization 4/18/2023-4/26/2023 for osteomyelitis, discharged with PICC and IV antibiotics.  PICC was removed 5/31/2023.  Here for elective procedure.

## 2023-06-01 NOTE — H&P PST ADULT - NSICDXPASTMEDICALHX_GEN_ALL_CORE_FT
PAST MEDICAL HISTORY:  Aphasia due to acute stroke     CVA (cerebral vascular accident)     DM (diabetes mellitus)     History of atrial fibrillation     HTN (hypertension)     Right hemiparesis     Upper GI bleed      PAST MEDICAL HISTORY:  Aphasia due to acute stroke     CVA (cerebral vascular accident)     DM (diabetes mellitus)     History of atrial fibrillation     HTN (hypertension)     Osteomyelitis     Right hemiparesis     Upper GI bleed

## 2023-06-01 NOTE — H&P PST ADULT - NSICDXPROCEDURE_GEN_ALL_CORE_FT
PROCEDURES:  Amputation of toe at metatarsophalangeal joint 01-Jun-2023 15:28:20 Left Laureen Kurtz  Amputation of toe of left foot at interphalangeal joint 01-Jun-2023 15:28:42  Laureen Kurtz

## 2023-06-01 NOTE — H&P PST ADULT - NSICDXPASTSURGICALHX_GEN_ALL_CORE_FT
PAST SURGICAL HISTORY:  History of aortic valve repair     S/P brain surgery     S/P CABG (coronary artery bypass graft)

## 2023-06-01 NOTE — H&P PST ADULT - NSICDXFAMILYHX_GEN_ALL_CORE_FT
FAMILY HISTORY:  Sibling  Still living? Unknown  FH: coronary artery disease, Age at diagnosis: Age Unknown  FH: type 2 diabetes, Age at diagnosis: Age Unknown

## 2023-06-01 NOTE — H&P PST ADULT - NSANTHOSAYNRD_GEN_A_CORE
No. HSADI screening performed.  STOP BANG Legend: 0-2 = LOW Risk; 3-4 = INTERMEDIATE Risk; 5-8 = HIGH Risk

## 2023-06-05 NOTE — ED ADULT NURSE NOTE - NSICDXPASTMEDICALHX_GEN_ALL_CORE_FT
PAST MEDICAL HISTORY:  Aphasia due to acute stroke     CVA (cerebral vascular accident)     DM (diabetes mellitus)     History of atrial fibrillation     HTN (hypertension)     Osteomyelitis     Right hemiparesis     Upper GI bleed

## 2023-06-05 NOTE — ED ADULT NURSE NOTE - OBJECTIVE STATEMENT
pt accompanied by his son.  as per son, pt understands [facility] but unable to speak after a CVA.  right sided residual.  unable to move right upper and lower extremity.  pt unable to ambulate.  sent for left fourth toe amputation.  +mild bleeding noted.  +necrosis noted at toe as well.

## 2023-06-05 NOTE — ED ADULT TRIAGE NOTE - CHIEF COMPLAINT QUOTE
Pt is here for pre-surgical testing for an amputation of 4th digit left foot. Pt is diabetic and has hx of stroke, pt is unable to ambulate. Pt sent by podiatry MD blanca jose. surgery is 10:30 tomorrow.

## 2023-06-05 NOTE — ED ADULT NURSE NOTE - ALCOHOL PRE SCREEN (AUDIT - C)
Spoke to patient and reports Dr. Evangelista ordered carafate for stomach issues and medication helps and trying to wean off slowly but feels she still needs carafate. Said also taking PPI thought the name was rabeprazole that was ordered by GI.  Patient watching diet and quit drinking coffee.  MD advise.   Statement Selected

## 2023-06-05 NOTE — ED ADULT NURSE NOTE - NSFALLHARMRISKINTERV_ED_ALL_ED

## 2023-06-05 NOTE — ED ADULT NURSE NOTE - CAS EDN INTEG ASSESS
For information on Fall & Injury Prevention, visit [email or web address] - - -

## 2023-06-06 PROBLEM — M86.9 OSTEOMYELITIS, UNSPECIFIED: Chronic | Status: ACTIVE | Noted: 2023-01-01

## 2023-06-06 PROBLEM — K92.2 GASTROINTESTINAL HEMORRHAGE, UNSPECIFIED: Chronic | Status: ACTIVE | Noted: 2023-01-01

## 2023-06-06 PROBLEM — I63.9 CEREBRAL INFARCTION, UNSPECIFIED: Chronic | Status: ACTIVE | Noted: 2023-01-01

## 2023-06-06 PROBLEM — G81.91 HEMIPLEGIA, UNSPECIFIED AFFECTING RIGHT DOMINANT SIDE: Chronic | Status: ACTIVE | Noted: 2023-01-01

## 2023-06-06 NOTE — CONSULT NOTE ADULT - SUBJECTIVE AND OBJECTIVE BOX
Date/Time Patient Seen:  		  Referring MD:   Data Reviewed	       Patient is a 67y old  Male who presents with a chief complaint of foot infection (06 Jun 2023 06:38)      Subjective/HPI   · Chief Complaint: The patient is a 67y Male complaining of sent by MD.  · Unable to Obtain: Severe Illness/Injury   · Details: non verbal, hx per son  · HPI Objective Statement: Patient is a 67-year-old male with an extensive history including prior hemorrhagic stroke status post left craniotomy, coronary artery bypass grafting, diabetes and peripheral vascular disease who was brought into the emergency room by his son to be admitted for surgery on the left fourth toe and possible amputation tomorrow morning.  Per son patient without fevers or chills and has had bleeding intermittently from the toe and was told by podiatrist to come in for surgery tomorrow    	pmd: dr rooney  	podiatry: blanca jose  cards: aslam    PAST MEDICAL & SURGICAL HISTORY:  CVA (cerebral vascular accident)    HTN (hypertension)    DM (diabetes mellitus)    Arrhythmia    History of atrial fibrillation    Right hemiparesis    Aphasia due to acute stroke    GI bleed    Iron deficiency anemia    Upper GI bleed    Osteomyelitis    S/P CABG (coronary artery bypass graft)    S/P brain surgery    History of aortic valve repair    PAST SURGICAL HISTORY:  History of aortic valve repair     S/P brain surgery     S/P CABG (coronary artery bypass graft).     FAMILY HISTORY:  Sibling  Still living? Unknown  FH: coronary artery disease, Age at diagnosis: Age Unknown  FH: type 2 diabetes, Age at diagnosis: Age Unknown.     Tobacco Usage:  · Tobacco Usage	Never smoker    · Attestation Comment: I have reviewed and confirmed nurses' notes for patient's medications, allergies, medical history, and surgical history.    ALLERGIES AND HOME MEDICATIONS:   Allergies:        Allergies:  	No Known Allergies:     Home Medications:   * Incomplete Medication History as of 05-Jun-2023 22:17 documented in Structured Notes  · 	alcohol swabs : Last Dose Taken:  , Apply topically to affected area 4 times a day  · 	Freestyle Mariah 2 sensors: Apply 1 sensor every 14 days  · 	Freestyle Mariah 2 [place]: Dispense 1 [place]  · 	Insulin Pen Needles, 4mm: 1 application subcutaneously 4 times a day. ** Use with insulin pen **  · 	aspirin 81 mg oral delayed release tablet: Last Dose Taken:  , 1 tab(s) orally once a day  · 	pantoprazole 40 mg oral delayed release tablet: 1 tab(s) orally 2 times a day  · 	levETIRAcetam 750 mg oral tablet: 1 tab(s) orally 2 times a day  · 	Plavix 75 mg oral tablet: 1 tab(s) orally once a day in morning  · 	metFORMIN 500 mg oral tablet: 1 tab(s) orally 2 times a day  · 	multivitamin: 1 tab(s) orally once a day  · 	D3-50 1250 mcg (50,000 intl units) oral capsule: Last Dose Taken:  , 1 cap(s) orally once a week  · 	enoxaparin 60 mg/0.6 mL injectable solution: 60 milligram(s) subcutaneously 2 times a day  · 	cyanocobalamin 1000 mcg oral tablet: Last Dose Taken:  , 1 tab(s) orally once a day  · 	spironolactone 25 mg oral tablet: 1 tab(s) orally once a day in morning  · 	famotidine 20 mg oral tablet: 1 tab(s) orally once a day (at bedtime)  · 	metoprolol succinate 25 mg oral capsule, extended release: 1 cap(s) orally once a day (at bedtime)  · 	furosemide 40 mg oral tablet: 1 tab(s) orally once a day  · 	warfarin 3 mg oral tablet: 1 tab(s) orally once a day (at bedtime)  · 	insulin aspart 100 units/mL injectable solution: 17 unit(s) injectable 3 times a day with meals  · 	Basaglar KwikPen 100 units/mL subcutaneous solution: Last Dose Taken:  , 45 unit(s) subcutaneous once a day (at bedtime)  · 	atorvastatin 40 mg oral tablet: Last Dose Taken:  , 1 tab(s) orally once a day (at bedtime)    REVIEW OF SYSTEMS:    Review of Systems:  · UNABLE TO OBTAIN: Severe Illness/Injury  · Details: non verbal  · Review of Systems: toe gangrene        Medication list         MEDICATIONS  (STANDING):    MEDICATIONS  (PRN):         Vitals log        ICU Vital Signs Last 24 Hrs  T(C): 36.6 (06 Jun 2023 05:41), Max: 36.7 (05 Jun 2023 22:17)  T(F): 97.9 (06 Jun 2023 05:41), Max: 98 (05 Jun 2023 22:17)  HR: 72 (06 Jun 2023 05:41) (69 - 72)  BP: 105/69 (06 Jun 2023 05:41) (101/64 - 105/69)  BP(mean): --  ABP: --  ABP(mean): --  RR: 16 (06 Jun 2023 05:41) (16 - 18)  SpO2: 99% (06 Jun 2023 05:41) (98% - 99%)    O2 Parameters below as of 06 Jun 2023 05:41  Patient On (Oxygen Delivery Method): room air                 Input and Output:  I&O's Detail      Lab Data                        10.8   6.19  )-----------( 327      ( 06 Jun 2023 02:35 )             33.2     06-06    132<L>  |  97  |  18  ----------------------------<  122<H>  4.2   |  32<H>  |  0.92    Ca    9.0      06 Jun 2023 02:35    TPro  7.7  /  Alb  3.1<L>  /  TBili  0.6  /  DBili  x   /  AST  33  /  ALT  29  /  AlkPhos  497<H>  06-06            Review of Systems	      Objective     Physical Examination        Pertinent Lab findings & Imaging      Adams:  NO   Adequate UO     I&O's Detail           Discussed with:     Cultures:	        Radiology      ACC: [id number] EXAM:  XR CHEST PORTABLE ROUTINE 1V   ORDERED BY: JONATHAN WOLFF     PROCEDURE DATE:  05/11/2023          INTERPRETATION:  Portable chest radiograph    CLINICAL INFORMATION: Patient pulled out PICC line    TECHNIQUE:  Portable  AP chest radiograph.    COMPARISON: 4/18/2023 chest x-ray .    FINDINGS:  CATHETERS AND TUBES: None    PULMONARY: Residual LEFT lower zone airspace consolidation and/or   effusion obscuring diaphragm contour. RIGHT lung parenchyma clear.  No pneumothorax.    HEART/VASCULAR: The  heart is enlarged in transverse diameter. Status   post cardiac valve replacement .    BONES: Visualized osseous thorax intact.    IMPRESSION:   Cardiomegaly.  LEFT lower zone airspace consolidation and/or effusion. No pneumothorax..    --- End of Report ---            RADHAMES CRUZ MD; Attending Radiologist  This document has been electronically signed. May 12 2023  8:51AM                         Date/Time Patient Seen:  		  Referring MD:   Data Reviewed	       Patient is a 67y old  Male who presents with a chief complaint of foot infection (06 Jun 2023 06:38)      Subjective/HPI   · Chief Complaint: The patient is a 67y Male complaining of sent by MD.  · Unable to Obtain: Severe Illness/Injury   · Details: non verbal, hx per son  · HPI Objective Statement: Patient is a 67-year-old male with an extensive history including prior hemorrhagic stroke status post left craniotomy, coronary artery bypass grafting, diabetes and peripheral vascular disease who was brought into the emergency room by his son to be admitted for surgery on the left fourth toe and possible amputation tomorrow morning.  Per son patient without fevers or chills and has had bleeding intermittently from the toe and was told by podiatrist to come in for surgery tomorrow    	pmd: dr rooney  	podiatry: blanca jose  cards: aslam    PAST MEDICAL & SURGICAL HISTORY:  CVA (cerebral vascular accident)    HTN (hypertension)    DM (diabetes mellitus)    Arrhythmia    History of atrial fibrillation    Right hemiparesis    Aphasia due to acute stroke    GI bleed    Iron deficiency anemia    Upper GI bleed    Osteomyelitis    S/P CABG (coronary artery bypass graft)    S/P brain surgery    History of aortic valve repair    PAST SURGICAL HISTORY:  History of aortic valve repair     S/P brain surgery     S/P CABG (coronary artery bypass graft).     FAMILY HISTORY:  Sibling  Still living? Unknown  FH: coronary artery disease, Age at diagnosis: Age Unknown  FH: type 2 diabetes, Age at diagnosis: Age Unknown.     Tobacco Usage:  · Tobacco Usage	Never smoker    · Attestation Comment: I have reviewed and confirmed nurses' notes for patient's medications, allergies, medical history, and surgical history.    ALLERGIES AND HOME MEDICATIONS:   Allergies:        Allergies:  	No Known Allergies:     Home Medications:   * Incomplete Medication History as of 05-Jun-2023 22:17 documented in Structured Notes  · 	alcohol swabs : Last Dose Taken:  , Apply topically to affected area 4 times a day  · 	Freestyle Mariah 2 sensors: Apply 1 sensor every 14 days  · 	Freestyle Mariah 2 [place]: Dispense 1 [place]  · 	Insulin Pen Needles, 4mm: 1 application subcutaneously 4 times a day. ** Use with insulin pen **  · 	aspirin 81 mg oral delayed release tablet: Last Dose Taken:  , 1 tab(s) orally once a day  · 	pantoprazole 40 mg oral delayed release tablet: 1 tab(s) orally 2 times a day  · 	levETIRAcetam 750 mg oral tablet: 1 tab(s) orally 2 times a day  · 	Plavix 75 mg oral tablet: 1 tab(s) orally once a day in morning  · 	metFORMIN 500 mg oral tablet: 1 tab(s) orally 2 times a day  · 	multivitamin: 1 tab(s) orally once a day  · 	D3-50 1250 mcg (50,000 intl units) oral capsule: Last Dose Taken:  , 1 cap(s) orally once a week  · 	enoxaparin 60 mg/0.6 mL injectable solution: 60 milligram(s) subcutaneously 2 times a day  · 	cyanocobalamin 1000 mcg oral tablet: Last Dose Taken:  , 1 tab(s) orally once a day  · 	spironolactone 25 mg oral tablet: 1 tab(s) orally once a day in morning  · 	famotidine 20 mg oral tablet: 1 tab(s) orally once a day (at bedtime)  · 	metoprolol succinate 25 mg oral capsule, extended release: 1 cap(s) orally once a day (at bedtime)  · 	furosemide 40 mg oral tablet: 1 tab(s) orally once a day  · 	warfarin 3 mg oral tablet: 1 tab(s) orally once a day (at bedtime)  · 	insulin aspart 100 units/mL injectable solution: 17 unit(s) injectable 3 times a day with meals  · 	Basaglar KwikPen 100 units/mL subcutaneous solution: Last Dose Taken:  , 45 unit(s) subcutaneous once a day (at bedtime)  · 	atorvastatin 40 mg oral tablet: Last Dose Taken:  , 1 tab(s) orally once a day (at bedtime)    REVIEW OF SYSTEMS:    Review of Systems:  · UNABLE TO OBTAIN: Severe Illness/Injury  · Details: non verbal  · Review of Systems: toe gangrene        Medication list         MEDICATIONS  (STANDING):    MEDICATIONS  (PRN):         Vitals log        ICU Vital Signs Last 24 Hrs  T(C): 36.6 (06 Jun 2023 05:41), Max: 36.7 (05 Jun 2023 22:17)  T(F): 97.9 (06 Jun 2023 05:41), Max: 98 (05 Jun 2023 22:17)  HR: 72 (06 Jun 2023 05:41) (69 - 72)  BP: 105/69 (06 Jun 2023 05:41) (101/64 - 105/69)  BP(mean): --  ABP: --  ABP(mean): --  RR: 16 (06 Jun 2023 05:41) (16 - 18)  SpO2: 99% (06 Jun 2023 05:41) (98% - 99%)    O2 Parameters below as of 06 Jun 2023 05:41  Patient On (Oxygen Delivery Method): room air                 Input and Output:  I&O's Detail      Lab Data                        10.8   6.19  )-----------( 327      ( 06 Jun 2023 02:35 )             33.2     06-06    132<L>  |  97  |  18  ----------------------------<  122<H>  4.2   |  32<H>  |  0.92    Ca    9.0      06 Jun 2023 02:35    TPro  7.7  /  Alb  3.1<L>  /  TBili  0.6  /  DBili  x   /  AST  33  /  ALT  29  /  AlkPhos  497<H>  06-06            Review of Systems	    ataxic gait    Objective     Physical Examination    heart s1s2  lung dc BS  on room air      Pertinent Lab findings & Imaging      Adams:  NO   Adequate UO     I&O's Detail           Discussed with:     Cultures:	        Radiology      ACC: [id number] EXAM:  XR CHEST PORTABLE ROUTINE 1V   ORDERED BY: JONATHAN WOLFF     PROCEDURE DATE:  05/11/2023          INTERPRETATION:  Portable chest radiograph    CLINICAL INFORMATION: Patient pulled out PICC line    TECHNIQUE:  Portable  AP chest radiograph.    COMPARISON: 4/18/2023 chest x-ray .    FINDINGS:  CATHETERS AND TUBES: None    PULMONARY: Residual LEFT lower zone airspace consolidation and/or   effusion obscuring diaphragm contour. RIGHT lung parenchyma clear.  No pneumothorax.    HEART/VASCULAR: The  heart is enlarged in transverse diameter. Status   post cardiac valve replacement .    BONES: Visualized osseous thorax intact.    IMPRESSION:   Cardiomegaly.  LEFT lower zone airspace consolidation and/or effusion. No pneumothorax..    --- End of Report ---            RADHAMES CRUZ MD; Attending Radiologist  This document has been electronically signed. May 12 2023  8:51AM

## 2023-06-06 NOTE — ED ADULT NURSE REASSESSMENT NOTE - NS ED NURSE REASSESS COMMENT FT1
Pt's daughter states that pt is due for his seizure medication at this time. Call was placed to Dr Jacques Harmon. Per Dr Harmon, ok to give seizure medication at this time.
Pt waiting to go to the OR. Pt in no acute distress. Pt updated on plan of care

## 2023-06-06 NOTE — PATIENT PROFILE ADULT - FALL HARM RISK - ATTEMPT OOB
----- Message from Nick Caldera sent at 1/10/2022  8:22 AM CST -----  Contact: daughter/ [phone]  Patient daughter Obdulia would like to consult with a nurse in regards to her mom current oxygen and sugar levels. Please call back at [phone]. Thanks r/s     
Left message for call back on voice mail.   
No

## 2023-06-06 NOTE — CONSULT NOTE ADULT - SUBJECTIVE AND OBJECTIVE BOX
Patient is a 67y Male whom presented to the hospital with hyponatremia     PAST MEDICAL & SURGICAL HISTORY:  CVA (cerebral vascular accident)      HTN (hypertension)      DM (diabetes mellitus)      History of atrial fibrillation      Right hemiparesis      Aphasia due to acute stroke      Upper GI bleed      Osteomyelitis      S/P CABG (coronary artery bypass graft)      S/P brain surgery      History of aortic valve repair          MEDICATIONS  (STANDING):  aspirin enteric coated 81 milliGRAM(s) Oral daily  atorvastatin 40 milliGRAM(s) Oral at bedtime  clopidogrel Tablet 75 milliGRAM(s) Oral daily  dextrose 5%. 1000 milliLiter(s) (50 mL/Hr) IV Continuous <Continuous>  dextrose 5%. 1000 milliLiter(s) (100 mL/Hr) IV Continuous <Continuous>  dextrose 50% Injectable 12.5 Gram(s) IV Push once  dextrose 50% Injectable 25 Gram(s) IV Push once  dextrose 50% Injectable 25 Gram(s) IV Push once  dextrose 50% Injectable 25 Gram(s) IV Push once  enoxaparin Injectable 60 milliGRAM(s) SubCutaneous every 12 hours  furosemide    Tablet 40 milliGRAM(s) Oral daily  glucagon  Injectable 1 milliGRAM(s) IntraMuscular once  insulin glargine Injectable (LANTUS) 30 Unit(s) SubCutaneous at bedtime  insulin lispro (ADMELOG) corrective regimen sliding scale   SubCutaneous three times a day before meals  insulin lispro (ADMELOG) corrective regimen sliding scale   SubCutaneous at bedtime  levETIRAcetam 750 milliGRAM(s) Oral two times a day  metoprolol succinate ER 25 milliGRAM(s) Oral daily  pantoprazole    Tablet 40 milliGRAM(s) Oral before breakfast  spironolactone 25 milliGRAM(s) Oral daily      Allergies    No Known Allergies    Intolerances        SOCIAL HISTORY:  Denies ETOh,Smoking,     FAMILY HISTORY:  FH: coronary artery disease (Sibling)    FH: type 2 diabetes (Sibling)        REVIEW OF SYSTEMS:    CONSTITUTIONAL: No weakness, fevers or chills  RESPIRATORY: No cough, wheezing, hemoptysis; No shortness of breath  CARDIOVASCULAR: No chest pain or palpitations  GASTROINTESTINAL: No abdominal or epigastric pain. No nausea, vomiting,     GENITOURINARY: No dysuria, frequency or hematuria  NEUROLOGICAL: No numbness or weakness  SKIN: dry      VITAL:  T(C): , Max: 36.7 (06-05-23 @ 22:17)  T(F): , Max: 98 (06-05-23 @ 22:17)  HR: 75 (06-06-23 @ 19:36)  BP: 122/79 (06-06-23 @ 19:36)  BP(mean): --  RR: 18 (06-06-23 @ 19:36)  SpO2: 98% (06-06-23 @ 19:36)  Wt(kg): --    I and O's:    Height (cm): 177.8 (06-06 @ 10:26)  Weight (kg): 59 (06-06 @ 10:26)  BMI (kg/m2): 18.7 (06-06 @ 10:26)  BSA (m2): 1.74 (06-06 @ 10:26)    PHYSICAL EXAM:    Constitutional: NAD  HEENT: conjunctive   clear   Neck:  No JVD  Respiratory: CTAB  Cardiovascular: S1 and S2  Gastrointestinal: BS+, soft, NT/ND  Extremities: No peripheral edema  Neurological:  no focal deficits  Psychiatric: Normal mood, normal affect  : No Adams  Skin: dry   Access: Not applicable    LABS:                        10.8   6.19  )-----------( 327      ( 06 Jun 2023 02:35 )             33.2     06-06    132<L>  |  97  |  18  ----------------------------<  122<H>  4.2   |  32<H>  |  0.92    Ca    9.0      06 Jun 2023 02:35    TPro  7.7  /  Alb  3.1<L>  /  TBili  0.6  /  DBili  x   /  AST  33  /  ALT  29  /  AlkPhos  497<H>  06-06      Urine Studies:          RADIOLOGY & ADDITIONAL STUDIES:        MEDICATIONS  (STANDING):  aspirin enteric coated 81 milliGRAM(s) Oral daily  atorvastatin 40 milliGRAM(s) Oral at bedtime  clopidogrel Tablet 75 milliGRAM(s) Oral daily  dextrose 5%. 1000 milliLiter(s) (50 mL/Hr) IV Continuous <Continuous>  dextrose 5%. 1000 milliLiter(s) (100 mL/Hr) IV Continuous <Continuous>  dextrose 50% Injectable 25 Gram(s) IV Push once  dextrose 50% Injectable 25 Gram(s) IV Push once  dextrose 50% Injectable 12.5 Gram(s) IV Push once  dextrose 50% Injectable 25 Gram(s) IV Push once  enoxaparin Injectable 60 milliGRAM(s) SubCutaneous every 12 hours  furosemide    Tablet 40 milliGRAM(s) Oral daily  glucagon  Injectable 1 milliGRAM(s) IntraMuscular once  insulin glargine Injectable (LANTUS) 30 Unit(s) SubCutaneous at bedtime  insulin lispro (ADMELOG) corrective regimen sliding scale   SubCutaneous three times a day before meals  insulin lispro (ADMELOG) corrective regimen sliding scale   SubCutaneous at bedtime  levETIRAcetam 750 milliGRAM(s) Oral two times a day  metoprolol succinate ER 25 milliGRAM(s) Oral daily  pantoprazole    Tablet 40 milliGRAM(s) Oral before breakfast  spironolactone 25 milliGRAM(s) Oral daily

## 2023-06-06 NOTE — CONSULT NOTE ADULT - SUBJECTIVE AND OBJECTIVE BOX
[facility] Cardiology Consult - Db Pham Tsiakos [phone]    CHIEF COMPLAINT: Patient is a 67y old  Male who presents with a chief complaint of foot infection (06 Jun 2023 06:36)      HPI:  67-year-old male with an extensive history including prior hemorrhagic stroke status post left craniotomy, coronary artery bypass grafting, diabetes and peripheral vascular disease who was brought into the emergency room by his son to be admitted for surgery on the left fourth toe and possible amputation tomorrow morning.  Per son patient without fevers or chills and has had bleeding intermittently from the toe and was told by podiatrist to come in for surgery tomorrow (06 Jun 2023 06:36)      PAST MEDICAL & SURGICAL HISTORY:  CVA (cerebral vascular accident)      HTN (hypertension)      DM (diabetes mellitus)      History of atrial fibrillation      Right hemiparesis      Aphasia due to acute stroke      Upper GI bleed      Osteomyelitis      S/P CABG (coronary artery bypass graft)      S/P brain surgery      History of aortic valve repair          SOCIAL HISTORY: Alochol: Denied  Smoking: Nonsmoker  Drug Use: Denied  Marital Status:         FAMILY HISTORY: FAMILY HISTORY:  FH: coronary artery disease (Sibling)    FH: type 2 diabetes (Sibling)        MEDICATIONS  (STANDING):    MEDICATIONS  (PRN):      Allergies    No Known Allergies    Intolerances        REVIEW OF SYSTEMS:  CONSTITUTIONAL: No weakness, fevers or chills  EYES/ENT: No visual changes;  No vertigo or throat pain   NECK: No pain or stiffness  RESPIRATORY: No cough, wheezing, hemoptysis; No shortness of breath  CARDIOVASCULAR: No chest pain or palpitations  GASTROINTESTINAL: No abdominal pain. No nausea, vomiting, or hematemesis; No diarrhea or constipation. No melena or hematochezia.  GENITOURINARY: No dysuria, frequency or hematuria  NEUROLOGICAL: No numbness or weakness  SKIN: No itching or rash  All other review of systems is negative unless indicated above    VITAL SIGNS:   Vital Signs Last 24 Hrs  T(C): 36.6 (06 Jun 2023 05:41), Max: 36.7 (05 Jun 2023 22:17)  T(F): 97.9 (06 Jun 2023 05:41), Max: 98 (05 Jun 2023 22:17)  HR: 72 (06 Jun 2023 05:41) (69 - 72)  BP: 105/69 (06 Jun 2023 05:41) (101/64 - 105/69)  BP(mean): --  RR: 16 (06 Jun 2023 05:41) (16 - 18)  SpO2: 99% (06 Jun 2023 05:41) (98% - 99%)    Parameters below as of 06 Jun 2023 05:41  Patient On (Oxygen Delivery Method): room air        I&O's Summary      PHYSICAL EXAM:  Constitutional: Awake in NAD  HEENT:  GUSTAVO, EOMI  Neck: No JVD  Pulmonary: CTA B/L No R/R/W  Cardiovascular: PMI not palpable non-displaced Regular S1 and S2, no murmurs  Gastrointestinal: Bowel Sounds present, soft, nontender.   Extremities: Trace peripheral edema.   Neuro: No gross focal deficits    LABS: All Labs Reviewed:                        10.8   6.19  )-----------( 327      ( 06 Jun 2023 02:35 )             33.2     06 Jun 2023 02:35    132    |  97     |  18     ----------------------------<  122    4.2     |  32     |  0.92     Ca    9.0        06 Jun 2023 02:35    TPro  7.7    /  Alb  3.1    /  TBili  0.6    /  DBili  x      /  AST  33     /  ALT  29     /  AlkPhos  497    06 Jun 2023 02:35    PT/INR - ( 06 Jun 2023 02:35 )   PT: 12.7 sec;   INR: 1.08 ratio         PTT - ( 06 Jun 2023 02:35 )  PTT:43.2 sec      Blood Culture:         RADIOLOGY/EKG:

## 2023-06-06 NOTE — ED ADULT NURSE REASSESSMENT NOTE - NSFALLRISKINTERV_ED_ALL_ED
Assistance OOB with selected safe patient handling equipment if applicable/Communicate fall risk and risk factors to all staff, patient, and family/Provide visual cue: yellow wristband, yellow gown, etc/Reinforce activity limits and safety measures with patient and family/Toileting schedule using arm’s reach rule for commode and bathroom/Call bell, personal items and telephone in reach/Instruct patient to call for assistance before getting out of bed/chair/stretcher/Non-slip footwear applied when patient is off stretcher/[place] to call system/Physically safe environment - no spills, clutter or unnecessary equipment/Purposeful Proactive Rounding/Room/bathroom lighting operational, light cord in reach

## 2023-06-06 NOTE — CONSULT NOTE ADULT - ASSESSMENT
67-year-old male with an extensive history including prior hemorrhagic stroke status post left craniotomy, coronary artery bypass grafting, diabetes and peripheral vascular disease who was brought into the emergency room by his son to be admitted for surgery on the left fourth toe w amputation.      RECOMMENDATIONS  Will follow for path and micro post op with recs to follow regarding abx, no prior MRSA    Thank you for consulting us and involving us in the management of this most interesting and challenging case.  We will follow along in the care of this patient. Please call us at [phone] or text me directly on my cell# at [phone] with any concerns.

## 2023-06-06 NOTE — H&P ADULT - HISTORY OF PRESENT ILLNESS
67-year-old male with an extensive history including prior hemorrhagic stroke status post left craniotomy, coronary artery bypass grafting, diabetes and peripheral vascular disease who was brought into the emergency room by his son to be admitted for surgery on the left fourth toe and possible amputation tomorrow morning.  Per son patient without fevers or chills and has had bleeding intermittently from the toe and was told by podiatrist to come in for surgery tomorrow

## 2023-06-06 NOTE — CONSULT NOTE ADULT - ASSESSMENT
67-year-old male with an extensive history including prior hemorrhagic stroke status post left craniotomy, coronary artery bypass grafting, diabetes and peripheral vascular disease who was brought into the emergency room by his son to be admitted for surgery on the left fourth toe and possible amputation tomorrow morning.  Per son patient without fevers or chills and has had bleeding intermittently from the toe and was told by podiatrist to come in for surgery tomorrow (06 Jun 2023 06:36)      will check ua , urine osmolality , urine sodium , urine uric acid , serum sodium , serum osmolality , serum uric acid , f/u with hyponatremia work up , f/u with bmp , monitor i and o      fluid overload furosemide    Tablet 40 milliGRAM(s) Oral daily  , spironolactone 25 milliGRAM(s) Oral daily      Accuchecks monitoring and insulin sliding scale coverage, no concentrated sweets diet, serial labs and f/up with PMD, monitor HB A 1 C every 3-4 mnth

## 2023-06-06 NOTE — H&P ADULT - NSHPLABSRESULTS_GEN_ALL_CORE
Lab Results:  CBC  CBC Full  -  ( 06 Jun 2023 02:35 )  WBC Count : 6.19 K/uL  RBC Count : 3.71 M/uL  Hemoglobin : 10.8 g/dL  Hematocrit : 33.2 %  Platelet Count - Automated : 327 K/uL  Mean Cell Volume : 89.5 fl  Mean Cell Hemoglobin : 29.1 pg  Mean Cell Hemoglobin Concentration : 32.5 gm/dL  Auto Neutrophil # : 3.95 K/uL  Auto Lymphocyte # : 1.15 K/uL  Auto Monocyte # : 0.83 K/uL  Auto Eosinophil # : 0.23 K/uL  Auto Basophil # : 0.02 K/uL  Auto Neutrophil % : 63.8 %  Auto Lymphocyte % : 18.6 %  Auto Monocyte % : 13.4 %  Auto Eosinophil % : 3.7 %  Auto Basophil % : 0.3 %    .		Differential:	[] Automated		[] Manual  Chemistry                        10.8   6.19  )-----------( 327      ( 06 Jun 2023 02:35 )             33.2     06-06    132<L>  |  97  |  18  ----------------------------<  122<H>  4.2   |  32<H>  |  0.92    Ca    9.0      06 Jun 2023 02:35    TPro  7.7  /  Alb  3.1<L>  /  TBili  0.6  /  DBili  x   /  AST  33  /  ALT  29  /  AlkPhos  497<H>  06-06    LIVER FUNCTIONS - ( 06 Jun 2023 02:35 )  Alb: 3.1 g/dL / Pro: 7.7 g/dL / ALK PHOS: 497 U/L / ALT: 29 U/L / AST: 33 U/L / GGT: x           PT/INR - ( 06 Jun 2023 02:35 )   PT: 12.7 sec;   INR: 1.08 ratio         PTT - ( 06 Jun 2023 02:35 )  PTT:43.2 sec          MICROBIOLOGY/CULTURES:      RADIOLOGY RESULTS: reviewed

## 2023-06-06 NOTE — CONSULT NOTE ADULT - ASSESSMENT
67-year-old male with an extensive history including prior hemorrhagic stroke status post left craniotomy, coronary artery bypass grafting, diabetes and peripheral vascular disease who was brought into the emergency room by his son to be admitted for surgery on the left fourth toe and possible amputation 67-year-old male with an extensive history including prior hemorrhagic stroke status post left craniotomy, coronary artery bypass grafting, diabetes and peripheral vascular disease who was brought into the emergency room by his son to be admitted for surgery on the left fourth toe and possible amputation    plan for OR -   lydia op eval and optimization -   cardio eval noted  [facility]vs rx regimen and BP control  pain assessment  wound care  skin hygiene  assist with needs  monitor VS and Sat  spoke with son  old records reviewed  cxr noted - left eff - atelectasis - compared to prior imaging

## 2023-06-06 NOTE — PATIENT PROFILE ADULT - FALL HARM RISK - HARM RISK INTERVENTIONS

## 2023-06-06 NOTE — CONSULT NOTE ADULT - SUBJECTIVE AND OBJECTIVE BOX
[facility] DIVISION of INFECTIOUS DISEASE  Guru Michel MD PhD, Lisa James MD, Becca Acosta MD, Karen Grimes MD, Corky Perez MD  and providing coverage with Carlos Marx MD  Providing Infectious Disease Consultations at [facility], [facility], [facility]'s    Office# [phone] to schedule follow up appointments  Answering Service for urgent calls or New Consults [phone]  Cell# to text for urgent issues Guru Michel [phone]     HPI:  67-year-old male with an extensive history including prior hemorrhagic stroke status post left craniotomy, coronary artery bypass grafting, diabetes and peripheral vascular disease who was brought into the emergency room by his son to be admitted for surgery on the left fourth toe w amputation.        PAST MEDICAL & SURGICAL HISTORY:  CVA (cerebral vascular accident)      HTN (hypertension)      DM (diabetes mellitus)      History of atrial fibrillation      Right hemiparesis      Aphasia due to acute stroke      Upper GI bleed      Osteomyelitis      S/P CABG (coronary artery bypass graft)      S/P brain surgery      History of aortic valve repair          Antimicrobials  ceFAZolin   IVPB 2000 milliGRAM(s) IV Intermittent once      Immunological      Other  aspirin enteric coated 81 milliGRAM(s) Oral daily  atorvastatin 40 milliGRAM(s) Oral at bedtime  clopidogrel Tablet 75 milliGRAM(s) Oral daily  dextrose 5%. 1000 milliLiter(s) IV Continuous <Continuous>  dextrose 5%. 1000 milliLiter(s) IV Continuous <Continuous>  dextrose 50% Injectable 12.5 Gram(s) IV Push once  dextrose 50% Injectable 25 Gram(s) IV Push once  dextrose 50% Injectable 25 Gram(s) IV Push once  dextrose Oral Gel 15 Gram(s) Oral once PRN  enoxaparin Injectable 60 milliGRAM(s) SubCutaneous every 12 hours  furosemide    Tablet 40 milliGRAM(s) Oral daily  glucagon  Injectable 1 milliGRAM(s) IntraMuscular once  insulin lispro (ADMELOG) corrective regimen sliding scale   SubCutaneous three times a day before meals  levETIRAcetam 750 milliGRAM(s) Oral two times a day  metoprolol succinate ER 25 milliGRAM(s) Oral daily  pantoprazole    Tablet 40 milliGRAM(s) Oral before breakfast  spironolactone 25 milliGRAM(s) Oral daily      Allergies    No Known Allergies    Intolerances        SOCIAL HISTORY:  no toxic habits reported      FAMILY HISTORY:  FH: coronary artery disease (Sibling)    FH: type 2 diabetes (Sibling)        ROS:    EYES:  Negative  blurry vision or double vision  GASTROINTESTINAL:  Negative for nausea, vomiting, diarrhea  -otherwise negative except for subjective    Vital Signs Last 24 Hrs  T(C): 36.3 (06 Jun 2023 07:57), Max: 36.7 (05 Jun 2023 22:17)  T(F): 97.4 (06 Jun 2023 07:57), Max: 98 (05 Jun 2023 22:17)  HR: 75 (06 Jun 2023 07:57) (69 - 75)  BP: 131/81 (06 Jun 2023 07:57) (101/64 - 131/81)  BP(mean): --  RR: 16 (06 Jun 2023 07:57) (16 - 18)  SpO2: 99% (06 Jun 2023 07:57) (98% - 99%)    Parameters below as of 06 Jun 2023 07:57  Patient On (Oxygen Delivery Method): room air        PE:  In no distress  HEENT:  NC, PERRL, sclerae anicteric, conjunctivae clear, EOMI.  Sinuses nontender, no nasal exudate.  No buccal or pharyngeal lesions, erythema or exudate  Neck:  Supple, no adenopathy  Lungs:  No accessory muscle use, breathing comfortably  Cor:  distant  Abd:  Symmetric, normoactive BS.  Soft, nontender, no masses, guarding or rebound.  Liver and spleen not enlarged  Extrem:  No cyanosis, right foot with swelling, wrapped toe  Neuro: grossly intact  Musc: moving all limbs freely, no focal deficits        LABS:                        10.8   6.19  )-----------( 327      ( 06 Jun 2023 02:35 )             33.2       WBC Count: 6.19 K/uL (06-06-23 @ 02:35)  WBC Count: 6.99 K/uL (06-01-23 @ 15:50)      06-06    132<L>  |  97  |  18  ----------------------------<  122<H>  4.2   |  32<H>  |  0.92    Ca    9.0      06 Jun 2023 02:35    TPro  7.7  /  Alb  3.1<L>  /  TBili  0.6  /  DBili  x   /  AST  33  /  ALT  29  /  AlkPhos  497<H>  06-06      Creatinine, Serum: 0.92 mg/dL (06-06-23 @ 02:35)  Creatinine, Serum: 0.94 mg/dL (06-01-23 @ 15:50)                MICROBIOLOGY:      RADIOLOGY & ADDITIONAL STUDIES:    --< from: Xray Foot AP + Lateral + Oblique, Bilat (06.06.23 @ 02:18) >    ACC: [id number] EXAM:  XR FOOT COMP MIN 3 VIEWS BI   ORDERED BY: FAITH COLBY     PROCEDURE DATE:  06/06/2023          INTERPRETATION:  LEFT and RIGHT foot    CLINICAL INFORMATION:  Gangrenous toes. Sub-Cutaneous air.    TECHNIQUE: AP,lateral and oblique views.    FINDINGS:  No subcutaneous air or radiopaque foreign body.  Diffuse arterial vascular wall calcifications..  Narrowing of the LEFT subtalar joint space.  No gross radiographic evidence of osteolysis/osteomyelitis.  No fracture or dislocation.    The bones and joint spaces are radiographically intact.  No fracture or dislocation.  Soft tissues unremarkable.    IMPRESSION:    No radiographic evidence of osteomyelitis.  If osteomyelitis is clinically considered  therapy, and soft tissue /   bone infection requires further assessment, follow-up MRI recommended.

## 2023-06-06 NOTE — PROGRESS NOTE ADULT - SUBJECTIVE AND OBJECTIVE BOX
POST OP NOTE    ARNIE DELGADILLO  MRN-[id number]    Date: 6/6/2023  Surgeon: DANIELLE Leger  Assistants: Ebenezer Hernández DPM, PGY-1  Procedure: Left foot 4th toe amputation  Pathology: 4th toe  EBL: 15cc    Patient tolerated both anesthesia and  procedures well and was transported from the operating room to the recovery room with vital signs stable and neurovascular status intact.  Patient transferred to PACU in stable condition.       PE / Post Op Check:       GEN: NAD, AAOx3, resting comfortably with pain controlled      LE Focused: CFT shows adequate perfusion b/l with no signs of ischemic compromise.  No strikethrough is appreciated on surgical dressings.  Dressings were dry, clean, and intact.  No neuromuscular deficits appreciated.      Plans:  Patient seen and evaluated  Patient will be managed by Hospitalist and stay 1 day before discharge  Pending pathology, WCx  Medical and pain management by primary team       POST OP NOTE    ARNIE DELGADILLO  [id number]    Date: 6/6/2023  Surgeon: DANIELLE Leger  Assistants: Ebenezer Hernández DPM, PGY-1  Procedure: Left foot 4th toe amputation  Pathology: 4th toe  EBL: 15cc    Patient tolerated both anesthesia and  procedures well and was transported from the operating room to the recovery room with vital signs stable and neurovascular status intact.  Patient transferred to PACU in stable condition.       PE / Post Op Check:       GEN: NAD, AAOx3, resting comfortably with pain controlled      LE Focused: CFT shows adequate perfusion b/l with no signs of ischemic compromise.  No strikethrough is appreciated on surgical dressings.  Dressings were dry, clean, and intact.  No neuromuscular deficits appreciated.      Plans:  Patient seen and evaluated  Patient will be managed by Hospitalist and stay 1 day before discharge  Pending pathology, WCx  Appreciates ID and primary team recs,  Medical and pain management by primary team       POST OP NOTE    ARNIE DELGADILLO  [id number]    Date: 6/6/2023  Surgeon: , DANIELLE  Assistants: Ebenezer Hernández DPM, PGY-1  Procedure: Left foot 4th toe amputation  Pathology: 4th toe  EBL: 15cc    Patient tolerated both anesthesia and  procedures well and was transported from the operating room to the recovery room with vital signs stable and neurovascular status intact.  Patient transferred to PACU in stable condition.       PE / Post Op Check:       GEN: NAD, AAOx3, resting comfortably with pain controlled      LE Focused: CFT shows adequate perfusion b/l with no signs of ischemic compromise.  No strikethrough is appreciated on surgical dressings.  Dressings were dry, clean, and intact.  No neuromuscular deficits appreciated.      Plans:  Patient seen and evaluated  Patient will be managed by Hospitalist and stay 1 day before discharge  Pending pathology, WCx  Appreciates ID and primary team recs,  Medical and pain management by primary team  Patient will follow up with Dr. Jin in her office 5-7 days after discharge. Please call [phone] for any question

## 2023-06-06 NOTE — CONSULT NOTE ADULT - ASSESSMENT
67M with known DM, HTN, HLD, CAD s/p CABG, AF, CVA with Right hemiparesis and LEFT fourth toe bleeding for podiatry surgery today.    Suggest:    1. LEFT fourth toe   - cardiac optimized for foot surgery with stable blood pressure, heart rate and ECG    2. CAD/CABG/HLD  - c/w     3. HTN    4. DM    5. Will follow 67M with known DM, HTN, HLD, CAD s/p CABG, AF on coumadin with current INR this am of 1.1, CVA with Right hemiparesis, seizure disorder and LEFT fourth toe bleeding for podiatry surgery today.    Suggest:    1. LEFT fourth toe   - cardiac optimized for foot surgery with stable blood pressure, heart rate and ECG    2. CAD/CABG/HLD  - c/w Lipitor, baby aspirin    3. HTN  - c/w home meds    4. DM   - RISS and finger checks    5. AF  - Lovenox full dose after foot surgery and coumadin for stroke prevention,    5. Will follow

## 2023-06-06 NOTE — ED PROVIDER NOTE - CLINICAL SUMMARY MEDICAL DECISION MAKING FREE TEXT BOX
67-year-old male status post stroke with aphasia and hemiplegia as well as vascular disease with gangrenous toe presenting for admission for surgery of the toe in the morning check preop labs EKG and chest x-ray and COVID as well as coags and type and screen and admit to medicine

## 2023-06-06 NOTE — CONSULT NOTE ADULT - SUBJECTIVE AND OBJECTIVE BOX
[facility]  Shane Murray PA-C  [address] [place] [id number]  [phone]      Chief Complaint:  Patient is a 67y old  Male who presents with a chief complaint of foot infection (06 Jun 2023 12:11)      HPI:67-year-old male with an extensive history including prior hemorrhagic stroke status post left craniotomy, coronary artery bypass grafting, diabetes and peripheral vascular disease who was brought into the emergency room by his son to be admitted for surgery on the left fourth toe and possible amputation tomorrow morning.  Per son patient without fevers or chills and has had bleeding intermittently from the toe and was told by podiatrist to come in for surgery tomorrow    Allergies:  No Known Allergies      Medications:  dextrose 5%. 1000 milliLiter(s) IV Continuous <Continuous>  dextrose 5%. 1000 milliLiter(s) IV Continuous <Continuous>  dextrose 50% Injectable 25 Gram(s) IV Push once  dextrose 50% Injectable 12.5 Gram(s) IV Push once  dextrose 50% Injectable 25 Gram(s) IV Push once  dextrose 50% Injectable 25 Gram(s) IV Push once  dextrose Oral Gel 15 Gram(s) Oral once PRN  glucagon  Injectable 1 milliGRAM(s) IntraMuscular once  insulin glargine Injectable (LANTUS) 30 Unit(s) SubCutaneous at bedtime  insulin lispro (ADMELOG) corrective regimen sliding scale   SubCutaneous three times a day before meals  insulin lispro (ADMELOG) corrective regimen sliding scale   SubCutaneous at bedtime      PMHX/PSHX:  CVA (cerebral vascular accident)    HTN (hypertension)    DM (diabetes mellitus)    Arrhythmia    History of atrial fibrillation    Right hemiparesis    Aphasia due to acute stroke    GI bleed    Iron deficiency anemia    Upper GI bleed    Osteomyelitis    S/P CABG (coronary artery bypass graft)    S/P brain surgery    History of aortic valve repair        Family history:  No pertinent family history in first degree relatives    No pertinent family history in first degree relatives    FH: coronary artery disease (Sibling)    FH: type 2 diabetes (Sibling)        Social History:     ROS:     General:  no fevers, chills, night sweats, fatigue,   Eyes:  Good vision, no reported pain  ENT:  No sore throat, pain, runny nose, dysphagia  CV:  No pain, palpitations, hypo/hypertension  Resp:  No dyspnea, cough, tachypnea, wheezing  GI:  No pain, No nausea, No vomiting, No diarrhea, No constipation, No weight loss, No fever, No pruritis, No rectal bleeding, No tarry stools, No dysphagia,  :  No pain, bleeding, incontinence, nocturia  Muscle:  No pain, weakness  Neuro:  No weakness, tingling, memory problems  Psych:  No fatigue, insomnia, mood problems, depression  Endocrine:  No polyuria, polydipsia, cold/heat intolerance  Heme:  No petechiae, ecchymosis, easy bruisability  Skin:  No rash, tattoos, scars, edema      PHYSICAL EXAM:   Vital Signs:  Vital Signs Last 24 Hrs  T(C): 36.4 (06 Jun 2023 13:43), Max: 36.7 (05 Jun 2023 22:17)  T(F): 97.6 (06 Jun 2023 13:43), Max: 98 (05 Jun 2023 22:17)  HR: 70 (06 Jun 2023 13:43) (63 - 93)  BP: 112/63 (06 Jun 2023 13:43) (100/57 - 133/86)  BP(mean): --  RR: 16 (06 Jun 2023 13:43) (14 - 18)  SpO2: 97% (06 Jun 2023 13:43) (97% - 100%)    Parameters below as of 06 Jun 2023 13:43  Patient On (Oxygen Delivery Method): room air      Daily Height in cm: 177.8 (06 Jun 2023 07:19)    Daily     GENERAL:  Appears stated age,   HEENT:  NC/AT,    CHEST:  Full & symmetric excursion,   HEART:  Regular rhythm  ABDOMEN:  Soft, non-tender, non-distended,   EXTEREMITIES:  no cyanosis,clubbing or edema  SKIN:  No rash  NEURO:  Alert,    LABS:                        10.8   6.19  )-----------( 327      ( 06 Jun 2023 02:35 )             33.2     06-06    132<L>  |  97  |  18  ----------------------------<  122<H>  4.2   |  32<H>  |  0.92    Ca    9.0      06 Jun 2023 02:35    TPro  7.7  /  Alb  3.1<L>  /  TBili  0.6  /  DBili  x   /  AST  33  /  ALT  29  /  AlkPhos  497<H>  06-06    LIVER FUNCTIONS - ( 06 Jun 2023 02:35 )  Alb: 3.1 g/dL / Pro: 7.7 g/dL / ALK PHOS: 497 U/L / ALT: 29 U/L / AST: 33 U/L / GGT: x           PT/INR - ( 06 Jun 2023 02:35 )   PT: 12.7 sec;   INR: 1.08 ratio         PTT - ( 06 Jun 2023 02:35 )  PTT:43.2 sec        Imaging:

## 2023-06-06 NOTE — ED PROVIDER NOTE - OBJECTIVE STATEMENT
Patient is a 67-year-old male with an extensive history including prior hemorrhagic stroke status post left craniotomy, coronary artery bypass grafting, diabetes and peripheral vascular disease who was brought into the emergency room by his son to be admitted for surgery on the left fourth toe and possible amputation tomorrow morning.  Per son patient without fevers or chills and has had bleeding intermittently from the toe and was told by podiatrist to come in for surgery tomorrow    pmd: dr rooney  podiatry: blanca jose  cards: elba

## 2023-06-06 NOTE — H&P ADULT - ASSESSMENT
67-year-old male with an extensive history including prior hemorrhagic stroke status post left craniotomy, coronary artery bypass grafting, diabetes and peripheral vascular disease who was brought into the emergency room by his son to be admitted for surgery on the left fourth toe and possible amputation tomorrow morning.  Per son patient without fevers or chills and has had bleeding intermittently from the toe and was told by podiatrist to come in for surgery tomorrow 67-year-old male with an extensive history including prior hemorrhagic stroke status post left craniotomy, coronary artery bypass grafting, diabetes and peripheral vascular disease who was brought into the emergency room by his son to be admitted for surgery on the left fourth toe and possible amputation tomorrow morning.  Per son patient without fevers or chills and has had bleeding intermittently from the toe and was told by podiatrist to come in for surgery tomorrow    1 Diabetic foot ulcer  - OR today  - podiatry and ID fu   - ABX as per ID  - will monitor    2 HX of CVA  - on asa, plavix   - cw keppra for seizure prophylaxis  - neuro fu    4 DM  - monitor FS  - Basal bolus  - diabetic diet  - uncontrolled     5  Afib, AVR  - cw lovenox BID till OR  - check INR and dose coumadin daily     6  CAD, CABG  - cw asa, statin  - cards fu     pt medically optimized for OR pending cardiac clearance

## 2023-06-06 NOTE — H&P ADULT - NSHPPHYSICALEXAM_GEN_ALL_CORE
General:frail  supple  AAOX2  Respiratory: CTA B/L  CV: RRR, S1S2, no murmurs, rubs or gallops  Abdominal: Soft, NT, ND +BS, Last BM  Extremities: foot dressing +

## 2023-06-06 NOTE — ED PROVIDER NOTE - SKIN, MLM
Skin normal color for race, warm, dry left 4th toe  with dry gangrene and ulcer or dorsum , right second toe with small area of dry gangrene

## 2023-06-07 NOTE — CONSULT NOTE ADULT - CONSULT REQUESTED DATE/TIME
06-Jun-2023 10:17
06-Jun-2023 14:08
06-Jun-2023 06:46
06-Jun-2023 20:25
07-Jun-2023 13:14
07-Jun-2023 22:15
06-Jun-2023 06:38

## 2023-06-07 NOTE — CONSULT NOTE ADULT - PROBLEM SELECTOR RECOMMENDATION 9
cont lantus 30 units qhs  change mod dose admelog corrective scale coverage qac/qhs  cont cons cho diet  goal bg 100-180 in hosp setting

## 2023-06-07 NOTE — CARE COORDINATION ASSESSMENT. - CURRENT MENTAL STATUS/COGNITIVE FUNCTIONING
Patient's daughter provided information. Patient speaks [place]/alert/oriented to person/oriented to place/oriented to time/oriented to situation/recall memory is intact/recent memory is intact/remote memory is intact

## 2023-06-07 NOTE — CONSULT NOTE ADULT - SUBJECTIVE AND OBJECTIVE BOX
S : 67y year old Male seen at bedside for Left foot ulceration.  Pt has history of left toe gangrene and osteomyelitis from previous visit. Pt is s/p left 4th toe amputation yesterday. Pt is unable to communicate and is accompanied by son today. Pt's son reports pt baing comfortable after surgery and denies any foot related complaint. Dressing intact since yesterady. Pt is s/p vascular invervention a week ago by Dr Juan Moctezuma and was prescribed anticoagulants. no new foot related complaint.  Chief Complaint : Patient is a 67y old  Male who presents with a chief complaint of foot infection (07 Jun 2023 12:41)    HPI : HPI:  67-year-old male with an extensive history including prior hemorrhagic stroke status post left craniotomy, coronary artery bypass grafting, diabetes and peripheral vascular disease who was brought into the emergency room by his son to be admitted for surgery on the left fourth toe and possible amputation tomorrow morning.  Per son patient without fevers or chills and has had bleeding intermittently from the toe and was told by podiatrist to come in for surgery tomorrow (06 Jun 2023 06:36)      Patient admits to  (-) Fevers, (-) Chills, (-) Nausea, (-) Vomiting, (-) Shortness of Breath      PMH: CVA (cerebral vascular accident)    HTN (hypertension)    DM (diabetes mellitus)    Arrhythmia    History of atrial fibrillation    Right hemiparesis    Aphasia due to acute stroke    GI bleed    Iron deficiency anemia    Upper GI bleed    Osteomyelitis      PSH:S/P CABG (coronary artery bypass graft)    S/P brain surgery    History of aortic valve repair        Allergies:No Known Allergies      Labs:                          10.8   6.32  )-----------( 331      ( 07 Jun 2023 06:24 )             33.6     WBC Trend  6.32 Date (06-07 @ 06:24)  6.19 Date (06-06 @ 02:35)  6.99 Date (06-01 @ 15:50)      Chem  06-07    136  |  101  |  15  ----------------------------<  87  4.4   |  32<H>  |  0.83    Ca    9.1      07 Jun 2023 06:24    TPro  7.6  /  Alb  3.0<L>  /  TBili  0.7  /  DBili  x   /  AST  34  /  ALT  26  /  AlkPhos  479<H>  06-07          T(F): 97.5 (06-07-23 @ 11:34), Max: 98.2 (06-07-23 @ 05:48)  HR: 72 (06-07-23 @ 11:34) (70 - 78)  BP: 125/65 (06-07-23 @ 11:34) (112/63 - 130/60)  RR: 18 (06-07-23 @ 11:34) (14 - 18)  SpO2: 96% (06-07-23 @ 11:34) (94% - 98%)  Wt(kg): --    O:   General: Pleasant  male NAD & AOX3.    Integument:  Skin warm, dry and supple bilateral.    Ulceration Left foot 4th interspace well coapted and intact suture, - hyperkeratotic border, - edema, - lydia-wound erythema, - purulence, - fluctuance, - tracking/tunneling,- probe to bone.   Vascular: Dorsalis Pedis and Posterior Tibial pulses 1/4.  Capillary re-fill time less then 5 seconds digits 1-5 bilateral.    Neuro: unable to asses but diminished pain sensation since pt does not respond to painful stimuli to foot  MSK: pt wheel chair bound secondary to stroke    A: Left foot ulceration  Right hallux ulceration      P:   Chart reviewed and Patient evaluated  Discussed diagnosis and treatment with patient's son  dressing remains intact till tomorrow  pending intra op path and wound culture  not today- Excisional debridement with 15 blade of ---- base down to subcutaneous tissue Right/ Left foot ulceration  Applied betadine with dry sterile dressing  X-rays reviewed post op  Continue with IV antibiotics As Per ID  Weight bearing as tolerated.  Rec care boots to offload heels  Podiatry will follow while in house.  Stable for discharge from  podiatry stand point once culture and path is negative.

## 2023-06-07 NOTE — CARE COORDINATION ASSESSMENT. - NSCAREPROVIDERS_GEN_ALL_CORE_FT
CARE PROVIDERS:  Accepting Physician: Tamika Harmon  Access Services: Virginie Bell  Administration: Elier Henriquez  Admitting: Tamika Harmon  Attending: Tamika Harmon  Case Management: Ashlee Castillo  Consultant: Weil, Patricia  Consultant: Kimberly Murray  Consultant: Ebenezer Hernández  Consultant: Guru Michel  Consultant: Ike Ornelas  Consultant: Gamaliel Kendrick  Covering Team: Janeth Santamaria  ED Attending: Howard Reyes ED Nurse: Jeffrey Cox  Nurse: Zach Woods  Nurse: Benjie Mccormick  Ordered: Azfar, Maegan  Ordered: ADM, User  Ordered: Physician, Ordering  Ordered: Doctor, Unknown  Ordered: Pahlavan, Mohsen  Outpatient Provider: Ulises Pham  Outpatient Provider: Sid Castrejon  Outpatient Provider: Pahlavan, Mohsen  Outpatient Provider: Anirudh Treadwell  Override: Hailee Sandoval  PCA/Nursing Assistant: Cristobal Leigh  PCA/Nursing Assistant: Nayely Salazar  Primary Team: Guerrero Vera  Registered Dietitian: Anabel Lang  Respiratory Therapy: Alfredo Schultz  Respiratory Therapy: Karl Lagos  UR// Supp. Assoc.: Gloria Noble

## 2023-06-07 NOTE — CONSULT NOTE ADULT - REASON FOR ADMISSION
foot infection

## 2023-06-07 NOTE — CONSULT NOTE ADULT - SUBJECTIVE AND OBJECTIVE BOX
Patient is a 67y old  Male who presents with a chief complaint of foot infection (07 Jun 2023 14:39)      Reason For Consult: dm2 uncontrolled    HPI:  67-year-old male with an extensive history including prior hemorrhagic stroke status post left craniotomy, coronary artery bypass grafting, diabetes and peripheral vascular disease who was brought into the emergency room by his son to be admitted for surgery on the left fourth toe and possible amputation tomorrow morning.  Per son patient without fevers or chills and has had bleeding intermittently from the toe and was told by podiatrist to come in for surgery tomorrow (06 Jun 2023 06:36)      PAST MEDICAL & SURGICAL HISTORY:  CVA (cerebral vascular accident)      HTN (hypertension)      DM (diabetes mellitus)      History of atrial fibrillation      Right hemiparesis      Aphasia due to acute stroke      Upper GI bleed      Osteomyelitis      S/P CABG (coronary artery bypass graft)      S/P brain surgery      History of aortic valve repair          FAMILY HISTORY:  FH: coronary artery disease (Sibling)    FH: type 2 diabetes (Sibling)          Social History:    MEDICATIONS  (STANDING):  aspirin enteric coated 81 milliGRAM(s) Oral daily  atorvastatin 40 milliGRAM(s) Oral at bedtime  cefTRIAXone   IVPB 2000 milliGRAM(s) IV Intermittent every 24 hours  clopidogrel Tablet 75 milliGRAM(s) Oral daily  dextrose 5%. 1000 milliLiter(s) (50 mL/Hr) IV Continuous <Continuous>  dextrose 5%. 1000 milliLiter(s) (100 mL/Hr) IV Continuous <Continuous>  dextrose 50% Injectable 25 Gram(s) IV Push once  dextrose 50% Injectable 12.5 Gram(s) IV Push once  dextrose 50% Injectable 25 Gram(s) IV Push once  dextrose 50% Injectable 25 Gram(s) IV Push once  enoxaparin Injectable 60 milliGRAM(s) SubCutaneous every 12 hours  furosemide    Tablet 40 milliGRAM(s) Oral daily  glucagon  Injectable 1 milliGRAM(s) IntraMuscular once  insulin glargine Injectable (LANTUS) 30 Unit(s) SubCutaneous at bedtime  insulin lispro (ADMELOG) corrective regimen sliding scale   SubCutaneous three times a day before meals  insulin lispro (ADMELOG) corrective regimen sliding scale   SubCutaneous at bedtime  levETIRAcetam 750 milliGRAM(s) Oral two times a day  metoprolol succinate ER 25 milliGRAM(s) Oral daily  pantoprazole    Tablet 40 milliGRAM(s) Oral before breakfast  spironolactone 25 milliGRAM(s) Oral daily  urea Oral Powder 15 Gram(s) Oral daily    MEDICATIONS  (PRN):  dextrose Oral Gel 15 Gram(s) Oral once PRN Blood Glucose LESS THAN 70 milliGRAM(s)/deciliter  morphine  - Injectable 2 milliGRAM(s) IV Push every 4 hours PRN Severe Pain (7 - 10)  oxycodone    5 mG/acetaminophen 325 mG 1 Tablet(s) Oral every 6 hours PRN Moderate Pain (4 - 6)        T(C): 36.8 (06-07-23 @ 20:48), Max: 36.8 (06-07-23 @ 05:48)  HR: 92 (06-07-23 @ 20:48) (72 - 92)  BP: 109/67 (06-07-23 @ 20:48) (109/67 - 125/65)  RR: 18 (06-07-23 @ 20:48) (18 - 18)  SpO2: 94% (06-07-23 @ 20:48) (94% - 96%)  Wt(kg): --    PHYSICAL EXAM:  GENERAL: NAD, well-groomed, well-developed  HEAD:  Atraumatic, Normocephalic  NECK: Supple, No JVD, Normal thyroid  CHEST/LUNG: Clear to percussion bilaterally; No rales, rhonchi, wheezing, or rubs  HEART: Regular rate and rhythm; No murmurs, rubs, or gallops  ABDOMEN: Soft, Nontender, Nondistended; Bowel sounds present  EXTREMITIES:  2+ Peripheral Pulses, No clubbing, cyanosis, or edema  SKIN: No rashes or lesions    CAPILLARY BLOOD GLUCOSE      POCT Blood Glucose.: 247 mg/dL (07 Jun 2023 21:17)  POCT Blood Glucose.: 159 mg/dL (07 Jun 2023 16:40)  POCT Blood Glucose.: 195 mg/dL (07 Jun 2023 11:59)  POCT Blood Glucose.: 100 mg/dL (07 Jun 2023 07:45)                            10.8   6.32  )-----------( 331      ( 07 Jun 2023 06:24 )             33.6       CMP:  06-07 @ 06:24  SGPT 26  Albumin 3.0   Alk Phos 479   Anion Gap 3   SGOT 34   Total Bili 0.7   BUN 15   Calcium Total 9.1   CO2 32   Chloride 101   Creatinine 0.83   eGFR if AA --   eGFR if non AA --   Glucose 87   Potassium 4.4   Protein 7.6   Sodium 136      Thyroid Function Tests:      Diabetes Tests:       Radiology:

## 2023-06-07 NOTE — CONSULT NOTE ADULT - CONSULT REASON
Clearance
dm2 uncontrolled
hyponatremia
foot infection
elevated alk phos
left 4th toe gangrene/amputation
atelectasis  cva  hemiplegia  HTN  OP  OA  foot infection  lydia op eval

## 2023-06-07 NOTE — CARE COORDINATION ASSESSMENT. - NSPASTMEDSURGHISTORY_GEN_ALL_CORE_FT
PAST MEDICAL & SURGICAL HISTORY:  DM (diabetes mellitus)      HTN (hypertension)      CVA (cerebral vascular accident)      S/P brain surgery      S/P CABG (coronary artery bypass graft)      History of atrial fibrillation      Osteomyelitis      Upper GI bleed      Aphasia due to acute stroke      Right hemiparesis      History of aortic valve repair

## 2023-06-07 NOTE — PROGRESS NOTE ADULT - SUBJECTIVE AND OBJECTIVE BOX
[facility] DIVISION of INFECTIOUS DISEASE  Guru Michel MD PhD, Lisa James MD, Becca Acosta MD, Karen Grimes MD, Corky Perez MD  and providing coverage with Carlos Marx MD  Providing Infectious Disease Consultations at [facility], [facility], [facility]'s    Office# [phone] to schedule follow up appointments  Answering Service for urgent calls or New Consults [phone]  Cell# to text for urgent issues Guru Michel [phone]     infectious diseases progress note:    ARNIE DELGADILLO is a 67y y. o. Male patient    Overnight and events of the last 24hrs reviewed    Allergies    No Known Allergies    Intolerances        ANTIBIOTICS/RELEVANT:  antimicrobials    immunologic:    OTHER:  aspirin enteric coated 81 milliGRAM(s) Oral daily  atorvastatin 40 milliGRAM(s) Oral at bedtime  clopidogrel Tablet 75 milliGRAM(s) Oral daily  dextrose 5%. 1000 milliLiter(s) IV Continuous <Continuous>  dextrose 5%. 1000 milliLiter(s) IV Continuous <Continuous>  dextrose 50% Injectable 25 Gram(s) IV Push once  dextrose 50% Injectable 25 Gram(s) IV Push once  dextrose 50% Injectable 25 Gram(s) IV Push once  dextrose 50% Injectable 12.5 Gram(s) IV Push once  dextrose Oral Gel 15 Gram(s) Oral once PRN  enoxaparin Injectable 60 milliGRAM(s) SubCutaneous every 12 hours  furosemide    Tablet 40 milliGRAM(s) Oral daily  glucagon  Injectable 1 milliGRAM(s) IntraMuscular once  insulin glargine Injectable (LANTUS) 30 Unit(s) SubCutaneous at bedtime  insulin lispro (ADMELOG) corrective regimen sliding scale   SubCutaneous three times a day before meals  insulin lispro (ADMELOG) corrective regimen sliding scale   SubCutaneous at bedtime  levETIRAcetam 750 milliGRAM(s) Oral two times a day  metoprolol succinate ER 25 milliGRAM(s) Oral daily  morphine  - Injectable 2 milliGRAM(s) IV Push every 4 hours PRN  oxycodone    5 mG/acetaminophen 325 mG 1 Tablet(s) Oral every 6 hours PRN  pantoprazole    Tablet 40 milliGRAM(s) Oral before breakfast  spironolactone 25 milliGRAM(s) Oral daily  urea Oral Powder 15 Gram(s) Oral daily      Objective:  Vital Signs Last 24 Hrs  T(C): 36.4 (07 Jun 2023 11:34), Max: 36.8 (07 Jun 2023 05:48)  T(F): 97.5 (07 Jun 2023 11:34), Max: 98.2 (07 Jun 2023 05:48)  HR: 72 (07 Jun 2023 11:34) (70 - 78)  BP: 125/65 (07 Jun 2023 11:34) (112/63 - 130/60)  BP(mean): --  RR: 18 (07 Jun 2023 11:34) (14 - 18)  SpO2: 96% (07 Jun 2023 11:34) (94% - 98%)    Parameters below as of 07 Jun 2023 11:34  Patient On (Oxygen Delivery Method): room air        T(C): 36.4 (06-07-23 @ 11:34), Max: 36.8 (06-07-23 @ 05:48)  T(C): 36.4 (06-07-23 @ 11:34), Max: 36.8 (06-07-23 @ 05:48)  T(C): 36.4 (06-07-23 @ 11:34), Max: 36.8 (06-07-23 @ 05:48)    PHYSICAL EXAM:  HEENT: NC atraumatic  Neck: supple  Respiratory: no accessory muscle use, breathing comfortably  Cardiovascular: distant  Gastrointestinal: normal appearing, nondistended  Extremities: no clubbing, no cyanosis, sp amp, foot wrapped        LABS:                          10.8   6.32  )-----------( 331      ( 07 Jun 2023 06:24 )             33.6       WBC  6.32 06-07 @ 06:24  6.19 06-06 @ 02:35  6.99 06-01 @ 15:50      06-07    136  |  101  |  15  ----------------------------<  87  4.4   |  32<H>  |  0.83    Ca    9.1      07 Jun 2023 06:24    TPro  7.6  /  Alb  3.0<L>  /  TBili  0.7  /  DBili  x   /  AST  34  /  ALT  26  /  AlkPhos  479<H>  06-07      Creatinine, Serum: 0.83 mg/dL (06-07-23 @ 06:24)  Creatinine, Serum: 0.92 mg/dL (06-06-23 @ 02:35)  Creatinine, Serum: 0.94 mg/dL (06-01-23 @ 15:50)      PT/INR - ( 07 Jun 2023 06:24 )   PT: 13.8 sec;   INR: 1.18 ratio         PTT - ( 06 Jun 2023 02:35 )  PTT:43.2 sec          INFLAMMATORY MARKERS      MICROBIOLOGY:              RADIOLOGY & ADDITIONAL STUDIES:

## 2023-06-07 NOTE — PROGRESS NOTE ADULT - SUBJECTIVE AND OBJECTIVE BOX
Patient is a 67y Male whom presented to the hospital with hyponatremia     PAST MEDICAL & SURGICAL HISTORY:  CVA (cerebral vascular accident)      HTN (hypertension)      DM (diabetes mellitus)      History of atrial fibrillation      Right hemiparesis      Aphasia due to acute stroke      Upper GI bleed      Osteomyelitis      S/P CABG (coronary artery bypass graft)      S/P brain surgery      History of aortic valve repair          MEDICATIONS  (STANDING):  aspirin enteric coated 81 milliGRAM(s) Oral daily  atorvastatin 40 milliGRAM(s) Oral at bedtime  clopidogrel Tablet 75 milliGRAM(s) Oral daily  dextrose 5%. 1000 milliLiter(s) (50 mL/Hr) IV Continuous <Continuous>  dextrose 5%. 1000 milliLiter(s) (100 mL/Hr) IV Continuous <Continuous>  dextrose 50% Injectable 12.5 Gram(s) IV Push once  dextrose 50% Injectable 25 Gram(s) IV Push once  dextrose 50% Injectable 25 Gram(s) IV Push once  dextrose 50% Injectable 25 Gram(s) IV Push once  enoxaparin Injectable 60 milliGRAM(s) SubCutaneous every 12 hours  furosemide    Tablet 40 milliGRAM(s) Oral daily  glucagon  Injectable 1 milliGRAM(s) IntraMuscular once  insulin glargine Injectable (LANTUS) 30 Unit(s) SubCutaneous at bedtime  insulin lispro (ADMELOG) corrective regimen sliding scale   SubCutaneous three times a day before meals  insulin lispro (ADMELOG) corrective regimen sliding scale   SubCutaneous at bedtime  levETIRAcetam 750 milliGRAM(s) Oral two times a day  metoprolol succinate ER 25 milliGRAM(s) Oral daily  pantoprazole    Tablet 40 milliGRAM(s) Oral before breakfast  spironolactone 25 milliGRAM(s) Oral daily      Allergies    No Known Allergies    Intolerances        SOCIAL HISTORY:  Denies ETOh,Smoking,     FAMILY HISTORY:  FH: coronary artery disease (Sibling)    FH: type 2 diabetes (Sibling)        REVIEW OF SYSTEMS:    CONSTITUTIONAL: No weakness, fevers or chills  RESPIRATORY: No cough, wheezing, hemoptysis; No shortness of breath  CARDIOVASCULAR: No chest pain or palpitations  GASTROINTESTINAL: No abdominal or epigastric pain. No nausea, vomiting,     GENITOURINARY: No dysuria, frequency or hematuria  NEUROLOGICAL: No numbness or weakness  SKIN: dry                            10.8   6.32  )-----------( 331      ( 07 Jun 2023 06:24 )             33.6       CBC Full  -  ( 07 Jun 2023 06:24 )  WBC Count : 6.32 K/uL  RBC Count : 3.74 M/uL  Hemoglobin : 10.8 g/dL  Hematocrit : 33.6 %  Platelet Count - Automated : 331 K/uL  Mean Cell Volume : 89.8 fl  Mean Cell Hemoglobin : 28.9 pg  Mean Cell Hemoglobin Concentration : 32.1 gm/dL  Auto Neutrophil # : x  Auto Lymphocyte # : x  Auto Monocyte # : x  Auto Eosinophil # : x  Auto Basophil # : x  Auto Neutrophil % : x  Auto Lymphocyte % : x  Auto Monocyte % : x  Auto Eosinophil % : x  Auto Basophil % : x      06-07    136  |  101  |  15  ----------------------------<  87  4.4   |  32<H>  |  0.83    Ca    9.1      07 Jun 2023 06:24    TPro  7.6  /  Alb  3.0<L>  /  TBili  0.7  /  DBili  x   /  AST  34  /  ALT  26  /  AlkPhos  479<H>  06-07      CAPILLARY BLOOD GLUCOSE      POCT Blood Glucose.: 159 mg/dL (07 Jun 2023 16:40)  POCT Blood Glucose.: 195 mg/dL (07 Jun 2023 11:59)  POCT Blood Glucose.: 100 mg/dL (07 Jun 2023 07:45)  POCT Blood Glucose.: 210 mg/dL (06 Jun 2023 21:46)      Vital Signs Last 24 Hrs  T(C): 36.4 (07 Jun 2023 11:34), Max: 36.8 (07 Jun 2023 05:48)  T(F): 97.5 (07 Jun 2023 11:34), Max: 98.2 (07 Jun 2023 05:48)  HR: 72 (07 Jun 2023 11:34) (72 - 78)  BP: 125/65 (07 Jun 2023 11:34) (120/76 - 125/65)  BP(mean): --  RR: 18 (07 Jun 2023 11:34) (18 - 18)  SpO2: 96% (07 Jun 2023 11:34) (94% - 96%)    Parameters below as of 07 Jun 2023 11:34  Patient On (Oxygen Delivery Method): room air            PT/INR - ( 07 Jun 2023 06:24 )   PT: 13.8 sec;   INR: 1.18 ratio         PTT - ( 06 Jun 2023 02:35 )  PTT:43.2 sec    PHYSICAL EXAM:    Constitutional: NAD  HEENT: conjunctive   clear   Neck:  No JVD  Respiratory: CTAB  Cardiovascular: S1 and S2  Gastrointestinal: BS+, soft, NT/ND  Extremities: No peripheral edema  Neurological:  no focal deficits

## 2023-06-07 NOTE — PROGRESS NOTE ADULT - SUBJECTIVE AND OBJECTIVE BOX
Patient is a 67y Male with a known history of :    HPI:  67-year-old male with an extensive history including prior hemorrhagic stroke status post left craniotomy, coronary artery bypass grafting, diabetes and peripheral vascular disease who was brought into the emergency room by his son to be admitted for surgery on the left fourth toe and possible amputation tomorrow morning.  Per son patient without fevers or chills and has had bleeding intermittently from the toe and was told by podiatrist to come in for surgery tomorrow (06 Jun 2023 06:36)      REVIEW OF SYSTEMS:    CONSTITUTIONAL: No weakness, fevers or chills  EYES/ENT: No visual changes;  No vertigo or throat pain   NECK: No pain or stiffness  RESPIRATORY: No cough, wheezing, hemoptysis; No shortness of breath  CARDIOVASCULAR: No chest pain or palpitations  GASTROINTESTINAL: No abdominal pain. No nausea, vomiting, or hematemesis; No diarrhea or constipation. No melena or hematochezia.  GENITOURINARY: No dysuria, frequency or hematuria  NEUROLOGICAL: No numbness or weakness  SKIN: No itching or rash  All other review of systems is negative unless indicated above      MEDICATIONS  (STANDING):  aspirin enteric coated 81 milliGRAM(s) Oral daily  atorvastatin 40 milliGRAM(s) Oral at bedtime  cefTRIAXone   IVPB 2000 milliGRAM(s) IV Intermittent every 24 hours  clopidogrel Tablet 75 milliGRAM(s) Oral daily  dextrose 5%. 1000 milliLiter(s) (50 mL/Hr) IV Continuous <Continuous>  dextrose 5%. 1000 milliLiter(s) (100 mL/Hr) IV Continuous <Continuous>  dextrose 50% Injectable 25 Gram(s) IV Push once  dextrose 50% Injectable 12.5 Gram(s) IV Push once  dextrose 50% Injectable 25 Gram(s) IV Push once  dextrose 50% Injectable 25 Gram(s) IV Push once  enoxaparin Injectable 60 milliGRAM(s) SubCutaneous every 12 hours  furosemide    Tablet 40 milliGRAM(s) Oral daily  glucagon  Injectable 1 milliGRAM(s) IntraMuscular once  insulin glargine Injectable (LANTUS) 30 Unit(s) SubCutaneous at bedtime  insulin lispro (ADMELOG) corrective regimen sliding scale   SubCutaneous three times a day before meals  insulin lispro (ADMELOG) corrective regimen sliding scale   SubCutaneous at bedtime  levETIRAcetam 750 milliGRAM(s) Oral two times a day  metoprolol succinate ER 25 milliGRAM(s) Oral daily  pantoprazole    Tablet 40 milliGRAM(s) Oral before breakfast  spironolactone 25 milliGRAM(s) Oral daily  urea Oral Powder 15 Gram(s) Oral daily    MEDICATIONS  (PRN):  dextrose Oral Gel 15 Gram(s) Oral once PRN Blood Glucose LESS THAN 70 milliGRAM(s)/deciliter  morphine  - Injectable 2 milliGRAM(s) IV Push every 4 hours PRN Severe Pain (7 - 10)  oxycodone    5 mG/acetaminophen 325 mG 1 Tablet(s) Oral every 6 hours PRN Moderate Pain (4 - 6)      ALLERGIES: No Known Allergies      FAMILY HISTORY:  FH: coronary artery disease (Sibling)    FH: type 2 diabetes (Sibling)        PHYSICAL EXAMINATION:  -----------------------------  T(C): 36.4 (06-07-23 @ 11:34), Max: 36.8 (06-07-23 @ 05:48)  HR: 72 (06-07-23 @ 11:34) (72 - 78)  BP: 125/65 (06-07-23 @ 11:34) (120/76 - 125/65)  RR: 18 (06-07-23 @ 11:34) (18 - 18)  SpO2: 96% (06-07-23 @ 11:34) (94% - 98%)  Wt(kg): --    06-06 @ 07:01  -  06-07 @ 07:00  --------------------------------------------------------  IN:    Oral Fluid: 240 mL  Total IN: 240 mL    OUT:  Total OUT: 0 mL    Total NET: 240 mL      PHYSICAL EXAM:  Constitutional: Awake in NAD  HEENT:  GUSTAVO, EOMI  Neck: No JVD  Pulmonary: CTA B/L No R/R/W  Cardiovascular: PMI not palpable non-displaced Regular S1 and S2, no murmurs  Gastrointestinal: Bowel Sounds present, soft, nontender.   Extremities: Trace peripheral edema.   Neuro: No gross focal deficits    LABS: All Labs Reviewed:        LABS:   --------  06-07    136  |  101  |  15  ----------------------------<  87  4.4   |  32<H>  |  0.83    Ca    9.1      07 Jun 2023 06:24    TPro  7.6  /  Alb  3.0<L>  /  TBili  0.7  /  DBili  x   /  AST  34  /  ALT  26  /  AlkPhos  479<H>  06-07                         10.8   6.32  )-----------( 331      ( 07 Jun 2023 06:24 )             33.6     PT/INR - ( 07 Jun 2023 06:24 )   PT: 13.8 sec;   INR: 1.18 ratio         PTT - ( 06 Jun 2023 02:35 )  PTT:43.2 sec        Culture Results:   No growth (06-06 @ 12:00)

## 2023-06-07 NOTE — PROGRESS NOTE ADULT - SUBJECTIVE AND OBJECTIVE BOX
[facility]  Shane Murray PA-C  [address] [place] [address]  [phone]      INTERVAL HPI/OVERNIGHT EVENTS:  Pt s/e with son at bedside who prefers to translate for patient  Discussed LFTs, trending down  Pt's son reports pt has no GI complaints    MEDICATIONS  (STANDING):  aspirin enteric coated 81 milliGRAM(s) Oral daily  atorvastatin 40 milliGRAM(s) Oral at bedtime  clopidogrel Tablet 75 milliGRAM(s) Oral daily  dextrose 5%. 1000 milliLiter(s) (100 mL/Hr) IV Continuous <Continuous>  dextrose 5%. 1000 milliLiter(s) (50 mL/Hr) IV Continuous <Continuous>  dextrose 50% Injectable 25 Gram(s) IV Push once  dextrose 50% Injectable 12.5 Gram(s) IV Push once  dextrose 50% Injectable 25 Gram(s) IV Push once  dextrose 50% Injectable 25 Gram(s) IV Push once  enoxaparin Injectable 60 milliGRAM(s) SubCutaneous every 12 hours  furosemide    Tablet 40 milliGRAM(s) Oral daily  glucagon  Injectable 1 milliGRAM(s) IntraMuscular once  insulin glargine Injectable (LANTUS) 30 Unit(s) SubCutaneous at bedtime  insulin lispro (ADMELOG) corrective regimen sliding scale   SubCutaneous three times a day before meals  insulin lispro (ADMELOG) corrective regimen sliding scale   SubCutaneous at bedtime  levETIRAcetam 750 milliGRAM(s) Oral two times a day  metoprolol succinate ER 25 milliGRAM(s) Oral daily  pantoprazole    Tablet 40 milliGRAM(s) Oral before breakfast  spironolactone 25 milliGRAM(s) Oral daily  urea Oral Powder 15 Gram(s) Oral daily    MEDICATIONS  (PRN):  dextrose Oral Gel 15 Gram(s) Oral once PRN Blood Glucose LESS THAN 70 milliGRAM(s)/deciliter  morphine  - Injectable 2 milliGRAM(s) IV Push every 4 hours PRN Severe Pain (7 - 10)  oxycodone    5 mG/acetaminophen 325 mG 1 Tablet(s) Oral every 6 hours PRN Moderate Pain (4 - 6)      Allergies    No Known Allergies    PHYSICAL EXAM:   Vital Signs:  Vital Signs Last 24 Hrs  T(C): 36.4 (07 Jun 2023 11:34), Max: 36.8 (07 Jun 2023 05:48)  T(F): 97.5 (07 Jun 2023 11:34), Max: 98.2 (07 Jun 2023 05:48)  HR: 72 (07 Jun 2023 11:34) (68 - 78)  BP: 125/65 (07 Jun 2023 11:34) (112/63 - 130/61)  BP(mean): --  RR: 18 (07 Jun 2023 11:34) (14 - 18)  SpO2: 96% (07 Jun 2023 11:34) (94% - 98%)    Parameters below as of 07 Jun 2023 11:34  Patient On (Oxygen Delivery Method): room air      GENERAL:  Appears stated age  HEENT:  NC/AT  CHEST:  Full & symmetric excursion  HEART:  Regular rhythm  ABDOMEN:  Soft, non-tender, non-distended  EXTEREMITIES:  no cyanosis, +dressing on L foot  SKIN:  No rash  NEURO:  Alert      LABS:                        10.8   6.32  )-----------( 331      ( 07 Jun 2023 06:24 )             33.6     06-07    136  |  101  |  15  ----------------------------<  87  4.4   |  32<H>  |  0.83    Ca    9.1      07 Jun 2023 06:24    TPro  7.6  /  Alb  3.0<L>  /  TBili  0.7  /  DBili  x   /  AST  34  /  ALT  26  /  AlkPhos  479<H>  06-07    PT/INR - ( 07 Jun 2023 06:24 )   PT: 13.8 sec;   INR: 1.18 ratio         PTT - ( 06 Jun 2023 02:35 )  PTT:43.2 sec

## 2023-06-07 NOTE — PROGRESS NOTE ADULT - SUBJECTIVE AND OBJECTIVE BOX
Patient is a 67y old  Male who presents with a chief complaint of foot infection (07 Jun 2023 13:14)    Date of servie : 06-07-23 @ 14:39  INTERVAL HPI/OVERNIGHT EVENTS:  T(C): 36.4 (06-07-23 @ 11:34), Max: 36.8 (06-07-23 @ 05:48)  HR: 72 (06-07-23 @ 11:34) (72 - 78)  BP: 125/65 (06-07-23 @ 11:34) (120/76 - 125/65)  RR: 18 (06-07-23 @ 11:34) (18 - 18)  SpO2: 96% (06-07-23 @ 11:34) (94% - 98%)  Wt(kg): --  I&O's Summary    06 Jun 2023 07:01  -  07 Jun 2023 07:00  --------------------------------------------------------  IN: 240 mL / OUT: 0 mL / NET: 240 mL        LABS:                        10.8   6.32  )-----------( 331      ( 07 Jun 2023 06:24 )             33.6     06-07    136  |  101  |  15  ----------------------------<  87  4.4   |  32<H>  |  0.83    Ca    9.1      07 Jun 2023 06:24    TPro  7.6  /  Alb  3.0<L>  /  TBili  0.7  /  DBili  x   /  AST  34  /  ALT  26  /  AlkPhos  479<H>  06-07    PT/INR - ( 07 Jun 2023 06:24 )   PT: 13.8 sec;   INR: 1.18 ratio         PTT - ( 06 Jun 2023 02:35 )  PTT:43.2 sec    CAPILLARY BLOOD GLUCOSE      POCT Blood Glucose.: 195 mg/dL (07 Jun 2023 11:59)  POCT Blood Glucose.: 100 mg/dL (07 Jun 2023 07:45)  POCT Blood Glucose.: 210 mg/dL (06 Jun 2023 21:46)  POCT Blood Glucose.: 115 mg/dL (06 Jun 2023 16:50)            MEDICATIONS  (STANDING):  aspirin enteric coated 81 milliGRAM(s) Oral daily  atorvastatin 40 milliGRAM(s) Oral at bedtime  cefTRIAXone   IVPB 2000 milliGRAM(s) IV Intermittent every 24 hours  clopidogrel Tablet 75 milliGRAM(s) Oral daily  dextrose 5%. 1000 milliLiter(s) (50 mL/Hr) IV Continuous <Continuous>  dextrose 5%. 1000 milliLiter(s) (100 mL/Hr) IV Continuous <Continuous>  dextrose 50% Injectable 12.5 Gram(s) IV Push once  dextrose 50% Injectable 25 Gram(s) IV Push once  dextrose 50% Injectable 25 Gram(s) IV Push once  dextrose 50% Injectable 25 Gram(s) IV Push once  enoxaparin Injectable 60 milliGRAM(s) SubCutaneous every 12 hours  furosemide    Tablet 40 milliGRAM(s) Oral daily  glucagon  Injectable 1 milliGRAM(s) IntraMuscular once  insulin glargine Injectable (LANTUS) 30 Unit(s) SubCutaneous at bedtime  insulin lispro (ADMELOG) corrective regimen sliding scale   SubCutaneous three times a day before meals  insulin lispro (ADMELOG) corrective regimen sliding scale   SubCutaneous at bedtime  levETIRAcetam 750 milliGRAM(s) Oral two times a day  metoprolol succinate ER 25 milliGRAM(s) Oral daily  pantoprazole    Tablet 40 milliGRAM(s) Oral before breakfast  spironolactone 25 milliGRAM(s) Oral daily  urea Oral Powder 15 Gram(s) Oral daily  warfarin 3 milliGRAM(s) Oral once    MEDICATIONS  (PRN):  dextrose Oral Gel 15 Gram(s) Oral once PRN Blood Glucose LESS THAN 70 milliGRAM(s)/deciliter  morphine  - Injectable 2 milliGRAM(s) IV Push every 4 hours PRN Severe Pain (7 - 10)  oxycodone    5 mG/acetaminophen 325 mG 1 Tablet(s) Oral every 6 hours PRN Moderate Pain (4 - 6)          PHYSICAL EXAM:  GENERAL: NAD, well-groomed, well-developed  HEAD:  Atraumatic, Normocephalic  CHEST/LUNG: Clear to percussion bilaterally; No rales, rhonchi, wheezing, or rubs  HEART: Regular rate and rhythm; No murmurs, rubs, or gallops  ABDOMEN: Soft, Nontender, Nondistended; Bowel sounds present  EXTREMITIES:  dressing +    Care Discussed with Consultants/Other Providers [ ] YES  [ ] NO

## 2023-06-07 NOTE — PROGRESS NOTE ADULT - SUBJECTIVE AND OBJECTIVE BOX
Date/Time Patient Seen:  		  Referring MD:   Data Reviewed	       Patient is a 67y old  Male who presents with a chief complaint of foot infection (06 Jun 2023 20:25)      Subjective/HPI     PAST MEDICAL & SURGICAL HISTORY:  CVA (cerebral vascular accident)    HTN (hypertension)    DM (diabetes mellitus)    Arrhythmia    History of atrial fibrillation    Right hemiparesis    Aphasia due to acute stroke    GI bleed    Iron deficiency anemia    Upper GI bleed    Osteomyelitis    S/P CABG (coronary artery bypass graft)    S/P brain surgery    History of aortic valve repair          Medication list         MEDICATIONS  (STANDING):  aspirin enteric coated 81 milliGRAM(s) Oral daily  atorvastatin 40 milliGRAM(s) Oral at bedtime  clopidogrel Tablet 75 milliGRAM(s) Oral daily  dextrose 5%. 1000 milliLiter(s) (100 mL/Hr) IV Continuous <Continuous>  dextrose 5%. 1000 milliLiter(s) (50 mL/Hr) IV Continuous <Continuous>  dextrose 50% Injectable 25 Gram(s) IV Push once  dextrose 50% Injectable 25 Gram(s) IV Push once  dextrose 50% Injectable 12.5 Gram(s) IV Push once  dextrose 50% Injectable 25 Gram(s) IV Push once  enoxaparin Injectable 60 milliGRAM(s) SubCutaneous every 12 hours  furosemide    Tablet 40 milliGRAM(s) Oral daily  glucagon  Injectable 1 milliGRAM(s) IntraMuscular once  insulin glargine Injectable (LANTUS) 30 Unit(s) SubCutaneous at bedtime  insulin lispro (ADMELOG) corrective regimen sliding scale   SubCutaneous three times a day before meals  insulin lispro (ADMELOG) corrective regimen sliding scale   SubCutaneous at bedtime  levETIRAcetam 750 milliGRAM(s) Oral two times a day  metoprolol succinate ER 25 milliGRAM(s) Oral daily  pantoprazole    Tablet 40 milliGRAM(s) Oral before breakfast  spironolactone 25 milliGRAM(s) Oral daily  urea Oral Powder 15 Gram(s) Oral daily    MEDICATIONS  (PRN):  dextrose Oral Gel 15 Gram(s) Oral once PRN Blood Glucose LESS THAN 70 milliGRAM(s)/deciliter  morphine  - Injectable 2 milliGRAM(s) IV Push every 4 hours PRN Severe Pain (7 - 10)  oxycodone    5 mG/acetaminophen 325 mG 1 Tablet(s) Oral every 6 hours PRN Moderate Pain (4 - 6)         Vitals log        ICU Vital Signs Last 24 Hrs  T(C): 36.7 (06 Jun 2023 19:36), Max: 36.7 (06 Jun 2023 19:36)  T(F): 98 (06 Jun 2023 19:36), Max: 98 (06 Jun 2023 19:36)  HR: 75 (06 Jun 2023 19:36) (63 - 93)  BP: 122/79 (06 Jun 2023 19:36) (100/57 - 133/86)  BP(mean): --  ABP: --  ABP(mean): --  RR: 18 (06 Jun 2023 19:36) (14 - 18)  SpO2: 98% (06 Jun 2023 19:36) (97% - 100%)    O2 Parameters below as of 06 Jun 2023 19:36  Patient On (Oxygen Delivery Method): room air                 Input and Output:  I&O's Detail    06 Jun 2023 07:01  -  07 Jun 2023 05:17  --------------------------------------------------------  IN:    Oral Fluid: 120 mL  Total IN: 120 mL    OUT:  Total OUT: 0 mL    Total NET: 120 mL          Lab Data                        10.8   6.19  )-----------( 327      ( 06 Jun 2023 02:35 )             33.2     06-06    132<L>  |  97  |  18  ----------------------------<  122<H>  4.2   |  32<H>  |  0.92    Ca    9.0      06 Jun 2023 02:35    TPro  7.7  /  Alb  3.1<L>  /  TBili  0.6  /  DBili  x   /  AST  33  /  ALT  29  /  AlkPhos  497<H>  06-06            Review of Systems	      Objective     Physical Examination    heart s1s2  lung dc BS  head nc      Pertinent Lab findings & Imaging      Bryan:  NO   Adequate UO     I&O's Detail    06 Jun 2023 07:01  -  07 Jun 2023 05:17  --------------------------------------------------------  IN:    Oral Fluid: 120 mL  Total IN: 120 mL    OUT:  Total OUT: 0 mL    Total NET: 120 mL               Discussed with:     Cultures:	        Radiology

## 2023-06-08 NOTE — DIETITIAN INITIAL EVALUATION ADULT - ADD RECOMMEND
1) Continue current diet as ordered; honor food preferences as able to optimize po intake/tolerance  2) Recommend MVI and Vitamin C 500mg daily  3) Recommend Glucerna Shake daily  4) Monitor po intake, diet tolerance, weight trends, labs, GI function, skin integrity

## 2023-06-08 NOTE — PROGRESS NOTE ADULT - SUBJECTIVE AND OBJECTIVE BOX
Patient is a 67y old  Male who presents with a chief complaint of foot infection (08 Jun 2023 12:26)    Date of servie : 06-08-23 @ 13:23  INTERVAL HPI/OVERNIGHT EVENTS:  T(C): 36.2 (06-08-23 @ 12:44), Max: 36.8 (06-07-23 @ 20:48)  HR: 75 (06-08-23 @ 12:44) (70 - 92)  BP: 116/74 (06-08-23 @ 12:44) (109/67 - 116/74)  RR: 18 (06-08-23 @ 12:44) (18 - 18)  SpO2: 96% (06-08-23 @ 12:44) (93% - 96%)  Wt(kg): --  I&O's Summary    07 Jun 2023 07:01  -  08 Jun 2023 07:00  --------------------------------------------------------  IN: 240 mL / OUT: 0 mL / NET: 240 mL    08 Jun 2023 07:01  -  08 Jun 2023 13:23  --------------------------------------------------------  IN: 240 mL / OUT: 0 mL / NET: 240 mL        LABS:                        10.8   6.32  )-----------( 331      ( 07 Jun 2023 06:24 )             33.6     06-07    136  |  101  |  15  ----------------------------<  87  4.4   |  32<H>  |  0.83    Ca    9.1      07 Jun 2023 06:24    TPro  7.6  /  Alb  3.0<L>  /  TBili  0.7  /  DBili  x   /  AST  34  /  ALT  26  /  AlkPhos  479<H>  06-07    PT/INR - ( 08 Jun 2023 08:59 )   PT: 13.7 sec;   INR: 1.17 ratio         PTT - ( 08 Jun 2023 08:59 )  PTT:58.1 sec    CAPILLARY BLOOD GLUCOSE      POCT Blood Glucose.: 193 mg/dL (08 Jun 2023 11:17)  POCT Blood Glucose.: 131 mg/dL (08 Jun 2023 07:54)  POCT Blood Glucose.: 247 mg/dL (07 Jun 2023 21:17)  POCT Blood Glucose.: 159 mg/dL (07 Jun 2023 16:40)            MEDICATIONS  (STANDING):  aspirin enteric coated 81 milliGRAM(s) Oral daily  atorvastatin 40 milliGRAM(s) Oral at bedtime  cefTRIAXone   IVPB 2000 milliGRAM(s) IV Intermittent every 24 hours  clopidogrel Tablet 75 milliGRAM(s) Oral daily  dextrose 5%. 1000 milliLiter(s) (50 mL/Hr) IV Continuous <Continuous>  dextrose 5%. 1000 milliLiter(s) (100 mL/Hr) IV Continuous <Continuous>  dextrose 50% Injectable 25 Gram(s) IV Push once  dextrose 50% Injectable 12.5 Gram(s) IV Push once  dextrose 50% Injectable 25 Gram(s) IV Push once  dextrose 50% Injectable 25 Gram(s) IV Push once  enoxaparin Injectable 60 milliGRAM(s) SubCutaneous every 12 hours  furosemide    Tablet 40 milliGRAM(s) Oral daily  glucagon  Injectable 1 milliGRAM(s) IntraMuscular once  insulin glargine Injectable (LANTUS) 30 Unit(s) SubCutaneous at bedtime  insulin lispro (ADMELOG) corrective regimen sliding scale   SubCutaneous three times a day before meals  insulin lispro (ADMELOG) corrective regimen sliding scale   SubCutaneous at bedtime  levETIRAcetam 750 milliGRAM(s) Oral two times a day  metoprolol succinate ER 25 milliGRAM(s) Oral daily  pantoprazole    Tablet 40 milliGRAM(s) Oral before breakfast  spironolactone 25 milliGRAM(s) Oral daily  urea Oral Powder 15 Gram(s) Oral daily  warfarin 5 milliGRAM(s) Oral once    MEDICATIONS  (PRN):  dextrose Oral Gel 15 Gram(s) Oral once PRN Blood Glucose LESS THAN 70 milliGRAM(s)/deciliter  morphine  - Injectable 2 milliGRAM(s) IV Push every 4 hours PRN Severe Pain (7 - 10)  oxycodone    5 mG/acetaminophen 325 mG 1 Tablet(s) Oral every 6 hours PRN Moderate Pain (4 - 6)          PHYSICAL EXAM:  GENERAL: frail  CHEST/LUNG: Clear to percussion bilaterally; No rales, rhonchi, wheezing, or rubs  HEART: Regular rate and rhythm; No murmurs, rubs, or gallops  ABDOMEN: Soft, Nontender, Nondistended; Bowel sounds present  EXTREMITIES:  dressing +    Care Discussed with Consultants/Other Providers [ ] YES  [ ] NO

## 2023-06-08 NOTE — DIETITIAN NUTRITION RISK NOTIFICATION - TREATMENT: THE FOLLOWING DIET HAS BEEN RECOMMENDED
Diet, Soft and Bite Sized:   Consistent Carbohydrate {Evening Snack}  DASH/TLC {Sodium & Cholesterol Restricted}  No Beef  No Pork  No Poultry (06-06-23 @ 12:18) [Active]

## 2023-06-08 NOTE — DIETITIAN INITIAL EVALUATION ADULT - PERTINENT MEDS FT
MEDICATIONS  (STANDING):  aspirin enteric coated 81 milliGRAM(s) Oral daily  atorvastatin 40 milliGRAM(s) Oral at bedtime  cefTRIAXone   IVPB 2000 milliGRAM(s) IV Intermittent every 24 hours  clopidogrel Tablet 75 milliGRAM(s) Oral daily  dextrose 5%. 1000 milliLiter(s) (50 mL/Hr) IV Continuous <Continuous>  dextrose 5%. 1000 milliLiter(s) (100 mL/Hr) IV Continuous <Continuous>  dextrose 50% Injectable 12.5 Gram(s) IV Push once  dextrose 50% Injectable 25 Gram(s) IV Push once  dextrose 50% Injectable 25 Gram(s) IV Push once  dextrose 50% Injectable 25 Gram(s) IV Push once  enoxaparin Injectable 60 milliGRAM(s) SubCutaneous every 12 hours  furosemide    Tablet 40 milliGRAM(s) Oral daily  glucagon  Injectable 1 milliGRAM(s) IntraMuscular once  insulin glargine Injectable (LANTUS) 30 Unit(s) SubCutaneous at bedtime  insulin lispro (ADMELOG) corrective regimen sliding scale   SubCutaneous three times a day before meals  insulin lispro (ADMELOG) corrective regimen sliding scale   SubCutaneous at bedtime  levETIRAcetam 750 milliGRAM(s) Oral two times a day  metoprolol succinate ER 25 milliGRAM(s) Oral daily  pantoprazole    Tablet 40 milliGRAM(s) Oral before breakfast  spironolactone 25 milliGRAM(s) Oral daily  urea Oral Powder 15 Gram(s) Oral daily  warfarin 5 milliGRAM(s) Oral once    MEDICATIONS  (PRN):  dextrose Oral Gel 15 Gram(s) Oral once PRN Blood Glucose LESS THAN 70 milliGRAM(s)/deciliter  morphine  - Injectable 2 milliGRAM(s) IV Push every 4 hours PRN Severe Pain (7 - 10)  oxycodone    5 mG/acetaminophen 325 mG 1 Tablet(s) Oral every 6 hours PRN Moderate Pain (4 - 6)

## 2023-06-08 NOTE — PROGRESS NOTE ADULT - SUBJECTIVE AND OBJECTIVE BOX
Date/Time Patient Seen:  		  Referring MD:   Data Reviewed	       Patient is a 67y old  Male who presents with a chief complaint of foot infection (07 Jun 2023 22:15)      Subjective/HPI     PAST MEDICAL & SURGICAL HISTORY:  CVA (cerebral vascular accident)    HTN (hypertension)    DM (diabetes mellitus)    Arrhythmia    History of atrial fibrillation    Right hemiparesis    Aphasia due to acute stroke    GI bleed    Iron deficiency anemia    Upper GI bleed    Osteomyelitis    S/P CABG (coronary artery bypass graft)    S/P brain surgery    History of aortic valve repair          Medication list         MEDICATIONS  (STANDING):  aspirin enteric coated 81 milliGRAM(s) Oral daily  atorvastatin 40 milliGRAM(s) Oral at bedtime  cefTRIAXone   IVPB 2000 milliGRAM(s) IV Intermittent every 24 hours  clopidogrel Tablet 75 milliGRAM(s) Oral daily  dextrose 5%. 1000 milliLiter(s) (50 mL/Hr) IV Continuous <Continuous>  dextrose 5%. 1000 milliLiter(s) (100 mL/Hr) IV Continuous <Continuous>  dextrose 50% Injectable 12.5 Gram(s) IV Push once  dextrose 50% Injectable 25 Gram(s) IV Push once  dextrose 50% Injectable 25 Gram(s) IV Push once  dextrose 50% Injectable 25 Gram(s) IV Push once  enoxaparin Injectable 60 milliGRAM(s) SubCutaneous every 12 hours  furosemide    Tablet 40 milliGRAM(s) Oral daily  glucagon  Injectable 1 milliGRAM(s) IntraMuscular once  insulin glargine Injectable (LANTUS) 30 Unit(s) SubCutaneous at bedtime  insulin lispro (ADMELOG) corrective regimen sliding scale   SubCutaneous three times a day before meals  insulin lispro (ADMELOG) corrective regimen sliding scale   SubCutaneous at bedtime  levETIRAcetam 750 milliGRAM(s) Oral two times a day  metoprolol succinate ER 25 milliGRAM(s) Oral daily  pantoprazole    Tablet 40 milliGRAM(s) Oral before breakfast  spironolactone 25 milliGRAM(s) Oral daily  urea Oral Powder 15 Gram(s) Oral daily    MEDICATIONS  (PRN):  dextrose Oral Gel 15 Gram(s) Oral once PRN Blood Glucose LESS THAN 70 milliGRAM(s)/deciliter  morphine  - Injectable 2 milliGRAM(s) IV Push every 4 hours PRN Severe Pain (7 - 10)  oxycodone    5 mG/acetaminophen 325 mG 1 Tablet(s) Oral every 6 hours PRN Moderate Pain (4 - 6)         Vitals log        ICU Vital Signs Last 24 Hrs  T(C): 36.8 (07 Jun 2023 20:48), Max: 36.8 (07 Jun 2023 05:48)  T(F): 98.2 (07 Jun 2023 20:48), Max: 98.2 (07 Jun 2023 05:48)  HR: 92 (07 Jun 2023 20:48) (72 - 92)  BP: 109/67 (07 Jun 2023 20:48) (109/67 - 125/65)  BP(mean): --  ABP: --  ABP(mean): --  RR: 18 (07 Jun 2023 20:48) (18 - 18)  SpO2: 94% (07 Jun 2023 20:48) (94% - 96%)    O2 Parameters below as of 07 Jun 2023 20:48  Patient On (Oxygen Delivery Method): room air                 Input and Output:  I&O's Detail    06 Jun 2023 07:01  -  07 Jun 2023 07:00  --------------------------------------------------------  IN:    Oral Fluid: 240 mL  Total IN: 240 mL    OUT:  Total OUT: 0 mL    Total NET: 240 mL      07 Jun 2023 07:01  -  08 Jun 2023 05:18  --------------------------------------------------------  IN:    Oral Fluid: 120 mL  Total IN: 120 mL    OUT:  Total OUT: 0 mL    Total NET: 120 mL          Lab Data                        10.8   6.32  )-----------( 331      ( 07 Jun 2023 06:24 )             33.6     06-07    136  |  101  |  15  ----------------------------<  87  4.4   |  32<H>  |  0.83    Ca    9.1      07 Jun 2023 06:24    TPro  7.6  /  Alb  3.0<L>  /  TBili  0.7  /  DBili  x   /  AST  34  /  ALT  26  /  AlkPhos  479<H>  06-07            Review of Systems	      Objective     Physical Examination    heart s1s2  lung dc BS  head nc      Pertinent Lab findings & Imaging      Bryan:  NO   Adequate UO     I&O's Detail    06 Jun 2023 07:01  -  07 Jun 2023 07:00  --------------------------------------------------------  IN:    Oral Fluid: 240 mL  Total IN: 240 mL    OUT:  Total OUT: 0 mL    Total NET: 240 mL      07 Jun 2023 07:01  -  08 Jun 2023 05:18  --------------------------------------------------------  IN:    Oral Fluid: 120 mL  Total IN: 120 mL    OUT:  Total OUT: 0 mL    Total NET: 120 mL               Discussed with:     Cultures:	        Radiology

## 2023-06-08 NOTE — DIETITIAN INITIAL EVALUATION ADULT - REASON INDICATOR FOR ASSESSMENT
T10 (and any other level) balloon kypho  with general  sedation scheduled for 1/30/23 with surgery center notified. .       Kypho rep notified. No blood thinners.     Ekg done, sedation clearance needed
BMI <19

## 2023-06-08 NOTE — DIETITIAN INITIAL EVALUATION ADULT - ORAL INTAKE PTA/DIET HISTORY
Pt with fair-good appetite/po intake PTA. Typically consumes 2-3 meals/day prepared by pt's family. Follows a DM diet at home. Regular consistency food at home. Has some difficulty with tough meats. Total assistance with feeding. Does not consume beef and pork. Chicken, fish, eggs, dairy ok.

## 2023-06-08 NOTE — DIETITIAN INITIAL EVALUATION ADULT - SIGNS/SYMPTOMS
as evidenced by hx of DM, elevated HgbA1c, hyperglycemia. as evidenced by BMI <19, NFPE findings- mild/moderate muscle depletion/fat loss.

## 2023-06-08 NOTE — DIETITIAN INITIAL EVALUATION ADULT - PERTINENT LABORATORY DATA
06-07    136  |  101  |  15  ----------------------------<  87  4.4   |  32<H>  |  0.83    Ca    9.1      07 Jun 2023 06:24    TPro  7.6  /  Alb  3.0<L>  /  TBili  0.7  /  DBili  x   /  AST  34  /  ALT  26  /  AlkPhos  479<H>  06-07  POCT Blood Glucose.: 193 mg/dL (06-08-23 @ 11:17)  A1C with Estimated Average Glucose Result: 9.4 % (06-01-23 @ 15:50)  A1C with Estimated Average Glucose Result: 13.5 % (04-20-23 @ 06:42)  A1C with Estimated Average Glucose Result: 13.0 % (04-19-23 @ 07:30)

## 2023-06-08 NOTE — PROGRESS NOTE ADULT - SUBJECTIVE AND OBJECTIVE BOX
[facility] of INFECTIOUS DISEASE  Guru Michel MD PhD, Lisa James MD, Becca Acosta MD, Karen Grimes MD, Corky Perez MD  and providing coverage with Carlos Marx MD  Providing Infectious Disease Consultations at [facility], [facility], [facility], [facility]'s    Office# [phone] to schedule follow up appointments  Answering Service for urgent calls or New Consults [phone]  Cell# to text for urgent issues Guru Michel [phone]     infectious diseases progress note:    ARNIE DELGADILLO is a 67y y. o. Male patient    Overnight and events of the last 24hrs reviewed    Allergies    No Known Allergies    Intolerances        ANTIBIOTICS/RELEVANT:  antimicrobials  cefTRIAXone   IVPB 2000 milliGRAM(s) IV Intermittent every 24 hours    immunologic:    OTHER:  aspirin enteric coated 81 milliGRAM(s) Oral daily  atorvastatin 40 milliGRAM(s) Oral at bedtime  clopidogrel Tablet 75 milliGRAM(s) Oral daily  dextrose 5%. 1000 milliLiter(s) IV Continuous <Continuous>  dextrose 5%. 1000 milliLiter(s) IV Continuous <Continuous>  dextrose 50% Injectable 25 Gram(s) IV Push once  dextrose 50% Injectable 12.5 Gram(s) IV Push once  dextrose 50% Injectable 25 Gram(s) IV Push once  dextrose 50% Injectable 25 Gram(s) IV Push once  dextrose Oral Gel 15 Gram(s) Oral once PRN  enoxaparin Injectable 60 milliGRAM(s) SubCutaneous every 12 hours  furosemide    Tablet 40 milliGRAM(s) Oral daily  glucagon  Injectable 1 milliGRAM(s) IntraMuscular once  insulin glargine Injectable (LANTUS) 30 Unit(s) SubCutaneous at bedtime  insulin lispro (ADMELOG) corrective regimen sliding scale   SubCutaneous three times a day before meals  insulin lispro (ADMELOG) corrective regimen sliding scale   SubCutaneous at bedtime  levETIRAcetam 750 milliGRAM(s) Oral two times a day  metoprolol succinate ER 25 milliGRAM(s) Oral daily  morphine  - Injectable 2 milliGRAM(s) IV Push every 4 hours PRN  oxycodone    5 mG/acetaminophen 325 mG 1 Tablet(s) Oral every 6 hours PRN  pantoprazole    Tablet 40 milliGRAM(s) Oral before breakfast  spironolactone 25 milliGRAM(s) Oral daily  urea Oral Powder 15 Gram(s) Oral daily  warfarin 5 milliGRAM(s) Oral once      Objective:  Vital Signs Last 24 Hrs  T(C): 36.2 (08 Jun 2023 12:44), Max: 36.8 (07 Jun 2023 20:48)  T(F): 97.2 (08 Jun 2023 12:44), Max: 98.2 (07 Jun 2023 20:48)  HR: 75 (08 Jun 2023 12:44) (70 - 92)  BP: 116/74 (08 Jun 2023 12:44) (109/67 - 116/74)  BP(mean): --  RR: 18 (08 Jun 2023 12:44) (18 - 18)  SpO2: 96% (08 Jun 2023 12:44) (93% - 96%)    Parameters below as of 08 Jun 2023 12:44  Patient On (Oxygen Delivery Method): room air        T(C): 36.2 (06-08-23 @ 12:44), Max: 36.8 (06-07-23 @ 05:48)  T(C): 36.2 (06-08-23 @ 12:44), Max: 36.8 (06-07-23 @ 05:48)  T(C): 36.2 (06-08-23 @ 12:44), Max: 36.8 (06-07-23 @ 05:48)    PHYSICAL EXAM:  HEENT: NC atraumatic  Neck: supple  Respiratory: no accessory muscle use, breathing comfortably  Cardiovascular: distant  Gastrointestinal: normal appearing, nondistended  Extremities: no clubbing, no cyanosis,        LABS:                          10.8   6.32  )-----------( 331      ( 07 Jun 2023 06:24 )             33.6       WBC  6.32 06-07 @ 06:24  6.19 06-06 @ 02:35  6.99 06-01 @ 15:50      06-07    136  |  101  |  15  ----------------------------<  87  4.4   |  32<H>  |  0.83    Ca    9.1      07 Jun 2023 06:24    TPro  7.6  /  Alb  3.0<L>  /  TBili  0.7  /  DBili  x   /  AST  34  /  ALT  26  /  AlkPhos  479<H>  06-07      Creatinine, Serum: 0.83 mg/dL (06-07-23 @ 06:24)  Creatinine, Serum: 0.92 mg/dL (06-06-23 @ 02:35)  Creatinine, Serum: 0.94 mg/dL (06-01-23 @ 15:50)      PT/INR - ( 08 Jun 2023 08:59 )   PT: 13.7 sec;   INR: 1.17 ratio         PTT - ( 08 Jun 2023 08:59 )  PTT:58.1 sec          INFLAMMATORY MARKERS      MICROBIOLOGY:              RADIOLOGY & ADDITIONAL STUDIES:

## 2023-06-08 NOTE — DIETITIAN INITIAL EVALUATION ADULT - OTHER INFO
67-year-old male with an extensive history including prior hemorrhagic stroke status post left craniotomy, coronary artery bypass grafting, diabetes and peripheral vascular disease who was brought into the emergency room by his son to be admitted for surgery on the left fourth toe and possible amputation. Pt s/p L 4th toe amputation.    Visited pt at bedside this afternoon. Pt sleeping during visit today. [employer] speaking. Pt's son present during visit; provided pt's nutrition-related hx. Pt with fair appetite/intake. PO intakes 25-75% per nursing documentation. Pt tolerating diet well. No N/V/D/C. No BMs thus far. CBW on admission 130#. Son reports pt's UBW of 135#. No edema noted. Previous admit wt of 160# (4/20/23).  Son unaware of any weight loss. Pt with hx of DM, A1c 9.4%. On insulin at home. Checks BS 1-2x/day. Pt currently on soft/bite size, con cho, DASH/TLC diet. Provided verbal and written DM, Heart Healthy diet education to pt's son today. Agreeable to Glucerna supplement daily for addtl kcal/protein.

## 2023-06-08 NOTE — PROGRESS NOTE ADULT - SUBJECTIVE AND OBJECTIVE BOX
[facility]    CHIEF COMPLAINT: Patient is a 67y old  Male who presents with a chief complaint of foot infection (08 Jun 2023 05:18)      Follow Up: [ ] Chest Pain      [ ] Dyspnea     [ ] Palpitations    [ ] Atrial Fibrillation     [ ] Ventricular Dysrhythmia    [ ] Abnormal EKG                      [ ] Abnormal Cardiac Enzymes     [ ] Valvular Disease    HPI:  67-year-old male with an extensive history including prior hemorrhagic stroke status post left craniotomy, coronary artery bypass grafting, diabetes and peripheral vascular disease who was brought into the emergency room by his son to be admitted for surgery on the left fourth toe and possible amputation tomorrow morning.  Per son patient without fevers or chills and has had bleeding intermittently from the toe and was told by podiatrist to come in for surgery tomorrow (06 Jun 2023 06:36)    PAST MEDICAL & SURGICAL HISTORY:  CVA (cerebral vascular accident)      HTN (hypertension)      DM (diabetes mellitus)      History of atrial fibrillation      Right hemiparesis      Aphasia due to acute stroke      Upper GI bleed      Osteomyelitis      S/P CABG (coronary artery bypass graft)      S/P brain surgery      History of aortic valve repair        MEDICATIONS  (STANDING):  aspirin enteric coated 81 milliGRAM(s) Oral daily  atorvastatin 40 milliGRAM(s) Oral at bedtime  cefTRIAXone   IVPB 2000 milliGRAM(s) IV Intermittent every 24 hours  clopidogrel Tablet 75 milliGRAM(s) Oral daily  dextrose 5%. 1000 milliLiter(s) (100 mL/Hr) IV Continuous <Continuous>  dextrose 5%. 1000 milliLiter(s) (50 mL/Hr) IV Continuous <Continuous>  dextrose 50% Injectable 25 Gram(s) IV Push once  dextrose 50% Injectable 25 Gram(s) IV Push once  dextrose 50% Injectable 12.5 Gram(s) IV Push once  dextrose 50% Injectable 25 Gram(s) IV Push once  enoxaparin Injectable 60 milliGRAM(s) SubCutaneous every 12 hours  furosemide    Tablet 40 milliGRAM(s) Oral daily  glucagon  Injectable 1 milliGRAM(s) IntraMuscular once  insulin glargine Injectable (LANTUS) 30 Unit(s) SubCutaneous at bedtime  insulin lispro (ADMELOG) corrective regimen sliding scale   SubCutaneous three times a day before meals  insulin lispro (ADMELOG) corrective regimen sliding scale   SubCutaneous at bedtime  levETIRAcetam 750 milliGRAM(s) Oral two times a day  metoprolol succinate ER 25 milliGRAM(s) Oral daily  pantoprazole    Tablet 40 milliGRAM(s) Oral before breakfast  spironolactone 25 milliGRAM(s) Oral daily  urea Oral Powder 15 Gram(s) Oral daily    MEDICATIONS  (PRN):  dextrose Oral Gel 15 Gram(s) Oral once PRN Blood Glucose LESS THAN 70 milliGRAM(s)/deciliter  morphine  - Injectable 2 milliGRAM(s) IV Push every 4 hours PRN Severe Pain (7 - 10)  oxycodone    5 mG/acetaminophen 325 mG 1 Tablet(s) Oral every 6 hours PRN Moderate Pain (4 - 6)    Allergies    No Known Allergies    Intolerances        REVIEW OF SYSTEMS:    CONSTITUTIONAL: No weakness, fevers or chills.   EYES/ENT: No visual changes;    NECK: No pain or stiffness  RESPIRATORY: No cough, wheezing, No shortness of breath  CARDIOVASCULAR: No chest pain or palpitations  GASTROINTESTINAL: No abdominal pain, or hematochezia.  GENITOURINARY: No dysuria orhematuria  NEUROLOGICAL: No numbness or weakness  SKIN: No itching, burning, rashes  All other review of systems is negative unless indicated above    Vital Signs Last 24 Hrs  T(C): 36.6 (08 Jun 2023 05:26), Max: 36.8 (07 Jun 2023 20:48)  T(F): 97.8 (08 Jun 2023 05:26), Max: 98.2 (07 Jun 2023 20:48)  HR: 70 (08 Jun 2023 05:26) (70 - 92)  BP: 110/65 (08 Jun 2023 05:26) (109/67 - 110/65)  BP(mean): --  RR: 18 (08 Jun 2023 05:26) (18 - 18)  SpO2: 93% (08 Jun 2023 05:26) (93% - 94%)    Parameters below as of 08 Jun 2023 05:26  Patient On (Oxygen Delivery Method): room air      I&O's Summary    07 Jun 2023 07:01  -  08 Jun 2023 07:00  --------------------------------------------------------  IN: 240 mL / OUT: 0 mL / NET: 240 mL        PHYSICAL EXAM:  Constitutional: NAD  Neurological: Alert, no focal deficits  HEENT: no JVD, EOMI  Cardiovascular: S1 and S2, murmur  Pulmonary: breath sounds bilaterally  Gastrointestinal: Bowel Sounds present, soft, nontender  EXT:  no peripheral edema  Skin: No rashes.  Psych:  Mood calm  LABS: All Labs Reviewed:                          10.8   6.32  )-----------( 331      ( 07 Jun 2023 06:24 )             33.6     06-07    136  |  101  |  15  ----------------------------<  87  4.4   |  32<H>  |  0.83    Ca    9.1      07 Jun 2023 06:24    TPro  7.6  /  Alb  3.0<L>  /  TBili  0.7  /  DBili  x   /  AST  34  /  ALT  26  /  AlkPhos  479<H>  06-07    PT/INR - ( 08 Jun 2023 08:59 )   PT: 13.7 sec;   INR: 1.17 ratio         PTT - ( 08 Jun 2023 08:59 )  PTT:58.1 sec      12 Lead ECG:   Ventricular Rate 69 BPM    QRS Duration 74 ms    Q-T Interval 462 ms    QTC Calculation(Bazett) 495 ms    R Axis 48 degrees    T Axis 138 degrees    Diagnosis Line Atrial fibrillation  ST & T wave abnormality, consider lateral ischemia  Prolonged QT  Confirmed by UZMA PHELPS ([id number]) on 6/6/2023 12:15:38 PM (06-06-23 @ 05:54)  12 Lead ECG:   Ventricular Rate 61 BPM    QRS Duration 76 ms    Q-T Interval 468 ms    QTC Calculation(Bazett) 471 ms    R Axis 48 degrees    T Axis 108 degrees    Diagnosis Line Atrial fibrillation  ST & T wave abnormality, consider lateral ischemia  Abnormal ECG  When compared with ECG of 06-JUN-2023 05:52, (Unconfirmed)  Nonspecific T wave abnormality no longer evident in Inferior leads  Confirmed by UZMA PHELPS ([id number]) on 6/6/2023 12:15:31 PM (06-06-23 @ 05:53)  12 Lead ECG:   Ventricular Rate 67 BPM    QRS Duration 74 ms    Q-T Interval 468 ms    QTC Calculation(Bazett) 494 ms    R Axis 48 degrees    T Axis 120 degrees    Diagnosis Line Atrial fibrillation  ST & T wave abnormality, consider lateral ischemia    Confirmedby UZMA PHELPS ([id number]) on 6/6/2023 12:15:28 PM (06-06-23 @ 05:52)  Xray Chest 1 View- PORTABLE-Urgent:   ACC: [id number] EXAM:  XR CHEST PORTABLE URGENT 1V   ORDERED BY: FAITH COLBY     PROCEDURE DATE:  06/06/2023      INTERPRETATION:  AP chest on June 16, 2023 at 2:19 AM. Patient is preop   for gangrenous toe.    Heart possibly enlarged. Sternotomy and prosthetic heart valve noted.    There is a persistent irregular pleural process at the left base   laterally.    Chest is similar to May 11 this year.    IMPRESSION: Persistent irregular left pleural process.    --- End of Report ---  RADHAMES ROBERTS MD; Attending Radiologist  This document has been electronically signed. Jun 6 2023 10:00AM (06-06-23 @ 02:18)    · Assessment    67M with known DM, HTN, HLD, CAD s/p CABG, AF on coumadin with current INR this am of 1.1, CVA with Right hemiparesis, seizure disorder and LEFT fourth toe bleeding for podiatry surgery today.    Suggest:    1. LEFT fourth toe MTP amputation  - [id number] feels ok.  No pain.  no cp  2. CAD/CABG/HLD  - c/w Lipitor, baby aspirin  - no cp sob.  Dtr at bedside translated and reports no cardiac sx   3. HTN  - c/w home meds  4. DM   - RISS and finger checks  5. AF  - Lovenox full dose after foot surgery and coumadin for stroke prevention,  5. Will follow

## 2023-06-08 NOTE — PROGRESS NOTE ADULT - SUBJECTIVE AND OBJECTIVE BOX
Patient is a 67y Male whom presented to the hospital with hyponatremia     PAST MEDICAL & SURGICAL HISTORY:  CVA (cerebral vascular accident)      HTN (hypertension)      DM (diabetes mellitus)      History of atrial fibrillation      Right hemiparesis      Aphasia due to acute stroke      Upper GI bleed      Osteomyelitis      S/P CABG (coronary artery bypass graft)      S/P brain surgery      History of aortic valve repair          MEDICATIONS  (STANDING):  aspirin enteric coated 81 milliGRAM(s) Oral daily  atorvastatin 40 milliGRAM(s) Oral at bedtime  clopidogrel Tablet 75 milliGRAM(s) Oral daily  dextrose 5%. 1000 milliLiter(s) (50 mL/Hr) IV Continuous <Continuous>  dextrose 5%. 1000 milliLiter(s) (100 mL/Hr) IV Continuous <Continuous>  dextrose 50% Injectable 12.5 Gram(s) IV Push once  dextrose 50% Injectable 25 Gram(s) IV Push once  dextrose 50% Injectable 25 Gram(s) IV Push once  dextrose 50% Injectable 25 Gram(s) IV Push once  enoxaparin Injectable 60 milliGRAM(s) SubCutaneous every 12 hours  furosemide    Tablet 40 milliGRAM(s) Oral daily  glucagon  Injectable 1 milliGRAM(s) IntraMuscular once  insulin glargine Injectable (LANTUS) 30 Unit(s) SubCutaneous at bedtime  insulin lispro (ADMELOG) corrective regimen sliding scale   SubCutaneous three times a day before meals  insulin lispro (ADMELOG) corrective regimen sliding scale   SubCutaneous at bedtime  levETIRAcetam 750 milliGRAM(s) Oral two times a day  metoprolol succinate ER 25 milliGRAM(s) Oral daily  pantoprazole    Tablet 40 milliGRAM(s) Oral before breakfast  spironolactone 25 milliGRAM(s) Oral daily      Allergies    No Known Allergies    Intolerances        SOCIAL HISTORY:  Denies ETOh,Smoking,     FAMILY HISTORY:  FH: coronary artery disease (Sibling)    FH: type 2 diabetes (Sibling)        REVIEW OF SYSTEMS:    CONSTITUTIONAL: No weakness, fevers or chills  RESPIRATORY: No cough, wheezing, hemoptysis; No shortness of breath  CARDIOVASCULAR: No chest pain or palpitations  GASTROINTESTINAL: No abdominal or epigastric pain. No nausea, vomiting,     GENITOURINARY: No dysuria, frequency or hematuria  NEUROLOGICAL: No numbness or weakness  SKIN: dry                                                                        10.8   6.32  )-----------( 331      ( 07 Jun 2023 06:24 )             33.6       CBC Full  -  ( 07 Jun 2023 06:24 )  WBC Count : 6.32 K/uL  RBC Count : 3.74 M/uL  Hemoglobin : 10.8 g/dL  Hematocrit : 33.6 %  Platelet Count - Automated : 331 K/uL  Mean Cell Volume : 89.8 fl  Mean Cell Hemoglobin : 28.9 pg  Mean Cell Hemoglobin Concentration : 32.1 gm/dL  Auto Neutrophil # : x  Auto Lymphocyte # : x  Auto Monocyte # : x  Auto Eosinophil # : x  Auto Basophil # : x  Auto Neutrophil % : x  Auto Lymphocyte % : x  Auto Monocyte % : x  Auto Eosinophil % : x  Auto Basophil % : x      06-07    136  |  101  |  15  ----------------------------<  87  4.4   |  32<H>  |  0.83    Ca    9.1      07 Jun 2023 06:24    TPro  7.6  /  Alb  3.0<L>  /  TBili  0.7  /  DBili  x   /  AST  34  /  ALT  26  /  AlkPhos  479<H>  06-07      CAPILLARY BLOOD GLUCOSE      POCT Blood Glucose.: 193 mg/dL (08 Jun 2023 16:33)  POCT Blood Glucose.: 193 mg/dL (08 Jun 2023 11:17)  POCT Blood Glucose.: 131 mg/dL (08 Jun 2023 07:54)  POCT Blood Glucose.: 247 mg/dL (07 Jun 2023 21:17)      Vital Signs Last 24 Hrs  T(C): 36.2 (08 Jun 2023 12:44), Max: 36.8 (07 Jun 2023 20:48)  T(F): 97.2 (08 Jun 2023 12:44), Max: 98.2 (07 Jun 2023 20:48)  HR: 75 (08 Jun 2023 12:44) (70 - 92)  BP: 116/74 (08 Jun 2023 12:44) (109/67 - 116/74)  BP(mean): --  RR: 18 (08 Jun 2023 12:44) (18 - 18)  SpO2: 96% (08 Jun 2023 12:44) (93% - 96%)    Parameters below as of 08 Jun 2023 12:44  Patient On (Oxygen Delivery Method): room air            PT/INR - ( 08 Jun 2023 08:59 )   PT: 13.7 sec;   INR: 1.17 ratio         PTT - ( 08 Jun 2023 08:59 )  PTT:58.1 sec    PHYSICAL EXAM:    Constitutional: NAD  HEENT: conjunctive   clear   Neck:  No JVD  Respiratory: CTAB  Cardiovascular: S1 and S2  Gastrointestinal: BS+, soft, NT/ND  Extremities: No peripheral edema  Neurological:  no focal deficits

## 2023-06-09 NOTE — PROGRESS NOTE ADULT - SUBJECTIVE AND OBJECTIVE BOX
HPI follow up: Pt was seen this AM S/P LEFT 4TH TOE amputation 06/06/23. Pt was resting comfortably with intact dressing. Pt is accompanied by daughter who states pt did not report any foot related complaint. denies F/C/N/V/SOB.     S : 67y year old Male seen at bedside for Left foot ulceration.  Pt has history of left toe gangrene and osteomyelitis from previous visit. Pt is s/p left 4th toe amputation yesterday. Pt is unable to communicate and is accompanied by son today. Pt's son reports pt baing comfortable after surgery and denies any foot related complaint. Dressing intact since yesterady. Pt is s/p vascular invervention a week ago by Dr Juan Moctezuma and was prescribed anticoagulants. no new foot related complaint.  Chief Complaint : Patient is a 67y old  Male who presents with a chief complaint of foot infection (07 Jun 2023 12:41)    HPI : HPI:  67-year-old male with an extensive history including prior hemorrhagic stroke status post left craniotomy, coronary artery bypass grafting, diabetes and peripheral vascular disease who was brought into the emergency room by his son to be admitted for surgery on the left fourth toe and possible amputation tomorrow morning.  Per son patient without fevers or chills and has had bleeding intermittently from the toe and was told by podiatrist to come in for surgery tomorrow (06 Jun 2023 06:36)      Patient admits to  (-) Fevers, (-) Chills, (-) Nausea, (-) Vomiting, (-) Shortness of Breath      PMH: CVA (cerebral vascular accident)    HTN (hypertension)    DM (diabetes mellitus)    Arrhythmia    History of atrial fibrillation    Right hemiparesis    Aphasia due to acute stroke    GI bleed    Iron deficiency anemia    Upper GI bleed    Osteomyelitis      PSH:S/P CABG (coronary artery bypass graft)    S/P brain surgery    History of aortic valve repair        Allergies:No Known Allergies      Labs:                              11.1   6.94  )-----------( 328      ( 09 Jun 2023 07:05 )             34.2     06-09    136  |  99  |  33<H>  ----------------------------<  109<H>  4.2   |  33<H>  |  0.99    Ca    9.4      09 Jun 2023 07:05    TPro  8.2  /  Alb  3.1<L>  /  TBili  0.4  /  DBili  x   /  AST  45<H>  /  ALT  35  /  AlkPhos  580<H>  06-09      Vital Signs Last 24 Hrs  T(C): 36.4 (09 Jun 2023 05:07), Max: 36.4 (09 Jun 2023 05:07)  T(F): 97.5 (09 Jun 2023 05:07), Max: 97.5 (09 Jun 2023 05:07)  HR: 77 (09 Jun 2023 05:07) (75 - 92)  BP: 115/67 (09 Jun 2023 05:07) (108/67 - 116/74)  BP(mean): --  RR: 18 (08 Jun 2023 20:49) (18 - 18)  SpO2: 93% (09 Jun 2023 05:07) (93% - 96%)    Parameters below as of 09 Jun 2023 05:07  Patient On (Oxygen Delivery Method): room air      O:   General: Pleasant  male NAD & AOX3.    Integument:  Skin warm, dry and supple bilateral.    Ulceration Left foot 4th interspace well coapted and intact suture, - hyperkeratotic border, - edema, - lydia-wound erythema, - purulence, - fluctuance, - tracking/tunneling,- probe to bone.   Vascular: Dorsalis Pedis and Posterior Tibial pulses 1/4.  Capillary re-fill time less then 5 seconds digits 1-5 bilateral.    Neuro: unable to asses but diminished pain sensation since pt does not respond to painful stimuli to foot  MSK: pt wheel chair bound secondary to stroke    A: S/P LEFT 4TH TOE AMP   Left foot ulceration  Right hallux ulceration      P:   Chart reviewed and Patient evaluated  Discussed diagnosis and treatment with patient's son  dressing remains intact.  pending intra op path and wound culture  Applied betadine with dry sterile dressing  ORDERED IODOSORB  X-rays reviewed post op  Continue with IV antibiotics As Per ID  Weight bearing as tolerated.  Rec care boots to offload heels  Podiatry will follow while in house.  Stable for discharge from  podiatry stand point once culture and path is negative.

## 2023-06-09 NOTE — PROGRESS NOTE ADULT - SUBJECTIVE AND OBJECTIVE BOX
Date/Time Patient Seen:  		  Referring MD:   Data Reviewed	       Patient is a 67y old  Male who presents with a chief complaint of foot infection (08 Jun 2023 20:13)      Subjective/HPI     PAST MEDICAL & SURGICAL HISTORY:  CVA (cerebral vascular accident)    HTN (hypertension)    DM (diabetes mellitus)    Arrhythmia    History of atrial fibrillation    Right hemiparesis    Aphasia due to acute stroke    GI bleed    Iron deficiency anemia    Upper GI bleed    Osteomyelitis    S/P CABG (coronary artery bypass graft)    S/P brain surgery    History of aortic valve repair          Medication list         MEDICATIONS  (STANDING):  aspirin enteric coated 81 milliGRAM(s) Oral daily  atorvastatin 40 milliGRAM(s) Oral at bedtime  cefTRIAXone   IVPB 2000 milliGRAM(s) IV Intermittent every 24 hours  clopidogrel Tablet 75 milliGRAM(s) Oral daily  dextrose 5%. 1000 milliLiter(s) (50 mL/Hr) IV Continuous <Continuous>  dextrose 5%. 1000 milliLiter(s) (100 mL/Hr) IV Continuous <Continuous>  dextrose 50% Injectable 12.5 Gram(s) IV Push once  dextrose 50% Injectable 25 Gram(s) IV Push once  dextrose 50% Injectable 25 Gram(s) IV Push once  dextrose 50% Injectable 25 Gram(s) IV Push once  enoxaparin Injectable 60 milliGRAM(s) SubCutaneous every 12 hours  furosemide    Tablet 40 milliGRAM(s) Oral daily  glucagon  Injectable 1 milliGRAM(s) IntraMuscular once  insulin glargine Injectable (LANTUS) 30 Unit(s) SubCutaneous at bedtime  insulin lispro (ADMELOG) corrective regimen sliding scale   SubCutaneous three times a day before meals  insulin lispro (ADMELOG) corrective regimen sliding scale   SubCutaneous at bedtime  levETIRAcetam 750 milliGRAM(s) Oral two times a day  metoprolol succinate ER 25 milliGRAM(s) Oral daily  pantoprazole    Tablet 40 milliGRAM(s) Oral before breakfast  spironolactone 25 milliGRAM(s) Oral daily  urea Oral Powder 15 Gram(s) Oral daily    MEDICATIONS  (PRN):  dextrose Oral Gel 15 Gram(s) Oral once PRN Blood Glucose LESS THAN 70 milliGRAM(s)/deciliter  morphine  - Injectable 2 milliGRAM(s) IV Push every 4 hours PRN Severe Pain (7 - 10)  oxycodone    5 mG/acetaminophen 325 mG 1 Tablet(s) Oral every 6 hours PRN Moderate Pain (4 - 6)         Vitals log        ICU Vital Signs Last 24 Hrs  T(C): 36.4 (09 Jun 2023 05:07), Max: 36.6 (08 Jun 2023 05:26)  T(F): 97.5 (09 Jun 2023 05:07), Max: 97.8 (08 Jun 2023 05:26)  HR: 77 (09 Jun 2023 05:07) (70 - 92)  BP: 115/67 (09 Jun 2023 05:07) (108/67 - 116/74)  BP(mean): --  ABP: --  ABP(mean): --  RR: 18 (08 Jun 2023 20:49) (18 - 18)  SpO2: 93% (09 Jun 2023 05:07) (93% - 96%)    O2 Parameters below as of 09 Jun 2023 05:07  Patient On (Oxygen Delivery Method): room air                 Input and Output:  I&O's Detail    07 Jun 2023 07:01  -  08 Jun 2023 07:00  --------------------------------------------------------  IN:    Oral Fluid: 240 mL  Total IN: 240 mL    OUT:  Total OUT: 0 mL    Total NET: 240 mL      08 Jun 2023 07:01  -  09 Jun 2023 05:16  --------------------------------------------------------  IN:    Oral Fluid: 240 mL  Total IN: 240 mL    OUT:  Total OUT: 0 mL    Total NET: 240 mL          Lab Data                        10.8   6.32  )-----------( 331      ( 07 Jun 2023 06:24 )             33.6     06-07    136  |  101  |  15  ----------------------------<  87  4.4   |  32<H>  |  0.83    Ca    9.1      07 Jun 2023 06:24    TPro  7.6  /  Alb  3.0<L>  /  TBili  0.7  /  DBili  x   /  AST  34  /  ALT  26  /  AlkPhos  479<H>  06-07            Review of Systems	      Objective     Physical Examination    heart s1s2  lung dec BS  head nc      Pertinent Lab findings & Imaging      Bryan:  NO   Adequate UO     I&O's Detail    07 Jun 2023 07:01  -  08 Jun 2023 07:00  --------------------------------------------------------  IN:    Oral Fluid: 240 mL  Total IN: 240 mL    OUT:  Total OUT: 0 mL    Total NET: 240 mL      08 Jun 2023 07:01  -  09 Jun 2023 05:16  --------------------------------------------------------  IN:    Oral Fluid: 240 mL  Total IN: 240 mL    OUT:  Total OUT: 0 mL    Total NET: 240 mL               Discussed with:     Cultures:	        Radiology

## 2023-06-09 NOTE — PROGRESS NOTE ADULT - SUBJECTIVE AND OBJECTIVE BOX
Patient is a 67y old  Male who presents with a chief complaint of foot infection (09 Jun 2023 12:27)    Date of servie : 06-09-23 @ 14:26  INTERVAL HPI/OVERNIGHT EVENTS:  T(C): 36.6 (06-09-23 @ 11:15), Max: 36.6 (06-09-23 @ 11:15)  HR: 81 (06-09-23 @ 11:15) (77 - 92)  BP: 120/75 (06-09-23 @ 11:15) (108/67 - 120/75)  RR: 18 (06-09-23 @ 11:15) (18 - 18)  SpO2: 97% (06-09-23 @ 11:15) (93% - 97%)  Wt(kg): --  I&O's Summary    08 Jun 2023 07:01  -  09 Jun 2023 07:00  --------------------------------------------------------  IN: 240 mL / OUT: 0 mL / NET: 240 mL        LABS:                        11.1   6.94  )-----------( 328      ( 09 Jun 2023 07:05 )             34.2     06-09    136  |  99  |  33<H>  ----------------------------<  109<H>  4.2   |  33<H>  |  0.99    Ca    9.4      09 Jun 2023 07:05    TPro  8.2  /  Alb  3.1<L>  /  TBili  0.4  /  DBili  x   /  AST  45<H>  /  ALT  35  /  AlkPhos  580<H>  06-09    PT/INR - ( 09 Jun 2023 07:05 )   PT: 14.5 sec;   INR: 1.24 ratio         PTT - ( 08 Jun 2023 08:59 )  PTT:58.1 sec    CAPILLARY BLOOD GLUCOSE      POCT Blood Glucose.: 243 mg/dL (09 Jun 2023 11:11)  POCT Blood Glucose.: 119 mg/dL (09 Jun 2023 07:28)  POCT Blood Glucose.: 282 mg/dL (08 Jun 2023 21:02)  POCT Blood Glucose.: 193 mg/dL (08 Jun 2023 16:33)            MEDICATIONS  (STANDING):  aspirin enteric coated 81 milliGRAM(s) Oral daily  atorvastatin 40 milliGRAM(s) Oral at bedtime  cadexomer iodine 0.9% Gel 1 Application(s) Topical daily  cefTRIAXone   IVPB 2000 milliGRAM(s) IV Intermittent every 24 hours  clopidogrel Tablet 75 milliGRAM(s) Oral daily  dextrose 5%. 1000 milliLiter(s) (50 mL/Hr) IV Continuous <Continuous>  dextrose 5%. 1000 milliLiter(s) (100 mL/Hr) IV Continuous <Continuous>  dextrose 50% Injectable 25 Gram(s) IV Push once  dextrose 50% Injectable 25 Gram(s) IV Push once  dextrose 50% Injectable 12.5 Gram(s) IV Push once  dextrose 50% Injectable 25 Gram(s) IV Push once  enoxaparin Injectable 60 milliGRAM(s) SubCutaneous every 12 hours  furosemide    Tablet 40 milliGRAM(s) Oral daily  glucagon  Injectable 1 milliGRAM(s) IntraMuscular once  insulin glargine Injectable (LANTUS) 30 Unit(s) SubCutaneous at bedtime  insulin lispro (ADMELOG) corrective regimen sliding scale   SubCutaneous three times a day before meals  insulin lispro (ADMELOG) corrective regimen sliding scale   SubCutaneous at bedtime  levETIRAcetam 750 milliGRAM(s) Oral two times a day  metoprolol succinate ER 25 milliGRAM(s) Oral daily  pantoprazole    Tablet 40 milliGRAM(s) Oral before breakfast  spironolactone 25 milliGRAM(s) Oral daily  urea Oral Powder 15 Gram(s) Oral daily  warfarin 5 milliGRAM(s) Oral once    MEDICATIONS  (PRN):  dextrose Oral Gel 15 Gram(s) Oral once PRN Blood Glucose LESS THAN 70 milliGRAM(s)/deciliter  morphine  - Injectable 2 milliGRAM(s) IV Push every 4 hours PRN Severe Pain (7 - 10)  oxycodone    5 mG/acetaminophen 325 mG 1 Tablet(s) Oral every 6 hours PRN Moderate Pain (4 - 6)          PHYSICAL EXAM:  GENERAL: frail  HEAD:  Atraumatic, Normocephalic  CHEST/LUNG: Clear to percussion bilaterally; No rales, rhonchi, wheezing, or rubs  HEART: Regular rate and rhythm; No murmurs, rubs, or gallops  ABDOMEN: Soft, Nontender, Nondistended; Bowel sounds present  EXTREMITIES:  foot dressing +    Care Discussed with Consultants/Other Providers [ ] YES  [ ] NO

## 2023-06-09 NOTE — PROGRESS NOTE ADULT - SUBJECTIVE AND OBJECTIVE BOX
Patient is a 67y Male whom presented to the hospital with hyponatremia     PAST MEDICAL & SURGICAL HISTORY:  CVA (cerebral vascular accident)      HTN (hypertension)      DM (diabetes mellitus)      History of atrial fibrillation      Right hemiparesis      Aphasia due to acute stroke      Upper GI bleed      Osteomyelitis      S/P CABG (coronary artery bypass graft)      S/P brain surgery      History of aortic valve repair          MEDICATIONS  (STANDING):  aspirin enteric coated 81 milliGRAM(s) Oral daily  atorvastatin 40 milliGRAM(s) Oral at bedtime  clopidogrel Tablet 75 milliGRAM(s) Oral daily  dextrose 5%. 1000 milliLiter(s) (50 mL/Hr) IV Continuous <Continuous>  dextrose 5%. 1000 milliLiter(s) (100 mL/Hr) IV Continuous <Continuous>  dextrose 50% Injectable 12.5 Gram(s) IV Push once  dextrose 50% Injectable 25 Gram(s) IV Push once  dextrose 50% Injectable 25 Gram(s) IV Push once  dextrose 50% Injectable 25 Gram(s) IV Push once  enoxaparin Injectable 60 milliGRAM(s) SubCutaneous every 12 hours  furosemide    Tablet 40 milliGRAM(s) Oral daily  glucagon  Injectable 1 milliGRAM(s) IntraMuscular once  insulin glargine Injectable (LANTUS) 30 Unit(s) SubCutaneous at bedtime  insulin lispro (ADMELOG) corrective regimen sliding scale   SubCutaneous three times a day before meals  insulin lispro (ADMELOG) corrective regimen sliding scale   SubCutaneous at bedtime  levETIRAcetam 750 milliGRAM(s) Oral two times a day  metoprolol succinate ER 25 milliGRAM(s) Oral daily  pantoprazole    Tablet 40 milliGRAM(s) Oral before breakfast  spironolactone 25 milliGRAM(s) Oral daily      Allergies    No Known Allergies    Intolerances        SOCIAL HISTORY:  Denies ETOh,Smoking,     FAMILY HISTORY:  FH: coronary artery disease (Sibling)    FH: type 2 diabetes (Sibling)        REVIEW OF SYSTEMS:    CONSTITUTIONAL: No weakness, fevers or chills  RESPIRATORY: No cough, wheezing, hemoptysis; No shortness of breath  CARDIOVASCULAR: No chest pain or palpitations  GASTROINTESTINAL: No abdominal or epigastric pain. No nausea, vomiting,     GENITOURINARY: No dysuria, frequency or hematuria  NEUROLOGICAL: No numbness or weakness  SKIN: dry                                                                                              11.1   6.94  )-----------( 328      ( 09 Jun 2023 07:05 )             34.2       CBC Full  -  ( 09 Jun 2023 07:05 )  WBC Count : 6.94 K/uL  RBC Count : 3.84 M/uL  Hemoglobin : 11.1 g/dL  Hematocrit : 34.2 %  Platelet Count - Automated : 328 K/uL  Mean Cell Volume : 89.1 fl  Mean Cell Hemoglobin : 28.9 pg  Mean Cell Hemoglobin Concentration : 32.5 gm/dL  Auto Neutrophil # : x  Auto Lymphocyte # : x  Auto Monocyte # : x  Auto Eosinophil # : x  Auto Basophil # : x  Auto Neutrophil % : x  Auto Lymphocyte % : x  Auto Monocyte % : x  Auto Eosinophil % : x  Auto Basophil % : x      06-09    136  |  99  |  33<H>  ----------------------------<  109<H>  4.2   |  33<H>  |  0.99    Ca    9.4      09 Jun 2023 07:05    TPro  8.2  /  Alb  3.1<L>  /  TBili  0.4  /  DBili  x   /  AST  45<H>  /  ALT  35  /  AlkPhos  580<H>  06-09      CAPILLARY BLOOD GLUCOSE      POCT Blood Glucose.: 252 mg/dL (09 Jun 2023 16:33)  POCT Blood Glucose.: 243 mg/dL (09 Jun 2023 11:11)  POCT Blood Glucose.: 119 mg/dL (09 Jun 2023 07:28)  POCT Blood Glucose.: 282 mg/dL (08 Jun 2023 21:02)      Vital Signs Last 24 Hrs  T(C): 36.6 (09 Jun 2023 11:15), Max: 36.6 (09 Jun 2023 11:15)  T(F): 97.9 (09 Jun 2023 11:15), Max: 97.9 (09 Jun 2023 11:15)  HR: 81 (09 Jun 2023 11:15) (77 - 92)  BP: 120/75 (09 Jun 2023 11:15) (108/67 - 120/75)  BP(mean): --  RR: 18 (09 Jun 2023 11:15) (18 - 18)  SpO2: 97% (09 Jun 2023 11:15) (93% - 97%)    Parameters below as of 09 Jun 2023 11:15  Patient On (Oxygen Delivery Method): room air            PT/INR - ( 09 Jun 2023 07:05 )   PT: 14.5 sec;   INR: 1.24 ratio         PTT - ( 08 Jun 2023 08:59 )  PTT:58.1 sec  PHYSICAL EXAM:    Constitutional: NAD  HEENT: conjunctive   clear   Neck:  No JVD  Respiratory: CTAB  Cardiovascular: S1 and S2  Gastrointestinal: BS+, soft, NT/ND  Extremities: No peripheral edema  Neurological:  no focal deficits

## 2023-06-09 NOTE — PROGRESS NOTE ADULT - SUBJECTIVE AND OBJECTIVE BOX
[facility]  Shane Murray PA-C  [address] [place] [address]  [phone]      INTERVAL HPI/OVERNIGHT EVENTS:  Pt s/e  Family at bedside prefers to translate for patient  No GI complaints    MEDICATIONS  (STANDING):  aspirin enteric coated 81 milliGRAM(s) Oral daily  atorvastatin 40 milliGRAM(s) Oral at bedtime  cadexomer iodine 0.9% Gel 1 Application(s) Topical daily  cefTRIAXone   IVPB 2000 milliGRAM(s) IV Intermittent every 24 hours  clopidogrel Tablet 75 milliGRAM(s) Oral daily  dextrose 5%. 1000 milliLiter(s) (50 mL/Hr) IV Continuous <Continuous>  dextrose 5%. 1000 milliLiter(s) (100 mL/Hr) IV Continuous <Continuous>  dextrose 50% Injectable 25 Gram(s) IV Push once  dextrose 50% Injectable 25 Gram(s) IV Push once  dextrose 50% Injectable 25 Gram(s) IV Push once  dextrose 50% Injectable 12.5 Gram(s) IV Push once  enoxaparin Injectable 60 milliGRAM(s) SubCutaneous every 12 hours  furosemide    Tablet 40 milliGRAM(s) Oral daily  glucagon  Injectable 1 milliGRAM(s) IntraMuscular once  insulin glargine Injectable (LANTUS) 30 Unit(s) SubCutaneous at bedtime  insulin lispro (ADMELOG) corrective regimen sliding scale   SubCutaneous three times a day before meals  insulin lispro (ADMELOG) corrective regimen sliding scale   SubCutaneous at bedtime  levETIRAcetam 750 milliGRAM(s) Oral two times a day  metoprolol succinate ER 25 milliGRAM(s) Oral daily  pantoprazole    Tablet 40 milliGRAM(s) Oral before breakfast  spironolactone 25 milliGRAM(s) Oral daily  urea Oral Powder 15 Gram(s) Oral daily    MEDICATIONS  (PRN):  dextrose Oral Gel 15 Gram(s) Oral once PRN Blood Glucose LESS THAN 70 milliGRAM(s)/deciliter  morphine  - Injectable 2 milliGRAM(s) IV Push every 4 hours PRN Severe Pain (7 - 10)  oxycodone    5 mG/acetaminophen 325 mG 1 Tablet(s) Oral every 6 hours PRN Moderate Pain (4 - 6)      Allergies    No Known Allergies        PHYSICAL EXAM:   Vital Signs:  Vital Signs Last 24 Hrs  T(C): 36.6 (2023 11:15), Max: 36.6 (2023 11:15)  T(F): 97.9 (2023 11:15), Max: 97.9 (2023 11:15)  HR: 81 (2023 11:15) (75 - 92)  BP: 120/75 (2023 11:15) (108/67 - 120/75)  BP(mean): --  RR: 18 (2023 11:15) (18 - 18)  SpO2: 97% (2023 11:15) (93% - 97%)    Parameters below as of 2023 11:15  Patient On (Oxygen Delivery Method): room air      Daily     Daily Weight in k (2023 05:07)    GENERAL:  Appears stated age  HEENT:  NC/AT  CHEST:  Full & symmetric excursion  HEART:  Regular rhythm  ABDOMEN:  Soft, non-tender, non-distended  EXTEREMITIES:  no cyanosis  SKIN:  No rash  NEURO:  Alert      LABS:                        11.1   6.94  )-----------( 328      ( 2023 07:05 )             34.2     06-    136  |  99  |  33<H>  ----------------------------<  109<H>  4.2   |  33<H>  |  0.99    Ca    9.4      2023 07:05    TPro  8.2  /  Alb  3.1<L>  /  TBili  0.4  /  DBili  x   /  AST  45<H>  /  ALT  35  /  AlkPhos  580<H>  06-09    PT/INR - ( 2023 07:05 )   PT: 14.5 sec;   INR: 1.24 ratio         PTT - ( 2023 08:59 )  PTT:58.1 sec

## 2023-06-09 NOTE — PROGRESS NOTE ADULT - SUBJECTIVE AND OBJECTIVE BOX
[facility] Cardiology    CHIEF COMPLAINT: Patient is a 67y old  Male who presents with a chief complaint of foot infection (09 Jun 2023 05:16)      Follow Up: [ ] Chest Pain      [ ] Dyspnea     [ ] Palpitations    [ ] Atrial Fibrillation     [ ] Ventricular Dysrhythmia    [ ] Abnormal EKG                      [ ] Abnormal Cardiac Enzymes     [ ] Valvular Disease    HPI:  67-year-old male with an extensive history including prior hemorrhagic stroke status post left craniotomy, coronary artery bypass grafting, diabetes and peripheral vascular disease who was brought into the emergency room by his son to be admitted for surgery on the left fourth toe and possible amputation tomorrow morning.  Per son patient without fevers or chills and has had bleeding intermittently from the toe and was told by podiatrist to come in for surgery tomorrow (06 Jun 2023 06:36)    PAST MEDICAL & SURGICAL HISTORY:  CVA (cerebral vascular accident)      HTN (hypertension)      DM (diabetes mellitus)      History of atrial fibrillation      Right hemiparesis      Aphasia due to acute stroke      Upper GI bleed      Osteomyelitis      S/P CABG (coronary artery bypass graft)      S/P brain surgery      History of aortic valve repair        MEDICATIONS  (STANDING):  aspirin enteric coated 81 milliGRAM(s) Oral daily  atorvastatin 40 milliGRAM(s) Oral at bedtime  cefTRIAXone   IVPB 2000 milliGRAM(s) IV Intermittent every 24 hours  clopidogrel Tablet 75 milliGRAM(s) Oral daily  dextrose 5%. 1000 milliLiter(s) (50 mL/Hr) IV Continuous <Continuous>  dextrose 5%. 1000 milliLiter(s) (100 mL/Hr) IV Continuous <Continuous>  dextrose 50% Injectable 25 Gram(s) IV Push once  dextrose 50% Injectable 25 Gram(s) IV Push once  dextrose 50% Injectable 12.5 Gram(s) IV Push once  dextrose 50% Injectable 25 Gram(s) IV Push once  enoxaparin Injectable 60 milliGRAM(s) SubCutaneous every 12 hours  furosemide    Tablet 40 milliGRAM(s) Oral daily  glucagon  Injectable 1 milliGRAM(s) IntraMuscular once  insulin glargine Injectable (LANTUS) 30 Unit(s) SubCutaneous at bedtime  insulin lispro (ADMELOG) corrective regimen sliding scale   SubCutaneous three times a day before meals  insulin lispro (ADMELOG) corrective regimen sliding scale   SubCutaneous at bedtime  levETIRAcetam 750 milliGRAM(s) Oral two times a day  metoprolol succinate ER 25 milliGRAM(s) Oral daily  pantoprazole    Tablet 40 milliGRAM(s) Oral before breakfast  spironolactone 25 milliGRAM(s) Oral daily  urea Oral Powder 15 Gram(s) Oral daily    MEDICATIONS  (PRN):  dextrose Oral Gel 15 Gram(s) Oral once PRN Blood Glucose LESS THAN 70 milliGRAM(s)/deciliter  morphine  - Injectable 2 milliGRAM(s) IV Push every 4 hours PRN Severe Pain (7 - 10)  oxycodone    5 mG/acetaminophen 325 mG 1 Tablet(s) Oral every 6 hours PRN Moderate Pain (4 - 6)    Allergies    No Known Allergies    Intolerances        REVIEW OF SYSTEMS:    CONSTITUTIONAL: No weakness, fevers or chills.   EYES/ENT: No visual changes;    NECK: No pain or stiffness  RESPIRATORY: No cough, wheezing, No shortness of breath  CARDIOVASCULAR: No chest pain or palpitations  GASTROINTESTINAL: No abdominal pain, or hematochezia.  GENITOURINARY: No dysuria orhematuria  NEUROLOGICAL: No numbness or weakness  SKIN: No itching, burning, rashes  All other review of systems is negative unless indicated above    Vital Signs Last 24 Hrs  T(C): 36.4 (09 Jun 2023 05:07), Max: 36.4 (09 Jun 2023 05:07)  T(F): 97.5 (09 Jun 2023 05:07), Max: 97.5 (09 Jun 2023 05:07)  HR: 77 (09 Jun 2023 05:07) (75 - 92)  BP: 115/67 (09 Jun 2023 05:07) (108/67 - 116/74)  BP(mean): --  RR: 18 (08 Jun 2023 20:49) (18 - 18)  SpO2: 93% (09 Jun 2023 05:07) (93% - 96%)    Parameters below as of 09 Jun 2023 05:07  Patient On (Oxygen Delivery Method): room air      I&O's Summary    08 Jun 2023 07:01  -  09 Jun 2023 07:00  --------------------------------------------------------  IN: 240 mL / OUT: 0 mL / NET: 240 mL    PHYSICAL EXAM:  Constitutional: NAD  Neurological: Alert  HEENT: no JVD, EOMI  Cardiovascular: Regular, S1 and S2, no murmur  Pulmonary: breath sounds bilaterally  Gastrointestinal: Bowel Sounds present, soft, nontender  EXT:  no peripheral edema  Skin: No rashes.  Psych:  Mood calm  LABS: All Labs Reviewed:                          11.1   6.94  )-----------( 328      ( 09 Jun 2023 07:05 )             34.2     06-09    136  |  99  |  33<H>  ----------------------------<  109<H>  4.2   |  33<H>  |  0.99    Ca    9.4      09 Jun 2023 07:05    TPro  8.2  /  Alb  3.1<L>  /  TBili  0.4  /  DBili  x   /  AST  45<H>  /  ALT  35  /  AlkPhos  580<H>  06-09    PT/INR - ( 09 Jun 2023 07:05 )   PT: 14.5 sec;   INR: 1.24 ratio         PTT - ( 08 Jun 2023 08:59 )  PTT:58.1 sec    · Assessment    67M with known DM, HTN, HLD, CAD s/p CABG, AF on coumadin with current INR this am of 1.1, CVA with Right hemiparesis, seizure disorder and LEFT fourth toe bleeding for podiatry surgery today.    Suggest:    1. LEFT fourth toe MTP amputation  - [id number] feels ok.  No pain.  no cp  2. CAD/CABG/HLD  - c/w Lipitor, baby aspirin  - no cp sob.  Dtr at bedside translated and reports no cardiac sx   3. HTN  - c/w home meds  4. DM   - RISS and finger checks  5. AF  - Lovenox full dose after foot surgery and coumadin for stroke prevention,  5. Will follow prn, call if needed. Family at bedside

## 2023-06-09 NOTE — PROGRESS NOTE ADULT - SUBJECTIVE AND OBJECTIVE BOX
CAPILLARY BLOOD GLUCOSE      POCT Blood Glucose.: 241 mg/dL (09 Jun 2023 21:15)  POCT Blood Glucose.: 252 mg/dL (09 Jun 2023 16:33)  POCT Blood Glucose.: 243 mg/dL (09 Jun 2023 11:11)  POCT Blood Glucose.: 119 mg/dL (09 Jun 2023 07:28)      Vital Signs Last 24 Hrs  T(C): 36.9 (09 Jun 2023 19:43), Max: 36.9 (09 Jun 2023 19:43)  T(F): 98.5 (09 Jun 2023 19:43), Max: 98.5 (09 Jun 2023 19:43)  HR: 69 (09 Jun 2023 19:43) (69 - 81)  BP: 109/70 (09 Jun 2023 19:43) (109/70 - 120/75)  BP(mean): --  RR: 18 (09 Jun 2023 19:43) (18 - 18)  SpO2: 94% (09 Jun 2023 19:43) (93% - 97%)    Parameters below as of 09 Jun 2023 19:43  Patient On (Oxygen Delivery Method): room air        General: WN/WD NAD  Respiratory: CTA B/L  CV: RRR, S1S2, no murmurs, rubs or gallops  Abdominal: Soft, NT, ND +BS, Last BM  Extremities: No edema, + peripheral pulses     06-09    136  |  99  |  33<H>  ----------------------------<  109<H>  4.2   |  33<H>  |  0.99    Ca    9.4      09 Jun 2023 07:05    TPro  8.2  /  Alb  3.1<L>  /  TBili  0.4  /  DBili  x   /  AST  45<H>  /  ALT  35  /  AlkPhos  580<H>  06-09      atorvastatin 40 milliGRAM(s) Oral at bedtime  dextrose 50% Injectable 25 Gram(s) IV Push once  dextrose 50% Injectable 12.5 Gram(s) IV Push once  dextrose 50% Injectable 25 Gram(s) IV Push once  dextrose 50% Injectable 25 Gram(s) IV Push once  dextrose Oral Gel 15 Gram(s) Oral once PRN  glucagon  Injectable 1 milliGRAM(s) IntraMuscular once  insulin glargine Injectable (LANTUS) 30 Unit(s) SubCutaneous at bedtime  insulin lispro (ADMELOG) corrective regimen sliding scale   SubCutaneous three times a day before meals  insulin lispro (ADMELOG) corrective regimen sliding scale   SubCutaneous at bedtime

## 2023-06-09 NOTE — PROGRESS NOTE ADULT - SUBJECTIVE AND OBJECTIVE BOX
[facility], Division of Infectious Diseases  ADAM Nava Y. Patel, S. Shah, G. [facility]  [phone]    Name: ARNIE DELGADILLO  Age: 67y  Gender: Male  MRN: [id number]    Interval History:  Patient seen and examined at bedside this morning  No acute overnight events. Afebrile  Notes reviewed    Antibiotics:  cefTRIAXone   IVPB 2000 milliGRAM(s) IV Intermittent every 24 hours      Medications:  aspirin enteric coated 81 milliGRAM(s) Oral daily  atorvastatin 40 milliGRAM(s) Oral at bedtime  cadexomer iodine 0.9% Gel 1 Application(s) Topical daily  cefTRIAXone   IVPB 2000 milliGRAM(s) IV Intermittent every 24 hours  clopidogrel Tablet 75 milliGRAM(s) Oral daily  dextrose 5%. 1000 milliLiter(s) IV Continuous <Continuous>  dextrose 5%. 1000 milliLiter(s) IV Continuous <Continuous>  dextrose 50% Injectable 25 Gram(s) IV Push once  dextrose 50% Injectable 25 Gram(s) IV Push once  dextrose 50% Injectable 12.5 Gram(s) IV Push once  dextrose 50% Injectable 25 Gram(s) IV Push once  dextrose Oral Gel 15 Gram(s) Oral once PRN  enoxaparin Injectable 60 milliGRAM(s) SubCutaneous every 12 hours  furosemide    Tablet 40 milliGRAM(s) Oral daily  glucagon  Injectable 1 milliGRAM(s) IntraMuscular once  insulin glargine Injectable (LANTUS) 30 Unit(s) SubCutaneous at bedtime  insulin lispro (ADMELOG) corrective regimen sliding scale   SubCutaneous three times a day before meals  insulin lispro (ADMELOG) corrective regimen sliding scale   SubCutaneous at bedtime  levETIRAcetam 750 milliGRAM(s) Oral two times a day  metoprolol succinate ER 25 milliGRAM(s) Oral daily  morphine  - Injectable 2 milliGRAM(s) IV Push every 4 hours PRN  oxycodone    5 mG/acetaminophen 325 mG 1 Tablet(s) Oral every 6 hours PRN  pantoprazole    Tablet 40 milliGRAM(s) Oral before breakfast  spironolactone 25 milliGRAM(s) Oral daily  urea Oral Powder 15 Gram(s) Oral daily      Review of Systems:  Review of systems otherwise negative except as previously noted.    Allergies: No Known Allergies    For details regarding the patient's past medical history, social history, family history, and other miscellaneous elements, please refer the initial infectious diseases consultation and/or the admitting history and physical examination for this admission.    Objective:  Vitals:   T(C): 36.6 (06-09-23 @ 11:15), Max: 36.6 (06-09-23 @ 11:15)  HR: 81 (06-09-23 @ 11:15) (75 - 92)  BP: 120/75 (06-09-23 @ 11:15) (108/67 - 120/75)  RR: 18 (06-09-23 @ 11:15) (18 - 18)  SpO2: 97% (06-09-23 @ 11:15) (93% - 97%)    Physical Examination:  General: no acute distress  HEENT: NC/AT, EOMI  Cardio: RRR  Resp: decreased breath sounds  Abd: soft, NT, ND  Ext: no edema or cyanosis, dressing c/d/i  Skin: warm, dry, no visible rash      Laboratory Studies:  CBC:                       11.1   6.94  )-----------( 328      ( 09 Jun 2023 07:05 )             34.2     CMP: 06-09    136  |  99  |  33<H>  ----------------------------<  109<H>  4.2   |  33<H>  |  0.99    Ca    9.4      09 Jun 2023 07:05    TPro  8.2  /  Alb  3.1<L>  /  TBili  0.4  /  DBili  x   /  AST  45<H>  /  ALT  35  /  AlkPhos  580<H>  06-09    LIVER FUNCTIONS - ( 09 Jun 2023 07:05 )  Alb: 3.1 g/dL / Pro: 8.2 g/dL / ALK PHOS: 580 U/L / ALT: 35 U/L / AST: 45 U/L / GGT: x               Microbiology: reviewed    Culture - Surgical Swab (collected 06-06-23 @ 12:00)  Source: .Surgical Swab deep wound swab left  Preliminary Report (06-07-23 @ 11:57):    No growth          Radiology: reviewed

## 2023-06-10 NOTE — PROGRESS NOTE ADULT - SUBJECTIVE AND OBJECTIVE BOX
[facility]  Shane Murray PA-C  [address] [place] [address]  [phone]      INTERVAL HPI/OVERNIGHT EVENTS:  Pt s/e  No new GI events    MEDICATIONS  (STANDING):  aspirin enteric coated 81 milliGRAM(s) Oral daily  atorvastatin 40 milliGRAM(s) Oral at bedtime  cadexomer iodine 0.9% Gel 1 Application(s) Topical daily  cefTRIAXone   IVPB 2000 milliGRAM(s) IV Intermittent every 24 hours  clopidogrel Tablet 75 milliGRAM(s) Oral daily  dextrose 5%. 1000 milliLiter(s) (50 mL/Hr) IV Continuous <Continuous>  dextrose 5%. 1000 milliLiter(s) (100 mL/Hr) IV Continuous <Continuous>  dextrose 50% Injectable 12.5 Gram(s) IV Push once  dextrose 50% Injectable 25 Gram(s) IV Push once  dextrose 50% Injectable 25 Gram(s) IV Push once  dextrose 50% Injectable 25 Gram(s) IV Push once  enoxaparin Injectable 60 milliGRAM(s) SubCutaneous every 12 hours  furosemide    Tablet 40 milliGRAM(s) Oral daily  glucagon  Injectable 1 milliGRAM(s) IntraMuscular once  insulin glargine Injectable (LANTUS) 30 Unit(s) SubCutaneous at bedtime  insulin lispro (ADMELOG) corrective regimen sliding scale   SubCutaneous at bedtime  insulin lispro (ADMELOG) corrective regimen sliding scale   SubCutaneous three times a day before meals  insulin lispro Injectable (ADMELOG) 5 Unit(s) SubCutaneous three times a day before meals  levETIRAcetam 750 milliGRAM(s) Oral two times a day  metoprolol succinate ER 25 milliGRAM(s) Oral daily  pantoprazole    Tablet 40 milliGRAM(s) Oral before breakfast  spironolactone 25 milliGRAM(s) Oral daily  urea Oral Powder 15 Gram(s) Oral daily    MEDICATIONS  (PRN):  dextrose Oral Gel 15 Gram(s) Oral once PRN Blood Glucose LESS THAN 70 milliGRAM(s)/deciliter  morphine  - Injectable 2 milliGRAM(s) IV Push every 4 hours PRN Severe Pain (7 - 10)  oxycodone    5 mG/acetaminophen 325 mG 1 Tablet(s) Oral every 6 hours PRN Moderate Pain (4 - 6)      Allergies    No Known Allergies      PHYSICAL EXAM:   Vital Signs:  Vital Signs Last 24 Hrs  T(C): 36.3 (10 [place] 2023 05:11), Max: 36.9 (09 Jun 2023 19:43)  T(F): 97.4 (10 [place] 2023 05:11), Max: 98.5 (09 Jun 2023 19:43)  HR: 67 (10 [place] 2023 05:11) (67 - 81)  BP: 103/61 (10 [place] 2023 05:11) (103/61 - 120/75)  BP(mean): --  RR: 18 (10 [place] 2023 05:11) (18 - 18)  SpO2: 95% (10 [place] 2023 05:11) (94% - 97%)    Parameters below as of 10 [place] 2023 05:11  Patient On (Oxygen Delivery Method): room air    GENERAL:  Appears stated age  HEENT:  NC/AT  CHEST:  Full & symmetric excursion  HEART:  Regular rhythm  ABDOMEN:  Soft, non-tender, non-distended  EXTEREMITIES:  no cyanosis  SKIN:  No rash  NEURO:  Alert      LABS:                        11.4   7.46  )-----------( 357      ( 10 [place] 2023 07:15 )             35.3     06-10    136  |  97  |  39<H>  ----------------------------<  71  4.2   |  34<H>  |  1.10    Ca    9.9      10 [place] 2023 07:15    TPro  8.4<H>  /  Alb  3.2<L>  /  TBili  0.4  /  DBili  x   /  AST  42<H>  /  ALT  35  /  AlkPhos  603<H>  06-10    PT/INR - ( 10 [place] 2023 07:15 )   PT: 17.6 sec;   INR: 1.50 ratio

## 2023-06-10 NOTE — PROGRESS NOTE ADULT - SUBJECTIVE AND OBJECTIVE BOX
Patient is a 67y Male whom presented to the hospital with hyponatremia     PAST MEDICAL & SURGICAL HISTORY:  CVA (cerebral vascular accident)      HTN (hypertension)      DM (diabetes mellitus)      History of atrial fibrillation      Right hemiparesis      Aphasia due to acute stroke      Upper GI bleed      Osteomyelitis      S/P CABG (coronary artery bypass graft)      S/P brain surgery      History of aortic valve repair          MEDICATIONS  (STANDING):  aspirin enteric coated 81 milliGRAM(s) Oral daily  atorvastatin 40 milliGRAM(s) Oral at bedtime  clopidogrel Tablet 75 milliGRAM(s) Oral daily  dextrose 5%. 1000 milliLiter(s) (50 mL/Hr) IV Continuous <Continuous>  dextrose 5%. 1000 milliLiter(s) (100 mL/Hr) IV Continuous <Continuous>  dextrose 50% Injectable 12.5 Gram(s) IV Push once  dextrose 50% Injectable 25 Gram(s) IV Push once  dextrose 50% Injectable 25 Gram(s) IV Push once  dextrose 50% Injectable 25 Gram(s) IV Push once  enoxaparin Injectable 60 milliGRAM(s) SubCutaneous every 12 hours  furosemide    Tablet 40 milliGRAM(s) Oral daily  glucagon  Injectable 1 milliGRAM(s) IntraMuscular once  insulin glargine Injectable (LANTUS) 30 Unit(s) SubCutaneous at bedtime  insulin lispro (ADMELOG) corrective regimen sliding scale   SubCutaneous three times a day before meals  insulin lispro (ADMELOG) corrective regimen sliding scale   SubCutaneous at bedtime  levETIRAcetam 750 milliGRAM(s) Oral two times a day  metoprolol succinate ER 25 milliGRAM(s) Oral daily  pantoprazole    Tablet 40 milliGRAM(s) Oral before breakfast  spironolactone 25 milliGRAM(s) Oral daily      Allergies    No Known Allergies    Intolerances        SOCIAL HISTORY:  Denies ETOh,Smoking,     FAMILY HISTORY:  FH: coronary artery disease (Sibling)    FH: type 2 diabetes (Sibling)        REVIEW OF SYSTEMS:    CONSTITUTIONAL: No weakness, fevers or chills  RESPIRATORY: No cough, wheezing, hemoptysis; No shortness of breath  CARDIOVASCULAR: No chest pain or palpitations  GASTROINTESTINAL: No abdominal or epigastric pain. No nausea, vomiting,     GENITOURINARY: No dysuria, frequency or hematuria  NEUROLOGICAL: No numbness or weakness  SKIN: dry                                                    11.4   7.46  )-----------( 357      ( 10 [place] 2023 07:15 )             35.3       CBC Full  -  ( 10 [place] 2023 07:15 )  WBC Count : 7.46 K/uL  RBC Count : 3.97 M/uL  Hemoglobin : 11.4 g/dL  Hematocrit : 35.3 %  Platelet Count - Automated : 357 K/uL  Mean Cell Volume : 88.9 fl  Mean Cell Hemoglobin : 28.7 pg  Mean Cell Hemoglobin Concentration : 32.3 gm/dL  Auto Neutrophil # : x  Auto Lymphocyte # : x  Auto Monocyte # : x  Auto Eosinophil # : x  Auto Basophil # : x  Auto Neutrophil % : x  Auto Lymphocyte % : x  Auto Monocyte % : x  Auto Eosinophil % : x  Auto Basophil % : x      06-10    136  |  97  |  39<H>  ----------------------------<  71  4.2   |  34<H>  |  1.10    Ca    9.9      10 [place] 2023 07:15    TPro  8.4<H>  /  Alb  3.2<L>  /  TBili  0.4  /  DBili  x   /  AST  42<H>  /  ALT  35  /  AlkPhos  603<H>  06-10      CAPILLARY BLOOD GLUCOSE      POCT Blood Glucose.: 165 mg/dL (10 [place] 2023 11:30)  POCT Blood Glucose.: 91 mg/dL (10 [place] 2023 08:04)  POCT Blood Glucose.: 241 mg/dL (09 Jun 2023 21:15)  POCT Blood Glucose.: 252 mg/dL (09 Jun 2023 16:33)      Vital Signs Last 24 Hrs  T(C): 36.3 (10 [place] 2023 11:31), Max: 36.9 (09 Jun 2023 19:43)  T(F): 97.4 (10 [place] 2023 11:31), Max: 98.5 (09 Jun 2023 19:43)  HR: 66 (10 [place] 2023 11:31) (66 - 69)  BP: 106/68 (10 [place] 2023 11:31) (103/61 - 109/70)  BP(mean): --  RR: 17 (10 [place] 2023 11:31) (17 - 18)  SpO2: 97% (10 [place] 2023 11:31) (94% - 97%)    Parameters below as of 10 [place] 2023 11:31  Patient On (Oxygen Delivery Method): room air            PT/INR - ( 10 [place] 2023 07:15 )   PT: 17.6 sec;   INR: 1.50 ratio                       PHYSICAL EXAM:    Constitutional: NAD  HEENT: conjunctive   clear   Neck:  No JVD  Respiratory: CTAB  Cardiovascular: S1 and S2  Gastrointestinal: BS+, soft, NT/ND  Extremities: No peripheral edema  Neurological:  no focal deficits

## 2023-06-10 NOTE — PROGRESS NOTE ADULT - SUBJECTIVE AND OBJECTIVE BOX
Date/Time Patient Seen:  		  Referring MD:   Data Reviewed	       Patient is a 67y old  Male who presents with a chief complaint of foot infection (09 Jun 2023 21:54)      Subjective/HPI     PAST MEDICAL & SURGICAL HISTORY:  CVA (cerebral vascular accident)    HTN (hypertension)    DM (diabetes mellitus)    Arrhythmia    History of atrial fibrillation    Right hemiparesis    Aphasia due to acute stroke    GI bleed    Iron deficiency anemia    Upper GI bleed    Osteomyelitis    S/P CABG (coronary artery bypass graft)    S/P brain surgery    History of aortic valve repair          Medication list         MEDICATIONS  (STANDING):  aspirin enteric coated 81 milliGRAM(s) Oral daily  atorvastatin 40 milliGRAM(s) Oral at bedtime  cadexomer iodine 0.9% Gel 1 Application(s) Topical daily  cefTRIAXone   IVPB 2000 milliGRAM(s) IV Intermittent every 24 hours  clopidogrel Tablet 75 milliGRAM(s) Oral daily  dextrose 5%. 1000 milliLiter(s) (50 mL/Hr) IV Continuous <Continuous>  dextrose 5%. 1000 milliLiter(s) (100 mL/Hr) IV Continuous <Continuous>  dextrose 50% Injectable 25 Gram(s) IV Push once  dextrose 50% Injectable 12.5 Gram(s) IV Push once  dextrose 50% Injectable 25 Gram(s) IV Push once  dextrose 50% Injectable 25 Gram(s) IV Push once  enoxaparin Injectable 60 milliGRAM(s) SubCutaneous every 12 hours  furosemide    Tablet 40 milliGRAM(s) Oral daily  glucagon  Injectable 1 milliGRAM(s) IntraMuscular once  insulin glargine Injectable (LANTUS) 30 Unit(s) SubCutaneous at bedtime  insulin lispro (ADMELOG) corrective regimen sliding scale   SubCutaneous at bedtime  insulin lispro (ADMELOG) corrective regimen sliding scale   SubCutaneous three times a day before meals  insulin lispro Injectable (ADMELOG) 5 Unit(s) SubCutaneous three times a day before meals  levETIRAcetam 750 milliGRAM(s) Oral two times a day  metoprolol succinate ER 25 milliGRAM(s) Oral daily  pantoprazole    Tablet 40 milliGRAM(s) Oral before breakfast  spironolactone 25 milliGRAM(s) Oral daily  urea Oral Powder 15 Gram(s) Oral daily    MEDICATIONS  (PRN):  dextrose Oral Gel 15 Gram(s) Oral once PRN Blood Glucose LESS THAN 70 milliGRAM(s)/deciliter  morphine  - Injectable 2 milliGRAM(s) IV Push every 4 hours PRN Severe Pain (7 - 10)  oxycodone    5 mG/acetaminophen 325 mG 1 Tablet(s) Oral every 6 hours PRN Moderate Pain (4 - 6)         Vitals log        ICU Vital Signs Last 24 Hrs  T(C): 36.3 (10 [place] 2023 05:11), Max: 36.9 (09 Jun 2023 19:43)  T(F): 97.4 (10 [place] 2023 05:11), Max: 98.5 (09 Jun 2023 19:43)  HR: 67 (10 [place] 2023 05:11) (67 - 81)  BP: 103/61 (10 [place] 2023 05:11) (103/61 - 120/75)  BP(mean): --  ABP: --  ABP(mean): --  RR: 18 (10 [place] 2023 05:11) (18 - 18)  SpO2: 95% (10 [place] 2023 05:11) (94% - 97%)    O2 Parameters below as of 10 [place] 2023 05:11  Patient On (Oxygen Delivery Method): room air                 Input and Output:  I&O's Detail    08 Jun 2023 07:01  -  09 Jun 2023 07:00  --------------------------------------------------------  IN:    Oral Fluid: 240 mL  Total IN: 240 mL    OUT:  Total OUT: 0 mL    Total NET: 240 mL      09 Jun 2023 07:01  -  10 [place] 2023 05:19  --------------------------------------------------------  IN:    IV PiggyBack: 50 mL  Total IN: 50 mL    OUT:  Total OUT: 0 mL    Total NET: 50 mL          Lab Data                        11.1   6.94  )-----------( 328      ( 09 Jun 2023 07:05 )             34.2     06-09    136  |  99  |  33<H>  ----------------------------<  109<H>  4.2   |  33<H>  |  0.99    Ca    9.4      09 Jun 2023 07:05    TPro  8.2  /  Alb  3.1<L>  /  TBili  0.4  /  DBili  x   /  AST  45<H>  /  ALT  35  /  AlkPhos  580<H>  06-09            Review of Systems	      Objective     Physical Examination    heart s1s2  lung dc BS  head nc      Pertinent Lab findings & Imaging      Bryan:  NO   Adequate UO     I&O's Detail    08 Jun 2023 07:01  -  09 Jun 2023 07:00  --------------------------------------------------------  IN:    Oral Fluid: 240 mL  Total IN: 240 mL    OUT:  Total OUT: 0 mL    Total NET: 240 mL      09 Jun 2023 07:01  -  10 Jun 2023 05:19  --------------------------------------------------------  IN:    IV PiggyBack: 50 mL  Total IN: 50 mL    OUT:  Total OUT: 0 mL    Total NET: 50 mL               Discussed with:     Cultures:	        Radiology

## 2023-06-10 NOTE — PROGRESS NOTE ADULT - SUBJECTIVE AND OBJECTIVE BOX
Patient is a 67y old  Male who presents with a chief complaint of foot infection (10 [place] 2023 10:54)    Date of servie : 06-10-23 @ 13:17  INTERVAL HPI/OVERNIGHT EVENTS:  T(C): 36.3 (06-10-23 @ 11:31), Max: 36.9 (06-09-23 @ 19:43)  HR: 66 (06-10-23 @ 11:31) (66 - 69)  BP: 106/68 (06-10-23 @ 11:31) (103/61 - 109/70)  RR: 17 (06-10-23 @ 11:31) (17 - 18)  SpO2: 97% (06-10-23 @ 11:31) (94% - 97%)  Wt(kg): --  I&O's Summary    09 Jun 2023 07:01  -  10 [place] 2023 07:00  --------------------------------------------------------  IN: 50 mL / OUT: 400 mL / NET: -350 mL        LABS:                        11.4   7.46  )-----------( 357      ( 10 [place] 2023 07:15 )             35.3     06-10    136  |  97  |  39<H>  ----------------------------<  71  4.2   |  34<H>  |  1.10    Ca    9.9      10 [place] 2023 07:15    TPro  8.4<H>  /  Alb  3.2<L>  /  TBili  0.4  /  DBili  x   /  AST  42<H>  /  ALT  35  /  AlkPhos  603<H>  06-10    PT/INR - ( 10 [place] 2023 07:15 )   PT: 17.6 sec;   INR: 1.50 ratio             CAPILLARY BLOOD GLUCOSE      POCT Blood Glucose.: 165 mg/dL (10 [place] 2023 11:30)  POCT Blood Glucose.: 91 mg/dL (10 [place] 2023 08:04)  POCT Blood Glucose.: 241 mg/dL (09 Jun 2023 21:15)  POCT Blood Glucose.: 252 mg/dL (09 Jun 2023 16:33)            MEDICATIONS  (STANDING):  aspirin enteric coated 81 milliGRAM(s) Oral daily  atorvastatin 40 milliGRAM(s) Oral at bedtime  cadexomer iodine 0.9% Gel 1 Application(s) Topical daily  cefTRIAXone   IVPB 2000 milliGRAM(s) IV Intermittent every 24 hours  clopidogrel Tablet 75 milliGRAM(s) Oral daily  dextrose 5%. 1000 milliLiter(s) (50 mL/Hr) IV Continuous <Continuous>  dextrose 5%. 1000 milliLiter(s) (100 mL/Hr) IV Continuous <Continuous>  dextrose 50% Injectable 25 Gram(s) IV Push once  dextrose 50% Injectable 25 Gram(s) IV Push once  dextrose 50% Injectable 12.5 Gram(s) IV Push once  dextrose 50% Injectable 25 Gram(s) IV Push once  enoxaparin Injectable 60 milliGRAM(s) SubCutaneous every 12 hours  furosemide    Tablet 40 milliGRAM(s) Oral daily  glucagon  Injectable 1 milliGRAM(s) IntraMuscular once  insulin glargine Injectable (LANTUS) 30 Unit(s) SubCutaneous at bedtime  insulin lispro (ADMELOG) corrective regimen sliding scale   SubCutaneous three times a day before meals  insulin lispro (ADMELOG) corrective regimen sliding scale   SubCutaneous at bedtime  insulin lispro Injectable (ADMELOG) 5 Unit(s) SubCutaneous three times a day before meals  levETIRAcetam 750 milliGRAM(s) Oral two times a day  metoprolol succinate ER 25 milliGRAM(s) Oral daily  pantoprazole    Tablet 40 milliGRAM(s) Oral before breakfast  spironolactone 25 milliGRAM(s) Oral daily  urea Oral Powder 15 Gram(s) Oral daily  warfarin 5 milliGRAM(s) Oral once    MEDICATIONS  (PRN):  dextrose Oral Gel 15 Gram(s) Oral once PRN Blood Glucose LESS THAN 70 milliGRAM(s)/deciliter  morphine  - Injectable 2 milliGRAM(s) IV Push every 4 hours PRN Severe Pain (7 - 10)  oxycodone    5 mG/acetaminophen 325 mG 1 Tablet(s) Oral every 6 hours PRN Moderate Pain (4 - 6)          PHYSICAL EXAM:  GENERAL: NAD, well-groomed, well-developed  HEAD:  Atraumatic, Normocephalic  CHEST/LUNG: Clear to percussion bilaterally; No rales, rhonchi, wheezing, or rubs  HEART: Regular rate and rhythm; No murmurs, rubs, or gallops  ABDOMEN: Soft, Nontender, Nondistended; Bowel sounds present  EXTREMITIES:  2+ Peripheral Pulses, No clubbing, cyanosis, or edema  LYMPH: No lymphadenopathy noted  SKIN: No rashes or lesions    Care Discussed with Consultants/Other Providers [ ] YES  [ ] NO

## 2023-06-11 NOTE — PROGRESS NOTE ADULT - SUBJECTIVE AND OBJECTIVE BOX
[facility]  Shane Murray PA-C  [address] [place] [address]  [phone]      INTERVAL HPI/OVERNIGHT EVENTS:  Pt s/e  No GI events    MEDICATIONS  (STANDING):  aspirin enteric coated 81 milliGRAM(s) Oral daily  atorvastatin 40 milliGRAM(s) Oral at bedtime  cadexomer iodine 0.9% Gel 1 Application(s) Topical daily  cefTRIAXone   IVPB 2000 milliGRAM(s) IV Intermittent every 24 hours  clopidogrel Tablet 75 milliGRAM(s) Oral daily  dextrose 5%. 1000 milliLiter(s) (50 mL/Hr) IV Continuous <Continuous>  dextrose 5%. 1000 milliLiter(s) (100 mL/Hr) IV Continuous <Continuous>  dextrose 50% Injectable 12.5 Gram(s) IV Push once  dextrose 50% Injectable 25 Gram(s) IV Push once  dextrose 50% Injectable 25 Gram(s) IV Push once  dextrose 50% Injectable 25 Gram(s) IV Push once  enoxaparin Injectable 60 milliGRAM(s) SubCutaneous every 12 hours  furosemide    Tablet 40 milliGRAM(s) Oral daily  glucagon  Injectable 1 milliGRAM(s) IntraMuscular once  insulin glargine Injectable (LANTUS) 30 Unit(s) SubCutaneous at bedtime  insulin lispro (ADMELOG) corrective regimen sliding scale   SubCutaneous three times a day before meals  insulin lispro (ADMELOG) corrective regimen sliding scale   SubCutaneous at bedtime  insulin lispro Injectable (ADMELOG) 5 Unit(s) SubCutaneous three times a day before meals  levETIRAcetam 750 milliGRAM(s) Oral two times a day  metoprolol succinate ER 25 milliGRAM(s) Oral daily  pantoprazole    Tablet 40 milliGRAM(s) Oral before breakfast  povidone iodine 10% Solution 1 Application(s) Topical daily  spironolactone 25 milliGRAM(s) Oral daily  urea Oral Powder 15 Gram(s) Oral daily    MEDICATIONS  (PRN):  dextrose Oral Gel 15 Gram(s) Oral once PRN Blood Glucose LESS THAN 70 milliGRAM(s)/deciliter  morphine  - Injectable 2 milliGRAM(s) IV Push every 4 hours PRN Severe Pain (7 - 10)  oxycodone    5 mG/acetaminophen 325 mG 1 Tablet(s) Oral every 6 hours PRN Moderate Pain (4 - 6)      Allergies    No Known Allergies        PHYSICAL EXAM:   Vital Signs:  Vital Signs Last 24 Hrs  T(C): 36.7 (11 Jun 2023 05:15), Max: 36.7 (11 Jun 2023 05:15)  T(F): 98.1 (11 Jun 2023 05:15), Max: 98.1 (11 Jun 2023 05:15)  HR: 67 (11 Jun 2023 05:15) (67 - 77)  BP: 102/64 (11 Jun 2023 05:15) (93/57 - 103/63)  BP(mean): --  RR: 18 (11 Jun 2023 05:15) (17 - 18)  SpO2: 94% (11 Jun 2023 05:15) (94% - 96%)    Parameters below as of 11 Jun 2023 05:15  Patient On (Oxygen Delivery Method): room air      GENERAL:  Appears stated age  HEENT:  NC/AT  CHEST:  Full & symmetric excursion  HEART:  Regular rhythm  ABDOMEN:  Soft, non-tender, non-distended  EXTEREMITIES:  no cyanosis  SKIN:  No rash  NEURO:  Alert      LABS:                        11.4   6.92  )-----------( 355      ( 11 Jun 2023 07:23 )             34.4     06-11    136  |  100  |  42<H>  ----------------------------<  64<L>  3.7   |  29  |  0.92    Ca    9.6      11 Jun 2023 07:23    TPro  7.9  /  Alb  3.0<L>  /  TBili  0.4  /  DBili  x   /  AST  49<H>  /  ALT  39  /  AlkPhos  631<H>  06-11    PT/INR - ( 11 Jun 2023 07:23 )   PT: 21.5 sec;   INR: 1.83 ratio

## 2023-06-11 NOTE — PROGRESS NOTE ADULT - SUBJECTIVE AND OBJECTIVE BOX
Date/Time Patient Seen:  		  Referring MD:   Data Reviewed	       Patient is a 67y old  Male who presents with a chief complaint of foot infection (10 [place] 2023 13:57)      Subjective/HPI     PAST MEDICAL & SURGICAL HISTORY:  CVA (cerebral vascular accident)    HTN (hypertension)    DM (diabetes mellitus)    Arrhythmia    History of atrial fibrillation    Right hemiparesis    Aphasia due to acute stroke    GI bleed    Iron deficiency anemia    Upper GI bleed    Osteomyelitis    S/P CABG (coronary artery bypass graft)    S/P brain surgery    History of aortic valve repair          Medication list         MEDICATIONS  (STANDING):  aspirin enteric coated 81 milliGRAM(s) Oral daily  atorvastatin 40 milliGRAM(s) Oral at bedtime  cadexomer iodine 0.9% Gel 1 Application(s) Topical daily  cefTRIAXone   IVPB 2000 milliGRAM(s) IV Intermittent every 24 hours  clopidogrel Tablet 75 milliGRAM(s) Oral daily  dextrose 5%. 1000 milliLiter(s) (100 mL/Hr) IV Continuous <Continuous>  dextrose 5%. 1000 milliLiter(s) (50 mL/Hr) IV Continuous <Continuous>  dextrose 50% Injectable 25 Gram(s) IV Push once  dextrose 50% Injectable 25 Gram(s) IV Push once  dextrose 50% Injectable 12.5 Gram(s) IV Push once  dextrose 50% Injectable 25 Gram(s) IV Push once  enoxaparin Injectable 60 milliGRAM(s) SubCutaneous every 12 hours  furosemide    Tablet 40 milliGRAM(s) Oral daily  glucagon  Injectable 1 milliGRAM(s) IntraMuscular once  insulin glargine Injectable (LANTUS) 30 Unit(s) SubCutaneous at bedtime  insulin lispro (ADMELOG) corrective regimen sliding scale   SubCutaneous at bedtime  insulin lispro (ADMELOG) corrective regimen sliding scale   SubCutaneous three times a day before meals  insulin lispro Injectable (ADMELOG) 5 Unit(s) SubCutaneous three times a day before meals  levETIRAcetam 750 milliGRAM(s) Oral two times a day  metoprolol succinate ER 25 milliGRAM(s) Oral daily  pantoprazole    Tablet 40 milliGRAM(s) Oral before breakfast  spironolactone 25 milliGRAM(s) Oral daily  urea Oral Powder 15 Gram(s) Oral daily    MEDICATIONS  (PRN):  dextrose Oral Gel 15 Gram(s) Oral once PRN Blood Glucose LESS THAN 70 milliGRAM(s)/deciliter  morphine  - Injectable 2 milliGRAM(s) IV Push every 4 hours PRN Severe Pain (7 - 10)  oxycodone    5 mG/acetaminophen 325 mG 1 Tablet(s) Oral every 6 hours PRN Moderate Pain (4 - 6)         Vitals log        ICU Vital Signs Last 24 Hrs  T(C): 36.7 (11 Jun 2023 05:15), Max: 36.7 (11 Jun 2023 05:15)  T(F): 98.1 (11 Jun 2023 05:15), Max: 98.1 (11 Jun 2023 05:15)  HR: 67 (11 Jun 2023 05:15) (66 - 77)  BP: 102/64 (11 Jun 2023 05:15) (93/57 - 106/68)  BP(mean): --  ABP: --  ABP(mean): --  RR: 18 (11 Jun 2023 05:15) (17 - 18)  SpO2: 94% (11 Jun 2023 05:15) (94% - 97%)    O2 Parameters below as of 11 Jun 2023 05:15  Patient On (Oxygen Delivery Method): room air                 Input and Output:  I&O's Detail    09 Jun 2023 07:01  -  10 [place] 2023 07:00  --------------------------------------------------------  IN:    IV PiggyBack: 50 mL  Total IN: 50 mL    OUT:    Voided (mL): 400 mL  Total OUT: 400 mL    Total NET: -350 mL          Lab Data                        11.4   7.46  )-----------( 357      ( 10 [place] 2023 07:15 )             35.3     06-10    136  |  97  |  39<H>  ----------------------------<  71  4.2   |  34<H>  |  1.10    Ca    9.9      10 [place] 2023 07:15    TPro  8.4<H>  /  Alb  3.2<L>  /  TBili  0.4  /  DBili  x   /  AST  42<H>  /  ALT  35  /  AlkPhos  603<H>  06-10            Review of Systems	      Objective     Physical Examination    heart s1s2  lung dc BS  head nc      Pertinent Lab findings & Imaging      Bryan:  NO   Adequate UO     I&O's Detail    09 Jun 2023 07:01  -  10 [place] 2023 07:00  --------------------------------------------------------  IN:    IV PiggyBack: 50 mL  Total IN: 50 mL    OUT:    Voided (mL): 400 mL  Total OUT: 400 mL    Total NET: -350 mL               Discussed with:     Cultures:	        Radiology

## 2023-06-11 NOTE — PROGRESS NOTE ADULT - SUBJECTIVE AND OBJECTIVE BOX
Patient is a 67y Male whom presented to the hospital with hyponatremia     PAST MEDICAL & SURGICAL HISTORY:  CVA (cerebral vascular accident)      HTN (hypertension)      DM (diabetes mellitus)      History of atrial fibrillation      Right hemiparesis      Aphasia due to acute stroke      Upper GI bleed      Osteomyelitis      S/P CABG (coronary artery bypass graft)      S/P brain surgery      History of aortic valve repair          MEDICATIONS  (STANDING):  aspirin enteric coated 81 milliGRAM(s) Oral daily  atorvastatin 40 milliGRAM(s) Oral at bedtime  clopidogrel Tablet 75 milliGRAM(s) Oral daily  dextrose 5%. 1000 milliLiter(s) (50 mL/Hr) IV Continuous <Continuous>  dextrose 5%. 1000 milliLiter(s) (100 mL/Hr) IV Continuous <Continuous>  dextrose 50% Injectable 12.5 Gram(s) IV Push once  dextrose 50% Injectable 25 Gram(s) IV Push once  dextrose 50% Injectable 25 Gram(s) IV Push once  dextrose 50% Injectable 25 Gram(s) IV Push once  enoxaparin Injectable 60 milliGRAM(s) SubCutaneous every 12 hours  furosemide    Tablet 40 milliGRAM(s) Oral daily  glucagon  Injectable 1 milliGRAM(s) IntraMuscular once  insulin glargine Injectable (LANTUS) 30 Unit(s) SubCutaneous at bedtime  insulin lispro (ADMELOG) corrective regimen sliding scale   SubCutaneous three times a day before meals  insulin lispro (ADMELOG) corrective regimen sliding scale   SubCutaneous at bedtime  levETIRAcetam 750 milliGRAM(s) Oral two times a day  metoprolol succinate ER 25 milliGRAM(s) Oral daily  pantoprazole    Tablet 40 milliGRAM(s) Oral before breakfast  spironolactone 25 milliGRAM(s) Oral daily      Allergies    No Known Allergies    Intolerances        SOCIAL HISTORY:  Denies ETOh,Smoking,     FAMILY HISTORY:  FH: coronary artery disease (Sibling)    FH: type 2 diabetes (Sibling)        REVIEW OF SYSTEMS:    CONSTITUTIONAL: No weakness, fevers or chills  RESPIRATORY: No cough, wheezing, hemoptysis; No shortness of breath  CARDIOVASCULAR: No chest pain or palpitations  GASTROINTESTINAL: No abdominal or epigastric pain. No nausea, vomiting,                                     11.4   6.92  )-----------( 355      ( 11 Jun 2023 07:23 )             34.4       CBC Full  -  ( 11 Jun 2023 07:23 )  WBC Count : 6.92 K/uL  RBC Count : 3.91 M/uL  Hemoglobin : 11.4 g/dL  Hematocrit : 34.4 %  Platelet Count - Automated : 355 K/uL  Mean Cell Volume : 88.0 fl  Mean Cell Hemoglobin : 29.2 pg  Mean Cell Hemoglobin Concentration : 33.1 gm/dL  Auto Neutrophil # : x  Auto Lymphocyte # : x  Auto Monocyte # : x  Auto Eosinophil # : x  Auto Basophil # : x  Auto Neutrophil % : x  Auto Lymphocyte % : x  Auto Monocyte % : x  Auto Eosinophil % : x  Auto Basophil % : x      06-11    136  |  100  |  42<H>  ----------------------------<  64<L>  3.7   |  29  |  0.92    Ca    9.6      11 Jun 2023 07:23    TPro  7.9  /  Alb  3.0<L>  /  TBili  0.4  /  DBili  x   /  AST  49<H>  /  ALT  39  /  AlkPhos  631<H>  06-11      CAPILLARY BLOOD GLUCOSE      POCT Blood Glucose.: 192 mg/dL (11 Jun 2023 11:47)  POCT Blood Glucose.: 79 mg/dL (11 Jun 2023 07:38)  POCT Blood Glucose.: 165 mg/dL (10 [place] 2023 22:01)  POCT Blood Glucose.: 138 mg/dL (10 [place] 2023 17:07)      Vital Signs Last 24 Hrs  T(C): 36.6 (11 Jun 2023 12:01), Max: 36.7 (11 Jun 2023 05:15)  T(F): 97.8 (11 Jun 2023 12:01), Max: 98.1 (11 Jun 2023 05:15)  HR: 72 (11 Jun 2023 12:01) (67 - 77)  BP: 106/65 (11 Jun 2023 12:01) (93/57 - 106/65)  BP(mean): --  RR: 18 (11 Jun 2023 12:01) (17 - 18)  SpO2: 96% (11 Jun 2023 12:01) (94% - 96%)    Parameters below as of 11 Jun 2023 12:01  Patient On (Oxygen Delivery Method): room air            PT/INR - ( 11 Jun 2023 07:23 )   PT: 21.5 sec;   INR: 1.83 ratio                            PHYSICAL EXAM:    Constitutional: NAD  HEENT: conjunctive   clear   Neck:  No JVD  Respiratory: CTAB  Cardiovascular: S1 and S2  Gastrointestinal: BS+, soft, NT/ND  Extremities: No peripheral edema  Neurological:  no focal deficits

## 2023-06-11 NOTE — PROGRESS NOTE ADULT - SUBJECTIVE AND OBJECTIVE BOX
Patient is a 67y old  Male who presents with a chief complaint of foot infection (11 Jun 2023 11:38)    Date of servie : 06-11-23 @ 11:44  INTERVAL HPI/OVERNIGHT EVENTS:  T(C): 36.7 (06-11-23 @ 05:15), Max: 36.7 (06-11-23 @ 05:15)  HR: 67 (06-11-23 @ 05:15) (67 - 77)  BP: 102/64 (06-11-23 @ 05:15) (93/57 - 103/63)  RR: 18 (06-11-23 @ 05:15) (17 - 18)  SpO2: 94% (06-11-23 @ 05:15) (94% - 96%)  Wt(kg): --  I&O's Summary      LABS:                        11.4   6.92  )-----------( 355      ( 11 Jun 2023 07:23 )             34.4     06-11    136  |  100  |  42<H>  ----------------------------<  64<L>  3.7   |  29  |  0.92    Ca    9.6      11 Jun 2023 07:23    TPro  7.9  /  Alb  3.0<L>  /  TBili  0.4  /  DBili  x   /  AST  49<H>  /  ALT  39  /  AlkPhos  631<H>  06-11    PT/INR - ( 11 Jun 2023 07:23 )   PT: 21.5 sec;   INR: 1.83 ratio             CAPILLARY BLOOD GLUCOSE      POCT Blood Glucose.: 79 mg/dL (11 Jun 2023 07:38)  POCT Blood Glucose.: 165 mg/dL (10 [place] 2023 22:01)  POCT Blood Glucose.: 138 mg/dL (10 [place] 2023 17:07)            MEDICATIONS  (STANDING):  aspirin enteric coated 81 milliGRAM(s) Oral daily  atorvastatin 40 milliGRAM(s) Oral at bedtime  cadexomer iodine 0.9% Gel 1 Application(s) Topical daily  cefTRIAXone   IVPB 2000 milliGRAM(s) IV Intermittent every 24 hours  clopidogrel Tablet 75 milliGRAM(s) Oral daily  dextrose 5%. 1000 milliLiter(s) (50 mL/Hr) IV Continuous <Continuous>  dextrose 5%. 1000 milliLiter(s) (100 mL/Hr) IV Continuous <Continuous>  dextrose 50% Injectable 25 Gram(s) IV Push once  dextrose 50% Injectable 12.5 Gram(s) IV Push once  dextrose 50% Injectable 25 Gram(s) IV Push once  dextrose 50% Injectable 25 Gram(s) IV Push once  enoxaparin Injectable 60 milliGRAM(s) SubCutaneous every 12 hours  furosemide    Tablet 40 milliGRAM(s) Oral daily  glucagon  Injectable 1 milliGRAM(s) IntraMuscular once  insulin glargine Injectable (LANTUS) 30 Unit(s) SubCutaneous at bedtime  insulin lispro (ADMELOG) corrective regimen sliding scale   SubCutaneous three times a day before meals  insulin lispro (ADMELOG) corrective regimen sliding scale   SubCutaneous at bedtime  insulin lispro Injectable (ADMELOG) 5 Unit(s) SubCutaneous three times a day before meals  levETIRAcetam 750 milliGRAM(s) Oral two times a day  metoprolol succinate ER 25 milliGRAM(s) Oral daily  pantoprazole    Tablet 40 milliGRAM(s) Oral before breakfast  povidone iodine 10% Solution 1 Application(s) Topical daily  spironolactone 25 milliGRAM(s) Oral daily  urea Oral Powder 15 Gram(s) Oral daily  warfarin 5 milliGRAM(s) Oral once    MEDICATIONS  (PRN):  dextrose Oral Gel 15 Gram(s) Oral once PRN Blood Glucose LESS THAN 70 milliGRAM(s)/deciliter  morphine  - Injectable 2 milliGRAM(s) IV Push every 4 hours PRN Severe Pain (7 - 10)  oxycodone    5 mG/acetaminophen 325 mG 1 Tablet(s) Oral every 6 hours PRN Moderate Pain (4 - 6)          PHYSICAL EXAM:  GENERAL: NAD, well-groomed, well-developed  HEAD:  Atraumatic, Normocephalic  CHEST/LUNG: Clear to percussion bilaterally; No rales, rhonchi, wheezing, or rubs  HEART: Regular rate and rhythm; No murmurs, rubs, or gallops  ABDOMEN: Soft, Nontender, Nondistended; Bowel sounds present  EXTREMITIES:  foot dressing +    Care Discussed with Consultants/Other Providers [ ] YES  [ ] NO

## 2023-06-11 NOTE — CHART NOTE - NSCHARTNOTEFT_GEN_A_CORE
Assessment: patient seen for malnutrition follow up   67y old  Male who presents with a chief complaint of foot infection   status post OR , DM hx CVA  patient seen with son at bedside feeding patient taking meal well   6/9 BM  willing to try glucerna supplement           Factors impacting intake: [ ] none [ ] nausea  [ ] vomiting [ ] diarrhea [ ] constipation  [x ]chewing problems [ ] swallowing issues  [ ] other: tolerating soft and bite sized diet    Diet Prescription: soft and bite sized consistent cho dash tlc no pork no beef no chicken  Intake: up to 100% per flow sheet    Current Weight: Weight 6/9 66kg     Pertinent Medications: MEDICATIONS  (STANDING):  aspirin enteric coated 81 milliGRAM(s) Oral daily  atorvastatin 40 milliGRAM(s) Oral at bedtime  cadexomer iodine 0.9% Gel 1 Application(s) Topical daily  cefTRIAXone   IVPB 2000 milliGRAM(s) IV Intermittent every 24 hours  clopidogrel Tablet 75 milliGRAM(s) Oral daily  dextrose 5%. 1000 milliLiter(s) (50 mL/Hr) IV Continuous <Continuous>  dextrose 5%. 1000 milliLiter(s) (100 mL/Hr) IV Continuous <Continuous>  dextrose 50% Injectable 25 Gram(s) IV Push once  dextrose 50% Injectable 12.5 Gram(s) IV Push once  dextrose 50% Injectable 25 Gram(s) IV Push once  dextrose 50% Injectable 25 Gram(s) IV Push once  enoxaparin Injectable 60 milliGRAM(s) SubCutaneous every 12 hours  furosemide    Tablet 40 milliGRAM(s) Oral daily  glucagon  Injectable 1 milliGRAM(s) IntraMuscular once  insulin glargine Injectable (LANTUS) 30 Unit(s) SubCutaneous at bedtime  insulin lispro (ADMELOG) corrective regimen sliding scale   SubCutaneous at bedtime  insulin lispro (ADMELOG) corrective regimen sliding scale   SubCutaneous three times a day before meals  insulin lispro Injectable (ADMELOG) 5 Unit(s) SubCutaneous three times a day before meals  levETIRAcetam 750 milliGRAM(s) Oral two times a day  metoprolol succinate ER 25 milliGRAM(s) Oral daily  pantoprazole    Tablet 40 milliGRAM(s) Oral before breakfast  povidone iodine 10% Solution 1 Application(s) Topical daily  spironolactone 25 milliGRAM(s) Oral daily  urea Oral Powder 15 Gram(s) Oral daily  warfarin 5 milliGRAM(s) Oral once    MEDICATIONS  (PRN):  dextrose Oral Gel 15 Gram(s) Oral once PRN Blood Glucose LESS THAN 70 milliGRAM(s)/deciliter  morphine  - Injectable 2 milliGRAM(s) IV Push every 4 hours PRN Severe Pain (7 - 10)  oxycodone    5 mG/acetaminophen 325 mG 1 Tablet(s) Oral every 6 hours PRN Moderate Pain (4 - 6)    Pertinent Labs: blood sugars 79, 165, 138  Skin: no pressure injury     Estimated Needs:   [x ] no change since previous assessment based on # 1.2-1.4gms protein/kg 90-105gms protein and 25-30kcals/kg 1880-2256kcals  [ ] recalculated:     Previous Nutrition Diagnosis:   [ ] Inadequate Energy Intake [ ]Inadequate Oral Intake [ ] Excessive Energy Intake   [ ] Underweight [ ] Increased Nutrient Needs [ ] Overweight/Obesity   [ ] Altered GI Function [ ] Unintended Weight Loss [ ] Food & Nutrition Related Knowledge Deficit [ x] Malnutrition acute on chronic  (X) altered nutrition related labs  Nutrition Diagnosis is [x ] ongoing  [ ] resolved [ ] not applicable     New Nutrition Diagnosis: [x ] not applicable       Interventions:   Recommend  [ ] Change Diet To:  [ ] Nutrition Supplement  [ ] Nutrition Support  [x ] Other: recommend MVI and Vit C 500mg BID , recommend glucerna daily     Monitoring and Evaluation:   [x ] PO intake [ x ] Tolerance to diet prescription [ x ] weights [ x ] labs[ x ] follow up per protocol  [ ] other:

## 2023-06-11 NOTE — PROGRESS NOTE ADULT - SUBJECTIVE AND OBJECTIVE BOX
HPI follow up: Pt was seen this AM S/P LEFT 4TH TOE amputation 06/06/23. Pt was resting comfortably with intact dressing with strike thru bleeding. Pt is accompanied by son who states pt did not report any foot related complaint. dressing was changed yesterday due to strike thru bleeding.  denies F/C/N/V/SOB.     S : 67y year old Male seen at bedside for Left foot ulceration.  Pt has history of left toe gangrene and osteomyelitis from previous visit. Pt is s/p left 4th toe amputation yesterday. Pt is unable to communicate and is accompanied by son today. Pt's son reports pt baing comfortable after surgery and denies any foot related complaint. Dressing intact since yesterady. Pt is s/p vascular invervention a week ago by Dr Juan Moctezuma and was prescribed anticoagulants. no new foot related complaint.  Chief Complaint : Patient is a 67y old  Male who presents with a chief complaint of foot infection (07 Jun 2023 12:41)    HPI : HPI:  67-year-old male with an extensive history including prior hemorrhagic stroke status post left craniotomy, coronary artery bypass grafting, diabetes and peripheral vascular disease who was brought into the emergency room by his son to be admitted for surgery on the left fourth toe and possible amputation tomorrow morning.  Per son patient without fevers or chills and has had bleeding intermittently from the toe and was told by podiatrist to come in for surgery tomorrow (06 Jun 2023 06:36)      Patient admits to  (-) Fevers, (-) Chills, (-) Nausea, (-) Vomiting, (-) Shortness of Breath      PMH: CVA (cerebral vascular accident)    HTN (hypertension)    DM (diabetes mellitus)    Arrhythmia    History of atrial fibrillation    Right hemiparesis    Aphasia due to acute stroke    GI bleed    Iron deficiency anemia    Upper GI bleed    Osteomyelitis      PSH:S/P CABG (coronary artery bypass graft)    S/P brain surgery    History of aortic valve repair        Allergies:No Known Allergies      Labs:                        11.4   6.92  )-----------( 355      ( 11 Jun 2023 07:23 )             34.4       Parameters below as of 09 Jun 2023 05:07  Patient On (Oxygen Delivery Method): room air        06-11    136  |  100  |  42<H>  ----------------------------<  64<L>  3.7   |  29  |  0.92    Ca    9.6      11 Jun 2023 07:23    TPro  7.9  /  Alb  3.0<L>  /  TBili  0.4  /  DBili  x   /  AST  49<H>  /  ALT  39  /  AlkPhos  631<H>  06-11  Vital Signs Last 24 Hrs  T(C): 36.7 (11 Jun 2023 05:15), Max: 36.7 (11 Jun 2023 05:15)  T(F): 98.1 (11 Jun 2023 05:15), Max: 98.1 (11 Jun 2023 05:15)  HR: 67 (11 Jun 2023 05:15) (66 - 77)  BP: 102/64 (11 Jun 2023 05:15) (93/57 - 106/68)  BP(mean): --  RR: 18 (11 Jun 2023 05:15) (17 - 18)  SpO2: 94% (11 Jun 2023 05:15) (94% - 97%)    Parameters below as of 11 Jun 2023 05:15  Patient On (Oxygen Delivery Method): room air      O:   General: Pleasant  male NAD & AOX3.      Integument:  Skin warm, dry and supple bilateral.    Ulceration Left foot 4th interspace well coapted and intact suture surrounding maceration and small amount of bleeding, - hyperkeratotic border, - edema, - lydia-wound erythema, - purulence, - fluctuance, - tracking/tunneling,- probe to bone.   Vascular: Dorsalis Pedis and Posterior Tibial pulses 1/4.  Capillary re-fill time less then 5 seconds digits 1-5 bilateral.    Neuro: unable to asses but diminished pain sensation since pt does not respond to painful stimuli to foot  MSK: pt wheel chair bound secondary to stroke    A: S/P LEFT 4TH TOE AMP   Left foot ulceration  Right hallux ulceration      P:   Chart reviewed and Patient evaluated  Discussed diagnosis and treatment with patient's son  dressing remains intact.  pending intra op path.  wound culture negative  Applied iodosorb  with dry sterile dressi  X-rays reviewed post op  Continue with IV antibiotics As Per ID  Weight bearing as tolerated.  Rec care boots to offload heels  Podiatry will follow while in house.  Stable for discharge from  podiatry stand point once culture and path is negative.      wound care instructions:   apply betadine to gauze and secure gauze to left foot wound and secure with jamel and tape.  keep feet elevated.  keep dressing dry, intact and clean.  change dressing daily.

## 2023-06-12 NOTE — PROGRESS NOTE ADULT - SUBJECTIVE AND OBJECTIVE BOX
CAPILLARY BLOOD GLUCOSE      POCT Blood Glucose.: 94 mg/dL (12 Jun 2023 07:27)  POCT Blood Glucose.: 164 mg/dL (11 Jun 2023 21:21)  POCT Blood Glucose.: 116 mg/dL (11 Jun 2023 16:48)  POCT Blood Glucose.: 192 mg/dL (11 Jun 2023 11:47)      Vital Signs Last 24 Hrs  T(C): 36.7 (12 Jun 2023 04:21), Max: 36.7 (12 Jun 2023 04:21)  T(F): 98.1 (12 Jun 2023 04:21), Max: 98.1 (12 Jun 2023 04:21)  HR: 71 (12 Jun 2023 04:21) (71 - 76)  BP: 100/65 (12 Jun 2023 04:21) (100/65 - 108/67)  BP(mean): --  RR: 18 (12 Jun 2023 04:21) (18 - 18)  SpO2: 95% (12 Jun 2023 04:21) (95% - 96%)    Parameters below as of 12 Jun 2023 04:21  Patient On (Oxygen Delivery Method): room air        General: WN/WD NAD  Respiratory: CTA B/L  CV: RRR, S1S2, no murmurs, rubs or gallops  Abdominal: Soft, NT, ND +BS, Last BM  Extremities: No edema, + peripheral pulses     06-11    136  |  100  |  42<H>  ----------------------------<  64<L>  3.7   |  29  |  0.92    Ca    9.6      11 Jun 2023 07:23    TPro  7.9  /  Alb  3.0<L>  /  TBili  0.4  /  DBili  x   /  AST  49<H>  /  ALT  39  /  AlkPhos  631<H>  06-11      atorvastatin 40 milliGRAM(s) Oral at bedtime  dextrose 50% Injectable 12.5 Gram(s) IV Push once  dextrose 50% Injectable 25 Gram(s) IV Push once  dextrose 50% Injectable 25 Gram(s) IV Push once  dextrose 50% Injectable 25 Gram(s) IV Push once  dextrose Oral Gel 15 Gram(s) Oral once PRN  glucagon  Injectable 1 milliGRAM(s) IntraMuscular once  insulin glargine Injectable (LANTUS) 30 Unit(s) SubCutaneous at bedtime  insulin lispro (ADMELOG) corrective regimen sliding scale   SubCutaneous at bedtime  insulin lispro (ADMELOG) corrective regimen sliding scale   SubCutaneous three times a day before meals  insulin lispro Injectable (ADMELOG) 5 Unit(s) SubCutaneous three times a day before meals

## 2023-06-12 NOTE — PROGRESS NOTE ADULT - SUBJECTIVE AND OBJECTIVE BOX
[facility]  Shane Murray PA-C  [address] [place] [address]  [phone]      INTERVAL HPI/OVERNIGHT EVENTS:  Pt s/e  Reports no new GI events    MEDICATIONS  (STANDING):  aspirin enteric coated 81 milliGRAM(s) Oral daily  atorvastatin 40 milliGRAM(s) Oral at bedtime  cadexomer iodine 0.9% Gel 1 Application(s) Topical daily  cefTRIAXone   IVPB 2000 milliGRAM(s) IV Intermittent every 24 hours  clopidogrel Tablet 75 milliGRAM(s) Oral daily  dextrose 5%. 1000 milliLiter(s) (100 mL/Hr) IV Continuous <Continuous>  dextrose 5%. 1000 milliLiter(s) (50 mL/Hr) IV Continuous <Continuous>  dextrose 50% Injectable 25 Gram(s) IV Push once  dextrose 50% Injectable 25 Gram(s) IV Push once  dextrose 50% Injectable 12.5 Gram(s) IV Push once  dextrose 50% Injectable 25 Gram(s) IV Push once  enoxaparin Injectable 60 milliGRAM(s) SubCutaneous every 12 hours  furosemide    Tablet 40 milliGRAM(s) Oral daily  glucagon  Injectable 1 milliGRAM(s) IntraMuscular once  insulin glargine Injectable (LANTUS) 30 Unit(s) SubCutaneous at bedtime  insulin lispro (ADMELOG) corrective regimen sliding scale   SubCutaneous at bedtime  insulin lispro (ADMELOG) corrective regimen sliding scale   SubCutaneous three times a day before meals  insulin lispro Injectable (ADMELOG) 5 Unit(s) SubCutaneous three times a day before meals  levETIRAcetam 750 milliGRAM(s) Oral two times a day  metoprolol succinate ER 25 milliGRAM(s) Oral daily  pantoprazole    Tablet 40 milliGRAM(s) Oral before breakfast  povidone iodine 10% Solution 1 Application(s) Topical daily  spironolactone 25 milliGRAM(s) Oral daily  urea Oral Powder 15 Gram(s) Oral daily    MEDICATIONS  (PRN):  dextrose Oral Gel 15 Gram(s) Oral once PRN Blood Glucose LESS THAN 70 milliGRAM(s)/deciliter  morphine  - Injectable 2 milliGRAM(s) IV Push every 4 hours PRN Severe Pain (7 - 10)  oxycodone    5 mG/acetaminophen 325 mG 1 Tablet(s) Oral every 6 hours PRN Moderate Pain (4 - 6)      Allergies    No Known Allergies    PHYSICAL EXAM:   Vital Signs:  Vital Signs Last 24 Hrs  T(C): 36.7 (12 Jun 2023 04:21), Max: 36.7 (12 Jun 2023 04:21)  T(F): 98.1 (12 Jun 2023 04:21), Max: 98.1 (12 Jun 2023 04:21)  HR: 71 (12 Jun 2023 04:21) (71 - 76)  BP: 133/67 (12 Jun 2023 08:10) (100/65 - 133/67)  BP(mean): --  RR: 18 (12 Jun 2023 08:10) (18 - 18)  SpO2: 95% (12 Jun 2023 08:10) (95% - 96%)    Parameters below as of 12 Jun 2023 08:10  Patient On (Oxygen Delivery Method): room air    GENERAL:  Appears stated age  HEENT:  NC/AT  CHEST:  Full & symmetric excursion  HEART:  Regular rhythm  ABDOMEN:  Soft, non-tender, non-distended  EXTEREMITIES:  no cyanosis  SKIN:  No rash  NEURO:  Alert      LABS:                        10.9   6.95  )-----------( 329      ( 12 Jun 2023 05:55 )             33.8     06-12    138  |  100  |  43<H>  ----------------------------<  79  4.5   |  32<H>  |  0.95    Ca    9.7      12 Jun 2023 05:55    TPro  7.9  /  Alb  3.1<L>  /  TBili  0.4  /  DBili  x   /  AST  49<H>  /  ALT  42  /  AlkPhos  665<H>  06-12    PT/INR - ( 12 Jun 2023 05:55 )   PT: 23.5 sec;   INR: 1.99 ratio

## 2023-06-12 NOTE — PROGRESS NOTE ADULT - SUBJECTIVE AND OBJECTIVE BOX
HPI follow up: Pt was seen this AM S/P LEFT 4TH TOE amputation 06/06/23. Pt was resting comfortably with intact dressing. no strike thru bleeding. Pt is accompanied by daughter who states pt did not report any foot related complaint. dressing was changed yesterday due to strike thru bleeding.  denies F/C/N/V/SOB.     S : 67y year old Male seen at bedside for Left foot ulceration.  Pt has history of left toe gangrene and osteomyelitis from previous visit. Pt is s/p left 4th toe amputation yesterday. Pt is unable to communicate and is accompanied by son today. Pt's son reports pt baing comfortable after surgery and denies any foot related complaint. Dressing intact since yesterady. Pt is s/p vascular invervention a week ago by Dr Juan Moctezuma and was prescribed anticoagulants. no new foot related complaint.  Chief Complaint : Patient is a 67y old  Male who presents with a chief complaint of foot infection (07 Jun 2023 12:41)    HPI : HPI:  67-year-old male with an extensive history including prior hemorrhagic stroke status post left craniotomy, coronary artery bypass grafting, diabetes and peripheral vascular disease who was brought into the emergency room by his son to be admitted for surgery on the left fourth toe and possible amputation tomorrow morning.  Per son patient without fevers or chills and has had bleeding intermittently from the toe and was told by podiatrist to come in for surgery tomorrow (06 Jun 2023 06:36)      Patient admits to  (-) Fevers, (-) Chills, (-) Nausea, (-) Vomiting, (-) Shortness of Breath      PMH: CVA (cerebral vascular accident)    HTN (hypertension)    DM (diabetes mellitus)    Arrhythmia    History of atrial fibrillation    Right hemiparesis    Aphasia due to acute stroke    GI bleed    Iron deficiency anemia    Upper GI bleed    Osteomyelitis      PSH:S/P CABG (coronary artery bypass graft)    S/P brain surgery    History of aortic valve repair        Allergies:No Known Allergies      Labs:                                      10.9   6.95  )-----------( 329      ( 12 Jun 2023 05:55 )             33.8   06-12    138  |  100  |  43<H>  ----------------------------<  79  4.5   |  32<H>  |  0.95    Ca    9.7      12 Jun 2023 05:55    TPro  7.9  /  Alb  3.1<L>  /  TBili  0.4  /  DBili  x   /  AST  49<H>  /  ALT  42  /  AlkPhos  665<H>  06-12        O:   General: Pleasant  male NAD & AOX3.      Integument:  Skin warm, dry and supple bilateral.    Ulceration Left foot 4th interspace well coapted and intact suture, no  surrounding maceration, no bleeding, - hyperkeratotic border, - edema, - lydia-wound erythema, - purulence, - fluctuance, - tracking/tunneling,- probe to bone.   Vascular: Dorsalis Pedis and Posterior Tibial pulses 1/4.  Capillary re-fill time less then 5 seconds digits 1-5 bilateral.    Neuro: unable to asses but diminished pain sensation since pt does not respond to painful stimuli to foot  MSK: pt wheel chair bound secondary to stroke    A: S/P LEFT 4TH TOE AMP        P:   Chart reviewed and Patient evaluated  Discussed diagnosis and treatment with patient's son  dressing remains intact  wound culture negative  Applied iodosorb  with dry sterile dressing  X-rays reviewed post op  Weight bearing as tolerated.  Rec care boots to offload heels  Podiatry will follow while in house.  Stable for discharge from  podiatry stand point.      wound care instructions:   apply betadine to gauze and secure gauze to left foot wound and secure with jamel and tape.  keep feet elevated.  keep dressing dry, intact and clean.  change dressing daily.

## 2023-06-12 NOTE — PROGRESS NOTE ADULT - SUBJECTIVE AND OBJECTIVE BOX
Patient is a 67y Male whom presented to the hospital with hyponatremia     PAST MEDICAL & SURGICAL HISTORY:  CVA (cerebral vascular accident)      HTN (hypertension)      DM (diabetes mellitus)      History of atrial fibrillation      Right hemiparesis      Aphasia due to acute stroke      Upper GI bleed      Osteomyelitis      S/P CABG (coronary artery bypass graft)      S/P brain surgery      History of aortic valve repair          MEDICATIONS  (STANDING):  aspirin enteric coated 81 milliGRAM(s) Oral daily  atorvastatin 40 milliGRAM(s) Oral at bedtime  clopidogrel Tablet 75 milliGRAM(s) Oral daily  dextrose 5%. 1000 milliLiter(s) (50 mL/Hr) IV Continuous <Continuous>  dextrose 5%. 1000 milliLiter(s) (100 mL/Hr) IV Continuous <Continuous>  dextrose 50% Injectable 12.5 Gram(s) IV Push once  dextrose 50% Injectable 25 Gram(s) IV Push once  dextrose 50% Injectable 25 Gram(s) IV Push once  dextrose 50% Injectable 25 Gram(s) IV Push once  enoxaparin Injectable 60 milliGRAM(s) SubCutaneous every 12 hours  furosemide    Tablet 40 milliGRAM(s) Oral daily  glucagon  Injectable 1 milliGRAM(s) IntraMuscular once  insulin glargine Injectable (LANTUS) 30 Unit(s) SubCutaneous at bedtime  insulin lispro (ADMELOG) corrective regimen sliding scale   SubCutaneous three times a day before meals  insulin lispro (ADMELOG) corrective regimen sliding scale   SubCutaneous at bedtime  levETIRAcetam 750 milliGRAM(s) Oral two times a day  metoprolol succinate ER 25 milliGRAM(s) Oral daily  pantoprazole    Tablet 40 milliGRAM(s) Oral before breakfast  spironolactone 25 milliGRAM(s) Oral daily      Allergies    No Known Allergies    Intolerances        SOCIAL HISTORY:  Denies ETOh,Smoking,     FAMILY HISTORY:  FH: coronary artery disease (Sibling)    FH: type 2 diabetes (Sibling)        REVIEW OF SYSTEMS:    CONSTITUTIONAL: No weakness, fevers or chills  RESPIRATORY: No cough, wheezing, hemoptysis; No shortness of breath  CARDIOVASCULAR: No chest pain or palpitations  GASTROINTESTINAL: No abdominal or epigastric pain. No nausea, vomiting,                                                                                 10.9   6.95  )-----------( 329      ( 12 Jun 2023 05:55 )             33.8       CBC Full  -  ( 12 Jun 2023 05:55 )  WBC Count : 6.95 K/uL  RBC Count : 3.75 M/uL  Hemoglobin : 10.9 g/dL  Hematocrit : 33.8 %  Platelet Count - Automated : 329 K/uL  Mean Cell Volume : 90.1 fl  Mean Cell Hemoglobin : 29.1 pg  Mean Cell Hemoglobin Concentration : 32.2 gm/dL  Auto Neutrophil # : x  Auto Lymphocyte # : x  Auto Monocyte # : x  Auto Eosinophil # : x  Auto Basophil # : x  Auto Neutrophil % : x  Auto Lymphocyte % : x  Auto Monocyte % : x  Auto Eosinophil % : x  Auto Basophil % : x      06-12    138  |  100  |  43<H>  ----------------------------<  79  4.5   |  32<H>  |  0.95    Ca    9.7      12 Jun 2023 05:55    TPro  7.9  /  Alb  3.1<L>  /  TBili  0.4  /  DBili  x   /  AST  49<H>  /  ALT  42  /  AlkPhos  665<H>  06-12      CAPILLARY BLOOD GLUCOSE      POCT Blood Glucose.: 86 mg/dL (12 Jun 2023 17:01)  POCT Blood Glucose.: 68 mg/dL (12 Jun 2023 16:44)  POCT Blood Glucose.: 66 mg/dL (12 Jun 2023 16:42)  POCT Blood Glucose.: 145 mg/dL (12 Jun 2023 11:09)  POCT Blood Glucose.: 94 mg/dL (12 Jun 2023 07:27)  POCT Blood Glucose.: 164 mg/dL (11 Jun 2023 21:21)      Vital Signs Last 24 Hrs  T(C): 36.7 (12 Jun 2023 11:59), Max: 36.7 (12 Jun 2023 04:21)  T(F): 98 (12 Jun 2023 11:59), Max: 98.1 (12 Jun 2023 04:21)  HR: 68 (12 Jun 2023 11:59) (68 - 76)  BP: 102/68 (12 Jun 2023 11:59) (100/65 - 133/67)  BP(mean): --  RR: 18 (12 Jun 2023 11:59) (18 - 18)  SpO2: 97% (12 Jun 2023 11:59) (95% - 97%)    Parameters below as of 12 Jun 2023 11:59  Patient On (Oxygen Delivery Method): room air            PT/INR - ( 12 Jun 2023 05:55 )   PT: 23.5 sec;   INR: 1.99 ratio           PHYSICAL EXAM:    Constitutional: NAD  HEENT: conjunctive   clear   Neck:  No JVD  Respiratory: CTAB  Cardiovascular: S1 and S2  Gastrointestinal: BS+, soft, NT/ND  Extremities: No peripheral edema  Neurological:  no focal deficits

## 2023-06-12 NOTE — PROGRESS NOTE ADULT - SUBJECTIVE AND OBJECTIVE BOX
Date/Time Patient Seen:  		  Referring MD:   Data Reviewed	       Patient is a 67y old  Male who presents with a chief complaint of foot infection (11 Jun 2023 13:17)      Subjective/HPI     PAST MEDICAL & SURGICAL HISTORY:  CVA (cerebral vascular accident)    HTN (hypertension)    DM (diabetes mellitus)    Arrhythmia    History of atrial fibrillation    Right hemiparesis    Aphasia due to acute stroke    GI bleed    Iron deficiency anemia    Upper GI bleed    Osteomyelitis    S/P CABG (coronary artery bypass graft)    S/P brain surgery    History of aortic valve repair          Medication list         MEDICATIONS  (STANDING):  aspirin enteric coated 81 milliGRAM(s) Oral daily  atorvastatin 40 milliGRAM(s) Oral at bedtime  cadexomer iodine 0.9% Gel 1 Application(s) Topical daily  cefTRIAXone   IVPB 2000 milliGRAM(s) IV Intermittent every 24 hours  clopidogrel Tablet 75 milliGRAM(s) Oral daily  dextrose 5%. 1000 milliLiter(s) (50 mL/Hr) IV Continuous <Continuous>  dextrose 5%. 1000 milliLiter(s) (100 mL/Hr) IV Continuous <Continuous>  dextrose 50% Injectable 25 Gram(s) IV Push once  dextrose 50% Injectable 12.5 Gram(s) IV Push once  dextrose 50% Injectable 25 Gram(s) IV Push once  dextrose 50% Injectable 25 Gram(s) IV Push once  enoxaparin Injectable 60 milliGRAM(s) SubCutaneous every 12 hours  furosemide    Tablet 40 milliGRAM(s) Oral daily  glucagon  Injectable 1 milliGRAM(s) IntraMuscular once  insulin glargine Injectable (LANTUS) 30 Unit(s) SubCutaneous at bedtime  insulin lispro (ADMELOG) corrective regimen sliding scale   SubCutaneous three times a day before meals  insulin lispro (ADMELOG) corrective regimen sliding scale   SubCutaneous at bedtime  insulin lispro Injectable (ADMELOG) 5 Unit(s) SubCutaneous three times a day before meals  levETIRAcetam 750 milliGRAM(s) Oral two times a day  metoprolol succinate ER 25 milliGRAM(s) Oral daily  pantoprazole    Tablet 40 milliGRAM(s) Oral before breakfast  povidone iodine 10% Solution 1 Application(s) Topical daily  spironolactone 25 milliGRAM(s) Oral daily  urea Oral Powder 15 Gram(s) Oral daily    MEDICATIONS  (PRN):  dextrose Oral Gel 15 Gram(s) Oral once PRN Blood Glucose LESS THAN 70 milliGRAM(s)/deciliter  morphine  - Injectable 2 milliGRAM(s) IV Push every 4 hours PRN Severe Pain (7 - 10)  oxycodone    5 mG/acetaminophen 325 mG 1 Tablet(s) Oral every 6 hours PRN Moderate Pain (4 - 6)         Vitals log        ICU Vital Signs Last 24 Hrs  T(C): 36.7 (12 Jun 2023 04:21), Max: 36.7 (12 Jun 2023 04:21)  T(F): 98.1 (12 Jun 2023 04:21), Max: 98.1 (12 Jun 2023 04:21)  HR: 71 (12 Jun 2023 04:21) (71 - 76)  BP: 100/65 (12 Jun 2023 04:21) (100/65 - 108/67)  BP(mean): --  ABP: --  ABP(mean): --  RR: 18 (12 Jun 2023 04:21) (18 - 18)  SpO2: 95% (12 Jun 2023 04:21) (95% - 96%)    O2 Parameters below as of 12 Jun 2023 04:21  Patient On (Oxygen Delivery Method): room air                 Input and Output:  I&O's Detail      Lab Data                        11.4   6.92  )-----------( 355      ( 11 Jun 2023 07:23 )             34.4     06-11    136  |  100  |  42<H>  ----------------------------<  64<L>  3.7   |  29  |  0.92    Ca    9.6      11 Jun 2023 07:23    TPro  7.9  /  Alb  3.0<L>  /  TBili  0.4  /  DBili  x   /  AST  49<H>  /  ALT  39  /  AlkPhos  631<H>  06-11            Review of Systems	      Objective     Physical Examination    heart s1s2  lung dc BS  head nc      Pertinent Lab findings & Imaging      Bryan:  NO   Adequate UO     I&O's Detail           Discussed with:     Cultures:	        Radiology

## 2023-06-12 NOTE — PROGRESS NOTE ADULT - SUBJECTIVE AND OBJECTIVE BOX
[facility] DIV[facility] of INFECTIOUS DISEASE  Guru Michel MD PhD, Lisa James MD, Becca Acosta MD, Karen Grimes MD, Corky Perez MD  and providing coverage with Carlos Marx MD  Providing Infectious Disease Consultations at [facility], [facility], [facility]'s    Office# [phone] to schedule follow up appointments  Answering Service for urgent calls or New Consults [phone]  Cell# to text for urgent issues Guru Michel [phone]     infectious diseases progress note:    ARNIE DELGADILLO is a 67y y. o. Male patient    Overnight and events of the last 24hrs reviewed    Allergies    No Known Allergies    Intolerances        ANTIBIOTICS/RELEVANT:  antimicrobials  cefTRIAXone   IVPB 2000 milliGRAM(s) IV Intermittent every 24 hours    immunologic:    OTHER:  aspirin enteric coated 81 milliGRAM(s) Oral daily  atorvastatin 40 milliGRAM(s) Oral at bedtime  cadexomer iodine 0.9% Gel 1 Application(s) Topical daily  clopidogrel Tablet 75 milliGRAM(s) Oral daily  dextrose 5%. 1000 milliLiter(s) IV Continuous <Continuous>  dextrose 5%. 1000 milliLiter(s) IV Continuous <Continuous>  dextrose 50% Injectable 12.5 Gram(s) IV Push once  dextrose 50% Injectable 25 Gram(s) IV Push once  dextrose 50% Injectable 25 Gram(s) IV Push once  dextrose 50% Injectable 25 Gram(s) IV Push once  dextrose Oral Gel 15 Gram(s) Oral once PRN  enoxaparin Injectable 60 milliGRAM(s) SubCutaneous every 12 hours  furosemide    Tablet 40 milliGRAM(s) Oral daily  glucagon  Injectable 1 milliGRAM(s) IntraMuscular once  insulin glargine Injectable (LANTUS) 30 Unit(s) SubCutaneous at bedtime  insulin lispro (ADMELOG) corrective regimen sliding scale   SubCutaneous at bedtime  insulin lispro (ADMELOG) corrective regimen sliding scale   SubCutaneous three times a day before meals  insulin lispro Injectable (ADMELOG) 5 Unit(s) SubCutaneous three times a day before meals  levETIRAcetam 750 milliGRAM(s) Oral two times a day  metoprolol succinate ER 25 milliGRAM(s) Oral daily  morphine  - Injectable 2 milliGRAM(s) IV Push every 4 hours PRN  oxycodone    5 mG/acetaminophen 325 mG 1 Tablet(s) Oral every 6 hours PRN  pantoprazole    Tablet 40 milliGRAM(s) Oral before breakfast  povidone iodine 10% Solution 1 Application(s) Topical daily  spironolactone 25 milliGRAM(s) Oral daily  urea Oral Powder 15 Gram(s) Oral daily      Objective:  Vital Signs Last 24 Hrs  T(C): 36.7 (12 Jun 2023 11:59), Max: 36.7 (12 Jun 2023 04:21)  T(F): 98 (12 Jun 2023 11:59), Max: 98.1 (12 Jun 2023 04:21)  HR: 68 (12 Jun 2023 11:59) (68 - 76)  BP: 102/68 (12 Jun 2023 11:59) (100/65 - 133/67)  BP(mean): --  RR: 18 (12 Jun 2023 11:59) (18 - 18)  SpO2: 97% (12 Jun 2023 11:59) (95% - 97%)    Parameters below as of 12 Jun 2023 11:59  Patient On (Oxygen Delivery Method): room air        T(C): 36.7 (06-12-23 @ 11:59), Max: 36.7 (06-11-23 @ 05:15)  T(C): 36.7 (06-12-23 @ 11:59), Max: 36.9 (06-09-23 @ 19:43)  T(C): 36.7 (06-12-23 @ 11:59), Max: 36.9 (06-09-23 @ 19:43)    PHYSICAL EXAM:  HEENT: NC atraumatic  Neck: supple  Respiratory: no accessory muscle use, breathing comfortably  Cardiovascular: distant  Gastrointestinal: normal appearing, nondistended  Extremities: no clubbing, no cyanosis,        LABS:                          10.9   6.95  )-----------( 329      ( 12 Jun 2023 05:55 )             33.8       WBC  6.95 06-12 @ 05:55  6.92 06-11 @ 07:23  7.46 06-10 @ 07:15  6.94 06-09 @ 07:05  6.32 06-07 @ 06:24  6.19 06-06 @ 02:35      06-12    138  |  100  |  43<H>  ----------------------------<  79  4.5   |  32<H>  |  0.95    Ca    9.7      12 Jun 2023 05:55    TPro  7.9  /  Alb  3.1<L>  /  TBili  0.4  /  DBili  x   /  AST  49<H>  /  ALT  42  /  AlkPhos  665<H>  06-12      Creatinine, Serum: 0.95 mg/dL (06-12-23 @ 05:55)  Creatinine, Serum: 0.92 mg/dL (06-11-23 @ 07:23)  Creatinine, Serum: 1.10 mg/dL (06-10-23 @ 07:15)  Creatinine, Serum: 0.99 mg/dL (06-09-23 @ 07:05)  Creatinine, Serum: 0.83 mg/dL (06-07-23 @ 06:24)  Creatinine, Serum: 0.92 mg/dL (06-06-23 @ 02:35)      PT/INR - ( 12 Jun 2023 05:55 )   PT: 23.5 sec;   INR: 1.99 ratio                   INFLAMMATORY MARKERS      MICROBIOLOGY:              RADIOLOGY & ADDITIONAL STUDIES:

## 2023-06-12 NOTE — PROGRESS NOTE ADULT - SUBJECTIVE AND OBJECTIVE BOX
Patient is a 67y old  Male who presents with a chief complaint of foot infection (12 Jun 2023 13:08)    Date of servie : 06-12-23 @ 13:18  INTERVAL HPI/OVERNIGHT EVENTS:  T(C): 36.7 (06-12-23 @ 11:59), Max: 36.7 (06-12-23 @ 04:21)  HR: 68 (06-12-23 @ 11:59) (68 - 76)  BP: 102/68 (06-12-23 @ 11:59) (100/65 - 133/67)  RR: 18 (06-12-23 @ 11:59) (18 - 18)  SpO2: 97% (06-12-23 @ 11:59) (95% - 97%)  Wt(kg): --  I&O's Summary      LABS:                        10.9   6.95  )-----------( 329      ( 12 Jun 2023 05:55 )             33.8     06-12    138  |  100  |  43<H>  ----------------------------<  79  4.5   |  32<H>  |  0.95    Ca    9.7      12 Jun 2023 05:55    TPro  7.9  /  Alb  3.1<L>  /  TBili  0.4  /  DBili  x   /  AST  49<H>  /  ALT  42  /  AlkPhos  665<H>  06-12    PT/INR - ( 12 Jun 2023 05:55 )   PT: 23.5 sec;   INR: 1.99 ratio             CAPILLARY BLOOD GLUCOSE      POCT Blood Glucose.: 145 mg/dL (12 Jun 2023 11:09)  POCT Blood Glucose.: 94 mg/dL (12 Jun 2023 07:27)  POCT Blood Glucose.: 164 mg/dL (11 Jun 2023 21:21)  POCT Blood Glucose.: 116 mg/dL (11 Jun 2023 16:48)            MEDICATIONS  (STANDING):  aspirin enteric coated 81 milliGRAM(s) Oral daily  atorvastatin 40 milliGRAM(s) Oral at bedtime  cadexomer iodine 0.9% Gel 1 Application(s) Topical daily  cefTRIAXone   IVPB 2000 milliGRAM(s) IV Intermittent every 24 hours  clopidogrel Tablet 75 milliGRAM(s) Oral daily  dextrose 5%. 1000 milliLiter(s) (50 mL/Hr) IV Continuous <Continuous>  dextrose 5%. 1000 milliLiter(s) (100 mL/Hr) IV Continuous <Continuous>  dextrose 50% Injectable 25 Gram(s) IV Push once  dextrose 50% Injectable 25 Gram(s) IV Push once  dextrose 50% Injectable 12.5 Gram(s) IV Push once  dextrose 50% Injectable 25 Gram(s) IV Push once  enoxaparin Injectable 60 milliGRAM(s) SubCutaneous every 12 hours  furosemide    Tablet 40 milliGRAM(s) Oral daily  glucagon  Injectable 1 milliGRAM(s) IntraMuscular once  insulin glargine Injectable (LANTUS) 30 Unit(s) SubCutaneous at bedtime  insulin lispro (ADMELOG) corrective regimen sliding scale   SubCutaneous three times a day before meals  insulin lispro (ADMELOG) corrective regimen sliding scale   SubCutaneous at bedtime  insulin lispro Injectable (ADMELOG) 5 Unit(s) SubCutaneous three times a day before meals  levETIRAcetam 750 milliGRAM(s) Oral two times a day  metoprolol succinate ER 25 milliGRAM(s) Oral daily  pantoprazole    Tablet 40 milliGRAM(s) Oral before breakfast  povidone iodine 10% Solution 1 Application(s) Topical daily  spironolactone 25 milliGRAM(s) Oral daily  urea Oral Powder 15 Gram(s) Oral daily  warfarin 3 milliGRAM(s) Oral once    MEDICATIONS  (PRN):  dextrose Oral Gel 15 Gram(s) Oral once PRN Blood Glucose LESS THAN 70 milliGRAM(s)/deciliter  morphine  - Injectable 2 milliGRAM(s) IV Push every 4 hours PRN Severe Pain (7 - 10)  oxycodone    5 mG/acetaminophen 325 mG 1 Tablet(s) Oral every 6 hours PRN Moderate Pain (4 - 6)          PHYSICAL EXAM:  GENERAL: frail  CHEST/LUNG: Clear to percussion bilaterally; No rales, rhonchi, wheezing, or rubs  HEART: Regular rate and rhythm; No murmurs, rubs, or gallops  ABDOMEN: Soft, Nontender, Nondistended; Bowel sounds present  EXTREMITIES: foot dressing +    Care Discussed with Consultants/Other Providers [ ] YES  [ ] NO

## 2023-06-13 NOTE — PROGRESS NOTE ADULT - SUBJECTIVE AND OBJECTIVE BOX
Date/Time Patient Seen:  		  Referring MD:   Data Reviewed	       Patient is a 67y old  Male who presents with a chief complaint of foot infection (12 Jun 2023 19:28)      Subjective/HPI     PAST MEDICAL & SURGICAL HISTORY:  CVA (cerebral vascular accident)    HTN (hypertension)    DM (diabetes mellitus)    Arrhythmia    History of atrial fibrillation    Right hemiparesis    Aphasia due to acute stroke    GI bleed    Iron deficiency anemia    Upper GI bleed    Osteomyelitis    S/P CABG (coronary artery bypass graft)    S/P brain surgery    History of aortic valve repair          Medication list         MEDICATIONS  (STANDING):  aspirin enteric coated 81 milliGRAM(s) Oral daily  atorvastatin 40 milliGRAM(s) Oral at bedtime  cadexomer iodine 0.9% Gel 1 Application(s) Topical daily  cefTRIAXone   IVPB 2000 milliGRAM(s) IV Intermittent every 24 hours  clopidogrel Tablet 75 milliGRAM(s) Oral daily  dextrose 5%. 1000 milliLiter(s) (50 mL/Hr) IV Continuous <Continuous>  dextrose 5%. 1000 milliLiter(s) (100 mL/Hr) IV Continuous <Continuous>  dextrose 50% Injectable 12.5 Gram(s) IV Push once  dextrose 50% Injectable 25 Gram(s) IV Push once  dextrose 50% Injectable 25 Gram(s) IV Push once  dextrose 50% Injectable 25 Gram(s) IV Push once  enoxaparin Injectable 60 milliGRAM(s) SubCutaneous every 12 hours  furosemide    Tablet 40 milliGRAM(s) Oral daily  glucagon  Injectable 1 milliGRAM(s) IntraMuscular once  insulin glargine Injectable (LANTUS) 20 Unit(s) SubCutaneous at bedtime  insulin lispro (ADMELOG) corrective regimen sliding scale   SubCutaneous three times a day before meals  insulin lispro (ADMELOG) corrective regimen sliding scale   SubCutaneous at bedtime  insulin lispro Injectable (ADMELOG) 5 Unit(s) SubCutaneous three times a day before meals  levETIRAcetam 750 milliGRAM(s) Oral two times a day  metoprolol succinate ER 25 milliGRAM(s) Oral daily  pantoprazole    Tablet 40 milliGRAM(s) Oral before breakfast  povidone iodine 10% Solution 1 Application(s) Topical daily  spironolactone 25 milliGRAM(s) Oral daily  urea Oral Powder 15 Gram(s) Oral daily    MEDICATIONS  (PRN):  dextrose Oral Gel 15 Gram(s) Oral once PRN Blood Glucose LESS THAN 70 milliGRAM(s)/deciliter  morphine  - Injectable 2 milliGRAM(s) IV Push every 4 hours PRN Severe Pain (7 - 10)  oxycodone    5 mG/acetaminophen 325 mG 1 Tablet(s) Oral every 6 hours PRN Moderate Pain (4 - 6)         Vitals log        ICU Vital Signs Last 24 Hrs  T(C): 36.6 (13 Jun 2023 04:59), Max: 36.8 (12 Jun 2023 20:09)  T(F): 97.8 (13 Jun 2023 04:59), Max: 98.3 (12 Jun 2023 20:09)  HR: 72 (13 Jun 2023 04:59) (68 - 86)  BP: 109/64 (13 Jun 2023 04:59) (102/68 - 133/67)  BP(mean): --  ABP: --  ABP(mean): --  RR: 18 (13 Jun 2023 04:59) (18 - 18)  SpO2: 95% (13 Jun 2023 04:59) (95% - 97%)    O2 Parameters below as of 13 Jun 2023 04:59  Patient On (Oxygen Delivery Method): room air                 Input and Output:  I&O's Detail      Lab Data                        10.9   6.95  )-----------( 329      ( 12 Jun 2023 05:55 )             33.8     06-12    138  |  100  |  43<H>  ----------------------------<  79  4.5   |  32<H>  |  0.95    Ca    9.7      12 Jun 2023 05:55    TPro  7.9  /  Alb  3.1<L>  /  TBili  0.4  /  DBili  x   /  AST  49<H>  /  ALT  42  /  AlkPhos  665<H>  06-12            Review of Systems	      Objective     Physical Examination    heart s1s2  lung dc BS  head nc      Pertinent Lab findings & Imaging      Bryan:  NO   Adequate UO     I&O's Detail           Discussed with:     Cultures:	        Radiology

## 2023-06-13 NOTE — PROGRESS NOTE ADULT - SUBJECTIVE AND OBJECTIVE BOX
Patient is a 67y Male whom presented to the hospital with hyponatremia     PAST MEDICAL & SURGICAL HISTORY:  CVA (cerebral vascular accident)      HTN (hypertension)      DM (diabetes mellitus)      History of atrial fibrillation      Right hemiparesis      Aphasia due to acute stroke      Upper GI bleed      Osteomyelitis      S/P CABG (coronary artery bypass graft)      S/P brain surgery      History of aortic valve repair          MEDICATIONS  (STANDING):  aspirin enteric coated 81 milliGRAM(s) Oral daily  atorvastatin 40 milliGRAM(s) Oral at bedtime  clopidogrel Tablet 75 milliGRAM(s) Oral daily  dextrose 5%. 1000 milliLiter(s) (50 mL/Hr) IV Continuous <Continuous>  dextrose 5%. 1000 milliLiter(s) (100 mL/Hr) IV Continuous <Continuous>  dextrose 50% Injectable 12.5 Gram(s) IV Push once  dextrose 50% Injectable 25 Gram(s) IV Push once  dextrose 50% Injectable 25 Gram(s) IV Push once  dextrose 50% Injectable 25 Gram(s) IV Push once  enoxaparin Injectable 60 milliGRAM(s) SubCutaneous every 12 hours  furosemide    Tablet 40 milliGRAM(s) Oral daily  glucagon  Injectable 1 milliGRAM(s) IntraMuscular once  insulin glargine Injectable (LANTUS) 30 Unit(s) SubCutaneous at bedtime  insulin lispro (ADMELOG) corrective regimen sliding scale   SubCutaneous three times a day before meals  insulin lispro (ADMELOG) corrective regimen sliding scale   SubCutaneous at bedtime  levETIRAcetam 750 milliGRAM(s) Oral two times a day  metoprolol succinate ER 25 milliGRAM(s) Oral daily  pantoprazole    Tablet 40 milliGRAM(s) Oral before breakfast  spironolactone 25 milliGRAM(s) Oral daily      Allergies    No Known Allergies    Intolerances        SOCIAL HISTORY:  Denies ETOh,Smoking,     FAMILY HISTORY:  FH: coronary artery disease (Sibling)    FH: type 2 diabetes (Sibling)        REVIEW OF SYSTEMS:    CONSTITUTIONAL: No weakness, fevers or chills  RESPIRATORY: No cough, wheezing, hemoptysis; No shortness of breath  CARDIOVASCULAR: No chest pain or palpitations  GASTROINTESTINAL: No abdominal or epigastric pain. No nausea, vomiting,                                                                             11.0   6.39  )-----------( 307      ( 13 Jun 2023 06:00 )             33.4       CBC Full  -  ( 13 Jun 2023 06:00 )  WBC Count : 6.39 K/uL  RBC Count : 3.76 M/uL  Hemoglobin : 11.0 g/dL  Hematocrit : 33.4 %  Platelet Count - Automated : 307 K/uL  Mean Cell Volume : 88.8 fl  Mean Cell Hemoglobin : 29.3 pg  Mean Cell Hemoglobin Concentration : 32.9 gm/dL  Auto Neutrophil # : x  Auto Lymphocyte # : x  Auto Monocyte # : x  Auto Eosinophil # : x  Auto Basophil # : x  Auto Neutrophil % : x  Auto Lymphocyte % : x  Auto Monocyte % : x  Auto Eosinophil % : x  Auto Basophil % : x      06-13    136  |  102  |  29<H>  ----------------------------<  56<L>  3.9   |  28  |  0.91    Ca    9.4      13 Jun 2023 06:00    TPro  7.7  /  Alb  3.1<L>  /  TBili  0.4  /  DBili  x   /  AST  49<H>  /  ALT  40  /  AlkPhos  661<H>  06-13      CAPILLARY BLOOD GLUCOSE      POCT Blood Glucose.: 80 mg/dL (13 Jun 2023 07:30)  POCT Blood Glucose.: 304 mg/dL (12 Jun 2023 21:04)  POCT Blood Glucose.: 276 mg/dL (12 Jun 2023 19:34)  POCT Blood Glucose.: 86 mg/dL (12 Jun 2023 17:01)  POCT Blood Glucose.: 68 mg/dL (12 Jun 2023 16:44)  POCT Blood Glucose.: 66 mg/dL (12 Jun 2023 16:42)  POCT Blood Glucose.: 145 mg/dL (12 Jun 2023 11:09)      Vital Signs Last 24 Hrs  T(C): 36.6 (13 Jun 2023 04:59), Max: 36.8 (12 Jun 2023 20:09)  T(F): 97.8 (13 Jun 2023 04:59), Max: 98.3 (12 Jun 2023 20:09)  HR: 72 (13 Jun 2023 04:59) (68 - 86)  BP: 109/64 (13 Jun 2023 04:59) (102/68 - 124/72)  BP(mean): --  RR: 18 (13 Jun 2023 04:59) (18 - 18)  SpO2: 95% (13 Jun 2023 04:59) (95% - 97%)    Parameters below as of 13 Jun 2023 04:59  Patient On (Oxygen Delivery Method): room air            PT/INR - ( 13 Jun 2023 06:00 )   PT: 27.8 sec;   INR: 2.36 ratio              PHYSICAL EXAM:    Constitutional: NAD  HEENT: conjunctive   clear   Neck:  No JVD  Respiratory: CTAB  Cardiovascular: S1 and S2  Gastrointestinal: BS+, soft, NT/ND  Extremities: No peripheral edema  Neurological:  no focal deficits

## 2023-06-13 NOTE — PROGRESS NOTE ADULT - SUBJECTIVE AND OBJECTIVE BOX
[facility]  Shane Murray PA-C  [address] [place] [address]  [phone]      INTERVAL HPI/OVERNIGHT EVENTS:  Pt s/e  No new GI events    MEDICATIONS  (STANDING):  aspirin enteric coated 81 milliGRAM(s) Oral daily  atorvastatin 40 milliGRAM(s) Oral at bedtime  cadexomer iodine 0.9% Gel 1 Application(s) Topical daily  cefTRIAXone   IVPB 2000 milliGRAM(s) IV Intermittent every 24 hours  clopidogrel Tablet 75 milliGRAM(s) Oral daily  dextrose 5%. 1000 milliLiter(s) (100 mL/Hr) IV Continuous <Continuous>  dextrose 5%. 1000 milliLiter(s) (50 mL/Hr) IV Continuous <Continuous>  dextrose 50% Injectable 25 Gram(s) IV Push once  dextrose 50% Injectable 12.5 Gram(s) IV Push once  dextrose 50% Injectable 25 Gram(s) IV Push once  dextrose 50% Injectable 25 Gram(s) IV Push once  enoxaparin Injectable 60 milliGRAM(s) SubCutaneous every 12 hours  furosemide    Tablet 40 milliGRAM(s) Oral daily  glucagon  Injectable 1 milliGRAM(s) IntraMuscular once  insulin glargine Injectable (LANTUS) 20 Unit(s) SubCutaneous at bedtime  insulin lispro (ADMELOG) corrective regimen sliding scale   SubCutaneous at bedtime  insulin lispro (ADMELOG) corrective regimen sliding scale   SubCutaneous three times a day before meals  insulin lispro Injectable (ADMELOG) 5 Unit(s) SubCutaneous three times a day before meals  levETIRAcetam 750 milliGRAM(s) Oral two times a day  metoprolol succinate ER 25 milliGRAM(s) Oral daily  pantoprazole    Tablet 40 milliGRAM(s) Oral before breakfast  povidone iodine 10% Solution 1 Application(s) Topical daily  spironolactone 25 milliGRAM(s) Oral daily  urea Oral Powder 15 Gram(s) Oral daily    MEDICATIONS  (PRN):  dextrose Oral Gel 15 Gram(s) Oral once PRN Blood Glucose LESS THAN 70 milliGRAM(s)/deciliter  morphine  - Injectable 2 milliGRAM(s) IV Push every 4 hours PRN Severe Pain (7 - 10)  oxycodone    5 mG/acetaminophen 325 mG 1 Tablet(s) Oral every 6 hours PRN Moderate Pain (4 - 6)      Allergies    No Known Allergies    PHYSICAL EXAM:   Vital Signs:  Vital Signs Last 24 Hrs  T(C): 36.6 (13 Jun 2023 04:59), Max: 36.8 (12 Jun 2023 20:09)  T(F): 97.8 (13 Jun 2023 04:59), Max: 98.3 (12 Jun 2023 20:09)  HR: 72 (13 Jun 2023 04:59) (68 - 86)  BP: 109/64 (13 Jun 2023 04:59) (102/68 - 124/72)  BP(mean): --  RR: 18 (13 Jun 2023 04:59) (18 - 18)  SpO2: 95% (13 Jun 2023 04:59) (95% - 97%)    Parameters below as of 13 Jun 2023 04:59  Patient On (Oxygen Delivery Method): room air    GENERAL:  Appears stated age  HEENT:  NC/AT  CHEST:  Full & symmetric excursion  HEART:  Regular rhythm  ABDOMEN:  Soft, non-tender, non-distended  EXTEREMITIES:  no cyanosis  SKIN:  No rash  NEURO:  Alert      LABS:                        11.0   6.39  )-----------( 307      ( 13 Jun 2023 06:00 )             33.4     06-13    136  |  102  |  29<H>  ----------------------------<  56<L>  3.9   |  28  |  0.91    Ca    9.4      13 Jun 2023 06:00    TPro  7.7  /  Alb  3.1<L>  /  TBili  0.4  /  DBili  x   /  AST  49<H>  /  ALT  40  /  AlkPhos  661<H>  06-13    PT/INR - ( 13 Jun 2023 06:00 )   PT: 27.8 sec;   INR: 2.36 ratio

## 2023-06-13 NOTE — PROGRESS NOTE ADULT - SUBJECTIVE AND OBJECTIVE BOX
Patient is a 67y old  Male who presents with a chief complaint of foot infection (13 Jun 2023 11:27)    Date of servie : 06-13-23 @ 13:44  INTERVAL HPI/OVERNIGHT EVENTS:  T(C): 36.6 (06-13-23 @ 12:11), Max: 36.8 (06-12-23 @ 20:09)  HR: 68 (06-13-23 @ 12:11) (68 - 86)  BP: 112/70 (06-13-23 @ 12:11) (109/64 - 124/72)  RR: 18 (06-13-23 @ 12:11) (18 - 18)  SpO2: 96% (06-13-23 @ 12:11) (95% - 96%)  Wt(kg): --  I&O's Summary      LABS:                        11.0   6.39  )-----------( 307      ( 13 Jun 2023 06:00 )             33.4     06-13    136  |  102  |  29<H>  ----------------------------<  56<L>  3.9   |  28  |  0.91    Ca    9.4      13 Jun 2023 06:00    TPro  7.7  /  Alb  3.1<L>  /  TBili  0.4  /  DBili  x   /  AST  49<H>  /  ALT  40  /  AlkPhos  661<H>  06-13    PT/INR - ( 13 Jun 2023 06:00 )   PT: 27.8 sec;   INR: 2.36 ratio             CAPILLARY BLOOD GLUCOSE      POCT Blood Glucose.: 212 mg/dL (13 Jun 2023 11:48)  POCT Blood Glucose.: 80 mg/dL (13 Jun 2023 07:30)  POCT Blood Glucose.: 304 mg/dL (12 Jun 2023 21:04)  POCT Blood Glucose.: 276 mg/dL (12 Jun 2023 19:34)  POCT Blood Glucose.: 86 mg/dL (12 Jun 2023 17:01)  POCT Blood Glucose.: 68 mg/dL (12 Jun 2023 16:44)  POCT Blood Glucose.: 66 mg/dL (12 Jun 2023 16:42)            MEDICATIONS  (STANDING):  aspirin enteric coated 81 milliGRAM(s) Oral daily  atorvastatin 40 milliGRAM(s) Oral at bedtime  cadexomer iodine 0.9% Gel 1 Application(s) Topical daily  cefTRIAXone   IVPB 2000 milliGRAM(s) IV Intermittent every 24 hours  clopidogrel Tablet 75 milliGRAM(s) Oral daily  dextrose 5%. 1000 milliLiter(s) (50 mL/Hr) IV Continuous <Continuous>  dextrose 5%. 1000 milliLiter(s) (100 mL/Hr) IV Continuous <Continuous>  dextrose 50% Injectable 25 Gram(s) IV Push once  dextrose 50% Injectable 12.5 Gram(s) IV Push once  dextrose 50% Injectable 25 Gram(s) IV Push once  dextrose 50% Injectable 25 Gram(s) IV Push once  enoxaparin Injectable 60 milliGRAM(s) SubCutaneous every 12 hours  furosemide    Tablet 40 milliGRAM(s) Oral daily  glucagon  Injectable 1 milliGRAM(s) IntraMuscular once  insulin glargine Injectable (LANTUS) 20 Unit(s) SubCutaneous at bedtime  insulin lispro (ADMELOG) corrective regimen sliding scale   SubCutaneous at bedtime  insulin lispro (ADMELOG) corrective regimen sliding scale   SubCutaneous three times a day before meals  insulin lispro Injectable (ADMELOG) 5 Unit(s) SubCutaneous three times a day before meals  levETIRAcetam 750 milliGRAM(s) Oral two times a day  metoprolol succinate ER 25 milliGRAM(s) Oral daily  pantoprazole    Tablet 40 milliGRAM(s) Oral before breakfast  povidone iodine 10% Solution 1 Application(s) Topical daily  spironolactone 25 milliGRAM(s) Oral daily  urea Oral Powder 15 Gram(s) Oral daily    MEDICATIONS  (PRN):  dextrose Oral Gel 15 Gram(s) Oral once PRN Blood Glucose LESS THAN 70 milliGRAM(s)/deciliter  morphine  - Injectable 2 milliGRAM(s) IV Push every 4 hours PRN Severe Pain (7 - 10)  oxycodone    5 mG/acetaminophen 325 mG 1 Tablet(s) Oral every 6 hours PRN Moderate Pain (4 - 6)          PHYSICAL EXAM:  GENERAL: frail  CHEST/LUNG: Clear to percussion bilaterally; No rales, rhonchi, wheezing, or rubs  HEART: Regular rate and rhythm; No murmurs, rubs, or gallops  ABDOMEN: Soft, Nontender, Nondistended; Bowel sounds present  EXTREMITIES:  dressing +    Care Discussed with Consultants/Other Providers [x ] YES  [ ] NO

## 2023-06-13 NOTE — PROGRESS NOTE ADULT - SUBJECTIVE AND OBJECTIVE BOX
[facility] DIV[facility] of INFECTIOUS DISEASE  Guru Michel MD PhD, Lisa James MD, Becca Acosta MD, Karen Grimes MD, Corky Perez MD  and providing coverage with Carlos Marx MD  Providing Infectious Disease Consultations at [facility], [facility], [place], [facility]    Office# [phone] to schedule follow up appointments  Answering Service for urgent calls or New Consults [phone]  Cell# to text for urgent issues Guru Michel [phone]     infectious diseases progress note:    ARNIE DELGADILLO is a 67y y. o. Male patient    Overnight and events of the last 24hrs reviewed    Allergies    No Known Allergies    Intolerances        ANTIBIOTICS/RELEVANT:  antimicrobials  cefTRIAXone   IVPB 2000 milliGRAM(s) IV Intermittent every 24 hours    immunologic:    OTHER:  aspirin enteric coated 81 milliGRAM(s) Oral daily  atorvastatin 40 milliGRAM(s) Oral at bedtime  cadexomer iodine 0.9% Gel 1 Application(s) Topical daily  clopidogrel Tablet 75 milliGRAM(s) Oral daily  dextrose 5%. 1000 milliLiter(s) IV Continuous <Continuous>  dextrose 5%. 1000 milliLiter(s) IV Continuous <Continuous>  dextrose 50% Injectable 25 Gram(s) IV Push once  dextrose 50% Injectable 25 Gram(s) IV Push once  dextrose 50% Injectable 25 Gram(s) IV Push once  dextrose 50% Injectable 12.5 Gram(s) IV Push once  dextrose Oral Gel 15 Gram(s) Oral once PRN  enoxaparin Injectable 60 milliGRAM(s) SubCutaneous every 12 hours  furosemide    Tablet 40 milliGRAM(s) Oral daily  glucagon  Injectable 1 milliGRAM(s) IntraMuscular once  insulin glargine Injectable (LANTUS) 20 Unit(s) SubCutaneous at bedtime  insulin lispro (ADMELOG) corrective regimen sliding scale   SubCutaneous at bedtime  insulin lispro (ADMELOG) corrective regimen sliding scale   SubCutaneous three times a day before meals  insulin lispro Injectable (ADMELOG) 5 Unit(s) SubCutaneous three times a day before meals  levETIRAcetam 750 milliGRAM(s) Oral two times a day  metoprolol succinate ER 25 milliGRAM(s) Oral daily  morphine  - Injectable 2 milliGRAM(s) IV Push every 4 hours PRN  oxycodone    5 mG/acetaminophen 325 mG 1 Tablet(s) Oral every 6 hours PRN  pantoprazole    Tablet 40 milliGRAM(s) Oral before breakfast  povidone iodine 10% Solution 1 Application(s) Topical daily  spironolactone 25 milliGRAM(s) Oral daily  urea Oral Powder 15 Gram(s) Oral daily      Objective:  Vital Signs Last 24 Hrs  T(C): 36.6 (13 Jun 2023 04:59), Max: 36.8 (12 Jun 2023 20:09)  T(F): 97.8 (13 Jun 2023 04:59), Max: 98.3 (12 Jun 2023 20:09)  HR: 72 (13 Jun 2023 04:59) (68 - 86)  BP: 109/64 (13 Jun 2023 04:59) (102/68 - 124/72)  BP(mean): --  RR: 18 (13 Jun 2023 04:59) (18 - 18)  SpO2: 95% (13 Jun 2023 04:59) (95% - 97%)    Parameters below as of 13 Jun 2023 04:59  Patient On (Oxygen Delivery Method): room air        T(C): 36.6 (06-13-23 @ 04:59), Max: 36.8 (06-12-23 @ 20:09)  T(C): 36.6 (06-13-23 @ 04:59), Max: 36.8 (06-12-23 @ 20:09)  T(C): 36.6 (06-13-23 @ 04:59), Max: 36.9 (06-09-23 @ 19:43)    PHYSICAL EXAM:  HEENT: NC atraumatic  Neck: supple  Respiratory: no accessory muscle use, breathing comfortably  Cardiovascular: distant  Gastrointestinal: normal appearing, nondistended  Extremities: no clubbing, no cyanosis,        LABS:                          11.0   6.39  )-----------( 307      ( 13 Jun 2023 06:00 )             33.4       WBC  6.39 06-13 @ 06:00  6.95 06-12 @ 05:55  6.92 06-11 @ 07:23  7.46 06-10 @ 07:15  6.94 06-09 @ 07:05  6.32 06-07 @ 06:24      06-13    136  |  102  |  29<H>  ----------------------------<  56<L>  3.9   |  28  |  0.91    Ca    9.4      13 Jun 2023 06:00    TPro  7.7  /  Alb  3.1<L>  /  TBili  0.4  /  DBili  x   /  AST  49<H>  /  ALT  40  /  AlkPhos  661<H>  06-13      Creatinine, Serum: 0.91 mg/dL (06-13-23 @ 06:00)  Creatinine, Serum: 0.95 mg/dL (06-12-23 @ 05:55)  Creatinine, Serum: 0.92 mg/dL (06-11-23 @ 07:23)  Creatinine, Serum: 1.10 mg/dL (06-10-23 @ 07:15)  Creatinine, Serum: 0.99 mg/dL (06-09-23 @ 07:05)  Creatinine, Serum: 0.83 mg/dL (06-07-23 @ 06:24)      PT/INR - ( 13 Jun 2023 06:00 )   PT: 27.8 sec;   INR: 2.36 ratio                   INFLAMMATORY MARKERS      MICROBIOLOGY:              RADIOLOGY & ADDITIONAL STUDIES:

## 2023-06-14 NOTE — PROGRESS NOTE ADULT - ASSESSMENT
67-year-old male with an extensive history including prior hemorrhagic stroke status post left craniotomy, coronary artery bypass grafting, diabetes and peripheral vascular disease who was brought into the emergency room by his son to be admitted for surgery on the left fourth toe w amputation.      Gangrene of toe of left foot 06-Jun-2023 22:29:09  Ebenezer Hernández.  ·  PROCEDURES:  Amputation, toe, at MTP joint 06-Jun-2023 22:28:42  Ebenezer Hernández.    Recommendations:  Will follow for path and micro post op with recs to follow regarding abx  No prior hx of MRSA  C/w ceftriaxone 2gm daily  path still pending and micro all negative. If no residual OM on path then can dc off abx but if residual noted the will recommend  Ceftriaxone 2 grams IV daily x 6 weeks with last day 7/17  weekly labs CBC, CMP, ESR    Thank you for consulting us and involving us in the management of this most interesting and challenging case.  We will follow along in the care of this patient. Please call us at [phone] or text me directly on my cell# at [phone] with any concerns.    
67-year-old male with an extensive history including prior hemorrhagic stroke status post left craniotomy, coronary artery bypass grafting, diabetes and peripheral vascular disease who was brought into the emergency room by his son to be admitted for surgery on the left fourth toe and possible amputation    POD 3  Toe Amputation  on ABX for OM  ENDO eval noted - pt with hyperglycemia -   vs noted  labs reviewed  pain assessment     cardio eval noted  cvs rx regimen and BP control  pain assessment  wound care  skin hygiene  assist with needs  monitor VS and Sat  spoke with son  old records reviewed  cxr noted - left eff - atelectasis - compared to prior imaging 
67-year-old male with an extensive history including prior hemorrhagic stroke status post left craniotomy, coronary artery bypass grafting, diabetes and peripheral vascular disease who was brought into the emergency room by his son to be admitted for surgery on the left fourth toe and possible amputation    Post op care  Toe Amputation  ID follow up noted  on LASIX  labs reviewed  pain assessment     cardio eval noted  cvs rx regimen and BP control  pain assessment  wound care  skin hygiene  assist with needs  monitor VS and Sat  spoke with son  old records reviewed  cxr noted - left eff - atelectasis - compared to prior imaging 
67-year-old male with an extensive history including prior hemorrhagic stroke status post left craniotomy, coronary artery bypass grafting, diabetes and peripheral vascular disease who was brought into the emergency room by his son to be admitted for surgery on the left fourth toe and possible amputation    Post op care  Toe Amputation  on ABX for OM  on LASIX  labs reviewed  pain assessment  Hyperglycemia -      cardio eval noted  cvs rx regimen and BP control  pain assessment  wound care  skin hygiene  assist with needs  monitor VS and Sat  spoke with son  old records reviewed  cxr noted - left eff - atelectasis - compared to prior imaging 
67-year-old male with an extensive history including prior hemorrhagic stroke status post left craniotomy, coronary artery bypass grafting, diabetes and peripheral vascular disease who was brought into the emergency room by his son to be admitted for surgery on the left fourth toe and possible amputation tomorrow morning.  Per son patient without fevers or chills and has had bleeding intermittently from the toe and was told by podiatrist to come in for surgery tomorrow    1 Diabetic foot ulcer  - sp OR  - podiatry and ID fu   - ABX as per ID  - will monitor    2 HX of CVA  - on asa, plavix   - cw keppra for seizure prophylaxis  - neuro fu    4 DM  - monitor FS  - Basal bolus  - diabetic diet  - uncontrolled     5  Afib, AVR  - cw lovenox BID   - check INR and dose coumadin daily     6  CAD, CABG  - cw asa, statin  - cards fu   
67-year-old male with an extensive history including prior hemorrhagic stroke status post left craniotomy, coronary artery bypass grafting, diabetes and peripheral vascular disease who was brought into the emergency room by his son to be admitted for surgery on the left fourth toe and possible amputation tomorrow morning.  Per son patient without fevers or chills and has had bleeding intermittently from the toe and was told by podiatrist to come in for surgery tomorrow    1 Diabetic foot ulcer  - sp OR  - podiatry and ID fu   - ABX as per ID  - will monitor  - fu OR path    2 HX of CVA  - on asa, plavix   - cw keppra for seizure prophylaxis  - neuro fu    4 DM  - monitor FS  - Basal bolus  - diabetic diet  - uncontrolled     5  Afib, AVR  - cw lovenox BID   - check INR and dose coumadin daily     6  CAD, CABG  - cw asa, statin  - cards fu     dc planning awaiting path results and ABX decision     case dw ID and family 
67-year-old male with an extensive history including prior hemorrhagic stroke status post left craniotomy, coronary artery bypass grafting, diabetes and peripheral vascular disease who was brought into the emergency room by his son to be admitted for surgery on the left fourth toe and possible amputation tomorrow morning.  Per son patient without fevers or chills and has had bleeding intermittently from the toe and was told by podiatrist to come in for surgery tomorrow    1 Diabetic foot ulcer  - sp OR  - podiatry and ID fu   - ABX as per ID  - will monitor  - fu OR path    2 HX of CVA  - on asa, plavix   - cw keppra for seizure prophylaxis  - neuro fu    4 DM  - monitor FS  - Basal bolus  - diabetic diet  - uncontrolled     5  Afib, AVR  - cw lovenox BID   - check INR and dose coumadin daily     6  CAD, CABG  - cw asa, statin  - cards fu     dc planning awaiting path results and ABX decision     case dw ID and family 
elevated lfts    suspect lft elevation 2/2 osteo  cont diet as tolerated  antibiotics per ID  discussed with family at bedside  will follow     I reviewed the overnight course of events on the unit, re-confirming the patient history. I discussed the care with the patient and their family  Differential diagnosis and plan of care discussed with patient after the evaluation  40 minutes spent on total encounter of which more than fifty percent of the encounter was spent counseling and/or coordinating care by the attending physician.  Advanced care planning was discussed with patient and family.  Advanced care planning forms were reviewed and discussed.  Risks, benefits and alternatives of gastroenterologic procedures were discussed in detail and all questions were answered.  
  67-year-old male with an extensive history including prior hemorrhagic stroke status post left craniotomy, coronary artery bypass grafting, diabetes and peripheral vascular disease who was brought into the emergency room by his son to be admitted for surgery on the left fourth toe w amputation.      Gangrene of toe of left foot 06-Jun-2023 22:29:09  Ebenezer Hernández.  ·  PROCEDURES:  Amputation, toe, at MTP joint 06-Jun-2023 22:28:42  Ebenezer Hernández.    Recommendations:  Will follow for path and micro post op with recs to follow regarding abx  No prior hx of MRSA  C/w ceftriaxone 2gm daily  path still pending and micro all negative. 6/13-again explained that path is pending to two daughters and that this result will allow us to determine abx recs.    If no residual OM on path then can dc off abx but if residual noted the will recommend  Ceftriaxone 2 grams IV daily x 6 weeks with last day 7/17  weekly labs CBC, CMP, ESR    Thank you for consulting us and involving us in the management of this most interesting and challenging case.  We will follow along in the care of this patient. Please call us at [phone] or text me directly on my cell# at [phone] with any concerns.    
67-year-old male with an extensive history including prior hemorrhagic stroke status post left craniotomy, coronary artery bypass grafting, diabetes and peripheral vascular disease who was brought into the emergency room by his son to be admitted for surgery on the left fourth toe and possible amputation    POD 1  Toe Amputation  vs noted  labs reviewed  pain assessment     cardio eval noted  cvs rx regimen and BP control  pain assessment  wound care  skin hygiene  assist with needs  monitor VS and Sat  spoke with son  old records reviewed  cxr noted - left eff - atelectasis - compared to prior imaging 
67-year-old male with an extensive history including prior hemorrhagic stroke status post left craniotomy, coronary artery bypass grafting, diabetes and peripheral vascular disease who was brought into the emergency room by his son to be admitted for surgery on the left fourth toe and possible amputation    POD 2  Toe Amputation  ENDO eval noted  vs noted  labs reviewed  pain assessment     cardio eval noted  cvs rx regimen and BP control  pain assessment  wound care  skin hygiene  assist with needs  monitor VS and Sat  spoke with son  old records reviewed  cxr noted - left eff - atelectasis - compared to prior imaging 
67-year-old male with an extensive history including prior hemorrhagic stroke status post left craniotomy, coronary artery bypass grafting, diabetes and peripheral vascular disease who was brought into the emergency room by his son to be admitted for surgery on the left fourth toe and possible amputation    Post op care  Toe Amputation  on ABX for OM  on LASIX  labs reviewed  pain assessment     cardio eval noted  cvs rx regimen and BP control  pain assessment  wound care  skin hygiene  assist with needs  monitor VS and Sat  spoke with son  old records reviewed  cxr noted - left eff - atelectasis - compared to prior imaging 
67-year-old male with an extensive history including prior hemorrhagic stroke status post left craniotomy, coronary artery bypass grafting, diabetes and peripheral vascular disease who was brought into the emergency room by his son to be admitted for surgery on the left fourth toe and possible amputation    Post op care  Toe Amputation  on ABX for OM  on LASIX  labs reviewed  pain assessment     cardio eval noted  cvs rx regimen and BP control  pain assessment  wound care  skin hygiene  assist with needs  monitor VS and Sat  spoke with son  old records reviewed  cxr noted - left eff - atelectasis - compared to prior imaging 
67-year-old male with an extensive history including prior hemorrhagic stroke status post left craniotomy, coronary artery bypass grafting, diabetes and peripheral vascular disease who was brought into the emergency room by his son to be admitted for surgery on the left fourth toe and possible amputation tomorrow morning.  Per son patient without fevers or chills and has had bleeding intermittently from the toe and was told by podiatrist to come in for surgery tomorrow    1 Diabetic foot ulcer  - sp OR  - podiatry and ID fu   - ABX as per ID  - will monitor- fu OR cultures     2 HX of CVA  - on asa, plavix   - cw keppra for seizure prophylaxis  - neuro fu    4 DM  - monitor FS  - Basal bolus  - diabetic diet  - uncontrolled     5  Afib, AVR  - cw lovenox BID   - check INR and dose coumadin daily     6  CAD, CABG  - cw asa, statin  - cards fu     
67-year-old male with an extensive history including prior hemorrhagic stroke status post left craniotomy, coronary artery bypass grafting, diabetes and peripheral vascular disease who was brought into the emergency room by his son to be admitted for surgery on the left fourth toe and possible amputation tomorrow morning.  Per son patient without fevers or chills and has had bleeding intermittently from the toe and was told by podiatrist to come in for surgery tomorrow (06 Jun 2023 06:36)      will check ua , urine osmolality , urine sodium , urine uric acid , serum sodium , serum osmolality , serum uric acid , f/u with hyponatremia work up , f/u with bmp , monitor i and o      fluid overload furosemide    Tablet 40 milliGRAM(s) Oral daily  , spironolactone 25 milliGRAM(s) Oral daily      Accuchecks monitoring and insulin sliding scale coverage, no concentrated sweets diet, serial labs and f/up with PMD, monitor HB A 1 C every 3-4 mnth 
elevated lfts    suspect lft elevation 2/2 osteo  cont diet as tolerated  antibiotics per ID  discussed with family at bedside  will follow     I reviewed the overnight course of events on the unit, re-confirming the patient history. I discussed the care with the patient and their family  Differential diagnosis and plan of care discussed with patient after the evaluation  40 minutes spent on total encounter of which more than fifty percent of the encounter was spent counseling and/or coordinating care by the attending physician.  Advanced care planning was discussed with patient and family.  Advanced care planning forms were reviewed and discussed.  Risks, benefits and alternatives of gastroenterologic procedures were discussed in detail and all questions were answered.
elevated lfts    suspect lft elevation 2/2 osteo  cont diet as tolerated  antibiotics per ID  discussed with family at bedside  will follow     I reviewed the overnight course of events on the unit, re-confirming the patient history. I discussed the care with the patient and their family  Differential diagnosis and plan of care discussed with patient after the evaluation  40 minutes spent on total encounter of which more than fifty percent of the encounter was spent counseling and/or coordinating care by the attending physician.  Advanced care planning was discussed with patient and family.  Advanced care planning forms were reviewed and discussed.  Risks, benefits and alternatives of gastroenterologic procedures were discussed in detail and all questions were answered.
67-year-old male with an extensive history including prior hemorrhagic stroke status post left craniotomy, coronary artery bypass grafting, diabetes and peripheral vascular disease who was brought into the emergency room by his son to be admitted for surgery on the left fourth toe and possible amputation tomorrow morning.  Per son patient without fevers or chills and has had bleeding intermittently from the toe and was told by podiatrist to come in for surgery tomorrow    1 Diabetic foot ulcer  - sp OR  - podiatry and ID fu   - ABX as per ID  - will monitor  - fu OR path    2 HX of CVA  - on asa, plavix   - cw keppra for seizure prophylaxis  - neuro fu    4 DM  - monitor FS  - Basal bolus  - diabetic diet  - uncontrolled     5  Afib, AVR  - cw lovenox BID   - check INR and dose coumadin daily     6  CAD, CABG  - cw asa, statin  - cards fu   
67-year-old male with an extensive history including prior hemorrhagic stroke status post left craniotomy, coronary artery bypass grafting, diabetes and peripheral vascular disease who was brought into the emergency room by his son to be admitted for surgery on the left fourth toe and possible amputation tomorrow morning.  Per son patient without fevers or chills and has had bleeding intermittently from the toe and was told by podiatrist to come in for surgery tomorrow    1 Diabetic foot ulcer  - sp OR  - podiatry and ID fu   - ABX as per ID  - will monitor  - fu OR path    2 HX of CVA  - on asa, plavix   - cw keppra for seizure prophylaxis  - neuro fu    4 DM  - monitor FS  - Basal bolus  - diabetic diet  - uncontrolled     5  Afib, AVR  - cw lovenox BID   - check INR and dose coumadin daily     6  CAD, CABG  - cw asa, statin  - cards fu     dc in am if cleared by ID 
67-year-old male with an extensive history including prior hemorrhagic stroke status post left craniotomy, coronary artery bypass grafting, diabetes and peripheral vascular disease who was brought into the emergency room by his son to be admitted for surgery on the left fourth toe and possible amputation tomorrow morning.  Per son patient without fevers or chills and has had bleeding intermittently from the toe and was told by podiatrist to come in for surgery tomorrow (06 Jun 2023 06:36)      hyponatremia improved    check ua , urine osmolality , urine sodium , urine uric acid , serum sodium , serum osmolality , serum uric acid , f/u with hyponatremia work up , f/u with bmp , monitor i and o      fluid overload furosemide    Tablet 40 milliGRAM(s) Oral daily  , spironolactone 25 milliGRAM(s) Oral daily      Accuchecks monitoring and insulin sliding scale coverage, no concentrated sweets diet, serial labs and f/up with PMD, monitor HB A 1 C every 3-4 mnth     BP monitoring,continue current antihypertensive meds, low salt diet,followup with PMD in 1-2 weeks  
67-year-old male with an extensive history including prior hemorrhagic stroke status post left craniotomy, coronary artery bypass grafting, diabetes and peripheral vascular disease who was brought into the emergency room by his son to be admitted for surgery on the left fourth toe w amputation.      Gangrene of toe of left foot 06-Jun-2023 22:29:09  Ebenezer Hernández.  ·  PROCEDURES:  Amputation, toe, at MTP joint 06-Jun-2023 22:28:42  Ebenezer Hernández.    Recommendations:  Will follow for path and micro post op with recs to follow regarding abx  No prior hx of MRSA  C/w ceftriaxone 2gm daily  Further recs to follow pending results of micro and path    I am covering weekend service  Infectious Diseases will continue to follow. Please call with any questions.   Becca Acosta M.D.  [facility] [phone]    
67-year-old male with an extensive history including prior hemorrhagic stroke status post left craniotomy, coronary artery bypass grafting, diabetes and peripheral vascular disease who was brought into the emergency room by his son to be admitted for surgery on the left fourth toe w amputation.    Gangrene of toe of left foot 06-Jun-2023 22:29:09  Ebenezer Hernández.  ·  PROCEDURES:  Amputation, toe, at MTP joint 06-Jun-2023 22:28:42  Ebenezer Hernández.    RECOMMENDATIONS  Will follow for path and micro post op with recs to follow regarding abx, no prior MRSA  will restart Ceftriaxone 2 grams IV daily for now     pending results of micro and path    Thank you for consulting us and involving us in the management of this most interesting and challenging case.  We will follow along in the care of this patient. Please call us at [phone] or text me directly on my cell# at [phone] with any concerns.  
67M with known DM, HTN, HLD, CAD s/p CABG, AF on coumadin with current INR this am of 1.1, CVA with Right hemiparesis, seizure disorder and LEFT fourth toe bleeding for podiatry surgery today.    Suggest:    1. LEFT fourth toe MTP amputation  - POD1 and feels ok.  No pain.      2. CAD/CABG/HLD  - c/w Lipitor, baby aspirin  - no cp sob.  Dtr at bedside translated and reports no cardiac sx       3. HTN  - c/w home meds    4. DM   - RISS and finger checks    5. AF  - Lovenox full dose after foot surgery and coumadin for stroke prevention,    5. Will follow    
elevated lfts    suspect lft elevation 2/2 osteo  cont diet as tolerated  antibiotics per ID  discussed with family at bedside  will follow     I reviewed the overnight course of events on the unit, re-confirming the patient history. I discussed the care with the patient and their family  Differential diagnosis and plan of care discussed with patient after the evaluation  40 minutes spent on total encounter of which more than fifty percent of the encounter was spent counseling and/or coordinating care by the attending physician.  Advanced care planning was discussed with patient and family.  Advanced care planning forms were reviewed and discussed.  Risks, benefits and alternatives of gastroenterologic procedures were discussed in detail and all questions were answered.
elevated lfts    suspect lft elevation 2/2 osteo  cont diet as tolerated  antibiotics per ID  discussed with son at bedside  will follow     I reviewed the overnight course of events on the unit, re-confirming the patient history. I discussed the care with the patient and their family  Differential diagnosis and plan of care discussed with patient after the evaluation  40 minutes spent on total encounter of which more than fifty percent of the encounter was spent counseling and/or coordinating care by the attending physician.  Advanced care planning was discussed with patient and family.  Advanced care planning forms were reviewed and discussed.  Risks, benefits and alternatives of gastroenterologic procedures were discussed in detail and all questions were answered.  
  67-year-old male with an extensive history including prior hemorrhagic stroke status post left craniotomy, coronary artery bypass grafting, diabetes and peripheral vascular disease who was brought into the emergency room by his son to be admitted for surgery on the left fourth toe w amputation.      Gangrene of toe of left foot 06-Jun-2023 22:29:09  Ebenezer Hernández.  ·  PROCEDURES:  Amputation, toe, at MTP joint 06-Jun-2023 22:28:42  Ebenezer Hernández.    Recommendations:  Osteomyelitis  No prior hx of MRSA  sp ceftriaxone 2gm daily  path Sections taken from the skin and bone margins are free of inflammation and micro all negative.    abx stopped and with no residual OM on path can dc off abx     From an ID standpoint no further requirement for inpatient status for the management of ID issues. Fine with discharge from ID standpoint when other medical issues no longer require inpatient care and social issues allow for a safe discharge plan. To schedule an outpatient ID follow up appointment please call our office at [phone]    Thank you for consulting us and involving us in the management of this most interesting and challenging case.  Please call us at [phone] or text me directly on my cell#[phone] with any concerns or further questions.    
67-year-old male with an extensive history including prior hemorrhagic stroke status post left craniotomy, coronary artery bypass grafting, diabetes and peripheral vascular disease who was brought into the emergency room by his son to be admitted for surgery on the left fourth toe and possible amputation    Post op care  Toe Amputation  on ABX for OM  labs reviewed  pain assessment     cardio eval noted  cvs rx regimen and BP control  pain assessment  wound care  skin hygiene  assist with needs  monitor VS and Sat  spoke with son  old records reviewed  cxr noted - left eff - atelectasis - compared to prior imaging 
67-year-old male with an extensive history including prior hemorrhagic stroke status post left craniotomy, coronary artery bypass grafting, diabetes and peripheral vascular disease who was brought into the emergency room by his son to be admitted for surgery on the left fourth toe and possible amputation tomorrow morning.  Per son patient without fevers or chills and has had bleeding intermittently from the toe and was told by podiatrist to come in for surgery tomorrow    1 Diabetic foot ulcer  - sp OR  - podiatry and ID fu   - ABX as per ID  - will monitor  - fu OR path    2 HX of CVA  - on asa, plavix   - cw keppra for seizure prophylaxis  - neuro fu    4 DM  - monitor FS  - Basal bolus  - diabetic diet  - uncontrolled     5  Afib, AVR  - cw lovenox BID   - check INR and dose coumadin daily     6  CAD, CABG  - cw asa, statin  - cards fu     
67-year-old male with an extensive history including prior hemorrhagic stroke status post left craniotomy, coronary artery bypass grafting, diabetes and peripheral vascular disease who was brought into the emergency room by his son to be admitted for surgery on the left fourth toe and possible amputation tomorrow morning.  Per son patient without fevers or chills and has had bleeding intermittently from the toe and was told by podiatrist to come in for surgery tomorrow (06 Jun 2023 06:36)      hyponatremia improved    check ua , urine osmolality , urine sodium , urine uric acid , serum sodium , serum osmolality , serum uric acid , f/u with hyponatremia work up , f/u with bmp , monitor i and o      fluid overload furosemide    Tablet 40 milliGRAM(s) Oral daily  , spironolactone 25 milliGRAM(s) Oral daily      Accuchecks monitoring and insulin sliding scale coverage, no concentrated sweets diet, serial labs and f/up with PMD, monitor HB A 1 C every 3-4 mnth     BP monitoring,continue current antihypertensive meds, low salt diet,followup with PMD in 1-2 weeks  
67-year-old male with an extensive history including prior hemorrhagic stroke status post left craniotomy, coronary artery bypass grafting, diabetes and peripheral vascular disease who was brought into the emergency room by his son to be admitted for surgery on the left fourth toe and possible amputation tomorrow morning.  Per son patient without fevers or chills and has had bleeding intermittently from the toe and was told by podiatrist to come in for surgery tomorrow (06 Jun 2023 06:36)      will check ua , urine osmolality , urine sodium , urine uric acid , serum sodium , serum osmolality , serum uric acid , f/u with hyponatremia work up , f/u with bmp , monitor i and o      fluid overload furosemide    Tablet 40 milliGRAM(s) Oral daily  , spironolactone 25 milliGRAM(s) Oral daily      Accuchecks monitoring and insulin sliding scale coverage, no concentrated sweets diet, serial labs and f/up with PMD, monitor HB A 1 C every 3-4 mnth 
67-year-old male with an extensive history including prior hemorrhagic stroke status post left craniotomy, coronary artery bypass grafting, diabetes and peripheral vascular disease who was brought into the emergency room by his son to be admitted for surgery on the left fourth toe w amputation.      RECOMMENDATIONS  Will follow for path and micro post op with recs to follow regarding abx, no prior MRSA  will restart Ceftriaxone 2 grams IV daily for now pending results of micro and path    Thank you for consulting us and involving us in the management of this most interesting and challenging case.  We will follow along in the care of this patient. Please call us at [phone] or text me directly on my cell# at [phone] with any concerns.  
67-year-old male with an extensive history including prior hemorrhagic stroke status post left craniotomy, coronary artery bypass grafting, diabetes and peripheral vascular disease who was brought into the emergency room by his son to be admitted for surgery on the left fourth toe and possible amputation tomorrow morning.  Per son patient without fevers or chills and has had bleeding intermittently from the toe and was told by podiatrist to come in for surgery tomorrow (06 Jun 2023 06:36)      will check ua , urine osmolality , urine sodium , urine uric acid , serum sodium , serum osmolality , serum uric acid , f/u with hyponatremia work up , f/u with bmp , monitor i and o      fluid overload furosemide    Tablet 40 milliGRAM(s) Oral daily  , spironolactone 25 milliGRAM(s) Oral daily      Accuchecks monitoring and insulin sliding scale coverage, no concentrated sweets diet, serial labs and f/up with PMD, monitor HB A 1 C every 3-4 mnth 
67-year-old male with an extensive history including prior hemorrhagic stroke status post left craniotomy, coronary artery bypass grafting, diabetes and peripheral vascular disease who was brought into the emergency room by his son to be admitted for surgery on the left fourth toe and possible amputation tomorrow morning.  Per son patient without fevers or chills and has had bleeding intermittently from the toe and was told by podiatrist to come in for surgery tomorrow (06 Jun 2023 06:36)      will check ua , urine osmolality , urine sodium , urine uric acid , serum sodium , serum osmolality , serum uric acid , f/u with hyponatremia work up , f/u with bmp , monitor i and o      fluid overload furosemide    Tablet 40 milliGRAM(s) Oral daily  , spironolactone 25 milliGRAM(s) Oral daily      Accuchecks monitoring and insulin sliding scale coverage, no concentrated sweets diet, serial labs and f/up with PMD, monitor HB A 1 C every 3-4 mnth

## 2023-06-14 NOTE — DISCHARGE NOTE PROVIDER - PROVIDER TOKENS
PROVIDER:[TOKEN:[[id number]]],PROVIDER:[TOKEN:[[id number]]],PROVIDER:[TOKEN:[[id number]]]

## 2023-06-14 NOTE — PROGRESS NOTE ADULT - PROVIDER SPECIALTY LIST ADULT
Gastroenterology
Infectious Disease
Pulmonology
Pulmonology
Cardiology
Gastroenterology
Hospitalist
Infectious Disease
Nephrology
Podiatry
Pulmonology
Cardiology
Cardiology
Gastroenterology
Gastroenterology
Hospitalist
Hospitalist
Nephrology
Podiatry
Pulmonology
Endocrinology
Gastroenterology
Hospitalist
Hospitalist
Infectious Disease
Infectious Disease
Nephrology
Nephrology
Pulmonology
Endocrinology
Endocrinology
Hospitalist
Hospitalist
Nephrology
Nephrology

## 2023-06-14 NOTE — DISCHARGE NOTE PROVIDER - NSDCCPCAREPLAN_GEN_ALL_CORE_FT
PRINCIPAL DISCHARGE DIAGNOSIS  Diagnosis: Gangrene of toe of left foot  Assessment and Plan of Treatment:

## 2023-06-14 NOTE — PROGRESS NOTE ADULT - REASON FOR ADMISSION
foot infection

## 2023-06-14 NOTE — PROGRESS NOTE ADULT - PROBLEM SELECTOR PLAN 1
cont lantus 20 units qhs  cont admelog 5 units 3x/day before meals  cont admelog correcitive scale coverage  goal bg 100-180 in hosp setting
cont lantus 30 units qhs  add admelog 5 units 3x/day  cont mod dose admelog corrective scale coverage qac/qhs  cont cons cho diet  goal bg 100-180 in hosp setting
cont lantus 30 units qhs  cont admelog 5 units 3x/day before meals  cont mod dose admelog corrective scale coverage qac/qhs  cont cons cho diet  goal bg 100-180 in hosp setting

## 2023-06-14 NOTE — CASE MANAGEMENT PROGRESS NOTE - NSCMPROGRESSNOTE_GEN_ALL_CORE
Cm spoke with patients daughter who stated she was able to perform foot dressing change and declined home care. She will transport dad . I answered questions re DC and she has plans for f/up.

## 2023-06-14 NOTE — PROGRESS NOTE ADULT - SUBJECTIVE AND OBJECTIVE BOX
[facility] DIVISION of INFECTIOUS DISEASE  Guru Michel MD PhD, Lisa James MD, Becca Acosta MD, Karen Grimes MD, Corky Perez MD  and providing coverage with Carlos aMrx MD  Providing Infectious Disease Consultations at [facility], [facility], [facility]'s    Office# [phone] to schedule follow up appointments  Answering Service for urgent calls or New Consults [phone]  Cell# to text for urgent issues Guru Michel [phone]     infectious diseases progress note:    ARNIE DELGADILLO is a 67y y. o. Male patient    Overnight and events of the last 24hrs reviewed    Allergies    No Known Allergies    Intolerances        ANTIBIOTICS/RELEVANT:  antimicrobials    immunologic:    OTHER:  aspirin enteric coated 81 milliGRAM(s) Oral daily  atorvastatin 40 milliGRAM(s) Oral at bedtime  cadexomer iodine 0.9% Gel 1 Application(s) Topical daily  clopidogrel Tablet 75 milliGRAM(s) Oral daily  dextrose 5%. 1000 milliLiter(s) IV Continuous <Continuous>  dextrose 5%. 1000 milliLiter(s) IV Continuous <Continuous>  dextrose 50% Injectable 25 Gram(s) IV Push once  dextrose 50% Injectable 25 Gram(s) IV Push once  dextrose 50% Injectable 12.5 Gram(s) IV Push once  dextrose 50% Injectable 25 Gram(s) IV Push once  dextrose Oral Gel 15 Gram(s) Oral once PRN  enoxaparin Injectable 60 milliGRAM(s) SubCutaneous every 12 hours  furosemide    Tablet 40 milliGRAM(s) Oral daily  glucagon  Injectable 1 milliGRAM(s) IntraMuscular once  insulin glargine Injectable (LANTUS) 20 Unit(s) SubCutaneous at bedtime  insulin lispro (ADMELOG) corrective regimen sliding scale   SubCutaneous at bedtime  insulin lispro (ADMELOG) corrective regimen sliding scale   SubCutaneous three times a day before meals  insulin lispro Injectable (ADMELOG) 5 Unit(s) SubCutaneous three times a day before meals  levETIRAcetam 750 milliGRAM(s) Oral two times a day  metoprolol succinate ER 25 milliGRAM(s) Oral daily  pantoprazole    Tablet 40 milliGRAM(s) Oral before breakfast  povidone iodine 10% Solution 1 Application(s) Topical daily  spironolactone 25 milliGRAM(s) Oral daily  urea Oral Powder 15 Gram(s) Oral daily      Objective:  Vital Signs Last 24 Hrs  T(C): 36.2 (14 Jun 2023 11:30), Max: 36.5 (13 Jun 2023 20:13)  T(F): 97.2 (14 Jun 2023 11:30), Max: 97.7 (13 Jun 2023 20:13)  HR: 63 (14 Jun 2023 11:30) (63 - 81)  BP: 115/73 (14 Jun 2023 11:30) (112/68 - 115/73)  BP(mean): --  RR: 18 (14 Jun 2023 11:30) (18 - 18)  SpO2: 97% (14 Jun 2023 11:30) (96% - 97%)    Parameters below as of 14 Jun 2023 11:30  Patient On (Oxygen Delivery Method): room air        T(C): 36.2 (06-14-23 @ 11:30), Max: 36.8 (06-12-23 @ 20:09)  T(C): 36.2 (06-14-23 @ 11:30), Max: 36.8 (06-12-23 @ 20:09)  T(C): 36.2 (06-14-23 @ 11:30), Max: 36.8 (06-12-23 @ 20:09)    PHYSICAL EXAM:  HEENT: NC atraumatic  Neck: supple  Respiratory: no accessory muscle use, breathing comfortably  Cardiovascular: distant  Gastrointestinal: normal appearing, nondistended  Extremities: no clubbing, no cyanosis,        LABS:                          12.5   6.50  )-----------( 354      ( 14 Jun 2023 07:20 )             39.0       WBC  6.50 06-14 @ 07:20  6.39 06-13 @ 06:00  6.95 06-12 @ 05:55  6.92 06-11 @ 07:23  7.46 06-10 @ 07:15  6.94 06-09 @ 07:05      06-14    135  |  98  |  37<H>  ----------------------------<  147<H>  5.0   |  32<H>  |  1.20    Ca    10.2<H>      14 Jun 2023 07:20    TPro  8.9<H>  /  Alb  3.4  /  TBili  0.5  /  DBili  x   /  AST  49<H>  /  ALT  46  /  AlkPhos  769<H>  06-14      Creatinine, Serum: 1.20 mg/dL (06-14-23 @ 07:20)  Creatinine, Serum: 0.91 mg/dL (06-13-23 @ 06:00)  Creatinine, Serum: 0.95 mg/dL (06-12-23 @ 05:55)  Creatinine, Serum: 0.92 mg/dL (06-11-23 @ 07:23)  Creatinine, Serum: 1.10 mg/dL (06-10-23 @ 07:15)  Creatinine, Serum: 0.99 mg/dL (06-09-23 @ 07:05)      PT/INR - ( 13 Jun 2023 06:00 )   PT: 27.8 sec;   INR: 2.36 ratio                   INFLAMMATORY MARKERS      MICROBIOLOGY:              RADIOLOGY & ADDITIONAL STUDIES:  Surgical Pathology Report (06.06.23 @ 11:34)    Surgical Pathology Report:   ACCESSION No:  [id number]  Patient:   ARNIE DELGADILLO      Accession:                             [id number]    Collected Date/Time:                   6/6/2023 11:34 EDT  Received Date/Time:                    6/6/2023 13:13 EDT    Surgical Pathology Report - Auth (Verified)    Specimen(s) Submitted  1  Left 4th toe    Final Diagnosis  1  Left 4th toe:  Skin and subcutaneous tissue with acute necrotizing inflammation  (gangrene) extending to the superficial bone (osteomyelitis).  Note: Sections taken from the skin and bone margins are free of    inflammation.  Verified by: Robyn Ayers Pathologist  (Electronic Signature)  Reported on: 06/13/23 15:02 EDT, [facility], [address] [place] [id number]  _________________________________________________________________      Clinical Information  Procedure: Amputation of left 4th toe    Perioperative Diagnosis  Left 4th toe gangrene and osteomyelitis    Postoperative Diagnosis  Same    Gross Description  Received:  In formalin labeled  "left 4th toe pathology" , consisting of  an amputated digit with smooth skin and bone margins of resection.  Approximatly 98% of the digit shows a non-viable, gangrenous, variegated    appearance, which extends to the skin margin and measures overall 3.5  x 1.2 x 1.2 cm. The bone cut surface is brown-yellow, porous and soft.  No nail is noted. The skin and bone margins of resection show area of  discoloration and are inked.  Dimensions:  6.0 x 1.2 x 1.2 cm overall  Submitted:  Representative sections following fixation and acid  decalcification in 7 cassettes:  1A: Skin margin, perpendicular  1B: Bone margin, perpendicular  1C-1D: Cross sections of digit  1E-1G: Additional sections, cross sections    AUDIE Adler ([facility]) 06/08/2023 06:56 AM    Disclaimer  In addition to other data that may appear on the specimen containers, all  labels have been inspected to confirm the presence of the patient's name  and date of birth.  Histological processing performed at [facility], Department of  Pathology, [address] [place] [address].

## 2023-06-14 NOTE — DISCHARGE NOTE PROVIDER - DETAILS OF MALNUTRITION DIAGNOSIS/DIAGNOSES
This patient has been assessed with a concern for Malnutrition and was treated during this hospitalization for the following Nutrition diagnosis/diagnoses:     -  06/08/2023: Moderate protein-calorie malnutrition   -  06/08/2023: Underweight (BMI < 19)

## 2023-06-14 NOTE — PROGRESS NOTE ADULT - SUBJECTIVE AND OBJECTIVE BOX
[facility]  Shane Murray PA-C  [address] [place] [address]  [phone]      INTERVAL HPI/OVERNIGHT EVENTS:  Pt s/e  No new GI events    MEDICATIONS  (STANDING):  aspirin enteric coated 81 milliGRAM(s) Oral daily  atorvastatin 40 milliGRAM(s) Oral at bedtime  cadexomer iodine 0.9% Gel 1 Application(s) Topical daily  clopidogrel Tablet 75 milliGRAM(s) Oral daily  dextrose 5%. 1000 milliLiter(s) (50 mL/Hr) IV Continuous <Continuous>  dextrose 5%. 1000 milliLiter(s) (100 mL/Hr) IV Continuous <Continuous>  dextrose 50% Injectable 12.5 Gram(s) IV Push once  dextrose 50% Injectable 25 Gram(s) IV Push once  dextrose 50% Injectable 25 Gram(s) IV Push once  dextrose 50% Injectable 25 Gram(s) IV Push once  enoxaparin Injectable 60 milliGRAM(s) SubCutaneous every 12 hours  furosemide    Tablet 40 milliGRAM(s) Oral daily  glucagon  Injectable 1 milliGRAM(s) IntraMuscular once  insulin glargine Injectable (LANTUS) 20 Unit(s) SubCutaneous at bedtime  insulin lispro (ADMELOG) corrective regimen sliding scale   SubCutaneous at bedtime  insulin lispro (ADMELOG) corrective regimen sliding scale   SubCutaneous three times a day before meals  insulin lispro Injectable (ADMELOG) 5 Unit(s) SubCutaneous three times a day before meals  levETIRAcetam 750 milliGRAM(s) Oral two times a day  metoprolol succinate ER 25 milliGRAM(s) Oral daily  pantoprazole    Tablet 40 milliGRAM(s) Oral before breakfast  povidone iodine 10% Solution 1 Application(s) Topical daily  spironolactone 25 milliGRAM(s) Oral daily  urea Oral Powder 15 Gram(s) Oral daily    MEDICATIONS  (PRN):  dextrose Oral Gel 15 Gram(s) Oral once PRN Blood Glucose LESS THAN 70 milliGRAM(s)/deciliter      Allergies    No Known Allergies      PHYSICAL EXAM:   Vital Signs:  Vital Signs Last 24 Hrs  T(C): 36.2 (2023 11:30), Max: 36.6 (2023 12:11)  T(F): 97.2 (2023 11:30), Max: 97.9 (2023 12:11)  HR: 63 (:30) (63 - 81)  BP: 115/73 (2023 11:30) (112/68 - 115/73)  BP(mean): --  RR: 18 (:30) (18 - 18)  SpO2: 97% (2023 11:30) (96% - 97%)    Parameters below as of 2023 11:30  Patient On (Oxygen Delivery Method): room air      Daily     Daily Weight in k.3 (2023 04:55)    GENERAL:  Appears stated age  HEENT:  NC/AT  CHEST:  Full & symmetric excursion  HEART:  Regular rhythm  ABDOMEN:  Soft, non-tender, non-distended  EXTEREMITIES:  no cyanosis  SKIN:  No rash  NEURO:  Alert      LABS:                        12.5   6.50  )-----------( 354      ( 2023 07:20 )             39.0     06-14    135  |  98  |  37<H>  ----------------------------<  147<H>  5.0   |  32<H>  |  1.20    Ca    10.2<H>      2023 07:20    TPro  8.9<H>  /  Alb  3.4  /  TBili  0.5  /  DBili  x   /  AST  49<H>  /  ALT  46  /  AlkPhos  769<H>  06-14    PT/INR - ( 2023 06:00 )   PT: 27.8 sec;   INR: 2.36 ratio

## 2023-06-14 NOTE — PROGRESS NOTE ADULT - NUTRITIONAL ASSESSMENT
MEDICATIONS  (STANDING):  aspirin enteric coated 81 milliGRAM(s) Oral daily  atorvastatin 40 milliGRAM(s) Oral at bedtime  cadexomer iodine 0.9% Gel 1 Application(s) Topical daily  cefTRIAXone   IVPB 2000 milliGRAM(s) IV Intermittent every 24 hours  clopidogrel Tablet 75 milliGRAM(s) Oral daily  dextrose 5%. 1000 milliLiter(s) (100 mL/Hr) IV Continuous <Continuous>  dextrose 5%. 1000 milliLiter(s) (50 mL/Hr) IV Continuous <Continuous>  dextrose 50% Injectable 25 Gram(s) IV Push once  dextrose 50% Injectable 12.5 Gram(s) IV Push once  dextrose 50% Injectable 25 Gram(s) IV Push once  dextrose 50% Injectable 25 Gram(s) IV Push once  enoxaparin Injectable 60 milliGRAM(s) SubCutaneous every 12 hours  furosemide    Tablet 40 milliGRAM(s) Oral daily  glucagon  Injectable 1 milliGRAM(s) IntraMuscular once  insulin glargine Injectable (LANTUS) 20 Unit(s) SubCutaneous at bedtime  insulin lispro (ADMELOG) corrective regimen sliding scale   SubCutaneous at bedtime  insulin lispro (ADMELOG) corrective regimen sliding scale   SubCutaneous three times a day before meals  insulin lispro Injectable (ADMELOG) 5 Unit(s) SubCutaneous three times a day before meals  levETIRAcetam 750 milliGRAM(s) Oral two times a day  metoprolol succinate ER 25 milliGRAM(s) Oral daily  pantoprazole    Tablet 40 milliGRAM(s) Oral before breakfast  povidone iodine 10% Solution 1 Application(s) Topical daily  spironolactone 25 milliGRAM(s) Oral daily  urea Oral Powder 15 Gram(s) Oral daily
MEDICATIONS  (STANDING):  aspirin enteric coated 81 milliGRAM(s) Oral daily  atorvastatin 40 milliGRAM(s) Oral at bedtime  cefTRIAXone   IVPB 2000 milliGRAM(s) IV Intermittent every 24 hours  clopidogrel Tablet 75 milliGRAM(s) Oral daily  dextrose 5%. 1000 milliLiter(s) (50 mL/Hr) IV Continuous <Continuous>  dextrose 5%. 1000 milliLiter(s) (100 mL/Hr) IV Continuous <Continuous>  dextrose 50% Injectable 12.5 Gram(s) IV Push once  dextrose 50% Injectable 25 Gram(s) IV Push once  dextrose 50% Injectable 25 Gram(s) IV Push once  dextrose 50% Injectable 25 Gram(s) IV Push once  enoxaparin Injectable 60 milliGRAM(s) SubCutaneous every 12 hours  furosemide    Tablet 40 milliGRAM(s) Oral daily  glucagon  Injectable 1 milliGRAM(s) IntraMuscular once  insulin glargine Injectable (LANTUS) 30 Unit(s) SubCutaneous at bedtime  insulin lispro (ADMELOG) corrective regimen sliding scale   SubCutaneous three times a day before meals  insulin lispro (ADMELOG) corrective regimen sliding scale   SubCutaneous at bedtime  levETIRAcetam 750 milliGRAM(s) Oral two times a day  metoprolol succinate ER 25 milliGRAM(s) Oral daily  pantoprazole    Tablet 40 milliGRAM(s) Oral before breakfast  spironolactone 25 milliGRAM(s) Oral daily  urea Oral Powder 15 Gram(s) Oral daily  warfarin 3 milliGRAM(s) Oral once
MEDICATIONS  (STANDING):  aspirin enteric coated 81 milliGRAM(s) Oral daily  atorvastatin 40 milliGRAM(s) Oral at bedtime  cefTRIAXone   IVPB 2000 milliGRAM(s) IV Intermittent every 24 hours  clopidogrel Tablet 75 milliGRAM(s) Oral daily  dextrose 5%. 1000 milliLiter(s) (50 mL/Hr) IV Continuous <Continuous>  dextrose 5%. 1000 milliLiter(s) (100 mL/Hr) IV Continuous <Continuous>  dextrose 50% Injectable 12.5 Gram(s) IV Push once  dextrose 50% Injectable 25 Gram(s) IV Push once  dextrose 50% Injectable 25 Gram(s) IV Push once  dextrose 50% Injectable 25 Gram(s) IV Push once  enoxaparin Injectable 60 milliGRAM(s) SubCutaneous every 12 hours  furosemide    Tablet 40 milliGRAM(s) Oral daily  glucagon  Injectable 1 milliGRAM(s) IntraMuscular once  insulin glargine Injectable (LANTUS) 30 Unit(s) SubCutaneous at bedtime  insulin lispro (ADMELOG) corrective regimen sliding scale   SubCutaneous three times a day before meals  insulin lispro (ADMELOG) corrective regimen sliding scale   SubCutaneous at bedtime  levETIRAcetam 750 milliGRAM(s) Oral two times a day  metoprolol succinate ER 25 milliGRAM(s) Oral daily  pantoprazole    Tablet 40 milliGRAM(s) Oral before breakfast  spironolactone 25 milliGRAM(s) Oral daily  urea Oral Powder 15 Gram(s) Oral daily  warfarin 3 milliGRAM(s) Oral once
This patient has been assessed with a concern for Malnutrition and has been determined to have a diagnosis/diagnoses of Moderate protein-calorie malnutrition and Underweight (BMI < 19).    This patient is being managed with:   Diet Soft and Bite Sized-  Consistent Carbohydrate {Evening Snack}  DASH/TLC {Sodium & Cholesterol Restricted}  No Beef  No Pork  Supplement Feeding Modality:  Oral  Glucerna Shake Cans or Servings Per Day:  1       Frequency:  Daily  Entered: Jun 8 2023  2:05PM    Diet Soft and Bite Sized-  Consistent Carbohydrate {Evening Snack}  DASH/TLC {Sodium & Cholesterol Restricted}  No Beef  No Pork  No Poultry  Entered: Jun 6 2023 12:18PM    The following pending diet order is being considered for treatment of Moderate protein-calorie malnutrition and Underweight (BMI < 19):null
MEDICATIONS  (STANDING):  aspirin enteric coated 81 milliGRAM(s) Oral daily  atorvastatin 40 milliGRAM(s) Oral at bedtime  cefTRIAXone   IVPB 2000 milliGRAM(s) IV Intermittent every 24 hours  clopidogrel Tablet 75 milliGRAM(s) Oral daily  dextrose 5%. 1000 milliLiter(s) (50 mL/Hr) IV Continuous <Continuous>  dextrose 5%. 1000 milliLiter(s) (100 mL/Hr) IV Continuous <Continuous>  dextrose 50% Injectable 12.5 Gram(s) IV Push once  dextrose 50% Injectable 25 Gram(s) IV Push once  dextrose 50% Injectable 25 Gram(s) IV Push once  dextrose 50% Injectable 25 Gram(s) IV Push once  enoxaparin Injectable 60 milliGRAM(s) SubCutaneous every 12 hours  furosemide    Tablet 40 milliGRAM(s) Oral daily  glucagon  Injectable 1 milliGRAM(s) IntraMuscular once  insulin glargine Injectable (LANTUS) 30 Unit(s) SubCutaneous at bedtime  insulin lispro (ADMELOG) corrective regimen sliding scale   SubCutaneous three times a day before meals  insulin lispro (ADMELOG) corrective regimen sliding scale   SubCutaneous at bedtime  levETIRAcetam 750 milliGRAM(s) Oral two times a day  metoprolol succinate ER 25 milliGRAM(s) Oral daily  pantoprazole    Tablet 40 milliGRAM(s) Oral before breakfast  spironolactone 25 milliGRAM(s) Oral daily  urea Oral Powder 15 Gram(s) Oral daily  warfarin 3 milliGRAM(s) Oral once
This patient has been assessed with a concern for Malnutrition and has been determined to have a diagnosis/diagnoses of Moderate protein-calorie malnutrition and Underweight (BMI < 19).    This patient is being managed with:   Diet Soft and Bite Sized-  Consistent Carbohydrate {Evening Snack}  DASH/TLC {Sodium & Cholesterol Restricted}  No Beef  No Pork  Supplement Feeding Modality:  Oral  Glucerna Shake Cans or Servings Per Day:  1       Frequency:  Daily  Entered: Jun 8 2023  2:05PM    Diet Soft and Bite Sized-  Consistent Carbohydrate {Evening Snack}  DASH/TLC {Sodium & Cholesterol Restricted}  No Beef  No Pork  No Poultry  Entered: Jun 6 2023 12:18PM    The following pending diet order is being considered for treatment of Moderate protein-calorie malnutrition and Underweight (BMI < 19):null
This patient has been assessed with a concern for Malnutrition and has been determined to have a diagnosis/diagnoses of Moderate protein-calorie malnutrition and Underweight (BMI < 19).    This patient is being managed with:   Diet Soft and Bite Sized-  Consistent Carbohydrate {Evening Snack}  DASH/TLC {Sodium & Cholesterol Restricted}  No Beef  No Pork  Supplement Feeding Modality:  Oral  Glucerna Shake Cans or Servings Per Day:  1       Frequency:  Daily  Entered: Jun 8 2023  2:05PM    Diet Soft and Bite Sized-  Consistent Carbohydrate {Evening Snack}  DASH/TLC {Sodium & Cholesterol Restricted}  No Beef  No Pork  No Poultry  Entered: Jun 6 2023 12:18PM    The following pending diet order is being considered for treatment of Moderate protein-calorie malnutrition and Underweight (BMI < 19):null
MEDICATIONS  (STANDING):  aspirin enteric coated 81 milliGRAM(s) Oral daily  atorvastatin 40 milliGRAM(s) Oral at bedtime  cadexomer iodine 0.9% Gel 1 Application(s) Topical daily  cefTRIAXone   IVPB 2000 milliGRAM(s) IV Intermittent every 24 hours  clopidogrel Tablet 75 milliGRAM(s) Oral daily  dextrose 5%. 1000 milliLiter(s) (100 mL/Hr) IV Continuous <Continuous>  dextrose 5%. 1000 milliLiter(s) (50 mL/Hr) IV Continuous <Continuous>  dextrose 50% Injectable 25 Gram(s) IV Push once  dextrose 50% Injectable 12.5 Gram(s) IV Push once  dextrose 50% Injectable 25 Gram(s) IV Push once  dextrose 50% Injectable 25 Gram(s) IV Push once  enoxaparin Injectable 60 milliGRAM(s) SubCutaneous every 12 hours  furosemide    Tablet 40 milliGRAM(s) Oral daily  glucagon  Injectable 1 milliGRAM(s) IntraMuscular once  insulin glargine Injectable (LANTUS) 20 Unit(s) SubCutaneous at bedtime  insulin lispro (ADMELOG) corrective regimen sliding scale   SubCutaneous at bedtime  insulin lispro (ADMELOG) corrective regimen sliding scale   SubCutaneous three times a day before meals  insulin lispro Injectable (ADMELOG) 5 Unit(s) SubCutaneous three times a day before meals  levETIRAcetam 750 milliGRAM(s) Oral two times a day  metoprolol succinate ER 25 milliGRAM(s) Oral daily  pantoprazole    Tablet 40 milliGRAM(s) Oral before breakfast  povidone iodine 10% Solution 1 Application(s) Topical daily  spironolactone 25 milliGRAM(s) Oral daily  urea Oral Powder 15 Gram(s) Oral daily
MEDICATIONS  (STANDING):  aspirin enteric coated 81 milliGRAM(s) Oral daily  atorvastatin 40 milliGRAM(s) Oral at bedtime  cefTRIAXone   IVPB 2000 milliGRAM(s) IV Intermittent every 24 hours  clopidogrel Tablet 75 milliGRAM(s) Oral daily  dextrose 5%. 1000 milliLiter(s) (50 mL/Hr) IV Continuous <Continuous>  dextrose 5%. 1000 milliLiter(s) (100 mL/Hr) IV Continuous <Continuous>  dextrose 50% Injectable 12.5 Gram(s) IV Push once  dextrose 50% Injectable 25 Gram(s) IV Push once  dextrose 50% Injectable 25 Gram(s) IV Push once  dextrose 50% Injectable 25 Gram(s) IV Push once  enoxaparin Injectable 60 milliGRAM(s) SubCutaneous every 12 hours  furosemide    Tablet 40 milliGRAM(s) Oral daily  glucagon  Injectable 1 milliGRAM(s) IntraMuscular once  insulin glargine Injectable (LANTUS) 30 Unit(s) SubCutaneous at bedtime  insulin lispro (ADMELOG) corrective regimen sliding scale   SubCutaneous three times a day before meals  insulin lispro (ADMELOG) corrective regimen sliding scale   SubCutaneous at bedtime  levETIRAcetam 750 milliGRAM(s) Oral two times a day  metoprolol succinate ER 25 milliGRAM(s) Oral daily  pantoprazole    Tablet 40 milliGRAM(s) Oral before breakfast  spironolactone 25 milliGRAM(s) Oral daily  urea Oral Powder 15 Gram(s) Oral daily  warfarin 3 milliGRAM(s) Oral once
This patient has been assessed with a concern for Malnutrition and has been determined to have a diagnosis/diagnoses of Moderate protein-calorie malnutrition and Underweight (BMI < 19).    This patient is being managed with:   Diet Soft and Bite Sized-  Consistent Carbohydrate {Evening Snack}  DASH/TLC {Sodium & Cholesterol Restricted}  No Beef  No Pork  Supplement Feeding Modality:  Oral  Glucerna Shake Cans or Servings Per Day:  1       Frequency:  Daily  Entered: Jun 8 2023  2:05PM    Diet Soft and Bite Sized-  Consistent Carbohydrate {Evening Snack}  DASH/TLC {Sodium & Cholesterol Restricted}  No Beef  No Pork  No Poultry  Entered: Jun 6 2023 12:18PM    The following pending diet order is being considered for treatment of Moderate protein-calorie malnutrition and Underweight (BMI < 19):null
This patient has been assessed with a concern for Malnutrition and has been determined to have a diagnosis/diagnoses of Moderate protein-calorie malnutrition and Underweight (BMI < 19).    This patient is being managed with:   Diet Soft and Bite Sized-  Consistent Carbohydrate {Evening Snack}  DASH/TLC {Sodium & Cholesterol Restricted}  No Beef  No Pork  Supplement Feeding Modality:  Oral  Glucerna Shake Cans or Servings Per Day:  1       Frequency:  Daily  Entered: Jun 8 2023  2:05PM    Diet Soft and Bite Sized-  Consistent Carbohydrate {Evening Snack}  DASH/TLC {Sodium & Cholesterol Restricted}  No Beef  No Pork  No Poultry  Entered: Jun 6 2023 12:18PM    The following pending diet order is being considered for treatment of Moderate protein-calorie malnutrition and Underweight (BMI < 19):null

## 2023-06-14 NOTE — DISCHARGE NOTE PROVIDER - NSDCMRMEDTOKEN_GEN_ALL_CORE_FT
aspirin 81 mg oral delayed release tablet: 1 tab(s) orally once a day  atorvastatin 40 mg oral tablet: 1 tab(s) orally once a day (at bedtime)  famotidine 20 mg oral tablet: 1 tab(s) orally once a day (at bedtime)  furosemide 40 mg oral tablet: 1 tab(s) orally once a day  insulin aspart 100 units/mL injectable solution: 5 unit(s) injectable 3 times a day with meals  insulin glargine 100 units/mL subcutaneous solution: 20 unit(s) subcutaneous once a day (at bedtime)  levETIRAcetam 750 mg oral tablet: 1 tab(s) orally 2 times a day  metFORMIN 500 mg oral tablet: 1 tab(s) orally 2 times a day  metoprolol succinate 25 mg oral capsule, extended release: 1 cap(s) orally once a day (at bedtime)  pantoprazole 40 mg oral delayed release tablet: 1 tab(s) orally 2 times a day  Plavix 75 mg oral tablet: 1 tab(s) orally once a day in morning  spironolactone 25 mg oral tablet: 1 tab(s) orally once a day in morning  warfarin 3 mg oral tablet: 1 tab(s) orally once a day (at bedtime)

## 2023-06-14 NOTE — DISCHARGE NOTE NURSING/CASE MANAGEMENT/SOCIAL WORK - PATIENT PORTAL LINK FT
You can access the FollowMyHealth Patient Portal offered by [facility] by registering at the following website: [email or web address]. By joining [employer]’s FollowMyHealth portal, you will also be able to view your health information using other applications (apps) compatible with our system.

## 2023-06-14 NOTE — PROGRESS NOTE ADULT - SUBJECTIVE AND OBJECTIVE BOX
Patient is a 67y Male whom presented to the hospital with hyponatremia     PAST MEDICAL & SURGICAL HISTORY:  CVA (cerebral vascular accident)      HTN (hypertension)      DM (diabetes mellitus)      History of atrial fibrillation      Right hemiparesis      Aphasia due to acute stroke      Upper GI bleed      Osteomyelitis      S/P CABG (coronary artery bypass graft)      S/P brain surgery      History of aortic valve repair          MEDICATIONS  (STANDING):  aspirin enteric coated 81 milliGRAM(s) Oral daily  atorvastatin 40 milliGRAM(s) Oral at bedtime  clopidogrel Tablet 75 milliGRAM(s) Oral daily  dextrose 5%. 1000 milliLiter(s) (50 mL/Hr) IV Continuous <Continuous>  dextrose 5%. 1000 milliLiter(s) (100 mL/Hr) IV Continuous <Continuous>  dextrose 50% Injectable 12.5 Gram(s) IV Push once  dextrose 50% Injectable 25 Gram(s) IV Push once  dextrose 50% Injectable 25 Gram(s) IV Push once  dextrose 50% Injectable 25 Gram(s) IV Push once  enoxaparin Injectable 60 milliGRAM(s) SubCutaneous every 12 hours  furosemide    Tablet 40 milliGRAM(s) Oral daily  glucagon  Injectable 1 milliGRAM(s) IntraMuscular once  insulin glargine Injectable (LANTUS) 30 Unit(s) SubCutaneous at bedtime  insulin lispro (ADMELOG) corrective regimen sliding scale   SubCutaneous three times a day before meals  insulin lispro (ADMELOG) corrective regimen sliding scale   SubCutaneous at bedtime  levETIRAcetam 750 milliGRAM(s) Oral two times a day  metoprolol succinate ER 25 milliGRAM(s) Oral daily  pantoprazole    Tablet 40 milliGRAM(s) Oral before breakfast  spironolactone 25 milliGRAM(s) Oral daily      Allergies    No Known Allergies    Intolerances        SOCIAL HISTORY:  Denies ETOh,Smoking,     FAMILY HISTORY:  FH: coronary artery disease (Sibling)    FH: type 2 diabetes (Sibling)        REVIEW OF SYSTEMS:    CONSTITUTIONAL: No weakness, fevers or chills  RESPIRATORY: No cough, wheezing, hemoptysis; No shortness of breath  CARDIOVASCULAR: No chest pain or palpitations  GASTROINTESTINAL: No abdominal or epigastric pain. No nausea, vomiting,                                   12.5   6.50  )-----------( 354      ( 14 Jun 2023 07:20 )             39.0       CBC Full  -  ( 14 Jun 2023 07:20 )  WBC Count : 6.50 K/uL  RBC Count : 4.27 M/uL  Hemoglobin : 12.5 g/dL  Hematocrit : 39.0 %  Platelet Count - Automated : 354 K/uL  Mean Cell Volume : 91.3 fl  Mean Cell Hemoglobin : 29.3 pg  Mean Cell Hemoglobin Concentration : 32.1 gm/dL  Auto Neutrophil # : x  Auto Lymphocyte # : x  Auto Monocyte # : x  Auto Eosinophil # : x  Auto Basophil # : x  Auto Neutrophil % : x  Auto Lymphocyte % : x  Auto Monocyte % : x  Auto Eosinophil % : x  Auto Basophil % : x      06-14    135  |  98  |  37<H>  ----------------------------<  147<H>  5.0   |  32<H>  |  1.20    Ca    10.2<H>      14 Jun 2023 07:20    TPro  8.9<H>  /  Alb  3.4  /  TBili  0.5  /  DBili  x   /  AST  49<H>  /  ALT  46  /  AlkPhos  769<H>  06-14      CAPILLARY BLOOD GLUCOSE      POCT Blood Glucose.: 299 mg/dL (14 Jun 2023 12:15)  POCT Blood Glucose.: 340 mg/dL (14 Jun 2023 11:06)  POCT Blood Glucose.: 140 mg/dL (14 Jun 2023 07:17)  POCT Blood Glucose.: 137 mg/dL (13 Jun 2023 20:58)      Vital Signs Last 24 Hrs  T(C): 36.2 (14 Jun 2023 11:30), Max: 36.5 (13 Jun 2023 20:13)  T(F): 97.2 (14 Jun 2023 11:30), Max: 97.7 (13 Jun 2023 20:13)  HR: 63 (14 Jun 2023 11:30) (63 - 81)  BP: 115/73 (14 Jun 2023 11:30) (112/68 - 115/73)  BP(mean): --  RR: 18 (14 Jun 2023 11:30) (18 - 18)  SpO2: 97% (14 Jun 2023 11:30) (96% - 97%)    Parameters below as of 14 Jun 2023 11:30  Patient On (Oxygen Delivery Method): room air            PT/INR - ( 13 Jun 2023 06:00 )   PT: 27.8 sec;   INR: 2.36 ratio              PHYSICAL EXAM:    Constitutional: NAD  HEENT: conjunctive   clear   Neck:  No JVD  Respiratory: CTAB  Cardiovascular: S1 and S2  Gastrointestinal: BS+, soft, NT/ND  Extremities: No peripheral edema  Neurological:  no focal deficits

## 2023-06-14 NOTE — DISCHARGE NOTE PROVIDER - HOSPITAL COURSE
67-year-old male with an extensive history including prior hemorrhagic stroke status post left craniotomy, coronary artery bypass grafting, diabetes and peripheral vascular disease who was brought into the emergency room by his son to be admitted for surgery on the left fourth toe and possible amputation tomorrow morning.  Per son patient without fevers or chills and has had bleeding intermittently from the toe and was told by podiatrist to come in for surgery tomorrow    1 Diabetic foot ulcer  - sp OR  - podiatry and ID fu   -  dc ABX as per ID  - path neg     2 HX of CVA  - on asa, plavix   - cw keppra for seizure prophylaxis  - neuro fu    4 DM  - monitor FS  - Basal bolus  - diabetic diet  - uncontrolled     5  Afib, AVR  - dc lovenox BID   - check INR and dose coumadin daily     6  CAD, CABG  - cw asa, statin  - cards fu

## 2023-06-14 NOTE — PROGRESS NOTE ADULT - SUBJECTIVE AND OBJECTIVE BOX
CAPILLARY BLOOD GLUCOSE      POCT Blood Glucose.: 340 mg/dL (14 Jun 2023 11:06)  POCT Blood Glucose.: 140 mg/dL (14 Jun 2023 07:17)  POCT Blood Glucose.: 137 mg/dL (13 Jun 2023 20:58)  POCT Blood Glucose.: 131 mg/dL (13 Jun 2023 16:39)      Vital Signs Last 24 Hrs  T(C): 36.2 (14 Jun 2023 11:30), Max: 36.5 (13 Jun 2023 20:13)  T(F): 97.2 (14 Jun 2023 11:30), Max: 97.7 (13 Jun 2023 20:13)  HR: 63 (14 Jun 2023 11:30) (63 - 81)  BP: 115/73 (14 Jun 2023 11:30) (112/68 - 115/73)  BP(mean): --  RR: 18 (14 Jun 2023 11:30) (18 - 18)  SpO2: 97% (14 Jun 2023 11:30) (96% - 97%)    Parameters below as of 14 Jun 2023 11:30  Patient On (Oxygen Delivery Method): room air        General: WN/WD NAD  Respiratory: CTA B/L  CV: RRR, S1S2, no murmurs, rubs or gallops  Abdominal: Soft, NT, ND +BS, Last BM  Extremities: No edema, + peripheral pulses     06-14    135  |  98  |  37<H>  ----------------------------<  147<H>  5.0   |  32<H>  |  1.20    Ca    10.2<H>      14 Jun 2023 07:20    TPro  8.9<H>  /  Alb  3.4  /  TBili  0.5  /  DBili  x   /  AST  49<H>  /  ALT  46  /  AlkPhos  769<H>  06-14      atorvastatin 40 milliGRAM(s) Oral at bedtime  dextrose 50% Injectable 25 Gram(s) IV Push once  dextrose 50% Injectable 25 Gram(s) IV Push once  dextrose 50% Injectable 12.5 Gram(s) IV Push once  dextrose 50% Injectable 25 Gram(s) IV Push once  dextrose Oral Gel 15 Gram(s) Oral once PRN  glucagon  Injectable 1 milliGRAM(s) IntraMuscular once  insulin glargine Injectable (LANTUS) 20 Unit(s) SubCutaneous at bedtime  insulin lispro (ADMELOG) corrective regimen sliding scale   SubCutaneous at bedtime  insulin lispro (ADMELOG) corrective regimen sliding scale   SubCutaneous three times a day before meals  insulin lispro Injectable (ADMELOG) 5 Unit(s) SubCutaneous three times a day before meals

## 2023-06-14 NOTE — DISCHARGE NOTE PROVIDER - CARE PROVIDER_API CALL
Roberta Rios  Internal Medicine  [address] [facility] [place] [address]  Phone: [phone]  Fax: [phone]  Follow Up Time:     Guru Michel  Infectious Disease  [address] [place] [id number]  Phone: [phone]  Fax: [phone]  Follow Up Time:     Perlman, Craig Douglas  Internal Medicine  [address] [place] [address]  Phone: [phone]  Fax: [phone]  Follow Up Time:

## 2023-06-14 NOTE — PROGRESS NOTE ADULT - SUBJECTIVE AND OBJECTIVE BOX
Discharge Instructions    Discharged to other by medical transportation with escort. Discharged via wheelchair, hospital escort: Yes.  Special equipment needed: Not Applicable    Be sure to schedule a follow-up appointment with your primary care doctor or any specialists as instructed.     Discharge Plan:   Diet Plan: Discussed  Activity Level: Discussed  Confirmed Follow up Appointment: Patient to Call and Schedule Appointment  Confirmed Symptoms Management: Discussed  Medication Reconciliation Updated: Yes    I understand that a diet low in cholesterol, fat, and sodium is recommended for good health. Unless I have been given specific instructions below for another diet, I accept this instruction as my diet prescription.   Other diet: Liquidised with midly thick liquids    Special Instructions: None    · Is patient discharged on Warfarin / Coumadin?   No     Depression / Suicide Risk    As you are discharged from this [facility] facility, it is important to learn how to keep safe from harming yourself.    Recognize the warning signs:  · Abrupt changes in personality, positive or negative- including increase in energy   · Giving away possessions  · Change in eating patterns- significant weight changes-  positive or negative  · Change in sleeping patterns- unable to sleep or sleeping all the time   · Unwillingness or inability to communicate  · Depression  · Unusual sadness, discouragement and loneliness  · Talk of wanting to die  · Neglect of personal appearance   · Rebelliousness- reckless behavior  · Withdrawal from people/activities they love  · Confusion- inability to concentrate     If you or a loved one observes any of these behaviors or has concerns about self-harm, here's what you can do:  · Talk about it- your feelings and reasons for harming yourself  · Remove any means that you might use to hurt yourself (examples: pills, rope, extension cords, firearm)  · Get professional help from the community  (Mental Health, Substance Abuse, psychological counseling)  · Do not be alone:Call your Safe Contact- someone whom you trust who will be there for you.  · Call your local CRISIS HOTLINE 821-3880 or 968-719-7769  · Call your local Children's Mobile Crisis Response Team [place] (826) 099-9105 or www.SpanDeX  · Call the toll free National Suicide Prevention Hotlines   · National Suicide Prevention Lifeline 327-441-FDKT (4645)  · [place] Line Network 800-SUICIDE (774-1424)      Intracranial Hemorrhage  An intracranial hemorrhage is bleeding in the layers between the skull (cranium) and brain. A blood vessel bursts and allows blood to leak inside the cranial cavity. The leaking blood then collects (hematoma). This causes pressure and damage to brain cells. The bleeding can be mild to severe. In severe cases, it can lead to permanent damage or death. Symptoms may come on suddenly or develop over time. Early diagnosis and treatment leads to better recovery.  There are four types of intracranial hemorrhage: subarachnoid, subdural, extradural, or cerebral hemorrhage.  What are the causes?  · Head injury (trauma).  · Ruptured brain aneurysm.  · Bleeding from blood vessels that develop abnormally (arteriovenous malformation).  · Bleeding disorder.  · Use of blood thinners (anticoagulants).  · Use of certain drugs, such as cocaine.  For some people with intracranial hemorrhage, the cause is unknown.  What increases the risk?  · Using tobacco products, such as cigarettes and chewing tobacco.  · Having high blood pressure (hypertension).  · Abusing alcohol.  · Being a female, especially of postmenopausal age.  · Having a family history of disease in the blood vessels of the brain (cerebrovascular disease).  · Having certain genetic syndromes that result in kidney disease or connective tissue disease.  What are the signs or symptoms?  · A sudden, severe headache with no known cause. The headache is often  described as the worst headache ever experienced.  · Nausea or vomiting, especially when combined with other symptoms such as a headache.  · Sudden weakness or numbness of the face, arm, or leg, especially on one side of the body.  · Sudden trouble walking or difficulty moving arms or legs.  · Sudden confusion.  · Sudden personality changes.  · Trouble speaking (aphasia) or understanding.  · Difficulty swallowing.  · Sudden trouble seeing in one or both eyes.  · Double vision.  · Dizziness.  · Loss of balance or coordination.  · Intolerance to light.  · Stiff neck.  How is this diagnosed?  Your health care provider will perform a physical exam and ask about your symptoms. If an intracranial hemorrhage is suspected, various tests may be ordered. These tests may include:  · A CT scan.  · An MRI.  · A cerebral angiogram.  · A spinal tap (lumbar puncture).  · Blood tests.  How is this treated?  Immediate treatment in the hospital is often required to reduce the risk of brain damage. Treatment will depend on the cause of the bleeding, where it is located, and the extent of the bleeding and damage. The goals of treatment include stopping the bleeding, repairing the cause of bleeding, providing relief of symptoms, and preventing problems.  · Medicines may be given to:  ¨ Lower blood pressure (antihypertensives).  ¨ Relieve pain (analgesics).  ¨ Relieve nausea or vomiting.  · Surgery may be needed to stop the bleeding, repair the cause of the bleeding, or remove the blood.  · Rehabilitation may be needed to improve any cognitive and day-to-day functions impaired by the condition.  Further treatment depends on the duration, severity, and cause of your symptoms. Physical, speech, and occupational therapists will assess you and work to improve any functions impaired by the intracranial hemorrhage. Measures will be taken to prevent short-term and long-term problems, including infection from breathing foreign material into the  Date/Time Patient Seen:  		  Referring MD:   Data Reviewed	       Patient is a 67y old  Male who presents with a chief complaint of foot infection (13 Jun 2023 13:43)      Subjective/HPI     PAST MEDICAL & SURGICAL HISTORY:  CVA (cerebral vascular accident)    HTN (hypertension)    DM (diabetes mellitus)    Arrhythmia    History of atrial fibrillation    Right hemiparesis    Aphasia due to acute stroke    GI bleed    Iron deficiency anemia    Upper GI bleed    Osteomyelitis    S/P CABG (coronary artery bypass graft)    S/P brain surgery    History of aortic valve repair          Medication list         MEDICATIONS  (STANDING):  aspirin enteric coated 81 milliGRAM(s) Oral daily  atorvastatin 40 milliGRAM(s) Oral at bedtime  cadexomer iodine 0.9% Gel 1 Application(s) Topical daily  clopidogrel Tablet 75 milliGRAM(s) Oral daily  dextrose 5%. 1000 milliLiter(s) (50 mL/Hr) IV Continuous <Continuous>  dextrose 5%. 1000 milliLiter(s) (100 mL/Hr) IV Continuous <Continuous>  dextrose 50% Injectable 25 Gram(s) IV Push once  dextrose 50% Injectable 25 Gram(s) IV Push once  dextrose 50% Injectable 12.5 Gram(s) IV Push once  dextrose 50% Injectable 25 Gram(s) IV Push once  enoxaparin Injectable 60 milliGRAM(s) SubCutaneous every 12 hours  furosemide    Tablet 40 milliGRAM(s) Oral daily  glucagon  Injectable 1 milliGRAM(s) IntraMuscular once  insulin glargine Injectable (LANTUS) 20 Unit(s) SubCutaneous at bedtime  insulin lispro (ADMELOG) corrective regimen sliding scale   SubCutaneous at bedtime  insulin lispro (ADMELOG) corrective regimen sliding scale   SubCutaneous three times a day before meals  insulin lispro Injectable (ADMELOG) 5 Unit(s) SubCutaneous three times a day before meals  levETIRAcetam 750 milliGRAM(s) Oral two times a day  metoprolol succinate ER 25 milliGRAM(s) Oral daily  pantoprazole    Tablet 40 milliGRAM(s) Oral before breakfast  povidone iodine 10% Solution 1 Application(s) Topical daily  spironolactone 25 milliGRAM(s) Oral daily  urea Oral Powder 15 Gram(s) Oral daily    MEDICATIONS  (PRN):  dextrose Oral Gel 15 Gram(s) Oral once PRN Blood Glucose LESS THAN 70 milliGRAM(s)/deciliter         Vitals log        ICU Vital Signs Last 24 Hrs  T(C): 36.3 (14 Jun 2023 04:55), Max: 36.6 (13 Jun 2023 12:11)  T(F): 97.3 (14 Jun 2023 04:55), Max: 97.9 (13 Jun 2023 12:11)  HR: 67 (14 Jun 2023 04:55) (67 - 81)  BP: 112/68 (14 Jun 2023 04:55) (112/68 - 113/75)  BP(mean): --  ABP: --  ABP(mean): --  RR: 18 (14 Jun 2023 04:55) (18 - 18)  SpO2: 96% (14 Jun 2023 04:55) (96% - 96%)    O2 Parameters below as of 14 Jun 2023 04:55  Patient On (Oxygen Delivery Method): room air                 Input and Output:  I&O's Detail      Lab Data                        11.0   6.39  )-----------( 307      ( 13 Jun 2023 06:00 )             33.4     06-13    136  |  102  |  29<H>  ----------------------------<  56<L>  3.9   |  28  |  0.91    Ca    9.4      13 Jun 2023 06:00    TPro  7.7  /  Alb  3.1<L>  /  TBili  0.4  /  DBili  x   /  AST  49<H>  /  ALT  40  /  AlkPhos  661<H>  06-13            Review of Systems	      Objective     Physical Examination    heart s1s2  lung dc BS  head nc      Pertinent Lab findings & Imaging      Bryan:  NO   Adequate UO     I&O's Detail           Discussed with:     Cultures:	        Radiology                             lungs (aspiration pneumonia), blood clots in the legs, bedsores, and falls.  Follow these instructions at home:  · Take medicines only as directed by your health care provider.  · Eat healthy foods as directed by your health care provider:  ¨ A diet low in salt (sodium), saturated fat, trans fat, and cholesterol may be recommended to manage your blood pressure.  ¨ Foods may need to be soft or pureed, or small bites may need to be taken in order to avoid aspirating or choking.  ¨ If studies show that your ability to swallow safely has been affected, you may need to seek help from specialists such as a dietitian, speech and language pathologist, or an occupational therapist. These health care providers can teach you how to safely get the nutrition your body needs.  · Rest and limit activities or movements as directed by your health care provider.  · Do not use any tobacco products including cigarettes, chewing tobacco, or electronic cigarettes. If you need help quitting, ask your health care provider.  · Limit alcohol intake to no more than 1 drink per day for nonpregnant women and 2 drinks per day for men. One drink equals 12 ounces of beer, 5 ounces of wine, or 1½ ounces of hard liquor.  · Make any other lifestyle changes as directed by your health care provider.  · Monitor and record your blood pressure as directed by your health care provider.  · A safe home environment is important to reduce the risk of falls. Your health care provider may arrange for specialists to evaluate your home. Having grab bars in the bedroom and bathroom is often important. Your health care provider may arrange for special equipment to be used at home, such as raised toilets and a seat for the shower.  · Do physical, occupational, and speech therapy as directed by your health care provider. Ongoing therapy may be needed to maximize your recovery.  · Use a walker or a cane at all times if directed by your health care provider.  · Keep all  follow-up visits with your health care provider and other specialists. This is important. This includes any referrals, physical therapy, and rehabilitation.  Get help right away if:  · You have a sudden, severe headache with no known cause.  · You have nausea or vomiting occurring with another symptom.  · You have sudden weakness or numbness of the face, arm, or leg, especially on one side of the body.  · You have sudden trouble walking or difficulty moving your arms or legs.  · You have sudden confusion.  · You have trouble speaking (aphasia) or understanding.  · You have sudden trouble seeing in one or both eyes.  · You have a sudden loss of balance or coordination.  · You have a stiff neck.  · You have difficulty breathing.  · You have a partial or total loss of consciousness.  These symptoms may represent a serious problem that is an emergency. Do not wait to see if the symptoms will go away. Get medical help right away. Call your local emergency services (911 in the [place] Do not drive yourself to the hospital.   This information is not intended to replace advice given to you by your health care provider. Make sure you discuss any questions you have with your health care provider.  Document Released: 07/15/2015 Document Revised: 05/19/2017 Document Reviewed: 02/11/2015  [employer] Interactive Patient Education © 2017 [employer]    Influenza Virus Vaccine injection (Fluarix)  What is this medicine?  INFLUENZA VIRUS VACCINE (in floo EN [facility] SEEN) helps to reduce the risk of getting influenza also known as the flu.  This medicine may be used for other purposes; ask your health care provider or pharmacist if you have questions.  COMMON BRAND NAME(S): Fluarix, Fluzone  What should I tell my health care provider before I take this medicine?  They need to know if you have any of these conditions:  · bleeding disorder like hemophilia  · fever or infection  · Guillain-[place] syndrome or other neurological  problems  · immune system problems  · infection with the human immunodeficiency virus (HIV) or AIDS  · low blood platelet counts  · multiple sclerosis  · an unusual or allergic reaction to influenza virus vaccine, eggs, chicken proteins, latex, gentamicin, other medicines, foods, dyes or preservatives  · pregnant or trying to get pregnant  · breast-feeding  How should I use this medicine?  This vaccine is for injection into a muscle. It is given by a health care professional.  A copy of Vaccine Information Statements will be given before each vaccination. Read this sheet carefully each time. The sheet may change frequently.  Talk to your pediatrician regarding the use of this medicine in children. Special care may be needed.  Overdosage: If you think you have taken too much of this medicine contact a poison control center or emergency room at once.  NOTE: This medicine is only for you. Do not share this medicine with others.  What if I miss a dose?  This does not apply.  What may interact with this medicine?  · chemotherapy or radiation therapy  · medicines that lower your immune system like etanercept, anakinra, infliximab, and adalimumab  · medicines that treat or prevent blood clots like warfarin  · phenytoin  · steroid medicines like prednisone or cortisone  · theophylline  · vaccines  This list may not describe all possible interactions. Give your health care provider a list of all the medicines, herbs, non-prescription drugs, or dietary supplements you use. Also tell them if you smoke, drink alcohol, or use illegal drugs. Some items may interact with your medicine.  What should I watch for while using this medicine?  Report any side effects that do not go away within 3 days to your doctor or health care professional. Call your health care provider if any unusual symptoms occur within 6 weeks of receiving this vaccine.  You may still catch the flu, but the illness is not usually as bad. You cannot get the flu  from the vaccine. The vaccine will not protect against colds or other illnesses that may cause fever. The vaccine is needed every year.  What side effects may I notice from receiving this medicine?  Side effects that you should report to your doctor or health care professional as soon as possible:  · allergic reactions like skin rash, itching or hives, swelling of the face, lips, or tongue  Side effects that usually do not require medical attention (report to your doctor or health care professional if they continue or are bothersome):  · fever  · headache  · muscle aches and pains  · pain, tenderness, redness, or swelling at site where injected  · weak or tired  This list may not describe all possible side effects. Call your doctor for medical advice about side effects. You may report side effects to FDA at 8-957-WOM-9800.  Where should I keep my medicine?  This vaccine is only given in a clinic, pharmacy, doctor's office, or other health care setting and will not be stored at home.  NOTE: This sheet is a summary. It may not cover all possible information. If you have questions about this medicine, talk to your doctor, pharmacist, or health care provider.  © 2020 Elsevier/Gold Standard (2009-07-15 09:30:40)

## 2023-08-15 NOTE — ED ADULT NURSE NOTE - NSIMPLEMENTINTERV_GEN_ALL_ED
----- Message from Ana Albarran sent at 8/8/2023  2:45 PM CDT -----  Type:  Needs Medical Advice    Who Called:  Pt    Would the patient rather a call back or a response via [facility]?  call  Best Call Back Number:  [phone]  Additional Information:  Pt had a biopsy done on 8/3/23 and states that she received her results but would like for them to be further explained by the Dr.  Pt would like a call back.             
Biopsy was nml  I don't call for nml results  If she is ready to proceed with hysterectomy then please schedule  Kyara  is included in this message  
Patient stated that she would like a call to go over biopsy results         MARSHA Ren  
Would you be able to help patient to get scheduled    Biopsy was nml  I don't call for nml results  If she is ready to proceed with hysterectomy then please schedule      AMRSHA Ren    
Implemented All Fall with Harm Risk Interventions:  [place] to call system. Call bell, personal items and telephone within reach. Instruct patient to call for assistance. Room bathroom lighting operational. Non-slip footwear when patient is off stretcher. Physically safe environment: no spills, clutter or unnecessary equipment. Stretcher in lowest position, wheels locked, appropriate side rails in place. Provide visual cue, wrist band, yellow gown, etc. Monitor gait and stability. Monitor for mental status changes and reorient to person, place, and time. Review medications for side effects contributing to fall risk. Reinforce activity limits and safety measures with patient and family. Provide visual clues: red socks.

## 2023-10-05 NOTE — H&P ADULT - HISTORY OF PRESENT ILLNESS
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 Per daughter at bedside, patient's INR level was 7.2 two days ago and was instructed by hematologist to hold coumadin for the past 2 days. Of note, patient had EGD and colonoscopy in 08/2023 revealing gastritis, duodenitis, non-bleeding AVM, and two polyps, one removed (pathology revealing tubular adenoma). Patient was afebrile and HDS on arrival with SBPs 90s and HR 80s. Labs notable for Hgb 5.9, INR 7.01, BUN/Cr 64/1.80, Alk Phos 418. ED ordered for Kcentra, Vit K, and 2U PRBCs.

## 2023-10-05 NOTE — ED PROVIDER NOTE - CLINICAL SUMMARY MEDICAL DECISION MAKING FREE TEXT BOX
67yo M ho hemorrhagic stroke status post left craniotomy, coronary artery bypass grafting, diabetes and peripheral vascular disease pt is on coumadin for afib pw low hb and rectal bleeding. pt daugther noted blood in his stool last couple days, had hb checked today by hematologuist and was 5, INR 2 days ago was 7, coumadin has been held for last 2 days, denies fevesr, chills, abd pain, nausea, vomtiing, lightheadedness, dizziness or other complaints  pt on coumadin with concern for GI bleed and anemia, will rpt labs, reverse coumadin as needed, trasnfuse as needed

## 2023-10-05 NOTE — ED PROVIDER NOTE - PHYSICAL EXAMINATION
Gen: Well appearing in NAD  Head: NC/AT  Neck: trachea midline  Resp:  No distress  cv" irregular   abd nontender   Ext: no deformities  Neuro:  A&O appears non focal  Skin:  Warm and dry as visualized  Psych:  Normal affect and mood

## 2023-10-05 NOTE — CONSULT NOTE ADULT - ASSESSMENT
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 Per daughter at bedside, patient's INR level was 7.2 two days ago and was instructed by hematologist to hold coumadin for the past 2 days. Of note, patient had EGD and colonoscopy in 08/2023 revealing gastritis, duodenitis, non-bleeding AVM, and two polyps, one removed (pathology revealing tubular adenoma). Patient was afebrile and HDS on arrival with SBPs 90s and HR 80s. Labs notable for Hgb 5.9, INR 7.01, BUN/Cr 64/1.80, Alk Phos 418. ED ordered for Kcentra, Vit K, and 2U PRBCs. (05 Oct 2023 15:48)    hyponatremia   will check ua , urine osmolality , urine sodium , urine uric acid , serum sodium , serum osmolality , serum uric acid , f/u with hyponatremia work up , f/u with bmp , monitor i and o    ACUTE RENAL FAILURE:   Serum creatinine is  at    1.8  , approximating GFR at   ml/min.   There is no progression . No uremic symptoms  No evidence of anemia .  Fluid status stable.  Will continue to avoid nephrotoxic drugs.  Patient remains asymptomatic.   Continue current therapy.  hold  diuretic.  hold   ACE inhibitor.  hold   ARB.  Additional evaluation:   ECG,    echocardiogram,     CXR,  will obtained recent   renal ultrasound to evalaute kidney size and possible stones ,

## 2023-10-05 NOTE — ED ADULT NURSE NOTE - OBJECTIVE STATEMENT
Pt presents to the ED via ambulance s/p abnormal labs, rectal bleeding, currently on coumadin. Pt has a history of a CVA, with right sided weakness. Pt is bedbound as pe daughter.

## 2023-10-05 NOTE — CONSULT NOTE ADULT - ASSESSMENT
Anemia  Rectal Bleeding/Melena     in setting of supra-therapeutic INR   reverse INR (in progress)  transfuse PRBC x 1-2 units (keep active type and screen)  continue to hold coumadin for now   Start Protonix GTT and Carafate Syrup 1g BID    Monitor stool color   pt with h/o gastric ulcer and avm's   may need endoscopic eval early next week if no improvement in h/h  ICU input appreciated  will follow    Anemia  Rectal Bleeding/Melena     in setting of supra-therapeutic INR   reverse INR (in progress)  transfuse PRBC x 2 units (keep active type and screen)  continue to hold coumadin for now   Start Protonix GTT and Carafate Syrup 1g BID    Monitor stool color   pt with h/o gastric ulcer and avm's   may need endoscopic eval early next week if no improvement in h/h  ICU input appreciated  will follow

## 2023-10-05 NOTE — PATIENT PROFILE ADULT - FALL HARM RISK - HARM RISK INTERVENTIONS

## 2023-10-05 NOTE — H&P ADULT - NSHPLABSRESULTS_GEN_ALL_CORE
Lab Results:  CBC  CBC Full  -  ( 05 Oct 2023 13:00 )  WBC Count : 11.76 K/uL  RBC Count : 2.32 M/uL  Hemoglobin : 5.9 g/dL  Hematocrit : 18.8 %  Platelet Count - Automated : 314 K/uL  Mean Cell Volume : 81.0 fl  Mean Cell Hemoglobin : 25.4 pg  Mean Cell Hemoglobin Concentration : 31.4 gm/dL  Auto Neutrophil # : 8.91 K/uL  Auto Lymphocyte # : 1.52 K/uL  Auto Monocyte # : 1.14 K/uL  Auto Eosinophil # : 0.08 K/uL  Auto Basophil # : 0.03 K/uL  Auto Neutrophil % : 75.7 %  Auto Lymphocyte % : 12.9 %  Auto Monocyte % : 9.7 %  Auto Eosinophil % : 0.7 %  Auto Basophil % : 0.3 %    .		Differential:	[] Automated		[] Manual  Chemistry                        5.9    11.76 )-----------( 314      ( 05 Oct 2023 13:00 )             18.8     10-05    129<L>  |  95<L>  |  64<H>  ----------------------------<  264<H>  4.2   |  25  |  1.80<H>    Ca    8.5      05 Oct 2023 13:00    TPro  7.5  /  Alb  3.2<L>  /  TBili  0.4  /  DBili  x   /  AST  17  /  ALT  23  /  AlkPhos  418<H>  10-05    LIVER FUNCTIONS - ( 05 Oct 2023 13:00 )  Alb: 3.2 g/dL / Pro: 7.5 g/dL / ALK PHOS: 418 U/L / ALT: 23 U/L / AST: 17 U/L / GGT: x           PT/INR - ( 05 Oct 2023 13:00 )   PT: 77.5 sec;   INR: 7.01 ratio         PTT - ( 05 Oct 2023 13:00 )  PTT:57.9 sec  Urinalysis Basic - ( 05 Oct 2023 13:00 )    Color: x / Appearance: x / SG: x / pH: x  Gluc: 264 mg/dL / Ketone: x  / Bili: x / Urobili: x   Blood: x / Protein: x / Nitrite: x   Leuk Esterase: x / RBC: x / WBC x   Sq Epi: x / Non Sq Epi: x / Bacteria: x            MICROBIOLOGY/CULTURES:      RADIOLOGY RESULTS: reviewed

## 2023-10-05 NOTE — CONSULT NOTE ADULT - SUBJECTIVE AND OBJECTIVE BOX
Patient is a 68y Male whom presented to the hospital with hyponatremia     PAST MEDICAL & SURGICAL HISTORY:  CVA (cerebral vascular accident)      HTN (hypertension)      DM (diabetes mellitus)      History of atrial fibrillation      Right hemiparesis      Aphasia due to acute stroke      Upper GI bleed      Osteomyelitis      S/P CABG (coronary artery bypass graft)      S/P brain surgery      History of aortic valve repair          MEDICATIONS  (STANDING):  atorvastatin 40 milliGRAM(s) Oral at bedtime  levETIRAcetam 750 milliGRAM(s) Oral two times a day  pantoprazole Infusion 8 mG/Hr (10 mL/Hr) IV Continuous <Continuous>  sucralfate 1 Gram(s) Oral four times a day      Allergies    No Known Allergies    Intolerances        SOCIAL HISTORY:  Denies ETOh,Smoking,     FAMILY HISTORY:  FH: coronary artery disease (Sibling)    FH: type 2 diabetes (Sibling)        REVIEW OF SYSTEMS:    CONSTITUTIONAL: No weakness, fevers or chills  RESPIRATORY: No cough, wheezing, hemoptysis; No shortness of breath  CARDIOVASCULAR: No chest pain or palpitations  GASTROINTESTINAL: No abdominal or epigastric pain. No nausea, vomiting,     No diarrhea or constipation. No melena   SKIN: dry      VITAL:  T(C): , Max: 36.4 (10-05-23 @ 16:15)  T(F): , Max: 97.6 (10-05-23 @ 16:15)  HR: 80 (10-05-23 @ 19:50)  BP: 107/56 (10-05-23 @ 19:50)  BP(mean): --  RR: 18 (10-05-23 @ 19:50)  SpO2: 100% (10-05-23 @ 19:50)  Wt(kg): --    I and O's:      Weight (kg): 63.5 (10-05 @ 12:28)    PHYSICAL EXAM:    Constitutional: NAD  HEENT: conjunctive   clear   Neck:  No JVD  Respiratory: CTAB  Cardiovascular: S1 and S2  Gastrointestinal: BS+, soft, NT/ND  Extremities: No peripheral edema  Neurological:  no focal deficits  Skin: dry   Access: Not applicable    LABS:                        5.9    11.76 )-----------( 314      ( 05 Oct 2023 13:00 )             18.8     10-05    129<L>  |  95<L>  |  64<H>  ----------------------------<  264<H>  4.2   |  25  |  1.80<H>    Ca    8.5      05 Oct 2023 13:00    TPro  7.5  /  Alb  3.2<L>  /  TBili  0.4  /  DBili  x   /  AST  17  /  ALT  23  /  AlkPhos  418<H>  10-05      Urine Studies:  Urinalysis Basic - ( 05 Oct 2023 13:00 )    Color: x / Appearance: x / SG: x / pH: x  Gluc: 264 mg/dL / Ketone: x  / Bili: x / Urobili: x   Blood: x / Protein: x / Nitrite: x   Leuk Esterase: x / RBC: x / WBC x   Sq Epi: x / Non Sq Epi: x / Bacteria: x      MEDICATIONS  (STANDING):  atorvastatin 40 milliGRAM(s) Oral at bedtime  levETIRAcetam 750 milliGRAM(s) Oral two times a day  pantoprazole Infusion 8 mG/Hr (10 mL/Hr) IV Continuous <Continuous>  sucralfate 1 Gram(s) Oral four times a day        RADIOLOGY & ADDITIONAL STUDIES:

## 2023-10-05 NOTE — CONSULT NOTE ADULT - SUBJECTIVE AND OBJECTIVE BOX
Date/Time Patient Seen:  		  Referring MD:   Data Reviewed	       Patient is a 68y old  Male who presents with a chief complaint of black stool (05 Oct 2023 15:48)      Subjective/HPI   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, seizure disorder, prior CABG, DM2, AFib, Mechanical AV replacement in 2007 (on Warfarin) presenting to [facility] ED per recommendation of hematologist for low Hgb to 5.2 with associated black stools  PAST MEDICAL & SURGICAL HISTORY:  CVA (cerebral vascular accident)    HTN (hypertension)    DM (diabetes mellitus)    Arrhythmia    History of atrial fibrillation    Right hemiparesis    Aphasia due to acute stroke    GI bleed    Iron deficiency anemia    Upper GI bleed    Osteomyelitis    S/P CABG (coronary artery bypass graft)    S/P brain surgery    History of aortic valve repair    Right hemiparesis     Upper GI bleed.     PAST SURGICAL HISTORY:  History of aortic valve repair     S/P brain surgery     S/P CABG (coronary artery bypass graft).     FAMILY HISTORY:  Sibling  Still living? Unknown  FH: coronary artery disease, Age at diagnosis: Age Unknown  FH: type 2 diabetes, Age at diagnosis: Age Unknown.     Social History:  · Substance use	No     Tobacco Screening:  · Core Measure Site	Yes        Medication list         MEDICATIONS  (STANDING):  atorvastatin 40 milliGRAM(s) Oral at bedtime  levETIRAcetam 750 milliGRAM(s) Oral two times a day  pantoprazole Infusion 8 mG/Hr (10 mL/Hr) IV Continuous <Continuous>  sucralfate 1 Gram(s) Oral four times a day    MEDICATIONS  (PRN):         Vitals log        ICU Vital Signs Last 24 Hrs  T(C): 36.4 (05 Oct 2023 16:45), Max: 36.4 (05 Oct 2023 16:15)  T(F): 97.6 (05 Oct 2023 16:45), Max: 97.6 (05 Oct 2023 16:15)  HR: 92 (05 Oct 2023 16:45) (82 - 93)  BP: 97/57 (05 Oct 2023 16:45) (92/58 - 99/58)  BP(mean): --  ABP: --  ABP(mean): --  RR: 18 (05 Oct 2023 16:45) (16 - 18)  SpO2: 100% (05 Oct 2023 16:45) (99% - 100%)    O2 Parameters below as of 05 Oct 2023 16:45  Patient On (Oxygen Delivery Method): room air                 Input and Output:  I&O's Detail      Lab Data                        5.9    11.76 )-----------( 314      ( 05 Oct 2023 13:00 )             18.8     10-05    129<L>  |  95<L>  |  64<H>  ----------------------------<  264<H>  4.2   |  25  |  1.80<H>    Ca    8.5      05 Oct 2023 13:00    TPro  7.5  /  Alb  3.2<L>  /  TBili  0.4  /  DBili  x   /  AST  17  /  ALT  23  /  AlkPhos  418<H>  10-05            Review of Systems	  weakness      Objective     Physical Examination    heart s1s2  lung dc BS  head nc      Pertinent Lab findings & Imaging      Adams:  NO   Adequate UO     I&O's Detail           Discussed with:     Cultures:	        Radiology    ACC: [id number] EXAM:  XR CHEST PORTABLE URGENT 1V   ORDERED BY: MARCOS VEGA     PROCEDURE DATE:  10/05/2023          INTERPRETATION:  INDICATION: GI bleed    Portable chest 1:28 PM    COMPARISON: 6/6/2023    FINDINGS:  Heart/Vascular: The mediastinum, hilum and aorta are within normal limits   for projection. Status post median sternotomy, CABG and aortic valve   replacement. Cardiomegaly.  Pulmonary: Midline trachea. The right lung remains clear. Persistent left   pleural effusion may be loculated with additional pleural-based process.  Bones: There is no fracture.  Lines and catheter: None    Impression:    Persistent left pleural effusion may be loculated with additional   pleural-based process.    --- End of Report ---             ENOCH SINGH DO; Attending Radiologist  This document has been electronically signed. Oct  5 2023  1:38PM

## 2023-10-05 NOTE — CONSULT NOTE ADULT - SUBJECTIVE AND OBJECTIVE BOX
Patient is a 68y old  Male who presents with a chief complaint of low Hgb level with recent black stools.    BRIEF HOSPITAL COURSE: 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, seizure disorder, prior CABG, DM2, AFib, Mechanical AV replacement in 2007 (on Warfarin) presenting to [facility] ED per recommendation of hematologist for low Hgb to 5.2 with associated black stools for the past few days, last being yesterday. Per daughter at bedside, patient's INR level was 7.2 two days ago and was instructed by hematologist to hold coumadin for the past 2 days. Of note, patient had EGD and colonoscopy in 08/2023 revealing gastritis, duodenitis, non-bleeding AVM, and two polyps, one removed (pathology revealing tubular adenoma). Patient was afebrile and HDS on arrival with SBPs 90s and HR 80s. Labs notable for Hgb 5.9, INR 7.01, BUN/Cr 64/1.80, Alk Phos 418. ED ordered for Kcentra, Vit K, and 2U PRBCs. MICU consulted for GIB.       PAST MEDICAL & SURGICAL HISTORY:  CVA (cerebral vascular accident)      HTN (hypertension)      DM (diabetes mellitus)      History of atrial fibrillation      Right hemiparesis      Aphasia due to acute stroke      Upper GI bleed      Osteomyelitis      S/P CABG (coronary artery bypass graft)      S/P brain surgery      History of aortic valve repair          Review of Systems: Per daughter:  CONSTITUTIONAL: No fever, chills, or fatigue  EYES: No eye pain, visual disturbances, or discharge  ENMT:  No difficulty hearing, tinnitus, vertigo; No sinus or throat pain  NECK: No pain or stiffness  RESPIRATORY: No cough, wheezing, chills or hemoptysis; No shortness of breath  CARDIOVASCULAR: No chest pain, palpitations, dizziness, or leg swelling  GASTROINTESTINAL: No abdominal or epigastric pain. No nausea, vomiting, or hematemesis; No diarrhea or constipation. No melena or hematochezia.  GENITOURINARY: No dysuria, frequency, hematuria, or incontinence  NEUROLOGICAL: No headaches, memory loss, loss of strength, numbness, or tremors  SKIN: No itching, burning, rashes, or lesions   MUSCULOSKELETAL: No joint pain or swelling; No muscle, back, or extremity pain  PSYCHIATRIC: No depression, anxiety, mood swings, or difficulty sleeping      Medications:            prothrombin complex concentrate IVPB (KCENTRA) 1500 International Unit(s) IV Intermittent once          phytonadione  IVPB 10 milliGRAM(s) IV Intermittent once                ICU Vital Signs Last 24 Hrs  T(C): 36.2 (05 Oct 2023 12:28), Max: 36.2 (05 Oct 2023 12:28)  T(F): 97.2 (05 Oct 2023 12:28), Max: 97.2 (05 Oct 2023 12:28)  HR: 93 (05 Oct 2023 14:00) (82 - 93)  BP: 93/55 (05 Oct 2023 14:00) (92/58 - 93/55)  BP(mean): --  ABP: --  ABP(mean): --  RR: 16 (05 Oct 2023 14:00) (16 - 16)  SpO2: 100% (05 Oct 2023 14:00) (100% - 100%)    O2 Parameters below as of 05 Oct 2023 14:00  Patient On (Oxygen Delivery Method): room air                I&O's Detail        LABS:                        5.9    11.76 )-----------( 314      ( 05 Oct 2023 13:00 )             18.8     10-05    129<L>  |  95<L>  |  64<H>  ----------------------------<  264<H>  4.2   |  25  |  1.80<H>    Ca    8.5      05 Oct 2023 13:00    TPro  7.5  /  Alb  3.2<L>  /  TBili  0.4  /  DBili  x   /  AST  17  /  ALT  23  /  AlkPhos  418<H>  10-05          CAPILLARY BLOOD GLUCOSE        PT/INR - ( 05 Oct 2023 13:00 )   PT: 77.5 sec;   INR: 7.01 ratio         PTT - ( 05 Oct 2023 13:00 )  PTT:57.9 sec  Urinalysis Basic - ( 05 Oct 2023 13:00 )    Color: x / Appearance: x / SG: x / pH: x  Gluc: 264 mg/dL / Ketone: x  / Bili: x / Urobili: x   Blood: x / Protein: x / Nitrite: x   Leuk Esterase: x / RBC: x / WBC x   Sq Epi: x / Non Sq Epi: x / Bacteria: x      CULTURES:      Physical Examination:    General: No acute distress.  Alert, oriented to place (baseline per daughter), nonfocal, moving all extremities     HEENT: Pupils equal, reactive to light.  Symmetric.    PULM: Breathing comfortabley on RA, good BS B/L with no Rales or Rhonchi, no significant sputum production    CVS: Regular rate and rhythm, mechanical valve heard best at 2nd R ICS, no rubs or gallops    ABD: BS+ Soft, nondistended, nontender, no masses    EXT: No edema, nontender    SKIN: Warm and well perfused, no rashes noted.    RADIOLOGY:   < from: Xray Chest 1 View- PORTABLE-Urgent (Xray Chest 1 View- PORTABLE-Urgent .) (10.05.23 @ 13:30) >  Impression:    Persistent left pleural effusion may be loculated with additional   pleural-based process.    < end of copied text >      Time spent on this patient encoutner, which includes documenting this note in the EMR, was 42 minutes including assessing the presenting problems with associated risks, reviewing the medical record to prepare for the encounter, and meeting face to face with the patient to obtain additional history. I have also performed and interpreted diagnostic studies as documented. To improve communication and patient safety, I have coordinated with the multidisciplinary team including the bedside nurse, appropriate attending of record, and consultants as needed.   Patient is a 68y old  Male who presents with a chief complaint of low Hgb level with recent black stools.    BRIEF HOSPITAL COURSE: 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, seizure disorder, prior CABG, DM2, AFib, Mechanical AV replacement in 2007 (on Warfarin) presenting to [facility] ED per recommendation of hematologist for low Hgb to 5.2 with associated black stools for the past few days, last being yesterday. Per daughter at bedside, patient's INR level was 7.2 two days ago and was instructed by hematologist to hold coumadin for the past 2 days. Of note, patient had EGD and colonoscopy in 08/2023 revealing gastritis, duodenitis, non-bleeding AVM, and two polyps, one removed (pathology revealing tubular adenoma). Patient was afebrile and HDS on arrival with SBPs 90s and HR 80s. Labs notable for Hgb 5.9, INR 7.01, BUN/Cr 64/1.80, Alk Phos 418. ED ordered for Kcentra, Vit K, and 2U PRBCs. MICU consulted for GIB.       PAST MEDICAL & SURGICAL HISTORY:  CVA (cerebral vascular accident)      HTN (hypertension)      DM (diabetes mellitus)      History of atrial fibrillation      Right hemiparesis      Aphasia due to acute stroke      Upper GI bleed      Osteomyelitis      S/P CABG (coronary artery bypass graft)      S/P brain surgery      History of aortic valve repair          Review of Systems: Per daughter:  CONSTITUTIONAL: No fever, chills, or fatigue  EYES: No eye pain, visual disturbances, or discharge  ENMT:  No difficulty hearing, tinnitus, vertigo; No sinus or throat pain  NECK: No pain or stiffness  RESPIRATORY: No cough, wheezing, chills or hemoptysis; No shortness of breath  CARDIOVASCULAR: No chest pain, palpitations, dizziness, or leg swelling  GASTROINTESTINAL: No abdominal or epigastric pain. No nausea, vomiting, or hematemesis; No diarrhea or constipation. No melena or hematochezia.  GENITOURINARY: No dysuria, frequency, hematuria, or incontinence  NEUROLOGICAL: No headaches, memory loss, loss of strength, numbness, or tremors  SKIN: No itching, burning, rashes, or lesions   MUSCULOSKELETAL: No joint pain or swelling; No muscle, back, or extremity pain  PSYCHIATRIC: No depression, anxiety, mood swings, or difficulty sleeping      Medications:            prothrombin complex concentrate IVPB (KCENTRA) 1500 International Unit(s) IV Intermittent once          phytonadione  IVPB 10 milliGRAM(s) IV Intermittent once                ICU Vital Signs Last 24 Hrs  T(C): 36.2 (05 Oct 2023 12:28), Max: 36.2 (05 Oct 2023 12:28)  T(F): 97.2 (05 Oct 2023 12:28), Max: 97.2 (05 Oct 2023 12:28)  HR: 93 (05 Oct 2023 14:00) (82 - 93)  BP: 93/55 (05 Oct 2023 14:00) (92/58 - 93/55)  BP(mean): --  ABP: --  ABP(mean): --  RR: 16 (05 Oct 2023 14:00) (16 - 16)  SpO2: 100% (05 Oct 2023 14:00) (100% - 100%)    O2 Parameters below as of 05 Oct 2023 14:00  Patient On (Oxygen Delivery Method): room air                I&O's Detail        LABS:                        5.9    11.76 )-----------( 314      ( 05 Oct 2023 13:00 )             18.8     10-05    129<L>  |  95<L>  |  64<H>  ----------------------------<  264<H>  4.2   |  25  |  1.80<H>    Ca    8.5      05 Oct 2023 13:00    TPro  7.5  /  Alb  3.2<L>  /  TBili  0.4  /  DBili  x   /  AST  17  /  ALT  23  /  AlkPhos  418<H>  10-05          CAPILLARY BLOOD GLUCOSE        PT/INR - ( 05 Oct 2023 13:00 )   PT: 77.5 sec;   INR: 7.01 ratio         PTT - ( 05 Oct 2023 13:00 )  PTT:57.9 sec  Urinalysis Basic - ( 05 Oct 2023 13:00 )    Color: x / Appearance: x / SG: x / pH: x  Gluc: 264 mg/dL / Ketone: x  / Bili: x / Urobili: x   Blood: x / Protein: x / Nitrite: x   Leuk Esterase: x / RBC: x / WBC x   Sq Epi: x / Non Sq Epi: x / Bacteria: x      CULTURES:      Physical Examination:    General: No acute distress.  Alert, oriented to place (baseline per daughter), nonfocal, moving all extremities     HEENT: Pupils equal, reactive to light.  Symmetric.    PULM: Breathing comfortabley on RA, good BS B/L with no Rales or Rhonchi, no significant sputum production    CVS: Regular rate and rhythm, mechanical valve heard best at 2nd R ICS, no rubs or gallops    ABD: BS+ Soft, nondistended, nontender, no masses    EXT: No edema, nontender    SKIN: Warm and well perfused, no rashes noted.    RADIOLOGY:   < from: Xray Chest 1 View- PORTABLE-Urgent (Xray Chest 1 View- PORTABLE-Urgent .) (10.05.23 @ 13:30) >  Impression:    Persistent left pleural effusion may be loculated with additional   pleural-based process.    < end of copied text >      Time spent on this patient encounter, which includes documenting this note in the EMR, was 60 minutes including assessing the presenting problems with associated risks, reviewing the medical record to prepare for the encounter, and meeting face to face with the patient to obtain additional history. I have also performed and interpreted diagnostic studies as documented. To improve communication and patient safety, I have coordinated with the multidisciplinary team including the bedside nurse, appropriate attending of record, and consultants as needed.

## 2023-10-05 NOTE — CONSULT NOTE ADULT - ASSESSMENT
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 Per daughter at bedside, patient's INR level was 7.2 two days ago and was instructed by hematologist to hold coumadin for the past 2 days. Of note, patient had EGD and colonoscopy in 08/2023 revealing gastritis, duodenitis, non-bleeding AVM, and two polyps, one removed (pathology revealing tubular adenoma). Patient was afebrile and HDS on arrival with SBPs 90s and HR 80s. Labs notable for Hgb 5.9, INR 7.01, BUN/Cr 64/1.80, Alk Phos 418. ED ordered for Kcentra, Vit K, and 2U PRBCs. MICU consulted for GIB.     Suspected UGIB  ABLA  Elevated INR (likely iso warfarin use)  KHALIF    Plan:  -   - Consutl GI, appreciate recs.  - Rest of car per primary team.    Dispo: Patient is currently HDS and does not require upgrade to ICU level care at this time. Please reconsult in event of acute decompensation.         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 Per daughter at bedside, patient's INR level was 7.2 two days ago and was instructed by hematologist to hold coumadin for the past 2 days. Of note, patient had EGD and colonoscopy in 08/2023 revealing gastritis, duodenitis, non-bleeding AVM, and two polyps, one removed (pathology revealing tubular adenoma). Patient was afebrile and HDS on arrival with SBPs 90s and HR 80s. Labs notable for Hgb 5.9, INR 7.01, BUN/Cr 64/1.80, Alk Phos 418. ED ordered for Kcentra, Vit K, and 2U PRBCs. MICU consulted for GIB.     Suspected UGIB  ABLA  Elevated INR (likely iso warfarin use)  KHALIF    Plan:  - Currently HDS with SBPs 90s/50s and AFib HR 80s. Patient does not require vasopressors at this time. Goal MAP>65.  - Ordered for 2U PRBC. Actively transfusing for goal Hgb>8. Trend CBC q6h.  - Elevated INR likely iso ongoing warfarin use. Give KCentra. Would hold off on vitamin K for now. Although suspect ongoing GIB, INR goal for mechanical AV is 2.5-3.5, would aim for goal INR~2.5. Continue to hold warfarin and chemical DVT ppx for now. SCDs only.  - Recommend starting protonix gtt given location of GIB is suspected to be upper GI tract.  - CT A&P w/ contrast deferred given ongoing KHALIF.  - Elevated BUN/Cr likely pre-renal iso hypovolemia/blood loss, continue to trend with IVF and blood resuscitation. Baseline Cr appears to be around 0.9-1.0.  - Consult GI, appreciate recs. May need repeat EGD.  - Rest of care per primary team.    Dispo: Patient is currently HDS and does not require upgrade to ICU level care at this time. Please reconsult in event of acute decompensation.    Case discussed with ICU Attending Dr Ruvalcaba.

## 2023-10-05 NOTE — CONSULT NOTE ADULT - ASSESSMENT
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, seizure disorder, prior CABG, DM2, AFib, Mechanical AV replacement in 2007 (on Warfarin) presenting to [facility] ED per recommendation of hematologist for low Hgb to 5.2 with associated black stools    anemia  mech valve  cva  aphasia  seizure disorder  pleural eff  atelectasis  AF  CAD  CABG hx  DM    GI and ICU cx noted  serial hgb  s/p PRBC - Vit K - Kcentra -   I and O  monitor VS and HD  PPI  goal map > 60  cvs rx regimen  goal sat > 88 pct  cxr noted - chr changed, consideration for CT chest  will follow  GOC discussion

## 2023-10-05 NOTE — ED ADULT NURSE NOTE - NSFALLHARMRISKINTERV_ED_ALL_ED
Assistance OOB with selected safe patient handling equipment if applicable/Assistance with ambulation/Communicate risk of Fall with Harm to all staff, patient, and family/Monitor gait and stability/Provide visual cue: red socks, yellow wristband, yellow gown, etc/Reinforce activity limits and safety measures with patient and family/Bed in lowest position, wheels locked, appropriate side rails in place/Call bell, personal items and telephone in reach/Instruct patient to call for assistance before getting out of bed/chair/stretcher/Non-slip footwear applied when patient is off stretcher/[place] to call system/Physically safe environment - no spills, clutter or unnecessary equipment/Purposeful Proactive Rounding/Room/bathroom lighting operational, light cord in reach

## 2023-10-05 NOTE — CONSULT NOTE ADULT - SUBJECTIVE AND OBJECTIVE BOX
Chief Complaint:  Patient is a 68y old  Male who presents with a chief complaint of   CVA (cerebral vascular accident)    HTN (hypertension)    DM (diabetes mellitus)    Arrhythmia    History of atrial fibrillation    Right hemiparesis    Aphasia due to acute stroke    GI bleed    Iron deficiency anemia    Upper GI bleed    Osteomyelitis    S/P CABG (coronary artery bypass graft)    S/P brain surgery    History of aortic valve repair      HPI:  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, seizure disorder, prior CABG, DM2, AFib, Mechanical AV replacement in 2007 (on Warfarin), PUD and Gastric AVM presents with low hemoglobin and rectal bleeding. Pt is aphasic, daughter bedside who is very involved in patients care gave history. The daughter noted blood in his stool last couple days, had hb checked today by hematologist and was 5, INR 2 days ago was 7, coumadin has been held for last 2 days. There have been no new medications or dietary changes.  The daughter reports that yesterday was the last day she noted black stool mixed with some red. Today he has not had a BM as of yet. He has no associated abdominal pain, no n/v. He is known to our service from previous admission in 11/2022 for UGIB with bleeding Gastric Ulcer. Of note, patient had a recent EGD and colonoscopy in 08/2023 revealing gastritis, duodenitis, non-bleeding AVM, and two polyps, one removed (pathology revealing tubular adenoma)      No Known Allergies      prothrombin complex concentrate IVPB (KCENTRA) 1500 International Unit(s) IV Intermittent once        FAMILY HISTORY:  FH: coronary artery disease (Sibling)    FH: type 2 diabetes (Sibling)          Review of Systems: *unobtainable, pt with aphasia        Relevant Family History:   n/c    Relevant Social History: n/c      Physical Exam:    Vital Signs:  Vital Signs Last 24 Hrs  T(C): 36.2 (05 Oct 2023 12:28), Max: 36.2 (05 Oct 2023 12:28)  T(F): 97.2 (05 Oct 2023 12:28), Max: 97.2 (05 Oct 2023 12:28)  HR: 93 (05 Oct 2023 14:00) (82 - 93)  BP: 93/55 (05 Oct 2023 14:00) (92/58 - 93/55)  BP(mean): --  RR: 16 (05 Oct 2023 14:00) (16 - 16)  SpO2: 100% (05 Oct 2023 14:00) (100% - 100%)    Parameters below as of 05 Oct 2023 14:00  Patient On (Oxygen Delivery Method): room air      Daily     Daily     General:  Appears stated age, well-groomed, nad  HEENT:  NC/AT,  conjunctivae clear and pink, no thyromegaly, nodules, adenopathy, no JVD  Chest:  Full & symmetric excursion, no increased effort, breath sounds clear  Cardiovascular:  Regular rhythm, S1, S2, no murmur/rub/S3/S4, no abdominal bruit, no edema  Abdomen:  Soft, non-tender, non-distended, normoactive bowel sounds,  no masses ,no hepatosplenomeagaly, no signs of chronic liver disease  Extremities:  no cyanosis,clubbing or edema  Skin:  No rash/erythema/ecchymoses/petechiae/wounds/abscess/warm/dry  Neuro/Psych:  A&O (aphasia)  , no asterixis, no tremor, no encephalopathy    Laboratory:                            5.9    11.76 )-----------( 314      ( 05 Oct 2023 13:00 )             18.8     10-05    129<L>  |  95<L>  |  64<H>  ----------------------------<  264<H>  4.2   |  25  |  1.80<H>    Ca    8.5      05 Oct 2023 13:00    TPro  7.5  /  Alb  3.2<L>  /  TBili  0.4  /  DBili  x   /  AST  17  /  ALT  23  /  AlkPhos  418<H>  10-05    LIVER FUNCTIONS - ( 05 Oct 2023 13:00 )  Alb: 3.2 g/dL / Pro: 7.5 g/dL / ALK PHOS: 418 U/L / ALT: 23 U/L / AST: 17 U/L / GGT: x           PT/INR - ( 05 Oct 2023 13:00 )   PT: 77.5 sec;   INR: 7.01 ratio         PTT - ( 05 Oct 2023 13:00 )  PTT:57.9 sec  Urinalysis Basic - ( 05 Oct 2023 13:00 )    Color: x / Appearance: x / SG: x / pH: x  Gluc: 264 mg/dL / Ketone: x  / Bili: x / Urobili: x   Blood: x / Protein: x / Nitrite: x   Leuk Esterase: x / RBC: x / WBC x   Sq Epi: x / Non Sq Epi: x / Bacteria: x        Imaging:

## 2023-10-05 NOTE — CONSULT NOTE ADULT - NS PANP COMMENT GEN_ALL_CORE FT
Agree with ICU AUDIE Salazar' note above.  Mr. Moore is currently hemodynamically stable. Would keep MAP 60-65 for permissive hypotension so as not to disturb the clot.  INR goal should be 2.5-3.5 given mechanical aortic valve.   GI already following; follow recs. Can do PPI drip or BID 40mg IV dosing per their recommendation.  Hemoglobin goal >8 given cardiac hx.    Patient does not need ICU level care at this time. Please re-consult if anything changes.  Thank you.

## 2023-10-05 NOTE — ED PROVIDER NOTE - OBJECTIVE STATEMENT
69yo M ho hemorrhagic stroke status post left craniotomy, coronary artery bypass grafting, diabetes and peripheral vascular disease pt is on coumadin for afib pw low hb and rectal bleeding. pt daugther noted blood in his stool last couple days, had hb checked today by hematologuist and was 5, INR 2 days ago was 7, coumadin has been held for last 2 days, denies fevesr, chills, abd pain, nausea, vomtiing, lightheadedness, dizziness or other complaints 67yo M ho hemorrhagic stroke status post left craniotomy, coronary artery bypass grafting, diabetes and peripheral vascular disease pt is on coumadin for afib pw low hb and rectal bleeding. pt daugther noted blood in his stool last couple days, had hb checked today by hematologist and was 5, INR 2 days ago was 7, coumadin has been held for last 2 days, denies fevesr, chills, abd pain, nausea, vomtiing, lightheadedness, dizziness or other complaints

## 2023-10-05 NOTE — H&P ADULT - ASSESSMENT
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 Per daughter at bedside, patient's INR level was 7.2 two days ago and was instructed by hematologist to hold coumadin for the past 2 days. Of note, patient had EGD and colonoscopy in 08/2023 revealing gastritis, duodenitis, non-bleeding AVM, and two polyps, one removed (pathology revealing tubular adenoma). Patient was afebrile and HDS on arrival with SBPs 90s and HR 80s. Labs notable for Hgb 5.9, INR 7.01, BUN/Cr 64/1.80, Alk Phos 418. ED ordered for Kcentra, Vit K, and 2U PRBCs. 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 Per daughter at bedside, patient's INR level was 7.2 two days ago and was instructed by hematologist to hold coumadin for the past 2 days. Of note, patient had EGD and colonoscopy in 08/2023 revealing gastritis, duodenitis, non-bleeding AVM, and two polyps, one removed (pathology revealing tubular adenoma). Patient was afebrile and HDS on arrival with SBPs 90s and HR 80s. Labs notable for Hgb 5.9, INR 7.01, BUN/Cr 64/1.80, Alk Phos 418. ED ordered for Kcentra, Vit K, and 2U PRBCs.    1Acute blood loss anemia  - monitor cbc  - hold AC  - sp Kcentra and vitamin K  - GI fu  - Protonix drip  - ICU consult appreciated    2 High INR   - hold AC  - monitor INR  - sp Vitamin K  - sp Kcentra    3 Hx of CVA  - neuro fu  - hold AC,sec to [facility]

## 2023-10-06 NOTE — PROGRESS NOTE ADULT - SUBJECTIVE AND OBJECTIVE BOX
Patient is a 68y old  Male who presents with a chief complaint of black stool (06 Oct 2023 09:53)    Date of servie : 10-06-23 @ 14:09  INTERVAL HPI/OVERNIGHT EVENTS:  T(C): 36.5 (10-06-23 @ 12:43), Max: 36.7 (10-06-23 @ 02:00)  HR: 79 (10-06-23 @ 12:43) (68 - 92)  BP: 99/67 (10-06-23 @ 12:43) (97/57 - 107/56)  RR: 17 (10-06-23 @ 12:43) (16 - 18)  SpO2: 93% (10-06-23 @ 12:43) (93% - 100%)  Wt(kg): --  I&O's Summary      LABS:                        8.7    15.73 )-----------( 220      ( 06 Oct 2023 11:45 )             27.3     10-06    132<L>  |  97  |  63<H>  ----------------------------<  300<H>  4.5   |  25  |  1.90<H>    Ca    8.5      06 Oct 2023 09:15    TPro  6.5  /  Alb  2.9<L>  /  TBili  1.1  /  DBili  x   /  AST  16  /  ALT  20  /  AlkPhos  347<H>  10-06    PT/INR - ( 06 Oct 2023 07:30 )   PT: 14.5 sec;   INR: 1.25 ratio         PTT - ( 05 Oct 2023 22:06 )  PTT:34.4 sec  Urinalysis Basic - ( 06 Oct 2023 09:15 )    Color: x / Appearance: x / SG: x / pH: x  Gluc: 300 mg/dL / Ketone: x  / Bili: x / Urobili: x   Blood: x / Protein: x / Nitrite: x   Leuk Esterase: x / RBC: x / WBC x   Sq Epi: x / Non Sq Epi: x / Bacteria: x      CAPILLARY BLOOD GLUCOSE      POCT Blood Glucose.: 346 mg/dL (06 Oct 2023 12:02)  POCT Blood Glucose.: 289 mg/dL (06 Oct 2023 08:23)        Urinalysis Basic - ( 06 Oct 2023 09:15 )    Color: x / Appearance: x / SG: x / pH: x  Gluc: 300 mg/dL / Ketone: x  / Bili: x / Urobili: x   Blood: x / Protein: x / Nitrite: x   Leuk Esterase: x / RBC: x / WBC x   Sq Epi: x / Non Sq Epi: x / Bacteria: x        MEDICATIONS  (STANDING):  atorvastatin 40 milliGRAM(s) Oral at bedtime  dextrose 5%. 1000 milliLiter(s) (50 mL/Hr) IV Continuous <Continuous>  dextrose 5%. 1000 milliLiter(s) (100 mL/Hr) IV Continuous <Continuous>  dextrose 50% Injectable 25 Gram(s) IV Push once  dextrose 50% Injectable 12.5 Gram(s) IV Push once  dextrose 50% Injectable 25 Gram(s) IV Push once  glucagon  Injectable 1 milliGRAM(s) IntraMuscular once  influenza  Vaccine (HIGH DOSE) 0.7 milliLiter(s) IntraMuscular once  insulin lispro (ADMELOG) corrective regimen sliding scale   SubCutaneous three times a day before meals  levETIRAcetam 750 milliGRAM(s) Oral two times a day  pantoprazole Infusion 8 mG/Hr (10 mL/Hr) IV Continuous <Continuous>  sucralfate 1 Gram(s) Oral four times a day  urea Oral Powder 15 Gram(s) Oral daily    MEDICATIONS  (PRN):  dextrose Oral Gel 15 Gram(s) Oral once PRN Blood Glucose LESS THAN 70 milliGRAM(s)/deciliter  guaiFENesin Oral Liquid (Sugar-Free) 200 milliGRAM(s) Oral every 6 hours PRN Cough          PHYSICAL EXAM:  GENERAL: frail  HEAD:  Atraumatic, Normocephalic  CHEST/LUNG: Clear to percussion bilaterally; No rales, rhonchi, wheezing, or rubs  HEART: Regular rate and rhythm; No murmurs, rubs, or gallops  ABDOMEN: Soft, Nontender, Nondistended; Bowel sounds present  EXTREMITIES:  edema +    Care Discussed with Consultants/Other Providers [ x] YES  [ ] NO

## 2023-10-06 NOTE — PROGRESS NOTE ADULT - ASSESSMENT
Anemia  Rectal Bleeding/Melena     hgb now improved  INR has been reverse  had recent egd/colonoscopy 2 months ago, no need to repeat  suspect severe gi bleed in setting of elevated INR  reg diet  proton pump inhibitor bid  if hgb stable saturday am then no objection for low dose hep gtt with no bolus given h/o MVR

## 2023-10-06 NOTE — CONSULT NOTE ADULT - ASSESSMENT
[ASSESSMENT and  PLAN]    K92.2         GIB  D62           Anemia hemorrhage  D50.0        Fe def  Q27. 33    AVM Colon  K74. 60    Cirrhosis liver  D68.32     Coagulopathy due to coumadin  D63.8       Anemia due to chronic disease    67yo [place] M with hx of Fe def anemia, GIB, on outpt IV iron.   Known to me from office.   When well, Hgb 11g/dL with treatmetn  Recent decline in Hgb post GIB events.   Had bleed in last month, s/p eval at [facility] Colonic AVM s/p APC  seen in office yest, Thurs, and present with Hgb 5 g/dL  Sent to ER due to low Hgb and recent BRBPR, recent elevated IN R5.2    s/p PRBC txfusion  Hgb better.         RECOMMENDATIONS    GIB  Follow CBC  Transfuse PRBC as clinically indicated.   Transfuse PRBC if Hgb <7.0 or if symptomatic.   GI following.     Fe Def  Consider IV venofer.     Cirrhosis    Cardiac Valve  AC on hold due to bleeding today  Cardiology to see.     DVT Prophylaxis  Contraindicated.   Coumadin on hold      Discussed plan of care with patient and or family in detail.   Pt/Family expressed understanding of the treatment plan.   Risks, benefits and alternatives discussed in detail.   Opportunity given for questions and discussion.   Questions or concerns all addressed and answered to their satisfaction, and in lay terms.     Thank you for consulting us.

## 2023-10-06 NOTE — CONSULT NOTE ADULT - SUBJECTIVE AND OBJECTIVE BOX
Patient is a 68y old  Male who presents with a chief complaint of black stool (06 Oct 2023 18:43)      Reason For Consult: dm2 uncontrolled    HPI:  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, seizure disorder, prior CABG, DM2, AFib, Mechanical AV replacement in 2007 (on Warfarin) presenting to [facility] ED per recommendation of hematologist for low Hgb to 5.2 with associated black stools for the past few days, last being yesterday. Per daughter at bedside, patient's INR level was 7.2 two days ago and was instructed by hematologist to hold coumadin for the past 2 days. Of note, patient had EGD and colonoscopy in 08/2023 revealing gastritis, duodenitis, non-bleeding AVM, and two polyps, one removed (pathology revealing tubular adenoma). Patient was afebrile and HDS on arrival with SBPs 90s and HR 80s. Labs notable for Hgb 5.9, INR 7.01, BUN/Cr 64/1.80, Alk Phos 418. ED ordered for Kcentra, Vit K, and 2U PRBCs. (05 Oct 2023 15:48)      PAST MEDICAL & SURGICAL HISTORY:  CVA (cerebral vascular accident)      HTN (hypertension)      DM (diabetes mellitus)      History of atrial fibrillation      Right hemiparesis      Aphasia due to acute stroke      Upper GI bleed      Osteomyelitis      S/P CABG (coronary artery bypass graft)      S/P brain surgery      History of aortic valve repair          FAMILY HISTORY:  FH: coronary artery disease (Sibling)    FH: type 2 diabetes (Sibling)          Social History:    MEDICATIONS  (STANDING):  atorvastatin 40 milliGRAM(s) Oral at bedtime  dextrose 5%. 1000 milliLiter(s) (50 mL/Hr) IV Continuous <Continuous>  dextrose 5%. 1000 milliLiter(s) (100 mL/Hr) IV Continuous <Continuous>  dextrose 50% Injectable 25 Gram(s) IV Push once  dextrose 50% Injectable 12.5 Gram(s) IV Push once  dextrose 50% Injectable 25 Gram(s) IV Push once  glucagon  Injectable 1 milliGRAM(s) IntraMuscular once  influenza  Vaccine (HIGH DOSE) 0.7 milliLiter(s) IntraMuscular once  insulin lispro (ADMELOG) corrective regimen sliding scale   SubCutaneous three times a day before meals  levETIRAcetam 750 milliGRAM(s) Oral two times a day  pantoprazole Infusion 8 mG/Hr (10 mL/Hr) IV Continuous <Continuous>  sucralfate 1 Gram(s) Oral four times a day  urea Oral Powder 15 Gram(s) Oral daily    MEDICATIONS  (PRN):  dextrose Oral Gel 15 Gram(s) Oral once PRN Blood Glucose LESS THAN 70 milliGRAM(s)/deciliter  guaiFENesin Oral Liquid (Sugar-Free) 200 milliGRAM(s) Oral every 6 hours PRN Cough        T(C): 36.6 (10-06-23 @ 20:59), Max: 36.7 (10-06-23 @ 02:00)  HR: 73 (10-06-23 @ 20:59) (73 - 79)  BP: 103/58 (10-06-23 @ 20:59) (99/67 - 103/58)  RR: 18 (10-06-23 @ 20:59) (16 - 18)  SpO2: 90% (10-06-23 @ 20:59) (90% - 100%)  Wt(kg): --    PHYSICAL EXAM:  CHEST/LUNG: Clear to percussion bilaterally; No rales, rhonchi, wheezing, or rubs  HEART: Regular rate and rhythm; No murmurs, rubs, or gallops  ABDOMEN: Soft, Nontender, Nondistended; Bowel sounds present  EXTREMITIES:  2+ Peripheral Pulses, No clubbing, cyanosis, or edema  SKIN: No rashes or lesions    CAPILLARY BLOOD GLUCOSE      POCT Blood Glucose.: 275 mg/dL (06 Oct 2023 20:57)  POCT Blood Glucose.: 408 mg/dL (06 Oct 2023 17:57)  POCT Blood Glucose.: 346 mg/dL (06 Oct 2023 12:02)  POCT Blood Glucose.: 289 mg/dL (06 Oct 2023 08:23)                            8.7    15.73 )-----------( 220      ( 06 Oct 2023 11:45 )             27.3       CMP:  10-06 @ 09:15  SGPT 20  Albumin 2.9   Alk Phos 347   Anion Gap 10   SGOT 16   Total Bili 1.1   BUN 63   Calcium Total 8.5   CO2 25   Chloride 97   Creatinine 1.90   eGFR if AA --   eGFR if non AA --   Glucose 300   Potassium 4.5   Protein 6.5   Sodium 132      Thyroid Function Tests:      Diabetes Tests:       Radiology:

## 2023-10-06 NOTE — CONSULT NOTE ADULT - SUBJECTIVE AND OBJECTIVE BOX
Patient is a 68y old  Male who presents with a chief complaint of black stool (06 Oct 2023 15:55)    HPI:  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, seizure disorder, prior CABG, DM2, AFib, Mechanical AV replacement in 2007 (on Warfarin) presenting to [facility] ED per recommendation of hematologist for low Hgb to 5.2 with associated black stools for the past few days, last being yesterday. Per daughter at bedside, patient's INR level was 7.2 two days ago and was instructed by hematologist to hold coumadin for the past 2 days. Of note, patient had EGD and colonoscopy in 08/2023 revealing gastritis, duodenitis, non-bleeding AVM, and two polyps, one removed (pathology revealing tubular adenoma). Patient was afebrile and HDS on arrival with SBPs 90s and HR 80s. Labs notable for Hgb 5.9, INR 7.01, BUN/Cr 64/1.80, Alk Phos 418. ED ordered for Kcentra, Vit K, and 2U PRBCs. (05 Oct 2023 15:48)      PAST MEDICAL & SURGICAL HISTORY:  CVA (cerebral vascular accident)  HTN (hypertension)  DM (diabetes mellitus)  History of atrial fibrillation  Right hemiparesis  Aphasia due to acute stroke  Upper GI bleed  Osteomyelitis  S/P CABG (coronary artery bypass graft)  S/P brain surgery  History of aortic valve repair       HEALTH ISSUES - PROBLEM Dx:  Gastrointestinal hemorrhage [K92.2]  CVA (cerebral vascular accident) [I63.9]  HTN (hypertension) [I10]  DM (diabetes mellitus) [E11.9]  Arrhythmia [I49.9]  History of atrial fibrillation [Z86.79]  Right hemiparesis [G81.91]  Aphasia due to acute stroke [I63.9]  GI bleed [K92.2]  Iron deficiency anemia [D50.9]  Upper GI bleed [K92.2]  Osteomyelitis [M86.9]  S/P CABG (coronary artery bypass graft) [Z95.1]  S/P brain surgery [Z98.890]  History of aortic valve repair [Z98.890]      FAMILY HISTORY:  FH: coronary artery disease (Sibling)  FH: type 2 diabetes (Sibling)      [SOCIAL HISTORY: ]     smoking:  none currently     EtOH:  none currently     illicit drugs:  none currently     occupation:  Retired     marital status:       Other:       [ALLERGIES/INTOLERANCES:]  Allergies      No Known Allergies  Intolerances      [MEDICATIONS]  MEDICATIONS  (STANDING):  atorvastatin 40 milliGRAM(s) Oral at bedtime  dextrose 5%. 1000 milliLiter(s) (50 mL/Hr) IV Continuous <Continuous>  dextrose 5%. 1000 milliLiter(s) (100 mL/Hr) IV Continuous <Continuous>  dextrose 50% Injectable 25 Gram(s) IV Push once  dextrose 50% Injectable 25 Gram(s) IV Push once  dextrose 50% Injectable 12.5 Gram(s) IV Push once  glucagon  Injectable 1 milliGRAM(s) IntraMuscular once  influenza  Vaccine (HIGH DOSE) 0.7 milliLiter(s) IntraMuscular once  insulin lispro (ADMELOG) corrective regimen sliding scale   SubCutaneous three times a day before meals  levETIRAcetam 750 milliGRAM(s) Oral two times a day  pantoprazole Infusion 8 mG/Hr (10 mL/Hr) IV Continuous <Continuous>  sucralfate 1 Gram(s) Oral four times a day  urea Oral Powder 15 Gram(s) Oral daily      MEDICATIONS  (PRN):  dextrose Oral Gel 15 Gram(s) Oral once PRN Blood Glucose LESS THAN 70 milliGRAM(s)/deciliter  guaiFENesin Oral Liquid (Sugar-Free) 200 milliGRAM(s) Oral every 6 hours PRN Cough      [REVIEW OF SYSTEMS: ]  CONSTITUTIONAL: elderly frail , no fever, no shakes, no chills   EYES: No eye pain, no visual disturbances, no discharge  ENMT:  no discharge  NECK: No pain, no stiffness  BREASTS: No pain, no masses, no nipple discharge  RESPIRATORY: No cough, no wheezing, no chills, no hemoptysis; No shortness of breath  CARDIOVASCULAR: No chest pain, no palpitations, no dizziness, no leg swelling  GASTROINTESTINAL: No abdominal, no epigastric pain. No nausea, no vomiting, no hematemesis; No diarrhea , no constipation. No melena, no hematochezia.  GENITOURINARY: No dysuria, no frequency, no hematuria, no incontinence  NEUROLOGICAL: No headaches, no memory loss, no loss of strength, no numbness, no tremors  SKIN: No itching, no burning, no rashes, no lesions   LYMPH NODES: No enlarged glands  ENDOCRINE: No heat or cold intolerance; No hair loss  MUSCULOSKELETAL: No joint pain or swelling; No muscle, no back, no extremity pain  PSYCHIATRIC: No depression, no anxiety, no mood swings, no difficulty sleeping  HEME/LYMPH: No easy bruising, no bleeding gums    [VITALS SIGNS 24hrs]  Vital Signs Last 24 Hrs  T(C): 36.5 (06 Oct 2023 12:43), Max: 36.7 (06 Oct 2023 02:00)  T(F): 97.7 (06 Oct 2023 12:43), Max: 98 (06 Oct 2023 02:00)  HR: 79 (06 Oct 2023 12:43) (68 - 80)  BP: 99/67 (06 Oct 2023 12:43) (99/67 - 107/56)  BP(mean): --  RR: 17 (06 Oct 2023 12:43) (16 - 18)  SpO2: 93% (06 Oct 2023 12:43) (93% - 100%)    Parameters below as of 06 Oct 2023 12:43  Patient On (Oxygen Delivery Method): room air      Daily     Daily     I&O's Summary      [PHYSICAL EXAM]  GEN: elderly frail looking  HEENT: normocephalic and atraumatic.   NECK: Supple.  No lymphadenopathy   LUNGS: Clear to auscultation.  HEART: S1S2 Regular rate and rhythm, no MRG  ABDOMEN: Soft, nontender, and nondistended.  Positive bowel sounds.    : No CVA tenderness  EXTREMITIES: Without edema.  NEUROLOGIC: grossly intact.  SKIN: No rash     [LABS: ]                        8.7    15.73 )-----------( 220      ( 06 Oct 2023 11:45 )             27.3     CBC Full  -  ( 06 Oct 2023 11:45 )  WBC Count : 15.73 K/uL  RBC Count : 3.22 M/uL  Hemoglobin : 8.7 g/dL  Hematocrit : 27.3 %  Platelet Count - Automated : 220 K/uL  Mean Cell Volume : 84.8 fl  Mean Cell Hemoglobin : 27.0 pg  Mean Cell Hemoglobin Concentration : 31.9 gm/dL  Auto Neutrophil # : x  Auto Lymphocyte # : x  Auto Monocyte # : x  Auto Eosinophil # : x  Auto Basophil # : x  Auto Neutrophil % : x  Auto Lymphocyte % : x  Auto Monocyte % : x  Auto Eosinophil % : x  Auto Basophil % : x    10-06    132<L>  |  97  |  63<H>  ----------------------------<  300<H>  4.5   |  25  |  1.90<H>    Ca    8.5      06 Oct 2023 09:15    TPro  6.5  /  Alb  2.9<L>  /  TBili  1.1  /  DBili  x   /  AST  16  /  ALT  20  /  AlkPhos  347<H>  10-06    PT/INR - ( 06 Oct 2023 07:30 )   PT: 14.5 sec;   INR: 1.25 ratio         PTT - ( 05 Oct 2023 22:06 )  PTT:34.4 sec  LIVER FUNCTIONS - ( 06 Oct 2023 09:15 )  Alb: 2.9 g/dL / Pro: 6.5 g/dL / ALK PHOS: 347 U/L / ALT: 20 U/L / AST: 16 U/L / GGT: x               Urinalysis Basic - ( 06 Oct 2023 09:15 )    Color: x / Appearance: x / SG: x / pH: x  Gluc: 300 mg/dL / Ketone: x  / Bili: x / Urobili: x   Blood: x / Protein: x / Nitrite: x   Leuk Esterase: x / RBC: x / WBC x   Sq Epi: x / Non Sq Epi: x / Bacteria: x          CBC TREND (5 Days)  WBC Count: 15.73 K/uL (10-06 @ 11:45)  WBC Count: 15.31 K/uL (10-06 @ 09:15)  WBC Count: 14.95 K/uL (10-06 @ 07:30)  WBC Count: 10.74 K/uL (10-05 @ 22:06)  WBC Count: 11.76 K/uL (10-05 @ 13:00)    Hemoglobin: 8.7 g/dL (10-06 @ 11:45)  Hemoglobin: 8.6 g/dL (10-06 @ 09:15)  Hemoglobin: 8.6 g/dL (10-06 @ 07:30)  Hemoglobin: 7.1 g/dL (10-05 @ 22:06)  Hemoglobin: 5.9 g/dL (10-05 @ 13:00)    Hematocrit: 27.3 % (10-06 @ 11:45)  Hematocrit: 25.7 % (10-06 @ 09:15)  Hematocrit: 26.4 % (10-06 @ 07:30)  Hematocrit: 22.1 % (10-05 @ 22:06)  Hematocrit: 18.8 % (10-05 @ 13:00)    Platelet Count - Automated: 220 K/uL (10-06 @ 11:45)  Platelet Count - Automated: 214 K/uL (10-06 @ 09:15)  Platelet Count - Automated: 217 K/uL (10-06 @ 07:30)  Platelet Count - Automated: 236 K/uL (10-05 @ 22:06)  Platelet Count - Automated: 314 K/uL (10-05 @ 13:00)      Sedimentation Rate, Erythrocyte: 79 mm/hr (04-18 @ 20:06)        [MICROBIOLOGY /  VIROLOGY:]  COVID-19 PCR: NotDetec (18 Apr 2023 20:06)        [PATHOLOGY]       [RADIOLOGY & ADDITIONAL STUDIES:]       CT Chest No Cont:   ACC: [id number] EXAM:  CT CHEST   ORDERED BY: TABATHA SINGH   PROCEDURE DATE:  10/06/2023    INTERPRETATION:  CLINICAL INFORMATION: 68-year-old male with severe anemia now requiring hemodialysis. Evaluate for effusions and atelectasis.  COMPARISON: CT chest 11/28/2022  CONTRAST/COMPLICATIONS:  ·	IV Contrast: NONE  ·	Oral Contrast: NONE  ·	Complications: None reported at time of study completion  PROCEDURE:  ·	CT of the Chest was performed.  ·	Sagittal and coronal reformats were performed.  ·	FINDINGS:  ·	LUNGS AND AIRWAYS: Patent central airways.  There is redemonstration of volume loss in the left lower lobe, pleural-based consolidation, and peribronchial vascular distribution, suggestive of a round atelectasis.   ·	In additionthere is a smaller round atelectasis in the left upper lobe.   ·	There are peribronchovascular foci of groundglass opacity in the bilateral upper, middle, and right lower lobes, suggestive of infection.  ·	PLEURA: Trace right pleural effusion. There isno pneumothorax.  ·	MEDIASTINUM AND GREER: No lymphadenopathy.  ·	VESSELS: Calcifications of the thoracic aorta and coronary vessels. Redemonstrated aneurysmal dilatation of the ascending aorta is 4.2 cm. Status post aortic valve replacement.  ·	HEART: There is biatrial enlargement of the heart. There is pericardial calcification along the right atrium..  ·	CHEST WALL AND LOWER NECK: Mild bilateral gynecomastia.  ·	VISUALIZED UPPER ABDOMEN: Nodular contour liver parenchyma suggestive of cirrhosis. Spleen, bilateral kidneys, pancreas, and adrenals are within normal limits. There is irregularity of the right renal parenchyma suggestive of a scar.  ·	BONES: Degenerative changes of the spine. Sternotomy wires.  IMPRESSION:  ·	Peribronchovascular foci of groundglass opacity and small consolidations in bilateral upper, middle and right lower lobe, suggestive of multifocal pneumonia  ·	Trace right pleural effusion. Bilateral round atelectasis in the left upper and left lower lobes.  ·	Cirrhotic liver  --- End of Report ---  JONATHAN MARVIN MD; Resident Radiologist  This document has been electronically signed.  ANTOINETTE OSBORN MD; Attending Radiologist  This document has been electronically signed. Oct  6 2023  3:55PM (10-06-23 @ 10:51)

## 2023-10-06 NOTE — PROGRESS NOTE ADULT - SUBJECTIVE AND OBJECTIVE BOX
[facility]  Shane Murray PA-C  [address] [place] [address]  [phone]      INTERVAL HPI/OVERNIGHT EVENTS:    patient s/e  dtr bedside  still with blood in stool    MEDICATIONS  (STANDING):  atorvastatin 40 milliGRAM(s) Oral at bedtime  dextrose 5%. 1000 milliLiter(s) (50 mL/Hr) IV Continuous <Continuous>  dextrose 5%. 1000 milliLiter(s) (100 mL/Hr) IV Continuous <Continuous>  dextrose 50% Injectable 12.5 Gram(s) IV Push once  dextrose 50% Injectable 25 Gram(s) IV Push once  dextrose 50% Injectable 25 Gram(s) IV Push once  glucagon  Injectable 1 milliGRAM(s) IntraMuscular once  influenza  Vaccine (HIGH DOSE) 0.7 milliLiter(s) IntraMuscular once  insulin lispro (ADMELOG) corrective regimen sliding scale   SubCutaneous three times a day before meals  levETIRAcetam 750 milliGRAM(s) Oral two times a day  pantoprazole Infusion 8 mG/Hr (10 mL/Hr) IV Continuous <Continuous>  sucralfate 1 Gram(s) Oral four times a day  urea Oral Powder 15 Gram(s) Oral daily    MEDICATIONS  (PRN):  dextrose Oral Gel 15 Gram(s) Oral once PRN Blood Glucose LESS THAN 70 milliGRAM(s)/deciliter  guaiFENesin Oral Liquid (Sugar-Free) 200 milliGRAM(s) Oral every 6 hours PRN Cough      Allergies    No Known Allergies    Intolerances        ROS:   unable to obtain        PHYSICAL EXAM:   Vital Signs:  Vital Signs Last 24 Hrs  T(C): 36.5 (06 Oct 2023 12:43), Max: 36.7 (06 Oct 2023 02:00)  T(F): 97.7 (06 Oct 2023 12:43), Max: 98 (06 Oct 2023 02:00)  HR: 79 (06 Oct 2023 12:43) (68 - 92)  BP: 99/67 (06 Oct 2023 12:43) (97/57 - 107/56)  BP(mean): --  RR: 17 (06 Oct 2023 12:43) (16 - 18)  SpO2: 93% (06 Oct 2023 12:43) (93% - 100%)    Parameters below as of 06 Oct 2023 12:43  Patient On (Oxygen Delivery Method): room air      Daily     Daily     nad  confused  frail  non toxic  soft, nt  no edema        LABS:                        8.7    15.73 )-----------( 220      ( 06 Oct 2023 11:45 )             27.3     10-06    132<L>  |  97  |  63<H>  ----------------------------<  300<H>  4.5   |  25  |  1.90<H>    Ca    8.5      06 Oct 2023 09:15    TPro  6.5  /  Alb  2.9<L>  /  TBili  1.1  /  DBili  x   /  AST  16  /  ALT  20  /  AlkPhos  347<H>  10-06    PT/INR - ( 06 Oct 2023 07:30 )   PT: 14.5 sec;   INR: 1.25 ratio         PTT - ( 05 Oct 2023 22:06 )  PTT:34.4 sec  Urinalysis Basic - ( 06 Oct 2023 09:15 )    Color: x / Appearance: x / SG: x / pH: x  Gluc: 300 mg/dL / Ketone: x  / Bili: x / Urobili: x   Blood: x / Protein: x / Nitrite: x   Leuk Esterase: x / RBC: x / WBC x   Sq Epi: x / Non Sq Epi: x / Bacteria: x        RADIOLOGY & ADDITIONAL TESTS:

## 2023-10-06 NOTE — CONSULT NOTE ADULT - SUBJECTIVE AND OBJECTIVE BOX
[facility]    CHIEF COMPLAINT: Patient is a 68y old  Male who presents with a chief complaint of black stool (06 Oct 2023 17:26)      HPI:  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, seizure disorder, prior CABG, DM2, AFib, Mechanical AV replacement in 2007 (on Warfarin) presenting to [facility] ED per recommendation of hematologist for low Hgb to 5.2 with associated black stools for the past few days, last being yesterday. Per daughter at bedside, patient's INR level was 7.2 two days ago and was instructed by hematologist to hold coumadin for the past 2 days. Of note, patient had EGD and colonoscopy in 08/2023 revealing gastritis, duodenitis, non-bleeding AVM, and two polyps, one removed (pathology revealing tubular adenoma). Patient was afebrile and HDS on arrival with SBPs 90s and HR 80s. Labs notable for Hgb 5.9, INR 7.01, BUN/Cr 64/1.80, Alk Phos 418. ED ordered for Kcentra, Vit K, and 2U PRBCs. (05 Oct 2023 15:48)    PAST MEDICAL & SURGICAL HISTORY:  CVA (cerebral vascular accident)      HTN (hypertension)      DM (diabetes mellitus)      History of atrial fibrillation      Right hemiparesis      Aphasia due to acute stroke      Upper GI bleed      Osteomyelitis      S/P CABG (coronary artery bypass graft)      S/P brain surgery      History of aortic valve repair        SOCIAL HISTORY: no tobacco  FAMILY HISTORY:  FH: coronary artery disease (Sibling)    FH: type 2 diabetes (Sibling)     HTN  MEDICATIONS  (STANDING):  atorvastatin 40 milliGRAM(s) Oral at bedtime  dextrose 5%. 1000 milliLiter(s) (50 mL/Hr) IV Continuous <Continuous>  dextrose 5%. 1000 milliLiter(s) (100 mL/Hr) IV Continuous <Continuous>  dextrose 50% Injectable 25 Gram(s) IV Push once  dextrose 50% Injectable 25 Gram(s) IV Push once  dextrose 50% Injectable 12.5 Gram(s) IV Push once  glucagon  Injectable 1 milliGRAM(s) IntraMuscular once  influenza  Vaccine (HIGH DOSE) 0.7 milliLiter(s) IntraMuscular once  insulin lispro (ADMELOG) corrective regimen sliding scale   SubCutaneous three times a day before meals  levETIRAcetam 750 milliGRAM(s) Oral two times a day  pantoprazole Infusion 8 mG/Hr (10 mL/Hr) IV Continuous <Continuous>  sucralfate 1 Gram(s) Oral four times a day  urea Oral Powder 15 Gram(s) Oral daily    MEDICATIONS  (PRN):  dextrose Oral Gel 15 Gram(s) Oral once PRN Blood Glucose LESS THAN 70 milliGRAM(s)/deciliter  guaiFENesin Oral Liquid (Sugar-Free) 200 milliGRAM(s) Oral every 6 hours PRN Cough    Allergies    No Known Allergies    Intolerances        REVIEW OF SYSTEMS:  CONSTITUTIONAL: weakness, no fevers   EYES/ENT: No visual changes  NECK: No pain or stiffness  RESPIRATORY: No shortness of breath  CARDIOVASCULAR: No chest pain or palpitations  GASTROINTESTINAL: No abdominal pain  GENITOURINARY: No hematuria  NEUROLOGICAL: No weakness  SKIN: No rash  All other review of systems is negative unless indicated above    VITAL SIGNS:   Vital Signs Last 24 Hrs  T(C): 36.5 (06 Oct 2023 12:43), Max: 36.7 (06 Oct 2023 02:00)  T(F): 97.7 (06 Oct 2023 12:43), Max: 98 (06 Oct 2023 02:00)  HR: 79 (06 Oct 2023 12:43) (68 - 80)  BP: 99/67 (06 Oct 2023 12:43) (99/67 - 107/56)  BP(mean): --  RR: 17 (06 Oct 2023 12:43) (16 - 18)  SpO2: 93% (06 Oct 2023 12:43) (93% - 100%)    Parameters below as of 06 Oct 2023 12:43  Patient On (Oxygen Delivery Method): room air      I&O's Summary    PHYSICAL EXAM:  Constitutional: NAD  Neurological: Alert and oriented  HEENT: EOMI, no JVD  Cardiovascular: S1 and S2, no murmur  Pulmonary: breath sounds bilaterally  Gastrointestinal: Bowel Sounds present, soft, nontender  Ext: no peripheral edema  Skin: No rashes, No cyanosis.  Psych:  Mood calm    LABS: All Labs Reviewed:                        8.7    15.73 )-----------( 220      ( 06 Oct 2023 11:45 )             27.3     10-06    132<L>  |  97  |  63<H>  ----------------------------<  300<H>  4.5   |  25  |  1.90<H>    Ca    8.5      06 Oct 2023 09:15    TPro  6.5  /  Alb  2.9<L>  /  TBili  1.1  /  DBili  x   /  AST  16  /  ALT  20  /  AlkPhos  347<H>  10-06    PT/INR - ( 06 Oct 2023 07:30 )   PT: 14.5 sec;   INR: 1.25 ratio         PTT - ( 05 Oct 2023 22:06 )  PTT:34.4 sec      12 Lead ECG:   Ventricular Rate 84 BPM    Atrial Rate 53 BPM    QRS Duration 76 ms    Q-T Interval 422 ms    QTC Calculation(Bazett) 498 ms    R Axis 45 degrees    T Axis 144 degrees    Diagnosis Line Atrial fibrillation  ST & T wave abnormality, consider lateralischemia  Abnormal ECG  When compared with ECG of 05-OCT-2023 12:48, (Unconfirmed)  No significant change was found  Confirmed by UZMA PHELPS ([id number]) on 10/5/2023 1:22:15 PM (10-05-23 @ 12:50)        CHIEF COMPLAINT: Patient is a 68y old  Male who presents with a chief complaint of black stool (06 Oct 2023 17:26)      HPI:  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, seizure disorder, prior CABG, DM2, AFib, Mechanical AV replacement in 2007 (on Warfarin) presenting to [facility] ED per recommendation of hematologist for low Hgb to 5.2 with associated black stools for the past few days, last being yesterday. Per daughter at bedside, patient's INR level was 7.2 two days ago and was instructed by hematologist to hold coumadin for the past 2 days. Of note, patient had EGD and colonoscopy in 08/2023 revealing gastritis, duodenitis, non-bleeding AVM, and two polyps, one removed (pathology revealing tubular adenoma). Patient was afebrile and HDS on arrival with SBPs 90s and HR 80s. Labs notable for Hgb 5.9, INR 7.01, BUN/Cr 64/1.80, Alk Phos 418. ED ordered for Kcentra, Vit K, and 2U PRBCs. (05 Oct 2023 15:48)    Vit K, 2 units ordered in ER  pt denies CP  hold coumadin  INR goal 3  cardio will follow here  pt sees Alexandru Alonso outpt

## 2023-10-06 NOTE — CARE COORDINATION ASSESSMENT. - NSCAREPROVIDERS_GEN_ALL_CORE_FT
CARE PROVIDERS:  Accepting Physician: Tamika Harmon  Access Services: Kira Frank  Administration: Elier Henriquez  Administration: Jodie Retana  Admitting: Tamika Harmon  Attending: Tamika Harmon  Consultant: Ike Ornelas  Consultant: Gamaliel Kendrick  Consultant: Anirudh Treadwell  Consultant: Charlie Mckeon  Consultant: Sivan Choudhury  Consultant: Hon Francisco J  Covering Team: Shailesh Mcfarland  ED Attending: Aidee Madsen  ED Nurse: Ofelia Charlton  Nurse: Maggy Little  Nurse: Shilpi Warner  Ordered: ADM, User  Ordered: Doctor, Unknown  Ordered: Pahlavan, Mohsen  Outpatient Provider: Ulises Pham  Outpatient Provider: Sid Castrejon  Outpatient Provider: Pahlavan, Mohsen  Outpatient Provider: Anirudh Treadwell  Override: Jaimee Marr  PCA/Nursing Assistant: Néstor Barrios  PCA/Nursing Assistant: Janna Prather  Primary Team: Ruth Salazar  Respiratory Therapy: Erika Malin

## 2023-10-06 NOTE — PROGRESS NOTE ADULT - SUBJECTIVE AND OBJECTIVE BOX
Patient is a 68y Male whom presented to the hospital with hyponatremia     PAST MEDICAL & SURGICAL HISTORY:  CVA (cerebral vascular accident)      HTN (hypertension)      DM (diabetes mellitus)      History of atrial fibrillation      Right hemiparesis      Aphasia due to acute stroke      Upper GI bleed      Osteomyelitis      S/P CABG (coronary artery bypass graft)      S/P brain surgery      History of aortic valve repair          MEDICATIONS  (STANDING):  atorvastatin 40 milliGRAM(s) Oral at bedtime  levETIRAcetam 750 milliGRAM(s) Oral two times a day  pantoprazole Infusion 8 mG/Hr (10 mL/Hr) IV Continuous <Continuous>  sucralfate 1 Gram(s) Oral four times a day      Allergies    No Known Allergies    Intolerances        SOCIAL HISTORY:  Denies ETOh,Smoking,     FAMILY HISTORY:  FH: coronary artery disease (Sibling)    FH: type 2 diabetes (Sibling)        REVIEW OF SYSTEMS:    CONSTITUTIONAL: No weakness, fevers or chills  RESPIRATORY: No cough, wheezing, hemoptysis; No shortness of breath  CARDIOVASCULAR: No chest pain or palpitations  GASTROINTESTINAL: No abdominal or epigastric pain. No nausea, vomiting,     No diarrhea or constipation. No melena   SKIN: dry                            8.6    14.95 )-----------( 217      ( 06 Oct 2023 07:30 )             26.4       CBC Full  -  ( 06 Oct 2023 07:30 )  WBC Count : 14.95 K/uL  RBC Count : 3.13 M/uL  Hemoglobin : 8.6 g/dL  Hematocrit : 26.4 %  Platelet Count - Automated : 217 K/uL  Mean Cell Volume : 84.3 fl  Mean Cell Hemoglobin : 27.5 pg  Mean Cell Hemoglobin Concentration : 32.6 gm/dL  Auto Neutrophil # : x  Auto Lymphocyte # : x  Auto Monocyte # : x  Auto Eosinophil # : x  Auto Basophil # : x  Auto Neutrophil % : x  Auto Lymphocyte % : x  Auto Monocyte % : x  Auto Eosinophil % : x  Auto Basophil % : x      10-06    133<L>  |  98  |  63<H>  ----------------------------<  278<H>  4.5   |  27  |  2.00<H>    Ca    8.6      06 Oct 2023 07:30    TPro  6.8  /  Alb  3.0<L>  /  TBili  1.1  /  DBili  x   /  AST  16  /  ALT  21  /  AlkPhos  364<H>  10-06      CAPILLARY BLOOD GLUCOSE      POCT Blood Glucose.: 289 mg/dL (06 Oct 2023 08:23)      Vital Signs Last 24 Hrs  T(C): 36.7 (06 Oct 2023 02:00), Max: 36.7 (06 Oct 2023 02:00)  T(F): 98 (06 Oct 2023 02:00), Max: 98 (06 Oct 2023 02:00)  HR: 74 (06 Oct 2023 02:00) (68 - 93)  BP: 103/58 (06 Oct 2023 02:00) (92/58 - 107/56)  BP(mean): --  RR: 16 (06 Oct 2023 02:00) (16 - 18)  SpO2: 100% (06 Oct 2023 02:00) (93% - 100%)    Parameters below as of 06 Oct 2023 02:00  Patient On (Oxygen Delivery Method): room air        Urinalysis Basic - ( 06 Oct 2023 07:30 )    Color: x / Appearance: x / SG: x / pH: x  Gluc: 278 mg/dL / Ketone: x  / Bili: x / Urobili: x   Blood: x / Protein: x / Nitrite: x   Leuk Esterase: x / RBC: x / WBC x   Sq Epi: x / Non Sq Epi: x / Bacteria: x        PT/INR - ( 06 Oct 2023 07:30 )   PT: 14.5 sec;   INR: 1.25 ratio         PTT - ( 05 Oct 2023 22:06 )  PTT:34.4 sec      PHYSICAL EXAM:    Constitutional: NAD  HEENT: conjunctive   clear   Neck:  No JVD  Respiratory: CTAB  Cardiovascular: S1 and S2  Gastrointestinal: BS+, soft, NT/ND  Extremities: No peripheral edema  Neurological:  no focal deficits  Skin: dry   Access: Not applicable    LABS:                        5.9    11.76 )-----------( 314      ( 05 Oct 2023 13:00 )             18.8     10-05    129<L>  |  95<L>  |  64<H>  ----------------------------<  264<H>  4.2   |  25  |  1.80<H>    Ca    8.5      05 Oct 2023 13:00    TPro  7.5  /  Alb  3.2<L>  /  TBili  0.4  /  DBili  x   /  AST  17  /  ALT  23  /  AlkPhos  418<H>  10-05      Urine Studies:  Urinalysis Basic - ( 05 Oct 2023 13:00 )    Color: x / Appearance: x / SG: x / pH: x  Gluc: 264 mg/dL / Ketone: x  / Bili: x / Urobili: x   Blood: x / Protein: x / Nitrite: x   Leuk Esterase: x / RBC: x / WBC x   Sq Epi: x / Non Sq Epi: x / Bacteria: x      MEDICATIONS  (STANDING):  atorvastatin 40 milliGRAM(s) Oral at bedtime  levETIRAcetam 750 milliGRAM(s) Oral two times a day  pantoprazole Infusion 8 mG/Hr (10 mL/Hr) IV Continuous <Continuous>  sucralfate 1 Gram(s) Oral four times a day        RADIOLOGY & ADDITIONAL STUDIES:

## 2023-10-06 NOTE — PROGRESS NOTE ADULT - ASSESSMENT
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 Per daughter at bedside, patient's INR level was 7.2 two days ago and was instructed by hematologist to hold coumadin for the past 2 days. Of note, patient had EGD and colonoscopy in 08/2023 revealing gastritis, duodenitis, non-bleeding AVM, and two polyps, one removed (pathology revealing tubular adenoma). Patient was afebrile and HDS on arrival with SBPs 90s and HR 80s. Labs notable for Hgb 5.9, INR 7.01, BUN/Cr 64/1.80, Alk Phos 418. ED ordered for Kcentra, Vit K, and 2U PRBCs. (05 Oct 2023 15:48)    hyponatremia improved   will check ua , urine osmolality , urine sodium , urine uric acid , serum sodium , serum osmolality , serum uric acid , f/u with hyponatremia work up , f/u with bmp , monitor i and o    ACUTE RENAL FAILURE:   Serum creatinine is  at    1.8  , approximating GFR at   ml/min.   There is no progression . No uremic symptoms  No evidence of anemia .  Fluid status stable.  Will continue to avoid nephrotoxic drugs.  Patient remains asymptomatic.   Continue current therapy.  hold  diuretic.  hold   ACE inhibitor.  hold   ARB.  Additional evaluation:   ECG,    echocardiogram,     CXR,  will obtained recent   renal ultrasound to evalaute kidney size and possible stones ,

## 2023-10-06 NOTE — PROGRESS NOTE ADULT - SUBJECTIVE AND OBJECTIVE BOX
Date/Time Patient Seen:  		  Referring MD:   Data Reviewed	       Patient is a 68y old  Male who presents with a chief complaint of black stool (05 Oct 2023 21:02)      Subjective/HPI     PAST MEDICAL & SURGICAL HISTORY:  CVA (cerebral vascular accident)    HTN (hypertension)    DM (diabetes mellitus)    Arrhythmia    History of atrial fibrillation    Right hemiparesis    Aphasia due to acute stroke    GI bleed    Iron deficiency anemia    Upper GI bleed    Osteomyelitis    S/P CABG (coronary artery bypass graft)    S/P brain surgery    History of aortic valve repair          Medication list         MEDICATIONS  (STANDING):  atorvastatin 40 milliGRAM(s) Oral at bedtime  influenza  Vaccine (HIGH DOSE) 0.7 milliLiter(s) IntraMuscular once  levETIRAcetam 750 milliGRAM(s) Oral two times a day  pantoprazole Infusion 8 mG/Hr (10 mL/Hr) IV Continuous <Continuous>  sodium chloride 1 Gram(s) Oral three times a day  sodium chloride 0.9%. 1000 milliLiter(s) (30 mL/Hr) IV Continuous <Continuous>  sucralfate 1 Gram(s) Oral four times a day  urea Oral Powder 15 Gram(s) Oral daily    MEDICATIONS  (PRN):         Vitals log        ICU Vital Signs Last 24 Hrs  T(C): 36.7 (06 Oct 2023 02:00), Max: 36.7 (06 Oct 2023 02:00)  T(F): 98 (06 Oct 2023 02:00), Max: 98 (06 Oct 2023 02:00)  HR: 74 (06 Oct 2023 02:00) (68 - 93)  BP: 103/58 (06 Oct 2023 02:00) (92/58 - 107/56)  BP(mean): --  ABP: --  ABP(mean): --  RR: 16 (06 Oct 2023 02:00) (16 - 18)  SpO2: 100% (06 Oct 2023 02:00) (93% - 100%)    O2 Parameters below as of 06 Oct 2023 02:00  Patient On (Oxygen Delivery Method): room air                 Input and Output:  I&O's Detail      Lab Data                        7.1    10.74 )-----------( 236      ( 05 Oct 2023 22:06 )             22.1     10-05    129<L>  |  95<L>  |  64<H>  ----------------------------<  264<H>  4.2   |  25  |  1.80<H>    Ca    8.5      05 Oct 2023 13:00    TPro  7.5  /  Alb  3.2<L>  /  TBili  0.4  /  DBili  x   /  AST  17  /  ALT  23  /  AlkPhos  418<H>  10-05            Review of Systems	      Objective     Physical Examination    heart s1s2  lung dec BS  head nc      Pertinent Lab findings & Imaging      Bryan:  NO   Adequate UO     I&O's Detail           Discussed with:     Cultures:	        Radiology

## 2023-10-06 NOTE — CARE COORDINATION ASSESSMENT. - OTHER PERTINENT DISCHARGE PLANNING INFORMATION:
CM met with the patient and daughter at the bedside, pt's primary language Stanford, declined translation services, prefers daughter to translate. CM explained role of CM and transition planning. Patient & Daughter verbalized understanding. CM provided direct contact/resource folder and remains available. Per daughter pt resides in a house with his spouse and adult children. He has no steps to enter, resides on 1st floor and is wheelchair bound. Pt has CDPAP Aide, daughter through [facility], [facility] [place], # [phone], 5 days/week 7 hours daily. PMd Roberta Rios [phone], pharmacy [facility].

## 2023-10-06 NOTE — PROGRESS NOTE ADULT - ASSESSMENT
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 Per daughter at bedside, patient's INR level was 7.2 two days ago and was instructed by hematologist to hold coumadin for the past 2 days. Of note, patient had EGD and colonoscopy in 08/2023 revealing gastritis, duodenitis, non-bleeding AVM, and two polyps, one removed (pathology revealing tubular adenoma). Patient was afebrile and HDS on arrival with SBPs 90s and HR 80s. Labs notable for Hgb 5.9, INR 7.01, BUN/Cr 64/1.80, Alk Phos 418. ED ordered for Kcentra, Vit K, and 2U PRBCs.    1Acute blood loss anemia  - monitor cbc  - hold AC  - sp Kcentra and vitamin K  - GI fu  - Protonix drip  - ICU consult appreciated    2 High INR   - hold AC  - monitor INR  - sp Vitamin K  - sp Kcentra    3 Hx of CVA  - neuro fu  - hold AC,sec to [facility]

## 2023-10-07 NOTE — PROGRESS NOTE ADULT - ASSESSMENT
[ASSESSMENT and  PLAN]    K92.2         GIB  D62           Anemia hemorrhage  D50.0        Fe def  Q27. 33    AVM Colon  K74. 60    Cirrhosis liver  D68.32     Coagulopathy due to coumadin  D63.8       Anemia due to chronic disease    67yo [place] CORTES with hx of Fe def anemia, GIB, on outpt IV iron.   Known to me from office.   When well, Hgb 11g/dL with treatmetn  Recent decline in Hgb post GIB events.   Had bleed in last month, s/p eval at [facility]. Colonic AVM s/p APC  seen in office yest, Thurs, and present with Hgb 5 g/dL  Sent to ER due to low Hgb and recent BRBPR, recent elevated IN R5.2    s/p PRBC txfusion  Hgb better.         RECOMMENDATIONS    GIB  Follow CBC  Transfuse PRBC as clinically indicated.   Transfuse PRBC if Hgb <7.0 or if symptomatic.   GI following.     Fe Def  Consider IV venofer.     Cirrhosis    Cardiac Valve  AC on hold due to bleeding today  Cardiology to see.     DVT Prophylaxis  Contraindicated.   Coumadin on hold      Discussed plan of care with patient and or family in detail.   Pt/Family expressed understanding of the treatment plan.   Risks, benefits and alternatives discussed in detail.   Opportunity given for questions and discussion.   Questions or concerns all addressed and answered to their satisfaction, and in lay terms.     Follow in office post DC in 1wk    Thank you for consulting us.

## 2023-10-07 NOTE — PROGRESS NOTE ADULT - SUBJECTIVE AND OBJECTIVE BOX
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 Per daughter at bedside, patient's INR level was 7.2 two days ago and was instructed by hematologist to hold coumadin for the past 2 days. Of note, patient had EGD and colonoscopy in 08/2023 revealing gastritis, duodenitis, non-bleeding AVM, and two polyps, one removed (pathology revealing tubular adenoma). Patient was afebrile and HDS on arrival with SBPs 90s and HR 80s. Labs notable for Hgb 5.9, INR 7.01, BUN/Cr 64/1.80, Alk Phos 418. ED ordered for Kcentra, Vit K, and 2U PRBCs. (05 Oct 2023 15:48)      REVIEW OF SYSTEMS:    CONSTITUTIONAL: No fever, weight loss, or fatigue  EYES: No eye pain, visual disturbances, or discharge  ENMT:  No difficulty hearing, tinnitus, vertigo; No sinus or throat pain  NECK: No pain or stiffness  BREASTS: No pain, masses, or nipple discharge  RESPIRATORY: No cough, wheezing, chills or hemoptysis; No shortness of breath  CARDIOVASCULAR: No chest pain, palpitations, dizziness, or leg swelling  GASTROINTESTINAL: No abdominal or epigastric pain. No nausea, vomiting, or hematemesis; No diarrhea or constipation. No melena or hematochezia.  GENITOURINARY: No dysuria, frequency, hematuria, or incontinence  NEUROLOGICAL: No headaches, memory loss, loss of strength, numbness, or tremors  SKIN: No itching, burning, rashes, or lesions   LYMPH NODES: No enlarged glands  ENDOCRINE: No heat or cold intolerance; No hair loss  MUSCULOSKELETAL: No joint pain or swelling; No muscle, back, or extremity pain  PSYCHIATRIC: No depression, anxiety, mood swings, or difficulty sleeping  HEME/LYMPH: No easy bruising, or bleeding gums  ALLERGY AND IMMUNOLOGIC: No hives or eczema    MEDICATIONS  (STANDING):  albuterol/ipratropium for Nebulization 3 milliLiter(s) Nebulizer every 8 hours  atorvastatin 40 milliGRAM(s) Oral at bedtime  dextrose 5%. 1000 milliLiter(s) (50 mL/Hr) IV Continuous <Continuous>  dextrose 5%. 1000 milliLiter(s) (100 mL/Hr) IV Continuous <Continuous>  dextrose 50% Injectable 25 Gram(s) IV Push once  dextrose 50% Injectable 12.5 Gram(s) IV Push once  dextrose 50% Injectable 25 Gram(s) IV Push once  glucagon  Injectable 1 milliGRAM(s) IntraMuscular once  influenza  Vaccine (HIGH DOSE) 0.7 milliLiter(s) IntraMuscular once  insulin lispro (ADMELOG) corrective regimen sliding scale   SubCutaneous three times a day before meals  levETIRAcetam 750 milliGRAM(s) Oral two times a day  pantoprazole Infusion 8 mG/Hr (10 mL/Hr) IV Continuous <Continuous>  piperacillin/tazobactam IVPB.- 3.375 Gram(s) IV Intermittent once  piperacillin/tazobactam IVPB.. 3.375 Gram(s) IV Intermittent every 8 hours  sucralfate 1 Gram(s) Oral four times a day  urea Oral Powder 15 Gram(s) Oral daily    MEDICATIONS  (PRN):  dextrose Oral Gel 15 Gram(s) Oral once PRN Blood Glucose LESS THAN 70 milliGRAM(s)/deciliter  guaiFENesin Oral Liquid (Sugar-Free) 200 milliGRAM(s) Oral every 6 hours PRN Cough      ALLERGIES: No Known Allergies      FAMILY HISTORY:  FH: coronary artery disease (Sibling)    FH: type 2 diabetes (Sibling)        Social history:  Alochol:   Smoking:   Drug Use:   Marital Status:     PHYSICAL EXAMINATION:  -----------------------------  T(C): 36.7 (10-07-23 @ 04:46), Max: 36.7 (10-07-23 @ 04:46)  HR: 84 (10-07-23 @ 07:43) (73 - 88)  BP: 110/62 (10-07-23 @ 04:46) (99/67 - 110/62)  RR: 18 (10-07-23 @ 04:46) (17 - 18)  SpO2: 92% (10-07-23 @ 07:43) (90% - 93%)  Wt(kg): --    10-06 @ 07:01  -  10-07 @ 07:00  --------------------------------------------------------  IN:    Pantoprazole: 120 mL  Total IN: 120 mL    OUT:  Total OUT: 0 mL    Total NET: 120 mL            Constitutional: well developed, normal appearance, well groomed, well nourished, no deformities and no acute distress.   Eyes: the conjunctiva exhibited no abnormalities and the eyelids demonstrated no xanthelasmas.   HEENT: normal oral mucosa, no oral pallor and no oral cyanosis.   Neck: normal jugular venous A waves present, normal jugular venous V waves present and no jugular venous baca A waves.   Pulmonary: no respiratory distress, normal respiratory rhythm and effort, no accessory muscle use and lungs were clear to auscultation bilaterally. Anteriorly  Cardiovascular: heart rate and rhythm were irreg/irreg, normal S1 and S2 and no murmur, gallop, rub, heave or thrill are present.   Musculoskeletal: the gait could not be assessed.   Extremities: no clubbing of the fingernails, no localized cyanosis, no petechial hemorrhages and no ischemic changes.   Skin: normal skin color and pigmentation, no rash, no venous stasis, no skin lesions, no skin ulcer and no xanthoma was observed.       LABS:   --------  10-06    132<L>  |  97  |  63<H>  ----------------------------<  300<H>  4.5   |  25  |  1.90<H>    Ca    8.5      06 Oct 2023 09:15    TPro  6.5  /  Alb  2.9<L>  /  TBili  1.1  /  DBili  x   /  AST  16  /  ALT  20  /  AlkPhos  347<H>  10-06                         8.7    15.73 )-----------( 220      ( 06 Oct 2023 11:45 )             27.3     PT/INR - ( 06 Oct 2023 07:30 )   PT: 14.5 sec;   INR: 1.25 ratio         PTT - ( 05 Oct 2023 22:06 )  PTT:34.4 sec              Radiology:    < from: TTE Echo Complete w/o Contrast w/ Doppler (11.22.22 @ 19:15) >    ACC: [id number] EXAM:  ECHO TTE WO CON COMP W DOPP                          PROCEDURE DATE:  11/22/2022          INTERPRETATION:  Ordering Physician: NICOLLE CHAVEZ [id number]    Indication: Murmur    Study Quality: Adequate  A complete echocardiographic study was performed utilizing standard   protocol including spectral and color Doppler in all echocardiographic   windows.    Height: 173 cm  Weight: 70 kg  BSA: 1.8  Blood Pressure: 121/69    MEASUREMENTS  IVS: 1.4cm  PWT: 1.1cm  LA: 4.3cm  AO: 3.3cm  AV Cusp Separation: cm  LVIDd: 4.2cm  LVIDs: 3cm  LVOT: 1.9cm    LVEF: 55%  RVSP: 34mmHg    FINDINGS  Left Ventricle: Normal LV systolic function  Aortic Valve: History of aortic valve replacement, mean aortic gradient   19 mmHg, mild aortic stenosis suggested  Mitral Valve: Trace MR  Tricuspid Valve: Trace TR  Pulmonic Valve: Trace PI  Left Atrium: Dilated  Right Ventricle: Normal systolic function  Right Atrium: Normal  Diastolic Function: Indeterminate  Pericardium/Pleura: Normal pericardium      Impression:  Normal LV systolic function EF 55%, history of aortic valve replacement,   mean aortic gradient 19 mmHg, mild aortic stenosis suggested, biatrial   enlargement, normal pericardium, RVSP 34 mmHg    --- End of Report ---            MIKE PAULINO MD; Attending Cardiologist  This document has been electronically signed. Nov 24 2022  9:53AM    < end of copied text >

## 2023-10-07 NOTE — PROGRESS NOTE ADULT - SUBJECTIVE AND OBJECTIVE BOX
[facility]  Shane Murray PA-C  [address] [place] [address]  [phone]      INTERVAL HPI/OVERNIGHT EVENTS:    patient s/e  dtr bedside  bloody bm x1 per nursing staff  h/h stable         MEDICATIONS  (STANDING):  albuterol/ipratropium for Nebulization 3 milliLiter(s) Nebulizer every 8 hours  atorvastatin 40 milliGRAM(s) Oral at bedtime  dextrose 5%. 1000 milliLiter(s) (50 mL/Hr) IV Continuous <Continuous>  dextrose 5%. 1000 milliLiter(s) (100 mL/Hr) IV Continuous <Continuous>  dextrose 50% Injectable 25 Gram(s) IV Push once  dextrose 50% Injectable 12.5 Gram(s) IV Push once  dextrose 50% Injectable 25 Gram(s) IV Push once  glucagon  Injectable 1 milliGRAM(s) IntraMuscular once  heparin  Infusion.  Unit(s)/Hr (11 mL/Hr) IV Continuous <Continuous>  influenza  Vaccine (HIGH DOSE) 0.7 milliLiter(s) IntraMuscular once  insulin glargine Injectable (LANTUS) 10 Unit(s) SubCutaneous at bedtime  insulin lispro (ADMELOG) corrective regimen sliding scale   SubCutaneous three times a day before meals  levETIRAcetam 750 milliGRAM(s) Oral two times a day  pantoprazole Infusion 8 mG/Hr (10 mL/Hr) IV Continuous <Continuous>  piperacillin/tazobactam IVPB.- 3.375 Gram(s) IV Intermittent once  piperacillin/tazobactam IVPB.. 3.375 Gram(s) IV Intermittent every 8 hours  sucralfate 1 Gram(s) Oral four times a day  urea Oral Powder 15 Gram(s) Oral daily    MEDICATIONS  (PRN):  dextrose Oral Gel 15 Gram(s) Oral once PRN Blood Glucose LESS THAN 70 milliGRAM(s)/deciliter  guaiFENesin Oral Liquid (Sugar-Free) 200 milliGRAM(s) Oral every 6 hours PRN Cough  heparin   Injectable 5000 Unit(s) IV Push every 6 hours PRN For aPTT less than 40  heparin   Injectable 2500 Unit(s) IV Push every 6 hours PRN For aPTT between 40 - 57      Allergies    No Known Allergies    Intolerances          ROS:   unable to obtain      nad  confused  frail  non toxic  soft, nt  no edema      LABS:                        8.8    13.98 )-----------( 243      ( 07 Oct 2023 09:40 )             27.7     10-06    132<L>  |  97  |  63<H>  ----------------------------<  300<H>  4.5   |  25  |  1.90<H>    Ca    8.5      06 Oct 2023 09:15    TPro  6.5  /  Alb  2.9<L>  /  TBili  1.1  /  DBili  x   /  AST  16  /  ALT  20  /  AlkPhos  347<H>  10-06    PT/INR - ( 06 Oct 2023 07:30 )   PT: 14.5 sec;   INR: 1.25 ratio         PTT - ( 05 Oct 2023 22:06 )  PTT:34.4 sec      10-06-23 @ 07:01  -  10-07-23 @ 07:00  --------------------------------------------------------  IN: 120 mL / OUT: 0 mL / NET: 120 mL                    RADIOLOGY & ADDITIONAL TESTS:

## 2023-10-07 NOTE — PROGRESS NOTE ADULT - SUBJECTIVE AND OBJECTIVE BOX
Patient is a 68y old  Male who presents with a chief complaint of black stool (07 Oct 2023 09:25)    Date of servie : 10-07-23 @ 11:03  INTERVAL HPI/OVERNIGHT EVENTS:  T(C): 36.7 (10-07-23 @ 04:46), Max: 36.7 (10-07-23 @ 04:46)  HR: 84 (10-07-23 @ 07:43) (73 - 88)  BP: 110/62 (10-07-23 @ 04:46) (99/67 - 110/62)  RR: 18 (10-07-23 @ 04:46) (17 - 18)  SpO2: 92% (10-07-23 @ 07:43) (90% - 93%)  Wt(kg): --  I&O's Summary    06 Oct 2023 07:01  -  07 Oct 2023 07:00  --------------------------------------------------------  IN: 120 mL / OUT: 0 mL / NET: 120 mL        LABS:                        8.8    13.98 )-----------( 243      ( 07 Oct 2023 09:40 )             27.7     10-06    132<L>  |  97  |  63<H>  ----------------------------<  300<H>  4.5   |  25  |  1.90<H>    Ca    8.5      06 Oct 2023 09:15    TPro  6.5  /  Alb  2.9<L>  /  TBili  1.1  /  DBili  x   /  AST  16  /  ALT  20  /  AlkPhos  347<H>  10-06    PT/INR - ( 06 Oct 2023 07:30 )   PT: 14.5 sec;   INR: 1.25 ratio         PTT - ( 05 Oct 2023 22:06 )  PTT:34.4 sec  Urinalysis Basic - ( 06 Oct 2023 09:15 )    Color: x / Appearance: x / SG: x / pH: x  Gluc: 300 mg/dL / Ketone: x  / Bili: x / Urobili: x   Blood: x / Protein: x / Nitrite: x   Leuk Esterase: x / RBC: x / WBC x   Sq Epi: x / Non Sq Epi: x / Bacteria: x      CAPILLARY BLOOD GLUCOSE      POCT Blood Glucose.: 365 mg/dL (07 Oct 2023 08:01)  POCT Blood Glucose.: 275 mg/dL (06 Oct 2023 20:57)  POCT Blood Glucose.: 408 mg/dL (06 Oct 2023 17:57)  POCT Blood Glucose.: 346 mg/dL (06 Oct 2023 12:02)        Urinalysis Basic - ( 06 Oct 2023 09:15 )    Color: x / Appearance: x / SG: x / pH: x  Gluc: 300 mg/dL / Ketone: x  / Bili: x / Urobili: x   Blood: x / Protein: x / Nitrite: x   Leuk Esterase: x / RBC: x / WBC x   Sq Epi: x / Non Sq Epi: x / Bacteria: x        MEDICATIONS  (STANDING):  albuterol/ipratropium for Nebulization 3 milliLiter(s) Nebulizer every 8 hours  atorvastatin 40 milliGRAM(s) Oral at bedtime  dextrose 5%. 1000 milliLiter(s) (50 mL/Hr) IV Continuous <Continuous>  dextrose 5%. 1000 milliLiter(s) (100 mL/Hr) IV Continuous <Continuous>  dextrose 50% Injectable 25 Gram(s) IV Push once  dextrose 50% Injectable 12.5 Gram(s) IV Push once  dextrose 50% Injectable 25 Gram(s) IV Push once  glucagon  Injectable 1 milliGRAM(s) IntraMuscular once  influenza  Vaccine (HIGH DOSE) 0.7 milliLiter(s) IntraMuscular once  insulin glargine Injectable (LANTUS) 10 Unit(s) SubCutaneous at bedtime  insulin lispro (ADMELOG) corrective regimen sliding scale   SubCutaneous three times a day before meals  levETIRAcetam 750 milliGRAM(s) Oral two times a day  pantoprazole Infusion 8 mG/Hr (10 mL/Hr) IV Continuous <Continuous>  piperacillin/tazobactam IVPB.- 3.375 Gram(s) IV Intermittent once  piperacillin/tazobactam IVPB.. 3.375 Gram(s) IV Intermittent every 8 hours  sucralfate 1 Gram(s) Oral four times a day  urea Oral Powder 15 Gram(s) Oral daily    MEDICATIONS  (PRN):  dextrose Oral Gel 15 Gram(s) Oral once PRN Blood Glucose LESS THAN 70 milliGRAM(s)/deciliter  guaiFENesin Oral Liquid (Sugar-Free) 200 milliGRAM(s) Oral every 6 hours PRN Cough          PHYSICAL EXAM:  GENERAL: NAD, well-groomed, well-developed  HEAD:  Atraumatic, Normocephalic  CHEST/LUNG: Clear to percussion bilaterally; No rales, rhonchi, wheezing, or rubs  HEART: Regular rate and rhythm; No murmurs, rubs, or gallops  ABDOMEN: Soft, Nontender, Nondistended; Bowel sounds present  EXTREMITIES:  2+ Peripheral Pulses, No clubbing, cyanosis, or edema  LYMPH: No lymphadenopathy noted  SKIN: No rashes or lesions    Care Discussed with Consultants/Other Providers [ ] YES  [ ] NO

## 2023-10-07 NOTE — PROGRESS NOTE ADULT - SUBJECTIVE AND OBJECTIVE BOX
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 Per daughter at bedside, patient's INR level was 7.2 two days ago and was instructed by hematologist to hold coumadin for the past 2 days. Of note, patient had EGD and colonoscopy in 08/2023 revealing gastritis, duodenitis, non-bleeding AVM, and two polyps, one removed (pathology revealing tubular adenoma). Patient was afebrile and HDS on arrival with SBPs 90s and HR 80s. Labs notable for Hgb 5.9, INR 7.01, BUN/Cr 64/1.80, Alk Phos 418. ED ordered for Kcentra, Vit K, and 2U PRBCs. (05 Oct 2023 15:48)      Interval History -no seizure reported    Patient is seen, chart was reviewed and case was discussed with the treatment team.  Pt is not in any distress.   Lying on bed comfortably.   No events reported overnight.       Vital Signs Last 24 Hrs  T(C): 37.6 (07 Oct 2023 14:43), Max: 37.6 (07 Oct 2023 14:43)  T(F): 99.6 (07 Oct 2023 14:43), Max: 99.6 (07 Oct 2023 14:43)  HR: 99 (07 Oct 2023 14:43) (73 - 126)  BP: 142/83 (07 Oct 2023 14:43) (103/58 - 142/83)  BP(mean): --  RR: 18 (07 Oct 2023 14:43) (17 - 18)  SpO2: 99% (07 Oct 2023 14:43) (90% - 99%)    Parameters below as of 07 Oct 2023 14:43  Patient On (Oxygen Delivery Method): nasal cannula  O2 Flow (L/min): 4          REVIEW OF SYSTEMS:    Constitutional: No fever,  Eyes: No eye pain, visual disturbances, or discharge  ENT:  No difficulty hearing, tinnitus, vertigo; No sinus or throat pain  Neck: No pain or stiffness  Respiratory: No cough, wheezing, chills or hemoptysis  Cardiovascular: No chest pain, palpitations, s  Gastrointestinal: No abdominal or epigastric pain. No nausea, vomiting or hematemesis;   Genitourinary: No dysuria, frequency, hematuria or incontinence  Neurological: No headaches, memory loss,  numbness or tremors  Psychiatric: No depression, anxiety, mood swings or difficulty sleeping  Musculoskeletal: No joint pain or swelling; No muscle, back or extremity pain  Skin: No itching, burning, rashes or lesions   Lymph Nodes: No enlarged glands  Endocrine: No heat or cold intolerance; No hair loss,   Allergy and Immunologic: No hives or eczema    On Neurological Examination:    Mental Status - Pt is alert, awake, . Follows commands well     Speech -  Normal. Pt has dysarthria.    Cranial Nerves - Pupils 3 mm equal and reactive to light, extraocular eye movements intact. Pt has no visual field deficit.  Pt has right facial asymmetry. Facial sensation is intact.Tongue - is in midline.    Muscle tone - is increase on right.      Motor Exam - residual right hemiparesis    Sensory Exam - . Pt withdraws all extremities equally on stimulation. No asymmetry seen. No complaints of tingling, numbness    coordination:    Deep tendon Reflexes - 2 plus all over.       Neck Supple -  Yes.     MEDICATIONS    albuterol/ipratropium for Nebulization 3 milliLiter(s) Nebulizer every 8 hours  atorvastatin 40 milliGRAM(s) Oral at bedtime  dextrose 5%. 1000 milliLiter(s) IV Continuous <Continuous>  dextrose 5%. 1000 milliLiter(s) IV Continuous <Continuous>  dextrose 50% Injectable 25 Gram(s) IV Push once  dextrose 50% Injectable 12.5 Gram(s) IV Push once  dextrose 50% Injectable 25 Gram(s) IV Push once  dextrose Oral Gel 15 Gram(s) Oral once PRN  glucagon  Injectable 1 milliGRAM(s) IntraMuscular once  guaiFENesin Oral Liquid (Sugar-Free) 200 milliGRAM(s) Oral every 6 hours PRN  heparin   Injectable 2500 Unit(s) IV Push every 6 hours PRN  heparin   Injectable 5000 Unit(s) IV Push every 6 hours PRN  heparin  Infusion.  Unit(s)/Hr IV Continuous <Continuous>  influenza  Vaccine (HIGH DOSE) 0.7 milliLiter(s) IntraMuscular once  insulin glargine Injectable (LANTUS) 10 Unit(s) SubCutaneous at bedtime  insulin lispro (ADMELOG) corrective regimen sliding scale   SubCutaneous three times a day before meals  levETIRAcetam 750 milliGRAM(s) Oral two times a day  pantoprazole Infusion 8 mG/Hr IV Continuous <Continuous>  piperacillin/tazobactam IVPB.- 3.375 Gram(s) IV Intermittent once  piperacillin/tazobactam IVPB.. 3.375 Gram(s) IV Intermittent every 8 hours  sucralfate 1 Gram(s) Oral four times a day  urea Oral Powder 15 Gram(s) Oral daily      Allergies    No Known Allergies    Intolerances        LABS:  CBC Full  -  ( 07 Oct 2023 13:15 )  WBC Count : 14.83 K/uL  RBC Count : 3.55 M/uL  Hemoglobin : 9.8 g/dL  Hematocrit : 30.6 %  Platelet Count - Automated : 286 K/uL  Mean Cell Volume : 86.2 fl  Mean Cell Hemoglobin : 27.6 pg  Mean Cell Hemoglobin Concentration : 32.0 gm/dL  Auto Neutrophil # : x    Urinalysis Basic - ( 06 Oct 2023 09:15 )    Color: x / Appearance: x / SG: x / pH: x  Gluc: 300 mg/dL / Ketone: x  / Bili: x / Urobili: x   Blood: x / Protein: x / Nitrite: x   Leuk Esterase: x / RBC: x / WBC x   Sq Epi: x / Non Sq Epi: x / Bacteria: x      10-06    132<L>  |  97  |  63<H>  ----------------------------<  300<H>  4.5   |  25  |  1.90<H>    Ca    8.5      06 Oct 2023 09:15    TPro  6.5  /  Alb  2.9<L>  /  TBili  1.1  /  DBili  x   /  AST  16  /  ALT  20  /  AlkPhos  347<H>  10-06    Hemoglobin A1C:     Vitamin B12     RADIOLOGY    ASSESSMENT AND PLAN:   hx of ICH-with residual right hemiparesis     hx of seizure on keppra  sepsis  GI bleed    follow up h/h  keppra 750 mg bid  Physical therapy evaluation.  OOB to chair/ambulation with assistance only.  Pain is accessed and addressed.  Plan of care was discussed with family. Questions answered.  Would continue to follow.

## 2023-10-07 NOTE — PROGRESS NOTE ADULT - ASSESSMENT
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

## 2023-10-07 NOTE — CHART NOTE - NSCHARTNOTEFT_GEN_A_CORE
Called by RN for patient with gluc ~475. Patient without evening lispro correction order. Chart reviewed, patient received 12 of lispro at noon which brought him from 566 to 400. Will give a total of 8 units lispro now, and patient will get 10 units of glargine as well.     Consider endocrinology consult.

## 2023-10-07 NOTE — PROGRESS NOTE ADULT - ASSESSMENT
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 Per daughter at bedside, patient's INR level was 7.2 two days ago and was instructed by hematologist to hold coumadin for the past 2 days. Of note, patient had EGD and colonoscopy in 08/2023 revealing gastritis, duodenitis, non-bleeding AVM, and two polyps, one removed (pathology revealing tubular adenoma). Patient was afebrile and HDS on arrival with SBPs 90s and HR 80s. Labs notable for Hgb 5.9, INR 7.01, BUN/Cr 64/1.80, Alk Phos 418. ED ordered for Kcentra, Vit K, and 2U PRBCs. (05 Oct 2023 15:48)    hyponatremia improved urea Oral Powder 15 Gram(s) Oral daily  ,  sodium chloride 1 Gram(s) Oral two times a day  will check ua , urine osmolality , urine sodium , urine uric acid , serum sodium , serum osmolality , serum uric acid , f/u with hyponatremia work up , f/u with bmp , monitor i and o    ACUTE RENAL FAILURE:   Serum creatinine is  at    1.9  , approximating GFR at   ml/min.   There is no progression . No uremic symptoms  No evidence of anemia .  Fluid status stable.  Will continue to avoid nephrotoxic drugs.  Patient remains asymptomatic.   Continue current therapy.  hold  diuretic.  hold   ACE inhibitor.  hold   ARB.  Additional evaluation:   ECG,    echocardiogram,     CXR,  will obtained recent   renal ultrasound to evalaute kidney size and possible stones ,

## 2023-10-07 NOTE — PROGRESS NOTE ADULT - ASSESSMENT
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 Per daughter at bedside, patient's INR level was 7.2 two days ago and was instructed by hematologist to hold coumadin for the past 2 days. Of note, patient had EGD and colonoscopy in 08/2023 revealing gastritis, duodenitis, non-bleeding AVM, and two polyps, one removed (pathology revealing tubular adenoma). Patient was afebrile and HDS on arrival with SBPs 90s and HR 80s. Labs notable for Hgb 5.9, INR 7.01, BUN/Cr 64/1.80, Alk Phos 418. ED ordered for Kcentra, Vit K, and 2U PRBCs.    1Acute blood loss anemia  - monitor cbc  - hold AC  - sp Kcentra and vitamin K  - GI fu  - ICU consult appreciated    2 High INR   - hold AC- monitor INR  - sp Vitamin K  - sp Kcentra    3 Hx of CVA  - neuro fu  - hold AC,sec to GIB    4 DM  - monitor FS  - basal, bolus     Venodynes

## 2023-10-07 NOTE — PROGRESS NOTE ADULT - ASSESSMENT
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 Per daughter at bedside, patient's INR level was 7.2 two days ago and was instructed by hematologist to hold coumadin for the past 2 days. Of note, patient had EGD and colonoscopy in 08/2023 revealing gastritis, duodenitis, non-bleeding AVM, and two polyps, one removed (pathology revealing tubular adenoma). Patient was afebrile and HDS on arrival with SBPs 90s and HR 80s. Labs notable for Hgb 5.9, INR 7.01, BUN/Cr 64/1.80, Alk Phos 418. ED ordered for Kcentra, Vit K, and 2U PRBCs. (05 Oct 2023 15:48)    10/7/23  Seen at [facility] telemetry  Lying flat, sleeping comfortably  Son at bedside    Plan:  - Vitamin K, 2 units,PRBC ordered in ER  - Follow labs  - 11/22/22 Essentially normal Echocardiogram-see above  - Hold coumadin, ASA, plavix and re-start when OK with GI  - Spironolactone and furosemide also on hold  - INR goal 3  - Patient sees Alexandru Alonso out-patient

## 2023-10-07 NOTE — PROGRESS NOTE ADULT - SUBJECTIVE AND OBJECTIVE BOX
Date/Time Patient Seen:  		  Referring MD:   Data Reviewed	       Patient is a 68y old  Male who presents with a chief complaint of black stool (06 Oct 2023 23:10)      Subjective/HPI     PAST MEDICAL & SURGICAL HISTORY:  CVA (cerebral vascular accident)    HTN (hypertension)    DM (diabetes mellitus)    Arrhythmia    History of atrial fibrillation    Right hemiparesis    Aphasia due to acute stroke    GI bleed    Iron deficiency anemia    Upper GI bleed    Osteomyelitis    S/P CABG (coronary artery bypass graft)    S/P brain surgery    History of aortic valve repair          Medication list         MEDICATIONS  (STANDING):  atorvastatin 40 milliGRAM(s) Oral at bedtime  dextrose 5%. 1000 milliLiter(s) (50 mL/Hr) IV Continuous <Continuous>  dextrose 5%. 1000 milliLiter(s) (100 mL/Hr) IV Continuous <Continuous>  dextrose 50% Injectable 25 Gram(s) IV Push once  dextrose 50% Injectable 25 Gram(s) IV Push once  dextrose 50% Injectable 12.5 Gram(s) IV Push once  glucagon  Injectable 1 milliGRAM(s) IntraMuscular once  influenza  Vaccine (HIGH DOSE) 0.7 milliLiter(s) IntraMuscular once  insulin lispro (ADMELOG) corrective regimen sliding scale   SubCutaneous three times a day before meals  levETIRAcetam 750 milliGRAM(s) Oral two times a day  pantoprazole Infusion 8 mG/Hr (10 mL/Hr) IV Continuous <Continuous>  sucralfate 1 Gram(s) Oral four times a day  urea Oral Powder 15 Gram(s) Oral daily    MEDICATIONS  (PRN):  dextrose Oral Gel 15 Gram(s) Oral once PRN Blood Glucose LESS THAN 70 milliGRAM(s)/deciliter  guaiFENesin Oral Liquid (Sugar-Free) 200 milliGRAM(s) Oral every 6 hours PRN Cough         Vitals log        ICU Vital Signs Last 24 Hrs  T(C): 36.7 (07 Oct 2023 04:46), Max: 36.7 (07 Oct 2023 04:46)  T(F): 98 (07 Oct 2023 04:46), Max: 98 (07 Oct 2023 04:46)  HR: 84 (07 Oct 2023 04:46) (73 - 88)  BP: 110/62 (07 Oct 2023 04:46) (99/67 - 110/62)  BP(mean): --  ABP: --  ABP(mean): --  RR: 18 (07 Oct 2023 04:46) (17 - 18)  SpO2: 90% (07 Oct 2023 04:46) (90% - 93%)    O2 Parameters below as of 07 Oct 2023 04:46  Patient On (Oxygen Delivery Method): nasal cannula                 Input and Output:  I&O's Detail    06 Oct 2023 07:01  -  07 Oct 2023 05:57  --------------------------------------------------------  IN:    Pantoprazole: 120 mL  Total IN: 120 mL    OUT:  Total OUT: 0 mL    Total NET: 120 mL          Lab Data                        8.7    15.73 )-----------( 220      ( 06 Oct 2023 11:45 )             27.3     10-06    132<L>  |  97  |  63<H>  ----------------------------<  300<H>  4.5   |  25  |  1.90<H>    Ca    8.5      06 Oct 2023 09:15    TPro  6.5  /  Alb  2.9<L>  /  TBili  1.1  /  DBili  x   /  AST  16  /  ALT  20  /  AlkPhos  347<H>  10-06            Review of Systems	      Objective     Physical Examination  heart s1s2  lung dc bS  head nc        Pertinent Lab findings & Imaging      Bryan:  NO   Adequate UO     I&O's Detail    06 Oct 2023 07:01  -  07 Oct 2023 05:57  --------------------------------------------------------  IN:    Pantoprazole: 120 mL  Total IN: 120 mL    OUT:  Total OUT: 0 mL    Total NET: 120 mL               Discussed with:     Cultures:	        Radiology

## 2023-10-07 NOTE — PROGRESS NOTE ADULT - SUBJECTIVE AND OBJECTIVE BOX
[INTERVAL HX: ]  Patient seen and examined;  Chart reviewed and events noted;   son at bedside    last bleeding yest    Today no bleeding  having BM now  await eval    [MEDICATIONS]  MEDICATIONS  (STANDING):  albuterol/ipratropium for Nebulization 3 milliLiter(s) Nebulizer every 8 hours  atorvastatin 40 milliGRAM(s) Oral at bedtime  dextrose 5%. 1000 milliLiter(s) (50 mL/Hr) IV Continuous <Continuous>  dextrose 5%. 1000 milliLiter(s) (100 mL/Hr) IV Continuous <Continuous>  dextrose 50% Injectable 25 Gram(s) IV Push once  dextrose 50% Injectable 12.5 Gram(s) IV Push once  dextrose 50% Injectable 25 Gram(s) IV Push once  glucagon  Injectable 1 milliGRAM(s) IntraMuscular once  heparin  Infusion.  Unit(s)/Hr (11 mL/Hr) IV Continuous <Continuous>  influenza  Vaccine (HIGH DOSE) 0.7 milliLiter(s) IntraMuscular once  insulin glargine Injectable (LANTUS) 10 Unit(s) SubCutaneous at bedtime  insulin lispro (ADMELOG) corrective regimen sliding scale   SubCutaneous three times a day before meals  levETIRAcetam 750 milliGRAM(s) Oral two times a day  pantoprazole Infusion 8 mG/Hr (10 mL/Hr) IV Continuous <Continuous>  piperacillin/tazobactam IVPB.- 3.375 Gram(s) IV Intermittent once  piperacillin/tazobactam IVPB.. 3.375 Gram(s) IV Intermittent every 8 hours  sucralfate 1 Gram(s) Oral four times a day  urea Oral Powder 15 Gram(s) Oral daily    MEDICATIONS  (PRN):  dextrose Oral Gel 15 Gram(s) Oral once PRN Blood Glucose LESS THAN 70 milliGRAM(s)/deciliter  guaiFENesin Oral Liquid (Sugar-Free) 200 milliGRAM(s) Oral every 6 hours PRN Cough  heparin   Injectable 2500 Unit(s) IV Push every 6 hours PRN For aPTT between 40 - 57  heparin   Injectable 5000 Unit(s) IV Push every 6 hours PRN For aPTT less than 40      [VITALS]  Vital Signs Last 24 Hrs  T(C): 36.7 (07 Oct 2023 12:05), Max: 36.7 (07 Oct 2023 04:46)  T(F): 98 (07 Oct 2023 12:05), Max: 98 (07 Oct 2023 04:46)  HR: 126 (07 Oct 2023 12:05) (73 - 126)  BP: 138/90 (07 Oct 2023 12:05) (103/58 - 138/90)  BP(mean): --  RR: 17 (07 Oct 2023 12:05) (17 - 18)  SpO2: 91% (07 Oct 2023 12:05) (90% - 92%)    Parameters below as of 07 Oct 2023 12:05  Patient On (Oxygen Delivery Method): nasal cannula  O2 Flow (L/min): 4    [WT/HT]  Daily     Daily   [VENT]      [PHYSICAL EXAM]  GEN: NAD  HEENT: normocephalic and atraumatic. EOMI. .    NECK: Supple.  No lymphadenopathy   LUNGS: Clear to auscultation., faint wheeze  HEART: Regular rate and rhythm,  no MRG  ABDOMEN: Soft, nontender, and nondistended.  Positive bowel sounds.    : No CVA tenderness  EXTREMITIES: Without edema.  NEUROLOGIC: grossly intact.  PSYCHIATRIC: Appropriate affect .  SKIN: No rash     [LABS:]                        8.8    13.98 )-----------( 243      ( 07 Oct 2023 09:40 )             27.7     10-06    132<L>  |  97  |  63<H>  ----------------------------<  300<H>  4.5   |  25  |  1.90<H>    Ca    8.5      06 Oct 2023 09:15    TPro  6.5  /  Alb  2.9<L>  /  TBili  1.1  /  DBili  x   /  AST  16  /  ALT  20  /  AlkPhos  347<H>  10-06    PT/INR - ( 06 Oct 2023 07:30 )   PT: 14.5 sec;   INR: 1.25 ratio         PTT - ( 05 Oct 2023 22:06 )  PTT:34.4 sec      Sedimentation Rate, Erythrocyte: 79 mm/hr *H* [0 - 20] (04-18-23 @ 20:06)      Urinalysis Basic - ( 06 Oct 2023 09:15 )    Color: x / Appearance: x / SG: x / pH: x  Gluc: 300 mg/dL / Ketone: x  / Bili: x / Urobili: x   Blood: x / Protein: x / Nitrite: x   Leuk Esterase: x / RBC: x / WBC x   Sq Epi: x / Non Sq Epi: x / Bacteria: x        COVID-19 PCR: NotDetec (18 Apr 2023 20:06)          [RADIOLOGY STUDIES:]

## 2023-10-07 NOTE — CHART NOTE - NSCHARTNOTEFT_GEN_A_CORE
Called by RN for pt hypoxic to 76% on room air, confirmed x2 with different devices. Placed on 2LNC with rise in O2 sat to 90% Called by RN for pt hypoxic to 76% on room air, confirmed x2 with different devices. Placed on 2LNC with rise in O2 sat to 90%. Pt seen and examined at bedside, daughter present. Pt and daughter state he feels fine other than intermittent coughing, denies SOB. On exam pt with notable RLL wheezing. No LE edema or evidence of volume overload. Will order STAT Duoneb x1. RN to call with any change

## 2023-10-07 NOTE — PROGRESS NOTE ADULT - SUBJECTIVE AND OBJECTIVE BOX
Patient is a 68y Male whom presented to the hospital with hyponatremia     PAST MEDICAL & SURGICAL HISTORY:  CVA (cerebral vascular accident)      HTN (hypertension)      DM (diabetes mellitus)      History of atrial fibrillation      Right hemiparesis      Aphasia due to acute stroke      Upper GI bleed      Osteomyelitis      S/P CABG (coronary artery bypass graft)      S/P brain surgery      History of aortic valve repair          MEDICATIONS  (STANDING):  atorvastatin 40 milliGRAM(s) Oral at bedtime  levETIRAcetam 750 milliGRAM(s) Oral two times a day  pantoprazole Infusion 8 mG/Hr (10 mL/Hr) IV Continuous <Continuous>  sucralfate 1 Gram(s) Oral four times a day      Allergies    No Known Allergies    Intolerances        SOCIAL HISTORY:  Denies ETOh,Smoking,     FAMILY HISTORY:  FH: coronary artery disease (Sibling)    FH: type 2 diabetes (Sibling)        REVIEW OF SYSTEMS:    CONSTITUTIONAL: No weakness, fevers or chills  RESPIRATORY: No cough, wheezing, hemoptysis; No shortness of breath  CARDIOVASCULAR: No chest pain or palpitations  GASTROINTESTINAL: No abdominal or epigastric pain. No nausea, vomiting,     No diarrhea or constipation. No melena   SKIN: dry                                                9.8    14.83 )-----------( 286      ( 07 Oct 2023 13:15 )             30.6       CBC Full  -  ( 07 Oct 2023 13:15 )  WBC Count : 14.83 K/uL  RBC Count : 3.55 M/uL  Hemoglobin : 9.8 g/dL  Hematocrit : 30.6 %  Platelet Count - Automated : 286 K/uL  Mean Cell Volume : 86.2 fl  Mean Cell Hemoglobin : 27.6 pg  Mean Cell Hemoglobin Concentration : 32.0 gm/dL  Auto Neutrophil # : x  Auto Lymphocyte # : x  Auto Monocyte # : x  Auto Eosinophil # : x  Auto Basophil # : x  Auto Neutrophil % : x  Auto Lymphocyte % : x  Auto Monocyte % : x  Auto Eosinophil % : x  Auto Basophil % : x      10-06    132<L>  |  97  |  63<H>  ----------------------------<  300<H>  4.5   |  25  |  1.90<H>    Ca    8.5      06 Oct 2023 09:15    TPro  6.5  /  Alb  2.9<L>  /  TBili  1.1  /  DBili  x   /  AST  16  /  ALT  20  /  AlkPhos  347<H>  10-06      CAPILLARY BLOOD GLUCOSE      POCT Blood Glucose.: 566 mg/dL (07 Oct 2023 11:50)  POCT Blood Glucose.: 545 mg/dL (07 Oct 2023 11:49)  POCT Blood Glucose.: 365 mg/dL (07 Oct 2023 08:01)  POCT Blood Glucose.: 275 mg/dL (06 Oct 2023 20:57)  POCT Blood Glucose.: 408 mg/dL (06 Oct 2023 17:57)      Vital Signs Last 24 Hrs  T(C): 37.6 (07 Oct 2023 14:43), Max: 37.6 (07 Oct 2023 14:43)  T(F): 99.6 (07 Oct 2023 14:43), Max: 99.6 (07 Oct 2023 14:43)  HR: 99 (07 Oct 2023 14:43) (73 - 126)  BP: 142/83 (07 Oct 2023 14:43) (103/58 - 142/83)  BP(mean): --  RR: 18 (07 Oct 2023 14:43) (17 - 18)  SpO2: 99% (07 Oct 2023 14:43) (90% - 99%)    Parameters below as of 07 Oct 2023 14:43  Patient On (Oxygen Delivery Method): nasal cannula  O2 Flow (L/min): 4      Urinalysis Basic - ( 06 Oct 2023 09:15 )    Color: x / Appearance: x / SG: x / pH: x  Gluc: 300 mg/dL / Ketone: x  / Bili: x / Urobili: x   Blood: x / Protein: x / Nitrite: x   Leuk Esterase: x / RBC: x / WBC x   Sq Epi: x / Non Sq Epi: x / Bacteria: x        PT/INR - ( 07 Oct 2023 13:15 )   PT: 12.9 sec;   INR: 1.11 ratio         PTT - ( 07 Oct 2023 13:15 )  PTT:34.2 sec    PHYSICAL EXAM:    Constitutional: NAD  HEENT: conjunctive   clear   Neck:  No JVD  Respiratory: CTAB  Cardiovascular: S1 and S2  Gastrointestinal: BS+, soft, NT/ND  Extremities: No peripheral edema  Neurological:  no focal deficits  Skin: dry   Access: Not applicable    LABS:                        5.9    11.76 )-----------( 314      ( 05 Oct 2023 13:00 )             18.8     10-05    129<L>  |  95<L>  |  64<H>  ----------------------------<  264<H>  4.2   |  25  |  1.80<H>    Ca    8.5      05 Oct 2023 13:00    TPro  7.5  /  Alb  3.2<L>  /  TBili  0.4  /  DBili  x   /  AST  17  /  ALT  23  /  AlkPhos  418<H>  10-05      Urine Studies:  Urinalysis Basic - ( 05 Oct 2023 13:00 )    Color: x / Appearance: x / SG: x / pH: x  Gluc: 264 mg/dL / Ketone: x  / Bili: x / Urobili: x   Blood: x / Protein: x / Nitrite: x   Leuk Esterase: x / RBC: x / WBC x   Sq Epi: x / Non Sq Epi: x / Bacteria: x      MEDICATIONS  (STANDING):  atorvastatin 40 milliGRAM(s) Oral at bedtime  levETIRAcetam 750 milliGRAM(s) Oral two times a day  pantoprazole Infusion 8 mG/Hr (10 mL/Hr) IV Continuous <Continuous>  sucralfate 1 Gram(s) Oral four times a day        RADIOLOGY & ADDITIONAL STUDIES:

## 2023-10-08 NOTE — PROVIDER CONTACT NOTE (OTHER) - SITUATION
pts 's-140's
pt blood sugars 426 and 455
pts c/s 544 and 566mg/dl. pts heartrate 110's to 140's
bed rails

## 2023-10-08 NOTE — PROGRESS NOTE ADULT - ASSESSMENT
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 Per daughter at bedside, patient's INR level was 7.2 two days ago and was instructed by hematologist to hold coumadin for the past 2 days. Of note, patient had EGD and colonoscopy in 08/2023 revealing gastritis, duodenitis, non-bleeding AVM, and two polyps, one removed (pathology revealing tubular adenoma). Patient was afebrile and HDS on arrival with SBPs 90s and HR 80s. Labs notable for Hgb 5.9, INR 7.01, BUN/Cr 64/1.80, Alk Phos 418. ED ordered for Kcentra, Vit K, and 2U PRBCs.    1Acute blood loss anemia  - monitor cbc  - hold AC  - sp Kcentra and vitamin K  - GI fu    2 High INR   - hold AC- monitor INR  - sp Vitamin K  - sp Kcentra    3 Hx of CVA  - neuro fu  - hold AC,sec to GIB    4 DM  - uncontrolled   - monitor FS  - basal, bolus     Venodyne  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, seizure disorder, prior CABG, DM2, AFib, Mechanical AV replacement in 2007 (on Warfarin) presenting to [facility] ED per recommendation of hematologist for low Hgb to 5.2 with associated black stools for the past few days, last being yesterday. Per daughter at bedside, patient's INR level was 7.2 two days ago and was instructed by hematologist to hold coumadin for the past 2 days. Of note, patient had EGD and colonoscopy in 08/2023 revealing gastritis, duodenitis, non-bleeding AVM, and two polyps, one removed (pathology revealing tubular adenoma). Patient was afebrile and HDS on arrival with SBPs 90s and HR 80s. Labs notable for Hgb 5.9, INR 7.01, BUN/Cr 64/1.80, Alk Phos 418. ED ordered for Kcentra, Vit K, and 2U PRBCs.    1Acute blood loss anemia  - monitor cbc  - GI fu    2 High INR   - rstarted hep gtt  - monitor cbc     3 Hx of CVA  - neuro fu  - hold AC,sec to GIB    4 DM  - uncontrolled   - monitor FS  - basal, bolus       5 SOB   - improved  - check VQ scan  Venodyne

## 2023-10-08 NOTE — PROGRESS NOTE ADULT - ASSESSMENT
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, seizure disorder, prior CABG, DM2, AFib, Mechanical AV replacement in 2007 (on Warfarin) presenting to [facility] ED per recommendation of hematologist for low Hgb to 5.2 with associated black stools for the past few days, last being yesterday. Per daughter at bedside, patient's INR level was 7.2 two days ago and was instructed by hematologist to hold coumadin for the past 2 days. Of note, patient had EGD and colonoscopy in 08/2023 revealing gastritis, duodenitis, non-bleeding AVM, and two polyps, one removed (pathology revealing tubular adenoma). Patient was afebrile and HDS on arrival with SBPs 90s and HR 80s. Labs notable for Hgb 5.9, INR 7.01, BUN/Cr 64/1.80, Alk Phos 418. ED ordered for Kcentra, Vit K, and 2U PRBCs. (05 Oct 2023 15:48)    10/8/23  Seen at [facility] telemetry  Lying flat comfortably  Awake and alert  Son at bedside    Plan:  - Vitamin K, 2 units,PRBC ordered in ER  - Follow labs  - 11/22/22 Essentially normal Echocardiogram  - Hold coumadin, ASA, plavix and re-start when OK with GI  - Spironolactone and furosemide also on hold  - INR goal 3  - Patient sees Alexandru Alonso out-patient

## 2023-10-08 NOTE — PROGRESS NOTE ADULT - SUBJECTIVE AND OBJECTIVE BOX
Patient is a 68y Male whom presented to the hospital with hyponatremia     PAST MEDICAL & SURGICAL HISTORY:  CVA (cerebral vascular accident)      HTN (hypertension)      DM (diabetes mellitus)      History of atrial fibrillation      Right hemiparesis      Aphasia due to acute stroke      Upper GI bleed      Osteomyelitis      S/P CABG (coronary artery bypass graft)      S/P brain surgery      History of aortic valve repair          MEDICATIONS  (STANDING):  atorvastatin 40 milliGRAM(s) Oral at bedtime  levETIRAcetam 750 milliGRAM(s) Oral two times a day  pantoprazole Infusion 8 mG/Hr (10 mL/Hr) IV Continuous <Continuous>  sucralfate 1 Gram(s) Oral four times a day      Allergies    No Known Allergies    Intolerances        SOCIAL HISTORY:  Denies ETOh,Smoking,     FAMILY HISTORY:  FH: coronary artery disease (Sibling)    FH: type 2 diabetes (Sibling)        REVIEW OF SYSTEMS:    CONSTITUTIONAL: No weakness, fevers or chills  RESPIRATORY: No cough, wheezing, hemoptysis; No shortness of breath  CARDIOVASCULAR: No chest pain or palpitations  GASTROINTESTINAL: No abdominal or epigastric pain. No nausea, vomiting,     No diarrhea or constipation. No melena   SKIN: dry                          8.0    11.70 )-----------( 216      ( 08 Oct 2023 10:40 )             25.0       CBC Full  -  ( 08 Oct 2023 10:40 )  WBC Count : 11.70 K/uL  RBC Count : 2.91 M/uL  Hemoglobin : 8.0 g/dL  Hematocrit : 25.0 %  Platelet Count - Automated : 216 K/uL  Mean Cell Volume : 85.9 fl  Mean Cell Hemoglobin : 27.5 pg  Mean Cell Hemoglobin Concentration : 32.0 gm/dL  Auto Neutrophil # : x  Auto Lymphocyte # : x  Auto Monocyte # : x  Auto Eosinophil # : x  Auto Basophil # : x  Auto Neutrophil % : x  Auto Lymphocyte % : x  Auto Monocyte % : x  Auto Eosinophil % : x  Auto Basophil % : x      10-08    136  |  99  |  50<H>  ----------------------------<  324<H>  3.4<L>   |  26  |  1.60<H>    Ca    8.7      08 Oct 2023 06:45    TPro  6.8  /  Alb  2.7<L>  /  TBili  1.2  /  DBili  x   /  AST  17  /  ALT  17  /  AlkPhos  383<H>  10-08      CAPILLARY BLOOD GLUCOSE      POCT Blood Glucose.: 455 mg/dL (08 Oct 2023 11:50)  POCT Blood Glucose.: 426 mg/dL (08 Oct 2023 11:48)  POCT Blood Glucose.: 358 mg/dL (08 Oct 2023 07:55)  POCT Blood Glucose.: 478 mg/dL (07 Oct 2023 20:47)  POCT Blood Glucose.: 473 mg/dL (07 Oct 2023 20:44)  POCT Blood Glucose.: 400 mg/dL (07 Oct 2023 17:07)  POCT Blood Glucose.: 400 mg/dL (07 Oct 2023 17:02)      Vital Signs Last 24 Hrs  T(C): 37.8 (08 Oct 2023 11:14), Max: 39 (08 Oct 2023 06:15)  T(F): 100 (08 Oct 2023 11:14), Max: 102.2 (08 Oct 2023 06:15)  HR: 90 (08 Oct 2023 11:54) (80 - 167)  BP: 98/60 (08 Oct 2023 11:54) (98/60 - 142/83)  BP(mean): --  RR: 20 (08 Oct 2023 11:54) (18 - 20)  SpO2: 98% (08 Oct 2023 11:54) (92% - 99%)    Parameters below as of 08 Oct 2023 11:54  Patient On (Oxygen Delivery Method): nasal cannula  O2 Flow (L/min): 3      Urinalysis Basic - ( 08 Oct 2023 06:45 )    Color: x / Appearance: x / SG: x / pH: x  Gluc: 324 mg/dL / Ketone: x  / Bili: x / Urobili: x   Blood: x / Protein: x / Nitrite: x   Leuk Esterase: x / RBC: x / WBC x   Sq Epi: x / Non Sq Epi: x / Bacteria: x        PT/INR - ( 08 Oct 2023 06:45 )   PT: 14.0 sec;   INR: 1.20 ratio         PTT - ( 08 Oct 2023 10:40 )  PTT:91.6 sec  PHYSICAL EXAM:    Constitutional: NAD  HEENT: conjunctive   clear   Neck:  No JVD  Respiratory: CTAB  Cardiovascular: S1 and S2  Gastrointestinal: BS+, soft, NT/ND  Extremities: No peripheral edema  Neurological:  no focal deficits  Skin: dry   Access: Not applicable    LABS:                        5.9    11.76 )-----------( 314      ( 05 Oct 2023 13:00 )             18.8     10-05    129<L>  |  95<L>  |  64<H>  ----------------------------<  264<H>  4.2   |  25  |  1.80<H>    Ca    8.5      05 Oct 2023 13:00    TPro  7.5  /  Alb  3.2<L>  /  TBili  0.4  /  DBili  x   /  AST  17  /  ALT  23  /  AlkPhos  418<H>  10-05      Urine Studies:  Urinalysis Basic - ( 05 Oct 2023 13:00 )    Color: x / Appearance: x / SG: x / pH: x  Gluc: 264 mg/dL / Ketone: x  / Bili: x / Urobili: x   Blood: x / Protein: x / Nitrite: x   Leuk Esterase: x / RBC: x / WBC x   Sq Epi: x / Non Sq Epi: x / Bacteria: x      MEDICATIONS  (STANDING):  atorvastatin 40 milliGRAM(s) Oral at bedtime  levETIRAcetam 750 milliGRAM(s) Oral two times a day  pantoprazole Infusion 8 mG/Hr (10 mL/Hr) IV Continuous <Continuous>  sucralfate 1 Gram(s) Oral four times a day        RADIOLOGY & ADDITIONAL STUDIES:

## 2023-10-08 NOTE — PROGRESS NOTE ADULT - ASSESSMENT
[ASSESSMENT and  PLAN]    K92.2         GIB  D62           Anemia hemorrhage  D50.0        Fe def  Q27. 33    AVM Colon  K74. 60    Cirrhosis liver  D68.32     Coagulopathy due to coumadin  D63.8       Anemia due to chronic disease    69yo [place] M with hx of Fe def anemia, GIB, on outpt IV iron.   Known to me from office.   When well, Hgb 11g/dL with treatmetn  Recent decline in Hgb post GIB events.   Had bleed in last month, s/p eval at [facility]. Colonic AVM s/p APC  seen in office yest, Thurs, and present with Hgb 5 g/dL  Sent to ER due to low Hgb and recent BRBPR, recent elevated IN R5.2    s/p PRBC txfusion  Hgb better post transfusion.     Slow decline since last txfusion.         RECOMMENDATIONS    GIB  Follow CBC  Transfuse PRBC as clinically indicated.   Transfuse PRBC if Hgb <7.0 or if symptomatic.   GI following.     Fe Def  Consider IV venofer.     Cirrhosis    Cardiac Valve  AC on hold due to bleeding today  Await cardio and GI decision on  when OK to reattempt AC.     DVT Prophylaxis  Contraindicated.  SCD       Discussed plan of care with family.   Opportunity given for questions and discussion.   Questions or concerns all addressed and answered to their satisfaction, and in lay terms.     Follow in office post DC in 1wk    Thank you for consulting us.

## 2023-10-08 NOTE — PROGRESS NOTE ADULT - ASSESSMENT
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, seizure disorder, prior CABG, DM2, AFib, Mechanical AV replacement in 2007 (on Warfarin) presenting to [facility] ED per recommendation of hematologist for low Hgb to 5.2 with associated black stools for the past few days, last being yesterday. Per daughter at bedside, patient's INR level was 7.2 two days ago and was instructed by hematologist to hold coumadin for the past 2 days. Of note, patient had EGD and colonoscopy in 08/2023 revealing gastritis, duodenitis, non-bleeding AVM, and two polyps, one removed (pathology revealing tubular adenoma). Patient was afebrile and HDS on arrival with SBPs 90s and HR 80s. Labs notable for Hgb 5.9, INR 7.01, BUN/Cr 64/1.80, Alk Phos 418. ED ordered for Kcentra, Vit K, and 2U PRBCs. (05 Oct 2023 15:48)    hyponatremia improved urea Oral Powder 15 Gram(s) Oral daily  ,  sodium chloride 1 Gram(s) Oral two times a day  will check ua , urine osmolality , urine sodium , urine uric acid , serum sodium , serum osmolality , serum uric acid , f/u with hyponatremia work up , f/u with bmp , monitor i and o    ACUTE RENAL FAILURE:   Serum creatinine is  at    1.9  , approximating GFR at   ml/min.   There is no progression . No uremic symptoms  No evidence of anemia .  Fluid status stable.  Will continue to avoid nephrotoxic drugs.  Patient remains asymptomatic.   Continue current therapy.  hold  diuretic.  hold   ACE inhibitor.  hold   ARB.  Additional evaluation:   ECG,    echocardiogram,     CXR,  will obtained recent   renal ultrasound to evalaute kidney size and possible stones ,

## 2023-10-08 NOTE — PROGRESS NOTE ADULT - SUBJECTIVE AND OBJECTIVE BOX
[INTERVAL HX: ]  Patient seen and examined;  Chart reviewed and events noted;     last bleeding 2d ago. Not clear if any bleeding overnight.   family, dtr,  at bedside unawares of any    Hgb did decline slightly    [MEDICATIONS]  MEDICATIONS  (STANDING):  albuterol/ipratropium for Nebulization 3 milliLiter(s) Nebulizer every 8 hours  atorvastatin 40 milliGRAM(s) Oral at bedtime  dextrose 5%. 1000 milliLiter(s) (50 mL/Hr) IV Continuous <Continuous>  dextrose 5%. 1000 milliLiter(s) (100 mL/Hr) IV Continuous <Continuous>  dextrose 50% Injectable 25 Gram(s) IV Push once  dextrose 50% Injectable 12.5 Gram(s) IV Push once  dextrose 50% Injectable 25 Gram(s) IV Push once  diltiazem    milliGRAM(s) Oral daily  glucagon  Injectable 1 milliGRAM(s) IntraMuscular once  heparin  Infusion.  Unit(s)/Hr (11 mL/Hr) IV Continuous <Continuous>  influenza  Vaccine (HIGH DOSE) 0.7 milliLiter(s) IntraMuscular once  insulin glargine Injectable (LANTUS) 20 Unit(s) SubCutaneous at bedtime  insulin lispro (ADMELOG) corrective regimen sliding scale   SubCutaneous three times a day before meals  insulin lispro (ADMELOG) corrective regimen sliding scale   SubCutaneous at bedtime  insulin lispro Injectable (ADMELOG) 4 Unit(s) SubCutaneous three times a day before meals  levETIRAcetam 750 milliGRAM(s) Oral two times a day  pantoprazole Infusion 8 mG/Hr (10 mL/Hr) IV Continuous <Continuous>  piperacillin/tazobactam IVPB.. 3.375 Gram(s) IV Intermittent every 8 hours  sodium chloride 1 Gram(s) Oral two times a day  sucralfate 1 Gram(s) Oral four times a day  urea Oral Powder 15 Gram(s) Oral daily    MEDICATIONS  (PRN):  dextrose Oral Gel 15 Gram(s) Oral once PRN Blood Glucose LESS THAN 70 milliGRAM(s)/deciliter  guaiFENesin Oral Liquid (Sugar-Free) 200 milliGRAM(s) Oral every 6 hours PRN Cough  heparin   Injectable 2500 Unit(s) IV Push every 6 hours PRN For aPTT between 40 - 57  heparin   Injectable 5000 Unit(s) IV Push every 6 hours PRN For aPTT less than 40      [VITALS]  Vital Signs Last 24 Hrs  T(C): 37.8 (08 Oct 2023 11:14), Max: 39 (08 Oct 2023 06:15)  T(F): 100 (08 Oct 2023 11:14), Max: 102.2 (08 Oct 2023 06:15)  HR: 82 (08 Oct 2023 13:33) (80 - 167)  BP: 98/60 (08 Oct 2023 11:54) (98/60 - 128/74)  BP(mean): --  RR: 20 (08 Oct 2023 11:54) (18 - 20)  SpO2: 97% (08 Oct 2023 13:33) (92% - 98%)    Parameters below as of 08 Oct 2023 13:33  Patient On (Oxygen Delivery Method): nasal cannula      [WT/HT]  Daily     Daily   [VENT]      [PHYSICAL EXAM]  GEN: NAD, elderly frail looking  HEENT: normocephalic and atraumatic. EOMI. .    NECK: Supple.  No lymphadenopathy   LUNGS: Clear to auscultation.  HEART: Regular rate and rhythm,  no MRG  ABDOMEN: Soft, nontender, and nondistended.  Positive bowel sounds.    : No CVA tenderness  EXTREMITIES: Without edema.  NEUROLOGIC: grossly intact.  PSYCHIATRIC: Appropriate affect .  SKIN: No rash     [LABS:]                        8.0    11.70 )-----------( 216      ( 08 Oct 2023 10:40 )             25.0     10-08    136  |  99  |  50<H>  ----------------------------<  324<H>  3.4<L>   |  26  |  1.60<H>    Ca    8.7      08 Oct 2023 06:45    TPro  6.8  /  Alb  2.7<L>  /  TBili  1.2  /  DBili  x   /  AST  17  /  ALT  17  /  AlkPhos  383<H>  10-08    PT/INR - ( 08 Oct 2023 06:45 )   PT: 14.0 sec;   INR: 1.20 ratio         PTT - ( 08 Oct 2023 15:43 )  PTT:94.7 sec      Sedimentation Rate, Erythrocyte: 79 mm/hr *H* [0 - 20] (04-18-23 @ 20:06)      Urinalysis Basic - ( 08 Oct 2023 06:45 )    Color: x / Appearance: x / SG: x / pH: x  Gluc: 324 mg/dL / Ketone: x  / Bili: x / Urobili: x   Blood: x / Protein: x / Nitrite: x   Leuk Esterase: x / RBC: x / WBC x   Sq Epi: x / Non Sq Epi: x / Bacteria: x        COVID-19 PCR: NotDetec (18 Apr 2023 20:06)          [RADIOLOGY STUDIES:]

## 2023-10-08 NOTE — PROGRESS NOTE ADULT - SUBJECTIVE AND OBJECTIVE BOX
Date/Time Patient Seen:  		  Referring MD:   Data Reviewed	       Patient is a 68y old  Male who presents with a chief complaint of black stool (07 Oct 2023 16:09)      Subjective/HPI     PAST MEDICAL & SURGICAL HISTORY:  CVA (cerebral vascular accident)    HTN (hypertension)    DM (diabetes mellitus)    Arrhythmia    History of atrial fibrillation    Right hemiparesis    Aphasia due to acute stroke    GI bleed    Iron deficiency anemia    Upper GI bleed    Osteomyelitis    S/P CABG (coronary artery bypass graft)    S/P brain surgery    History of aortic valve repair          Medication list         MEDICATIONS  (STANDING):  albuterol/ipratropium for Nebulization 3 milliLiter(s) Nebulizer every 8 hours  atorvastatin 40 milliGRAM(s) Oral at bedtime  dextrose 5%. 1000 milliLiter(s) (50 mL/Hr) IV Continuous <Continuous>  dextrose 5%. 1000 milliLiter(s) (100 mL/Hr) IV Continuous <Continuous>  dextrose 50% Injectable 25 Gram(s) IV Push once  dextrose 50% Injectable 12.5 Gram(s) IV Push once  dextrose 50% Injectable 25 Gram(s) IV Push once  glucagon  Injectable 1 milliGRAM(s) IntraMuscular once  heparin  Infusion.  Unit(s)/Hr (11 mL/Hr) IV Continuous <Continuous>  influenza  Vaccine (HIGH DOSE) 0.7 milliLiter(s) IntraMuscular once  insulin glargine Injectable (LANTUS) 10 Unit(s) SubCutaneous at bedtime  insulin lispro (ADMELOG) corrective regimen sliding scale   SubCutaneous three times a day before meals  insulin lispro (ADMELOG) corrective regimen sliding scale   SubCutaneous at bedtime  levETIRAcetam 750 milliGRAM(s) Oral two times a day  pantoprazole Infusion 8 mG/Hr (10 mL/Hr) IV Continuous <Continuous>  piperacillin/tazobactam IVPB.. 3.375 Gram(s) IV Intermittent every 8 hours  sodium chloride 1 Gram(s) Oral two times a day  sucralfate 1 Gram(s) Oral four times a day  urea Oral Powder 15 Gram(s) Oral daily    MEDICATIONS  (PRN):  dextrose Oral Gel 15 Gram(s) Oral once PRN Blood Glucose LESS THAN 70 milliGRAM(s)/deciliter  guaiFENesin Oral Liquid (Sugar-Free) 200 milliGRAM(s) Oral every 6 hours PRN Cough  heparin   Injectable 5000 Unit(s) IV Push every 6 hours PRN For aPTT less than 40  heparin   Injectable 2500 Unit(s) IV Push every 6 hours PRN For aPTT between 40 - 57         Vitals log        ICU Vital Signs Last 24 Hrs  T(C): 36.8 (08 Oct 2023 04:31), Max: 37.6 (07 Oct 2023 14:43)  T(F): 98.3 (08 Oct 2023 04:31), Max: 99.6 (07 Oct 2023 14:43)  HR: 121 (08 Oct 2023 04:31) (82 - 126)  BP: 120/75 (08 Oct 2023 04:31) (119/86 - 142/83)  BP(mean): --  ABP: --  ABP(mean): --  RR: 19 (08 Oct 2023 04:31) (17 - 19)  SpO2: 96% (08 Oct 2023 04:31) (91% - 99%)    O2 Parameters below as of 08 Oct 2023 04:31  Patient On (Oxygen Delivery Method): nasal cannula                 Input and Output:  I&O's Detail    06 Oct 2023 07:01  -  07 Oct 2023 07:00  --------------------------------------------------------  IN:    Pantoprazole: 120 mL  Total IN: 120 mL    OUT:  Total OUT: 0 mL    Total NET: 120 mL      07 Oct 2023 07:01  -  08 Oct 2023 05:44  --------------------------------------------------------  IN:    Heparin Infusion: 62 mL    IV PiggyBack: 100 mL    Pantoprazole: 70 mL  Total IN: 232 mL    OUT:  Total OUT: 0 mL    Total NET: 232 mL          Lab Data                        8.2    14.83 )-----------( 229      ( 08 Oct 2023 01:41 )             25.3     10-06    132<L>  |  97  |  63<H>  ----------------------------<  300<H>  4.5   |  25  |  1.90<H>    Ca    8.5      06 Oct 2023 09:15    TPro  6.5  /  Alb  2.9<L>  /  TBili  1.1  /  DBili  x   /  AST  16  /  ALT  20  /  AlkPhos  347<H>  10-06            Review of Systems	      Objective     Physical Examination    heart s1s2  lung dc BS  head nc      Pertinent Lab findings & Imaging      Bryan:  NO   Adequate UO     I&O's Detail    06 Oct 2023 07:01  -  07 Oct 2023 07:00  --------------------------------------------------------  IN:    Pantoprazole: 120 mL  Total IN: 120 mL    OUT:  Total OUT: 0 mL    Total NET: 120 mL      07 Oct 2023 07:01  -  08 Oct 2023 05:44  --------------------------------------------------------  IN:    Heparin Infusion: 62 mL    IV PiggyBack: 100 mL    Pantoprazole: 70 mL  Total IN: 232 mL    OUT:  Total OUT: 0 mL    Total NET: 232 mL               Discussed with:     Cultures:	        Radiology

## 2023-10-08 NOTE — CONSULT NOTE ADULT - ASSESSMENT
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   ID c/s on HD#3 for fever to 102F overnight    Acute Multifocal PNA  AHRF on NC  - fever to 102 overnight, leukocytosis to 18  - CT chest showing multifocal PNA  Recommendations  C/w zosyn to cover for noscomially acquired organisms  F/u pending blood/urine cx  Trend temps/WBC  Supportive care, O2 as needed  Appreciate pulm recs  additional care per primary team    D/w Dr. Harmon    Infectious Diseases will continue to follow. Please call with any questions.   Becca Acosta M.D.  [facility] Division of Infectious Diseases [phone]  For after 5 P.M. and weekends, please call [phone]

## 2023-10-08 NOTE — CONSULT NOTE ADULT - SUBJECTIVE AND OBJECTIVE BOX
Jose Angelum, Division of Infectious Diseases  ADAM Nava S. Shah, DANNY [facility]  [phone]    ARNIE DELGADILLO  68y, Male  [id number]    HPI--  HPI:  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, seizure disorder, prior CABG, DM2, AFib, Mechanical AV replacement in 2007 (on Warfarin) presenting to [facility] ED per recommendation of hematologist for low Hgb to 5.2 with associated black stools for the past few days, last being yesterday. Per daughter at bedside, patient's INR level was 7.2 two days ago and was instructed by hematologist to hold coumadin for the past 2 days. Of note, patient had EGD and colonoscopy in 08/2023 revealing gastritis, duodenitis, non-bleeding AVM, and two polyps, one removed (pathology revealing tubular adenoma). Patient was afebrile and HDS on arrival with SBPs 90s and HR 80s. Labs notable for Hgb 5.9, INR 7.01, BUN/Cr 64/1.80, Alk Phos 418. ED ordered for Kcentra, Vit K, and 2U PRBCs. (05 Oct 2023 15:48)    ID c/s on HD#3 for fever to 102F overnight  Chest CT showing multifocal PNA  Pt on zosyn    Pt seen at bedside  Son at bedside--reporting pt is feeling better    Active Medications--  albuterol/ipratropium for Nebulization 3 milliLiter(s) Nebulizer every 8 hours  atorvastatin 40 milliGRAM(s) Oral at bedtime  dextrose 5%. 1000 milliLiter(s) IV Continuous <Continuous>  dextrose 5%. 1000 milliLiter(s) IV Continuous <Continuous>  dextrose 50% Injectable 25 Gram(s) IV Push once  dextrose 50% Injectable 12.5 Gram(s) IV Push once  dextrose 50% Injectable 25 Gram(s) IV Push once  dextrose Oral Gel 15 Gram(s) Oral once PRN  diltiazem    milliGRAM(s) Oral daily  glucagon  Injectable 1 milliGRAM(s) IntraMuscular once  guaiFENesin Oral Liquid (Sugar-Free) 200 milliGRAM(s) Oral every 6 hours PRN  heparin   Injectable 2500 Unit(s) IV Push every 6 hours PRN  heparin   Injectable 5000 Unit(s) IV Push every 6 hours PRN  heparin  Infusion.  Unit(s)/Hr IV Continuous <Continuous>  influenza  Vaccine (HIGH DOSE) 0.7 milliLiter(s) IntraMuscular once  insulin glargine Injectable (LANTUS) 20 Unit(s) SubCutaneous at bedtime  insulin lispro (ADMELOG) corrective regimen sliding scale   SubCutaneous three times a day before meals  insulin lispro (ADMELOG) corrective regimen sliding scale   SubCutaneous at bedtime  insulin lispro Injectable (ADMELOG) 4 Unit(s) SubCutaneous three times a day before meals  levETIRAcetam 750 milliGRAM(s) Oral two times a day  pantoprazole Infusion 8 mG/Hr IV Continuous <Continuous>  piperacillin/tazobactam IVPB.. 3.375 Gram(s) IV Intermittent every 8 hours  sodium chloride 1 Gram(s) Oral two times a day  sucralfate 1 Gram(s) Oral four times a day  urea Oral Powder 15 Gram(s) Oral daily    Antimicrobials:   piperacillin/tazobactam IVPB.. 3.375 Gram(s) IV Intermittent every 8 hours    Immunologic: influenza  Vaccine (HIGH DOSE) 0.7 milliLiter(s) IntraMuscular once      ROS:  unable to obtain    Allergies: No Known Allergies    PMH -- CVA (cerebral vascular accident)    HTN (hypertension)    DM (diabetes mellitus)    Arrhythmia    History of atrial fibrillation    Right hemiparesis    Aphasia due to acute stroke    GI bleed    Iron deficiency anemia    Upper GI bleed    Osteomyelitis      PSH -- S/P CABG (coronary artery bypass graft)    S/P brain surgery    History of aortic valve repair      FH -- No pertinent family history in first degree relatives    No pertinent family history in first degree relatives    FH: coronary artery disease (Sibling)    FH: type 2 diabetes (Sibling)      Social History --  EtOH: denies   Tobacco: denies   Drug Use: denies     Travel/Environmental/Occupational History:    Physical Exam--  Vital Signs Last 24 Hrs  T(F): 100 (08 Oct 2023 11:14), Max: 102.2 (08 Oct 2023 06:15)  HR: 82 (08 Oct 2023 13:33) (80 - 167)  BP: 98/60 (08 Oct 2023 11:54) (98/60 - 128/74)  RR: 20 (08 Oct 2023 11:54) (18 - 20)  SpO2: 97% (08 Oct 2023 13:33) (92% - 98%)  General: nontoxic-appearing, no acute distress  HEENT: NC/AT, EOMI,  Lungs: Clear bilaterally without rales, wheezing or rhonchi  Heart: Regular rate and rhythm. No murmur, rub or gallop.  Abdomen: Soft. Nondistended. Nontender.   Extremities: No cyanosis or clubbing. No edema.   Skin: Warm. Dry. Good turgor.    Laboratory & Imaging Data:  CBC:                       8.0    11.70 )-----------( 216      ( 08 Oct 2023 10:40 )             25.0     CMP: 10-08    136  |  99  |  50<H>  ----------------------------<  324<H>  3.4<L>   |  26  |  1.60<H>    Ca    8.7      08 Oct 2023 06:45    TPro  6.8  /  Alb  2.7<L>  /  TBili  1.2  /  DBili  x   /  AST  17  /  ALT  17  /  AlkPhos  383<H>  10-08    LIVER FUNCTIONS - ( 08 Oct 2023 06:45 )  Alb: 2.7 g/dL / Pro: 6.8 g/dL / ALK PHOS: 383 U/L / ALT: 17 U/L / AST: 17 U/L / GGT: x           Urinalysis Basic - ( 08 Oct 2023 06:45 )    Color: x / Appearance: x / SG: x / pH: x  Gluc: 324 mg/dL / Ketone: x  / Bili: x / Urobili: x   Blood: x / Protein: x / Nitrite: x   Leuk Esterase: x / RBC: x / WBC x   Sq Epi: x / Non Sq Epi: x / Bacteria: x        Microbiology: reviewed        Radiology: reviewed    < from: CT Chest No Cont (10.06.23 @ 10:51) >    ACC: [id number] EXAM:  CT CHEST   ORDERED BY: TABATHA SINGH     PROCEDURE DATE:  10/06/2023          INTERPRETATION:  CLINICAL INFORMATION: 68-year-old male with severe   anemia now requiring hemodialysis. Evaluate for effusions and atelectasis.    COMPARISON: CT chest 11/28/2022    CONTRAST/COMPLICATIONS:  IV Contrast: NONE  Oral Contrast: NONE  Complications: None reported at time of study completion    PROCEDURE:  CT of the Chest was performed.  Sagittal and coronal reformats were performed.    FINDINGS:    LUNGS AND AIRWAYS: Patent central airways.  There is redemonstration of   volume loss in the left lower lobe, pleural-based consolidation, and   peribronchial vascular distribution, suggestive of a round atelectasis.   In additionthere is a smaller round atelectasis in the left upper lobe.   There are peribronchovascular foci of groundglass opacity in the   bilateral upper, middle, and right lower lobes, suggestive of infection.  PLEURA: Trace right pleural effusion. There isno pneumothorax.  MEDIASTINUM AND GREER: No lymphadenopathy.  VESSELS: Calcifications of the thoracic aorta and coronary vessels.   Redemonstrated aneurysmal dilatation of the ascending aorta is 4.2 cm.   Status post aortic valve replacement.  HEART: There is biatrial enlargement of the heart. There is pericardial   calcification along the right atrium..  CHEST WALL AND LOWER NECK: Mild bilateral gynecomastia.  VISUALIZED UPPER ABDOMEN: Nodular contour liver parenchyma suggestive of   cirrhosis. Spleen, bilateral kidneys, pancreas, and adrenals are within   normal limits. There is irregularity of the right renal parenchyma   suggestive of a scar.  BONES: Degenerative changes of the spine. Sternotomy wires.    IMPRESSION:  Peribronchovascular foci of groundglass opacity and small consolidations   in bilateral upper, middle and right lower lobe, suggestive of multifocal   pneumonia  Trace right pleural effusion. Bilateral round atelectasis in the left   upper and left lower lobes.  Cirrhoticliver    --- End of Report ---          JONATHAN MARVIN MD; Resident Radiologist  This document has been electronically signed.  ANTOINETTE OSBORN MD; Attending Radiologist  This document has been electronically signed. Oct  6 2023  3:55PM    < end of copied text >

## 2023-10-08 NOTE — PROGRESS NOTE ADULT - ASSESSMENT
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, seizure disorder, prior CABG, DM2, AFib, Mechanical AV replacement in 2007 (on Warfarin) presenting to [facility] ED per recommendation of hematologist for low Hgb to 5.2 with associated black stools    anemia  mech valve  cva  aphasia  seizure disorder  pleural eff  atelectasis  AF  CAD  CABG hx  DM    ct reviewed  eval for PNA - aspiration - emp ABX - NEBS prn for mucociliary clearance  VS noted    GI and ICU cx noted  serial hgb  s/p PRBC - Vit K - Kcentra -   I and O  monitor VS and HD  PPI  goal map > 60  cvs rx regimen  goal sat > 88 pct  will follow  GOC discussion

## 2023-10-08 NOTE — PROGRESS NOTE ADULT - SUBJECTIVE AND OBJECTIVE BOX
Patient is a 68y old  Male who presents with a chief complaint of black stool (08 Oct 2023 08:10)    Date of servie : 10-08-23 @ 12:30  INTERVAL HPI/OVERNIGHT EVENTS:  T(C): 37.8 (10-08-23 @ 11:14), Max: 39 (10-08-23 @ 06:15)  HR: 90 (10-08-23 @ 11:54) (80 - 167)  BP: 98/60 (10-08-23 @ 11:54) (98/60 - 142/83)  RR: 20 (10-08-23 @ 11:54) (18 - 20)  SpO2: 98% (10-08-23 @ 11:54) (92% - 99%)  Wt(kg): --  I&O's Summary    07 Oct 2023 07:01  -  08 Oct 2023 07:00  --------------------------------------------------------  IN: 232 mL / OUT: 0 mL / NET: 232 mL        LABS:                        8.0    11.70 )-----------( 216      ( 08 Oct 2023 10:40 )             25.0     10-08    136  |  99  |  50<H>  ----------------------------<  324<H>  3.4<L>   |  26  |  1.60<H>    Ca    8.7      08 Oct 2023 06:45    TPro  6.8  /  Alb  2.7<L>  /  TBili  1.2  /  DBili  x   /  AST  17  /  ALT  17  /  AlkPhos  383<H>  10-08    PT/INR - ( 08 Oct 2023 06:45 )   PT: 14.0 sec;   INR: 1.20 ratio         PTT - ( 08 Oct 2023 10:40 )  PTT:91.6 sec  Urinalysis Basic - ( 08 Oct 2023 06:45 )    Color: x / Appearance: x / SG: x / pH: x  Gluc: 324 mg/dL / Ketone: x  / Bili: x / Urobili: x   Blood: x / Protein: x / Nitrite: x   Leuk Esterase: x / RBC: x / WBC x   Sq Epi: x / Non Sq Epi: x / Bacteria: x      CAPILLARY BLOOD GLUCOSE      POCT Blood Glucose.: 455 mg/dL (08 Oct 2023 11:50)  POCT Blood Glucose.: 426 mg/dL (08 Oct 2023 11:48)  POCT Blood Glucose.: 358 mg/dL (08 Oct 2023 07:55)  POCT Blood Glucose.: 478 mg/dL (07 Oct 2023 20:47)  POCT Blood Glucose.: 473 mg/dL (07 Oct 2023 20:44)  POCT Blood Glucose.: 400 mg/dL (07 Oct 2023 17:07)  POCT Blood Glucose.: 400 mg/dL (07 Oct 2023 17:02)        Urinalysis Basic - ( 08 Oct 2023 06:45 )    Color: x / Appearance: x / SG: x / pH: x  Gluc: 324 mg/dL / Ketone: x  / Bili: x / Urobili: x   Blood: x / Protein: x / Nitrite: x   Leuk Esterase: x / RBC: x / WBC x   Sq Epi: x / Non Sq Epi: x / Bacteria: x        MEDICATIONS  (STANDING):  albuterol/ipratropium for Nebulization 3 milliLiter(s) Nebulizer every 8 hours  atorvastatin 40 milliGRAM(s) Oral at bedtime  dextrose 5%. 1000 milliLiter(s) (50 mL/Hr) IV Continuous <Continuous>  dextrose 5%. 1000 milliLiter(s) (100 mL/Hr) IV Continuous <Continuous>  dextrose 50% Injectable 25 Gram(s) IV Push once  dextrose 50% Injectable 12.5 Gram(s) IV Push once  dextrose 50% Injectable 25 Gram(s) IV Push once  diltiazem    milliGRAM(s) Oral daily  glucagon  Injectable 1 milliGRAM(s) IntraMuscular once  heparin  Infusion.  Unit(s)/Hr (11 mL/Hr) IV Continuous <Continuous>  influenza  Vaccine (HIGH DOSE) 0.7 milliLiter(s) IntraMuscular once  insulin glargine Injectable (LANTUS) 20 Unit(s) SubCutaneous at bedtime  insulin lispro (ADMELOG) corrective regimen sliding scale   SubCutaneous three times a day before meals  insulin lispro (ADMELOG) corrective regimen sliding scale   SubCutaneous at bedtime  insulin lispro Injectable (ADMELOG) 4 Unit(s) SubCutaneous three times a day before meals  insulin lispro Injectable (ADMELOG). 10 Unit(s) SubCutaneous once  levETIRAcetam 750 milliGRAM(s) Oral two times a day  pantoprazole Infusion 8 mG/Hr (10 mL/Hr) IV Continuous <Continuous>  piperacillin/tazobactam IVPB.. 3.375 Gram(s) IV Intermittent every 8 hours  potassium chloride  10 mEq/100 mL IVPB 10 milliEquivalent(s) IV Intermittent once  sodium chloride 1 Gram(s) Oral two times a day  sucralfate 1 Gram(s) Oral four times a day  urea Oral Powder 15 Gram(s) Oral daily    MEDICATIONS  (PRN):  dextrose Oral Gel 15 Gram(s) Oral once PRN Blood Glucose LESS THAN 70 milliGRAM(s)/deciliter  guaiFENesin Oral Liquid (Sugar-Free) 200 milliGRAM(s) Oral every 6 hours PRN Cough  heparin   Injectable 5000 Unit(s) IV Push every 6 hours PRN For aPTT less than 40  heparin   Injectable 2500 Unit(s) IV Push every 6 hours PRN For aPTT between 40 - 57          PHYSICAL EXAM:  GENERAL: NAD, well-groomed, well-developed  HEAD:  Atraumatic, Normocephalic  CHEST/LUNG: Clear to percussion bilaterally; No rales, rhonchi, wheezing, or rubs  HEART: Regular rate and rhythm; No murmurs, rubs, or gallops  ABDOMEN: Soft, Nontender, Nondistended; Bowel sounds present  EXTREMITIES:  2+ Peripheral Pulses, No clubbing, cyanosis, or edema  LYMPH: No lymphadenopathy noted  SKIN: No rashes or lesions    Care Discussed with Consultants/Other Providers [ ] YES  [ ] NO

## 2023-10-08 NOTE — CHART NOTE - NSCHARTNOTEFT_GEN_A_CORE
Called by RN for patient with fever of 101.8F rectal.  AS per RN HR now ~113 on tele monitor    - ordered another ofirmev 1g x1  - continue zosyn  - hold cardizem gtt  - started on cardizem ER 240mg qd PO  - Cardiologist Dr. Seals agrees with above plan  - RN to call if changes Called by RN for patient with fever of 101.8F rectal.  As per RN HR now ~113 on tele monitor    - ordered another ofirmev 1g x1  - continue zosyn  - hold cardizem gtt  - started on cardizem ER 240mg qd PO  - Cardiologist Dr. Seals agrees with above plan  - RN to call if changes Called by RN for patient with fever of 101.8F rectal.  As per RN HR now ~113 on tele monitor    - s/p ofirmev x1, ordered motrin 400mg PO x1  - continue zosyn  - hold cardizem gtt  - started on cardizem ER 240mg qd PO  - Cardiologist Dr. Seals agrees with above plan  - RN to call if changes

## 2023-10-08 NOTE — PROGRESS NOTE ADULT - SUBJECTIVE AND OBJECTIVE BOX
Patient is a 68y Male with a known history of :  Uncontrolled type 2 diabetes mellitus with hyperglycemia [E11.65]      HPI:  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, seizure disorder, prior CABG, DM2, AFib, Mechanical AV replacement in 2007 (on Warfarin) presenting to [facility]"" ED per recommendation of hematologist for low Hgb to 5.2 with associated black stools for the past few days, last being yesterday. Per daughter at bedside, patient's INR level was 7.2 two days ago and was instructed by hematologist to hold coumadin for the past 2 days. Of note, patient had EGD and colonoscopy in 08/2023 revealing gastritis, duodenitis, non-bleeding AVM, and two polyps, one removed (pathology revealing tubular adenoma). Patient was afebrile and HDS on arrival with SBPs 90s and HR 80s. Labs notable for Hgb 5.9, INR 7.01, BUN/Cr 64/1.80, Alk Phos 418. ED ordered for Kcentra, Vit K, and 2U PRBCs. (05 Oct 2023 15:48)      REVIEW OF SYSTEMS:    CONSTITUTIONAL: No fever, weight loss, or fatigue  EYES: No eye pain, visual disturbances, or discharge  ENMT:  No difficulty hearing, tinnitus, vertigo; No sinus or throat pain  NECK: No pain or stiffness  BREASTS: No pain, masses, or nipple discharge  RESPIRATORY: No cough, wheezing, chills or hemoptysis; No shortness of breath  CARDIOVASCULAR: No chest pain, palpitations, dizziness, or leg swelling  GASTROINTESTINAL: No abdominal or epigastric pain. No nausea, vomiting, or hematemesis; No diarrhea or constipation. No melena or hematochezia.  GENITOURINARY: No dysuria, frequency, hematuria, or incontinence  NEUROLOGICAL: No headaches, memory loss, loss of strength, numbness, or tremors  SKIN: No itching, burning, rashes, or lesions   LYMPH NODES: No enlarged glands  ENDOCRINE: No heat or cold intolerance; No hair loss  MUSCULOSKELETAL: No joint pain or swelling; No muscle, back, or extremity pain  PSYCHIATRIC: No depression, anxiety, mood swings, or difficulty sleeping  HEME/LYMPH: No easy bruising, or bleeding gums  ALLERGY AND IMMUNOLOGIC: No hives or eczema    MEDICATIONS  (STANDING):  albuterol/ipratropium for Nebulization 3 milliLiter(s) Nebulizer every 8 hours  atorvastatin 40 milliGRAM(s) Oral at bedtime  dextrose 5%. 1000 milliLiter(s) (50 mL/Hr) IV Continuous <Continuous>  dextrose 5%. 1000 milliLiter(s) (100 mL/Hr) IV Continuous <Continuous>  dextrose 50% Injectable 25 Gram(s) IV Push once  dextrose 50% Injectable 12.5 Gram(s) IV Push once  dextrose 50% Injectable 25 Gram(s) IV Push once  diltiazem Infusion 10 mG/Hr (10 mL/Hr) IV Continuous <Continuous>  glucagon  Injectable 1 milliGRAM(s) IntraMuscular once  heparin  Infusion.  Unit(s)/Hr (11 mL/Hr) IV Continuous <Continuous>  influenza  Vaccine (HIGH DOSE) 0.7 milliLiter(s) IntraMuscular once  insulin glargine Injectable (LANTUS) 10 Unit(s) SubCutaneous at bedtime  insulin lispro (ADMELOG) corrective regimen sliding scale   SubCutaneous three times a day before meals  insulin lispro (ADMELOG) corrective regimen sliding scale   SubCutaneous at bedtime  levETIRAcetam 750 milliGRAM(s) Oral two times a day  pantoprazole Infusion 8 mG/Hr (10 mL/Hr) IV Continuous <Continuous>  piperacillin/tazobactam IVPB.. 3.375 Gram(s) IV Intermittent every 8 hours  sodium chloride 1 Gram(s) Oral two times a day  sucralfate 1 Gram(s) Oral four times a day  urea Oral Powder 15 Gram(s) Oral daily    MEDICATIONS  (PRN):  dextrose Oral Gel 15 Gram(s) Oral once PRN Blood Glucose LESS THAN 70 milliGRAM(s)/deciliter  guaiFENesin Oral Liquid (Sugar-Free) 200 milliGRAM(s) Oral every 6 hours PRN Cough  heparin   Injectable 5000 Unit(s) IV Push every 6 hours PRN For aPTT less than 40  heparin   Injectable 2500 Unit(s) IV Push every 6 hours PRN For aPTT between 40 - 57      ALLERGIES: No Known Allergies      FAMILY HISTORY:  FH: coronary artery disease (Sibling)    FH: type 2 diabetes (Sibling)        Social history:  Alochol:   Smoking:   Drug Use:   Marital Status:     PHYSICAL EXAMINATION:  -----------------------------  T(C): 39 (10-08-23 @ 06:15), Max: 39 (10-08-23 @ 06:15)  HR: 138 (10-08-23 @ 06:15) (82 - 167)  BP: 128/74 (10-08-23 @ 05:58) (119/86 - 142/83)  RR: 20 (10-08-23 @ 05:58) (17 - 20)  SpO2: 92% (10-08-23 @ 05:58) (91% - 99%)  Wt(kg): --    10-07 @ 07:01  -  10-08 @ 07:00  --------------------------------------------------------  IN:    Heparin Infusion: 62 mL    IV PiggyBack: 100 mL    Pantoprazole: 70 mL  Total IN: 232 mL    OUT:  Total OUT: 0 mL    Total NET: 232 mL            Constitutional: well developed, normal appearance, well groomed, well nourished, no deformities and no acute distress.   Eyes: the conjunctiva exhibited no abnormalities and the eyelids demonstrated no xanthelasmas.   HEENT: normal oral mucosa, no oral pallor and no oral cyanosis.   Neck: normal jugular venous A waves present, normal jugular venous V waves present and no jugular venous baca A waves.   Pulmonary: no respiratory distress, normal respiratory rhythm and effort, no accessory muscle use and lungs were clear to auscultation bilaterally. Anteriorly  Cardiovascular: heart rate and rhythm were irreg/irreg, normal S1 and S2 and no rub, heave or thrill are present. II/VI harsh systolic murmur  Musculoskeletal: the gait could not be assessed.   Extremities: no clubbing of the fingernails, no localized cyanosis, no petechial hemorrhages and no ischemic changes.   Skin: normal skin color and pigmentation, no rash, no venous stasis, no skin lesions, no skin ulcer and no xanthoma was observed.       LABS:   --------  10-06    132<L>  |  97  |  63<H>  ----------------------------<  300<H>  4.5   |  25  |  1.90<H>    Ca    8.5      06 Oct 2023 09:15    TPro  6.5  /  Alb  2.9<L>  /  TBili  1.1  /  DBili  x   /  AST  16  /  ALT  20  /  AlkPhos  347<H>  10-06                         8.2    14.83 )-----------( 229      ( 08 Oct 2023 01:41 )             25.3     PT/INR - ( 07 Oct 2023 13:15 )   PT: 12.9 sec;   INR: 1.11 ratio         PTT - ( 08 Oct 2023 01:41 )  PTT:159.5 sec              Radiology:

## 2023-10-08 NOTE — PROGRESS NOTE ADULT - SUBJECTIVE AND OBJECTIVE BOX
[facility]  Shane Murray PA-C  [address] [place] [address]  [phone]      INTERVAL HPI/OVERNIGHT EVENTS:    patient s/e  febrile overnight w/ episode afib w/ RVR  melanics stool   x1 per nursing staff  h/h stable           MEDICATIONS  (STANDING):  albuterol/ipratropium for Nebulization 3 milliLiter(s) Nebulizer every 8 hours  atorvastatin 40 milliGRAM(s) Oral at bedtime  dextrose 5%. 1000 milliLiter(s) (50 mL/Hr) IV Continuous <Continuous>  dextrose 5%. 1000 milliLiter(s) (100 mL/Hr) IV Continuous <Continuous>  dextrose 50% Injectable 25 Gram(s) IV Push once  dextrose 50% Injectable 25 Gram(s) IV Push once  dextrose 50% Injectable 12.5 Gram(s) IV Push once  diltiazem    milliGRAM(s) Oral daily  glucagon  Injectable 1 milliGRAM(s) IntraMuscular once  heparin  Infusion.  Unit(s)/Hr (11 mL/Hr) IV Continuous <Continuous>  influenza  Vaccine (HIGH DOSE) 0.7 milliLiter(s) IntraMuscular once  insulin glargine Injectable (LANTUS) 20 Unit(s) SubCutaneous at bedtime  insulin lispro (ADMELOG) corrective regimen sliding scale   SubCutaneous three times a day before meals  insulin lispro (ADMELOG) corrective regimen sliding scale   SubCutaneous at bedtime  insulin lispro Injectable (ADMELOG) 4 Unit(s) SubCutaneous three times a day before meals  levETIRAcetam 750 milliGRAM(s) Oral two times a day  pantoprazole Infusion 8 mG/Hr (10 mL/Hr) IV Continuous <Continuous>  piperacillin/tazobactam IVPB.. 3.375 Gram(s) IV Intermittent every 8 hours  potassium chloride  10 mEq/100 mL IVPB 10 milliEquivalent(s) IV Intermittent once  sodium chloride 1 Gram(s) Oral two times a day  sucralfate 1 Gram(s) Oral four times a day  urea Oral Powder 15 Gram(s) Oral daily    MEDICATIONS  (PRN):  dextrose Oral Gel 15 Gram(s) Oral once PRN Blood Glucose LESS THAN 70 milliGRAM(s)/deciliter  guaiFENesin Oral Liquid (Sugar-Free) 200 milliGRAM(s) Oral every 6 hours PRN Cough  heparin   Injectable 5000 Unit(s) IV Push every 6 hours PRN For aPTT less than 40  heparin   Injectable 2500 Unit(s) IV Push every 6 hours PRN For aPTT between 40 - 57      Allergies    No Known Allergies    Intolerances          ROS:   unable to obtain      nad  confused  frail  non toxic  soft, nt  no edema        PHYSICAL EXAM  Vital Signs Last 24 Hrs  T(C): 37.8 (08 Oct 2023 11:14), Max: 39 (08 Oct 2023 06:15)  T(F): 100 (08 Oct 2023 11:14), Max: 102.2 (08 Oct 2023 06:15)  HR: 90 (08 Oct 2023 11:54) (80 - 167)  BP: 98/60 (08 Oct 2023 11:54) (98/60 - 142/83)  BP(mean): --  RR: 20 (08 Oct 2023 11:54) (18 - 20)  SpO2: 98% (08 Oct 2023 11:54) (92% - 99%)    Parameters below as of 08 Oct 2023 11:54  Patient On (Oxygen Delivery Method): nasal cannula  O2 Flow (L/min): 3        LABS:                        8.0    11.70 )-----------( 216      ( 08 Oct 2023 10:40 )             25.0     10-08    136  |  99  |  50<H>  ----------------------------<  324<H>  3.4<L>   |  26  |  1.60<H>    Ca    8.7      08 Oct 2023 06:45    TPro  6.8  /  Alb  2.7<L>  /  TBili  1.2  /  DBili  x   /  AST  17  /  ALT  17  /  AlkPhos  383<H>  10-08    PT/INR - ( 08 Oct 2023 06:45 )   PT: 14.0 sec;   INR: 1.20 ratio         PTT - ( 08 Oct 2023 10:40 )  PTT:91.6 sec      10-07-23 @ 07:01  -  10-08-23 @ 07:00  --------------------------------------------------------  IN: 232 mL / OUT: 0 mL / NET: 232 mL                      RADIOLOGY & ADDITIONAL TESTS:

## 2023-10-08 NOTE — CHART NOTE - NSCHARTNOTEFT_GEN_A_CORE
Called by RN for patient with elevated heart rate.    Patient seen at the bedside, he is experiencing significant rigors. Unable to respond to questioning. Heart rate significantly elevated on physical exam. Per RN, he was given a tea with a significant amount of sugar and caffeine within the last hour. Tele reviewed patient is sustaining heart rates between 170s-190s    Possible patient is developing a fever, will give ofirmev x1  For RVR, will give diltiazem .25mg/kg x1 and reassess  stat 12 lead ordered  f/u am labs Called by RN for patient with elevated heart rate.    Patient seen at the bedside, he is experiencing significant rigors. Unable to respond to questioning. Heart rate significantly elevated on physical exam. Per RN, he was given a tea with a significant amount of sugar and caffeine within the last hour. Tele reviewed patient is sustaining heart rates between 170s-190s    Possible patient is developing a fever, will give ofirmev x1  For afib RVR, will give diltiazem .25mg/kg x1 and reassess  stat 12 lead ordered  f/u am labs Called by RN for patient with elevated heart rate.    Patient seen at the bedside, he is experiencing significant rigors. Unable to respond to questioning. Heart rate significantly elevated on physical exam. Per RN, he was given a tea with a significant amount of sugar and caffeine within the last hour. Tele reviewed patient is sustaining heart rates between 170s-190s    Possible patient is developing a fever, will give ofirmev x1  For afib RVR, will give diltiazem .25mg/kg x1 and reassess  stat 12 lead ordered  f/u am labs    addendum  rectal temp noted to be 102f  chest imaging noted to have multifocal pna  will check ua, uc, bcx x2 Called by RN for patient with elevated heart rate.    Patient seen at the bedside, he is experiencing significant rigors. Unable to respond to questioning. Heart rate significantly elevated on physical exam. Per RN, he was given a tea with a significant amount of sugar and caffeine within the last hour. Tele reviewed patient is sustaining heart rates between 170s-190s    Possible patient is developing a fever, will give ofirmev x1  For afib RVR, will give diltiazem .25mg/kg x1 and reassess  stat 12 lead ordered  f/u am labs    addendum  rectal temp noted to be 102f  chest imaging noted to have multifocal pna  will check ua, uc, bcx x2  start empiric abx w/ zosyn  rec id consult Called by RN for patient with elevated heart rate.    Patient seen at the bedside, he is experiencing significant rigors. Unable to respond to questioning. Heart rate significantly elevated on physical exam. Per RN, he was given a tea with a significant amount of sugar and caffeine within the last hour. Tele reviewed patient is sustaining heart rates between 170s-190s    Possible patient is developing a fever, will give ofirmev x1  For afib RVR, will give diltiazem .25mg/kg x1 and reassess  stat 12 lead ordered  f/u am labs    addendum  rectal temp noted to be 102f  chest imaging noted to have multifocal pna  will check ua, uc, bcx x2  start empiric abx w/ zosyn  rec id consult    HRs still 130s on tele, will start cardizem drip Called by RN for patient with elevated heart rate.    Patient seen at the bedside, he is experiencing significant rigors. Unable to respond to questioning. Heart rate significantly elevated on physical exam. Per RN, he was given a tea with a significant amount of sugar and caffeine within the last hour. Tele reviewed patient is sustaining heart rates between 170s-190s    Possible patient is developing a fever, will give ofirmev x1  For afib RVR, will give diltiazem .25mg/kg x1 and reassess  stat 12 lead ordered  f/u am labs    addendum  rectal temp noted to be 102f  chest imaging noted to have multifocal pna  will check ua, uc, bcx x2  start empiric abx w/ zosyn  rec id consult    HRs still 130s on tele, will start cardizem drip at 10mg/hr Called by RN for patient with elevated heart rate.    Patient seen at the bedside, he is experiencing significant rigors. Unable to respond to questioning. Heart rate significantly elevated on physical exam. Per RN, he was given a tea with a significant amount of sugar and caffeine within the last hour. Tele reviewed patient is sustaining heart rates between 170s-190s    Possible patient is developing a fever, will give ofirmev x1  For afib RVR, will give diltiazem .25mg/kg x1 and reassess  stat 12 lead ordered  f/u am labs    addendum  rectal temp noted to be 102f  chest imaging noted to have multifocal pna  will check ua, uc, bcx x2  continue empiric abx with zosyn  rec id consult    HRs still 130s on tele, will start cardizem drip at 10mg/hr

## 2023-10-08 NOTE — PROGRESS NOTE ADULT - SUBJECTIVE AND OBJECTIVE BOX
CAPILLARY BLOOD GLUCOSE      POCT Blood Glucose.: 350 mg/dL (08 Oct 2023 21:24)  POCT Blood Glucose.: 415 mg/dL (08 Oct 2023 16:55)  POCT Blood Glucose.: 413 mg/dL (08 Oct 2023 16:50)  POCT Blood Glucose.: 455 mg/dL (08 Oct 2023 11:50)  POCT Blood Glucose.: 426 mg/dL (08 Oct 2023 11:48)  POCT Blood Glucose.: 358 mg/dL (08 Oct 2023 07:55)      Vital Signs Last 24 Hrs  T(C): 36.3 (08 Oct 2023 17:37), Max: 39 (08 Oct 2023 06:15)  T(F): 97.4 (08 Oct 2023 17:37), Max: 102.2 (08 Oct 2023 06:15)  HR: 87 (08 Oct 2023 17:37) (80 - 167)  BP: 104/70 (08 Oct 2023 17:37) (98/60 - 128/74)  BP(mean): --  RR: 18 (08 Oct 2023 17:37) (18 - 20)  SpO2: 98% (08 Oct 2023 17:37) (92% - 98%)    Parameters below as of 08 Oct 2023 17:37  Patient On (Oxygen Delivery Method): nasal cannula  O2 Flow (L/min): 3      Respiratory: CTA B/L  CV: RRR, S1S2, no murmurs, rubs or gallops  Abdominal: Soft, NT, ND +BS, Last BM  Extremities: No edema, + peripheral pulses     10-08    136  |  99  |  50<H>  ----------------------------<  324<H>  3.4<L>   |  26  |  1.60<H>    Ca    8.7      08 Oct 2023 06:45    TPro  6.8  /  Alb  2.7<L>  /  TBili  1.2  /  DBili  x   /  AST  17  /  ALT  17  /  AlkPhos  383<H>  10-08      atorvastatin 40 milliGRAM(s) Oral at bedtime  dextrose 50% Injectable 25 Gram(s) IV Push once  dextrose 50% Injectable 25 Gram(s) IV Push once  dextrose 50% Injectable 12.5 Gram(s) IV Push once  dextrose Oral Gel 15 Gram(s) Oral once PRN  glucagon  Injectable 1 milliGRAM(s) IntraMuscular once  insulin glargine Injectable (LANTUS) 20 Unit(s) SubCutaneous at bedtime  insulin lispro (ADMELOG) corrective regimen sliding scale   SubCutaneous three times a day before meals  insulin lispro (ADMELOG) corrective regimen sliding scale   SubCutaneous at bedtime  insulin lispro Injectable (ADMELOG) 4 Unit(s) SubCutaneous three times a day before meals

## 2023-10-09 NOTE — PROGRESS NOTE ADULT - SUBJECTIVE AND OBJECTIVE BOX
Neurology follow up note    ARNIE [id number]      Interval History:    Patient feels ok no new complaints sitting in chair     Allergies    No Known Allergies    Intolerances        MEDICATIONS    albuterol/ipratropium for Nebulization 3 milliLiter(s) Nebulizer every 8 hours  atorvastatin 40 milliGRAM(s) Oral at bedtime  dextrose 5%. 1000 milliLiter(s) IV Continuous <Continuous>  dextrose 5%. 1000 milliLiter(s) IV Continuous <Continuous>  dextrose 50% Injectable 25 Gram(s) IV Push once  dextrose 50% Injectable 12.5 Gram(s) IV Push once  dextrose 50% Injectable 25 Gram(s) IV Push once  dextrose Oral Gel 15 Gram(s) Oral once PRN  diltiazem    milliGRAM(s) Oral daily  furosemide    Tablet 40 milliGRAM(s) Oral daily  glucagon  Injectable 1 milliGRAM(s) IntraMuscular once  guaiFENesin Oral Liquid (Sugar-Free) 200 milliGRAM(s) Oral every 6 hours PRN  heparin   Injectable 2500 Unit(s) IV Push every 6 hours PRN  heparin   Injectable 5000 Unit(s) IV Push every 6 hours PRN  heparin  Infusion.  Unit(s)/Hr IV Continuous <Continuous>  influenza  Vaccine (HIGH DOSE) 0.7 milliLiter(s) IntraMuscular once  insulin glargine Injectable (LANTUS) 20 Unit(s) SubCutaneous at bedtime  insulin lispro (ADMELOG) corrective regimen sliding scale   SubCutaneous at bedtime  insulin lispro (ADMELOG) corrective regimen sliding scale   SubCutaneous three times a day before meals  insulin lispro Injectable (ADMELOG) 4 Unit(s) SubCutaneous three times a day before meals  levETIRAcetam 750 milliGRAM(s) Oral two times a day  pantoprazole Infusion 8 mG/Hr IV Continuous <Continuous>  piperacillin/tazobactam IVPB.. 3.375 Gram(s) IV Intermittent every 8 hours  sodium chloride 1 Gram(s) Oral two times a day  sucralfate 1 Gram(s) Oral four times a day  urea Oral Powder 15 Gram(s) Oral daily              Vital Signs Last 24 Hrs  T(C): 36.6 (09 Oct 2023 04:46), Max: 37.8 (08 Oct 2023 11:14)  T(F): 97.9 (09 Oct 2023 04:46), Max: 100 (08 Oct 2023 11:14)  HR: 99 (09 Oct 2023 08:32) (81 - 99)  BP: 102/62 (09 Oct 2023 08:32) (98/60 - 110/73)  BP(mean): --  RR: 20 (09 Oct 2023 08:32) (18 - 20)  SpO2: 92% (09 Oct 2023 08:32) (92% - 99%)    Parameters below as of 09 Oct 2023 08:32  Patient On (Oxygen Delivery Method): nasal cannula  O2 Flow (L/min): 2        REVIEW OF SYSTEMS:  Completely limited secondary to the patient repeats words over, has severe expressive aphasia, but had been in his normal state of health as per daughter.    PHYSICAL EXAMINATION:  HEENT:  Head:  Normocephalic, atraumatic.  Eyes:  No scleral icterus.  Ears:  Hard to evaluate secondary to he could not answer questions accordingly but appears to follow commands.  NECK:  Supple.  RESPIRATORY:  Air entry bilaterally.  CARDIOVASCULAR:  S1 and S2 heard.  ABDOMEN:  Soft and nontender.  EXTREMITIES:  Positive discoloration was noted on the left toe.     NEUROLOGIC:  The patient was awake, alert.  On-off blink to visual threat.  Positive expressive aphasia.  Will say a few words but repeat the same words over.  Right upper and right lower were 0/5 with increased tone.  Right hemiplegia is his baseline.  Left upper and left lower were 4/5.              LABS:  CBC Full  -  ( 09 Oct 2023 07:40 )  WBC Count : 7.58 K/uL  RBC Count : 2.87 M/uL  Hemoglobin : 7.9 g/dL  Hematocrit : 24.2 %  Platelet Count - Automated : 195 K/uL  Mean Cell Volume : 84.3 fl  Mean Cell Hemoglobin : 27.5 pg  Mean Cell Hemoglobin Concentration : 32.6 gm/dL  Auto Neutrophil # : x  Auto Lymphocyte # : x  Auto Monocyte # : x  Auto Eosinophil # : x  Auto Basophil # : x  Auto Neutrophil % : x  Auto Lymphocyte % : x  Auto Monocyte % : x  Auto Eosinophil % : x  Auto Basophil % : x    Urinalysis Basic - ( 09 Oct 2023 07:40 )    Color: x / Appearance: x / SG: x / pH: x  Gluc: 236 mg/dL / Ketone: x  / Bili: x / Urobili: x   Blood: x / Protein: x / Nitrite: x   Leuk Esterase: x / RBC: x / WBC x   Sq Epi: x / Non Sq Epi: x / Bacteria: x      10-09    135  |  100  |  46<H>  ----------------------------<  236<H>  3.3<L>   |  27  |  1.70<H>    Ca    8.6      09 Oct 2023 07:40    TPro  6.3  /  Alb  2.5<L>  /  TBili  1.0  /  DBili  x   /  AST  21  /  ALT  21  /  AlkPhos  316<H>  10-09    Hemoglobin A1C:     LIVER FUNCTIONS - ( 09 Oct 2023 07:40 )  Alb: 2.5 g/dL / Pro: 6.3 g/dL / ALK PHOS: 316 U/L / ALT: 21 U/L / AST: 21 U/L / GGT: x           Vitamin B12   PT/INR - ( 08 Oct 2023 06:45 )   PT: 14.0 sec;   INR: 1.20 ratio         PTT - ( 09 Oct 2023 07:40 )  PTT:103.1 sec      RADIOLOGY    ANALYSIS AND PLAN:  This is a 68-year-old with a history of cerebrovascular accident, atrial fibrillation, now with possibly gastrointestinal bleed.  For history of cerebrovascular accident and atrial fibrillation, the patient is on multiple blood thinning medications.  The patient's warfarin INR is significantly elevated.  For now, I would recommend gastrointestinal workup, when possible to prevent further cerebrovascular accident, would recommend to reinstitute anticoagulation, but possibly an alternative to warfarin secondary to elevation of INR, unclear if the patient needs to be on antiplatelets as well.  If the patient cannot have strong anticoagulation, then we will recommend at least a baby aspirin if possible.  For history of seizure prophylaxis, continue the patient on his Keppra.  For history of diabetes, strict control of blood sugars.  For hyperlipidemia, continue the patient on statin.    Spoke with daughter, Jeffrey, at bedside 10/9 .  She does understand my reasoning and thought process.  Her telephone number is [phone].     49 minutes of time was spent with the patient, plan of care, reviewing data, with greater than 50% of the visit was spent counseling and/or coordinating care with multidisciplinary healthcare team

## 2023-10-09 NOTE — PROGRESS NOTE ADULT - ASSESSMENT
[ASSESSMENT and  PLAN]    K92.2         GIB  D62           Anemia hemorrhage  D50.0        Fe def  Q27. 33    AVM Colon  K74. 60    Cirrhosis liver  D68.32     Coagulopathy due to coumadin  D63.8       Anemia due to chronic disease    67yo [place] M with hx of Fe def anemia, GIB, on outpt IV iron.   Known to me from office.   When well, Hgb 11g/dL with treatmetn  Recent decline in Hgb post GIB events.   Had bleed in last month, s/p eval at [facility]. Colonic AVM s/p APC  seen in office yest, Thurs, and present with Hgb 5 g/dL  Sent to ER due to low Hgb and recent BRBPR, recent elevated IN R5.2    s/p PRBC txfusion  Hgb better post transfusion.     Slow decline since last txfusion.         RECOMMENDATIONS    GIB  Follow CBC, Hgb trending down, may need txfusion    Transfuse PRBC as clinically indicated.   Transfuse PRBC if Hgb <7.0 or if symptomatic.   GI following.     Fe Def  09/14/23 Ferritin 36, sat 7% on   Administer IV venofer.     Cirrhosis    Cardiac Valve  AC on hold due to bleeding today  Await cardio and GI decision on  when OK to reattempt AC.     DVT Prophylaxis  Contraindicated.  SCD       Discussed plan of care with family.   Opportunity given for questions and discussion.   Questions or concerns all addressed and answered to their satisfaction, and in lay terms.     Follow in office post DC in 1wk    Thank you for consulting us.

## 2023-10-09 NOTE — PROGRESS NOTE ADULT - ASSESSMENT
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 Per daughter at bedside, patient's INR level was 7.2 two days ago and was instructed by hematologist to hold coumadin for the past 2 days. Of note, patient had EGD and colonoscopy in 08/2023 revealing gastritis, duodenitis, non-bleeding AVM, and two polyps, one removed (pathology revealing tubular adenoma). Patient was afebrile and HDS on arrival with SBPs 90s and HR 80s. Labs notable for Hgb 5.9, INR 7.01, BUN/Cr 64/1.80, Alk Phos 418. ED ordered for Kcentra, Vit K, and 2U PRBCs. (05 Oct 2023 15:48)    hyponatremia improved urea Oral Powder 15 Gram(s) Oral daily  ,    sodium chloride 1 Gram(s) Oral two times a day  will check ua , urine osmolality , urine sodium , urine uric acid , serum sodium , serum osmolality , serum uric acid , f/u with hyponatremia work up , f/u with bmp , monitor i and o    ACUTE RENAL FAILURE:   Serum creatinine is  improving   There is no progression . No uremic symptoms  No evidence of anemia .  Fluid status stable.  Will continue to avoid nephrotoxic drugs.  Patient remains asymptomatic.   Continue current therapy.  hold  diuretic.  hold   ACE inhibitor.  hold   ARB.  Additional evaluation:   ECG,    echocardiogram,     CXR,  will obtained recent   renal ultrasound to evalaute kidney size and possible stones ,    BP monitoring,continue current antihypertensive meds, low salt diet,followup with PMD in 1-2 weeks  diltiazem    milliGRAM(s) Oral daily

## 2023-10-09 NOTE — PROGRESS NOTE ADULT - SUBJECTIVE AND OBJECTIVE BOX
Date/Time Patient Seen:  		  Referring MD:   Data Reviewed	       Patient is a 68y old  Male who presents with a chief complaint of black stool (08 Oct 2023 23:02)      Subjective/HPI     PAST MEDICAL & SURGICAL HISTORY:  CVA (cerebral vascular accident)    HTN (hypertension)    DM (diabetes mellitus)    Arrhythmia    History of atrial fibrillation    Right hemiparesis    Aphasia due to acute stroke    GI bleed    Iron deficiency anemia    Upper GI bleed    Osteomyelitis    S/P CABG (coronary artery bypass graft)    S/P brain surgery    History of aortic valve repair          Medication list         MEDICATIONS  (STANDING):  albuterol/ipratropium for Nebulization 3 milliLiter(s) Nebulizer every 8 hours  atorvastatin 40 milliGRAM(s) Oral at bedtime  dextrose 5%. 1000 milliLiter(s) (50 mL/Hr) IV Continuous <Continuous>  dextrose 5%. 1000 milliLiter(s) (100 mL/Hr) IV Continuous <Continuous>  dextrose 50% Injectable 25 Gram(s) IV Push once  dextrose 50% Injectable 12.5 Gram(s) IV Push once  dextrose 50% Injectable 25 Gram(s) IV Push once  diltiazem    milliGRAM(s) Oral daily  glucagon  Injectable 1 milliGRAM(s) IntraMuscular once  heparin  Infusion.  Unit(s)/Hr (11 mL/Hr) IV Continuous <Continuous>  influenza  Vaccine (HIGH DOSE) 0.7 milliLiter(s) IntraMuscular once  insulin glargine Injectable (LANTUS) 20 Unit(s) SubCutaneous at bedtime  insulin lispro (ADMELOG) corrective regimen sliding scale   SubCutaneous at bedtime  insulin lispro (ADMELOG) corrective regimen sliding scale   SubCutaneous three times a day before meals  insulin lispro Injectable (ADMELOG) 4 Unit(s) SubCutaneous three times a day before meals  levETIRAcetam 750 milliGRAM(s) Oral two times a day  pantoprazole Infusion 8 mG/Hr (10 mL/Hr) IV Continuous <Continuous>  piperacillin/tazobactam IVPB.. 3.375 Gram(s) IV Intermittent every 8 hours  sodium chloride 1 Gram(s) Oral two times a day  sucralfate 1 Gram(s) Oral four times a day  urea Oral Powder 15 Gram(s) Oral daily    MEDICATIONS  (PRN):  dextrose Oral Gel 15 Gram(s) Oral once PRN Blood Glucose LESS THAN 70 milliGRAM(s)/deciliter  guaiFENesin Oral Liquid (Sugar-Free) 200 milliGRAM(s) Oral every 6 hours PRN Cough  heparin   Injectable 2500 Unit(s) IV Push every 6 hours PRN For aPTT between 40 - 57  heparin   Injectable 5000 Unit(s) IV Push every 6 hours PRN For aPTT less than 40         Vitals log        ICU Vital Signs Last 24 Hrs  T(C): 36.6 (09 Oct 2023 04:46), Max: 38.8 (08 Oct 2023 08:12)  T(F): 97.9 (09 Oct 2023 04:46), Max: 101.8 (08 Oct 2023 08:12)  HR: 81 (09 Oct 2023 04:46) (80 - 90)  BP: 110/73 (09 Oct 2023 04:46) (98/60 - 110/73)  BP(mean): --  ABP: --  ABP(mean): --  RR: 20 (09 Oct 2023 04:46) (18 - 20)  SpO2: 95% (09 Oct 2023 04:46) (95% - 98%)    O2 Parameters below as of 09 Oct 2023 04:46  Patient On (Oxygen Delivery Method): nasal cannula                 Input and Output:  I&O's Detail    07 Oct 2023 07:01  -  08 Oct 2023 07:00  --------------------------------------------------------  IN:    Heparin Infusion: 62 mL    IV PiggyBack: 100 mL    Pantoprazole: 70 mL  Total IN: 232 mL    OUT:  Total OUT: 0 mL    Total NET: 232 mL      08 Oct 2023 07:01  -  09 Oct 2023 06:23  --------------------------------------------------------  IN:    Heparin Infusion: 72 mL    Pantoprazole: 90 mL  Total IN: 162 mL    OUT:  Total OUT: 0 mL    Total NET: 162 mL          Lab Data                        8.0    11.70 )-----------( 216      ( 08 Oct 2023 10:40 )             25.0     10-08    136  |  99  |  50<H>  ----------------------------<  324<H>  3.4<L>   |  26  |  1.60<H>    Ca    8.7      08 Oct 2023 06:45    TPro  6.8  /  Alb  2.7<L>  /  TBili  1.2  /  DBili  x   /  AST  17  /  ALT  17  /  AlkPhos  383<H>  10-08            Review of Systems	      Objective     Physical Examination    heart s1s2  lung dc BS      Pertinent Lab findings & Imaging      Bryan:  NO   Adequate UO     I&O's Detail    07 Oct 2023 07:01  -  08 Oct 2023 07:00  --------------------------------------------------------  IN:    Heparin Infusion: 62 mL    IV PiggyBack: 100 mL    Pantoprazole: 70 mL  Total IN: 232 mL    OUT:  Total OUT: 0 mL    Total NET: 232 mL      08 Oct 2023 07:01  -  09 Oct 2023 06:23  --------------------------------------------------------  IN:    Heparin Infusion: 72 mL    Pantoprazole: 90 mL  Total IN: 162 mL    OUT:  Total OUT: 0 mL    Total NET: 162 mL               Discussed with:     Cultures:	        Radiology

## 2023-10-09 NOTE — PROGRESS NOTE ADULT - SUBJECTIVE AND OBJECTIVE BOX
[facility]  Shane Murray PA-C  [address] [place] [address]  [phone]      INTERVAL HPI/OVERNIGHT EVENTS:  Pt s/e with family at bedside who translated for pt  Had one bloody BM yesterday but no BM today yet  Otherwise feels well    MEDICATIONS  (STANDING):  albuterol/ipratropium for Nebulization 3 milliLiter(s) Nebulizer every 8 hours  atorvastatin 40 milliGRAM(s) Oral at bedtime  dextrose 5%. 1000 milliLiter(s) (50 mL/Hr) IV Continuous <Continuous>  dextrose 5%. 1000 milliLiter(s) (100 mL/Hr) IV Continuous <Continuous>  dextrose 50% Injectable 25 Gram(s) IV Push once  dextrose 50% Injectable 25 Gram(s) IV Push once  dextrose 50% Injectable 12.5 Gram(s) IV Push once  diltiazem    milliGRAM(s) Oral daily  furosemide    Tablet 40 milliGRAM(s) Oral daily  glucagon  Injectable 1 milliGRAM(s) IntraMuscular once  heparin  Infusion.  Unit(s)/Hr (11 mL/Hr) IV Continuous <Continuous>  influenza  Vaccine (HIGH DOSE) 0.7 milliLiter(s) IntraMuscular once  insulin glargine Injectable (LANTUS) 20 Unit(s) SubCutaneous at bedtime  insulin lispro (ADMELOG) corrective regimen sliding scale   SubCutaneous at bedtime  insulin lispro (ADMELOG) corrective regimen sliding scale   SubCutaneous three times a day before meals  insulin lispro Injectable (ADMELOG) 4 Unit(s) SubCutaneous three times a day before meals  levETIRAcetam 750 milliGRAM(s) Oral two times a day  pantoprazole Infusion 8 mG/Hr (10 mL/Hr) IV Continuous <Continuous>  piperacillin/tazobactam IVPB.. 3.375 Gram(s) IV Intermittent every 8 hours  sodium chloride 1 Gram(s) Oral two times a day  sucralfate 1 Gram(s) Oral four times a day  urea Oral Powder 15 Gram(s) Oral daily    MEDICATIONS  (PRN):  dextrose Oral Gel 15 Gram(s) Oral once PRN Blood Glucose LESS THAN 70 milliGRAM(s)/deciliter  guaiFENesin Oral Liquid (Sugar-Free) 200 milliGRAM(s) Oral every 6 hours PRN Cough  heparin   Injectable 5000 Unit(s) IV Push every 6 hours PRN For aPTT less than 40  heparin   Injectable 2500 Unit(s) IV Push every 6 hours PRN For aPTT between 40 - 57      Allergies    No Known Allergies        PHYSICAL EXAM:   Vital Signs:  Vital Signs Last 24 Hrs  T(C): 36.8 (09 Oct 2023 12:22), Max: 36.8 (09 Oct 2023 12:22)  T(F): 98.2 (09 Oct 2023 12:22), Max: 98.2 (09 Oct 2023 12:22)  HR: 100 (09 Oct 2023 12:22) (81 - 100)  BP: 132/68 (09 Oct 2023 12:22) (102/62 - 132/68)  BP(mean): --  RR: 20 (09 Oct 2023 12:22) (18 - 20)  SpO2: 90% (09 Oct 2023 12:22) (90% - 99%)    Parameters below as of 09 Oct 2023 12:22  Patient On (Oxygen Delivery Method): nasal cannula  O2 Flow (L/min): 2    Daily     Daily Weight in k.2 (09 Oct 2023 04:46)    GENERAL:  Appears stated age  HEENT:  NC/AT  CHEST:  Full & symmetric excursion  HEART:  Regular rhythm  ABDOMEN:  Soft, non-tender, non-distended  EXTEREMITIES:  no cyanosis  SKIN:  No rash  NEURO:  Alert      LABS:                        7.9    7.58  )-----------( 195      ( 09 Oct 2023 07:40 )             24.2     10-09    135  |  100  |  46<H>  ----------------------------<  236<H>  3.3<L>   |  27  |  1.70<H>    Ca    8.6      09 Oct 2023 07:40    TPro  6.3  /  Alb  2.5<L>  /  TBili  1.0  /  DBili  x   /  AST  21  /  ALT  21  /  AlkPhos  316<H>  10    PT/INR - ( 08 Oct 2023 06:45 )   PT: 14.0 sec;   INR: 1.20 ratio         PTT - ( 09 Oct 2023 07:40 )  PTT:103.1 sec  Urinalysis Basic - ( 09 Oct 2023 07:40 )    Color: x / Appearance: x / SG: x / pH: x  Gluc: 236 mg/dL / Ketone: x  / Bili: x / Urobili: x   Blood: x / Protein: x / Nitrite: x   Leuk Esterase: x / RBC: x / WBC x   Sq Epi: x / Non Sq Epi: x / Bacteria: x

## 2023-10-09 NOTE — PROGRESS NOTE ADULT - ASSESSMENT
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, seizure disorder, prior CABG, DM2, AFib, Mechanical AV replacement in 2007 (on Warfarin) presenting to [facility] ED per recommendation of hematologist for low Hgb to 5.2 with associated black stools    anemia  mech valve  cva  aphasia  seizure disorder  pleural eff  atelectasis  AF  CAD  CABG hx  DM    ct reviewed  eval for PNA - aspiration - emp ABX - NEBS prn for mucociliary clearance  ID eval noted  on ZOSYN  on HEP gtt    GI and ICU cx noted  serial hgb  s/p PRBC - Vit K - Kcentra -   I and O  monitor VS and HD  PPI  goal map > 60  cvs rx regimen  goal sat > 88 pct  will follow  GOC discussion

## 2023-10-09 NOTE — PROGRESS NOTE ADULT - ASSESSMENT
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   ID c/s on HD#3 for fever to 102F overnight    Acute Multifocal PNA  AHRF on NC  - fever to 102 overnight, leukocytosis to 18 on 10/7  - CT chest showing multifocal PNA  Recommendations  C/w zosyn to cover for noscomially acquired organisms  F/u pending blood  Trend temps/WBC  Supportive care, O2 as needed  Appreciate pulm recs  additional care per primary team    Infectious Diseases will continue to follow. Please call with any questions.   Becca Acosta M.D.  [facility] Division of Infectious Diseases [phone]  For after 5 P.M. and weekends, please call [phone]

## 2023-10-09 NOTE — PROGRESS NOTE ADULT - ASSESSMENT
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 Per daughter at bedside, patient's INR level was 7.2 two days ago and was instructed by hematologist to hold coumadin for the past 2 days. Of note, patient had EGD and colonoscopy in 08/2023 revealing gastritis, duodenitis, non-bleeding AVM, and two polyps, one removed (pathology revealing tubular adenoma). Patient was afebrile and HDS on arrival with SBPs 90s and HR 80s. Labs notable for Hgb 5.9, INR 7.01, BUN/Cr 64/1.80, Alk Phos 418. ED ordered for Kcentra, Vit K, and 2U PRBCs.    1Acute blood loss anemia  - monitor cbc  - GI fu  - management as per GI     2 High INR   - cw  hep gtt  - monitor cbc     3 Hx of CVA  - neuro fu  - hold AC,sec to GIB    4 DM  - uncontrolled   - monitor FS  - basal, bolus   - endo following     5 SOB   - improved  - check VQ scan    Venodyne     case dw family at bedside

## 2023-10-09 NOTE — PROGRESS NOTE ADULT - SUBJECTIVE AND OBJECTIVE BOX
Patient is a 68y Male whom presented to the hospital with hyponatremia     PAST MEDICAL & SURGICAL HISTORY:  CVA (cerebral vascular accident)      HTN (hypertension)      DM (diabetes mellitus)      History of atrial fibrillation      Right hemiparesis      Aphasia due to acute stroke      Upper GI bleed      Osteomyelitis      S/P CABG (coronary artery bypass graft)      S/P brain surgery      History of aortic valve repair          MEDICATIONS  (STANDING):  atorvastatin 40 milliGRAM(s) Oral at bedtime  levETIRAcetam 750 milliGRAM(s) Oral two times a day  pantoprazole Infusion 8 mG/Hr (10 mL/Hr) IV Continuous <Continuous>  sucralfate 1 Gram(s) Oral four times a day      Allergies    No Known Allergies    Intolerances        SOCIAL HISTORY:  Denies ETOh,Smoking,     FAMILY HISTORY:  FH: coronary artery disease (Sibling)    FH: type 2 diabetes (Sibling)        REVIEW OF SYSTEMS:    CONSTITUTIONAL: No weakness, fevers or chills  RESPIRATORY: No cough, wheezing, hemoptysis; No shortness of breath  CARDIOVASCULAR: No chest pain or palpitations  GASTROINTESTINAL: No abdominal or epigastric pain. No nausea, vomiting,     No diarrhea or constipation. No melena   SKIN: dry                                   7.9    7.58  )-----------( 195      ( 09 Oct 2023 07:40 )             24.2       CBC Full  -  ( 09 Oct 2023 07:40 )  WBC Count : 7.58 K/uL  RBC Count : 2.87 M/uL  Hemoglobin : 7.9 g/dL  Hematocrit : 24.2 %  Platelet Count - Automated : 195 K/uL  Mean Cell Volume : 84.3 fl  Mean Cell Hemoglobin : 27.5 pg  Mean Cell Hemoglobin Concentration : 32.6 gm/dL  Auto Neutrophil # : x  Auto Lymphocyte # : x  Auto Monocyte # : x  Auto Eosinophil # : x  Auto Basophil # : x  Auto Neutrophil % : x  Auto Lymphocyte % : x  Auto Monocyte % : x  Auto Eosinophil % : x  Auto Basophil % : x      10-09    135  |  100  |  46<H>  ----------------------------<  236<H>  3.3<L>   |  27  |  1.70<H>    Ca    8.6      09 Oct 2023 07:40    TPro  6.3  /  Alb  2.5<L>  /  TBili  1.0  /  DBili  x   /  AST  21  /  ALT  21  /  AlkPhos  316<H>  10-09      CAPILLARY BLOOD GLUCOSE      POCT Blood Glucose.: 343 mg/dL (09 Oct 2023 12:20)  POCT Blood Glucose.: 266 mg/dL (09 Oct 2023 08:20)  POCT Blood Glucose.: 350 mg/dL (08 Oct 2023 21:24)  POCT Blood Glucose.: 415 mg/dL (08 Oct 2023 16:55)  POCT Blood Glucose.: 413 mg/dL (08 Oct 2023 16:50)      Vital Signs Last 24 Hrs  T(C): 36.8 (09 Oct 2023 12:22), Max: 36.8 (09 Oct 2023 12:22)  T(F): 98.2 (09 Oct 2023 12:22), Max: 98.2 (09 Oct 2023 12:22)  HR: 77 (09 Oct 2023 14:32) (77 - 100)  BP: 132/68 (09 Oct 2023 12:22) (102/62 - 132/68)  BP(mean): --  RR: 20 (09 Oct 2023 12:22) (18 - 20)  SpO2: 95% (09 Oct 2023 14:32) (90% - 99%)    Parameters below as of 09 Oct 2023 14:32  Patient On (Oxygen Delivery Method): nasal cannula        Urinalysis Basic - ( 09 Oct 2023 07:40 )    Color: x / Appearance: x / SG: x / pH: x  Gluc: 236 mg/dL / Ketone: x  / Bili: x / Urobili: x   Blood: x / Protein: x / Nitrite: x   Leuk Esterase: x / RBC: x / WBC x   Sq Epi: x / Non Sq Epi: x / Bacteria: x        PT/INR - ( 08 Oct 2023 06:45 )   PT: 14.0 sec;   INR: 1.20 ratio         PTT - ( 09 Oct 2023 07:40 )  PTT:103.1 sec                         PHYSICAL EXAM:    Constitutional: NAD  HEENT: conjunctive   clear   Neck:  No JVD  Respiratory: CTAB  Cardiovascular: S1 and S2  Gastrointestinal: BS+, soft, NT/ND  Extremities: No peripheral edema  Neurological:  no focal deficits  Skin: dry   Access: Not applicable

## 2023-10-09 NOTE — PROGRESS NOTE ADULT - SUBJECTIVE AND OBJECTIVE BOX
[facility], Division of Infectious Diseases  ADAM Nava Y. Patel, S. Shah, G. [facility]  [phone]    Name: ARNIE DELGADILLO  Age: 68y  Gender: Male  MRN: [id number]    Interval History:  Patient seen and examined at bedside  No acute overnight events. Afebrile  No complaints, per daughter at bedside feeling better  Notes reviewed    Antibiotics:  piperacillin/tazobactam IVPB.. 3.375 Gram(s) IV Intermittent every 8 hours      Medications:  albuterol/ipratropium for Nebulization 3 milliLiter(s) Nebulizer every 8 hours  atorvastatin 40 milliGRAM(s) Oral at bedtime  dextrose 5%. 1000 milliLiter(s) IV Continuous <Continuous>  dextrose 5%. 1000 milliLiter(s) IV Continuous <Continuous>  dextrose 50% Injectable 25 Gram(s) IV Push once  dextrose 50% Injectable 12.5 Gram(s) IV Push once  dextrose 50% Injectable 25 Gram(s) IV Push once  dextrose Oral Gel 15 Gram(s) Oral once PRN  diltiazem    milliGRAM(s) Oral daily  furosemide    Tablet 40 milliGRAM(s) Oral daily  glucagon  Injectable 1 milliGRAM(s) IntraMuscular once  guaiFENesin Oral Liquid (Sugar-Free) 200 milliGRAM(s) Oral every 6 hours PRN  heparin   Injectable 2500 Unit(s) IV Push every 6 hours PRN  heparin   Injectable 5000 Unit(s) IV Push every 6 hours PRN  heparin  Infusion.  Unit(s)/Hr IV Continuous <Continuous>  influenza  Vaccine (HIGH DOSE) 0.7 milliLiter(s) IntraMuscular once  insulin glargine Injectable (LANTUS) 20 Unit(s) SubCutaneous at bedtime  insulin lispro (ADMELOG) corrective regimen sliding scale   SubCutaneous at bedtime  insulin lispro (ADMELOG) corrective regimen sliding scale   SubCutaneous three times a day before meals  insulin lispro Injectable (ADMELOG) 4 Unit(s) SubCutaneous three times a day before meals  levETIRAcetam 750 milliGRAM(s) Oral two times a day  pantoprazole Infusion 8 mG/Hr IV Continuous <Continuous>  piperacillin/tazobactam IVPB.. 3.375 Gram(s) IV Intermittent every 8 hours  sodium chloride 1 Gram(s) Oral two times a day  sucralfate 1 Gram(s) Oral four times a day  urea Oral Powder 15 Gram(s) Oral daily      Review of Systems:  A 10-point review of systems was obtained.   Review of systems otherwise negative except as previously noted.    Allergies: No Known Allergies    For details regarding the patient's past medical history, social history, family history, and other miscellaneous elements, please refer the initial infectious diseases consultation and/or the admitting history and physical examination for this admission.    Objective:  Vitals:   T(C): 36.8 (10-09-23 @ 12:22), Max: 36.8 (10-09-23 @ 12:22)  HR: 100 (10-09-23 @ 12:22) (81 - 100)  BP: 132/68 (10-09-23 @ 12:22) (102/62 - 132/68)  RR: 20 (10-09-23 @ 12:22) (18 - 20)  SpO2: 90% (10-09-23 @ 12:22) (90% - 99%)    Physical Examination:  General: no acute distress  HEENT: NC/AT, EOMI  Cardio: S1, S2 heard, RRR, no murmurs  Resp: decreased b/l breath sounds  Abd: soft, NT, ND  Ext: no edema or cyanosis  Skin: warm, dry, no visible rash      Laboratory Studies:  CBC:                       7.9    7.58  )-----------( 195      ( 09 Oct 2023 07:40 )             24.2     CMP: 10-09    135  |  100  |  46<H>  ----------------------------<  236<H>  3.3<L>   |  27  |  1.70<H>    Ca    8.6      09 Oct 2023 07:40    TPro  6.3  /  Alb  2.5<L>  /  TBili  1.0  /  DBili  x   /  AST  21  /  ALT  21  /  AlkPhos  316<H>  10-09    LIVER FUNCTIONS - ( 09 Oct 2023 07:40 )  Alb: 2.5 g/dL / Pro: 6.3 g/dL / ALK PHOS: 316 U/L / ALT: 21 U/L / AST: 21 U/L / GGT: x           Urinalysis Basic - ( 09 Oct 2023 07:40 )    Color: x / Appearance: x / SG: x / pH: x  Gluc: 236 mg/dL / Ketone: x  / Bili: x / Urobili: x   Blood: x / Protein: x / Nitrite: x   Leuk Esterase: x / RBC: x / WBC x   Sq Epi: x / Non Sq Epi: x / Bacteria: x        Microbiology: reviewed        Radiology: reviewed

## 2023-10-09 NOTE — PROGRESS NOTE ADULT - SUBJECTIVE AND OBJECTIVE BOX
[facility] Cardiology Progress Note [phone] (Dr. Pham, Db, Linda, Edgar)    CHIEF COMPLAINT: Patient is a 68y old  Male who presents with a chief complaint of black stool (09 Oct 2023 06:23)      Follow Up Today: The patient denies any chest discomfort or shortness of breath.    HPI:  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, seizure disorder, prior CABG, DM2, AFib, Mechanical AV replacement in 2007 (on Warfarin) presenting to [facility] ED per recommendation of hematologist for low Hgb to 5.2 with associated black stools for the past few days, last being yesterday. Per daughter at bedside, patient's INR level was 7.2 two days ago and was instructed by hematologist to hold coumadin for the past 2 days. Of note, patient had EGD and colonoscopy in 08/2023 revealing gastritis, duodenitis, non-bleeding AVM, and two polyps, one removed (pathology revealing tubular adenoma). Patient was afebrile and HDS on arrival with SBPs 90s and HR 80s. Labs notable for Hgb 5.9, INR 7.01, BUN/Cr 64/1.80, Alk Phos 418. ED ordered for Kcentra, Vit K, and 2U PRBCs. (05 Oct 2023 15:48)      PAST MEDICAL & SURGICAL HISTORY:  CVA (cerebral vascular accident)      HTN (hypertension)      DM (diabetes mellitus)      History of atrial fibrillation      Right hemiparesis      Aphasia due to acute stroke      Upper GI bleed      Osteomyelitis      S/P CABG (coronary artery bypass graft)      S/P brain surgery      History of aortic valve repair          MEDICATIONS  (STANDING):  albuterol/ipratropium for Nebulization 3 milliLiter(s) Nebulizer every 8 hours  atorvastatin 40 milliGRAM(s) Oral at bedtime  dextrose 5%. 1000 milliLiter(s) (50 mL/Hr) IV Continuous <Continuous>  dextrose 5%. 1000 milliLiter(s) (100 mL/Hr) IV Continuous <Continuous>  dextrose 50% Injectable 25 Gram(s) IV Push once  dextrose 50% Injectable 12.5 Gram(s) IV Push once  dextrose 50% Injectable 25 Gram(s) IV Push once  diltiazem    milliGRAM(s) Oral daily  furosemide    Tablet 40 milliGRAM(s) Oral daily  glucagon  Injectable 1 milliGRAM(s) IntraMuscular once  heparin  Infusion.  Unit(s)/Hr (11 mL/Hr) IV Continuous <Continuous>  influenza  Vaccine (HIGH DOSE) 0.7 milliLiter(s) IntraMuscular once  insulin glargine Injectable (LANTUS) 20 Unit(s) SubCutaneous at bedtime  insulin lispro (ADMELOG) corrective regimen sliding scale   SubCutaneous at bedtime  insulin lispro (ADMELOG) corrective regimen sliding scale   SubCutaneous three times a day before meals  insulin lispro Injectable (ADMELOG) 4 Unit(s) SubCutaneous three times a day before meals  levETIRAcetam 750 milliGRAM(s) Oral two times a day  pantoprazole Infusion 8 mG/Hr (10 mL/Hr) IV Continuous <Continuous>  piperacillin/tazobactam IVPB.. 3.375 Gram(s) IV Intermittent every 8 hours  sodium chloride 1 Gram(s) Oral two times a day  sucralfate 1 Gram(s) Oral four times a day  urea Oral Powder 15 Gram(s) Oral daily    MEDICATIONS  (PRN):  dextrose Oral Gel 15 Gram(s) Oral once PRN Blood Glucose LESS THAN 70 milliGRAM(s)/deciliter  guaiFENesin Oral Liquid (Sugar-Free) 200 milliGRAM(s) Oral every 6 hours PRN Cough  heparin   Injectable 2500 Unit(s) IV Push every 6 hours PRN For aPTT between 40 - 57  heparin   Injectable 5000 Unit(s) IV Push every 6 hours PRN For aPTT less than 40      Allergies    No Known Allergies    Intolerances        REVIEW OF SYSTEMS:    All other review of systems is negative unless indicated above    Vital Signs Last 24 Hrs  T(C): 36.6 (09 Oct 2023 04:46), Max: 38.8 (08 Oct 2023 08:12)  T(F): 97.9 (09 Oct 2023 04:46), Max: 101.8 (08 Oct 2023 08:12)  HR: 81 (09 Oct 2023 04:46) (80 - 90)  BP: 110/73 (09 Oct 2023 04:46) (98/60 - 110/73)  BP(mean): --  RR: 20 (09 Oct 2023 04:46) (18 - 20)  SpO2: 95% (09 Oct 2023 04:46) (95% - 98%)    Parameters below as of 09 Oct 2023 04:46  Patient On (Oxygen Delivery Method): nasal cannula        I&O's Summary    08 Oct 2023 07:01  -  09 Oct 2023 07:00  --------------------------------------------------------  IN: 162 mL / OUT: 0 mL / NET: 162 mL        PHYSICAL EXAM:    Constitutional: NAD, awake and alert, well-developed  Eyes:  EOMI,  Pupils round, No oral cyanosis.  HEENT: No exudate or erythema  Pulmonary: Non-labored, breath sounds are clear bilaterally, No wheezing, rales or rhonchi  Cardiovascular: Regular, S1 and S2, No murmurs  Gastrointestinal: Bowel Sounds present, soft, nontender.   Ext: No significant LE edema  Neurological: Alert, no gross focal motor deficits  Skin: No rashes.  Psych:  Mood & affect appropriate    LABS: All Labs Reviewed:                        8.0    11.70 )-----------( 216      ( 08 Oct 2023 10:40 )             25.0                         8.7    14.07 )-----------( 232      ( 08 Oct 2023 06:45 )             26.5                         8.2    14.83 )-----------( 229      ( 08 Oct 2023 01:41 )             25.3     08 Oct 2023 06:45    136    |  99     |  50     ----------------------------<  324    3.4     |  26     |  1.60   06 Oct 2023 09:15    132    |  97     |  63     ----------------------------<  300    4.5     |  25     |  1.90   06 Oct 2023 07:30    133    |  98     |  63     ----------------------------<  278    4.5     |  27     |  2.00     Ca    8.7        08 Oct 2023 06:45  Ca    8.5        06 Oct 2023 09:15  Ca    8.6        06 Oct 2023 07:30    TPro  6.8    /  Alb  2.7    /  TBili  1.2    /  DBili  x      /  AST  17     /  ALT  17     /  AlkPhos  383    08 Oct 2023 06:45  TPro  6.5    /  Alb  2.9    /  TBili  1.1    /  DBili  x      /  AST  16     /  ALT  20     /  AlkPhos  347    06 Oct 2023 09:15  TPro  6.8    /  Alb  3.0    /  TBili  1.1    /  DBili  x      /  AST  16     /  ALT  21     /  AlkPhos  364    06 Oct 2023 07:30    PT/INR - ( 08 Oct 2023 06:45 )   PT: 14.0 sec;   INR: 1.20 ratio         PTT - ( 08 Oct 2023 15:43 )  PTT:94.7 sec      Blood Culture:         RADIOLOGY/EKG:    Attending Attestation:   50 minutes spent on total encounter; more than 50% of the visit was spent counseling and/or coordinating care by the attending physician.     ASSESSMENT AND PLAN [facility] Cardiology Progress Note [phone] (Dr. Pham, Db, Linda, Edgar)    CHIEF COMPLAINT: Patient is a 68y old  Male who presents with a chief complaint of black stool (09 Oct 2023 06:23)      Follow Up Today: The patient denies any chest discomfort or shortness of breath. Daughter at bedside    HPI:  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, seizure disorder, prior CABG, DM2, AFib, Mechanical AV replacement in 2007 (on Warfarin) presenting to [facility] ED per recommendation of hematologist for low Hgb to 5.2 with associated black stools for the past few days, last being yesterday. Per daughter at bedside, patient's INR level was 7.2 two days ago and was instructed by hematologist to hold coumadin for the past 2 days. Of note, patient had EGD and colonoscopy in 08/2023 revealing gastritis, duodenitis, non-bleeding AVM, and two polyps, one removed (pathology revealing tubular adenoma). Patient was afebrile and HDS on arrival with SBPs 90s and HR 80s. Labs notable for Hgb 5.9, INR 7.01, BUN/Cr 64/1.80, Alk Phos 418. ED ordered for Kcentra, Vit K, and 2U PRBCs. (05 Oct 2023 15:48)      PAST MEDICAL & SURGICAL HISTORY:  CVA (cerebral vascular accident)      HTN (hypertension)      DM (diabetes mellitus)      History of atrial fibrillation      Right hemiparesis      Aphasia due to acute stroke      Upper GI bleed      Osteomyelitis      S/P CABG (coronary artery bypass graft)      S/P brain surgery      History of aortic valve repair          MEDICATIONS  (STANDING):  albuterol/ipratropium for Nebulization 3 milliLiter(s) Nebulizer every 8 hours  atorvastatin 40 milliGRAM(s) Oral at bedtime  dextrose 5%. 1000 milliLiter(s) (50 mL/Hr) IV Continuous <Continuous>  dextrose 5%. 1000 milliLiter(s) (100 mL/Hr) IV Continuous <Continuous>  dextrose 50% Injectable 25 Gram(s) IV Push once  dextrose 50% Injectable 12.5 Gram(s) IV Push once  dextrose 50% Injectable 25 Gram(s) IV Push once  diltiazem    milliGRAM(s) Oral daily  furosemide    Tablet 40 milliGRAM(s) Oral daily  glucagon  Injectable 1 milliGRAM(s) IntraMuscular once  heparin  Infusion.  Unit(s)/Hr (11 mL/Hr) IV Continuous <Continuous>  influenza  Vaccine (HIGH DOSE) 0.7 milliLiter(s) IntraMuscular once  insulin glargine Injectable (LANTUS) 20 Unit(s) SubCutaneous at bedtime  insulin lispro (ADMELOG) corrective regimen sliding scale   SubCutaneous at bedtime  insulin lispro (ADMELOG) corrective regimen sliding scale   SubCutaneous three times a day before meals  insulin lispro Injectable (ADMELOG) 4 Unit(s) SubCutaneous three times a day before meals  levETIRAcetam 750 milliGRAM(s) Oral two times a day  pantoprazole Infusion 8 mG/Hr (10 mL/Hr) IV Continuous <Continuous>  piperacillin/tazobactam IVPB.. 3.375 Gram(s) IV Intermittent every 8 hours  sodium chloride 1 Gram(s) Oral two times a day  sucralfate 1 Gram(s) Oral four times a day  urea Oral Powder 15 Gram(s) Oral daily    MEDICATIONS  (PRN):  dextrose Oral Gel 15 Gram(s) Oral once PRN Blood Glucose LESS THAN 70 milliGRAM(s)/deciliter  guaiFENesin Oral Liquid (Sugar-Free) 200 milliGRAM(s) Oral every 6 hours PRN Cough  heparin   Injectable 2500 Unit(s) IV Push every 6 hours PRN For aPTT between 40 - 57  heparin   Injectable 5000 Unit(s) IV Push every 6 hours PRN For aPTT less than 40      Allergies    No Known Allergies    Intolerances        REVIEW OF SYSTEMS:    All other review of systems is negative unless indicated above    Vital Signs Last 24 Hrs  T(C): 36.6 (09 Oct 2023 04:46), Max: 38.8 (08 Oct 2023 08:12)  T(F): 97.9 (09 Oct 2023 04:46), Max: 101.8 (08 Oct 2023 08:12)  HR: 81 (09 Oct 2023 04:46) (80 - 90)  BP: 110/73 (09 Oct 2023 04:46) (98/60 - 110/73)  BP(mean): --  RR: 20 (09 Oct 2023 04:46) (18 - 20)  SpO2: 95% (09 Oct 2023 04:46) (95% - 98%)    Parameters below as of 09 Oct 2023 04:46  Patient On (Oxygen Delivery Method): nasal cannula        I&O's Summary    08 Oct 2023 07:01  -  09 Oct 2023 07:00  --------------------------------------------------------  IN: 162 mL / OUT: 0 mL / NET: 162 mL        PHYSICAL EXAM:    Constitutional: NAD, awake and alert, well-developed  Eyes:  EOMI,  Pupils round, No oral cyanosis.  HEENT: No exudate or erythema  Pulmonary: Non-labored, breath sounds are clear bilaterally, No wheezing, rales or rhonchi  Cardiovascular: Regular, S1 and S2, No murmurs  Gastrointestinal: Bowel Sounds present, soft, nontender.   Ext: No significant LE edema  Neurological: Alert, no gross focal motor deficits  Skin: No rashes.  Psych:  Mood & affect appropriate    LABS: All Labs Reviewed:                        8.0    11.70 )-----------( 216      ( 08 Oct 2023 10:40 )             25.0                         8.7    14.07 )-----------( 232      ( 08 Oct 2023 06:45 )             26.5                         8.2    14.83 )-----------( 229      ( 08 Oct 2023 01:41 )             25.3     08 Oct 2023 06:45    136    |  99     |  50     ----------------------------<  324    3.4     |  26     |  1.60   06 Oct 2023 09:15    132    |  97     |  63     ----------------------------<  300    4.5     |  25     |  1.90   06 Oct 2023 07:30    133    |  98     |  63     ----------------------------<  278    4.5     |  27     |  2.00     Ca    8.7        08 Oct 2023 06:45  Ca    8.5        06 Oct 2023 09:15  Ca    8.6        06 Oct 2023 07:30    TPro  6.8    /  Alb  2.7    /  TBili  1.2    /  DBili  x      /  AST  17     /  ALT  17     /  AlkPhos  383    08 Oct 2023 06:45  TPro  6.5    /  Alb  2.9    /  TBili  1.1    /  DBili  x      /  AST  16     /  ALT  20     /  AlkPhos  347    06 Oct 2023 09:15  TPro  6.8    /  Alb  3.0    /  TBili  1.1    /  DBili  x      /  AST  16     /  ALT  21     /  AlkPhos  364    06 Oct 2023 07:30    PT/INR - ( 08 Oct 2023 06:45 )   PT: 14.0 sec;   INR: 1.20 ratio         PTT - ( 08 Oct 2023 15:43 )  PTT:94.7 sec      Blood Culture:         RADIOLOGY/EKG:    Attending Attestation:   50 minutes spent on total encounter; more than 50% of the visit was spent counseling and/or coordinating care by the attending physician.     ASSESSMENT AND PLAN

## 2023-10-09 NOTE — PROGRESS NOTE ADULT - ASSESSMENT
Anemia  Rectal Bleeding/Melena     hgb now improved  INR has been reverse  had recent egd/colonoscopy 2 months ago, no need to repeat  suspect severe gi bleed in setting of elevated INR  reg diet  proton pump inhibitor bid  monitor on heparin    I reviewed the overnight course of events on the unit, re-confirming the patient history. I discussed the care with the patient and their family  Differential diagnosis and plan of care discussed with patient after the evaluation  40 minutes spent on total encounter of which more than fifty percent of the encounter was spent counseling and/or coordinating care by the attending physician.  Advanced care planning was discussed with patient and family.  Advanced care planning forms were reviewed and discussed.  Risks, benefits and alternatives of gastroenterologic procedures were discussed in detail and all questions were answered.

## 2023-10-09 NOTE — PROGRESS NOTE ADULT - SUBJECTIVE AND OBJECTIVE BOX
Patient is a 68y old  Male who presents with a chief complaint of black stool (09 Oct 2023 09:55)    Date of servie : 10-09-23 @ 12:34  INTERVAL HPI/OVERNIGHT EVENTS:  T(C): 36.8 (10-09-23 @ 12:22), Max: 36.8 (10-09-23 @ 12:22)  HR: 100 (10-09-23 @ 12:22) (81 - 100)  BP: 132/68 (10-09-23 @ 12:22) (102/62 - 132/68)  RR: 20 (10-09-23 @ 12:22) (18 - 20)  SpO2: 90% (10-09-23 @ 12:22) (90% - 99%)  Wt(kg): --  I&O's Summary    08 Oct 2023 07:01  -  09 Oct 2023 07:00  --------------------------------------------------------  IN: 262 mL / OUT: 0 mL / NET: 262 mL        LABS:                        7.9    7.58  )-----------( 195      ( 09 Oct 2023 07:40 )             24.2     10-09    135  |  100  |  46<H>  ----------------------------<  236<H>  3.3<L>   |  27  |  1.70<H>    Ca    8.6      09 Oct 2023 07:40    TPro  6.3  /  Alb  2.5<L>  /  TBili  1.0  /  DBili  x   /  AST  21  /  ALT  21  /  AlkPhos  316<H>  10-09    PT/INR - ( 08 Oct 2023 06:45 )   PT: 14.0 sec;   INR: 1.20 ratio         PTT - ( 09 Oct 2023 07:40 )  PTT:103.1 sec  Urinalysis Basic - ( 09 Oct 2023 07:40 )    Color: x / Appearance: x / SG: x / pH: x  Gluc: 236 mg/dL / Ketone: x  / Bili: x / Urobili: x   Blood: x / Protein: x / Nitrite: x   Leuk Esterase: x / RBC: x / WBC x   Sq Epi: x / Non Sq Epi: x / Bacteria: x      CAPILLARY BLOOD GLUCOSE      POCT Blood Glucose.: 343 mg/dL (09 Oct 2023 12:20)  POCT Blood Glucose.: 266 mg/dL (09 Oct 2023 08:20)  POCT Blood Glucose.: 350 mg/dL (08 Oct 2023 21:24)  POCT Blood Glucose.: 415 mg/dL (08 Oct 2023 16:55)  POCT Blood Glucose.: 413 mg/dL (08 Oct 2023 16:50)        Urinalysis Basic - ( 09 Oct 2023 07:40 )    Color: x / Appearance: x / SG: x / pH: x  Gluc: 236 mg/dL / Ketone: x  / Bili: x / Urobili: x   Blood: x / Protein: x / Nitrite: x   Leuk Esterase: x / RBC: x / WBC x   Sq Epi: x / Non Sq Epi: x / Bacteria: x        MEDICATIONS  (STANDING):  albuterol/ipratropium for Nebulization 3 milliLiter(s) Nebulizer every 8 hours  atorvastatin 40 milliGRAM(s) Oral at bedtime  dextrose 5%. 1000 milliLiter(s) (50 mL/Hr) IV Continuous <Continuous>  dextrose 5%. 1000 milliLiter(s) (100 mL/Hr) IV Continuous <Continuous>  dextrose 50% Injectable 25 Gram(s) IV Push once  dextrose 50% Injectable 12.5 Gram(s) IV Push once  dextrose 50% Injectable 25 Gram(s) IV Push once  diltiazem    milliGRAM(s) Oral daily  furosemide    Tablet 40 milliGRAM(s) Oral daily  glucagon  Injectable 1 milliGRAM(s) IntraMuscular once  heparin  Infusion.  Unit(s)/Hr (11 mL/Hr) IV Continuous <Continuous>  influenza  Vaccine (HIGH DOSE) 0.7 milliLiter(s) IntraMuscular once  insulin glargine Injectable (LANTUS) 20 Unit(s) SubCutaneous at bedtime  insulin lispro (ADMELOG) corrective regimen sliding scale   SubCutaneous at bedtime  insulin lispro (ADMELOG) corrective regimen sliding scale   SubCutaneous three times a day before meals  insulin lispro Injectable (ADMELOG) 4 Unit(s) SubCutaneous three times a day before meals  levETIRAcetam 750 milliGRAM(s) Oral two times a day  pantoprazole Infusion 8 mG/Hr (10 mL/Hr) IV Continuous <Continuous>  piperacillin/tazobactam IVPB.. 3.375 Gram(s) IV Intermittent every 8 hours  sodium chloride 1 Gram(s) Oral two times a day  sucralfate 1 Gram(s) Oral four times a day  urea Oral Powder 15 Gram(s) Oral daily    MEDICATIONS  (PRN):  dextrose Oral Gel 15 Gram(s) Oral once PRN Blood Glucose LESS THAN 70 milliGRAM(s)/deciliter  guaiFENesin Oral Liquid (Sugar-Free) 200 milliGRAM(s) Oral every 6 hours PRN Cough  heparin   Injectable 5000 Unit(s) IV Push every 6 hours PRN For aPTT less than 40  heparin   Injectable 2500 Unit(s) IV Push every 6 hours PRN For aPTT between 40 - 57          PHYSICAL EXAM:  GENERAL: NAD, well-groomed, well-developed  HEAD:  Atraumatic, Normocephalic  CHEST/LUNG: Clear to percussion bilaterally; No rales, rhonchi, wheezing, or rubs  HEART: Regular rate and rhythm; No murmurs, rubs, or gallops  ABDOMEN: Soft, Nontender, Nondistended; Bowel sounds present  EXTREMITIES:  2+ Peripheral Pulses, No clubbing, cyanosis, or edema  LYMPH: No lymphadenopathy noted  SKIN: No rashes or lesions    Care Discussed with Consultants/Other Providers [ ] YES  [ ] NO

## 2023-10-09 NOTE — PROGRESS NOTE ADULT - ASSESSMENT
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 Per daughter at bedside, patient's INR level was 7.2 two days ago and was instructed by hematologist to hold coumadin for the past 2 days. Of note, patient had EGD and colonoscopy in 08/2023 revealing gastritis, duodenitis, non-bleeding AVM, and two polyps, one removed (pathology revealing tubular adenoma). Patient was afebrile and HDS on arrival with SBPs 90s and HR 80s. Labs notable for Hgb 5.9, INR 7.01, BUN/Cr 64/1.80, Alk Phos 418. ED ordered for Kcentra, Vit K, and 2U PRBCs. (05 Oct 2023 15:48)    10/8/23  Seen at [facility] telemetry  Lying flat comfortably  Awake and alert  Son at bedside    Plan:  - Vitamin K, 2 units,PRBC ordered in ER  - Follow labs  - 11/22/22 Essentially normal Echocardiogram  - Hold coumadin, ASA, plavix and re-start when OK with GI  - Spironolactone and furosemide also on hold  - INR goal 3  - Patient sees Alexandru Alonso out-patient 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, seizure disorder, prior CABG, DM2, AFib, Mechanical AV replacement in 2007 (on Warfarin) presenting to [facility] ED per recommendation of hematologist for low Hgb to 5.2 with associated black stools for the past few days, last being yesterday. Per daughter at bedside, patient's INR level was 7.2 two days ago and was instructed by hematologist to hold coumadin for the past 2 days. Of note, patient had EGD and colonoscopy in 08/2023 revealing gastritis, duodenitis, non-bleeding AVM, and two polyps, one removed (pathology revealing tubular adenoma). Patient was afebrile and HDS on arrival with SBPs 90s and HR 80s. Labs notable for Hgb 5.9, INR 7.01, BUN/Cr 64/1.80, Alk Phos 418. ED ordered for Kcentra, Vit K, and 2U PRBCs. (05 Oct 2023 15:48)    Afib on tele monitor with 5 beat run of NSVT. Hb 7.9 stable but with hypokalemia    Plan:  - Keep Hb > 7.5,  follow with serial CBC  - Follow labs  - 11/22/22 Essentially normal Echocardiogram  - Hold coumadin, ASA, plavix and re-start when OK with GI  - Spironolactone and furosemide also on hold  - INR goal 3  - Patient sees Alexandru Alonso out-patient

## 2023-10-09 NOTE — PROGRESS NOTE ADULT - SUBJECTIVE AND OBJECTIVE BOX
[INTERVAL HX: ]  Patient seen and examined;  Chart reviewed and events noted;   dtr at bedside  no overt bleeding since Fri,     [MEDICATIONS]  MEDICATIONS  (STANDING):  albuterol/ipratropium for Nebulization 3 milliLiter(s) Nebulizer every 8 hours  atorvastatin 40 milliGRAM(s) Oral at bedtime  dextrose 5%. 1000 milliLiter(s) (50 mL/Hr) IV Continuous <Continuous>  dextrose 5%. 1000 milliLiter(s) (100 mL/Hr) IV Continuous <Continuous>  dextrose 50% Injectable 25 Gram(s) IV Push once  dextrose 50% Injectable 25 Gram(s) IV Push once  dextrose 50% Injectable 12.5 Gram(s) IV Push once  diltiazem    milliGRAM(s) Oral daily  furosemide    Tablet 40 milliGRAM(s) Oral daily  glucagon  Injectable 1 milliGRAM(s) IntraMuscular once  heparin  Infusion.  Unit(s)/Hr (11 mL/Hr) IV Continuous <Continuous>  influenza  Vaccine (HIGH DOSE) 0.7 milliLiter(s) IntraMuscular once  insulin glargine Injectable (LANTUS) 20 Unit(s) SubCutaneous at bedtime  insulin lispro (ADMELOG) corrective regimen sliding scale   SubCutaneous at bedtime  insulin lispro (ADMELOG) corrective regimen sliding scale   SubCutaneous three times a day before meals  insulin lispro Injectable (ADMELOG) 4 Unit(s) SubCutaneous three times a day before meals  levETIRAcetam 750 milliGRAM(s) Oral two times a day  pantoprazole  Injectable 40 milliGRAM(s) IV Push every 12 hours  piperacillin/tazobactam IVPB.. 3.375 Gram(s) IV Intermittent every 8 hours  sodium chloride 1 Gram(s) Oral two times a day  sucralfate 1 Gram(s) Oral four times a day  urea Oral Powder 15 Gram(s) Oral daily    MEDICATIONS  (PRN):  dextrose Oral Gel 15 Gram(s) Oral once PRN Blood Glucose LESS THAN 70 milliGRAM(s)/deciliter  guaiFENesin Oral Liquid (Sugar-Free) 200 milliGRAM(s) Oral every 6 hours PRN Cough  heparin   Injectable 5000 Unit(s) IV Push every 6 hours PRN For aPTT less than 40  heparin   Injectable 2500 Unit(s) IV Push every 6 hours PRN For aPTT between 40 - 57      [VITALS]  Vital Signs Last 24 Hrs  T(C): 36.5 (09 Oct 2023 21:32), Max: 36.8 (09 Oct 2023 12:22)  T(F): 97.7 (09 Oct 2023 21:32), Max: 98.2 (09 Oct 2023 12:22)  HR: 99 (09 Oct 2023 21:32) (77 - 104)  BP: 116/61 (09 Oct 2023 21:32) (102/62 - 132/68)  BP(mean): --  RR: 20 (09 Oct 2023 21:) (20 - 20)  SpO2: 96% (09 Oct 2023 21:) (90% - 99%)    Parameters below as of 09 Oct 2023 21:32  Patient On (Oxygen Delivery Method): nasal cannula      [WT/HT]  Daily     Daily Weight in k.2 (09 Oct 2023 04:46)  [VENT]      [PHYSICAL EXAM]  GEN: NAD  HEENT: normocephalic and atraumatic. EOMI. .    NECK: Supple.  No lymphadenopathy   LUNGS: Clear to auscultation.  HEART: Regular rate and rhythm,  no MRG  ABDOMEN: Soft, nontender, and nondistended.  Positive bowel sounds.    : No CVA tenderness  EXTREMITIES: Without edema.  SKIN: No rash     [LABS:]                        7.9    7.58  )-----------( 195      ( 09 Oct 2023 07:40 )             24.2     10    135  |  100  |  46<H>  ----------------------------<  236<H>  3.3<L>   |  27  |  1.70<H>    Ca    8.6      09 Oct 2023 07:40    TPro  6.3  /  Alb  2.5<L>  /  TBili  1.0  /  DBili  x   /  AST  21  /  ALT  21  /  AlkPhos  316<H>  10    PT/INR - ( 08 Oct 2023 06:45 )   PT: 14.0 sec;   INR: 1.20 ratio         PTT - ( 09 Oct 2023 07:40 )  PTT:103.1 sec      Sedimentation Rate, Erythrocyte: 79 mm/hr *H* [0 - 20] (23 @ 20:06)      Urinalysis Basic - ( 09 Oct 2023 07:40 )    Color: x / Appearance: x / SG: x / pH: x  Gluc: 236 mg/dL / Ketone: x  / Bili: x / Urobili: x   Blood: x / Protein: x / Nitrite: x   Leuk Esterase: x / RBC: x / WBC x   Sq Epi: x / Non Sq Epi: x / Bacteria: x        Culture - Blood (collected 08 Oct 2023 11:40)  Source: .Blood Blood-Peripheral  Preliminary Report (09 Oct 2023 17:01):    No growth at 24 hours    Culture - Blood (collected 08 Oct 2023 10:40)  Source: .Blood Blood-Peripheral  Preliminary Report (09 Oct 2023 15:02):    No growth at 24 hours      COVID-19 PCR: NotDetec (2023 20:06)        Culture - Blood (collected 08 Oct 2023 11:40)  Source: .Blood Blood-Peripheral  Preliminary Report (09 Oct 2023 17:01):    No growth at 24 hours    Culture - Blood (collected 08 Oct 2023 10:40)  Source: .Blood Blood-Peripheral  Preliminary Report (09 Oct 2023 15:02):    No growth at 24 hours        [RADIOLOGY STUDIES:]

## 2023-10-10 NOTE — PROGRESS NOTE ADULT - ASSESSMENT
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   ID c/s on HD#3 for fever to 102F overnight    Acute Multifocal PNA/AHRF on NC  - fever to 102 overnight, leukocytosis to 18 on 10/7  - CT chest showing multifocal PNA  Recommendations  C/w zosyn (started 10/7) with potential for 10/11 as last day    Thank you for consulting us and involving us in the management of this most interesting and challenging case.  We will follow along in the care of this patient. Please call us at [phone] or text me directly on my cell# at [phone] with any concerns.

## 2023-10-10 NOTE — PROGRESS NOTE ADULT - SUBJECTIVE AND OBJECTIVE BOX
Patient is a 68y Male whom presented to the hospital with hyponatremia     PAST MEDICAL & SURGICAL HISTORY:  CVA (cerebral vascular accident)      HTN (hypertension)      DM (diabetes mellitus)      History of atrial fibrillation      Right hemiparesis      Aphasia due to acute stroke      Upper GI bleed      Osteomyelitis      S/P CABG (coronary artery bypass graft)      S/P brain surgery      History of aortic valve repair          MEDICATIONS  (STANDING):  atorvastatin 40 milliGRAM(s) Oral at bedtime  levETIRAcetam 750 milliGRAM(s) Oral two times a day  pantoprazole Infusion 8 mG/Hr (10 mL/Hr) IV Continuous <Continuous>  sucralfate 1 Gram(s) Oral four times a day      Allergies    No Known Allergies    Intolerances        SOCIAL HISTORY:  Denies ETOh,Smoking,     FAMILY HISTORY:  FH: coronary artery disease (Sibling)    FH: type 2 diabetes (Sibling)        REVIEW OF SYSTEMS:    CONSTITUTIONAL: No weakness, fevers or chills  RESPIRATORY: No cough, wheezing, hemoptysis; No shortness of breath  CARDIOVASCULAR: No chest pain or palpitations  GASTROINTESTINAL: No abdominal or epigastric pain. No nausea, vomiting,     No diarrhea or constipation. No melena   SKIN: dry                                 7.9    8.73  )-----------( 238      ( 10 Oct 2023 14:15 )             24.3       CBC Full  -  ( 10 Oct 2023 14:15 )  WBC Count : 8.73 K/uL  RBC Count : 2.88 M/uL  Hemoglobin : 7.9 g/dL  Hematocrit : 24.3 %  Platelet Count - Automated : 238 K/uL  Mean Cell Volume : 84.4 fl  Mean Cell Hemoglobin : 27.4 pg  Mean Cell Hemoglobin Concentration : 32.5 gm/dL  Auto Neutrophil # : x  Auto Lymphocyte # : x  Auto Monocyte # : x  Auto Eosinophil # : x  Auto Basophil # : x  Auto Neutrophil % : x  Auto Lymphocyte % : x  Auto Monocyte % : x  Auto Eosinophil % : x  Auto Basophil % : x      10-10    138  |  101  |  50<H>  ----------------------------<  241<H>  3.0<L>   |  27  |  1.60<H>    Ca    8.5      10 Oct 2023 06:51    TPro  6.3  /  Alb  2.5<L>  /  TBili  1.0  /  DBili  x   /  AST  21  /  ALT  21  /  AlkPhos  316<H>  10-09      CAPILLARY BLOOD GLUCOSE      POCT Blood Glucose.: 341 mg/dL (10 Oct 2023 17:22)  POCT Blood Glucose.: 293 mg/dL (10 Oct 2023 12:23)  POCT Blood Glucose.: 260 mg/dL (10 Oct 2023 08:00)  POCT Blood Glucose.: 316 mg/dL (09 Oct 2023 21:28)      Vital Signs Last 24 Hrs  T(C): 36.6 (10 Oct 2023 20:23), Max: 36.7 (10 Oct 2023 11:48)  T(F): 97.9 (10 Oct 2023 20:23), Max: 98 (10 Oct 2023 11:48)  HR: 90 (10 Oct 2023 20:23) (77 - 99)  BP: 113/69 (10 Oct 2023 20:23) (113/69 - 122/58)  BP(mean): --  RR: 18 (10 Oct 2023 20:23) (18 - 20)  SpO2: 97% (10 Oct 2023 20:23) (96% - 99%)    Parameters below as of 10 Oct 2023 20:23  Patient On (Oxygen Delivery Method): nasal cannula  O2 Flow (L/min): 3      Urinalysis Basic - ( 10 Oct 2023 06:51 )    Color: x / Appearance: x / SG: x / pH: x  Gluc: 241 mg/dL / Ketone: x  / Bili: x / Urobili: x   Blood: x / Protein: x / Nitrite: x   Leuk Esterase: x / RBC: x / WBC x   Sq Epi: x / Non Sq Epi: x / Bacteria: x        PTT - ( 10 Oct 2023 15:59 )  PTT:78.8 sec        PHYSICAL EXAM:    Constitutional: NAD  HEENT: conjunctive   clear   Neck:  No JVD  Respiratory: CTAB  Cardiovascular: S1 and S2  Gastrointestinal: BS+, soft, NT/ND  Extremities: No peripheral edema  Neurological:  no focal deficits  Skin: dry   Access: Not applicable

## 2023-10-10 NOTE — PROVIDER CONTACT NOTE (CHANGE IN STATUS NOTIFICATION) - SITUATION
Blood culture on 10/08/23 @ 10:40am Blood culture on 10/08/23 @ 10:40am  growth anaerobic bottle gram+ cocci in pairs.

## 2023-10-10 NOTE — SWALLOW BEDSIDE ASSESSMENT ADULT - SWALLOW EVAL: DIAGNOSIS
1) Mild oral dysphagia for soft/bite sized solids, puree, moderately thick, mildly thick, and thin liquids marked by adequate containment, prolonged manipulation and reduced coordination resulting in delayed posterior transfer/transit. Mild lingual stasis noted which was cleared with liquid wash. 2) Functional pharyngeal stage of swallow for soft and bite sized solids, puree, moderately thick, mildly thick, and thin liquids marked by initiation of pharyngeal swallow trigger and hyolaryngeal excursion noted upon digital palpation. No overt signs/symptoms of penetration/aspiration noted. Of Note, patient noted with some impulsivity and rapid rate of intake requiring mild to moderate verbal and tactile prompting to utilize slow rate of intake.

## 2023-10-10 NOTE — PROGRESS NOTE ADULT - ASSESSMENT
Anemia  Rectal Bleeding/Melena     hgb noted; trend  INR has been reversed  had recent egd/colonoscopy 2 months ago, still no immediate plan to repeat  suspect severe gi bleed in setting of elevated INR  reg diet  proton pump inhibitor bid  monitor on heparin; had small amount of blood in BM    I reviewed the overnight course of events on the unit, re-confirming the patient history. I discussed the care with the patient and their family  Differential diagnosis and plan of care discussed with patient after the evaluation  40 minutes spent on total encounter of which more than fifty percent of the encounter was spent counseling and/or coordinating care by the attending physician.  Advanced care planning was discussed with patient and family.  Advanced care planning forms were reviewed and discussed.  Risks, benefits and alternatives of gastroenterologic procedures were discussed in detail and all questions were answered.

## 2023-10-10 NOTE — PROGRESS NOTE ADULT - SUBJECTIVE AND OBJECTIVE BOX
Patient is a 68y old  Male who presents with a chief complaint of black stool (10 Oct 2023 12:36)    Date of servie : 10-10-23 @ 13:24  INTERVAL HPI/OVERNIGHT EVENTS:  T(C): 36.7 (10-10-23 @ 11:48), Max: 36.7 (10-10-23 @ 11:48)  HR: 85 (10-10-23 @ 11:48) (77 - 104)  BP: 122/58 (10-10-23 @ 11:48) (116/61 - 122/58)  RR: 18 (10-10-23 @ 11:48) (18 - 20)  SpO2: 97% (10-10-23 @ 11:48) (95% - 99%)  Wt(kg): --  I&O's Summary      LABS:                        7.4    8.58  )-----------( 228      ( 10 Oct 2023 06:50 )             22.7     10-10    138  |  101  |  50<H>  ----------------------------<  241<H>  3.0<L>   |  27  |  1.60<H>    Ca    8.5      10 Oct 2023 06:51    TPro  6.3  /  Alb  2.5<L>  /  TBili  1.0  /  DBili  x   /  AST  21  /  ALT  21  /  AlkPhos  316<H>  10-09    PTT - ( 10 Oct 2023 06:51 )  PTT:75.7 sec  Urinalysis Basic - ( 10 Oct 2023 06:51 )    Color: x / Appearance: x / SG: x / pH: x  Gluc: 241 mg/dL / Ketone: x  / Bili: x / Urobili: x   Blood: x / Protein: x / Nitrite: x   Leuk Esterase: x / RBC: x / WBC x   Sq Epi: x / Non Sq Epi: x / Bacteria: x      CAPILLARY BLOOD GLUCOSE      POCT Blood Glucose.: 293 mg/dL (10 Oct 2023 12:23)  POCT Blood Glucose.: 260 mg/dL (10 Oct 2023 08:00)  POCT Blood Glucose.: 316 mg/dL (09 Oct 2023 21:28)  POCT Blood Glucose.: 272 mg/dL (09 Oct 2023 17:25)        Urinalysis Basic - ( 10 Oct 2023 06:51 )    Color: x / Appearance: x / SG: x / pH: x  Gluc: 241 mg/dL / Ketone: x  / Bili: x / Urobili: x   Blood: x / Protein: x / Nitrite: x   Leuk Esterase: x / RBC: x / WBC x   Sq Epi: x / Non Sq Epi: x / Bacteria: x        MEDICATIONS  (STANDING):  albuterol/ipratropium for Nebulization 3 milliLiter(s) Nebulizer every 8 hours  atorvastatin 40 milliGRAM(s) Oral at bedtime  dextrose 5%. 1000 milliLiter(s) (50 mL/Hr) IV Continuous <Continuous>  dextrose 5%. 1000 milliLiter(s) (100 mL/Hr) IV Continuous <Continuous>  dextrose 50% Injectable 25 Gram(s) IV Push once  dextrose 50% Injectable 25 Gram(s) IV Push once  dextrose 50% Injectable 12.5 Gram(s) IV Push once  diltiazem    milliGRAM(s) Oral daily  furosemide    Tablet 40 milliGRAM(s) Oral daily  glucagon  Injectable 1 milliGRAM(s) IntraMuscular once  heparin  Infusion.  Unit(s)/Hr (11 mL/Hr) IV Continuous <Continuous>  influenza  Vaccine (HIGH DOSE) 0.7 milliLiter(s) IntraMuscular once  insulin glargine Injectable (LANTUS) 20 Unit(s) SubCutaneous at bedtime  insulin lispro (ADMELOG) corrective regimen sliding scale   SubCutaneous three times a day before meals  insulin lispro (ADMELOG) corrective regimen sliding scale   SubCutaneous at bedtime  insulin lispro Injectable (ADMELOG) 4 Unit(s) SubCutaneous three times a day before meals  levETIRAcetam 750 milliGRAM(s) Oral two times a day  pantoprazole  Injectable 40 milliGRAM(s) IV Push every 12 hours  piperacillin/tazobactam IVPB.. 3.375 Gram(s) IV Intermittent every 8 hours  potassium chloride  10 mEq/100 mL IVPB 10 milliEquivalent(s) IV Intermittent every 1 hour  sodium chloride 1 Gram(s) Oral two times a day  sucralfate 1 Gram(s) Oral four times a day  urea Oral Powder 15 Gram(s) Oral daily    MEDICATIONS  (PRN):  dextrose Oral Gel 15 Gram(s) Oral once PRN Blood Glucose LESS THAN 70 milliGRAM(s)/deciliter  guaiFENesin Oral Liquid (Sugar-Free) 200 milliGRAM(s) Oral every 6 hours PRN Cough  heparin   Injectable 5000 Unit(s) IV Push every 6 hours PRN For aPTT less than 40  heparin   Injectable 2500 Unit(s) IV Push every 6 hours PRN For aPTT between 40 - 57          PHYSICAL EXAM:  GENERAL: NAD, well-groomed, well-developed  HEAD:  Atraumatic, Normocephalic  CHEST/LUNG: Clear to percussion bilaterally; No rales, rhonchi, wheezing, or rubs  HEART: Regular rate and rhythm; No murmurs, rubs, or gallops  ABDOMEN: Soft, Nontender, Nondistended; Bowel sounds present  EXTREMITIES:  edema +    Care Discussed with Consultants/Other Providers [ ] YES  [ ] NO

## 2023-10-10 NOTE — PROGRESS NOTE ADULT - SUBJECTIVE AND OBJECTIVE BOX
Date/Time Patient Seen:  		  Referring MD:   Data Reviewed	       Patient is a 68y old  Male who presents with a chief complaint of black stool (09 Oct 2023 23:55)      Subjective/HPI     PAST MEDICAL & SURGICAL HISTORY:  CVA (cerebral vascular accident)    HTN (hypertension)    DM (diabetes mellitus)    Arrhythmia    History of atrial fibrillation    Right hemiparesis    Aphasia due to acute stroke    GI bleed    Iron deficiency anemia    Upper GI bleed    Osteomyelitis    S/P CABG (coronary artery bypass graft)    S/P brain surgery    History of aortic valve repair          Medication list         MEDICATIONS  (STANDING):  albuterol/ipratropium for Nebulization 3 milliLiter(s) Nebulizer every 8 hours  atorvastatin 40 milliGRAM(s) Oral at bedtime  dextrose 5%. 1000 milliLiter(s) (50 mL/Hr) IV Continuous <Continuous>  dextrose 5%. 1000 milliLiter(s) (100 mL/Hr) IV Continuous <Continuous>  dextrose 50% Injectable 25 Gram(s) IV Push once  dextrose 50% Injectable 25 Gram(s) IV Push once  dextrose 50% Injectable 12.5 Gram(s) IV Push once  diltiazem    milliGRAM(s) Oral daily  furosemide    Tablet 40 milliGRAM(s) Oral daily  glucagon  Injectable 1 milliGRAM(s) IntraMuscular once  heparin  Infusion.  Unit(s)/Hr (11 mL/Hr) IV Continuous <Continuous>  influenza  Vaccine (HIGH DOSE) 0.7 milliLiter(s) IntraMuscular once  insulin glargine Injectable (LANTUS) 20 Unit(s) SubCutaneous at bedtime  insulin lispro (ADMELOG) corrective regimen sliding scale   SubCutaneous three times a day before meals  insulin lispro (ADMELOG) corrective regimen sliding scale   SubCutaneous at bedtime  insulin lispro Injectable (ADMELOG) 4 Unit(s) SubCutaneous three times a day before meals  levETIRAcetam 750 milliGRAM(s) Oral two times a day  pantoprazole  Injectable 40 milliGRAM(s) IV Push every 12 hours  piperacillin/tazobactam IVPB.. 3.375 Gram(s) IV Intermittent every 8 hours  sodium chloride 1 Gram(s) Oral two times a day  sucralfate 1 Gram(s) Oral four times a day  urea Oral Powder 15 Gram(s) Oral daily    MEDICATIONS  (PRN):  dextrose Oral Gel 15 Gram(s) Oral once PRN Blood Glucose LESS THAN 70 milliGRAM(s)/deciliter  guaiFENesin Oral Liquid (Sugar-Free) 200 milliGRAM(s) Oral every 6 hours PRN Cough  heparin   Injectable 5000 Unit(s) IV Push every 6 hours PRN For aPTT less than 40  heparin   Injectable 2500 Unit(s) IV Push every 6 hours PRN For aPTT between 40 - 57         Vitals log        ICU Vital Signs Last 24 Hrs  T(C): 36.4 (10 Oct 2023 05:00), Max: 36.8 (09 Oct 2023 12:22)  T(F): 97.5 (10 Oct 2023 05:00), Max: 98.2 (09 Oct 2023 12:22)  HR: 99 (10 Oct 2023 05:00) (77 - 104)  BP: 119/76 (10 Oct 2023 05:00) (102/62 - 132/68)  BP(mean): --  ABP: --  ABP(mean): --  RR: 20 (10 Oct 2023 05:00) (20 - 20)  SpO2: 98% (10 Oct 2023 05:00) (90% - 99%)    O2 Parameters below as of 10 Oct 2023 05:00  Patient On (Oxygen Delivery Method): nasal cannula  O2 Flow (L/min): 2               Input and Output:  I&O's Detail    08 Oct 2023 07:01  -  09 Oct 2023 07:00  --------------------------------------------------------  IN:    Heparin Infusion: 72 mL    IV PiggyBack: 100 mL    Pantoprazole: 90 mL  Total IN: 262 mL    OUT:  Total OUT: 0 mL    Total NET: 262 mL          Lab Data                        7.9    7.58  )-----------( 195      ( 09 Oct 2023 07:40 )             24.2     10-09    135  |  100  |  46<H>  ----------------------------<  236<H>  3.3<L>   |  27  |  1.70<H>    Ca    8.6      09 Oct 2023 07:40    TPro  6.3  /  Alb  2.5<L>  /  TBili  1.0  /  DBili  x   /  AST  21  /  ALT  21  /  AlkPhos  316<H>  10-09            Review of Systems	      Objective     Physical Examination    heart s1s2  lung dc bS      Pertinent Lab findings & Imaging      Bryan:  NO   Adequate UO     I&O's Detail    08 Oct 2023 07:01  -  09 Oct 2023 07:00  --------------------------------------------------------  IN:    Heparin Infusion: 72 mL    IV PiggyBack: 100 mL    Pantoprazole: 90 mL  Total IN: 262 mL    OUT:  Total OUT: 0 mL    Total NET: 262 mL               Discussed with:     Cultures:	        Radiology

## 2023-10-10 NOTE — PROGRESS NOTE ADULT - SUBJECTIVE AND OBJECTIVE BOX
[facility] Cardiology Progress Note [phone] (Dr. Pham, Db, Linda, Edgar)    CHIEF COMPLAINT: Patient is a 68y old  Male who presents with a chief complaint of black stool (10 Oct 2023 05:48)      Follow Up Today: The patient denies any chest discomfort or shortness of breath.    HPI:  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, seizure disorder, prior CABG, DM2, AFib, Mechanical AV replacement in 2007 (on Warfarin) presenting to [facility] ED per recommendation of hematologist for low Hgb to 5.2 with associated black stools for the past few days, last being yesterday. Per daughter at bedside, patient's INR level was 7.2 two days ago and was instructed by hematologist to hold coumadin for the past 2 days. Of note, patient had EGD and colonoscopy in 08/2023 revealing gastritis, duodenitis, non-bleeding AVM, and two polyps, one removed (pathology revealing tubular adenoma). Patient was afebrile and HDS on arrival with SBPs 90s and HR 80s. Labs notable for Hgb 5.9, INR 7.01, BUN/Cr 64/1.80, Alk Phos 418. ED ordered for Kcentra, Vit K, and 2U PRBCs. (05 Oct 2023 15:48)      PAST MEDICAL & SURGICAL HISTORY:  CVA (cerebral vascular accident)      HTN (hypertension)      DM (diabetes mellitus)      History of atrial fibrillation      Right hemiparesis      Aphasia due to acute stroke      Upper GI bleed      Osteomyelitis      S/P CABG (coronary artery bypass graft)      S/P brain surgery      History of aortic valve repair          MEDICATIONS  (STANDING):  albuterol/ipratropium for Nebulization 3 milliLiter(s) Nebulizer every 8 hours  atorvastatin 40 milliGRAM(s) Oral at bedtime  dextrose 5%. 1000 milliLiter(s) (50 mL/Hr) IV Continuous <Continuous>  dextrose 5%. 1000 milliLiter(s) (100 mL/Hr) IV Continuous <Continuous>  dextrose 50% Injectable 25 Gram(s) IV Push once  dextrose 50% Injectable 12.5 Gram(s) IV Push once  dextrose 50% Injectable 25 Gram(s) IV Push once  diltiazem    milliGRAM(s) Oral daily  furosemide    Tablet 40 milliGRAM(s) Oral daily  glucagon  Injectable 1 milliGRAM(s) IntraMuscular once  heparin  Infusion.  Unit(s)/Hr (11 mL/Hr) IV Continuous <Continuous>  influenza  Vaccine (HIGH DOSE) 0.7 milliLiter(s) IntraMuscular once  insulin glargine Injectable (LANTUS) 20 Unit(s) SubCutaneous at bedtime  insulin lispro (ADMELOG) corrective regimen sliding scale   SubCutaneous at bedtime  insulin lispro (ADMELOG) corrective regimen sliding scale   SubCutaneous three times a day before meals  insulin lispro Injectable (ADMELOG) 4 Unit(s) SubCutaneous three times a day before meals  levETIRAcetam 750 milliGRAM(s) Oral two times a day  pantoprazole  Injectable 40 milliGRAM(s) IV Push every 12 hours  piperacillin/tazobactam IVPB.. 3.375 Gram(s) IV Intermittent every 8 hours  sodium chloride 1 Gram(s) Oral two times a day  sucralfate 1 Gram(s) Oral four times a day  urea Oral Powder 15 Gram(s) Oral daily    MEDICATIONS  (PRN):  dextrose Oral Gel 15 Gram(s) Oral once PRN Blood Glucose LESS THAN 70 milliGRAM(s)/deciliter  guaiFENesin Oral Liquid (Sugar-Free) 200 milliGRAM(s) Oral every 6 hours PRN Cough  heparin   Injectable 2500 Unit(s) IV Push every 6 hours PRN For aPTT between 40 - 57  heparin   Injectable 5000 Unit(s) IV Push every 6 hours PRN For aPTT less than 40      Allergies    No Known Allergies    Intolerances        REVIEW OF SYSTEMS:    All other review of systems is negative unless indicated above    Vital Signs Last 24 Hrs  T(C): 36.4 (10 Oct 2023 05:00), Max: 36.8 (09 Oct 2023 12:22)  T(F): 97.5 (10 Oct 2023 05:00), Max: 98.2 (09 Oct 2023 12:22)  HR: 99 (10 Oct 2023 05:00) (77 - 104)  BP: 119/76 (10 Oct 2023 05:00) (102/62 - 132/68)  BP(mean): --  RR: 20 (10 Oct 2023 05:00) (20 - 20)  SpO2: 98% (10 Oct 2023 05:00) (90% - 99%)    Parameters below as of 10 Oct 2023 05:00  Patient On (Oxygen Delivery Method): nasal cannula  O2 Flow (L/min): 2      I&O's Summary      PHYSICAL EXAM:    Constitutional: NAD, awake and alert, well-developed  Eyes:  EOMI,  Pupils round, No oral cyanosis.  HEENT: No exudate or erythema  Pulmonary: Non-labored, breath sounds are clear bilaterally, No wheezing, rales or rhonchi  Cardiovascular: Regular, S1 and S2, No murmurs  Gastrointestinal: Bowel Sounds present, soft, nontender.   Ext: No significant LE edema  Neurological: Alert, no gross focal motor deficits  Skin: No rashes.  Psych:  Mood & affect appropriate    LABS: All Labs Reviewed:                        7.9    7.58  )-----------( 195      ( 09 Oct 2023 07:40 )             24.2                         8.0    11.70 )-----------( 216      ( 08 Oct 2023 10:40 )             25.0                         8.7    14.07 )-----------( 232      ( 08 Oct 2023 06:45 )             26.5     09 Oct 2023 07:40    135    |  100    |  46     ----------------------------<  236    3.3     |  27     |  1.70   08 Oct 2023 06:45    136    |  99     |  50     ----------------------------<  324    3.4     |  26     |  1.60     Ca    8.6        09 Oct 2023 07:40  Ca    8.7        08 Oct 2023 06:45    TPro  6.3    /  Alb  2.5    /  TBili  1.0    /  DBili  x      /  AST  21     /  ALT  21     /  AlkPhos  316    09 Oct 2023 07:40  TPro  6.8    /  Alb  2.7    /  TBili  1.2    /  DBili  x      /  AST  17     /  ALT  17     /  AlkPhos  383    08 Oct 2023 06:45    PTT - ( 09 Oct 2023 07:40 )  PTT:103.1 sec      Blood Culture: Organism --  Gram Stain Blood -- Gram Stain --  Specimen Source .Blood Blood-Peripheral  Culture-Blood --    Organism --  Gram Stain Blood -- Gram Stain --  Specimen Source .Blood Blood-Peripheral  Culture-Blood --            RADIOLOGY/EKG:    Attending Attestation:   50 minutes spent on total encounter; more than 50% of the visit was spent counseling and/or coordinating care by the attending physician.     ASSESSMENT AND PLAN

## 2023-10-10 NOTE — PROGRESS NOTE ADULT - SUBJECTIVE AND OBJECTIVE BOX
[facility] DIV[facility] of INFECTIOUS DISEASE  Guru iMchel MD PhD, Lisa James MD, Becca Acosta MD, Karen Grimes MD, Corky Perez MD  and providing coverage with Carlos Marx MD  Providing Infectious Disease Consultations at [facility], [facility], [place], [facility]'s    Office# [phone] to schedule follow up appointments  Answering Service for urgent calls or New Consults [phone]  Cell# to text for urgent issues Guru Michel [phone]     infectious diseases progress note:    ARNIE DELGADILLO is a 68y y. o. Male patient    Overnight and events of the last 24hrs reviewed    Allergies    No Known Allergies    Intolerances        ANTIBIOTICS/RELEVANT:  antimicrobials  piperacillin/tazobactam IVPB.. 3.375 Gram(s) IV Intermittent every 8 hours    immunologic:  influenza  Vaccine (HIGH DOSE) 0.7 milliLiter(s) IntraMuscular once    OTHER:  albuterol/ipratropium for Nebulization 3 milliLiter(s) Nebulizer every 8 hours  atorvastatin 40 milliGRAM(s) Oral at bedtime  dextrose 5%. 1000 milliLiter(s) IV Continuous <Continuous>  dextrose 5%. 1000 milliLiter(s) IV Continuous <Continuous>  dextrose 50% Injectable 25 Gram(s) IV Push once  dextrose 50% Injectable 25 Gram(s) IV Push once  dextrose 50% Injectable 12.5 Gram(s) IV Push once  dextrose Oral Gel 15 Gram(s) Oral once PRN  diltiazem    milliGRAM(s) Oral daily  furosemide    Tablet 40 milliGRAM(s) Oral daily  glucagon  Injectable 1 milliGRAM(s) IntraMuscular once  guaiFENesin Oral Liquid (Sugar-Free) 200 milliGRAM(s) Oral every 6 hours PRN  heparin   Injectable 2500 Unit(s) IV Push every 6 hours PRN  heparin   Injectable 5000 Unit(s) IV Push every 6 hours PRN  heparin  Infusion.  Unit(s)/Hr IV Continuous <Continuous>  insulin glargine Injectable (LANTUS) 20 Unit(s) SubCutaneous at bedtime  insulin lispro (ADMELOG) corrective regimen sliding scale   SubCutaneous at bedtime  insulin lispro (ADMELOG) corrective regimen sliding scale   SubCutaneous three times a day before meals  insulin lispro Injectable (ADMELOG) 4 Unit(s) SubCutaneous three times a day before meals  levETIRAcetam 750 milliGRAM(s) Oral two times a day  pantoprazole  Injectable 40 milliGRAM(s) IV Push every 12 hours  sodium chloride 1 Gram(s) Oral two times a day  sucralfate 1 Gram(s) Oral four times a day  urea Oral Powder 15 Gram(s) Oral daily      Objective:  Vital Signs Last 24 Hrs  T(C): 36.4 (10 Oct 2023 05:00), Max: 36.8 (09 Oct 2023 12:22)  T(F): 97.5 (10 Oct 2023 05:00), Max: 98.2 (09 Oct 2023 12:22)  HR: 90 (10 Oct 2023 07:36) (77 - 104)  BP: 119/76 (10 Oct 2023 05:00) (116/61 - 132/68)  BP(mean): --  RR: 20 (10 Oct 2023 05:00) (20 - 20)  SpO2: 99% (10 Oct 2023 07:36) (90% - 99%)    Parameters below as of 10 Oct 2023 07:36  Patient On (Oxygen Delivery Method): nasal cannula        T(C): 36.4 (10-10-23 @ 05:00), Max: 37.8 (10-08-23 @ 11:14)  T(C): 36.4 (10-10-23 @ 05:00), Max: 39 (10-08-23 @ 06:15)  T(C): 36.4 (10-10-23 @ 05:00), Max: 39 (10-08-23 @ 06:15)    PHYSICAL EXAM:  HEENT: NC atraumatic  Neck: supple  Respiratory: no accessory muscle use, breathing comfortably  Cardiovascular: distant  Gastrointestinal: normal appearing, nondistended  Extremities: no clubbing, no cyanosis,        LABS:                          7.4    8.58  )-----------( 228      ( 10 Oct 2023 06:50 )             22.7       WBC  8.58 10-10 @ 06:50  7.58 10-09 @ 07:40  11.70 10-08 @ 10:40  14.07 10-08 @ 06:45  14.83 10-08 @ 01:41  18.60 10-07 @ 18:45  14.83 10-07 @ 13:15  13.98 10-07 @ 09:40  15.73 10-06 @ 11:45  15.31 10-06 @ 09:15  14.95 10-06 @ 07:30  10.74 10-05 @ 22:06  11.76 10-05 @ 13:00      10-09    135  |  100  |  46<H>  ----------------------------<  236<H>  3.3<L>   |  27  |  1.70<H>    Ca    8.6      09 Oct 2023 07:40    TPro  6.3  /  Alb  2.5<L>  /  TBili  1.0  /  DBili  x   /  AST  21  /  ALT  21  /  AlkPhos  316<H>  10-09      Creatinine: 1.70 mg/dL (10-09-23 @ 07:40)  Creatinine: 1.60 mg/dL (10-08-23 @ 06:45)  Creatinine: 1.90 mg/dL (10-06-23 @ 09:15)  Creatinine: 2.00 mg/dL (10-06-23 @ 07:30)  Creatinine: 1.80 mg/dL (10-05-23 @ 13:00)      PTT - ( 09 Oct 2023 07:40 )  PTT:103.1 sec  Urinalysis Basic - ( 09 Oct 2023 07:40 )    Color: x / Appearance: x / SG: x / pH: x  Gluc: 236 mg/dL / Ketone: x  / Bili: x / Urobili: x   Blood: x / Protein: x / Nitrite: x   Leuk Esterase: x / RBC: x / WBC x   Sq Epi: x / Non Sq Epi: x / Bacteria: x            INFLAMMATORY MARKERS      MICROBIOLOGY:              RADIOLOGY & ADDITIONAL STUDIES:

## 2023-10-10 NOTE — PROGRESS NOTE ADULT - ASSESSMENT
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 Per daughter at bedside, patient's INR level was 7.2 two days ago and was instructed by hematologist to hold coumadin for the past 2 days. Of note, patient had EGD and colonoscopy in 08/2023 revealing gastritis, duodenitis, non-bleeding AVM, and two polyps, one removed (pathology revealing tubular adenoma). Patient was afebrile and HDS on arrival with SBPs 90s and HR 80s. Labs notable for Hgb 5.9, INR 7.01, BUN/Cr 64/1.80, Alk Phos 418. ED ordered for Kcentra, Vit K, and 2U PRBCs.    1Acute blood loss anemia  - monitor cbc  - GI fu  - management as per GI   - no plan for scope at present  - winifred sec to high INR     2 High INR , mechanical valve   - reversed  - hold coumadin   - cw  hep gtt  - monitor cbc     3 Hx of CVA  - neuro fu  - restarted heparin drip     4 DM  - uncontrolled   - monitor FS  - basal, bolus   - endo following     5 SOB , tachycardia   - improved  -VQ scan low probability     Venodyne     case dw family at bedside

## 2023-10-10 NOTE — CHART NOTE - NSCHARTNOTEFT_GEN_A_CORE
Called by RN as patient with positive blood cx. RN called primary attending Dr Harmon for critical value. Per chart review, patient on zosyn. Awaiting sensitivities of current culture. Will repeat blood cultures x2. Per primary attending, will also order vanco 1g x1 now. Called by RN as patient with gram positive cocci in one bottle. RN called primary attending Dr Harmon for critical value. Per chart review, patient on zosyn. Awaiting sensitivities of current culture. Will repeat blood cultures x2. Per primary attending, will also order vanco 1g x1 now.

## 2023-10-10 NOTE — PROGRESS NOTE ADULT - SUBJECTIVE AND OBJECTIVE BOX
[facility]  Shane Murray PA-C  [address] [place] [address]  [phone]      INTERVAL HPI/OVERNIGHT EVENTS:  Pt s/e with dtr at bedside who translated for pt  Pt had small amount of blood in stool this am; per daughter significantly less than before    MEDICATIONS  (STANDING):  albuterol/ipratropium for Nebulization 3 milliLiter(s) Nebulizer every 8 hours  atorvastatin 40 milliGRAM(s) Oral at bedtime  dextrose 5%. 1000 milliLiter(s) (50 mL/Hr) IV Continuous <Continuous>  dextrose 5%. 1000 milliLiter(s) (100 mL/Hr) IV Continuous <Continuous>  dextrose 50% Injectable 25 Gram(s) IV Push once  dextrose 50% Injectable 25 Gram(s) IV Push once  dextrose 50% Injectable 12.5 Gram(s) IV Push once  diltiazem    milliGRAM(s) Oral daily  furosemide    Tablet 40 milliGRAM(s) Oral daily  glucagon  Injectable 1 milliGRAM(s) IntraMuscular once  heparin  Infusion.  Unit(s)/Hr (11 mL/Hr) IV Continuous <Continuous>  influenza  Vaccine (HIGH DOSE) 0.7 milliLiter(s) IntraMuscular once  insulin glargine Injectable (LANTUS) 20 Unit(s) SubCutaneous at bedtime  insulin lispro (ADMELOG) corrective regimen sliding scale   SubCutaneous three times a day before meals  insulin lispro (ADMELOG) corrective regimen sliding scale   SubCutaneous at bedtime  insulin lispro Injectable (ADMELOG) 4 Unit(s) SubCutaneous three times a day before meals  levETIRAcetam 750 milliGRAM(s) Oral two times a day  pantoprazole  Injectable 40 milliGRAM(s) IV Push every 12 hours  piperacillin/tazobactam IVPB.. 3.375 Gram(s) IV Intermittent every 8 hours  sodium chloride 1 Gram(s) Oral two times a day  sucralfate 1 Gram(s) Oral four times a day  urea Oral Powder 15 Gram(s) Oral daily    MEDICATIONS  (PRN):  dextrose Oral Gel 15 Gram(s) Oral once PRN Blood Glucose LESS THAN 70 milliGRAM(s)/deciliter  guaiFENesin Oral Liquid (Sugar-Free) 200 milliGRAM(s) Oral every 6 hours PRN Cough  heparin   Injectable 5000 Unit(s) IV Push every 6 hours PRN For aPTT less than 40  heparin   Injectable 2500 Unit(s) IV Push every 6 hours PRN For aPTT between 40 - 57      Allergies    No Known Allergies    PHYSICAL EXAM:   Vital Signs:  Vital Signs Last 24 Hrs  T(C): 36.7 (10 Oct 2023 11:48), Max: 36.7 (10 Oct 2023 11:48)  T(F): 98 (10 Oct 2023 11:48), Max: 98 (10 Oct 2023 11:48)  HR: 85 (10 Oct 2023 11:48) (77 - 104)  BP: 122/58 (10 Oct 2023 11:48) (116/61 - 122/58)  BP(mean): --  RR: 18 (10 Oct 2023 11:48) (18 - 20)  SpO2: 97% (10 Oct 2023 11:48) (95% - 99%)    Parameters below as of 10 Oct 2023 11:48  Patient On (Oxygen Delivery Method): nasal cannula  O2 Flow (L/min): 3    GENERAL:  Appears stated age  HEENT:  NC/AT  CHEST:  Full & symmetric excursion  HEART:  Regular rhythm  ABDOMEN:  Soft, non-tender, non-distended  EXTEREMITIES:  no cyanosis  SKIN:  No rash  NEURO:  Alert      LABS:                        7.4    8.58  )-----------( 228      ( 10 Oct 2023 06:50 )             22.7     10-10    138  |  101  |  50<H>  ----------------------------<  241<H>  3.0<L>   |  27  |  1.60<H>    Ca    8.5      10 Oct 2023 06:51    TPro  6.3  /  Alb  2.5<L>  /  TBili  1.0  /  DBili  x   /  AST  21  /  ALT  21  /  AlkPhos  316<H>  10-09    PTT - ( 10 Oct 2023 06:51 )  PTT:75.7 sec  Urinalysis Basic - ( 10 Oct 2023 06:51 )    Color: x / Appearance: x / SG: x / pH: x  Gluc: 241 mg/dL / Ketone: x  / Bili: x / Urobili: x   Blood: x / Protein: x / Nitrite: x   Leuk Esterase: x / RBC: x / WBC x   Sq Epi: x / Non Sq Epi: x / Bacteria: x

## 2023-10-10 NOTE — SWALLOW BEDSIDE ASSESSMENT ADULT - COMMENTS
Hospitalist 10/9 "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, seizure disorder, prior CABG, DM2, AFib, Mechanical AV replacement in 2007 (on Warfarin) presenting to [facility] ED per recommendation of hematologist for low Hgb to 5.2 with associated black stools for the past few days, last being yesterday. Per daughter at bedside, patient's INR level was 7.2 two days ago and was instructed by hematologist to hold coumadin for the past 2 days. Of note, patient had EGD and colonoscopy in 08/2023 revealing gastritis, duodenitis, non-bleeding AVM, and two polyps, one removed (pathology revealing tubular adenoma). Patient was afebrile and HDS on arrival with SBPs 90s and HR 80s. Labs notable for Hgb 5.9, INR 7.01, BUN/Cr 64/1.80, Alk Phos 418. ED ordered for Kcentra, Vit K, and 2U PRBCs."    CXR 10/7 "Bilateral  RIGHT greater than LEFT perihilar/basilar airspace disease and/or LEFT effusion. Cardiomegaly."    Pt seen at bedside, awake/alert, during clinical swallow evaluation this AM. Pt's daughter present at bedside, and provided [employer] translation. However, pt with difficulty answering questions and only able to follow limited directions. Per daughter, pt consumes soft solids at home and is not demonstrating difficulty swallowing. Pt was cooperative and accepted all PO trials.

## 2023-10-10 NOTE — PROGRESS NOTE ADULT - ASSESSMENT
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, seizure disorder, prior CABG, DM2, AFib, Mechanical AV replacement in 2007 (on Warfarin) presenting to [facility] ED per recommendation of hematologist for low Hgb to 5.2 with associated black stools for the past few days, last being yesterday. Per daughter at bedside, patient's INR level was 7.2 two days ago and was instructed by hematologist to hold coumadin for the past 2 days. Of note, patient had EGD and colonoscopy in 08/2023 revealing gastritis, duodenitis, non-bleeding AVM, and two polyps, one removed (pathology revealing tubular adenoma). Patient was afebrile and HDS on arrival with SBPs 90s and HR 80s. Labs notable for Hgb 5.9, INR 7.01, BUN/Cr 64/1.80, Alk Phos 418. ED ordered for Kcentra, Vit K, and 2U PRBCs. (05 Oct 2023 15:48)    Afib on tele monitor with 5 beat run of NSVT. Hb 7.9 stable but with hypokalemia    Plan:  - Keep Hb > 7.5,  follow with serial CBC  - Follow labs  - 11/22/22 Essentially normal Echocardiogram  - Hold coumadin, ASA, plavix and re-start when OK with GI  - Spironolactone and furosemide also on hold  - INR goal 3  - Patient sees Alexandru Alonso out-patient 68M [employer] speaking male with prior hemorrhagic CVA s/p L craniotomy with residual aphasia, seizure disorder, prior CABG, DM2, AFib, Mechanical AV replacement in 2007 (on Warfarin) presenting to [facility] ED per recommendation of hematologist for low Hgb to 5.2 with associated black stools for the past few days, last being yesterday. Per daughter at bedside, patient's INR level was 7.2 two days ago and was instructed by hematologist to hold coumadin for the past 2 days. Of note, patient had EGD and colonoscopy in 08/2023 revealing gastritis, duodenitis, non-bleeding AVM, and two polyps, one removed (pathology revealing tubular adenoma). Patient was afebrile and HDS on arrival with SBPs 90s and HR 80s. Labs notable for Hgb 5.9, INR 7.01, BUN/Cr 64/1.80, Alk Phos 418. ED ordered for Kcentra, Vit K, and 2U PRBCs. (05 Oct 2023 15:48)    Afib on tele monitor with 5 beat run of NSVT. Hb 7.9 stable but with hypokalemia    Plan:  - Keep Hb > 7.5,  follow with serial CBC  - Follow labs  - 11/22/22 Essentially normal Echocardiogram  - Hold coumadin, ASA, plavix and re-start when OK with GI  - Spironolactone and furosemide also on hold  - INR goal 3  - Patient sees Alexandru Alonso out-patient

## 2023-10-11 NOTE — PROGRESS NOTE ADULT - ASSESSMENT
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 Per daughter at bedside, patient's INR level was 7.2 two days ago and was instructed by hematologist to hold coumadin for the past 2 days. Of note, patient had EGD and colonoscopy in 08/2023 revealing gastritis, duodenitis, non-bleeding AVM, and two polyps, one removed (pathology revealing tubular adenoma). Patient was afebrile and HDS on arrival with SBPs 90s and HR 80s. Labs notable for Hgb 5.9, INR 7.01, BUN/Cr 64/1.80, Alk Phos 418. ED ordered for Kcentra, Vit K, and 2U PRBCs.    1Acute blood loss anemia  - monitor cbc  - GI fu  - management as per GI   - no plan for scope at present  - winifred sec to high INR     2 High INR , mechanical valve   - reversed- hold coumadin   - cw  hep gtt  - monitor cbc     3 Hx of CVA  - neuro fu  - restarted heparin drip     4 DM  - uncontrolled   - monitor FS  - basal, bolus   - endo following     5 SOB , tachycardia   - improved  -VQ scan low probability     Venodyne     case dw family at bedside

## 2023-10-11 NOTE — PROGRESS NOTE ADULT - SUBJECTIVE AND OBJECTIVE BOX
Date/Time Patient Seen:  		  Referring MD:   Data Reviewed	       Patient is a 68y old  Male who presents with a chief complaint of black stool (10 Oct 2023 20:54)      Subjective/HPI     PAST MEDICAL & SURGICAL HISTORY:  CVA (cerebral vascular accident)    HTN (hypertension)    DM (diabetes mellitus)    Arrhythmia    History of atrial fibrillation    Right hemiparesis    Aphasia due to acute stroke    GI bleed    Iron deficiency anemia    Upper GI bleed    Osteomyelitis    S/P CABG (coronary artery bypass graft)    S/P brain surgery    History of aortic valve repair          Medication list         MEDICATIONS  (STANDING):  albuterol/ipratropium for Nebulization 3 milliLiter(s) Nebulizer every 8 hours  atorvastatin 40 milliGRAM(s) Oral at bedtime  dextrose 5%. 1000 milliLiter(s) (50 mL/Hr) IV Continuous <Continuous>  dextrose 5%. 1000 milliLiter(s) (100 mL/Hr) IV Continuous <Continuous>  dextrose 50% Injectable 25 Gram(s) IV Push once  dextrose 50% Injectable 12.5 Gram(s) IV Push once  dextrose 50% Injectable 25 Gram(s) IV Push once  diltiazem    milliGRAM(s) Oral daily  furosemide    Tablet 40 milliGRAM(s) Oral daily  glucagon  Injectable 1 milliGRAM(s) IntraMuscular once  heparin  Infusion.  Unit(s)/Hr (11 mL/Hr) IV Continuous <Continuous>  influenza  Vaccine (HIGH DOSE) 0.7 milliLiter(s) IntraMuscular once  insulin glargine Injectable (LANTUS) 20 Unit(s) SubCutaneous at bedtime  insulin lispro (ADMELOG) corrective regimen sliding scale   SubCutaneous at bedtime  insulin lispro (ADMELOG) corrective regimen sliding scale   SubCutaneous three times a day before meals  insulin lispro Injectable (ADMELOG) 4 Unit(s) SubCutaneous three times a day before meals  levETIRAcetam 750 milliGRAM(s) Oral two times a day  pantoprazole  Injectable 40 milliGRAM(s) IV Push every 12 hours  piperacillin/tazobactam IVPB.. 3.375 Gram(s) IV Intermittent every 8 hours  sodium chloride 1 Gram(s) Oral two times a day  sucralfate 1 Gram(s) Oral four times a day  urea Oral Powder 15 Gram(s) Oral daily    MEDICATIONS  (PRN):  dextrose Oral Gel 15 Gram(s) Oral once PRN Blood Glucose LESS THAN 70 milliGRAM(s)/deciliter  guaiFENesin Oral Liquid (Sugar-Free) 200 milliGRAM(s) Oral every 6 hours PRN Cough  heparin   Injectable 5000 Unit(s) IV Push every 6 hours PRN For aPTT less than 40  heparin   Injectable 2500 Unit(s) IV Push every 6 hours PRN For aPTT between 40 - 57         Vitals log        ICU Vital Signs Last 24 Hrs  T(C): 37.2 (11 Oct 2023 04:11), Max: 37.2 (11 Oct 2023 04:11)  T(F): 98.9 (11 Oct 2023 04:11), Max: 98.9 (11 Oct 2023 04:11)  HR: 86 (11 Oct 2023 04:57) (77 - 95)  BP: 125/73 (11 Oct 2023 04:57) (113/69 - 125/73)  BP(mean): --  ABP: --  ABP(mean): --  RR: 18 (11 Oct 2023 04:11) (18 - 18)  SpO2: 97% (11 Oct 2023 04:11) (97% - 99%)    O2 Parameters below as of 11 Oct 2023 04:11  Patient On (Oxygen Delivery Method): nasal cannula  O2 Flow (L/min): 3               Input and Output:  I&O's Detail      Lab Data                        7.9    8.73  )-----------( 238      ( 10 Oct 2023 14:15 )             24.3     10-10    138  |  101  |  50<H>  ----------------------------<  241<H>  3.0<L>   |  27  |  1.60<H>    Ca    8.5      10 Oct 2023 06:51    TPro  6.3  /  Alb  2.5<L>  /  TBili  1.0  /  DBili  x   /  AST  21  /  ALT  21  /  AlkPhos  316<H>  10-09            Review of Systems	      Objective     Physical Examination    heart s1s2  lung dec BS  head nc      Pertinent Lab findings & Imaging      Bryan:  NO   Adequate UO     I&O's Detail           Discussed with:     Cultures:	        Radiology

## 2023-10-11 NOTE — PROGRESS NOTE ADULT - ASSESSMENT
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   ID c/s on HD#3 for fever to 102F overnight    Acute Multifocal PNA/AHRF on NC  - fever to 102 overnight, leukocytosis to 18 on 10/7  - CT chest showing multifocal PNA  Recommendations  C/w zosyn (started 10/7) with potential for 10/11 as last day but changed abx as below    MSSA Bacteremia  -noted Culture - Blood (10.08.23 @ 10:40)-  Methicillin SENSITIVE Staphylococcus aureus (MSSA)  -repeat blood cultures collected 10/10 20:38  -no obv source other than perhaps MSSA so will need to determine if repeat and second bottle show growth  Cefazolin 2 grams IV q8 started 10/11    Thank you for consulting us and involving us in the management of this most interesting and challenging case.  We will follow along in the care of this patient. Please call us at [phone] or text me directly on my cell# at [phone] with any concerns.    Starting tomorrow Dr Becca Acosta will be assuming care of this patient so please contact her with any questions, concerns or new micro data.

## 2023-10-11 NOTE — PROGRESS NOTE ADULT - SUBJECTIVE AND OBJECTIVE BOX
[facility]  Shane Murray PA-C  [address] [place] [address]  [phone]      INTERVAL HPI/OVERNIGHT EVENTS:  Pt s/e with daughter at bedside translating for pt  No further overt bleeding in stool    MEDICATIONS  (STANDING):  albuterol/ipratropium for Nebulization 3 milliLiter(s) Nebulizer every 8 hours  atorvastatin 40 milliGRAM(s) Oral at bedtime  dextrose 5%. 1000 milliLiter(s) (50 mL/Hr) IV Continuous <Continuous>  dextrose 5%. 1000 milliLiter(s) (100 mL/Hr) IV Continuous <Continuous>  dextrose 50% Injectable 25 Gram(s) IV Push once  dextrose 50% Injectable 12.5 Gram(s) IV Push once  dextrose 50% Injectable 25 Gram(s) IV Push once  diltiazem    milliGRAM(s) Oral daily  furosemide    Tablet 40 milliGRAM(s) Oral daily  glucagon  Injectable 1 milliGRAM(s) IntraMuscular once  heparin  Infusion.  Unit(s)/Hr (11 mL/Hr) IV Continuous <Continuous>  influenza  Vaccine (HIGH DOSE) 0.7 milliLiter(s) IntraMuscular once  insulin glargine Injectable (LANTUS) 20 Unit(s) SubCutaneous at bedtime  insulin lispro (ADMELOG) corrective regimen sliding scale   SubCutaneous at bedtime  insulin lispro (ADMELOG) corrective regimen sliding scale   SubCutaneous three times a day before meals  insulin lispro Injectable (ADMELOG) 7 Unit(s) SubCutaneous three times a day before meals  iron sucrose Injectable 100 milliGRAM(s) IV Push every 24 hours  levETIRAcetam 750 milliGRAM(s) Oral two times a day  pantoprazole  Injectable 40 milliGRAM(s) IV Push every 12 hours  piperacillin/tazobactam IVPB.. 3.375 Gram(s) IV Intermittent every 8 hours  potassium chloride  10 mEq/100 mL IVPB 10 milliEquivalent(s) IV Intermittent every 1 hour  sodium chloride 1 Gram(s) Oral two times a day  sucralfate 1 Gram(s) Oral four times a day  urea Oral Powder 15 Gram(s) Oral daily    MEDICATIONS  (PRN):  dextrose Oral Gel 15 Gram(s) Oral once PRN Blood Glucose LESS THAN 70 milliGRAM(s)/deciliter  guaiFENesin Oral Liquid (Sugar-Free) 200 milliGRAM(s) Oral every 6 hours PRN Cough  heparin   Injectable 2500 Unit(s) IV Push every 6 hours PRN For aPTT between 40 - 57  heparin   Injectable 5000 Unit(s) IV Push every 6 hours PRN For aPTT less than 40      Allergies    No Known Allergies    PHYSICAL EXAM:   Vital Signs:  Vital Signs Last 24 Hrs  T(C): 36.5 (11 Oct 2023 11:05), Max: 37.2 (11 Oct 2023 04:11)  T(F): 97.7 (11 Oct 2023 11:05), Max: 98.9 (11 Oct 2023 04:11)  HR: 88 (11 Oct 2023 11:05) (77 - 95)  BP: 130/69 (11 Oct 2023 11:05) (113/69 - 130/69)  BP(mean): --  RR: 17 (11 Oct 2023 11:05) (17 - 18)  SpO2: 97% (11 Oct 2023 11:05) (97% - 99%)    Parameters below as of 11 Oct 2023 11:05  Patient On (Oxygen Delivery Method): nasal cannula  O2 Flow (L/min): 3    Daily     Daily Weight in k (11 Oct 2023 04:11)    GENERAL:  Appears stated age  HEENT:  NC/AT  CHEST:  Full & symmetric excursion  HEART:  Regular rhythm  ABDOMEN:  Soft, non-tender, non-distended  EXTEREMITIES:  no cyanosis  SKIN:  No rash  NEURO:  Alert      LABS:                        7.4    7.86  )-----------( 219      ( 11 Oct 2023 07:22 )             22.8     10-11    133<L>  |  99  |  46<H>  ----------------------------<  242<H>  3.3<L>   |  27  |  1.40<H>    Ca    8.8      11 Oct 2023 07:22      PTT - ( 11 Oct 2023 07:22 )  PTT:74.8 sec  Urinalysis Basic - ( 11 Oct 2023 07:22 )    Color: x / Appearance: x / SG: x / pH: x  Gluc: 242 mg/dL / Ketone: x  / Bili: x / Urobili: x   Blood: x / Protein: x / Nitrite: x   Leuk Esterase: x / RBC: x / WBC x   Sq Epi: x / Non Sq Epi: x / Bacteria: x

## 2023-10-11 NOTE — PROGRESS NOTE ADULT - SUBJECTIVE AND OBJECTIVE BOX
Patient is a 68y Male whom presented to the hospital with hyponatremia     PAST MEDICAL & SURGICAL HISTORY:  CVA (cerebral vascular accident)      HTN (hypertension)      DM (diabetes mellitus)      History of atrial fibrillation      Right hemiparesis      Aphasia due to acute stroke      Upper GI bleed      Osteomyelitis      S/P CABG (coronary artery bypass graft)      S/P brain surgery      History of aortic valve repair          MEDICATIONS  (STANDING):  atorvastatin 40 milliGRAM(s) Oral at bedtime  levETIRAcetam 750 milliGRAM(s) Oral two times a day  pantoprazole Infusion 8 mG/Hr (10 mL/Hr) IV Continuous <Continuous>  sucralfate 1 Gram(s) Oral four times a day      Allergies    No Known Allergies    Intolerances        SOCIAL HISTORY:  Denies ETOh,Smoking,     FAMILY HISTORY:  FH: coronary artery disease (Sibling)    FH: type 2 diabetes (Sibling)        REVIEW OF SYSTEMS:    CONSTITUTIONAL: No weakness, fevers or chills  RESPIRATORY: No cough, wheezing, hemoptysis; No shortness of breath  CARDIOVASCULAR: No chest pain or palpitations  GASTROINTESTINAL: No abdominal or epigastric pain. No nausea, vomiting,     No diarrhea or constipation. No melena   SKIN: dry                          7.4    7.86  )-----------( 219      ( 11 Oct 2023 07:22 )             22.8       CBC Full  -  ( 11 Oct 2023 07:22 )  WBC Count : 7.86 K/uL  RBC Count : 2.70 M/uL  Hemoglobin : 7.4 g/dL  Hematocrit : 22.8 %  Platelet Count - Automated : 219 K/uL  Mean Cell Volume : 84.4 fl  Mean Cell Hemoglobin : 27.4 pg  Mean Cell Hemoglobin Concentration : 32.5 gm/dL  Auto Neutrophil # : x  Auto Lymphocyte # : x  Auto Monocyte # : x  Auto Eosinophil # : x  Auto Basophil # : x  Auto Neutrophil % : x  Auto Lymphocyte % : x  Auto Monocyte % : x  Auto Eosinophil % : x  Auto Basophil % : x      10-11    133<L>  |  99  |  46<H>  ----------------------------<  242<H>  3.3<L>   |  27  |  1.40<H>    Ca    8.8      11 Oct 2023 07:22        CAPILLARY BLOOD GLUCOSE      POCT Blood Glucose.: 256 mg/dL (11 Oct 2023 08:19)  POCT Blood Glucose.: 243 mg/dL (10 Oct 2023 21:35)  POCT Blood Glucose.: 341 mg/dL (10 Oct 2023 17:22)  POCT Blood Glucose.: 293 mg/dL (10 Oct 2023 12:23)      Vital Signs Last 24 Hrs  T(C): 37.2 (11 Oct 2023 04:11), Max: 37.2 (11 Oct 2023 04:11)  T(F): 98.9 (11 Oct 2023 04:11), Max: 98.9 (11 Oct 2023 04:11)  HR: 86 (11 Oct 2023 08:02) (77 - 95)  BP: 125/73 (11 Oct 2023 04:57) (113/69 - 125/73)  BP(mean): --  RR: 18 (11 Oct 2023 04:11) (18 - 18)  SpO2: 99% (11 Oct 2023 08:02) (97% - 99%)    Parameters below as of 11 Oct 2023 08:02  Patient On (Oxygen Delivery Method): nasal cannula, 3 L        Urinalysis Basic - ( 11 Oct 2023 07:22 )    Color: x / Appearance: x / SG: x / pH: x  Gluc: 242 mg/dL / Ketone: x  / Bili: x / Urobili: x   Blood: x / Protein: x / Nitrite: x   Leuk Esterase: x / RBC: x / WBC x   Sq Epi: x / Non Sq Epi: x / Bacteria: x        PTT - ( 11 Oct 2023 07:22 )  PTT:74.8 sec    PHYSICAL EXAM:    Constitutional: NAD  HEENT: conjunctive   clear   Neck:  No JVD  Respiratory: CTAB  Cardiovascular: S1 and S2  Gastrointestinal: BS+, soft, NT/ND  Extremities: No peripheral edema  Neurological:  no focal deficits  Skin: dry   Access: Not applicable

## 2023-10-11 NOTE — PROGRESS NOTE ADULT - ASSESSMENT
68M [employer] speaking male with prior hemorrhagic CVA s/p L craniotomy with residual aphasia, seizure disorder, prior CABG, DM2, AFib, Mechanical AV replacement in 2007 (on Warfarin) presenting to [facility] ED per recommendation of hematologist for low Hgb to 5.2 with associated black stools for the past few days, last being yesterday. Per daughter at bedside, patient's INR level was 7.2 two days ago and was instructed by hematologist to hold coumadin for the past 2 days. Of note, patient had EGD and colonoscopy in 08/2023 revealing gastritis, duodenitis, non-bleeding AVM, and two polyps, one removed (pathology revealing tubular adenoma). Patient was afebrile and HDS on arrival with SBPs 90s and HR 80s. Labs notable for Hgb 5.9, INR 7.01, BUN/Cr 64/1.80, Alk Phos 418. ED ordered for Kcentra, Vit K, and 2U PRBCs. (05 Oct 2023 15:48)    Afib on tele monitor with 5 beat run of NSVT. Hb today 7.3 stable      1. GI bleed  - Keep Hb > 7.5,  follow with serial CBC  - Holding coumadin, ASA, plavix and re-start when OK with GI  - On IV heparin  - INR goal 3    2. AF  Rate controlled on diltiazem 240 mg    3. H/o metallic AVR  Started IV heparin  Start warfarin when ok with GI.    4. CABG  No cp sob    Will follow   - Patient sees Alexandru Alonso out-patient

## 2023-10-11 NOTE — PROGRESS NOTE ADULT - SUBJECTIVE AND OBJECTIVE BOX
Patient is a 68y old  Male who presents with a chief complaint of black stool (11 Oct 2023 09:48)    Date of servie : 10-11-23 @ 12:55  INTERVAL HPI/OVERNIGHT EVENTS:  T(C): 36.5 (10-11-23 @ 11:05), Max: 37.2 (10-11-23 @ 04:11)  HR: 88 (10-11-23 @ 11:05) (77 - 95)  BP: 130/69 (10-11-23 @ 11:05) (113/69 - 130/69)  RR: 17 (10-11-23 @ 11:05) (17 - 18)  SpO2: 97% (10-11-23 @ 11:05) (97% - 99%)  Wt(kg): --  I&O's Summary    11 Oct 2023 07:01  -  11 Oct 2023 12:55  --------------------------------------------------------  IN: 0 mL / OUT: 300 mL / NET: -300 mL        LABS:                        7.4    7.86  )-----------( 219      ( 11 Oct 2023 07:22 )             22.8     10-11    133<L>  |  99  |  46<H>  ----------------------------<  242<H>  3.3<L>   |  27  |  1.40<H>    Ca    8.8      11 Oct 2023 07:22      PTT - ( 11 Oct 2023 07:22 )  PTT:74.8 sec  Urinalysis Basic - ( 11 Oct 2023 07:22 )    Color: x / Appearance: x / SG: x / pH: x  Gluc: 242 mg/dL / Ketone: x  / Bili: x / Urobili: x   Blood: x / Protein: x / Nitrite: x   Leuk Esterase: x / RBC: x / WBC x   Sq Epi: x / Non Sq Epi: x / Bacteria: x      CAPILLARY BLOOD GLUCOSE      POCT Blood Glucose.: 372 mg/dL (11 Oct 2023 12:17)  POCT Blood Glucose.: 256 mg/dL (11 Oct 2023 08:19)  POCT Blood Glucose.: 243 mg/dL (10 Oct 2023 21:35)  POCT Blood Glucose.: 341 mg/dL (10 Oct 2023 17:22)        Urinalysis Basic - ( 11 Oct 2023 07:22 )    Color: x / Appearance: x / SG: x / pH: x  Gluc: 242 mg/dL / Ketone: x  / Bili: x / Urobili: x   Blood: x / Protein: x / Nitrite: x   Leuk Esterase: x / RBC: x / WBC x   Sq Epi: x / Non Sq Epi: x / Bacteria: x        MEDICATIONS  (STANDING):  albuterol/ipratropium for Nebulization 3 milliLiter(s) Nebulizer every 8 hours  atorvastatin 40 milliGRAM(s) Oral at bedtime  dextrose 5%. 1000 milliLiter(s) (50 mL/Hr) IV Continuous <Continuous>  dextrose 5%. 1000 milliLiter(s) (100 mL/Hr) IV Continuous <Continuous>  dextrose 50% Injectable 12.5 Gram(s) IV Push once  dextrose 50% Injectable 25 Gram(s) IV Push once  dextrose 50% Injectable 25 Gram(s) IV Push once  diltiazem    milliGRAM(s) Oral daily  furosemide    Tablet 40 milliGRAM(s) Oral daily  glucagon  Injectable 1 milliGRAM(s) IntraMuscular once  heparin  Infusion.  Unit(s)/Hr (11 mL/Hr) IV Continuous <Continuous>  influenza  Vaccine (HIGH DOSE) 0.7 milliLiter(s) IntraMuscular once  insulin glargine Injectable (LANTUS) 20 Unit(s) SubCutaneous at bedtime  insulin lispro (ADMELOG) corrective regimen sliding scale   SubCutaneous three times a day before meals  insulin lispro (ADMELOG) corrective regimen sliding scale   SubCutaneous at bedtime  insulin lispro Injectable (ADMELOG) 7 Unit(s) SubCutaneous three times a day before meals  iron sucrose Injectable 100 milliGRAM(s) IV Push every 24 hours  levETIRAcetam 750 milliGRAM(s) Oral two times a day  pantoprazole  Injectable 40 milliGRAM(s) IV Push every 12 hours  piperacillin/tazobactam IVPB.. 3.375 Gram(s) IV Intermittent every 8 hours  potassium chloride  10 mEq/100 mL IVPB 10 milliEquivalent(s) IV Intermittent every 1 hour  sodium chloride 1 Gram(s) Oral two times a day  sucralfate 1 Gram(s) Oral four times a day  urea Oral Powder 15 Gram(s) Oral daily    MEDICATIONS  (PRN):  dextrose Oral Gel 15 Gram(s) Oral once PRN Blood Glucose LESS THAN 70 milliGRAM(s)/deciliter  guaiFENesin Oral Liquid (Sugar-Free) 200 milliGRAM(s) Oral every 6 hours PRN Cough  heparin   Injectable 5000 Unit(s) IV Push every 6 hours PRN For aPTT less than 40  heparin   Injectable 2500 Unit(s) IV Push every 6 hours PRN For aPTT between 40 - 57          PHYSICAL EXAM:  GENERAL: NAD, well-groomed, well-developed  HEAD:  Atraumatic, Normocephalic  CHEST/LUNG: Clear to percussion bilaterally; No rales, rhonchi, wheezing, or rubs  HEART: Regular rate and rhythm; No murmurs, rubs, or gallops  ABDOMEN: Soft, Nontender, Nondistended; Bowel sounds present  EXTREMITIES:  2+ Peripheral Pulses, No clubbing, cyanosis, or edema  LYMPH: No lymphadenopathy noted  SKIN: No rashes or lesions    Care Discussed with Consultants/Other Providers [x ] YES  [ ] NO

## 2023-10-11 NOTE — PROGRESS NOTE ADULT - SUBJECTIVE AND OBJECTIVE BOX
[facility] DIVISION of INFECTIOUS DISEASE  Guru Michel MD PhD, Lisa James MD, Becca Acosta MD, Karen Grimes MD, Corky Perez MD  and providing coverage with Carlos Marx MD  Providing Infectious Disease Consultations at [facility], [facility], [facility]'s    Office# [phone] to schedule follow up appointments  Answering Service for urgent calls or New Consults [phone]  Cell# to text for urgent issues Guru Michel [phone]     infectious diseases progress note:    ARNIE DELGADILLO is a 68y y. o. Male patient    Overnight and events of the last 24hrs reviewed    Allergies    No Known Allergies    Intolerances        ANTIBIOTICS/RELEVANT:  antimicrobials  ceFAZolin   IVPB 2000 milliGRAM(s) IV Intermittent every 8 hours    immunologic:  influenza  Vaccine (HIGH DOSE) 0.7 milliLiter(s) IntraMuscular once    OTHER:  albuterol/ipratropium for Nebulization 3 milliLiter(s) Nebulizer every 8 hours  atorvastatin 40 milliGRAM(s) Oral at bedtime  dextrose 5%. 1000 milliLiter(s) IV Continuous <Continuous>  dextrose 5%. 1000 milliLiter(s) IV Continuous <Continuous>  dextrose 50% Injectable 25 Gram(s) IV Push once  dextrose 50% Injectable 25 Gram(s) IV Push once  dextrose 50% Injectable 12.5 Gram(s) IV Push once  dextrose Oral Gel 15 Gram(s) Oral once PRN  diltiazem    milliGRAM(s) Oral daily  furosemide    Tablet 40 milliGRAM(s) Oral daily  glucagon  Injectable 1 milliGRAM(s) IntraMuscular once  guaiFENesin Oral Liquid (Sugar-Free) 200 milliGRAM(s) Oral every 6 hours PRN  heparin   Injectable 5000 Unit(s) IV Push every 6 hours PRN  heparin   Injectable 2500 Unit(s) IV Push every 6 hours PRN  heparin  Infusion.  Unit(s)/Hr IV Continuous <Continuous>  insulin glargine Injectable (LANTUS) 20 Unit(s) SubCutaneous at bedtime  insulin lispro (ADMELOG) corrective regimen sliding scale   SubCutaneous three times a day before meals  insulin lispro (ADMELOG) corrective regimen sliding scale   SubCutaneous at bedtime  insulin lispro Injectable (ADMELOG) 7 Unit(s) SubCutaneous three times a day before meals  iron sucrose Injectable 100 milliGRAM(s) IV Push every 24 hours  levETIRAcetam 750 milliGRAM(s) Oral two times a day  pantoprazole  Injectable 40 milliGRAM(s) IV Push every 12 hours  potassium chloride  10 mEq/100 mL IVPB 10 milliEquivalent(s) IV Intermittent every 1 hour  sodium chloride 1 Gram(s) Oral two times a day  sucralfate 1 Gram(s) Oral four times a day  urea Oral Powder 15 Gram(s) Oral daily      Objective:  Vital Signs Last 24 Hrs  T(C): 36.5 (11 Oct 2023 11:05), Max: 37.2 (11 Oct 2023 04:11)  T(F): 97.7 (11 Oct 2023 11:05), Max: 98.9 (11 Oct 2023 04:11)  HR: 88 (11 Oct 2023 11:05) (77 - 90)  BP: 130/69 (11 Oct 2023 11:05) (113/69 - 130/69)  BP(mean): --  RR: 17 (11 Oct 2023 11:05) (17 - 18)  SpO2: 97% (11 Oct 2023 11:05) (97% - 99%)    Parameters below as of 11 Oct 2023 11:05  Patient On (Oxygen Delivery Method): nasal cannula  O2 Flow (L/min): 3      T(C): 36.5 (10-11-23 @ 11:05), Max: 37.2 (10-11-23 @ 04:11)  T(C): 36.5 (10-11-23 @ 11:05), Max: 37.2 (10-11-23 @ 04:11)  T(C): 36.5 (10-11-23 @ 11:05), Max: 39 (10-08-23 @ 06:15)    PHYSICAL EXAM:  HEENT: NC atraumatic  Neck: supple  Respiratory: no accessory muscle use, breathing comfortably  Cardiovascular: distant  Gastrointestinal: normal appearing, nondistended  Extremities: no clubbing, no cyanosis,        LABS:                          7.4    7.86  )-----------( 219      ( 11 Oct 2023 07:22 )             22.8       WBC  7.86 10-11 @ 07:22  8.73 10-10 @ 14:15  8.58 10-10 @ 06:50  7.58 10-09 @ 07:40  11.70 10-08 @ 10:40  14.07 10-08 @ 06:45  14.83 10-08 @ 01:41  18.60 10-07 @ 18:45  14.83 10-07 @ 13:15  13.98 10-07 @ 09:40  15.73 10-06 @ 11:45  15.31 10-06 @ 09:15  14.95 10-06 @ 07:30  10.74 10-05 @ 22:06  11.76 10-05 @ 13:00      10-11    133<L>  |  99  |  46<H>  ----------------------------<  242<H>  3.3<L>   |  27  |  1.40<H>    Ca    8.8      11 Oct 2023 07:22        Creatinine: 1.40 mg/dL (10-11-23 @ 07:22)  Creatinine: 1.60 mg/dL (10-10-23 @ 06:51)  Creatinine: 1.70 mg/dL (10-09-23 @ 07:40)  Creatinine: 1.60 mg/dL (10-08-23 @ 06:45)  Creatinine: 1.90 mg/dL (10-06-23 @ 09:15)  Creatinine: 2.00 mg/dL (10-06-23 @ 07:30)  Creatinine: 1.80 mg/dL (10-05-23 @ 13:00)      PTT - ( 11 Oct 2023 07:22 )  PTT:74.8 sec  Urinalysis Basic - ( 11 Oct 2023 07:22 )    Color: x / Appearance: x / SG: x / pH: x  Gluc: 242 mg/dL / Ketone: x  / Bili: x / Urobili: x   Blood: x / Protein: x / Nitrite: x   Leuk Esterase: x / RBC: x / WBC x   Sq Epi: x / Non Sq Epi: x / Bacteria: x            INFLAMMATORY MARKERS      MICROBIOLOGY:    Culture - Blood (10.08.23 @ 10:40)    -  Methicillin SENSITIVE Staphylococcus aureus (MSSA): Detec Any isolate of Staphylococcus aureus from a blood culture is NOT considered a contaminant.   Gram Stain:   Growth in anaerobic bottle: Gram positive cocci in pairs   Specimen Source: .Blood Blood-Peripheral   Organism: Blood Culture PCR   Culture Results:   Growth in anaerobic bottle: Gram positive cocci in pairs  Direct identification is available within approximately 3-5  hours either by Blood Panel Multiplexed PCR or Direct  MALDI-TOF. Details: [email or web address]   Organism Identification: Blood Culture PCR   Method Type: PCR        RADIOLOGY & ADDITIONAL STUDIES:

## 2023-10-11 NOTE — PROGRESS NOTE ADULT - SUBJECTIVE AND OBJECTIVE BOX
CAPILLARY BLOOD GLUCOSE      POCT Blood Glucose.: 243 mg/dL (10 Oct 2023 21:35)  POCT Blood Glucose.: 341 mg/dL (10 Oct 2023 17:22)  POCT Blood Glucose.: 293 mg/dL (10 Oct 2023 12:23)  POCT Blood Glucose.: 260 mg/dL (10 Oct 2023 08:00)      Vital Signs Last 24 Hrs  T(C): 37.2 (11 Oct 2023 04:11), Max: 37.2 (11 Oct 2023 04:11)  T(F): 98.9 (11 Oct 2023 04:11), Max: 98.9 (11 Oct 2023 04:11)  HR: 86 (11 Oct 2023 04:57) (77 - 95)  BP: 125/73 (11 Oct 2023 04:57) (113/69 - 125/73)  BP(mean): --  RR: 18 (11 Oct 2023 04:11) (18 - 18)  SpO2: 97% (11 Oct 2023 04:11) (97% - 97%)    Parameters below as of 11 Oct 2023 04:11  Patient On (Oxygen Delivery Method): nasal cannula  O2 Flow (L/min): 3      General: WN/WD NAD  Respiratory: CTA B/L  CV: RRR, S1S2, no murmurs, rubs or gallops  Abdominal: Soft, NT, ND +BS, Last BM  Extremities: No edema, + peripheral pulses     10-10    138  |  101  |  50<H>  ----------------------------<  241<H>  3.0<L>   |  27  |  1.60<H>    Ca    8.5      10 Oct 2023 06:51        atorvastatin 40 milliGRAM(s) Oral at bedtime  dextrose 50% Injectable 25 Gram(s) IV Push once  dextrose 50% Injectable 12.5 Gram(s) IV Push once  dextrose 50% Injectable 25 Gram(s) IV Push once  dextrose Oral Gel 15 Gram(s) Oral once PRN  glucagon  Injectable 1 milliGRAM(s) IntraMuscular once  insulin glargine Injectable (LANTUS) 20 Unit(s) SubCutaneous at bedtime  insulin lispro (ADMELOG) corrective regimen sliding scale   SubCutaneous at bedtime  insulin lispro (ADMELOG) corrective regimen sliding scale   SubCutaneous three times a day before meals  insulin lispro Injectable (ADMELOG) 4 Unit(s) SubCutaneous three times a day before meals

## 2023-10-11 NOTE — PROGRESS NOTE ADULT - SUBJECTIVE AND OBJECTIVE BOX
{\rtf1\fatelz99070\ansi\hfbrrmy7819\ftnbj\uc1\deff0  {\fonttbl{\f0 \fnil Segoe UI;}{\f1 \fnil [place];}{\f2 \fnil \fcharset0 Segoe UI;}{\f3 \fnil Symbol;}{\f4 \fnil Wingdings;}}  {\colortbl ;\ysn170\liwzf729\flko514 ;\red0\green0\blue0 ;\red0\green0\blue0 ;}  {\stylesheet{\f0\fs20 Normal;}{\cs1 Default Paragraph Font;}{\cs2\f0\fs16 Line Number;}{\cs3\f1\fs24 Hyperlink;}}  {\*\revtbl{Unknown;}}  {\*\listtable  {\list\listtemplateid-1  {\listlevel\levelnfc0\levelfollow0\levelstartat1\levelnorestart{\leveltext \'02\'00.}{\levelnumbers \'01}\f1\fs24}  {\listlevel\levelnfc4\levelfollow0\levelstartat1\levelnorestart{\leveltext \'02\'01.}{\levelnumbers \'01}\f1\fs24}  {\listlevel\levelnfc0\levelfollow0\levelstartat1\levelnorestart{\leveltext \'02\'02.}{\levelnumbers \'01}\f1\fs24}  {\listlevel\levelnfc0\levelfollow0\levelstartat1\levelnorestart{\leveltext \'02\'03.}{\levelnumbers \'01}\f1\fs24}  {\listlevel\levelnfc0\levelfollow0\levelstartat1\levelnorestart{\leveltext \'02\'04.}{\levelnumbers \'01}\f1\fs24}  {\listlevel\levelnfc0\levelfollow0\levelstartat1\levelnorestart{\leveltext \'02\'05.}{\levelnumbers \'01}\f1\fs24}  {\listlevel\levelnfc0\levelfollow0\levelstartat1\levelnorestart{\leveltext \'02\'06.}{\levelnumbers \'01}\f1\fs24}  {\listlevel\levelnfc0\levelfollow0\levelstartat1\levelnorestart{\leveltext \'02\'07.}{\levelnumbers \'01}\f1\fs24}  {\listlevel\levelnfc0\levelfollow0\levelstartat1\levelnorestart{\leveltext \'02\'08.}{\levelnumbers \'01}\f1\fs24}  {\listname ;}\[id number]  }  }  {\*\listoverridetable  {\listoverride\[id number]\listoverridecount0\ls1}  }  \bnrnjv96372\squkfy88090\xveti8652\xghpj3404\lshns7179\gzpqj2220\zzcwdia545\yxusgwe119\nogrowautofit\sfhaiv682\formshade\nofeaturethrottle1\dntblnsbdb\fet4\aendnotes\aftnnrlc\pgbrdrhead\pgbrdrfoot  \sectd\mwdumd97741\wtrrwr66529\guttersxn0\zrgazbru8151\uettmbvt8588\nayegwhv5518\dkirrygm9080\hhycuvi313\tbjfgqh957\sbkpage\pgncont\pgndec  \plain\plain\f0\fs24\pard\plain\f0\fs24\plain\f0\fs20\kiht7303\hich\f0\dbch\f0\loch\f0\fs20 Neurology follow up note\par  \par  NAIB [id number]\par  \par  \par  Interval History:\par  \par  Patient feels ok no new complaints.\par  \par  Allergies\par  \par  No Known Allergies\par  \par  Intolerances\par  \par  \par  \par  MEDICATIONS\par  \par  albuterol/ipratropium for Nebulization 3 milliLiter(s) Nebulizer every 8 hours\par  atorvastatin 40 milliGRAM(s) Oral at bedtime\par  dextrose 5%. 1000 milliLiter(s) IV Continuous <Continuous>\par  dextrose 5%. 1000 milliLiter(s) IV Continuous <Continuous>\par  dextrose 50% Injectable 25 Gram(s) IV Push once\par  dextrose 50% Injectable 25 Gram(s) IV Push once\par  dextrose 50% Injectable 12.5 Gram(s) IV Push once\par  dextrose Oral Gel 15 Gram(s) Oral once PRN\par  diltiazem    milliGRAM(s) Oral daily\par  furosemide    Tablet 40 milliGRAM(s) Oral daily\par  glucagon  Injectable 1 milliGRAM(s) IntraMuscular once\par  guaiFENesin Oral Liquid (Sugar-Free) 200 milliGRAM(s) Oral every 6 hours PRN\par  heparin   Injectable 5000 Unit(s) IV Push every 6 hours PRN\par  heparin   Injectable 2500 Unit(s) IV Push every 6 hours PRN\par  heparin  Infusion.  Unit(s)/Hr IV Continuous <Continuous>\par  influenza  Vaccine (HIGH DOSE) 0.7 milliLiter(s) IntraMuscular once\par  insulin glargine Injectable (LANTUS) 20 Unit(s) SubCutaneous at bedtime\par  insulin lispro (ADMELOG) corrective regimen sliding scale   SubCutaneous at bedtime\par  insulin lispro (ADMELOG) corrective regimen sliding scale   SubCutaneous three times a day before meals\par  insulin lispro Injectable (ADMELOG) 7 Unit(s) SubCutaneous three times a day before meals\par  levETIRAcetam 750 milliGRAM(s) Oral two times a day\par  pantoprazole  Injectable 40 milliGRAM(s) IV Push every 12 hours\par  piperacillin/tazobactam IVPB.. 3.375 Gram(s) IV Intermittent every 8 hours\par  sodium chloride 1 Gram(s) Oral two times a day\par  sucralfate 1 Gram(s) Oral four times a day\par  urea Oral Powder 15 Gram(s) Oral daily\par  \par  \par  \par  \par  \par  \par  Vital Signs Last 24 Hrs\par  T(C): 37.2 (11 Oct 2023 04:11), Max: 37.2 (11 Oct 2023 04:11)\par  T(F): 98.9 (11 Oct 2023 04:11), Max: 98.9 (11 Oct 2023 04:11)\par  HR: 86 (11 Oct 2023 04:57) (77 - 95)\par  BP: 125/73 (11 Oct 2023 04:57) (113/69 - 125/73)\par  BP(mean): --\par  RR: 18 (11 Oct 2023 04:11) (18 - 18)\par  SpO2: 97% (11 Oct 2023 04:11) (97% - 97%)\par  \par  Parameters below as of 11 Oct 2023 04:11\par  Patient On (Oxygen Delivery Method): nasal cannula\par  O2 Flow (L/min): 3\par  \par  \par  \ql\plain\f0\fs24\plain\f0\fs20\vdwv8272\hich\f0\dbch\f0\loch\f0\fs20 REVIEW OF SYSTEMS:  Completely limited secondary to the patient repeats words over, has severe expressive aphasia, but had been in his normal state of health as per daughter.\par  \par  PHYSICAL EXAMINATION:  HEENT:  Head:  Normocephalic, atraumatic.  Eyes:  No scleral icterus.  Ears:  Hard to evaluate secondary to he could not answer questions accordingly but appears to follow commands.  NECK:  Supple.  RESPIRATORY:  Air entry bilaterally.    CARDIOVASCULAR:  S1 and S2 heard.  ABDOMEN:  Soft and nontender.  EXTREMITIES:  Positive discoloration was noted on the left toe. \par  \par  NEUROLOGIC:  The patient was awake, alert.  On-off blink to visual threat.  Positive expressive aphasia.  Will say a few words but repeat the same words over.  Right upper and right lower were 0/5 with increased tone.  Right hemiplegia is his baseline.    Left upper and left lower were 4/5.\par  \pard\plain\f0\fs24\plain\f0\fs20\ybwg5433\hich\f0\dbch\f0\loch\f0\fs20\par   \par  \par   \par  \par  LABS:\par  CBC Full  -  ( 11 Oct 2023 07:22 )\par  WBC Count : 7.86 K/uL\par  RBC Count : 2.70 M/uL\par  Hemoglobin : 7.4 g/dL\par  Hematocrit : 22.8 %\par  Platelet Count - Automated : 219 K/uL\par  Mean Cell Volume : 84.4 fl\par  Mean Cell Hemoglobin : 27.4 pg\par  Mean Cell Hemoglobin Concentration : 32.5 gm/dL\par  Auto Neutrophil # : x\par  Auto Lymphocyte # : x\par  Auto Monocyte # : x\par  Auto Eosinophil # : x\par  Auto Basophil # : x\par  Auto Neutrophil % : x\par  Auto Lymphocyte % : x\par  Auto Monocyte % : x\par  Auto Eosinophil % : x\par  Auto Basophil % : x\par  \par  Urinalysis Basic - ( 10 Oct 2023 06:51 )\par  \par  Color: x / Appearance: x / SG: x / pH: x\par  Gluc: 241 mg/dL / Ketone: x  / Bili: x / Urobili: x \par  Blood: x / Protein: x / Nitrite: x \par  Leuk Esterase: x / RBC: x / WBC x \par  Sq Epi: x / Non Sq Epi: x / Bacteria: x\par  \par  \par  10-10\par  \par  138  |  101  |  50<H>\par  ----------------------------<  241<H>\par  3.0<L>   |  27  |  1.60<H>\par  \par  Ca    8.5      10 Oct 2023 06:51\par  \par  \par  Hemoglobin A1C: \par  \par  \par  Vitamin B12 \par  PTT - ( 10 Oct 2023 15:59 )  PTT:78.8 sec\par  \par  \par  RADIOLOGY\par  \par  \par  \ql\plain\f0\fs24\plain\f0\fs20\yyfn5835\hich\f0\dbch\f0\loch\f0\fs20 ANALYSIS AND PLAN:  This is a 68-year-old with a history of cerebrovascular accident, atrial fibrillation, now with possibly gastrointestinal bleed.\par  {\listtext\pard\plain\f1\fs24 1.\tab}  \pard\ssparaaux0\s0\tx360\ls1\ilvl0\fi-360\li360\ql\plain\f0\fs24\plain\f0\fs20\ltox7401\hich\f0\dbch\f0\loch\f0\fs20 For history of cerebrovascular accident and atrial fibrillation, the patient is on multiple blood thinning medications.  The patient's   warfarin INR is significantly elevated.  For now, I would recommend gastrointestinal workup, when possible to prevent further cerebrovascular accident, would recommend to reinstitute anticoagulation, but possibly an alternative to warfarin secondary to   elevation of INR, unclear if the patient needs to be on antiplatelets as well.  If the patient cannot have strong anticoagulation, then we will recommend at least a baby aspirin if possible.\par  {\listtext\pard\plain\f1\fs24 2.\tab}  For history of seizure prophylaxis, continue the patient on his Keppra.\par  {\listtext\pard\plain\f1\fs24 3.\tab}  For history of diabetes, strict control of blood sugars.\par  {\listtext\pard\plain\f1\fs24 4.\tab}  For hyperlipidemia, continue the patient on statin.\par  {\listtext\pard\plain\f1\fs24 5.\tab}  GI noted suspect severe gi bleed in setting of elevated INR AC as per medical team \par  \pard\ssparaaux0\s0\ql\plain\f0\fs24\plain\f0\fs20\qpwg0966\hich\f0\dbch\f0\loch\f0\fs20\par  Spoke with daughter, Jeffrey, at bedside 10/10 .  She does understand my reasoning and thought process.  Her telephone number is [phone].\par  \par   49 minutes of time was spent with the patient, plan of care, reviewing data, with greater than 50% of the visit was spent counseling and/or coordinating care with multidisciplinary healthcare team\par  \plain\f2\fs16\qvvo1031\hich\f2\dbch\f2\loch\f2\cf2\fs16\par  \plain\f2\fs16\qzhl1704\hich\f2\dbch\f2\loch\f2\cf2\fs16\strike\plain\f2\fs16\actg1119\hich\f2\dbch\f2\loch\f2\cf2\fs16\plain\f0\fs20\vxum3821\hich\f0\dbch\f0\loch\f0\fs20\par  }

## 2023-10-11 NOTE — PROGRESS NOTE ADULT - ASSESSMENT
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, seizure disorder, prior CABG, DM2, AFib, Mechanical AV replacement in 2007 (on Warfarin) presenting to [facility] ED per recommendation of hematologist for low Hgb to 5.2 with associated black stools    anemia  mech valve  cva  aphasia  seizure disorder  pleural eff  atelectasis  AF  CAD  CABG hx  DM    ct reviewed  eval for PNA - aspiration - emp ABX - NEBS prn for mucociliary clearance  ID eval noted  on ZOSYN  on HEP gtt  on LASIX    GI and ICU cx noted  serial hgb  s/p PRBC - Vit K - Kcentra -   I and O  monitor VS and HD  PPI  goal map > 60  cvs rx regimen  goal sat > 88 pct  will follow  GOC discussion

## 2023-10-11 NOTE — PROGRESS NOTE ADULT - ASSESSMENT
Anemia  Rectal Bleeding/Melena     INR has been reversed  had recent egd/colonoscopy 2 months ago, still no immediate plan to repeat  suspect resolved severe gi bleed in setting of elevated INR  reg diet  proton pump inhibitor bid  no further overt GI bleeding  keep hgb >8  no objection to restart coumadin    I reviewed the overnight course of events on the unit, re-confirming the patient history. I discussed the care with the patient and their family  Differential diagnosis and plan of care discussed with patient after the evaluation  40 minutes spent on total encounter of which more than fifty percent of the encounter was spent counseling and/or coordinating care by the attending physician.  Advanced care planning was discussed with patient and family.  Advanced care planning forms were reviewed and discussed.  Risks, benefits and alternatives of gastroenterologic procedures were discussed in detail and all questions were answered.

## 2023-10-11 NOTE — PROGRESS NOTE ADULT - SUBJECTIVE AND OBJECTIVE BOX
Neurology follow up note    ARNIE [id number]      Interval History:    Patient seen with daughter     Allergies    No Known Allergies    Intolerances        MEDICATIONS    albuterol/ipratropium for Nebulization 3 milliLiter(s) Nebulizer every 8 hours  atorvastatin 40 milliGRAM(s) Oral at bedtime  dextrose 5%. 1000 milliLiter(s) IV Continuous <Continuous>  dextrose 5%. 1000 milliLiter(s) IV Continuous <Continuous>  dextrose 50% Injectable 25 Gram(s) IV Push once  dextrose 50% Injectable 25 Gram(s) IV Push once  dextrose 50% Injectable 12.5 Gram(s) IV Push once  dextrose Oral Gel 15 Gram(s) Oral once PRN  diltiazem    milliGRAM(s) Oral daily  furosemide    Tablet 40 milliGRAM(s) Oral daily  glucagon  Injectable 1 milliGRAM(s) IntraMuscular once  guaiFENesin Oral Liquid (Sugar-Free) 200 milliGRAM(s) Oral every 6 hours PRN  heparin   Injectable 5000 Unit(s) IV Push every 6 hours PRN  heparin   Injectable 2500 Unit(s) IV Push every 6 hours PRN  heparin  Infusion.  Unit(s)/Hr IV Continuous <Continuous>  influenza  Vaccine (HIGH DOSE) 0.7 milliLiter(s) IntraMuscular once  insulin glargine Injectable (LANTUS) 20 Unit(s) SubCutaneous at bedtime  insulin lispro (ADMELOG) corrective regimen sliding scale   SubCutaneous at bedtime  insulin lispro (ADMELOG) corrective regimen sliding scale   SubCutaneous three times a day before meals  insulin lispro Injectable (ADMELOG) 4 Unit(s) SubCutaneous three times a day before meals  levETIRAcetam 750 milliGRAM(s) Oral two times a day  pantoprazole  Injectable 40 milliGRAM(s) IV Push every 12 hours  piperacillin/tazobactam IVPB.. 3.375 Gram(s) IV Intermittent every 8 hours  sodium chloride 1 Gram(s) Oral two times a day  sucralfate 1 Gram(s) Oral four times a day  urea Oral Powder 15 Gram(s) Oral daily              Vital Signs Last 24 Hrs  T(C): 36.4 (10 Oct 2023 05:00), Max: 36.8 (09 Oct 2023 12:22)  T(F): 97.5 (10 Oct 2023 05:00), Max: 98.2 (09 Oct 2023 12:22)  HR: 99 (10 Oct 2023 05:00) (77 - 104)  BP: 119/76 (10 Oct 2023 05:00) (102/62 - 132/68)  BP(mean): --  RR: 20 (10 Oct 2023 05:00) (20 - 20)  SpO2: 98% (10 Oct 2023 05:00) (90% - 98%)    Parameters below as of 10 Oct 2023 05:00  Patient On (Oxygen Delivery Method): nasal cannula  O2 Flow (L/min): 2      REVIEW OF SYSTEMS:  Completely limited secondary to the patient repeats words over, has severe expressive aphasia, but had been in his normal state of health as per daughter.    PHYSICAL EXAMINATION:  HEENT:  Head:  Normocephalic, atraumatic.  Eyes:  No scleral icterus.  Ears:  Hard to evaluate secondary to he could not answer questions accordingly but appears to follow commands.  NECK:  Supple.  RESPIRATORY:  Air entry bilaterally.  CARDIOVASCULAR:  S1 and S2 heard.  ABDOMEN:  Soft and nontender.  EXTREMITIES:  Positive discoloration was noted on the left toe.     NEUROLOGIC:  The patient was awake, alert.  On-off blink to visual threat.  Positive expressive aphasia.  Will say a few words but repeat the same words over.  Right upper and right lower were 0/5 with increased tone.  Right hemiplegia is his baseline.  Left upper and left lower were 4/5.      LABS:  CBC Full  -  ( 09 Oct 2023 07:40 )  WBC Count : 7.58 K/uL  RBC Count : 2.87 M/uL  Hemoglobin : 7.9 g/dL  Hematocrit : 24.2 %  Platelet Count - Automated : 195 K/uL  Mean Cell Volume : 84.3 fl  Mean Cell Hemoglobin : 27.5 pg  Mean Cell Hemoglobin Concentration : 32.6 gm/dL  Auto Neutrophil # : x  Auto Lymphocyte # : x  Auto Monocyte # : x  Auto Eosinophil # : x  Auto Basophil # : x  Auto Neutrophil % : x  Auto Lymphocyte % : x  Auto Monocyte % : x  Auto Eosinophil % : x  Auto Basophil % : x    Urinalysis Basic - ( 09 Oct 2023 07:40 )    Color: x / Appearance: x / SG: x / pH: x  Gluc: 236 mg/dL / Ketone: x  / Bili: x / Urobili: x   Blood: x / Protein: x / Nitrite: x   Leuk Esterase: x / RBC: x / WBC x   Sq Epi: x / Non Sq Epi: x / Bacteria: x      10-09    135  |  100  |  46<H>  ----------------------------<  236<H>  3.3<L>   |  27  |  1.70<H>    Ca    8.6      09 Oct 2023 07:40    TPro  6.3  /  Alb  2.5<L>  /  TBili  1.0  /  DBili  x   /  AST  21  /  ALT  21  /  AlkPhos  316<H>  10-09    Hemoglobin A1C:     LIVER FUNCTIONS - ( 09 Oct 2023 07:40 )  Alb: 2.5 g/dL / Pro: 6.3 g/dL / ALK PHOS: 316 U/L / ALT: 21 U/L / AST: 21 U/L / GGT: x           Vitamin B12   PTT - ( 09 Oct 2023 07:40 )  PTT:103.1 sec      RADIOLOGY      ANALYSIS AND PLAN:  This is a 68-year-old with a history of cerebrovascular accident, atrial fibrillation, now with possibly gastrointestinal bleed.  For history of cerebrovascular accident and atrial fibrillation, the patient is on multiple blood thinning medications.  The patient's warfarin INR is significantly elevated.  For now, I would recommend gastrointestinal workup, when possible to prevent further cerebrovascular accident, would recommend to reinstitute anticoagulation, but possibly an alternative to warfarin secondary to elevation of INR, unclear if the patient needs to be on antiplatelets as well.  If the patient cannot have strong anticoagulation, then we will recommend at least a baby aspirin if possible.  For history of seizure prophylaxis, continue the patient on his Keppra.  For history of diabetes, strict control of blood sugars.  For hyperlipidemia, continue the patient on statin.  GI noted suspect severe gi bleed in setting of elevated INR AC as per medical team     Spoke with daughter, Jeffrey, at bedside 10/9 .  She does understand my reasoning and thought process.  Her telephone number is [phone].     49 minutes of time was spent with the patient, plan of care, reviewing data, with greater than 50% of the visit was spent counseling and/or coordinating care with multidisciplinary healthcare team Neurology follow up note    ARNIE [id number]      Interval History:    Patient seen with daughter     Allergies    No Known Allergies    Intolerances        MEDICATIONS    albuterol/ipratropium for Nebulization 3 milliLiter(s) Nebulizer every 8 hours  atorvastatin 40 milliGRAM(s) Oral at bedtime  dextrose 5%. 1000 milliLiter(s) IV Continuous <Continuous>  dextrose 5%. 1000 milliLiter(s) IV Continuous <Continuous>  dextrose 50% Injectable 25 Gram(s) IV Push once  dextrose 50% Injectable 25 Gram(s) IV Push once  dextrose 50% Injectable 12.5 Gram(s) IV Push once  dextrose Oral Gel 15 Gram(s) Oral once PRN  diltiazem    milliGRAM(s) Oral daily  furosemide    Tablet 40 milliGRAM(s) Oral daily  glucagon  Injectable 1 milliGRAM(s) IntraMuscular once  guaiFENesin Oral Liquid (Sugar-Free) 200 milliGRAM(s) Oral every 6 hours PRN  heparin   Injectable 5000 Unit(s) IV Push every 6 hours PRN  heparin   Injectable 2500 Unit(s) IV Push every 6 hours PRN  heparin  Infusion.  Unit(s)/Hr IV Continuous <Continuous>  influenza  Vaccine (HIGH DOSE) 0.7 milliLiter(s) IntraMuscular once  insulin glargine Injectable (LANTUS) 20 Unit(s) SubCutaneous at bedtime  insulin lispro (ADMELOG) corrective regimen sliding scale   SubCutaneous at bedtime  insulin lispro (ADMELOG) corrective regimen sliding scale   SubCutaneous three times a day before meals  insulin lispro Injectable (ADMELOG) 4 Unit(s) SubCutaneous three times a day before meals  levETIRAcetam 750 milliGRAM(s) Oral two times a day  pantoprazole  Injectable 40 milliGRAM(s) IV Push every 12 hours  piperacillin/tazobactam IVPB.. 3.375 Gram(s) IV Intermittent every 8 hours  sodium chloride 1 Gram(s) Oral two times a day  sucralfate 1 Gram(s) Oral four times a day  urea Oral Powder 15 Gram(s) Oral daily              Vital Signs Last 24 Hrs  T(C): 36.4 (10 Oct 2023 05:00), Max: 36.8 (09 Oct 2023 12:22)  T(F): 97.5 (10 Oct 2023 05:00), Max: 98.2 (09 Oct 2023 12:22)  HR: 99 (10 Oct 2023 05:00) (77 - 104)  BP: 119/76 (10 Oct 2023 05:00) (102/62 - 132/68)  BP(mean): --  RR: 20 (10 Oct 2023 05:00) (20 - 20)  SpO2: 98% (10 Oct 2023 05:00) (90% - 98%)    Parameters below as of 10 Oct 2023 05:00  Patient On (Oxygen Delivery Method): nasal cannula  O2 Flow (L/min): 2      REVIEW OF SYSTEMS:  Completely limited secondary to the patient repeats words over, has severe expressive aphasia, but had been in his normal state of health as per daughter.    PHYSICAL EXAMINATION:  HEENT:  Head:  Normocephalic, atraumatic.  Eyes:  No scleral icterus.  Ears:  Hard to evaluate secondary to he could not answer questions accordingly but appears to follow commands.  NECK:  Supple.  RESPIRATORY:  Air entry bilaterally.  CARDIOVASCULAR:  S1 and S2 heard.  ABDOMEN:  Soft and nontender.  EXTREMITIES:  Positive discoloration was noted on the left toe.     NEUROLOGIC:  The patient was awake, alert.  On-off blink to visual threat.  Positive expressive aphasia.  Will say a few words but repeat the same words over.  Right upper and right lower were 0/5 with increased tone.  Right hemiplegia is his baseline.  Left upper and left lower were 4/5.      LABS:  CBC Full  -  ( 09 Oct 2023 07:40 )  WBC Count : 7.58 K/uL  RBC Count : 2.87 M/uL  Hemoglobin : 7.9 g/dL  Hematocrit : 24.2 %  Platelet Count - Automated : 195 K/uL  Mean Cell Volume : 84.3 fl  Mean Cell Hemoglobin : 27.5 pg  Mean Cell Hemoglobin Concentration : 32.6 gm/dL  Auto Neutrophil # : x  Auto Lymphocyte # : x  Auto Monocyte # : x  Auto Eosinophil # : x  Auto Basophil # : x  Auto Neutrophil % : x  Auto Lymphocyte % : x  Auto Monocyte % : x  Auto Eosinophil % : x  Auto Basophil % : x    Urinalysis Basic - ( 09 Oct 2023 07:40 )    Color: x / Appearance: x / SG: x / pH: x  Gluc: 236 mg/dL / Ketone: x  / Bili: x / Urobili: x   Blood: x / Protein: x / Nitrite: x   Leuk Esterase: x / RBC: x / WBC x   Sq Epi: x / Non Sq Epi: x / Bacteria: x      10-09    135  |  100  |  46<H>  ----------------------------<  236<H>  3.3<L>   |  27  |  1.70<H>    Ca    8.6      09 Oct 2023 07:40    TPro  6.3  /  Alb  2.5<L>  /  TBili  1.0  /  DBili  x   /  AST  21  /  ALT  21  /  AlkPhos  316<H>  10-09    Hemoglobin A1C:     LIVER FUNCTIONS - ( 09 Oct 2023 07:40 )  Alb: 2.5 g/dL / Pro: 6.3 g/dL / ALK PHOS: 316 U/L / ALT: 21 U/L / AST: 21 U/L / GGT: x           Vitamin B12   PTT - ( 09 Oct 2023 07:40 )  PTT:103.1 sec      RADIOLOGY      ANALYSIS AND PLAN:  This is a 68-year-old with a history of cerebrovascular accident, atrial fibrillation, now with possibly gastrointestinal bleed.  For history of cerebrovascular accident and atrial fibrillation, the patient is on multiple blood thinning medications.  The patient's warfarin INR is significantly elevated.  For now, I would recommend gastrointestinal workup, when possible to prevent further cerebrovascular accident, would recommend to reinstitute anticoagulation, but possibly an alternative to warfarin secondary to elevation of INR, unclear if the patient needs to be on antiplatelets as well.  If the patient cannot have strong anticoagulation, then we will recommend at least a baby aspirin if possible.  For history of seizure prophylaxis, continue the patient on his Keppra.  For history of diabetes, strict control of blood sugars.  For hyperlipidemia, continue the patient on statin.  GI noted suspect severe gi bleed in setting of elevated INR AC as per medical team     Spoke with daughter, Jeffrey, at bedside 10/10 .  She does understand my reasoning and thought process.  Her telephone number is [phone].     49 minutes of time was spent with the patient, plan of care, reviewing data, with greater than 50% of the visit was spent counseling and/or coordinating care with multidisciplinary healthcare team clears

## 2023-10-11 NOTE — PROGRESS NOTE ADULT - SUBJECTIVE AND OBJECTIVE BOX
[INTERVAL HX: ]  Patient seen and examined;  Chart reviewed and events noted;     dtr at bedside  Reports dark stools Sat nd Sun.   non today so far.     [MEDICATIONS]  MEDICATIONS  (STANDING):  acetaminophen     Tablet .. 325 milliGRAM(s) Oral once  albuterol/ipratropium for Nebulization 3 milliLiter(s) Nebulizer every 8 hours  atorvastatin 40 milliGRAM(s) Oral at bedtime  ceFAZolin   IVPB 2000 milliGRAM(s) IV Intermittent every 8 hours  dextrose 5%. 1000 milliLiter(s) (50 mL/Hr) IV Continuous <Continuous>  dextrose 5%. 1000 milliLiter(s) (100 mL/Hr) IV Continuous <Continuous>  dextrose 50% Injectable 25 Gram(s) IV Push once  dextrose 50% Injectable 12.5 Gram(s) IV Push once  dextrose 50% Injectable 25 Gram(s) IV Push once  diltiazem    milliGRAM(s) Oral daily  diphenhydrAMINE Elixir 12 milliGRAM(s) Oral daily  furosemide    Tablet 40 milliGRAM(s) Oral daily  glucagon  Injectable 1 milliGRAM(s) IntraMuscular once  heparin  Infusion.  Unit(s)/Hr (11 mL/Hr) IV Continuous <Continuous>  influenza  Vaccine (HIGH DOSE) 0.7 milliLiter(s) IntraMuscular once  insulin glargine Injectable (LANTUS) 20 Unit(s) SubCutaneous at bedtime  insulin lispro (ADMELOG) corrective regimen sliding scale   SubCutaneous three times a day before meals  insulin lispro (ADMELOG) corrective regimen sliding scale   SubCutaneous at bedtime  insulin lispro Injectable (ADMELOG) 7 Unit(s) SubCutaneous three times a day before meals  iron sucrose Injectable 100 milliGRAM(s) IV Push every 24 hours  levETIRAcetam 750 milliGRAM(s) Oral two times a day  pantoprazole  Injectable 40 milliGRAM(s) IV Push every 12 hours  sodium chloride 1 Gram(s) Oral two times a day  sucralfate 1 Gram(s) Oral four times a day  urea Oral Powder 15 Gram(s) Oral daily    MEDICATIONS  (PRN):  dextrose Oral Gel 15 Gram(s) Oral once PRN Blood Glucose LESS THAN 70 milliGRAM(s)/deciliter  guaiFENesin Oral Liquid (Sugar-Free) 200 milliGRAM(s) Oral every 6 hours PRN Cough  heparin   Injectable 5000 Unit(s) IV Push every 6 hours PRN For aPTT less than 40  heparin   Injectable 2500 Unit(s) IV Push every 6 hours PRN For aPTT between 40 - 57  ondansetron Injectable 4 milliGRAM(s) IV Push every 6 hours PRN Nausea and/or Vomiting      [VITALS]  Vital Signs Last 24 Hrs  T(C): 36.5 (11 Oct 2023 11:05), Max: 37.2 (11 Oct 2023 04:11)  T(F): 97.7 (11 Oct 2023 11:05), Max: 98.9 (11 Oct 2023 04:11)  HR: 80 (11 Oct 2023 16:24) (77 - 90)  BP: 130/69 (11 Oct 2023 11:05) (113/69 - 130/69)  BP(mean): --  RR: 17 (11 Oct 2023 11:05) (17 - 18)  SpO2: 99% (11 Oct 2023 16:24) (97% - 99%)    Parameters below as of 11 Oct 2023 16:24  Patient On (Oxygen Delivery Method): nasal cannula      [WT/HT]  Daily     Daily Weight in k (11 Oct 2023 04:11)  [VENT]      [PHYSICAL EXAM]  GEN: NAD  HEENT: normocephalic and atraumatic. EOMI. PERRL.    NECK: Supple.  No lymphadenopathy   LUNGS: Clear to auscultation.  HEART: Regular rate and rhythm,  no MRG  ABDOMEN: Soft, nontender, and nondistended.  Positive bowel sounds.    : No CVA tenderness  EXTREMITIES: Without edema.  NEUROLOGIC: grossly intact.  PSYCHIATRIC: Appropriate affect .  SKIN: No rash     [LABS:]                        7.4    7.86  )-----------( 219      ( 11 Oct 2023 07:22 )             22.8     10-    133<L>  |  99  |  46<H>  ----------------------------<  242<H>  3.3<L>   |  27  |  1.40<H>    Ca    8.8      11 Oct 2023 07:22      PTT - ( 11 Oct 2023 07:22 )  PTT:74.8 sec      Sedimentation Rate, Erythrocyte: 79 mm/hr *H* [0 - 20] (23 @ 20:06)      Urinalysis Basic - ( 11 Oct 2023 07:22 )    Color: x / Appearance: x / SG: x / pH: x  Gluc: 242 mg/dL / Ketone: x  / Bili: x / Urobili: x   Blood: x / Protein: x / Nitrite: x   Leuk Esterase: x / RBC: x / WBC x   Sq Epi: x / Non Sq Epi: x / Bacteria: x        COVID-19 PCR: NotDetec (2023 20:06)          [RADIOLOGY STUDIES:]

## 2023-10-11 NOTE — PROGRESS NOTE ADULT - ASSESSMENT
[ASSESSMENT and  PLAN]    K92.2         GIB  D62           Anemia hemorrhage  D50.0        Fe def  Q27. 33    AVM Colon  K74. 60    Cirrhosis liver  D68.32     Coagulopathy due to coumadin  D63.8       Anemia due to chronic disease    67yo [place] M with hx of Fe def anemia, GIB, on outpt IV iron.   Known to me from office.   When well, Hgb 11g/dL with treatmetn  Recent decline in Hgb post GIB events.   Had bleed in last month, s/p eval at [facility]. Colonic AVM s/p APC  seen in office yest, Thurs, and present with Hgb 5 g/dL  Sent to ER due to low Hgb and recent BRBPR, recent elevated IN R5.2    s/p PRBC txfusion  Hgb better post transfusion.     Slow decline since last txfusion.         RECOMMENDATIONS    GIB  Follow CBC, Hgb trending down, may need txfusion    Txfuse 1U PRBC today    Transfuse PRBC as clinically indicated.   Transfuse PRBC if Hgb <7.0 or if symptomatic.   GI following.   revisit if EGD warranted, as Hgb continues to decline.     Fe Def  09/14/23 Ferritin 36, sat 7% on   Administer IV venofer.     Cirrhosis    Cardiac Valve  AC on hold due to bleeding today  Await cardio and GI decision on  when OK to reattempt AC.     DVT Prophylaxis  Contraindicated.  SCD       Discussed plan of care with family.   Opportunity given for questions and discussion.   Questions or concerns all addressed and answered to their satisfaction, and in lay terms.     Follow in office post DC in 1wk    Thank you for consulting us.

## 2023-10-11 NOTE — PROGRESS NOTE ADULT - SUBJECTIVE AND OBJECTIVE BOX
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 Per daughter at bedside, patient's INR level was 7.2 two days ago and was instructed by hematologist to hold coumadin for the past 2 days. Of note, patient had EGD and colonoscopy in 08/2023 revealing gastritis, duodenitis, non-bleeding AVM, and two polyps, one removed (pathology revealing tubular adenoma). Patient was afebrile and HDS on arrival with SBPs 90s and HR 80s. Labs notable for Hgb 5.9, INR 7.01, BUN/Cr 64/1.80, Alk Phos 418. ED ordered for Kcentra, Vit K, and 2U PRBCs. (05 Oct 2023 15:48)      REVIEW OF SYSTEMS:  All other review of systems is negative unless indicated above      MEDICATIONS  (STANDING):  albuterol/ipratropium for Nebulization 3 milliLiter(s) Nebulizer every 8 hours  atorvastatin 40 milliGRAM(s) Oral at bedtime  dextrose 5%. 1000 milliLiter(s) (50 mL/Hr) IV Continuous <Continuous>  dextrose 5%. 1000 milliLiter(s) (100 mL/Hr) IV Continuous <Continuous>  dextrose 50% Injectable 25 Gram(s) IV Push once  dextrose 50% Injectable 25 Gram(s) IV Push once  dextrose 50% Injectable 12.5 Gram(s) IV Push once  diltiazem    milliGRAM(s) Oral daily  furosemide    Tablet 40 milliGRAM(s) Oral daily  glucagon  Injectable 1 milliGRAM(s) IntraMuscular once  heparin  Infusion.  Unit(s)/Hr (11 mL/Hr) IV Continuous <Continuous>  influenza  Vaccine (HIGH DOSE) 0.7 milliLiter(s) IntraMuscular once  insulin glargine Injectable (LANTUS) 20 Unit(s) SubCutaneous at bedtime  insulin lispro (ADMELOG) corrective regimen sliding scale   SubCutaneous at bedtime  insulin lispro (ADMELOG) corrective regimen sliding scale   SubCutaneous three times a day before meals  insulin lispro Injectable (ADMELOG) 7 Unit(s) SubCutaneous three times a day before meals  levETIRAcetam 750 milliGRAM(s) Oral two times a day  pantoprazole  Injectable 40 milliGRAM(s) IV Push every 12 hours  piperacillin/tazobactam IVPB.. 3.375 Gram(s) IV Intermittent every 8 hours  potassium chloride  10 mEq/100 mL IVPB 10 milliEquivalent(s) IV Intermittent every 1 hour  sodium chloride 1 Gram(s) Oral two times a day  sucralfate 1 Gram(s) Oral four times a day  urea Oral Powder 15 Gram(s) Oral daily    MEDICATIONS  (PRN):  dextrose Oral Gel 15 Gram(s) Oral once PRN Blood Glucose LESS THAN 70 milliGRAM(s)/deciliter  guaiFENesin Oral Liquid (Sugar-Free) 200 milliGRAM(s) Oral every 6 hours PRN Cough  heparin   Injectable 2500 Unit(s) IV Push every 6 hours PRN For aPTT between 40 - 57  heparin   Injectable 5000 Unit(s) IV Push every 6 hours PRN For aPTT less than 40      ALLERGIES: No Known Allergies      FAMILY HISTORY:  FH: coronary artery disease (Sibling)    FH: type 2 diabetes (Sibling)        PHYSICAL EXAMINATION:  -----------------------------  T(C): 37.2 (10-11-23 @ 04:11), Max: 37.2 (10-11-23 @ 04:11)  HR: 86 (10-11-23 @ 08:02) (77 - 95)  BP: 125/73 (10-11-23 @ 04:57) (113/69 - 125/73)  RR: 18 (10-11-23 @ 04:11) (18 - 18)  SpO2: 99% (10-11-23 @ 08:02) (97% - 99%)  Wt(kg): --        PHYSICAL EXAM:    Constitutional: NAD, awake and alert, well-developed  Eyes:  EOMI,  Pupils round, No oral cyanosis.  HEENT: No exudate or erythema  Pulmonary: Non-labored, breath sounds are clear bilaterally, No wheezing, rales or rhonchi  Cardiovascular: Regular, S1 and S2, No murmurs  Gastrointestinal: Bowel Sounds present, soft, nontender.   Ext: No significant LE edema  Neurological: Alert, no gross focal motor deficits  Skin: No rashes.  Psych:  Mood & affect appropriate    LABS: All Labs Reviewed:          LABS:   --------  10-11    133<L>  |  99  |  46<H>  ----------------------------<  242<H>  3.3<L>   |  27  |  1.40<H>    Ca    8.8      11 Oct 2023 07:22                           7.4    7.86  )-----------( 219      ( 11 Oct 2023 07:22 )             22.8     PTT - ( 11 Oct 2023 07:22 )  PTT:74.8 sec

## 2023-10-12 NOTE — PROGRESS NOTE ADULT - SUBJECTIVE AND OBJECTIVE BOX
Patient is a 68y old  Male who presents with a chief complaint of black stool (12 Oct 2023 11:23)    Date of servie : 10-12-23 @ 12:00  INTERVAL HPI/OVERNIGHT EVENTS:  T(C): 36.5 (10-12-23 @ 11:30), Max: 36.6 (10-12-23 @ 05:13)  HR: 92 (10-12-23 @ 11:30) (80 - 92)  BP: 140/72 (10-12-23 @ 11:30) (111/70 - 140/72)  RR: 17 (10-12-23 @ 11:30) (17 - 19)  SpO2: 92% (10-12-23 @ 11:30) (92% - 99%)  Wt(kg): --  I&O's Summary    11 Oct 2023 07:01  -  12 Oct 2023 07:00  --------------------------------------------------------  IN: 550 mL / OUT: 700 mL / NET: -150 mL        LABS:                        9.3    6.58  )-----------( 252      ( 12 Oct 2023 07:30 )             28.7     10-11    133<L>  |  99  |  46<H>  ----------------------------<  242<H>  3.3<L>   |  27  |  1.40<H>    Ca    8.8      11 Oct 2023 07:22      PTT - ( 12 Oct 2023 07:30 )  PTT:69.9 sec  Urinalysis Basic - ( 11 Oct 2023 07:22 )    Color: x / Appearance: x / SG: x / pH: x  Gluc: 242 mg/dL / Ketone: x  / Bili: x / Urobili: x   Blood: x / Protein: x / Nitrite: x   Leuk Esterase: x / RBC: x / WBC x   Sq Epi: x / Non Sq Epi: x / Bacteria: x      CAPILLARY BLOOD GLUCOSE      POCT Blood Glucose.: 75 mg/dL (12 Oct 2023 08:25)  POCT Blood Glucose.: 131 mg/dL (11 Oct 2023 20:51)  POCT Blood Glucose.: 96 mg/dL (11 Oct 2023 17:08)  POCT Blood Glucose.: 372 mg/dL (11 Oct 2023 12:17)        Urinalysis Basic - ( 11 Oct 2023 07:22 )    Color: x / Appearance: x / SG: x / pH: x  Gluc: 242 mg/dL / Ketone: x  / Bili: x / Urobili: x   Blood: x / Protein: x / Nitrite: x   Leuk Esterase: x / RBC: x / WBC x   Sq Epi: x / Non Sq Epi: x / Bacteria: x        MEDICATIONS  (STANDING):  albuterol/ipratropium for Nebulization 3 milliLiter(s) Nebulizer every 8 hours  atorvastatin 40 milliGRAM(s) Oral at bedtime  ceFAZolin   IVPB 2000 milliGRAM(s) IV Intermittent every 8 hours  dextrose 5%. 1000 milliLiter(s) (50 mL/Hr) IV Continuous <Continuous>  dextrose 5%. 1000 milliLiter(s) (100 mL/Hr) IV Continuous <Continuous>  dextrose 50% Injectable 25 Gram(s) IV Push once  dextrose 50% Injectable 25 Gram(s) IV Push once  dextrose 50% Injectable 12.5 Gram(s) IV Push once  diltiazem    milliGRAM(s) Oral daily  furosemide    Tablet 40 milliGRAM(s) Oral daily  glucagon  Injectable 1 milliGRAM(s) IntraMuscular once  heparin  Infusion.  Unit(s)/Hr (11 mL/Hr) IV Continuous <Continuous>  influenza  Vaccine (HIGH DOSE) 0.7 milliLiter(s) IntraMuscular once  insulin glargine Injectable (LANTUS) 20 Unit(s) SubCutaneous at bedtime  insulin lispro (ADMELOG) corrective regimen sliding scale   SubCutaneous at bedtime  insulin lispro (ADMELOG) corrective regimen sliding scale   SubCutaneous three times a day before meals  insulin lispro Injectable (ADMELOG) 7 Unit(s) SubCutaneous three times a day before meals  iron sucrose Injectable 100 milliGRAM(s) IV Push every 24 hours  levETIRAcetam 750 milliGRAM(s) Oral two times a day  pantoprazole  Injectable 40 milliGRAM(s) IV Push every 12 hours  sodium chloride 1 Gram(s) Oral two times a day  sucralfate 1 Gram(s) Oral four times a day  urea Oral Powder 15 Gram(s) Oral daily  warfarin 5 milliGRAM(s) Oral once    MEDICATIONS  (PRN):  dextrose Oral Gel 15 Gram(s) Oral once PRN Blood Glucose LESS THAN 70 milliGRAM(s)/deciliter  guaiFENesin Oral Liquid (Sugar-Free) 200 milliGRAM(s) Oral every 6 hours PRN Cough  heparin   Injectable 5000 Unit(s) IV Push every 6 hours PRN For aPTT less than 40  heparin   Injectable 2500 Unit(s) IV Push every 6 hours PRN For aPTT between 40 - 57  ondansetron Injectable 4 milliGRAM(s) IV Push every 6 hours PRN Nausea and/or Vomiting          PHYSICAL EXAM:  GENERAL: NAD, well-groomed, well-developed  HEAD:  Atraumatic, Normocephalic  CHEST/LUNG: Clear to percussion bilaterally; No rales, rhonchi, wheezing, or rubs  HEART: Regular rate and rhythm; No murmurs, rubs, or gallops  ABDOMEN: Soft, Nontender, Nondistended; Bowel sounds present  EXTREMITIES:  2+ Peripheral Pulses, No clubbing, cyanosis, or edema  LYMPH: No lymphadenopathy noted  SKIN: No rashes or lesions    Care Discussed with Consultants/Other Providers [ ] YES  [ ] NO

## 2023-10-12 NOTE — DIETITIAN INITIAL EVALUATION ADULT - PERTINENT MEDS FT
MEDICATIONS  (STANDING):  albuterol/ipratropium for Nebulization 3 milliLiter(s) Nebulizer every 8 hours  atorvastatin 40 milliGRAM(s) Oral at bedtime  ceFAZolin   IVPB 2000 milliGRAM(s) IV Intermittent every 8 hours  dextrose 5%. 1000 milliLiter(s) (50 mL/Hr) IV Continuous <Continuous>  dextrose 5%. 1000 milliLiter(s) (100 mL/Hr) IV Continuous <Continuous>  dextrose 50% Injectable 25 Gram(s) IV Push once  dextrose 50% Injectable 12.5 Gram(s) IV Push once  dextrose 50% Injectable 25 Gram(s) IV Push once  diltiazem    milliGRAM(s) Oral daily  furosemide    Tablet 40 milliGRAM(s) Oral daily  glucagon  Injectable 1 milliGRAM(s) IntraMuscular once  heparin  Infusion.  Unit(s)/Hr (11 mL/Hr) IV Continuous <Continuous>  influenza  Vaccine (HIGH DOSE) 0.7 milliLiter(s) IntraMuscular once  insulin glargine Injectable (LANTUS) 20 Unit(s) SubCutaneous at bedtime  insulin lispro (ADMELOG) corrective regimen sliding scale   SubCutaneous at bedtime  insulin lispro (ADMELOG) corrective regimen sliding scale   SubCutaneous three times a day before meals  insulin lispro Injectable (ADMELOG) 7 Unit(s) SubCutaneous three times a day before meals  iron sucrose Injectable 100 milliGRAM(s) IV Push every 24 hours  levETIRAcetam 750 milliGRAM(s) Oral two times a day  pantoprazole  Injectable 40 milliGRAM(s) IV Push every 12 hours  sodium chloride 1 Gram(s) Oral two times a day  sucralfate 1 Gram(s) Oral four times a day  urea Oral Powder 15 Gram(s) Oral daily  warfarin 5 milliGRAM(s) Oral once    MEDICATIONS  (PRN):  dextrose Oral Gel 15 Gram(s) Oral once PRN Blood Glucose LESS THAN 70 milliGRAM(s)/deciliter  guaiFENesin Oral Liquid (Sugar-Free) 200 milliGRAM(s) Oral every 6 hours PRN Cough  heparin   Injectable 2500 Unit(s) IV Push every 6 hours PRN For aPTT between 40 - 57  heparin   Injectable 5000 Unit(s) IV Push every 6 hours PRN For aPTT less than 40  ondansetron Injectable 4 milliGRAM(s) IV Push every 6 hours PRN Nausea and/or Vomiting

## 2023-10-12 NOTE — PROGRESS NOTE ADULT - SUBJECTIVE AND OBJECTIVE BOX
Date/Time Patient Seen:  		  Referring MD:   Data Reviewed	       Patient is a 68y old  Male who presents with a chief complaint of black stool (11 Oct 2023 18:53)      Subjective/HPI     PAST MEDICAL & SURGICAL HISTORY:  CVA (cerebral vascular accident)    HTN (hypertension)    DM (diabetes mellitus)    Arrhythmia    History of atrial fibrillation    Right hemiparesis    Aphasia due to acute stroke    GI bleed    Iron deficiency anemia    Upper GI bleed    Osteomyelitis    S/P CABG (coronary artery bypass graft)    S/P brain surgery    History of aortic valve repair          Medication list         MEDICATIONS  (STANDING):  albuterol/ipratropium for Nebulization 3 milliLiter(s) Nebulizer every 8 hours  atorvastatin 40 milliGRAM(s) Oral at bedtime  ceFAZolin   IVPB 2000 milliGRAM(s) IV Intermittent every 8 hours  dextrose 5%. 1000 milliLiter(s) (50 mL/Hr) IV Continuous <Continuous>  dextrose 5%. 1000 milliLiter(s) (100 mL/Hr) IV Continuous <Continuous>  dextrose 50% Injectable 25 Gram(s) IV Push once  dextrose 50% Injectable 12.5 Gram(s) IV Push once  dextrose 50% Injectable 25 Gram(s) IV Push once  diltiazem    milliGRAM(s) Oral daily  furosemide    Tablet 40 milliGRAM(s) Oral daily  glucagon  Injectable 1 milliGRAM(s) IntraMuscular once  heparin  Infusion.  Unit(s)/Hr (11 mL/Hr) IV Continuous <Continuous>  influenza  Vaccine (HIGH DOSE) 0.7 milliLiter(s) IntraMuscular once  insulin glargine Injectable (LANTUS) 20 Unit(s) SubCutaneous at bedtime  insulin lispro (ADMELOG) corrective regimen sliding scale   SubCutaneous three times a day before meals  insulin lispro (ADMELOG) corrective regimen sliding scale   SubCutaneous at bedtime  insulin lispro Injectable (ADMELOG) 7 Unit(s) SubCutaneous three times a day before meals  iron sucrose Injectable 100 milliGRAM(s) IV Push every 24 hours  levETIRAcetam 750 milliGRAM(s) Oral two times a day  pantoprazole  Injectable 40 milliGRAM(s) IV Push every 12 hours  sodium chloride 1 Gram(s) Oral two times a day  sucralfate 1 Gram(s) Oral four times a day  urea Oral Powder 15 Gram(s) Oral daily    MEDICATIONS  (PRN):  dextrose Oral Gel 15 Gram(s) Oral once PRN Blood Glucose LESS THAN 70 milliGRAM(s)/deciliter  guaiFENesin Oral Liquid (Sugar-Free) 200 milliGRAM(s) Oral every 6 hours PRN Cough  heparin   Injectable 5000 Unit(s) IV Push every 6 hours PRN For aPTT less than 40  heparin   Injectable 2500 Unit(s) IV Push every 6 hours PRN For aPTT between 40 - 57  ondansetron Injectable 4 milliGRAM(s) IV Push every 6 hours PRN Nausea and/or Vomiting         Vitals log        ICU Vital Signs Last 24 Hrs  T(C): 36.6 (12 Oct 2023 05:13), Max: 36.6 (12 Oct 2023 05:13)  T(F): 97.8 (12 Oct 2023 05:13), Max: 97.8 (12 Oct 2023 05:13)  HR: 85 (12 Oct 2023 05:13) (80 - 92)  BP: 127/66 (12 Oct 2023 05:13) (111/70 - 130/79)  BP(mean): --  ABP: --  ABP(mean): --  RR: 17 (12 Oct 2023 05:13) (17 - 19)  SpO2: 96% (12 Oct 2023 05:13) (96% - 99%)    O2 Parameters below as of 12 Oct 2023 05:13  Patient On (Oxygen Delivery Method): nasal cannula                 Input and Output:  I&O's Detail    11 Oct 2023 07:01  -  12 Oct 2023 05:43  --------------------------------------------------------  IN:    Heparin Infusion: 80 mL    PRBCs (Packed Red Blood Cells): 338 mL  Total IN: 418 mL    OUT:    Voided (mL): 700 mL  Total OUT: 700 mL    Total NET: -282 mL          Lab Data                        7.4    7.86  )-----------( 219      ( 11 Oct 2023 07:22 )             22.8     10-11    133<L>  |  99  |  46<H>  ----------------------------<  242<H>  3.3<L>   |  27  |  1.40<H>    Ca    8.8      11 Oct 2023 07:22              Review of Systems	      Objective     Physical Examination    heart s1s2  lung dc BS      Pertinent Lab findings & Imaging      Bryan:  NO   Adequate UO     I&O's Detail    11 Oct 2023 07:01  -  12 Oct 2023 05:43  --------------------------------------------------------  IN:    Heparin Infusion: 80 mL    PRBCs (Packed Red Blood Cells): 338 mL  Total IN: 418 mL    OUT:    Voided (mL): 700 mL  Total OUT: 700 mL    Total NET: -282 mL               Discussed with:     Cultures:	        Radiology

## 2023-10-12 NOTE — CHART NOTE - NSCHARTNOTEFT_GEN_A_CORE
Called by RN as patient hypoglycemic blood glu 66. Patient came in hyperglycemic initially. Patient being given orange juice/ hypoglycemia order sets available for activation as appropriate. Will decrease lantus from 15u to 5u tonight given labile blood glucose readings now with hypoglycemia. Further insulin management per day /endocrinology teams.

## 2023-10-12 NOTE — DIETITIAN INITIAL EVALUATION ADULT - OTHER INFO
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 Per conversation with pt's daughter, appetite and PO intake have been good PTA, eats 3 full meals a day. Since admission, appetite and PO intake have been fair, eating ~50% of his meals. Pt follows a DM diet @ home. Last admission 6/8/23 pt's weight was 130#. CBW documented 143.7#. Daughter expressed that pt has problems chewing/swallowing hard breads and meats/poultry. Seen by SLP 10/10; recommending soft and bite-sized with thin liquids. No complaints of N/V/D/C. Last BM noted last night 10/11. Obtained food preferences for pt from daughter; enjoys fish, pancakes and soft rolls. Does not eat poultry, beef or pork. Pt does not currently take any supplements @ home but has had Glucerna in the past and is agreeable to try this again. A1c during last visit was 9.4%; current A1c obtained 7.6%. Daughter was previously educated on DM diet; reinforced education to daughter and provided with written materials of Plate Method for Diabetes. Daughter asked about Coumadin interactions with vit K; education reinforced. Daughter of good understanding and appreciative of education; RD to follow up.

## 2023-10-12 NOTE — CONSULT NOTE ADULT - SUBJECTIVE AND OBJECTIVE BOX
Patient is a 68y old  Male who presents with a chief complaint of Gastrointestinal hemorrhage     (12 Oct 2023 13:18)    Type:2 DX >10 years. a1c 7.6%- patient is known to me. Family very involved in patient care- spoke with dtr at the bedside- home management easier now with the CGM iram 2. home rx: lantus 40 units HS and lispro 15 units AC x3- has all insulin and supplies-denies needs. diabetes education provided      HPI:  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, seizure disorder, prior CABG, DM2, AFib, Mechanical AV replacement in 2007 (on Warfarin) presenting to [facility] ED per recommendation of hematologist for low Hgb to 5.2 with associated black stools for the past few days, last being yesterday. Per daughter at bedside, patient's INR level was 7.2 two days ago and was instructed by hematologist to hold coumadin for the past 2 days. Of note, patient had EGD and colonoscopy in 08/2023 revealing gastritis, duodenitis, non-bleeding AVM, and two polyps, one removed (pathology revealing tubular adenoma). Patient was afebrile and HDS on arrival with SBPs 90s and HR 80s. Labs notable for Hgb 5.9, INR 7.01, BUN/Cr 64/1.80, Alk Phos 418. ED ordered for Kcentra, Vit K, and 2U PRBCs. (05 Oct 2023 15:48)      PAST MEDICAL & SURGICAL HISTORY:  CVA (cerebral vascular accident)      HTN (hypertension)      DM (diabetes mellitus)      History of atrial fibrillation      Right hemiparesis      Aphasia due to acute stroke      Upper GI bleed      Osteomyelitis      S/P CABG (coronary artery bypass graft)      S/P brain surgery      History of aortic valve repair          Allergies    No Known Allergies    Intolerances        MEDICATIONS  (STANDING):  albuterol/ipratropium for Nebulization 3 milliLiter(s) Nebulizer every 8 hours  atorvastatin 40 milliGRAM(s) Oral at bedtime  ceFAZolin   IVPB 2000 milliGRAM(s) IV Intermittent every 8 hours  dextrose 5%. 1000 milliLiter(s) (50 mL/Hr) IV Continuous <Continuous>  dextrose 5%. 1000 milliLiter(s) (100 mL/Hr) IV Continuous <Continuous>  dextrose 50% Injectable 25 Gram(s) IV Push once  dextrose 50% Injectable 12.5 Gram(s) IV Push once  dextrose 50% Injectable 25 Gram(s) IV Push once  diltiazem    milliGRAM(s) Oral daily  furosemide    Tablet 40 milliGRAM(s) Oral daily  glucagon  Injectable 1 milliGRAM(s) IntraMuscular once  heparin  Infusion.  Unit(s)/Hr (11 mL/Hr) IV Continuous <Continuous>  influenza  Vaccine (HIGH DOSE) 0.7 milliLiter(s) IntraMuscular once  insulin glargine Injectable (LANTUS) 15 Unit(s) SubCutaneous at bedtime  insulin lispro (ADMELOG) corrective regimen sliding scale   SubCutaneous three times a day before meals  insulin lispro (ADMELOG) corrective regimen sliding scale   SubCutaneous at bedtime  insulin lispro Injectable (ADMELOG) 7 Unit(s) SubCutaneous three times a day before meals  iron sucrose Injectable 100 milliGRAM(s) IV Push every 24 hours  levETIRAcetam 750 milliGRAM(s) Oral two times a day  pantoprazole  Injectable 40 milliGRAM(s) IV Push every 12 hours  sodium chloride 1 Gram(s) Oral two times a day  sucralfate 1 Gram(s) Oral four times a day  urea Oral Powder 15 Gram(s) Oral daily  warfarin 5 milliGRAM(s) Oral once

## 2023-10-12 NOTE — PROGRESS NOTE ADULT - ASSESSMENT
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, seizure disorder, prior CABG, DM2, AFib, Mechanical AV replacement in 2007 (on Warfarin) presenting to [facility] ED per recommendation of hematologist for low Hgb to 5.2 with associated black stools    anemia  mech valve  cva  aphasia  seizure disorder  pleural eff  atelectasis  AF  CAD  CABG hx  DM    ct reviewed  eval for PNA - aspiration - emp ABX - NEBS prn for mucociliary clearance  ID eval noted  on HEP gtt  on LASIX    GI and ICU cx noted  serial hgb  s/p PRBC - Vit K - Kcentra -   I and O  monitor VS and HD  PPI  goal map > 60  cvs rx regimen  goal sat > 88 pct  will follow  GOC discussion

## 2023-10-12 NOTE — PROGRESS NOTE ADULT - SUBJECTIVE AND OBJECTIVE BOX
Interval History:  daughter at bedside ? still with occasional black bowel movement  Chart reviewed and events noted;   Overnight events:    MEDICATIONS  (STANDING):  albuterol/ipratropium for Nebulization 3 milliLiter(s) Nebulizer every 8 hours  atorvastatin 40 milliGRAM(s) Oral at bedtime  ceFAZolin   IVPB 2000 milliGRAM(s) IV Intermittent every 8 hours  dextrose 5%. 1000 milliLiter(s) (50 mL/Hr) IV Continuous <Continuous>  dextrose 5%. 1000 milliLiter(s) (100 mL/Hr) IV Continuous <Continuous>  dextrose 50% Injectable 12.5 Gram(s) IV Push once  dextrose 50% Injectable 25 Gram(s) IV Push once  dextrose 50% Injectable 25 Gram(s) IV Push once  diltiazem    milliGRAM(s) Oral daily  furosemide    Tablet 40 milliGRAM(s) Oral daily  glucagon  Injectable 1 milliGRAM(s) IntraMuscular once  heparin  Infusion.  Unit(s)/Hr (11 mL/Hr) IV Continuous <Continuous>  influenza  Vaccine (HIGH DOSE) 0.7 milliLiter(s) IntraMuscular once  insulin glargine Injectable (LANTUS) 15 Unit(s) SubCutaneous at bedtime  insulin lispro (ADMELOG) corrective regimen sliding scale   SubCutaneous three times a day before meals  insulin lispro (ADMELOG) corrective regimen sliding scale   SubCutaneous at bedtime  insulin lispro Injectable (ADMELOG) 7 Unit(s) SubCutaneous three times a day before meals  iron sucrose Injectable 100 milliGRAM(s) IV Push every 24 hours  levETIRAcetam 750 milliGRAM(s) Oral two times a day  pantoprazole  Injectable 40 milliGRAM(s) IV Push every 12 hours  sodium chloride 1 Gram(s) Oral two times a day  sucralfate 1 Gram(s) Oral four times a day  urea Oral Powder 15 Gram(s) Oral daily  warfarin 5 milliGRAM(s) Oral once    MEDICATIONS  (PRN):  dextrose Oral Gel 15 Gram(s) Oral once PRN Blood Glucose LESS THAN 70 milliGRAM(s)/deciliter  guaiFENesin Oral Liquid (Sugar-Free) 200 milliGRAM(s) Oral every 6 hours PRN Cough  heparin   Injectable 5000 Unit(s) IV Push every 6 hours PRN For aPTT less than 40  heparin   Injectable 2500 Unit(s) IV Push every 6 hours PRN For aPTT between 40 - 57  ondansetron Injectable 4 milliGRAM(s) IV Push every 6 hours PRN Nausea and/or Vomiting      Vital Signs Last 24 Hrs  T(C): 36.4 (12 Oct 2023 20:40), Max: 36.6 (12 Oct 2023 05:13)  T(F): 97.6 (12 Oct 2023 20:40), Max: 97.8 (12 Oct 2023 05:13)  HR: 86 (12 Oct 2023 20:40) (85 - 92)  BP: 121/60 (12 Oct 2023 20:40) (111/70 - 140/72)  BP(mean): --  RR: 18 (12 Oct 2023 20:40) (17 - 19)  SpO2: 97% (12 Oct 2023 20:40) (92% - 98%)    Parameters below as of 12 Oct 2023 20:40  Patient On (Oxygen Delivery Method): nasal cannula        PHYSICAL EXAM  General: adult in NAD  HEENT: clear oropharynx, anicteric sclera, pink conjunctivae  Neck: supple  CV: normal S1S2 with no murmur rubs or gallops  Lungs: clear to auscultation, no wheezes, no rhales  Abdomen: soft non-tender non-distended, no hepato/splenomegaly  Ext: no clubbing cyanosis or edema  Skin: no rashes and no petichiae  Neuro: alert and oriented X3 no focal deficits      LABS:  CBC Full  -  ( 12 Oct 2023 07:30 )  WBC Count : 6.58 K/uL  RBC Count : 3.43 M/uL  Hemoglobin : 9.3 g/dL  Hematocrit : 28.7 %  Platelet Count - Automated : 252 K/uL  Mean Cell Volume : 83.7 fl  Mean Cell Hemoglobin : 27.1 pg  Mean Cell Hemoglobin Concentration : 32.4 gm/dL  Auto Neutrophil # : x  Auto Lymphocyte # : x  Auto Monocyte # : x  Auto Eosinophil # : x  Auto Basophil # : x  Auto Neutrophil % : x  Auto Lymphocyte % : x  Auto Monocyte % : x  Auto Eosinophil % : x  Auto Basophil % : x    10-11    133<L>  |  99  |  46<H>  ----------------------------<  242<H>  3.3<L>   |  27  |  1.40<H>    Ca    8.8      11 Oct 2023 07:22      PT/INR - ( 12 Oct 2023 12:29 )   PT: 13.5 sec;   INR: 1.16 ratio         PTT - ( 12 Oct 2023 12:29 )  PTT:73.4 sec    fe studies      WBC trend  6.58 K/uL (10-12-23 @ 07:30)  7.86 K/uL (10-11-23 @ 07:22)  8.73 K/uL (10-10-23 @ 14:15)  8.58 K/uL (10-10-23 @ 06:50)      Hgb trend  9.3 g/dL (10-12-23 @ 07:30)  7.4 g/dL (10-11-23 @ 07:22)  7.9 g/dL (10-10-23 @ 14:15)  7.4 g/dL (10-10-23 @ 06:50)      plt trend  252 K/uL (10-12-23 @ 07:30)  219 K/uL (10-11-23 @ 07:22)  238 K/uL (10-10-23 @ 14:15)  228 K/uL (10-10-23 @ 06:50)        RADIOLOGY & ADDITIONAL STUDIES:

## 2023-10-12 NOTE — PROGRESS NOTE ADULT - ASSESSMENT
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 Per daughter at bedside, patient's INR level was 7.2 two days ago and was instructed by hematologist to hold coumadin for the past 2 days. Of note, patient had EGD and colonoscopy in 08/2023 revealing gastritis, duodenitis, non-bleeding AVM, and two polyps, one removed (pathology revealing tubular adenoma). Patient was afebrile and HDS on arrival with SBPs 90s and HR 80s. Labs notable for Hgb 5.9, INR 7.01, BUN/Cr 64/1.80, Alk Phos 418. ED ordered for Kcentra, Vit K, and 2U PRBCs. (05 Oct 2023 15:48)    hyponatremia improved urea Oral Powder 15 Gram(s) Oral daily  ,    sodium chloride 1 Gram(s) Oral two times a day  will check ua , urine osmolality , urine sodium , urine uric acid , serum sodium , serum osmolality , serum uric acid , f/u with hyponatremia work up , f/u with bmp , monitor i and o    ACUTE RENAL FAILURE:   Serum creatinine is  improving   There is no progression . No uremic symptoms  No evidence of anemia .  Fluid status stable.  Will continue to avoid nephrotoxic drugs.  Patient remains asymptomatic.   Continue current therapy.  hold  diuretic.  hold   ACE inhibitor.  hold   ARB.  Additional evaluation:   ECG,    echocardiogram,     CXR,  will obtained recent   renal ultrasound to evalaute kidney size and possible stones ,    BP monitoring,continue current antihypertensive meds, low salt diet,followup with PMD in 1-2 weeks  diltiazem    milliGRAM(s) Oral daily KALA:'[id number]',KALA:'[id number]'

## 2023-10-12 NOTE — PROGRESS NOTE ADULT - ASSESSMENT
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 Per daughter at bedside, patient's INR level was 7.2 two days ago and was instructed by hematologist to hold coumadin for the past 2 days. Of note, patient had EGD and colonoscopy in 08/2023 revealing gastritis, duodenitis, non-bleeding AVM, and two polyps, one removed (pathology revealing tubular adenoma). Patient was afebrile and HDS on arrival with SBPs 90s and HR 80s. Labs notable for Hgb 5.9, INR 7.01, BUN/Cr 64/1.80, Alk Phos 418. ED ordered for Kcentra, Vit K, and 2U PRBCs.    1Acute blood loss anemia  - monitor cbc  - GI fu  - management as per GI   - no plan for scope at present  - winifred sec to high INR     2 High INR , mechanical valve   - reversed- hold coumadin   - cw  hep gtt  - monitor cbc   - started coumadin     3 Hx of CVA  - neuro fu    4 DM  - uncontrolled   - monitor FS  - basal, bolus   - endo following     5 SOB , tachycardia   - improved  -VQ scan low probability     6 MSSA bacteremia, pna  - cw abx  - fu repeat cultures  - ID fu     Venodyne     case dw family at bedside

## 2023-10-12 NOTE — CONSULT NOTE ADULT - PROVIDER SPECIALTY LIST ADULT
Cardiology
Heme/Onc
Pulmonology
Critical Care
Gastroenterology
Infectious Disease
Nephrology
Endocrinology
Diabetes

## 2023-10-12 NOTE — DIETITIAN INITIAL EVALUATION ADULT - DIET TYPE
soft and bite-sized/consistent carbohydrate (evening snack) low sodium/soft and bite-sized/consistent carbohydrate (evening snack)

## 2023-10-12 NOTE — PROGRESS NOTE ADULT - ASSESSMENT
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   ID c/s on HD#3 for fever to 102F overnight    Acute Multifocal PNA/AHRF on NC  - fever to 102 overnight, leukocytosis to 18 on 10/7  - CT chest showing multifocal PNA  - S/p zosyn 10/7-10/11; Abx tailored as below    MSSA Bacteremia  - noted Culture - Blood (10.08.23 @ 10:40)-  Methicillin SENSITIVE Staphylococcus aureus (MSSA)  - repeat blood cultures collected 10/10 20:38, NGTD  - c/w Cefazolin 2 grams IV q8 started 10/11    Additional care per primary team    Infectious Diseases will continue to follow. Please call with any questions.   Becca Acosta M.D.  [facility] Division of Infectious Diseases [phone]  For after 5 P.M. and weekends, please call [phone]     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, seizure disorder, prior CABG, DM2, AFib, Mechanical AV replacement in 2007 (on Warfarin) presenting to [facility] ED per recommendation of hematologist for low Hgb to 5.2 with associated black stools for the past few days, last being yesterday.   ID c/s on HD#3 for fever to 102F overnight    Acute Multifocal PNA/AHRF on NC  - fever to 102 overnight, leukocytosis to 18 on 10/7  - CT chest showing multifocal PNA  - S/p zosyn 10/7-10/11; Abx tailored as below    MSSA Bacteremia  - noted Culture - Blood (10.08.23 @ 10:40)-  Methicillin SENSITIVE Staphylococcus aureus (MSSA)  - repeat blood cultures collected 10/10 20:38, NGTD  - c/w Cefazolin 2 grams IV q8 started 10/11    Pt will ultimately need midline and LT IV Abx   Additional care per primary team    D/w Dr. Harmon    Infectious Diseases will continue to follow. Please call with any questions.   Becca Acosta M.D.  [facility] Division of Infectious Diseases [phone]  For after 5 P.M. and weekends, please call [phone]

## 2023-10-12 NOTE — CONSULT NOTE ADULT - CONSULT REASON
Fever
pleural eff  atelectasis  OP  OA  weakness  frailty
anemia/GIB
GIB
hyponatremia
dm2 uncontrolled
K92.2      GIB  D62         Anemia hemorrhage  D50.0       Fe def  Q27. 33    AVM Colon  K74. 60    Cirrhosis liver
Mech AVR
68y A1C with Estimated Average Glucose Result: 7.6 % (10-08-23 @ 06:45)  A1C with Estimated Average Glucose Result: 7.3 % (10-07-23 @ 09:40)   diabetes mellitus uncontrolled type 2

## 2023-10-12 NOTE — PROGRESS NOTE ADULT - ASSESSMENT
[ASSESSMENT and  PLAN]    K92.2         GIB  D62           Anemia hemorrhage  D50.0        Fe def  Q27. 33    AVM Colon  K74. 60    Cirrhosis liver  D68.32     Coagulopathy due to coumadin  D63.8       Anemia due to chronic disease    69yo [place] M with hx of Fe def anemia, GIB, on outpt IV iron.   Known to me from office.   When well, Hgb 11g/dL with treatmetn  Recent decline in Hgb post GIB events.   Had bleed in last month, s/p eval at [facility]. Colonic AVM s/p APC  seen in office yest, Thurs, and present with Hgb 5 g/dL  Sent to ER due to low Hgb and recent BRBPR, recent elevated IN R5.2    s/p PRBC txfusion  Hgb better post transfusion.     Slow decline since last txfusion.         RECOMMENDATIONS    GIB  Follow CBC, Hgb trending down, may need txfusion  received 1 unit PRBC yesterday with adequate increase in hgb    Transfuse PRBC as clinically indicated.   Transfuse PRBC if Hgb <7.0 or if symptomatic.   GI following.   revisit if EGD warranted, as Hgb continues to decline.     Fe Def  09/14/23 Ferritin 36, sat 7% on   Administer IV venofer.     Cirrhosis    Cardiac Valve  has been resumed on heparin and coumadin.   observing closely for bleeding

## 2023-10-12 NOTE — DIETITIAN INITIAL EVALUATION ADULT - ADD RECOMMEND
1) Rx Glucerna 8 oz BID to provide 440 kcal, 20g pro   2) Monitor nutrition related lab values, hydration status, skin integrity  3) Encourage HBV protein sources  4) Continue to obtain food preferences

## 2023-10-12 NOTE — CONSULT NOTE ADULT - PROBLEM SELECTOR RECOMMENDATION 9
change mod dose admelog corrective scale coverage qac/qhs  add lantus 10 units qhs  cont cons cho diet  goal bg 100-180 in hosp setting
Type 2 A1c 7.6% adm: GIB  Recommend endocrine-Perlman on consult  lantus 15 units HS; Lispro 7 units ac x3; low corrective scale  FU appt: TBA  DSC recommendations: return to home regimen and glucose monitoring  diabetes education provided  Diabetes support info and cell # [phone] given   Goal 100-180 mg/dL; 140-180 mg/dL in critical care areas

## 2023-10-12 NOTE — DIETITIAN INITIAL EVALUATION ADULT - PERSON TAUGHT/METHOD
diet education Coumadin interactions with vit K, Plate Method for Diabetes/verbal instruction/written material/daughter instructed Written/verbal Consistent Carbohydrate, Low Na diet education reinforced with good understanding./diet education Coumadin interactions with vit K, Plate Method for Diabetes/verbal instruction/written material/daughter instructed

## 2023-10-12 NOTE — PROGRESS NOTE ADULT - SUBJECTIVE AND OBJECTIVE BOX
Neurology follow up note    ARNIE [id number]      Interval History:    Patient feels ok no new complaints.    Allergies    No Known Allergies    Intolerances        MEDICATIONS    albuterol/ipratropium for Nebulization 3 milliLiter(s) Nebulizer every 8 hours  atorvastatin 40 milliGRAM(s) Oral at bedtime  ceFAZolin   IVPB 2000 milliGRAM(s) IV Intermittent every 8 hours  dextrose 5%. 1000 milliLiter(s) IV Continuous <Continuous>  dextrose 5%. 1000 milliLiter(s) IV Continuous <Continuous>  dextrose 50% Injectable 25 Gram(s) IV Push once  dextrose 50% Injectable 25 Gram(s) IV Push once  dextrose 50% Injectable 12.5 Gram(s) IV Push once  dextrose Oral Gel 15 Gram(s) Oral once PRN  diltiazem    milliGRAM(s) Oral daily  furosemide    Tablet 40 milliGRAM(s) Oral daily  glucagon  Injectable 1 milliGRAM(s) IntraMuscular once  guaiFENesin Oral Liquid (Sugar-Free) 200 milliGRAM(s) Oral every 6 hours PRN  heparin   Injectable 5000 Unit(s) IV Push every 6 hours PRN  heparin   Injectable 2500 Unit(s) IV Push every 6 hours PRN  heparin  Infusion.  Unit(s)/Hr IV Continuous <Continuous>  influenza  Vaccine (HIGH DOSE) 0.7 milliLiter(s) IntraMuscular once  insulin glargine Injectable (LANTUS) 20 Unit(s) SubCutaneous at bedtime  insulin lispro (ADMELOG) corrective regimen sliding scale   SubCutaneous at bedtime  insulin lispro (ADMELOG) corrective regimen sliding scale   SubCutaneous three times a day before meals  insulin lispro Injectable (ADMELOG) 7 Unit(s) SubCutaneous three times a day before meals  iron sucrose Injectable 100 milliGRAM(s) IV Push every 24 hours  levETIRAcetam 750 milliGRAM(s) Oral two times a day  ondansetron Injectable 4 milliGRAM(s) IV Push every 6 hours PRN  pantoprazole  Injectable 40 milliGRAM(s) IV Push every 12 hours  sodium chloride 1 Gram(s) Oral two times a day  sucralfate 1 Gram(s) Oral four times a day  urea Oral Powder 15 Gram(s) Oral daily              Vital Signs Last 24 Hrs  T(C): 36.6 (12 Oct 2023 05:13), Max: 36.6 (12 Oct 2023 05:13)  T(F): 97.8 (12 Oct 2023 05:13), Max: 97.8 (12 Oct 2023 05:13)  HR: 85 (12 Oct 2023 05:13) (80 - 92)  BP: 127/66 (12 Oct 2023 05:13) (111/70 - 130/79)  BP(mean): --  RR: 17 (12 Oct 2023 05:13) (17 - 19)  SpO2: 96% (12 Oct 2023 05:13) (96% - 99%)    Parameters below as of 12 Oct 2023 05:13  Patient On (Oxygen Delivery Method): nasal cannula      REVIEW OF SYSTEMS:  Completely limited secondary to the patient repeats words over, has severe expressive aphasia, but had been in his normal state of health as per daughter.    PHYSICAL EXAMINATION:  HEENT:  Head:  Normocephalic, atraumatic.  Eyes:  No scleral icterus.  Ears:  Hard to evaluate secondary to he could not answer questions accordingly but appears to follow commands.  NECK:  Supple.  RESPIRATORY:  Air entry bilaterally.  CARDIOVASCULAR:  S1 and S2 heard.  ABDOMEN:  Soft and nontender.  EXTREMITIES:  Positive discoloration was noted on the left toe.     NEUROLOGIC:  The patient was awake, alert.  On-off blink to visual threat.  Positive expressive aphasia.  Will say a few words but repeat the same words over.  Right upper and right lower were 0/5 with increased tone.  Right hemiplegia is his baseline.  Left upper and left lower were 4/5.      LABS:  CBC Full  -  ( 11 Oct 2023 07:22 )  WBC Count : 7.86 K/uL  RBC Count : 2.70 M/uL  Hemoglobin : 7.4 g/dL  Hematocrit : 22.8 %  Platelet Count - Automated : 219 K/uL  Mean Cell Volume : 84.4 fl  Mean Cell Hemoglobin : 27.4 pg  Mean Cell Hemoglobin Concentration : 32.5 gm/dL  Auto Neutrophil # : x  Auto Lymphocyte # : x  Auto Monocyte # : x  Auto Eosinophil # : x  Auto Basophil # : x  Auto Neutrophil % : x  Auto Lymphocyte % : x  Auto Monocyte % : x  Auto Eosinophil % : x  Auto Basophil % : x    Urinalysis Basic - ( 11 Oct 2023 07:22 )    Color: x / Appearance: x / SG: x / pH: x  Gluc: 242 mg/dL / Ketone: x  / Bili: x / Urobili: x   Blood: x / Protein: x / Nitrite: x   Leuk Esterase: x / RBC: x / WBC x   Sq Epi: x / Non Sq Epi: x / Bacteria: x      10-11    133<L>  |  99  |  46<H>  ----------------------------<  242<H>  3.3<L>   |  27  |  1.40<H>    Ca    8.8      11 Oct 2023 07:22      Hemoglobin A1C:       Vitamin B12   PTT - ( 11 Oct 2023 07:22 )  PTT:74.8 sec      RADIOLOGY      ANALYSIS AND PLAN:  This is a 68-year-old with a history of cerebrovascular accident, atrial fibrillation, now with possibly gastrointestinal bleed.  For history of cerebrovascular accident and atrial fibrillation, the patient is on multiple blood thinning medications.  The patient's warfarin INR is significantly elevated.  For now, I would recommend gastrointestinal workup, when possible to prevent further cerebrovascular accident, would recommend to reinstitute anticoagulation, but possibly an alternative to warfarin secondary to elevation of INR, unclear if the patient needs to be on antiplatelets as well.  If the patient cannot have strong anticoagulation, then we will recommend at least a baby aspirin if possible.  For history of seizure prophylaxis, continue the patient on his Keppra.  For history of diabetes, strict control of blood sugars.  For hyperlipidemia, continue the patient on statin.  GI noted suspect severe gi bleed in setting of elevated INR AC as per medical team     Spoke with daughter, Jeffrey, at bedside 10/10 .  She does understand my reasoning and thought process.  Her telephone number is [phone].     49 minutes of time was spent with the patient, plan of care, reviewing data, with greater than 50% of the visit was spent counseling and/or coordinating care with multidisciplinary healthcare team     Neurology follow up note    ARNIE [id number]      Interval History:    Patient feels ok no new complaints.    Allergies    No Known Allergies    Intolerances        MEDICATIONS    albuterol/ipratropium for Nebulization 3 milliLiter(s) Nebulizer every 8 hours  atorvastatin 40 milliGRAM(s) Oral at bedtime  ceFAZolin   IVPB 2000 milliGRAM(s) IV Intermittent every 8 hours  dextrose 5%. 1000 milliLiter(s) IV Continuous <Continuous>  dextrose 5%. 1000 milliLiter(s) IV Continuous <Continuous>  dextrose 50% Injectable 25 Gram(s) IV Push once  dextrose 50% Injectable 25 Gram(s) IV Push once  dextrose 50% Injectable 12.5 Gram(s) IV Push once  dextrose Oral Gel 15 Gram(s) Oral once PRN  diltiazem    milliGRAM(s) Oral daily  furosemide    Tablet 40 milliGRAM(s) Oral daily  glucagon  Injectable 1 milliGRAM(s) IntraMuscular once  guaiFENesin Oral Liquid (Sugar-Free) 200 milliGRAM(s) Oral every 6 hours PRN  heparin   Injectable 5000 Unit(s) IV Push every 6 hours PRN  heparin   Injectable 2500 Unit(s) IV Push every 6 hours PRN  heparin  Infusion.  Unit(s)/Hr IV Continuous <Continuous>  influenza  Vaccine (HIGH DOSE) 0.7 milliLiter(s) IntraMuscular once  insulin glargine Injectable (LANTUS) 20 Unit(s) SubCutaneous at bedtime  insulin lispro (ADMELOG) corrective regimen sliding scale   SubCutaneous at bedtime  insulin lispro (ADMELOG) corrective regimen sliding scale   SubCutaneous three times a day before meals  insulin lispro Injectable (ADMELOG) 7 Unit(s) SubCutaneous three times a day before meals  iron sucrose Injectable 100 milliGRAM(s) IV Push every 24 hours  levETIRAcetam 750 milliGRAM(s) Oral two times a day  ondansetron Injectable 4 milliGRAM(s) IV Push every 6 hours PRN  pantoprazole  Injectable 40 milliGRAM(s) IV Push every 12 hours  sodium chloride 1 Gram(s) Oral two times a day  sucralfate 1 Gram(s) Oral four times a day  urea Oral Powder 15 Gram(s) Oral daily              Vital Signs Last 24 Hrs  T(C): 36.6 (12 Oct 2023 05:13), Max: 36.6 (12 Oct 2023 05:13)  T(F): 97.8 (12 Oct 2023 05:13), Max: 97.8 (12 Oct 2023 05:13)  HR: 85 (12 Oct 2023 05:13) (80 - 92)  BP: 127/66 (12 Oct 2023 05:13) (111/70 - 130/79)  BP(mean): --  RR: 17 (12 Oct 2023 05:13) (17 - 19)  SpO2: 96% (12 Oct 2023 05:13) (96% - 99%)    Parameters below as of 12 Oct 2023 05:13  Patient On (Oxygen Delivery Method): nasal cannula      REVIEW OF SYSTEMS:  Completely limited secondary to the patient repeats words over, has severe expressive aphasia, but had been in his normal state of health as per daughter.    PHYSICAL EXAMINATION:  HEENT:  Head:  Normocephalic, atraumatic.  Eyes:  No scleral icterus.  Ears:  Hard to evaluate secondary to he could not answer questions accordingly but appears to follow commands.  NECK:  Supple.  RESPIRATORY:  Air entry bilaterally.  CARDIOVASCULAR:  S1 and S2 heard.  ABDOMEN:  Soft and nontender.  EXTREMITIES:  Positive discoloration was noted on the left toe.     NEUROLOGIC:  The patient was awake, alert.  On-off blink to visual threat.  Positive expressive aphasia.  Will say a few words but repeat the same words over.  Right upper and right lower were 0/5 with increased tone.  Right hemiplegia is his baseline.  Left upper and left lower were 4/5.      LABS:  CBC Full  -  ( 11 Oct 2023 07:22 )  WBC Count : 7.86 K/uL  RBC Count : 2.70 M/uL  Hemoglobin : 7.4 g/dL  Hematocrit : 22.8 %  Platelet Count - Automated : 219 K/uL  Mean Cell Volume : 84.4 fl  Mean Cell Hemoglobin : 27.4 pg  Mean Cell Hemoglobin Concentration : 32.5 gm/dL  Auto Neutrophil # : x  Auto Lymphocyte # : x  Auto Monocyte # : x  Auto Eosinophil # : x  Auto Basophil # : x  Auto Neutrophil % : x  Auto Lymphocyte % : x  Auto Monocyte % : x  Auto Eosinophil % : x  Auto Basophil % : x    Urinalysis Basic - ( 11 Oct 2023 07:22 )    Color: x / Appearance: x / SG: x / pH: x  Gluc: 242 mg/dL / Ketone: x  / Bili: x / Urobili: x   Blood: x / Protein: x / Nitrite: x   Leuk Esterase: x / RBC: x / WBC x   Sq Epi: x / Non Sq Epi: x / Bacteria: x      10-11    133<L>  |  99  |  46<H>  ----------------------------<  242<H>  3.3<L>   |  27  |  1.40<H>    Ca    8.8      11 Oct 2023 07:22      Hemoglobin A1C:       Vitamin B12   PTT - ( 11 Oct 2023 07:22 )  PTT:74.8 sec      RADIOLOGY      ANALYSIS AND PLAN:  This is a 68-year-old with a history of cerebrovascular accident, atrial fibrillation, now with possibly gastrointestinal bleed.  For history of cerebrovascular accident and atrial fibrillation, the patient is on multiple blood thinning medications.  The patient's warfarin INR is significantly elevated.  For now, I would recommend gastrointestinal workup, when possible to prevent further cerebrovascular accident, would recommend to reinstitute anticoagulation, but possibly an alternative to warfarin secondary to elevation of INR, unclear if the patient needs to be on antiplatelets as well.  If the patient cannot have strong anticoagulation, then we will recommend at least a baby aspirin if possible.  For history of seizure prophylaxis, continue the patient on his Keppra.  For history of diabetes, strict control of blood sugars.  For hyperlipidemia, continue the patient on statin.  GI noted suspect severe gi bleed in setting of elevated INR AC as per medical team   as per her no blood in stools     Spoke with daughter, Jeffrey, at bedside 10/12 .  She does understand my reasoning and thought process.  Her telephone number is [phone].     46 minutes of time was spent with the patient, plan of care, reviewing data, with greater than 50% of the visit was spent counseling and/or coordinating care with multidisciplinary healthcare team

## 2023-10-12 NOTE — CONSULT NOTE ADULT - ASSESSMENT
Physical Exam:   Vital Signs Last 24 Hrs  T(C): 36.5 (12 Oct 2023 11:30), Max: 36.6 (12 Oct 2023 05:13)  T(F): 97.7 (12 Oct 2023 11:30), Max: 97.8 (12 Oct 2023 05:13)  HR: 92 (12 Oct 2023 11:30) (80 - 92)  BP: 140/72 (12 Oct 2023 11:30) (111/70 - 140/72)  BP(mean): --  RR: 17 (12 Oct 2023 11:30) (17 - 19)  SpO2: 92% (12 Oct 2023 11:30) (92% - 99%)    Parameters below as of 12 Oct 2023 11:30  Patient On (Oxygen Delivery Method): nasal cannula  O2 Flow (L/min): 3           CAPILLARY BLOOD GLUCOSE      POCT Blood Glucose.: 115 mg/dL (12 Oct 2023 12:13)  POCT Blood Glucose.: 75 mg/dL (12 Oct 2023 08:25)  POCT Blood Glucose.: 131 mg/dL (11 Oct 2023 20:51)  POCT Blood Glucose.: 96 mg/dL (11 Oct 2023 17:08)      Cholesterol, Serum: 113 mg/dL (05.19.21 @ 08:36)     HDL Cholesterol, Serum: 22 mg/dL (05.19.21 @ 08:36)     LDL Cholesterol Calculated: 66 mg/dL (05.19.21 @ 08:36)     DIET: CC  >50%

## 2023-10-12 NOTE — PROGRESS NOTE ADULT - SUBJECTIVE AND OBJECTIVE BOX
[facility], Division of Infectious Diseases  ADAM Nava Y. Patel, S. Shah, G. [facility]  [phone]    Name: ARNIE DELGADILLO  Age: 68y  Gender: Male  MRN: [id number]    Interval History:  Patient seen and examined at bedside  No acute overnight events. Afebrile  No complaints  Notes reviewed    Antibiotics:  ceFAZolin   IVPB 2000 milliGRAM(s) IV Intermittent every 8 hours      Medications:  albuterol/ipratropium for Nebulization 3 milliLiter(s) Nebulizer every 8 hours  atorvastatin 40 milliGRAM(s) Oral at bedtime  ceFAZolin   IVPB 2000 milliGRAM(s) IV Intermittent every 8 hours  dextrose 5%. 1000 milliLiter(s) IV Continuous <Continuous>  dextrose 5%. 1000 milliLiter(s) IV Continuous <Continuous>  dextrose 50% Injectable 25 Gram(s) IV Push once  dextrose 50% Injectable 12.5 Gram(s) IV Push once  dextrose 50% Injectable 25 Gram(s) IV Push once  dextrose Oral Gel 15 Gram(s) Oral once PRN  diltiazem    milliGRAM(s) Oral daily  furosemide    Tablet 40 milliGRAM(s) Oral daily  glucagon  Injectable 1 milliGRAM(s) IntraMuscular once  guaiFENesin Oral Liquid (Sugar-Free) 200 milliGRAM(s) Oral every 6 hours PRN  heparin   Injectable 5000 Unit(s) IV Push every 6 hours PRN  heparin   Injectable 2500 Unit(s) IV Push every 6 hours PRN  heparin  Infusion.  Unit(s)/Hr IV Continuous <Continuous>  influenza  Vaccine (HIGH DOSE) 0.7 milliLiter(s) IntraMuscular once  insulin glargine Injectable (LANTUS) 20 Unit(s) SubCutaneous at bedtime  insulin lispro (ADMELOG) corrective regimen sliding scale   SubCutaneous at bedtime  insulin lispro (ADMELOG) corrective regimen sliding scale   SubCutaneous three times a day before meals  insulin lispro Injectable (ADMELOG) 7 Unit(s) SubCutaneous three times a day before meals  iron sucrose Injectable 100 milliGRAM(s) IV Push every 24 hours  levETIRAcetam 750 milliGRAM(s) Oral two times a day  ondansetron Injectable 4 milliGRAM(s) IV Push every 6 hours PRN  pantoprazole  Injectable 40 milliGRAM(s) IV Push every 12 hours  sodium chloride 1 Gram(s) Oral two times a day  sucralfate 1 Gram(s) Oral four times a day  urea Oral Powder 15 Gram(s) Oral daily      Review of Systems:  unable to obtain    Allergies: No Known Allergies    For details regarding the patient's past medical history, social history, family history, and other miscellaneous elements, please refer the initial infectious diseases consultation and/or the admitting history and physical examination for this admission.    Objective:  Vitals:   T(C): 36.6 (10-12-23 @ 05:13), Max: 36.6 (10-12-23 @ 05:13)  HR: 85 (10-12-23 @ 05:13) (80 - 92)  BP: 127/66 (10-12-23 @ 05:13) (111/70 - 130/79)  RR: 17 (10-12-23 @ 05:13) (17 - 19)  SpO2: 96% (10-12-23 @ 05:13) (96% - 99%)    Physical Examination:  General: no acute distress  HEENT: NC/AT, EOMI,  Cardio: S1, S2 heard, RRR, no murmurs  Resp: decreased b/l breath sounds  Abd: soft, NT, ND  Ext: no edema or cyanosis  Skin: warm, dry, no visible rash      Laboratory Studies:  CBC:                       9.3    6.58  )-----------( 252      ( 12 Oct 2023 07:30 )             28.7     CMP: 10-11    133<L>  |  99  |  46<H>  ----------------------------<  242<H>  3.3<L>   |  27  |  1.40<H>    Ca    8.8      11 Oct 2023 07:22        Urinalysis Basic - ( 11 Oct 2023 07:22 )    Color: x / Appearance: x / SG: x / pH: x  Gluc: 242 mg/dL / Ketone: x  / Bili: x / Urobili: x   Blood: x / Protein: x / Nitrite: x   Leuk Esterase: x / RBC: x / WBC x   Sq Epi: x / Non Sq Epi: x / Bacteria: x        Microbiology: reviewed    Culture - Blood (collected 10-10-23 @ 20:38)  Source: .Blood Blood-Peripheral  Preliminary Report (10-12-23 @ 02:02):    No growth at 24 hours    Culture - Blood (collected 10-10-23 @ 20:32)  Source: .Blood Blood-Peripheral  Preliminary Report (10-12-23 @ 01:02):    No growth at 24 hours    Culture - Blood (collected 10-08-23 @ 11:40)  Source: .Blood Blood-Peripheral  Preliminary Report (10-11-23 @ 17:01):    No growth at 72 Hours    Culture - Blood (collected 10-08-23 @ 10:40)  Source: .Blood Blood-Peripheral  Gram Stain (10-10-23 @ 19:53):    Growth in anaerobic bottle: Gram positive cocci in pairs  Preliminary Report (10-11-23 @ 15:51):    Growth in anaerobic bottle: Staphylococcus aureus    Direct identification is available within approximately 3-5    hours either by Blood Panel Multiplexed PCR or Direct    MALDI-TOF. Details: [email or web address]  Organism: Blood Culture PCR (10-10-23 @ 21:28)  Organism: Blood Culture PCR (10-10-23 @ 21:28)      Method Type: PCR      -  Methicillin SENSITIVE Staphylococcus aureus (MSSA): Detec Any isolate of Staphylococcus aureus from a blood culture is NOT considered a contaminant.          Radiology: reviewed

## 2023-10-12 NOTE — PROGRESS NOTE ADULT - SUBJECTIVE AND OBJECTIVE BOX
[facility]  Shane Murray PA-C  [address] [place] [address]  [phone]      INTERVAL HPI/OVERNIGHT EVENTS:  Pt s/e with daughter at bedside translating for pt  +BM, no overt GI bleeding    MEDICATIONS  (STANDING):  albuterol/ipratropium for Nebulization 3 milliLiter(s) Nebulizer every 8 hours  atorvastatin 40 milliGRAM(s) Oral at bedtime  ceFAZolin   IVPB 2000 milliGRAM(s) IV Intermittent every 8 hours  dextrose 5%. 1000 milliLiter(s) (50 mL/Hr) IV Continuous <Continuous>  dextrose 5%. 1000 milliLiter(s) (100 mL/Hr) IV Continuous <Continuous>  dextrose 50% Injectable 25 Gram(s) IV Push once  dextrose 50% Injectable 12.5 Gram(s) IV Push once  dextrose 50% Injectable 25 Gram(s) IV Push once  diltiazem    milliGRAM(s) Oral daily  furosemide    Tablet 40 milliGRAM(s) Oral daily  glucagon  Injectable 1 milliGRAM(s) IntraMuscular once  heparin  Infusion.  Unit(s)/Hr (11 mL/Hr) IV Continuous <Continuous>  influenza  Vaccine (HIGH DOSE) 0.7 milliLiter(s) IntraMuscular once  insulin glargine Injectable (LANTUS) 20 Unit(s) SubCutaneous at bedtime  insulin lispro (ADMELOG) corrective regimen sliding scale   SubCutaneous at bedtime  insulin lispro (ADMELOG) corrective regimen sliding scale   SubCutaneous three times a day before meals  insulin lispro Injectable (ADMELOG) 7 Unit(s) SubCutaneous three times a day before meals  iron sucrose Injectable 100 milliGRAM(s) IV Push every 24 hours  levETIRAcetam 750 milliGRAM(s) Oral two times a day  pantoprazole  Injectable 40 milliGRAM(s) IV Push every 12 hours  sodium chloride 1 Gram(s) Oral two times a day  sucralfate 1 Gram(s) Oral four times a day  urea Oral Powder 15 Gram(s) Oral daily  warfarin 5 milliGRAM(s) Oral once    MEDICATIONS  (PRN):  dextrose Oral Gel 15 Gram(s) Oral once PRN Blood Glucose LESS THAN 70 milliGRAM(s)/deciliter  guaiFENesin Oral Liquid (Sugar-Free) 200 milliGRAM(s) Oral every 6 hours PRN Cough  heparin   Injectable 5000 Unit(s) IV Push every 6 hours PRN For aPTT less than 40  heparin   Injectable 2500 Unit(s) IV Push every 6 hours PRN For aPTT between 40 - 57  ondansetron Injectable 4 milliGRAM(s) IV Push every 6 hours PRN Nausea and/or Vomiting      Allergies    No Known Allergies    PHYSICAL EXAM:   Vital Signs:  Vital Signs Last 24 Hrs  T(C): 36.5 (12 Oct 2023 11:30), Max: 36.6 (12 Oct 2023 05:13)  T(F): 97.7 (12 Oct 2023 11:30), Max: 97.8 (12 Oct 2023 05:13)  HR: 92 (12 Oct 2023 11:30) (80 - 92)  BP: 140/72 (12 Oct 2023 11:30) (111/70 - 140/72)  BP(mean): --  RR: 17 (12 Oct 2023 11:30) (17 - 19)  SpO2: 92% (12 Oct 2023 11:30) (92% - 99%)    Parameters below as of 12 Oct 2023 11:30  Patient On (Oxygen Delivery Method): nasal cannula  O2 Flow (L/min): 3    GENERAL:  Appears stated age  HEENT:  NC/AT  CHEST:  Full & symmetric excursion  HEART:  Regular rhythm  ABDOMEN:  Soft, non-tender, non-distended  EXTEREMITIES:  no cyanosis  SKIN:  No rash  NEURO:  Alert      LABS:                        9.3    6.58  )-----------( 252      ( 12 Oct 2023 07:30 )             28.7     10-11    133<L>  |  99  |  46<H>  ----------------------------<  242<H>  3.3<L>   |  27  |  1.40<H>    Ca    8.8      11 Oct 2023 07:22      PTT - ( 12 Oct 2023 07:30 )  PTT:69.9 sec  Urinalysis Basic - ( 11 Oct 2023 07:22 )    Color: x / Appearance: x / SG: x / pH: x  Gluc: 242 mg/dL / Ketone: x  / Bili: x / Urobili: x   Blood: x / Protein: x / Nitrite: x   Leuk Esterase: x / RBC: x / WBC x   Sq Epi: x / Non Sq Epi: x / Bacteria: x

## 2023-10-12 NOTE — CONSULT NOTE ADULT - CONSULT REQUESTED DATE/TIME
05-Oct-2023 14:24
05-Oct-2023 14:16
05-Oct-2023 21:03
05-Oct-2023 20:16
06-Oct-2023 18:43
06-Oct-2023 23:24
08-Oct-2023 17:22
06-Oct-2023 17:26
12-Oct-2023 14:18

## 2023-10-12 NOTE — PROGRESS NOTE ADULT - SUBJECTIVE AND OBJECTIVE BOX
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 Per daughter at bedside, patient's INR level was 7.2 two days ago and was instructed by hematologist to hold coumadin for the past 2 days. Of note, patient had EGD and colonoscopy in 08/2023 revealing gastritis, duodenitis, non-bleeding AVM, and two polyps, one removed (pathology revealing tubular adenoma). Patient was afebrile and HDS on arrival with SBPs 90s and HR 80s. Labs notable for Hgb 5.9, INR 7.01, BUN/Cr 64/1.80, Alk Phos 418. ED ordered for Kcentra, Vit K, and 2U PRBCs. (05 Oct 2023 15:48)    PAST MEDICAL & SURGICAL HISTORY:  CVA (cerebral vascular accident)      HTN (hypertension)      DM (diabetes mellitus)      History of atrial fibrillation      Right hemiparesis      Aphasia due to acute stroke      Upper GI bleed      Osteomyelitis      S/P CABG (coronary artery bypass graft)      S/P brain surgery      History of aortic valve repair        MEDICATIONS  (STANDING):  albuterol/ipratropium for Nebulization 3 milliLiter(s) Nebulizer every 8 hours  atorvastatin 40 milliGRAM(s) Oral at bedtime  ceFAZolin   IVPB 2000 milliGRAM(s) IV Intermittent every 8 hours  dextrose 5%. 1000 milliLiter(s) (50 mL/Hr) IV Continuous <Continuous>  dextrose 5%. 1000 milliLiter(s) (100 mL/Hr) IV Continuous <Continuous>  dextrose 50% Injectable 25 Gram(s) IV Push once  dextrose 50% Injectable 12.5 Gram(s) IV Push once  dextrose 50% Injectable 25 Gram(s) IV Push once  diltiazem    milliGRAM(s) Oral daily  furosemide    Tablet 40 milliGRAM(s) Oral daily  glucagon  Injectable 1 milliGRAM(s) IntraMuscular once  heparin  Infusion.  Unit(s)/Hr (11 mL/Hr) IV Continuous <Continuous>  influenza  Vaccine (HIGH DOSE) 0.7 milliLiter(s) IntraMuscular once  insulin glargine Injectable (LANTUS) 15 Unit(s) SubCutaneous at bedtime  insulin lispro (ADMELOG) corrective regimen sliding scale   SubCutaneous three times a day before meals  insulin lispro (ADMELOG) corrective regimen sliding scale   SubCutaneous at bedtime  insulin lispro Injectable (ADMELOG) 7 Unit(s) SubCutaneous three times a day before meals  iron sucrose Injectable 100 milliGRAM(s) IV Push every 24 hours  levETIRAcetam 750 milliGRAM(s) Oral two times a day  pantoprazole  Injectable 40 milliGRAM(s) IV Push every 12 hours  sodium chloride 1 Gram(s) Oral two times a day  sucralfate 1 Gram(s) Oral four times a day  urea Oral Powder 15 Gram(s) Oral daily  warfarin 5 milliGRAM(s) Oral once    MEDICATIONS  (PRN):  dextrose Oral Gel 15 Gram(s) Oral once PRN Blood Glucose LESS THAN 70 milliGRAM(s)/deciliter  guaiFENesin Oral Liquid (Sugar-Free) 200 milliGRAM(s) Oral every 6 hours PRN Cough  heparin   Injectable 2500 Unit(s) IV Push every 6 hours PRN For aPTT between 40 - 57  heparin   Injectable 5000 Unit(s) IV Push every 6 hours PRN For aPTT less than 40  ondansetron Injectable 4 milliGRAM(s) IV Push every 6 hours PRN Nausea and/or Vomiting    Allergies    No Known Allergies    Intolerances        REVIEW OF SYSTEMS:    CONSTITUTIONAL: No weakness, fevers or chills.   EYES/ENT: No visual changes;    NECK: No pain or stiffness  RESPIRATORY: No cough, wheezing, No shortness of breath  CARDIOVASCULAR: No chest pain or palpitations  GASTROINTESTINAL: No abdominal pain, or hematochezia.  GENITOURINARY: No dysuria orhematuria  NEUROLOGICAL: No numbness or weakness  SKIN: No itching, burning, rashes  All other review of systems is negative unless indicated above    Vital Signs Last 24 Hrs  T(C): 36.5 (12 Oct 2023 11:30), Max: 36.6 (12 Oct 2023 05:13)  T(F): 97.7 (12 Oct 2023 11:30), Max: 97.8 (12 Oct 2023 05:13)  HR: 92 (12 Oct 2023 11:30) (85 - 92)  BP: 140/72 (12 Oct 2023 11:30) (111/70 - 140/72)  BP(mean): --  RR: 17 (12 Oct 2023 11:30) (17 - 19)  SpO2: 92% (12 Oct 2023 11:30) (92% - 98%)    Parameters below as of 12 Oct 2023 11:30  Patient On (Oxygen Delivery Method): nasal cannula  O2 Flow (L/min): 3    I&O's Summary    11 Oct 2023 07:01  -  12 Oct 2023 07:00  --------------------------------------------------------  IN: 550 mL / OUT: 700 mL / NET: -150 mL        PHYSICAL EXAM:  Constitutional: NAD  Neurological: Alert,     HEENT: no JVD, EOMI  Cardiovascular: Regular, S1 and S2,   Pulmonary: breath sounds bilaterally  Gastrointestinal: Bowel Sounds present, soft, nontender  EXT:  no peripheral edema  Skin: No rashes.  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 Per daughter at bedside, patient's INR level was 7.2 two days ago and was instructed by hematologist to hold coumadin for the past 2 days. Of note, patient had EGD and colonoscopy in 08/2023 revealing gastritis, duodenitis, non-bleeding AVM, and two polyps, one removed (pathology revealing tubular adenoma). Patient was afebrile and HDS on arrival with SBPs 90s and HR 80s. Labs notable for Hgb 5.9, INR 7.01, BUN/Cr 64/1.80, Alk Phos 418. ED ordered for Kcentra, Vit K, and 2U PRBCs. (05 Oct 2023 15:48)    Afib on tele monitor with 5 beat run of NSVT. Hb today 7.3 stable      1. GI bleed  - Keep Hb > 7.5,  follow with serial CBC  -s/p PRBC  - Holding coumadin, ASA, plavix and re-start when OK with GI  - On IV heparin  - INR goal 3    2. AF  Rate controlled on diltiazem 240 mg    3. H/o metallic AVR  Started IV heparin  Start warfarin when ok with GI.    4. CABG  No cp sob    Will follow   - Patient sees Alexandru Alonso out-patient

## 2023-10-12 NOTE — PROGRESS NOTE ADULT - SUBJECTIVE AND OBJECTIVE BOX
Patient is a 68y Male whom presented to the hospital with hyponatremia     PAST MEDICAL & SURGICAL HISTORY:  CVA (cerebral vascular accident)      HTN (hypertension)      DM (diabetes mellitus)      History of atrial fibrillation      Right hemiparesis      Aphasia due to acute stroke      Upper GI bleed      Osteomyelitis      S/P CABG (coronary artery bypass graft)      S/P brain surgery      History of aortic valve repair          MEDICATIONS  (STANDING):  atorvastatin 40 milliGRAM(s) Oral at bedtime  levETIRAcetam 750 milliGRAM(s) Oral two times a day  pantoprazole Infusion 8 mG/Hr (10 mL/Hr) IV Continuous <Continuous>  sucralfate 1 Gram(s) Oral four times a day      Allergies    No Known Allergies    Intolerances        SOCIAL HISTORY:  Denies ETOh,Smoking,     FAMILY HISTORY:  FH: coronary artery disease (Sibling)    FH: type 2 diabetes (Sibling)        REVIEW OF SYSTEMS:    CONSTITUTIONAL: No weakness, fevers or chills  RESPIRATORY: No cough, wheezing, hemoptysis; No shortness of breath  CARDIOVASCULAR: No chest pain or palpitations  GASTROINTESTINAL: No abdominal or epigastric pain. No nausea, vomiting,     No diarrhea or constipation. No melena   SKIN: dry                                                9.3    6.58  )-----------( 252      ( 12 Oct 2023 07:30 )             28.7       CBC Full  -  ( 12 Oct 2023 07:30 )  WBC Count : 6.58 K/uL  RBC Count : 3.43 M/uL  Hemoglobin : 9.3 g/dL  Hematocrit : 28.7 %  Platelet Count - Automated : 252 K/uL  Mean Cell Volume : 83.7 fl  Mean Cell Hemoglobin : 27.1 pg  Mean Cell Hemoglobin Concentration : 32.4 gm/dL  Auto Neutrophil # : x  Auto Lymphocyte # : x  Auto Monocyte # : x  Auto Eosinophil # : x  Auto Basophil # : x  Auto Neutrophil % : x  Auto Lymphocyte % : x  Auto Monocyte % : x  Auto Eosinophil % : x  Auto Basophil % : x      10-11    133<L>  |  99  |  46<H>  ----------------------------<  242<H>  3.3<L>   |  27  |  1.40<H>    Ca    8.8      11 Oct 2023 07:22        CAPILLARY BLOOD GLUCOSE      POCT Blood Glucose.: 126 mg/dL (12 Oct 2023 16:56)  POCT Blood Glucose.: 115 mg/dL (12 Oct 2023 12:13)  POCT Blood Glucose.: 75 mg/dL (12 Oct 2023 08:25)      Vital Signs Last 24 Hrs  T(C): 36.5 (12 Oct 2023 11:30), Max: 36.6 (12 Oct 2023 05:13)  T(F): 97.7 (12 Oct 2023 11:30), Max: 97.8 (12 Oct 2023 05:13)  HR: 92 (12 Oct 2023 11:30) (85 - 92)  BP: 140/72 (12 Oct 2023 11:30) (111/70 - 140/72)  BP(mean): --  RR: 17 (12 Oct 2023 11:30) (17 - 19)  SpO2: 92% (12 Oct 2023 11:30) (92% - 98%)    Parameters below as of 12 Oct 2023 11:30  Patient On (Oxygen Delivery Method): nasal cannula  O2 Flow (L/min): 3      Urinalysis Basic - ( 11 Oct 2023 07:22 )    Color: x / Appearance: x / SG: x / pH: x  Gluc: 242 mg/dL / Ketone: x  / Bili: x / Urobili: x   Blood: x / Protein: x / Nitrite: x   Leuk Esterase: x / RBC: x / WBC x   Sq Epi: x / Non Sq Epi: x / Bacteria: x        PT/INR - ( 12 Oct 2023 12:29 )   PT: 13.5 sec;   INR: 1.16 ratio         PTT - ( 12 Oct 2023 12:29 )  PTT:73.4 sec  PHYSICAL EXAM:    Constitutional: NAD  HEENT: conjunctive   clear   Neck:  No JVD  Respiratory: CTAB  Cardiovascular: S1 and S2  Gastrointestinal: BS+, soft, NT/ND  Extremities: No peripheral edema  Neurological:  no focal deficits  Skin: dry   Access: Not applicable

## 2023-10-13 NOTE — PROGRESS NOTE ADULT - SUBJECTIVE AND OBJECTIVE BOX
[facility], Division of Infectious Diseases  ADAM Nava Y. Patel, S. Shah, G. [facility]  [phone]    Name: ARNIE DELGADILLO  Age: 68y  Gender: Male  MRN: [id number]    Interval History:  Patient seen and examined at bedside  No acute overnight events. Afebrile  No complaints  Sleeping comfortably  Notes reviewed    Antibiotics:  ceFAZolin   IVPB 2000 milliGRAM(s) IV Intermittent every 8 hours      Medications:  albuterol/ipratropium for Nebulization 3 milliLiter(s) Nebulizer every 8 hours  atorvastatin 40 milliGRAM(s) Oral at bedtime  ceFAZolin   IVPB 2000 milliGRAM(s) IV Intermittent every 8 hours  dextrose 5%. 1000 milliLiter(s) IV Continuous <Continuous>  dextrose 5%. 1000 milliLiter(s) IV Continuous <Continuous>  dextrose 50% Injectable 25 Gram(s) IV Push once  dextrose 50% Injectable 12.5 Gram(s) IV Push once  dextrose 50% Injectable 25 Gram(s) IV Push once  dextrose Oral Gel 15 Gram(s) Oral once PRN  diltiazem    milliGRAM(s) Oral daily  furosemide    Tablet 40 milliGRAM(s) Oral daily  glucagon  Injectable 1 milliGRAM(s) IntraMuscular once  guaiFENesin Oral Liquid (Sugar-Free) 200 milliGRAM(s) Oral every 6 hours PRN  heparin   Injectable 2500 Unit(s) IV Push every 6 hours PRN  heparin   Injectable 5000 Unit(s) IV Push every 6 hours PRN  heparin  Infusion.  Unit(s)/Hr IV Continuous <Continuous>  influenza  Vaccine (HIGH DOSE) 0.7 milliLiter(s) IntraMuscular once  insulin glargine Injectable (LANTUS) 5 Unit(s) SubCutaneous at bedtime  insulin lispro (ADMELOG) corrective regimen sliding scale   SubCutaneous three times a day before meals  insulin lispro (ADMELOG) corrective regimen sliding scale   SubCutaneous at bedtime  insulin lispro Injectable (ADMELOG) 3 Unit(s) SubCutaneous three times a day before meals  iron sucrose Injectable 100 milliGRAM(s) IV Push every 24 hours  levETIRAcetam 750 milliGRAM(s) Oral two times a day  ondansetron Injectable 4 milliGRAM(s) IV Push every 6 hours PRN  pantoprazole  Injectable 40 milliGRAM(s) IV Push every 12 hours  sodium chloride 1 Gram(s) Oral two times a day  sucralfate 1 Gram(s) Oral four times a day  urea Oral Powder 15 Gram(s) Oral daily      Review of Systems:  unable to obtain    Allergies: No Known Allergies    For details regarding the patient's past medical history, social history, family history, and other miscellaneous elements, please refer the initial infectious diseases consultation and/or the admitting history and physical examination for this admission.    Objective:  Vitals:   T(C): 37 (10-13-23 @ 12:22), Max: 37 (10-13-23 @ 12:22)  HR: 83 (10-13-23 @ 12:22) (79 - 86)  BP: 131/72 (10-13-23 @ 12:22) (118/68 - 131/72)  RR: 18 (10-13-23 @ 12:22) (17 - 18)  SpO2: 97% (10-13-23 @ 12:22) (96% - 97%)    Physical Examination:  General: no acute distress  HEENT: NC/AT, EOMI,  Cardio: RRR  Resp: decreased breath sounds  Abd: soft, NT, ND  Ext: no edema or cyanosis  Skin: warm, dry, no visible rash      Laboratory Studies:  CBC:                       9.5    6.50  )-----------( 249      ( 13 Oct 2023 07:45 )             30.1     CMP:             Microbiology: reviewed    Culture - Blood (collected 10-10-23 @ 20:38)  Source: .Blood Blood-Peripheral  Preliminary Report (10-13-23 @ 02:01):    No growth at 48 Hours    Culture - Blood (collected 10-10-23 @ 20:32)  Source: .Blood Blood-Peripheral  Preliminary Report (10-13-23 @ 01:01):    No growth at 48 Hours    Culture - Blood (collected 10-08-23 @ 11:40)  Source: .Blood Blood-Peripheral  Preliminary Report (10-12-23 @ 17:01):    No growth at 4 days    Culture - Blood (collected 10-08-23 @ 10:40)  Source: .Blood Blood-Peripheral  Gram Stain (10-10-23 @ 19:53):    Growth in anaerobic bottle: Gram positive cocci in pairs  Final Report (10-13-23 @ 11:20):    Growth in anaerobic bottle: Staphylococcus aureus    Direct identification is available within approximately 3-5    hours either by Blood Panel Multiplexed PCR or Direct    MALDI-TOF. Details: [email or web address]  Organism: Blood Culture PCR  Staphylococcus aureus (10-13-23 @ 11:20)  Organism: Staphylococcus aureus (10-13-23 @ 11:20)      -  Clindamycin: S <=0.25      -  Oxacillin: S <=0.25 Oxacillin predicts susceptibility for dicloxacillin, methicillin, and nafcillin      -  Gentamicin: S <=1 Should not be used as monotherapy      -  Cefazolin: S <=4      -  Vancomycin: S 1      -  Tetracycline: S <=1      Method Type: MARY      -  Ampicillin/Sulbactam: S <=8/4      -  Rifampin: S <=1 Should not be used as monotherapy      -  Erythromycin: S <=0.25      -  Trimethoprim/Sulfamethoxazole: S <=0.5/9.5  Organism: Blood Culture PCR (10-13-23 @ 11:20)      Method Type: PCR      -  Methicillin SENSITIVE Staphylococcus aureus (MSSA): Detec Any isolate of Staphylococcus aureus from a blood culture is NOT considered a contaminant.          Radiology: reviewed

## 2023-10-13 NOTE — PROGRESS NOTE ADULT - SUBJECTIVE AND OBJECTIVE BOX
Date/Time Patient Seen:  		  Referring MD:   Data Reviewed	       Patient is a 68y old  Male who presents with a chief complaint of black stool (12 Oct 2023 21:38)      Subjective/HPI     PAST MEDICAL & SURGICAL HISTORY:  CVA (cerebral vascular accident)    HTN (hypertension)    DM (diabetes mellitus)    Arrhythmia    History of atrial fibrillation    Right hemiparesis    Aphasia due to acute stroke    GI bleed    Iron deficiency anemia    Upper GI bleed    Osteomyelitis    S/P CABG (coronary artery bypass graft)    S/P brain surgery    History of aortic valve repair          Medication list         MEDICATIONS  (STANDING):  albuterol/ipratropium for Nebulization 3 milliLiter(s) Nebulizer every 8 hours  atorvastatin 40 milliGRAM(s) Oral at bedtime  ceFAZolin   IVPB 2000 milliGRAM(s) IV Intermittent every 8 hours  dextrose 5%. 1000 milliLiter(s) (50 mL/Hr) IV Continuous <Continuous>  dextrose 5%. 1000 milliLiter(s) (100 mL/Hr) IV Continuous <Continuous>  dextrose 50% Injectable 25 Gram(s) IV Push once  dextrose 50% Injectable 25 Gram(s) IV Push once  dextrose 50% Injectable 12.5 Gram(s) IV Push once  diltiazem    milliGRAM(s) Oral daily  furosemide    Tablet 40 milliGRAM(s) Oral daily  glucagon  Injectable 1 milliGRAM(s) IntraMuscular once  heparin  Infusion.  Unit(s)/Hr (11 mL/Hr) IV Continuous <Continuous>  influenza  Vaccine (HIGH DOSE) 0.7 milliLiter(s) IntraMuscular once  insulin glargine Injectable (LANTUS) 5 Unit(s) SubCutaneous at bedtime  insulin lispro (ADMELOG) corrective regimen sliding scale   SubCutaneous three times a day before meals  insulin lispro (ADMELOG) corrective regimen sliding scale   SubCutaneous at bedtime  insulin lispro Injectable (ADMELOG) 7 Unit(s) SubCutaneous three times a day before meals  iron sucrose Injectable 100 milliGRAM(s) IV Push every 24 hours  levETIRAcetam 750 milliGRAM(s) Oral two times a day  pantoprazole  Injectable 40 milliGRAM(s) IV Push every 12 hours  sodium chloride 1 Gram(s) Oral two times a day  sucralfate 1 Gram(s) Oral four times a day  urea Oral Powder 15 Gram(s) Oral daily    MEDICATIONS  (PRN):  dextrose Oral Gel 15 Gram(s) Oral once PRN Blood Glucose LESS THAN 70 milliGRAM(s)/deciliter  guaiFENesin Oral Liquid (Sugar-Free) 200 milliGRAM(s) Oral every 6 hours PRN Cough  heparin   Injectable 2500 Unit(s) IV Push every 6 hours PRN For aPTT between 40 - 57  heparin   Injectable 5000 Unit(s) IV Push every 6 hours PRN For aPTT less than 40  ondansetron Injectable 4 milliGRAM(s) IV Push every 6 hours PRN Nausea and/or Vomiting         Vitals log        ICU Vital Signs Last 24 Hrs  T(C): 36.9 (13 Oct 2023 04:58), Max: 36.9 (13 Oct 2023 04:58)  T(F): 98.5 (13 Oct 2023 04:58), Max: 98.5 (13 Oct 2023 04:58)  HR: 79 (13 Oct 2023 04:58) (79 - 92)  BP: 118/68 (13 Oct 2023 04:58) (118/68 - 140/72)  BP(mean): --  ABP: --  ABP(mean): --  RR: 17 (13 Oct 2023 04:58) (17 - 18)  SpO2: 96% (13 Oct 2023 04:58) (92% - 97%)    O2 Parameters below as of 13 Oct 2023 04:58  Patient On (Oxygen Delivery Method): nasal cannula                 Input and Output:  I&O's Detail    11 Oct 2023 07:01  -  12 Oct 2023 07:00  --------------------------------------------------------  IN:    Heparin Infusion: 112 mL    IV PiggyBack: 100 mL    PRBCs (Packed Red Blood Cells): 338 mL  Total IN: 550 mL    OUT:    Voided (mL): 700 mL  Total OUT: 700 mL    Total NET: -150 mL      12 Oct 2023 07:01  -  13 Oct 2023 05:52  --------------------------------------------------------  IN:    Heparin Infusion: 80 mL    IV PiggyBack: 50 mL  Total IN: 130 mL    OUT:  Total OUT: 0 mL    Total NET: 130 mL          Lab Data                        9.3    6.58  )-----------( 252      ( 12 Oct 2023 07:30 )             28.7     10-11    133<L>  |  99  |  46<H>  ----------------------------<  242<H>  3.3<L>   |  27  |  1.40<H>    Ca    8.8      11 Oct 2023 07:22              Review of Systems	      Objective     Physical Examination    heart s1s2  lung dc BS      Pertinent Lab findings & Imaging      Bryan:  NO   Adequate UO     I&O's Detail    11 Oct 2023 07:01  -  12 Oct 2023 07:00  --------------------------------------------------------  IN:    Heparin Infusion: 112 mL    IV PiggyBack: 100 mL    PRBCs (Packed Red Blood Cells): 338 mL  Total IN: 550 mL    OUT:    Voided (mL): 700 mL  Total OUT: 700 mL    Total NET: -150 mL      12 Oct 2023 07:01  -  13 Oct 2023 05:52  --------------------------------------------------------  IN:    Heparin Infusion: 80 mL    IV PiggyBack: 50 mL  Total IN: 130 mL    OUT:  Total OUT: 0 mL    Total NET: 130 mL               Discussed with:     Cultures:	        Radiology

## 2023-10-13 NOTE — PROGRESS NOTE ADULT - SUBJECTIVE AND OBJECTIVE BOX
Patient is a 68y old  Male who presents with a chief complaint of black stool (13 Oct 2023 12:42)    Date of servie : 10-13-23 @ 13:01  INTERVAL HPI/OVERNIGHT EVENTS:  T(C): 37 (10-13-23 @ 12:22), Max: 37 (10-13-23 @ 12:22)  HR: 83 (10-13-23 @ 12:22) (79 - 86)  BP: 131/72 (10-13-23 @ 12:22) (118/68 - 131/72)  RR: 18 (10-13-23 @ 12:22) (17 - 18)  SpO2: 97% (10-13-23 @ 12:22) (96% - 97%)  Wt(kg): --  I&O's Summary    12 Oct 2023 07:01  -  13 Oct 2023 07:00  --------------------------------------------------------  IN: 130 mL / OUT: 0 mL / NET: 130 mL        LABS:                        9.5    6.50  )-----------( 249      ( 13 Oct 2023 07:45 )             30.1           PT/INR - ( 13 Oct 2023 07:45 )   PT: 14.6 sec;   INR: 1.26 ratio         PTT - ( 13 Oct 2023 07:45 )  PTT:82.9 sec    CAPILLARY BLOOD GLUCOSE      POCT Blood Glucose.: 210 mg/dL (13 Oct 2023 12:19)  POCT Blood Glucose.: 118 mg/dL (13 Oct 2023 08:05)  POCT Blood Glucose.: 159 mg/dL (12 Oct 2023 22:32)  POCT Blood Glucose.: 66 mg/dL (12 Oct 2023 21:47)  POCT Blood Glucose.: 70 mg/dL (12 Oct 2023 21:44)  POCT Blood Glucose.: 126 mg/dL (12 Oct 2023 16:56)            MEDICATIONS  (STANDING):  albuterol/ipratropium for Nebulization 3 milliLiter(s) Nebulizer every 8 hours  atorvastatin 40 milliGRAM(s) Oral at bedtime  ceFAZolin   IVPB 2000 milliGRAM(s) IV Intermittent every 8 hours  dextrose 5%. 1000 milliLiter(s) (100 mL/Hr) IV Continuous <Continuous>  dextrose 5%. 1000 milliLiter(s) (50 mL/Hr) IV Continuous <Continuous>  dextrose 50% Injectable 25 Gram(s) IV Push once  dextrose 50% Injectable 12.5 Gram(s) IV Push once  dextrose 50% Injectable 25 Gram(s) IV Push once  diltiazem    milliGRAM(s) Oral daily  furosemide    Tablet 40 milliGRAM(s) Oral daily  glucagon  Injectable 1 milliGRAM(s) IntraMuscular once  heparin  Infusion.  Unit(s)/Hr (11 mL/Hr) IV Continuous <Continuous>  influenza  Vaccine (HIGH DOSE) 0.7 milliLiter(s) IntraMuscular once  insulin glargine Injectable (LANTUS) 5 Unit(s) SubCutaneous at bedtime  insulin lispro (ADMELOG) corrective regimen sliding scale   SubCutaneous three times a day before meals  insulin lispro (ADMELOG) corrective regimen sliding scale   SubCutaneous at bedtime  insulin lispro Injectable (ADMELOG) 3 Unit(s) SubCutaneous three times a day before meals  iron sucrose Injectable 100 milliGRAM(s) IV Push every 24 hours  levETIRAcetam 750 milliGRAM(s) Oral two times a day  pantoprazole  Injectable 40 milliGRAM(s) IV Push every 12 hours  sodium chloride 1 Gram(s) Oral two times a day  sucralfate 1 Gram(s) Oral four times a day  urea Oral Powder 15 Gram(s) Oral daily  warfarin 5 milliGRAM(s) Oral daily    MEDICATIONS  (PRN):  dextrose Oral Gel 15 Gram(s) Oral once PRN Blood Glucose LESS THAN 70 milliGRAM(s)/deciliter  guaiFENesin Oral Liquid (Sugar-Free) 200 milliGRAM(s) Oral every 6 hours PRN Cough  heparin   Injectable 5000 Unit(s) IV Push every 6 hours PRN For aPTT less than 40  heparin   Injectable 2500 Unit(s) IV Push every 6 hours PRN For aPTT between 40 - 57  ondansetron Injectable 4 milliGRAM(s) IV Push every 6 hours PRN Nausea and/or Vomiting          PHYSICAL EXAM:  GENERAL: NAD, well-groomed, well-developed  HEAD:  Atraumatic, Normocephalic  CHEST/LUNG: Clear to percussion bilaterally; No rales, rhonchi, wheezing, or rubs  HEART: Regular rate and rhythm; No murmurs, rubs, or gallops  ABDOMEN: Soft, Nontender, Nondistended; Bowel sounds present  EXTREMITIES:  2+ Peripheral Pulses, No clubbing, cyanosis, or edema  LYMPH: No lymphadenopathy noted  SKIN: No rashes or lesions    Care Discussed with Consultants/Other Providers [ ] YES  [ ] NO

## 2023-10-13 NOTE — PROGRESS NOTE ADULT - ASSESSMENT
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   ID c/s on HD#3 for fever to 102F overnight    Acute Multifocal PNA/AHRF on NC  - fever to 102 overnight, leukocytosis to 18 on 10/7  - CT chest showing multifocal PNA  - S/p zosyn 10/7-10/11; Abx tailored as below    MSSA Bacteremia  - noted Culture - Blood (10.08.23 @ 10:40)-  Methicillin SENSITIVE Staphylococcus aureus (MSSA)  - repeat blood cultures collected 10/10 20:38, NGTD  - c/w Cefazolin 2 grams IV q8 started 10/11  - BCx NGTD x48H, pt will need midline and LT IV Abx--midline ordered  Additional care per primary team    Over the weekend Dr. Grimes will be covering for our group. If you have any questions, concerns or new micro data, please reach out to them at [phone].  Becca Acosta M.D.  [facility] Division of Infectious Diseases [phone]  For after 5 P.M. and weekends, please call [phone]

## 2023-10-13 NOTE — PROGRESS NOTE ADULT - SUBJECTIVE AND OBJECTIVE BOX
[facility]    CHIEF COMPLAINT: Patient is a 68y old  Male who presents with a chief complaint of black stool (13 Oct 2023 13:00)      Follow Up: [ ] Chest Pain      [ ] Dyspnea     [ ] Palpitations    [ ] Atrial Fibrillation     [ ] Ventricular Dysrhythmia    [ ] Abnormal EKG                      [ ] Abnormal Cardiac Enzymes     [ ] Valvular Disease    HPI:  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, seizure disorder, prior CABG, DM2, AFib, Mechanical AV replacement in 2007 (on Warfarin) presenting to [facility] ED per recommendation of hematologist for low Hgb to 5.2 with associated black stools for the past few days, last being yesterday. Per daughter at bedside, patient's INR level was 7.2 two days ago and was instructed by hematologist to hold coumadin for the past 2 days. Of note, patient had EGD and colonoscopy in 08/2023 revealing gastritis, duodenitis, non-bleeding AVM, and two polyps, one removed (pathology revealing tubular adenoma). Patient was afebrile and HDS on arrival with SBPs 90s and HR 80s. Labs notable for Hgb 5.9, INR 7.01, BUN/Cr 64/1.80, Alk Phos 418. ED ordered for Kcentra, Vit K, and 2U PRBCs. (05 Oct 2023 15:48)    PAST MEDICAL & SURGICAL HISTORY:  CVA (cerebral vascular accident)      HTN (hypertension)      DM (diabetes mellitus)      History of atrial fibrillation      Right hemiparesis      Aphasia due to acute stroke      Upper GI bleed      Osteomyelitis      S/P CABG (coronary artery bypass graft)      S/P brain surgery      History of aortic valve repair        MEDICATIONS  (STANDING):  albuterol/ipratropium for Nebulization 3 milliLiter(s) Nebulizer every 8 hours  atorvastatin 40 milliGRAM(s) Oral at bedtime  ceFAZolin   IVPB 2000 milliGRAM(s) IV Intermittent every 8 hours  dextrose 5%. 1000 milliLiter(s) (50 mL/Hr) IV Continuous <Continuous>  dextrose 5%. 1000 milliLiter(s) (100 mL/Hr) IV Continuous <Continuous>  dextrose 50% Injectable 25 Gram(s) IV Push once  dextrose 50% Injectable 12.5 Gram(s) IV Push once  dextrose 50% Injectable 25 Gram(s) IV Push once  diltiazem    milliGRAM(s) Oral daily  furosemide    Tablet 40 milliGRAM(s) Oral daily  glucagon  Injectable 1 milliGRAM(s) IntraMuscular once  heparin  Infusion.  Unit(s)/Hr (11 mL/Hr) IV Continuous <Continuous>  influenza  Vaccine (HIGH DOSE) 0.7 milliLiter(s) IntraMuscular once  insulin glargine Injectable (LANTUS) 5 Unit(s) SubCutaneous at bedtime  insulin lispro (ADMELOG) corrective regimen sliding scale   SubCutaneous three times a day before meals  insulin lispro (ADMELOG) corrective regimen sliding scale   SubCutaneous at bedtime  insulin lispro Injectable (ADMELOG) 3 Unit(s) SubCutaneous three times a day before meals  iron sucrose Injectable 100 milliGRAM(s) IV Push every 24 hours  levETIRAcetam 750 milliGRAM(s) Oral two times a day  pantoprazole  Injectable 40 milliGRAM(s) IV Push every 12 hours  sodium chloride 1 Gram(s) Oral two times a day  sucralfate 1 Gram(s) Oral four times a day  urea Oral Powder 15 Gram(s) Oral daily  warfarin 5 milliGRAM(s) Oral daily    MEDICATIONS  (PRN):  dextrose Oral Gel 15 Gram(s) Oral once PRN Blood Glucose LESS THAN 70 milliGRAM(s)/deciliter  guaiFENesin Oral Liquid (Sugar-Free) 200 milliGRAM(s) Oral every 6 hours PRN Cough  heparin   Injectable 5000 Unit(s) IV Push every 6 hours PRN For aPTT less than 40  heparin   Injectable 2500 Unit(s) IV Push every 6 hours PRN For aPTT between 40 - 57  ondansetron Injectable 4 milliGRAM(s) IV Push every 6 hours PRN Nausea and/or Vomiting    Allergies    No Known Allergies    Intolerances        REVIEW OF SYSTEMS:    CONSTITUTIONAL: No weakness, fevers or chills.   EYES/ENT: No visual changes;    NECK: No pain or stiffness  RESPIRATORY: No cough, wheezing, No shortness of breath  CARDIOVASCULAR: No chest pain or palpitations  GASTROINTESTINAL: No abdominal pain, or hematochezia.  GENITOURINARY: No dysuria orhematuria  NEUROLOGICAL: No numbness or weakness  SKIN: No itching, burning, rashes  All other review of systems is negative unless indicated above    Vital Signs Last 24 Hrs  T(C): 37 (13 Oct 2023 12:22), Max: 37 (13 Oct 2023 12:22)  T(F): 98.6 (13 Oct 2023 12:22), Max: 98.6 (13 Oct 2023 12:22)  HR: 83 (13 Oct 2023 12:22) (79 - 86)  BP: 131/72 (13 Oct 2023 12:22) (118/68 - 131/72)  BP(mean): --  RR: 18 (13 Oct 2023 12:22) (17 - 18)  SpO2: 97% (13 Oct 2023 12:22) (96% - 97%)    Parameters below as of 13 Oct 2023 12:22  Patient On (Oxygen Delivery Method): nasal cannula  O2 Flow (L/min): 3    I&O's Summary    12 Oct 2023 07:01  -  13 Oct 2023 07:00  --------------------------------------------------------  IN: 130 mL / OUT: 0 mL / NET: 130 mL    PHYSICAL EXAM:  Constitutional: NAD  Neurological: Alert, no focal deficits  HEENT: no JVD, EOMI  Cardiovascular: Regular, S1 and S2, no murmur  Pulmonary: breath sounds bilaterally  Gastrointestinal: Bowel Sounds present, soft, nontender  EXT:  no peripheral edema  Skin: No rashes.  Psych:  Mood calm  LABS: All Labs Reviewed:                      9.5    6.50  )-----------( 249      ( 13 Oct 2023 07:45 )             30.1   PT/INR - ( 13 Oct 2023 07:45 )   PT: 14.6 sec;   INR: 1.26 ratio      PTT - ( 13 Oct 2023 07:45 )  PTT:82.9 sec    · Assessment    68M [place] speaking male with prior hemorrhagic CVA s/p L craniotomy with residual aphasia, seizure disorder, prior CABG, DM2, AFib, Mechanical AV replacement in 2007 (on Warfarin) presenting to [facility] ED per recommendation of hematologist for low Hgb to 5.2 with associated black stools for the past few days, last being yesterday. Per daughter at bedside, patient's INR level was 7.2 two days ago and was instructed by hematologist to hold coumadin for the past 2 days. Of note, patient had EGD and colonoscopy in 08/2023 revealing gastritis, duodenitis, non-bleeding AVM, and two polyps, one removed (pathology revealing tubular adenoma). Patient was afebrile and HDS on arrival with SBPs 90s and HR 80s. Labs notable for Hgb 5.9, INR 7.01, BUN/Cr 64/1.80, Alk Phos 418. ED ordered for Kcentra, Vit K, and 2U PRBCs. (05 Oct 2023 15:48)    Afib on tele monitor with 5 beat run of NSVT. Hb today 7.3 stable      1. GI bleed  - Keep Hb > 7.5,  follow with serial CBC  -s/p PRBC  - Holding ASA, plavix and re-start when OK with GI  - s/p IV heparin, warfarin restarted  - INR goal 3    2. AF  Rate controlled on diltiazem 240 mg    3. H/o metallic AVR  Started IV heparin   warfarin.    4. CABG  No cp sob    Will follow prn  - Patient sees Alexandru Alonso out-patient

## 2023-10-13 NOTE — PROGRESS NOTE ADULT - ASSESSMENT
[ASSESSMENT and  PLAN]    K92.2         GIB  D62           Anemia hemorrhage  D50.0        Fe def  Q27. 33    AVM Colon  K74. 60    Cirrhosis liver  D68.32     Coagulopathy due to coumadin  D63.8       Anemia due to chronic disease    67yo [place] M with hx of Fe def anemia, GIB, on outpt IV iron.   Known to me from office.   When well, Hgb 11g/dL with treatmetn  Recent decline in Hgb post GIB events.   Had bleed in last month, s/p eval at [facility]. Colonic AVM s/p APC  seen in office yest, Thurs, and present with Hgb 5 g/dL  Sent to ER due to low Hgb and recent BRBPR, recent elevated IN R5.2    s/p PRBC txfusion  Hgb better post transfusion.     hgb now stable        RECOMMENDATIONS    GIB  Follow CBC, Hgb trending down, may need txfusion  received 1 unit PRBC 10/11 with adequate increase in hgb    Transfuse PRBC as clinically indicated.   Transfuse PRBC if Hgb <7.0 or if symptomatic.   GI following.   revisit if EGD warranted, as Hgb continues to decline.     Fe Def  09/14/23 Ferritin 36, sat 7% on   Administer IV venofer.     Cirrhosis    Cardiac Valve  has been resumed on heparin and coumadin.   observing closely for bleeding    continue heparin bridge to coumadin  no further hematologic intervention required   please call if additional evaluation required

## 2023-10-13 NOTE — PROGRESS NOTE ADULT - SUBJECTIVE AND OBJECTIVE BOX
Neurology follow up note    ARNIE [id number]      Interval History:    Patient feels ok no new complaints.    Allergies    No Known Allergies    Intolerances        MEDICATIONS    albuterol/ipratropium for Nebulization 3 milliLiter(s) Nebulizer every 8 hours  atorvastatin 40 milliGRAM(s) Oral at bedtime  ceFAZolin   IVPB 2000 milliGRAM(s) IV Intermittent every 8 hours  dextrose 5%. 1000 milliLiter(s) IV Continuous <Continuous>  dextrose 5%. 1000 milliLiter(s) IV Continuous <Continuous>  dextrose 50% Injectable 25 Gram(s) IV Push once  dextrose 50% Injectable 12.5 Gram(s) IV Push once  dextrose 50% Injectable 25 Gram(s) IV Push once  dextrose Oral Gel 15 Gram(s) Oral once PRN  diltiazem    milliGRAM(s) Oral daily  furosemide    Tablet 40 milliGRAM(s) Oral daily  glucagon  Injectable 1 milliGRAM(s) IntraMuscular once  guaiFENesin Oral Liquid (Sugar-Free) 200 milliGRAM(s) Oral every 6 hours PRN  heparin   Injectable 2500 Unit(s) IV Push every 6 hours PRN  heparin   Injectable 5000 Unit(s) IV Push every 6 hours PRN  heparin  Infusion.  Unit(s)/Hr IV Continuous <Continuous>  influenza  Vaccine (HIGH DOSE) 0.7 milliLiter(s) IntraMuscular once  insulin glargine Injectable (LANTUS) 5 Unit(s) SubCutaneous at bedtime  insulin lispro (ADMELOG) corrective regimen sliding scale   SubCutaneous three times a day before meals  insulin lispro (ADMELOG) corrective regimen sliding scale   SubCutaneous at bedtime  insulin lispro Injectable (ADMELOG) 3 Unit(s) SubCutaneous three times a day before meals  iron sucrose Injectable 100 milliGRAM(s) IV Push every 24 hours  levETIRAcetam 750 milliGRAM(s) Oral two times a day  ondansetron Injectable 4 milliGRAM(s) IV Push every 6 hours PRN  pantoprazole  Injectable 40 milliGRAM(s) IV Push every 12 hours  sodium chloride 1 Gram(s) Oral two times a day  sucralfate 1 Gram(s) Oral four times a day  urea Oral Powder 15 Gram(s) Oral daily              Vital Signs Last 24 Hrs  T(C): 36.9 (13 Oct 2023 04:58), Max: 36.9 (13 Oct 2023 04:58)  T(F): 98.5 (13 Oct 2023 04:58), Max: 98.5 (13 Oct 2023 04:58)  HR: 79 (13 Oct 2023 04:58) (79 - 92)  BP: 118/68 (13 Oct 2023 04:58) (118/68 - 140/72)  BP(mean): --  RR: 17 (13 Oct 2023 04:58) (17 - 18)  SpO2: 96% (13 Oct 2023 04:58) (92% - 97%)    Parameters below as of 13 Oct 2023 04:58  Patient On (Oxygen Delivery Method): nasal cannula      REVIEW OF SYSTEMS:  Completely limited secondary to the patient repeats words over, has severe expressive aphasia, but had been in his normal state of health as per daughter.    PHYSICAL EXAMINATION:  HEENT:  Head:  Normocephalic, atraumatic.  Eyes:  No scleral icterus.  Ears:  Hard to evaluate secondary to he could not answer questions accordingly but appears to follow commands.  NECK:  Supple.  RESPIRATORY:  Air entry bilaterally.  CARDIOVASCULAR:  S1 and S2 heard.  ABDOMEN:  Soft and nontender.  EXTREMITIES:  Positive discoloration was noted on the left toe.     NEUROLOGIC:  The patient was awake, alert.  On-off blink to visual threat.  Positive expressive aphasia.  Will say a few words but repeat the same words over.  Right upper and right lower were 0/5 with increased tone.  Right hemiplegia is his baseline.  Left upper and left lower were 4/5.      LABS:  CBC Full  -  ( 12 Oct 2023 07:30 )  WBC Count : 6.58 K/uL  RBC Count : 3.43 M/uL  Hemoglobin : 9.3 g/dL  Hematocrit : 28.7 %  Platelet Count - Automated : 252 K/uL  Mean Cell Volume : 83.7 fl  Mean Cell Hemoglobin : 27.1 pg  Mean Cell Hemoglobin Concentration : 32.4 gm/dL  Auto Neutrophil # : x  Auto Lymphocyte # : x  Auto Monocyte # : x  Auto Eosinophil # : x  Auto Basophil # : x  Auto Neutrophil % : x  Auto Lymphocyte % : x  Auto Monocyte % : x  Auto Eosinophil % : x  Auto Basophil % : x            Hemoglobin A1C:       Vitamin B12   PT/INR - ( 12 Oct 2023 12:29 )   PT: 13.5 sec;   INR: 1.16 ratio         PTT - ( 12 Oct 2023 12:29 )  PTT:73.4 sec      RADIOLOGY    ANALYSIS AND PLAN:  This is a 68-year-old with a history of cerebrovascular accident, atrial fibrillation, now with possibly gastrointestinal bleed.  For history of cerebrovascular accident and atrial fibrillation, the patient is on multiple blood thinning medications.  The patient's warfarin INR is significantly elevated.  For now, I would recommend gastrointestinal workup, when possible to prevent further cerebrovascular accident, would recommend to reinstitute anticoagulation, but possibly an alternative to warfarin secondary to elevation of INR, unclear if the patient needs to be on antiplatelets as well.  If the patient cannot have strong anticoagulation, then we will recommend at least a baby aspirin if possible.  For history of seizure prophylaxis, continue the patient on his Keppra.  For history of diabetes, strict control of blood sugars.  For hyperlipidemia, continue the patient on statin.  GI noted suspect severe gi bleed in setting of elevated INR AC as per medical team   as per her no blood in stools   monitor oral intake     Spoke with daughter, Jeffrey, at bedside 10/12 .  She does understand my reasoning and thought process.  Her telephone number is [phone].     46 minutes of time was spent with the patient, plan of care, reviewing data, with greater than 50% of the visit was spent counseling and/or coordinating care with multidisciplinary healthcare team Neurology follow up note    ARNIE [id number]      Interval History:    Patient feels ok no new complaints.    Allergies    No Known Allergies    Intolerances        MEDICATIONS    albuterol/ipratropium for Nebulization 3 milliLiter(s) Nebulizer every 8 hours  atorvastatin 40 milliGRAM(s) Oral at bedtime  ceFAZolin   IVPB 2000 milliGRAM(s) IV Intermittent every 8 hours  dextrose 5%. 1000 milliLiter(s) IV Continuous <Continuous>  dextrose 5%. 1000 milliLiter(s) IV Continuous <Continuous>  dextrose 50% Injectable 25 Gram(s) IV Push once  dextrose 50% Injectable 12.5 Gram(s) IV Push once  dextrose 50% Injectable 25 Gram(s) IV Push once  dextrose Oral Gel 15 Gram(s) Oral once PRN  diltiazem    milliGRAM(s) Oral daily  furosemide    Tablet 40 milliGRAM(s) Oral daily  glucagon  Injectable 1 milliGRAM(s) IntraMuscular once  guaiFENesin Oral Liquid (Sugar-Free) 200 milliGRAM(s) Oral every 6 hours PRN  heparin   Injectable 2500 Unit(s) IV Push every 6 hours PRN  heparin   Injectable 5000 Unit(s) IV Push every 6 hours PRN  heparin  Infusion.  Unit(s)/Hr IV Continuous <Continuous>  influenza  Vaccine (HIGH DOSE) 0.7 milliLiter(s) IntraMuscular once  insulin glargine Injectable (LANTUS) 5 Unit(s) SubCutaneous at bedtime  insulin lispro (ADMELOG) corrective regimen sliding scale   SubCutaneous three times a day before meals  insulin lispro (ADMELOG) corrective regimen sliding scale   SubCutaneous at bedtime  insulin lispro Injectable (ADMELOG) 3 Unit(s) SubCutaneous three times a day before meals  iron sucrose Injectable 100 milliGRAM(s) IV Push every 24 hours  levETIRAcetam 750 milliGRAM(s) Oral two times a day  ondansetron Injectable 4 milliGRAM(s) IV Push every 6 hours PRN  pantoprazole  Injectable 40 milliGRAM(s) IV Push every 12 hours  sodium chloride 1 Gram(s) Oral two times a day  sucralfate 1 Gram(s) Oral four times a day  urea Oral Powder 15 Gram(s) Oral daily              Vital Signs Last 24 Hrs  T(C): 36.9 (13 Oct 2023 04:58), Max: 36.9 (13 Oct 2023 04:58)  T(F): 98.5 (13 Oct 2023 04:58), Max: 98.5 (13 Oct 2023 04:58)  HR: 79 (13 Oct 2023 04:58) (79 - 92)  BP: 118/68 (13 Oct 2023 04:58) (118/68 - 140/72)  BP(mean): --  RR: 17 (13 Oct 2023 04:58) (17 - 18)  SpO2: 96% (13 Oct 2023 04:58) (92% - 97%)    Parameters below as of 13 Oct 2023 04:58  Patient On (Oxygen Delivery Method): nasal cannula      REVIEW OF SYSTEMS:  Completely limited secondary to the patient repeats words over, has severe expressive aphasia, but had been in his normal state of health as per daughter.    PHYSICAL EXAMINATION:  HEENT:  Head:  Normocephalic, atraumatic.  Eyes:  No scleral icterus.  Ears:  Hard to evaluate secondary to he could not answer questions accordingly but appears to follow commands.  NECK:  Supple.  RESPIRATORY:  Air entry bilaterally.  CARDIOVASCULAR:  S1 and S2 heard.  ABDOMEN:  Soft and nontender.  EXTREMITIES:  Positive discoloration was noted on the left toe.     NEUROLOGIC:  The patient was awake, alert.  On-off blink to visual threat.  Positive expressive aphasia.  Will say a few words but repeat the same words over.  Right upper and right lower were 0/5 with increased tone.  Right hemiplegia is his baseline.  Left upper and left lower were 4/5.      LABS:  CBC Full  -  ( 12 Oct 2023 07:30 )  WBC Count : 6.58 K/uL  RBC Count : 3.43 M/uL  Hemoglobin : 9.3 g/dL  Hematocrit : 28.7 %  Platelet Count - Automated : 252 K/uL  Mean Cell Volume : 83.7 fl  Mean Cell Hemoglobin : 27.1 pg  Mean Cell Hemoglobin Concentration : 32.4 gm/dL  Auto Neutrophil # : x  Auto Lymphocyte # : x  Auto Monocyte # : x  Auto Eosinophil # : x  Auto Basophil # : x  Auto Neutrophil % : x  Auto Lymphocyte % : x  Auto Monocyte % : x  Auto Eosinophil % : x  Auto Basophil % : x            Hemoglobin A1C:       Vitamin B12   PT/INR - ( 12 Oct 2023 12:29 )   PT: 13.5 sec;   INR: 1.16 ratio         PTT - ( 12 Oct 2023 12:29 )  PTT:73.4 sec      RADIOLOGY    ANALYSIS AND PLAN:  This is a 68-year-old with a history of cerebrovascular accident, atrial fibrillation, now with possibly gastrointestinal bleed.  For history of cerebrovascular accident and atrial fibrillation, the patient is on multiple blood thinning medications.  The patient's warfarin INR is significantly elevated.  For now, I would recommend gastrointestinal workup, when possible to prevent further cerebrovascular accident, would recommend to reinstitute anticoagulation, but possibly an alternative to warfarin secondary to elevation of INR, unclear if the patient needs to be on antiplatelets as well.  If the patient cannot have strong anticoagulation, then we will recommend at least a baby aspirin if possible.  For history of seizure prophylaxis, continue the patient on his Keppra.  For history of diabetes, strict control of blood sugars.  For hyperlipidemia, continue the patient on statin.  GI noted suspect severe gi bleed in setting of elevated INR AC as per medical team   as per her no blood in stools   monitor oral intake     seen with different dtr today 10/13 no new events     Spoke with daughter, Jeffrey, at bedside 10/12 .  She does understand my reasoning and thought process.  Her telephone number is [phone].     46 minutes of time was spent with the patient, plan of care, reviewing data, with greater than 50% of the visit was spent counseling and/or coordinating care with multidisciplinary healthcare team

## 2023-10-13 NOTE — PROGRESS NOTE ADULT - ASSESSMENT
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 Per daughter at bedside, patient's INR level was 7.2 two days ago and was instructed by hematologist to hold coumadin for the past 2 days. Of note, patient had EGD and colonoscopy in 08/2023 revealing gastritis, duodenitis, non-bleeding AVM, and two polyps, one removed (pathology revealing tubular adenoma). Patient was afebrile and HDS on arrival with SBPs 90s and HR 80s. Labs notable for Hgb 5.9, INR 7.01, BUN/Cr 64/1.80, Alk Phos 418. ED ordered for Kcentra, Vit K, and 2U PRBCs.    1Acute blood loss anemia  - monitor cbc  - GI fu  - management as per GI   - no plan for scope at present  - winifred sec to high INR     2 High INR , mechanical valve   - reversed- hold coumadin   - cw  hep gtt  - monitor cbc   - started coumadin     3 Hx of CVA  - neuro fu    4 DM  - uncontrolled   - monitor FS  - basal, bolus   - endo following     5 SOB , tachycardia   - improved-VQ scan low probability     6 MSSA bacteremia, pna  - cw abx  - fu repeat cultures  - ID fu

## 2023-10-13 NOTE — PROGRESS NOTE ADULT - SUBJECTIVE AND OBJECTIVE BOX
Interval History:  no new complaints  daughter present.  no black stools  last transfusion 10/11/2023    Chart reviewed and events noted;   Overnight events:    MEDICATIONS  (STANDING):  albuterol/ipratropium for Nebulization 3 milliLiter(s) Nebulizer every 8 hours  atorvastatin 40 milliGRAM(s) Oral at bedtime  ceFAZolin   IVPB 2000 milliGRAM(s) IV Intermittent every 8 hours  dextrose 5%. 1000 milliLiter(s) (100 mL/Hr) IV Continuous <Continuous>  dextrose 5%. 1000 milliLiter(s) (50 mL/Hr) IV Continuous <Continuous>  dextrose 50% Injectable 25 Gram(s) IV Push once  dextrose 50% Injectable 12.5 Gram(s) IV Push once  dextrose 50% Injectable 25 Gram(s) IV Push once  diltiazem    milliGRAM(s) Oral daily  furosemide    Tablet 40 milliGRAM(s) Oral daily  glucagon  Injectable 1 milliGRAM(s) IntraMuscular once  heparin  Infusion.  Unit(s)/Hr (11 mL/Hr) IV Continuous <Continuous>  influenza  Vaccine (HIGH DOSE) 0.7 milliLiter(s) IntraMuscular once  insulin glargine Injectable (LANTUS) 5 Unit(s) SubCutaneous at bedtime  insulin lispro (ADMELOG) corrective regimen sliding scale   SubCutaneous three times a day before meals  insulin lispro (ADMELOG) corrective regimen sliding scale   SubCutaneous at bedtime  insulin lispro Injectable (ADMELOG) 3 Unit(s) SubCutaneous three times a day before meals  iron sucrose Injectable 100 milliGRAM(s) IV Push every 24 hours  levETIRAcetam 750 milliGRAM(s) Oral two times a day  pantoprazole  Injectable 40 milliGRAM(s) IV Push every 12 hours  sodium chloride 1 Gram(s) Oral two times a day  sucralfate 1 Gram(s) Oral four times a day  urea Oral Powder 15 Gram(s) Oral daily  warfarin 5 milliGRAM(s) Oral daily    MEDICATIONS  (PRN):  dextrose Oral Gel 15 Gram(s) Oral once PRN Blood Glucose LESS THAN 70 milliGRAM(s)/deciliter  guaiFENesin Oral Liquid (Sugar-Free) 200 milliGRAM(s) Oral every 6 hours PRN Cough  heparin   Injectable 5000 Unit(s) IV Push every 6 hours PRN For aPTT less than 40  heparin   Injectable 2500 Unit(s) IV Push every 6 hours PRN For aPTT between 40 - 57  ondansetron Injectable 4 milliGRAM(s) IV Push every 6 hours PRN Nausea and/or Vomiting      Vital Signs Last 24 Hrs  T(C): 37 (13 Oct 2023 12:22), Max: 37 (13 Oct 2023 12:22)  T(F): 98.6 (13 Oct 2023 12:22), Max: 98.6 (13 Oct 2023 12:22)  HR: 83 (13 Oct 2023 12:22) (79 - 86)  BP: 131/72 (13 Oct 2023 12:22) (118/68 - 131/72)  BP(mean): --  RR: 18 (13 Oct 2023 12:22) (17 - 18)  SpO2: 97% (13 Oct 2023 12:22) (96% - 97%)    Parameters below as of 13 Oct 2023 12:22  Patient On (Oxygen Delivery Method): nasal cannula  O2 Flow (L/min): 3      PHYSICAL EXAM  General: adult in NAD  HEENT: clear oropharynx, anicteric sclera, pink conjunctivae  Neck: supple  CV: normal S1S2 with no murmur rubs or gallops  Lungs: clear to auscultation, no wheezes, no rhales  Abdomen: soft non-tender non-distended, no hepato/splenomegaly  Ext: no clubbing cyanosis or edema  Skin: no rashes and no petichiae  Neuro: alert and oriented X3 no focal deficits      LABS:  CBC Full  -  ( 13 Oct 2023 07:45 )  WBC Count : 6.50 K/uL  RBC Count : 3.62 M/uL  Hemoglobin : 9.5 g/dL  Hematocrit : 30.1 %  Platelet Count - Automated : 249 K/uL  Mean Cell Volume : 83.1 fl  Mean Cell Hemoglobin : 26.2 pg  Mean Cell Hemoglobin Concentration : 31.6 gm/dL  Auto Neutrophil # : x  Auto Lymphocyte # : x  Auto Monocyte # : x  Auto Eosinophil # : x  Auto Basophil # : x  Auto Neutrophil % : x  Auto Lymphocyte % : x  Auto Monocyte % : x  Auto Eosinophil % : x  Auto Basophil % : x          PT/INR - ( 13 Oct 2023 07:45 )   PT: 14.6 sec;   INR: 1.26 ratio         PTT - ( 13 Oct 2023 07:45 )  PTT:82.9 sec    fe studies      WBC trend  6.50 K/uL (10-13-23 @ 07:45)  6.58 K/uL (10-12-23 @ 07:30)  7.86 K/uL (10-11-23 @ 07:22)      Hgb trend  9.5 g/dL (10-13-23 @ 07:45)  9.3 g/dL (10-12-23 @ 07:30)  7.4 g/dL (10-11-23 @ 07:22)      plt trend  249 K/uL (10-13-23 @ 07:45)  252 K/uL (10-12-23 @ 07:30)  219 K/uL (10-11-23 @ 07:22)        RADIOLOGY & ADDITIONAL STUDIES:

## 2023-10-13 NOTE — PROGRESS NOTE ADULT - SUBJECTIVE AND OBJECTIVE BOX
[facility]  Shane Murray PA-C  [address] [place] [address]  [phone]      INTERVAL HPI/OVERNIGHT EVENTS:  Pt s/e with family member at bedside translating for pt  Hgb stable no overt GI bleeding  Otherwise no new GI events    MEDICATIONS  (STANDING):  albuterol/ipratropium for Nebulization 3 milliLiter(s) Nebulizer every 8 hours  atorvastatin 40 milliGRAM(s) Oral at bedtime  ceFAZolin   IVPB 2000 milliGRAM(s) IV Intermittent every 8 hours  dextrose 5%. 1000 milliLiter(s) (50 mL/Hr) IV Continuous <Continuous>  dextrose 5%. 1000 milliLiter(s) (100 mL/Hr) IV Continuous <Continuous>  dextrose 50% Injectable 25 Gram(s) IV Push once  dextrose 50% Injectable 12.5 Gram(s) IV Push once  dextrose 50% Injectable 25 Gram(s) IV Push once  diltiazem    milliGRAM(s) Oral daily  furosemide    Tablet 40 milliGRAM(s) Oral daily  glucagon  Injectable 1 milliGRAM(s) IntraMuscular once  heparin  Infusion.  Unit(s)/Hr (11 mL/Hr) IV Continuous <Continuous>  influenza  Vaccine (HIGH DOSE) 0.7 milliLiter(s) IntraMuscular once  insulin glargine Injectable (LANTUS) 5 Unit(s) SubCutaneous at bedtime  insulin lispro (ADMELOG) corrective regimen sliding scale   SubCutaneous three times a day before meals  insulin lispro (ADMELOG) corrective regimen sliding scale   SubCutaneous at bedtime  insulin lispro Injectable (ADMELOG) 3 Unit(s) SubCutaneous three times a day before meals  iron sucrose Injectable 100 milliGRAM(s) IV Push every 24 hours  levETIRAcetam 750 milliGRAM(s) Oral two times a day  pantoprazole  Injectable 40 milliGRAM(s) IV Push every 12 hours  sodium chloride 1 Gram(s) Oral two times a day  sucralfate 1 Gram(s) Oral four times a day  urea Oral Powder 15 Gram(s) Oral daily    MEDICATIONS  (PRN):  dextrose Oral Gel 15 Gram(s) Oral once PRN Blood Glucose LESS THAN 70 milliGRAM(s)/deciliter  guaiFENesin Oral Liquid (Sugar-Free) 200 milliGRAM(s) Oral every 6 hours PRN Cough  heparin   Injectable 5000 Unit(s) IV Push every 6 hours PRN For aPTT less than 40  heparin   Injectable 2500 Unit(s) IV Push every 6 hours PRN For aPTT between 40 - 57  ondansetron Injectable 4 milliGRAM(s) IV Push every 6 hours PRN Nausea and/or Vomiting      Allergies  No Known Allergies      PHYSICAL EXAM:   Vital Signs:  Vital Signs Last 24 Hrs  T(C): 37 (13 Oct 2023 12:22), Max: 37 (13 Oct 2023 12:22)  T(F): 98.6 (13 Oct 2023 12:22), Max: 98.6 (13 Oct 2023 12:22)  HR: 83 (13 Oct 2023 12:22) (79 - 86)  BP: 131/72 (13 Oct 2023 12:22) (118/68 - 131/72)  BP(mean): --  RR: 18 (13 Oct 2023 12:22) (17 - 18)  SpO2: 97% (13 Oct 2023 12:22) (96% - 97%)    Parameters below as of 13 Oct 2023 12:22  Patient On (Oxygen Delivery Method): nasal cannula  O2 Flow (L/min): 3    GENERAL:  Appears stated age  HEENT:  NC/AT  CHEST:  Full & symmetric excursion  HEART:  Regular rhythm  ABDOMEN:  Soft, non-tender, non-distended  EXTEREMITIES:  no cyanosis  SKIN:  No rash  NEURO:  Alert      LABS:                        9.5    6.50  )-----------( 249      ( 13 Oct 2023 07:45 )             30.1           PT/INR - ( 13 Oct 2023 07:45 )   PT: 14.6 sec;   INR: 1.26 ratio         PTT - ( 13 Oct 2023 07:45 )  PTT:82.9 sec

## 2023-10-13 NOTE — PROGRESS NOTE ADULT - SUBJECTIVE AND OBJECTIVE BOX
Patient is a 68y Male whom presented to the hospital with hyponatremia     PAST MEDICAL & SURGICAL HISTORY:  CVA (cerebral vascular accident)      HTN (hypertension)      DM (diabetes mellitus)      History of atrial fibrillation      Right hemiparesis      Aphasia due to acute stroke      Upper GI bleed      Osteomyelitis      S/P CABG (coronary artery bypass graft)      S/P brain surgery      History of aortic valve repair          MEDICATIONS  (STANDING):  atorvastatin 40 milliGRAM(s) Oral at bedtime  levETIRAcetam 750 milliGRAM(s) Oral two times a day  pantoprazole Infusion 8 mG/Hr (10 mL/Hr) IV Continuous <Continuous>  sucralfate 1 Gram(s) Oral four times a day      Allergies    No Known Allergies    Intolerances        SOCIAL HISTORY:  Denies ETOh,Smoking,     FAMILY HISTORY:  FH: coronary artery disease (Sibling)    FH: type 2 diabetes (Sibling)        REVIEW OF SYSTEMS:    CONSTITUTIONAL: No weakness, fevers or chills  RESPIRATORY: No cough, wheezing, hemoptysis; No shortness of breath  CARDIOVASCULAR: No chest pain or palpitations  GASTROINTESTINAL: No abdominal or epigastric pain. No nausea, vomiting,     No diarrhea or constipation. No melena   SKIN: dry                                                         9.5    6.50  )-----------( 249      ( 13 Oct 2023 07:45 )             30.1       CBC Full  -  ( 13 Oct 2023 07:45 )  WBC Count : 6.50 K/uL  RBC Count : 3.62 M/uL  Hemoglobin : 9.5 g/dL  Hematocrit : 30.1 %  Platelet Count - Automated : 249 K/uL  Mean Cell Volume : 83.1 fl  Mean Cell Hemoglobin : 26.2 pg  Mean Cell Hemoglobin Concentration : 31.6 gm/dL  Auto Neutrophil # : x  Auto Lymphocyte # : x  Auto Monocyte # : x  Auto Eosinophil # : x  Auto Basophil # : x  Auto Neutrophil % : x  Auto Lymphocyte % : x  Auto Monocyte % : x  Auto Eosinophil % : x  Auto Basophil % : x              CAPILLARY BLOOD GLUCOSE      POCT Blood Glucose.: 210 mg/dL (13 Oct 2023 12:19)  POCT Blood Glucose.: 118 mg/dL (13 Oct 2023 08:05)  POCT Blood Glucose.: 159 mg/dL (12 Oct 2023 22:32)  POCT Blood Glucose.: 66 mg/dL (12 Oct 2023 21:47)  POCT Blood Glucose.: 70 mg/dL (12 Oct 2023 21:44)  POCT Blood Glucose.: 126 mg/dL (12 Oct 2023 16:56)      Vital Signs Last 24 Hrs  T(C): 37 (13 Oct 2023 12:22), Max: 37 (13 Oct 2023 12:22)  T(F): 98.6 (13 Oct 2023 12:22), Max: 98.6 (13 Oct 2023 12:22)  HR: 83 (13 Oct 2023 12:22) (79 - 86)  BP: 131/72 (13 Oct 2023 12:22) (118/68 - 131/72)  BP(mean): --  RR: 18 (13 Oct 2023 12:22) (17 - 18)  SpO2: 97% (13 Oct 2023 12:22) (96% - 97%)    Parameters below as of 13 Oct 2023 12:22  Patient On (Oxygen Delivery Method): nasal cannula  O2 Flow (L/min): 3          PT/INR - ( 13 Oct 2023 07:45 )   PT: 14.6 sec;   INR: 1.26 ratio         PTT - ( 13 Oct 2023 07:45 )  PTT:82.9 sec                         PHYSICAL EXAM:    Constitutional: NAD  HEENT: conjunctive   clear   Neck:  No JVD  Respiratory: CTAB  Cardiovascular: S1 and S2  Gastrointestinal: BS+, soft, NT/ND  Extremities: No peripheral edema  Neurological:  no focal deficits  Skin: dry   Access: Not applicable

## 2023-10-13 NOTE — PROGRESS NOTE ADULT - SUBJECTIVE AND OBJECTIVE BOX
CAPILLARY BLOOD GLUCOSE      POCT Blood Glucose.: 159 mg/dL (12 Oct 2023 22:32)  POCT Blood Glucose.: 66 mg/dL (12 Oct 2023 21:47)  POCT Blood Glucose.: 70 mg/dL (12 Oct 2023 21:44)  POCT Blood Glucose.: 126 mg/dL (12 Oct 2023 16:56)  POCT Blood Glucose.: 115 mg/dL (12 Oct 2023 12:13)  POCT Blood Glucose.: 75 mg/dL (12 Oct 2023 08:25)      Vital Signs Last 24 Hrs  T(C): 36.9 (13 Oct 2023 04:58), Max: 36.9 (13 Oct 2023 04:58)  T(F): 98.5 (13 Oct 2023 04:58), Max: 98.5 (13 Oct 2023 04:58)  HR: 79 (13 Oct 2023 04:58) (79 - 92)  BP: 118/68 (13 Oct 2023 04:58) (118/68 - 140/72)  BP(mean): --  RR: 17 (13 Oct 2023 04:58) (17 - 18)  SpO2: 96% (13 Oct 2023 04:58) (92% - 97%)    Parameters below as of 13 Oct 2023 04:58  Patient On (Oxygen Delivery Method): nasal cannula        General: WN/WD NAD  Respiratory: CTA B/L  CV: RRR, S1S2, no murmurs, rubs or gallops  Abdominal: Soft, NT, ND +BS, Last BM  Extremities: No edema, + peripheral pulses             atorvastatin 40 milliGRAM(s) Oral at bedtime  dextrose 50% Injectable 25 Gram(s) IV Push once  dextrose 50% Injectable 25 Gram(s) IV Push once  dextrose 50% Injectable 12.5 Gram(s) IV Push once  dextrose Oral Gel 15 Gram(s) Oral once PRN  glucagon  Injectable 1 milliGRAM(s) IntraMuscular once  insulin glargine Injectable (LANTUS) 5 Unit(s) SubCutaneous at bedtime  insulin lispro (ADMELOG) corrective regimen sliding scale   SubCutaneous three times a day before meals  insulin lispro (ADMELOG) corrective regimen sliding scale   SubCutaneous at bedtime  insulin lispro Injectable (ADMELOG) 7 Unit(s) SubCutaneous three times a day before meals

## 2023-10-14 NOTE — PROGRESS NOTE ADULT - SUBJECTIVE AND OBJECTIVE BOX
[facility]  Shane Murray PA-C  [address] [place] [address]  [phone]      INTERVAL HPI/OVERNIGHT EVENTS:  Pt s/e with family at bedside translating for pt  No new GI events or overt GI bleeding    MEDICATIONS  (STANDING):  albuterol/ipratropium for Nebulization 3 milliLiter(s) Nebulizer every 8 hours  atorvastatin 40 milliGRAM(s) Oral at bedtime  ceFAZolin   IVPB 2000 milliGRAM(s) IV Intermittent every 8 hours  dextrose 5%. 1000 milliLiter(s) (50 mL/Hr) IV Continuous <Continuous>  dextrose 5%. 1000 milliLiter(s) (100 mL/Hr) IV Continuous <Continuous>  dextrose 50% Injectable 25 Gram(s) IV Push once  dextrose 50% Injectable 25 Gram(s) IV Push once  dextrose 50% Injectable 12.5 Gram(s) IV Push once  diltiazem    milliGRAM(s) Oral daily  furosemide    Tablet 40 milliGRAM(s) Oral daily  glucagon  Injectable 1 milliGRAM(s) IntraMuscular once  heparin  Infusion.  Unit(s)/Hr (11 mL/Hr) IV Continuous <Continuous>  influenza  Vaccine (HIGH DOSE) 0.7 milliLiter(s) IntraMuscular once  insulin glargine Injectable (LANTUS) 5 Unit(s) SubCutaneous at bedtime  insulin lispro (ADMELOG) corrective regimen sliding scale   SubCutaneous three times a day before meals  insulin lispro (ADMELOG) corrective regimen sliding scale   SubCutaneous at bedtime  insulin lispro Injectable (ADMELOG) 3 Unit(s) SubCutaneous three times a day before meals  levETIRAcetam 750 milliGRAM(s) Oral two times a day  pantoprazole  Injectable 40 milliGRAM(s) IV Push every 12 hours  sodium chloride 1 Gram(s) Oral two times a day  sucralfate 1 Gram(s) Oral four times a day  urea Oral Powder 15 Gram(s) Oral daily  warfarin 4 milliGRAM(s) Oral once    MEDICATIONS  (PRN):  dextrose Oral Gel 15 Gram(s) Oral once PRN Blood Glucose LESS THAN 70 milliGRAM(s)/deciliter  guaiFENesin Oral Liquid (Sugar-Free) 200 milliGRAM(s) Oral every 6 hours PRN Cough  heparin   Injectable 2500 Unit(s) IV Push every 6 hours PRN For aPTT between 40 - 57  heparin   Injectable 5000 Unit(s) IV Push every 6 hours PRN For aPTT less than 40  ondansetron Injectable 4 milliGRAM(s) IV Push every 6 hours PRN Nausea and/or Vomiting      Allergies    No Known Allergies        PHYSICAL EXAM:   Vital Signs:  Vital Signs Last 24 Hrs  T(C): 36.4 (14 Oct 2023 11:16), Max: 37 (13 Oct 2023 12:22)  T(F): 97.5 (14 Oct 2023 11:16), Max: 98.6 (13 Oct 2023 12:22)  HR: 79 (14 Oct 2023 11:16) (74 - 93)  BP: 128/71 (14 Oct 2023 11:16) (123/64 - 131/72)  BP(mean): --  RR: 18 (14 Oct 2023 11:16) (17 - 18)  SpO2: 98% (14 Oct 2023 11:16) (94% - 99%)    Parameters below as of 14 Oct 2023 11:16  Patient On (Oxygen Delivery Method): nasal cannula  O2 Flow (L/min): 2      GENERAL:  Appears stated age  HEENT:  NC/AT  CHEST:  Full & symmetric excursion  HEART:  Regular rhythm  ABDOMEN:  Soft, non-tender, non-distended  EXTEREMITIES:  no cyanosis  SKIN:  No rash  NEURO:  Alert      LABS:                        9.1    7.52  )-----------( 247      ( 14 Oct 2023 06:46 )             29.4           PT/INR - ( 14 Oct 2023 06:46 )   PT: 21.9 sec;   INR: 1.91 ratio         PTT - ( 14 Oct 2023 06:46 )  PTT:183.1 sec

## 2023-10-14 NOTE — PROGRESS NOTE ADULT - SUBJECTIVE AND OBJECTIVE BOX
Patient is a 68y old  Male who presents with a chief complaint of black stool (14 Oct 2023 11:28)    Date of servie : 10-14-23 @ 11:30  INTERVAL HPI/OVERNIGHT EVENTS:  T(C): 36.4 (10-14-23 @ 11:16), Max: 37 (10-13-23 @ 12:22)  HR: 79 (10-14-23 @ 11:16) (74 - 93)  BP: 128/71 (10-14-23 @ 11:16) (123/64 - 131/72)  RR: 18 (10-14-23 @ 11:16) (17 - 18)  SpO2: 98% (10-14-23 @ 11:16) (94% - 99%)  Wt(kg): --  I&O's Summary    13 Oct 2023 07:01  -  14 Oct 2023 07:00  --------------------------------------------------------  IN: 146 mL / OUT: 0 mL / NET: 146 mL        LABS:                        9.1    7.52  )-----------( 247      ( 14 Oct 2023 06:46 )             29.4           PT/INR - ( 14 Oct 2023 06:46 )   PT: 21.9 sec;   INR: 1.91 ratio         PTT - ( 14 Oct 2023 06:46 )  PTT:183.1 sec    CAPILLARY BLOOD GLUCOSE      POCT Blood Glucose.: 106 mg/dL (14 Oct 2023 08:20)  POCT Blood Glucose.: 117 mg/dL (13 Oct 2023 21:27)  POCT Blood Glucose.: 137 mg/dL (13 Oct 2023 16:55)  POCT Blood Glucose.: 210 mg/dL (13 Oct 2023 12:19)            MEDICATIONS  (STANDING):  albuterol/ipratropium for Nebulization 3 milliLiter(s) Nebulizer every 8 hours  atorvastatin 40 milliGRAM(s) Oral at bedtime  ceFAZolin   IVPB 2000 milliGRAM(s) IV Intermittent every 8 hours  dextrose 5%. 1000 milliLiter(s) (100 mL/Hr) IV Continuous <Continuous>  dextrose 5%. 1000 milliLiter(s) (50 mL/Hr) IV Continuous <Continuous>  dextrose 50% Injectable 25 Gram(s) IV Push once  dextrose 50% Injectable 25 Gram(s) IV Push once  dextrose 50% Injectable 12.5 Gram(s) IV Push once  diltiazem    milliGRAM(s) Oral daily  furosemide    Tablet 40 milliGRAM(s) Oral daily  glucagon  Injectable 1 milliGRAM(s) IntraMuscular once  heparin  Infusion.  Unit(s)/Hr (11 mL/Hr) IV Continuous <Continuous>  influenza  Vaccine (HIGH DOSE) 0.7 milliLiter(s) IntraMuscular once  insulin glargine Injectable (LANTUS) 5 Unit(s) SubCutaneous at bedtime  insulin lispro (ADMELOG) corrective regimen sliding scale   SubCutaneous three times a day before meals  insulin lispro (ADMELOG) corrective regimen sliding scale   SubCutaneous at bedtime  insulin lispro Injectable (ADMELOG) 3 Unit(s) SubCutaneous three times a day before meals  levETIRAcetam 750 milliGRAM(s) Oral two times a day  pantoprazole  Injectable 40 milliGRAM(s) IV Push every 12 hours  sodium chloride 1 Gram(s) Oral two times a day  sucralfate 1 Gram(s) Oral four times a day  urea Oral Powder 15 Gram(s) Oral daily  warfarin 4 milliGRAM(s) Oral once    MEDICATIONS  (PRN):  dextrose Oral Gel 15 Gram(s) Oral once PRN Blood Glucose LESS THAN 70 milliGRAM(s)/deciliter  guaiFENesin Oral Liquid (Sugar-Free) 200 milliGRAM(s) Oral every 6 hours PRN Cough  heparin   Injectable 2500 Unit(s) IV Push every 6 hours PRN For aPTT between 40 - 57  heparin   Injectable 5000 Unit(s) IV Push every 6 hours PRN For aPTT less than 40  ondansetron Injectable 4 milliGRAM(s) IV Push every 6 hours PRN Nausea and/or Vomiting          PHYSICAL EXAM:  GENERAL: NAD, well-groomed, well-developed  HEAD:  Atraumatic, Normocephalic  CHEST/LUNG: Clear to percussion bilaterally; No rales, rhonchi, wheezing, or rubs  HEART: Regular rate and rhythm; No murmurs, rubs, or gallops  ABDOMEN: Soft, Nontender, Nondistended; Bowel sounds present  EXTREMITIES:  2+ Peripheral Pulses, No clubbing, cyanosis, or edema  LYMPH: No lymphadenopathy noted  SKIN: No rashes or lesions    Care Discussed with Consultants/Other Providers [ ] YES  [ ] NO

## 2023-10-14 NOTE — PROGRESS NOTE ADULT - ASSESSMENT
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 Per daughter at bedside, patient's INR level was 7.2 two days ago and was instructed by hematologist to hold coumadin for the past 2 days. Of note, patient had EGD and colonoscopy in 08/2023 revealing gastritis, duodenitis, non-bleeding AVM, and two polyps, one removed (pathology revealing tubular adenoma). Patient was afebrile and HDS on arrival with SBPs 90s and HR 80s. Labs notable for Hgb 5.9, INR 7.01, BUN/Cr 64/1.80, Alk Phos 418. ED ordered for Kcentra, Vit K, and 2U PRBCs.    1Acute blood loss anemia  - monitor cbc  - GI fu  - management as per GI   - no plan for scope at present  - winifred sec to high INR     2 High INR , mechanical valve   - reversed- hold coumadin   - cw  hep gtt  - monitor cbc   - check iNR and dose coumadin daily     3 Hx of CVA  - neuro fu    4 DM  - uncontrolled   - monitor FS  - basal, bolus   - endo following     5 SOB , tachycardia   - improved  -VQ scan low probability     6 MSSA bacteremia, pna  - cw abx  - fu repeat cultures  - ID fu

## 2023-10-14 NOTE — PROGRESS NOTE ADULT - SUBJECTIVE AND OBJECTIVE BOX
Date/Time Patient Seen:  		  Referring MD:   Data Reviewed	       Patient is a 68y old  Male who presents with a chief complaint of black stool (13 Oct 2023 16:12)      Subjective/HPI     PAST MEDICAL & SURGICAL HISTORY:  CVA (cerebral vascular accident)    HTN (hypertension)    DM (diabetes mellitus)    Arrhythmia    History of atrial fibrillation    Right hemiparesis    Aphasia due to acute stroke    GI bleed    Iron deficiency anemia    Upper GI bleed    Osteomyelitis    S/P CABG (coronary artery bypass graft)    S/P brain surgery    History of aortic valve repair          Medication list         MEDICATIONS  (STANDING):  albuterol/ipratropium for Nebulization 3 milliLiter(s) Nebulizer every 8 hours  atorvastatin 40 milliGRAM(s) Oral at bedtime  ceFAZolin   IVPB 2000 milliGRAM(s) IV Intermittent every 8 hours  dextrose 5%. 1000 milliLiter(s) (50 mL/Hr) IV Continuous <Continuous>  dextrose 5%. 1000 milliLiter(s) (100 mL/Hr) IV Continuous <Continuous>  dextrose 50% Injectable 25 Gram(s) IV Push once  dextrose 50% Injectable 12.5 Gram(s) IV Push once  dextrose 50% Injectable 25 Gram(s) IV Push once  diltiazem    milliGRAM(s) Oral daily  furosemide    Tablet 40 milliGRAM(s) Oral daily  glucagon  Injectable 1 milliGRAM(s) IntraMuscular once  heparin  Infusion.  Unit(s)/Hr (11 mL/Hr) IV Continuous <Continuous>  influenza  Vaccine (HIGH DOSE) 0.7 milliLiter(s) IntraMuscular once  insulin glargine Injectable (LANTUS) 5 Unit(s) SubCutaneous at bedtime  insulin lispro (ADMELOG) corrective regimen sliding scale   SubCutaneous three times a day before meals  insulin lispro (ADMELOG) corrective regimen sliding scale   SubCutaneous at bedtime  insulin lispro Injectable (ADMELOG) 3 Unit(s) SubCutaneous three times a day before meals  iron sucrose Injectable 100 milliGRAM(s) IV Push every 24 hours  levETIRAcetam 750 milliGRAM(s) Oral two times a day  pantoprazole  Injectable 40 milliGRAM(s) IV Push every 12 hours  sodium chloride 1 Gram(s) Oral two times a day  sucralfate 1 Gram(s) Oral four times a day  urea Oral Powder 15 Gram(s) Oral daily    MEDICATIONS  (PRN):  dextrose Oral Gel 15 Gram(s) Oral once PRN Blood Glucose LESS THAN 70 milliGRAM(s)/deciliter  guaiFENesin Oral Liquid (Sugar-Free) 200 milliGRAM(s) Oral every 6 hours PRN Cough  heparin   Injectable 2500 Unit(s) IV Push every 6 hours PRN For aPTT between 40 - 57  heparin   Injectable 5000 Unit(s) IV Push every 6 hours PRN For aPTT less than 40  ondansetron Injectable 4 milliGRAM(s) IV Push every 6 hours PRN Nausea and/or Vomiting         Vitals log        ICU Vital Signs Last 24 Hrs  T(C): 36.5 (14 Oct 2023 04:28), Max: 37 (13 Oct 2023 12:22)  T(F): 97.7 (14 Oct 2023 04:28), Max: 98.6 (13 Oct 2023 12:22)  HR: 74 (14 Oct 2023 04:28) (74 - 93)  BP: 128/72 (14 Oct 2023 04:28) (123/64 - 131/72)  BP(mean): --  ABP: --  ABP(mean): --  RR: 18 (14 Oct 2023 04:28) (17 - 18)  SpO2: 94% (14 Oct 2023 04:28) (94% - 99%)    O2 Parameters below as of 14 Oct 2023 04:28  Patient On (Oxygen Delivery Method): nasal cannula                 Input and Output:  I&O's Detail    12 Oct 2023 07:01  -  13 Oct 2023 07:00  --------------------------------------------------------  IN:    Heparin Infusion: 80 mL    IV PiggyBack: 50 mL  Total IN: 130 mL    OUT:  Total OUT: 0 mL    Total NET: 130 mL      13 Oct 2023 07:01  -  14 Oct 2023 05:50  --------------------------------------------------------  IN:    Heparin Infusion: 96 mL    Oral Fluid: 50 mL  Total IN: 146 mL    OUT:  Total OUT: 0 mL    Total NET: 146 mL          Lab Data                        9.5    6.50  )-----------( 249      ( 13 Oct 2023 07:45 )             30.1                   Review of Systems	      Objective     Physical Examination    heart s1s2  lung dc BS      Pertinent Lab findings & Imaging      Bryan:  NO   Adequate UO     I&O's Detail    12 Oct 2023 07:01  -  13 Oct 2023 07:00  --------------------------------------------------------  IN:    Heparin Infusion: 80 mL    IV PiggyBack: 50 mL  Total IN: 130 mL    OUT:  Total OUT: 0 mL    Total NET: 130 mL      13 Oct 2023 07:01  -  14 Oct 2023 05:50  --------------------------------------------------------  IN:    Heparin Infusion: 96 mL    Oral Fluid: 50 mL  Total IN: 146 mL    OUT:  Total OUT: 0 mL    Total NET: 146 mL               Discussed with:     Cultures:	        Radiology

## 2023-10-14 NOTE — PROGRESS NOTE ADULT - ASSESSMENT
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 Per daughter at bedside, patient's INR level was 7.2 two days ago and was instructed by hematologist to hold coumadin for the past 2 days. Of note, patient had EGD and colonoscopy in 08/2023 revealing gastritis, duodenitis, non-bleeding AVM, and two polyps, one removed (pathology revealing tubular adenoma). Patient was afebrile and HDS on arrival with SBPs 90s and HR 80s. Labs notable for Hgb 5.9, INR 7.01, BUN/Cr 64/1.80, Alk Phos 418. ED ordered for Kcentra, Vit K, and 2U PRBCs. (05 Oct 2023 15:48)    hyponatremia improved urea Oral Powder 15 Gram(s) Oral daily  ,    sodium chloride 1 Gram(s) Oral two times a day  will check ua , urine osmolality , urine sodium , urine uric acid , serum sodium , serum osmolality , serum uric acid , f/u with hyponatremia work up , f/u with bmp , monitor i and o    ACUTE RENAL FAILURE:   Serum creatinine is  improving   There is no progression . No uremic symptoms  No evidence of anemia .  Fluid status stable.  Will continue to avoid nephrotoxic drugs.  Patient remains asymptomatic.   Continue current therapy.       BP monitoring,continue current antihypertensive meds, low salt diet,followup with PMD in 1-2 weeks  diltiazem    milliGRAM(s) Oral daily    f/u  blood and urine cx,serial lactate levels,monitor vitals closley,ivfs hydration,monitor urine output and renal profile  ceFAZolin   IVPB 2000 milliGRAM(s) IV Intermittent every 8 hours

## 2023-10-14 NOTE — PROGRESS NOTE ADULT - SUBJECTIVE AND OBJECTIVE BOX
Patient is a 68y Male whom presented to the hospital with hyponatremia     PAST MEDICAL & SURGICAL HISTORY:  CVA (cerebral vascular accident)      HTN (hypertension)      DM (diabetes mellitus)      History of atrial fibrillation      Right hemiparesis      Aphasia due to acute stroke      Upper GI bleed      Osteomyelitis      S/P CABG (coronary artery bypass graft)      S/P brain surgery      History of aortic valve repair          MEDICATIONS  (STANDING):  atorvastatin 40 milliGRAM(s) Oral at bedtime  levETIRAcetam 750 milliGRAM(s) Oral two times a day  pantoprazole Infusion 8 mG/Hr (10 mL/Hr) IV Continuous <Continuous>  sucralfate 1 Gram(s) Oral four times a day      Allergies    No Known Allergies    Intolerances        SOCIAL HISTORY:  Denies ETOh,Smoking,     FAMILY HISTORY:  FH: coronary artery disease (Sibling)    FH: type 2 diabetes (Sibling)        REVIEW OF SYSTEMS:    CONSTITUTIONAL: No weakness, fevers or chills  RESPIRATORY: No cough, wheezing, hemoptysis; No shortness of breath  CARDIOVASCULAR: No chest pain or palpitations  GASTROINTESTINAL: No abdominal or epigastric pain. No nausea, vomiting,     No diarrhea or constipation. No melena   SKIN: dry                                                                               9.1    7.52  )-----------( 247      ( 14 Oct 2023 06:46 )             29.4       CBC Full  -  ( 14 Oct 2023 06:46 )  WBC Count : 7.52 K/uL  RBC Count : 3.45 M/uL  Hemoglobin : 9.1 g/dL  Hematocrit : 29.4 %  Platelet Count - Automated : 247 K/uL  Mean Cell Volume : 85.2 fl  Mean Cell Hemoglobin : 26.4 pg  Mean Cell Hemoglobin Concentration : 31.0 gm/dL  Auto Neutrophil # : x  Auto Lymphocyte # : x  Auto Monocyte # : x  Auto Eosinophil # : x  Auto Basophil # : x  Auto Neutrophil % : x  Auto Lymphocyte % : x  Auto Monocyte % : x  Auto Eosinophil % : x  Auto Basophil % : x              CAPILLARY BLOOD GLUCOSE      POCT Blood Glucose.: 106 mg/dL (14 Oct 2023 08:20)  POCT Blood Glucose.: 117 mg/dL (13 Oct 2023 21:27)  POCT Blood Glucose.: 137 mg/dL (13 Oct 2023 16:55)  POCT Blood Glucose.: 210 mg/dL (13 Oct 2023 12:19)      Vital Signs Last 24 Hrs  T(C): 36.4 (14 Oct 2023 11:16), Max: 37 (13 Oct 2023 12:22)  T(F): 97.5 (14 Oct 2023 11:16), Max: 98.6 (13 Oct 2023 12:22)  HR: 79 (14 Oct 2023 11:16) (74 - 93)  BP: 128/71 (14 Oct 2023 11:16) (123/64 - 131/72)  BP(mean): --  RR: 18 (14 Oct 2023 11:16) (17 - 18)  SpO2: 98% (14 Oct 2023 11:16) (94% - 99%)    Parameters below as of 14 Oct 2023 11:16  Patient On (Oxygen Delivery Method): nasal cannula  O2 Flow (L/min): 2          PT/INR - ( 14 Oct 2023 06:46 )   PT: 21.9 sec;   INR: 1.91 ratio         PTT - ( 14 Oct 2023 06:46 )  PTT:183.1 sec  PHYSICAL EXAM:    Constitutional: NAD  HEENT: conjunctive   clear   Neck:  No JVD  Respiratory: CTAB  Cardiovascular: S1 and S2  Gastrointestinal: BS+, soft, NT/ND  Extremities: No peripheral edema  Neurological:  no focal deficits  Skin: dry   Access: Not applicable

## 2023-10-14 NOTE — PROGRESS NOTE ADULT - SUBJECTIVE AND OBJECTIVE BOX
Neurology follow up note    NAIB [id number]      Interval History:    Patient feels ok no new complaints.    Allergies    No Known Allergies    Intolerances        MEDICATIONS    albuterol/ipratropium for Nebulization 3 milliLiter(s) Nebulizer every 8 hours  atorvastatin 40 milliGRAM(s) Oral at bedtime  ceFAZolin   IVPB 2000 milliGRAM(s) IV Intermittent every 8 hours  dextrose 5%. 1000 milliLiter(s) IV Continuous <Continuous>  dextrose 5%. 1000 milliLiter(s) IV Continuous <Continuous>  dextrose 50% Injectable 25 Gram(s) IV Push once  dextrose 50% Injectable 12.5 Gram(s) IV Push once  dextrose 50% Injectable 25 Gram(s) IV Push once  dextrose Oral Gel 15 Gram(s) Oral once PRN  diltiazem    milliGRAM(s) Oral daily  furosemide    Tablet 40 milliGRAM(s) Oral daily  glucagon  Injectable 1 milliGRAM(s) IntraMuscular once  guaiFENesin Oral Liquid (Sugar-Free) 200 milliGRAM(s) Oral every 6 hours PRN  heparin   Injectable 5000 Unit(s) IV Push every 6 hours PRN  heparin   Injectable 2500 Unit(s) IV Push every 6 hours PRN  heparin  Infusion.  Unit(s)/Hr IV Continuous <Continuous>  influenza  Vaccine (HIGH DOSE) 0.7 milliLiter(s) IntraMuscular once  insulin glargine Injectable (LANTUS) 5 Unit(s) SubCutaneous at bedtime  insulin lispro (ADMELOG) corrective regimen sliding scale   SubCutaneous three times a day before meals  insulin lispro (ADMELOG) corrective regimen sliding scale   SubCutaneous at bedtime  insulin lispro Injectable (ADMELOG) 3 Unit(s) SubCutaneous three times a day before meals  iron sucrose Injectable 100 milliGRAM(s) IV Push every 24 hours  levETIRAcetam 750 milliGRAM(s) Oral two times a day  ondansetron Injectable 4 milliGRAM(s) IV Push every 6 hours PRN  pantoprazole  Injectable 40 milliGRAM(s) IV Push every 12 hours  sodium chloride 1 Gram(s) Oral two times a day  sucralfate 1 Gram(s) Oral four times a day  urea Oral Powder 15 Gram(s) Oral daily              Vital Signs Last 24 Hrs  T(C): 36.5 (14 Oct 2023 04:28), Max: 37 (13 Oct 2023 12:22)  T(F): 97.7 (14 Oct 2023 04:28), Max: 98.6 (13 Oct 2023 12:22)  HR: 74 (14 Oct 2023 04:28) (74 - 93)  BP: 128/72 (14 Oct 2023 04:28) (123/64 - 131/72)  BP(mean): --  RR: 18 (14 Oct 2023 04:28) (17 - 18)  SpO2: 94% (14 Oct 2023 04:28) (94% - 99%)    Parameters below as of 14 Oct 2023 04:28  Patient On (Oxygen Delivery Method): nasal cannula      REVIEW OF SYSTEMS:  Completely limited secondary to the patient repeats words over, has severe expressive aphasia, but had been in his normal state of health as per daughter.    PHYSICAL EXAMINATION:  HEENT:  Head:  Normocephalic, atraumatic.  Eyes:  No scleral icterus.  Ears:  Hard to evaluate secondary to he could not answer questions accordingly but appears to follow commands.  NECK:  Supple.  RESPIRATORY:  Air entry bilaterally.  CARDIOVASCULAR:  S1 and S2 heard.  ABDOMEN:  Soft and nontender.  EXTREMITIES:  Positive discoloration was noted on the left toe.     NEUROLOGIC:  The patient was awake, alert.  On-off blink to visual threat.  Positive expressive aphasia.  Will say a few words but repeat the same words over.  Right upper and right lower were 0/5 with increased tone.  Right hemiplegia is his baseline.  Left upper and left lower were 4/5.      LABS:  CBC Full  -  ( 14 Oct 2023 06:46 )  WBC Count : 7.52 K/uL  RBC Count : 3.45 M/uL  Hemoglobin : 9.1 g/dL  Hematocrit : 29.4 %  Platelet Count - Automated : 247 K/uL  Mean Cell Volume : 85.2 fl  Mean Cell Hemoglobin : 26.4 pg  Mean Cell Hemoglobin Concentration : 31.0 gm/dL  Auto Neutrophil # : x  Auto Lymphocyte # : x  Auto Monocyte # : x  Auto Eosinophil # : x  Auto Basophil # : x  Auto Neutrophil % : x  Auto Lymphocyte % : x  Auto Monocyte % : x  Auto Eosinophil % : x  Auto Basophil % : x            Hemoglobin A1C:       Vitamin B12   PT/INR - ( 13 Oct 2023 07:45 )   PT: 14.6 sec;   INR: 1.26 ratio         PTT - ( 13 Oct 2023 07:45 )  PTT:82.9 sec      RADIOLOGY        ANALYSIS AND PLAN:  This is a 68-year-old with a history of cerebrovascular accident, atrial fibrillation, now with possibly gastrointestinal bleed.  For history of cerebrovascular accident and atrial fibrillation, the patient is on multiple blood thinning medications.  The patient's warfarin INR is significantly elevated.  For now, I would recommend gastrointestinal workup, when possible to prevent further cerebrovascular accident, would recommend to reinstitute anticoagulation, but possibly an alternative to warfarin secondary to elevation of INR, unclear if the patient needs to be on antiplatelets as well.  If the patient cannot have strong anticoagulation, then we will recommend at least a baby aspirin if possible.  For history of seizure prophylaxis, continue the patient on his Keppra.  For history of diabetes, strict control of blood sugars.  For hyperlipidemia, continue the patient on statin.  GI noted suspect severe gi bleed in setting of elevated INR AC as per medical team   as per her no blood in stools   monitor oral intake     seen with different dtr today 10/13 no new events     Spoke with daughter, Jeffrey, at bedside 10/12 .  She does understand my reasoning and thought process.  Her telephone number is [phone].  Greater than 46 minutes of time was spent with the patient, plan of care, reviewing data, speaking to the family and  50% of the visit was spent counseling and/or coordinating care with multidisciplinary healthcare team Neurology follow up note    NAIB [id number]      Interval History:    Patient feels ok no new complaints.    Allergies    No Known Allergies    Intolerances        MEDICATIONS    albuterol/ipratropium for Nebulization 3 milliLiter(s) Nebulizer every 8 hours  atorvastatin 40 milliGRAM(s) Oral at bedtime  ceFAZolin   IVPB 2000 milliGRAM(s) IV Intermittent every 8 hours  dextrose 5%. 1000 milliLiter(s) IV Continuous <Continuous>  dextrose 5%. 1000 milliLiter(s) IV Continuous <Continuous>  dextrose 50% Injectable 25 Gram(s) IV Push once  dextrose 50% Injectable 12.5 Gram(s) IV Push once  dextrose 50% Injectable 25 Gram(s) IV Push once  dextrose Oral Gel 15 Gram(s) Oral once PRN  diltiazem    milliGRAM(s) Oral daily  furosemide    Tablet 40 milliGRAM(s) Oral daily  glucagon  Injectable 1 milliGRAM(s) IntraMuscular once  guaiFENesin Oral Liquid (Sugar-Free) 200 milliGRAM(s) Oral every 6 hours PRN  heparin   Injectable 5000 Unit(s) IV Push every 6 hours PRN  heparin   Injectable 2500 Unit(s) IV Push every 6 hours PRN  heparin  Infusion.  Unit(s)/Hr IV Continuous <Continuous>  influenza  Vaccine (HIGH DOSE) 0.7 milliLiter(s) IntraMuscular once  insulin glargine Injectable (LANTUS) 5 Unit(s) SubCutaneous at bedtime  insulin lispro (ADMELOG) corrective regimen sliding scale   SubCutaneous three times a day before meals  insulin lispro (ADMELOG) corrective regimen sliding scale   SubCutaneous at bedtime  insulin lispro Injectable (ADMELOG) 3 Unit(s) SubCutaneous three times a day before meals  iron sucrose Injectable 100 milliGRAM(s) IV Push every 24 hours  levETIRAcetam 750 milliGRAM(s) Oral two times a day  ondansetron Injectable 4 milliGRAM(s) IV Push every 6 hours PRN  pantoprazole  Injectable 40 milliGRAM(s) IV Push every 12 hours  sodium chloride 1 Gram(s) Oral two times a day  sucralfate 1 Gram(s) Oral four times a day  urea Oral Powder 15 Gram(s) Oral daily              Vital Signs Last 24 Hrs  T(C): 36.5 (14 Oct 2023 04:28), Max: 37 (13 Oct 2023 12:22)  T(F): 97.7 (14 Oct 2023 04:28), Max: 98.6 (13 Oct 2023 12:22)  HR: 74 (14 Oct 2023 04:28) (74 - 93)  BP: 128/72 (14 Oct 2023 04:28) (123/64 - 131/72)  BP(mean): --  RR: 18 (14 Oct 2023 04:28) (17 - 18)  SpO2: 94% (14 Oct 2023 04:28) (94% - 99%)    Parameters below as of 14 Oct 2023 04:28  Patient On (Oxygen Delivery Method): nasal cannula      REVIEW OF SYSTEMS:  Completely limited secondary to the patient repeats words over, has severe expressive aphasia, but had been in his normal state of health as per daughter.    PHYSICAL EXAMINATION:  HEENT:  Head:  Normocephalic, atraumatic.  Eyes:  No scleral icterus.  Ears:  Hard to evaluate secondary to he could not answer questions accordingly but appears to follow commands.  NECK:  Supple.  RESPIRATORY:  Air entry bilaterally.  CARDIOVASCULAR:  S1 and S2 heard.  ABDOMEN:  Soft and nontender.  EXTREMITIES:  Positive discoloration was noted on the left toe.     NEUROLOGIC:  The patient was awake, alert.  On-off blink to visual threat.  Positive expressive aphasia.  Will say a few words but repeat the same words over.  Right upper and right lower were 0/5 with increased tone.  Right hemiplegia is his baseline.  Left upper and left lower were 4/5.      LABS:  CBC Full  -  ( 14 Oct 2023 06:46 )  WBC Count : 7.52 K/uL  RBC Count : 3.45 M/uL  Hemoglobin : 9.1 g/dL  Hematocrit : 29.4 %  Platelet Count - Automated : 247 K/uL  Mean Cell Volume : 85.2 fl  Mean Cell Hemoglobin : 26.4 pg  Mean Cell Hemoglobin Concentration : 31.0 gm/dL  Auto Neutrophil # : x  Auto Lymphocyte # : x  Auto Monocyte # : x  Auto Eosinophil # : x  Auto Basophil # : x  Auto Neutrophil % : x  Auto Lymphocyte % : x  Auto Monocyte % : x  Auto Eosinophil % : x  Auto Basophil % : x            Hemoglobin A1C:       Vitamin B12   PT/INR - ( 13 Oct 2023 07:45 )   PT: 14.6 sec;   INR: 1.26 ratio         PTT - ( 13 Oct 2023 07:45 )  PTT:82.9 sec      RADIOLOGY        ANALYSIS AND PLAN:  This is a 68-year-old with a history of cerebrovascular accident, atrial fibrillation, now with possibly gastrointestinal bleed.  For history of cerebrovascular accident and atrial fibrillation, the patient is on multiple blood thinning medications.  The patient's warfarin INR is significantly elevated.  For now, I would recommend gastrointestinal workup, when possible to prevent further cerebrovascular accident, would recommend to reinstitute anticoagulation, but possibly an alternative to warfarin secondary to elevation of INR, unclear if the patient needs to be on antiplatelets as well.  If the patient cannot have strong anticoagulation, then we will recommend at least a baby aspirin if possible.  For history of seizure prophylaxis, continue the patient on his Keppra.  For history of diabetes, strict control of blood sugars.  For hyperlipidemia, continue the patient on statin.  GI noted suspect severe gi bleed in setting of elevated INR AC as per medical team   as per her no blood in stools   monitor oral intake     seen with different son today 10/14 no new events     Spoke with daughter, Jeffrey, at bedside 10/12 .  She does understand my reasoning and thought process.  Her telephone number is [phone].  Greater than 46 minutes of time was spent with the patient, plan of care, reviewing data, speaking to the family and  50% of the visit was spent counseling and/or coordinating care with multidisciplinary healthcare team

## 2023-10-14 NOTE — PROGRESS NOTE ADULT - SUBJECTIVE AND OBJECTIVE BOX
[facility]  ADAM Nava Y. Patel, S. Shah, G. Casimir  [phone]  ([phone] - weekdays after 5pm and weekends)    Name: ARNIE DELGADILLO  Age/Gender: 68y Male  MRN: [id number]    Interval History:  Patient seen and examined this afternoon  No new complaints noted. Son at bedside  Notes reviewed  No concerning overnight events  Afebrile   Allergies: No Known Allergies      Objective:  Vitals:   T(F): 97.5 (10-14-23 @ 11:16), Max: 98.5 (10-13-23 @ 20:45)  HR: 79 (10-14-23 @ 11:16) (74 - 93)  BP: 128/71 (10-14-23 @ 11:16) (123/64 - 128/72)  RR: 18 (10-14-23 @ 11:16) (17 - 18)  SpO2: 98% (10-14-23 @ 11:16) (94% - 99%)  Physical Examination:  General: no acute distress  HEENT: NC/AT, anicteric, neck supple  Respiratory: decreased breath sounds b/l  Cardiovascular: S1 and S2 present, normal rate   Gastrointestinal: soft, nontender , nondistended  Extremities: no edema, no cyanosis  Skin: no visible rash    Laboratory Studies:  CBC:                       9.1    7.52  )-----------( 247      ( 14 Oct 2023 06:46 )             29.4     WBC Trend:  7.52 10-14-23 @ 06:46  6.50 10-13-23 @ 07:45  6.58 10-12-23 @ 07:30  7.86 10-11-23 @ 07:22  8.73 10-10-23 @ 14:15  8.58 10-10-23 @ 06:50  7.58 10-09-23 @ 07:40  11.70 10-08-23 @ 10:40  14.07 10-08-23 @ 06:45  14.83 10-08-23 @ 01:41  18.60 10-07-23 @ 18:45    CMP:   Creatinine: 1.40 mg/dL (10-11-23 @ 07:22)  Creatinine: 1.60 mg/dL (10-10-23 @ 06:51)  Creatinine: 1.70 mg/dL (10-09-23 @ 07:40)  Creatinine: 1.60 mg/dL (10-08-23 @ 06:45)    Microbiology: reviewed     Culture - Blood (collected 10-10-23 @ 20:38)  Source: .Blood Blood-Peripheral  Preliminary Report (10-14-23 @ 02:01):    No growth at 72 Hours    Culture - Blood (collected 10-10-23 @ 20:32)  Source: .Blood Blood-Peripheral  Preliminary Report (10-14-23 @ 01:02):    No growth at 72 Hours    Culture - Blood (collected 10-08-23 @ 11:40)  Source: .Blood Blood-Peripheral  Final Report (10-13-23 @ 17:00):    No growth at 5 days    Culture - Blood (collected 10-08-23 @ 10:40)  Source: .Blood Blood-Peripheral  Gram Stain (10-10-23 @ 19:53):    Growth in anaerobic bottle: Gram positive cocci in pairs  Final Report (10-13-23 @ 11:20):    Growth in anaerobic bottle: Staphylococcus aureus    Direct identification is available within approximately 3-5    hours either by Blood Panel Multiplexed PCR or Direct    MALDI-TOF. Details: [email or web address]  Organism: Blood Culture PCR  Staphylococcus aureus (10-13-23 @ 11:20)  Organism: Staphylococcus aureus (10-13-23 @ 11:20)      Method Type: MARY      -  Ampicillin/Sulbactam: S <=8/4      -  Cefazolin: S <=4      -  Clindamycin: S <=0.25      -  Erythromycin: S <=0.25      -  Gentamicin: S <=1 Should not be used as monotherapy      -  Oxacillin: S <=0.25 Oxacillin predicts susceptibility for dicloxacillin, methicillin, and nafcillin      -  Rifampin: S <=1 Should not be used as monotherapy      -  Tetracycline: S <=1      -  Trimethoprim/Sulfamethoxazole: S <=0.5/9.5      -  Vancomycin: S 1  Organism: Blood Culture PCR (10-13-23 @ 11:20)      Method Type: PCR      -  Methicillin SENSITIVE Staphylococcus aureus (MSSA): Detec Any isolate of Staphylococcus aureus from a blood culture is NOT considered a contaminant.    Radiology: reviewed     Medications:  albuterol/ipratropium for Nebulization 3 milliLiter(s) Nebulizer every 8 hours  atorvastatin 40 milliGRAM(s) Oral at bedtime  ceFAZolin   IVPB 2000 milliGRAM(s) IV Intermittent every 8 hours  dextrose 5%. 1000 milliLiter(s) IV Continuous <Continuous>  dextrose 5%. 1000 milliLiter(s) IV Continuous <Continuous>  dextrose 50% Injectable 25 Gram(s) IV Push once  dextrose 50% Injectable 12.5 Gram(s) IV Push once  dextrose 50% Injectable 25 Gram(s) IV Push once  dextrose Oral Gel 15 Gram(s) Oral once PRN  diltiazem    milliGRAM(s) Oral daily  furosemide    Tablet 40 milliGRAM(s) Oral daily  glucagon  Injectable 1 milliGRAM(s) IntraMuscular once  guaiFENesin Oral Liquid (Sugar-Free) 200 milliGRAM(s) Oral every 6 hours PRN  heparin   Injectable 2500 Unit(s) IV Push every 6 hours PRN  heparin   Injectable 5000 Unit(s) IV Push every 6 hours PRN  heparin  Infusion.  Unit(s)/Hr IV Continuous <Continuous>  influenza  Vaccine (HIGH DOSE) 0.7 milliLiter(s) IntraMuscular once  insulin glargine Injectable (LANTUS) 5 Unit(s) SubCutaneous at bedtime  insulin lispro (ADMELOG) corrective regimen sliding scale   SubCutaneous three times a day before meals  insulin lispro (ADMELOG) corrective regimen sliding scale   SubCutaneous at bedtime  insulin lispro Injectable (ADMELOG) 3 Unit(s) SubCutaneous three times a day before meals  levETIRAcetam 750 milliGRAM(s) Oral two times a day  ondansetron Injectable 4 milliGRAM(s) IV Push every 6 hours PRN  pantoprazole  Injectable 40 milliGRAM(s) IV Push every 12 hours  sodium chloride 1 Gram(s) Oral two times a day  sucralfate 1 Gram(s) Oral four times a day  urea Oral Powder 15 Gram(s) Oral daily  warfarin 2.5 milliGRAM(s) Oral once    Current Antimicrobials:  ceFAZolin   IVPB 2000 milliGRAM(s) IV Intermittent every 8 hours    Prior/Completed Antimicrobials:  piperacillin/tazobactam IVPB.  piperacillin/tazobactam IVPB.-   [facility]  ADAM Nava Y. Patel, S. Shah, G. Casimir  [phone]  ([phone] - weekdays after 5pm and weekends)    Name: ARNIE DELGADILLO  Age/Gender: 68y Male  MRN: [id number]    Interval History:  Patient seen and examined this afternoon  No new complaints noted. Son at bedside  Notes reviewed  No concerning overnight events  Afebrile   Allergies: No Known Allergies      Objective:  Vitals:   T(F): 97.5 (10-14-23 @ 11:16), Max: 98.5 (10-13-23 @ 20:45)  HR: 79 (10-14-23 @ 11:16) (74 - 93)  BP: 128/71 (10-14-23 @ 11:16) (123/64 - 128/72)  RR: 18 (10-14-23 @ 11:16) (17 - 18)  SpO2: 98% (10-14-23 @ 11:16) (94% - 99%)  Physical Examination:  General: no acute distress  HEENT: L hemicraniectomy, anicteric, neck supple  Respiratory: decreased breath sounds b/l  Cardiovascular: S1 and S2 present, normal rate   Gastrointestinal: soft, nontender , nondistended  Extremities: no edema, no cyanosis  Skin: no visible rash    Laboratory Studies:  CBC:                       9.1    7.52  )-----------( 247      ( 14 Oct 2023 06:46 )             29.4     WBC Trend:  7.52 10-14-23 @ 06:46  6.50 10-13-23 @ 07:45  6.58 10-12-23 @ 07:30  7.86 10-11-23 @ 07:22  8.73 10-10-23 @ 14:15  8.58 10-10-23 @ 06:50  7.58 10-09-23 @ 07:40  11.70 10-08-23 @ 10:40  14.07 10-08-23 @ 06:45  14.83 10-08-23 @ 01:41  18.60 10-07-23 @ 18:45    CMP:   Creatinine: 1.40 mg/dL (10-11-23 @ 07:22)  Creatinine: 1.60 mg/dL (10-10-23 @ 06:51)  Creatinine: 1.70 mg/dL (10-09-23 @ 07:40)  Creatinine: 1.60 mg/dL (10-08-23 @ 06:45)    Microbiology: reviewed     Culture - Blood (collected 10-10-23 @ 20:38)  Source: .Blood Blood-Peripheral  Preliminary Report (10-14-23 @ 02:01):    No growth at 72 Hours    Culture - Blood (collected 10-10-23 @ 20:32)  Source: .Blood Blood-Peripheral  Preliminary Report (10-14-23 @ 01:02):    No growth at 72 Hours    Culture - Blood (collected 10-08-23 @ 11:40)  Source: .Blood Blood-Peripheral  Final Report (10-13-23 @ 17:00):    No growth at 5 days    Culture - Blood (collected 10-08-23 @ 10:40)  Source: .Blood Blood-Peripheral  Gram Stain (10-10-23 @ 19:53):    Growth in anaerobic bottle: Gram positive cocci in pairs  Final Report (10-13-23 @ 11:20):    Growth in anaerobic bottle: Staphylococcus aureus    Direct identification is available within approximately 3-5    hours either by Blood Panel Multiplexed PCR or Direct    MALDI-TOF. Details: [email or web address]  Organism: Blood Culture PCR  Staphylococcus aureus (10-13-23 @ 11:20)  Organism: Staphylococcus aureus (10-13-23 @ 11:20)      Method Type: MARY      -  Ampicillin/Sulbactam: S <=8/4      -  Cefazolin: S <=4      -  Clindamycin: S <=0.25      -  Erythromycin: S <=0.25      -  Gentamicin: S <=1 Should not be used as monotherapy      -  Oxacillin: S <=0.25 Oxacillin predicts susceptibility for dicloxacillin, methicillin, and nafcillin      -  Rifampin: S <=1 Should not be used as monotherapy      -  Tetracycline: S <=1      -  Trimethoprim/Sulfamethoxazole: S <=0.5/9.5      -  Vancomycin: S 1  Organism: Blood Culture PCR (10-13-23 @ 11:20)      Method Type: PCR      -  Methicillin SENSITIVE Staphylococcus aureus (MSSA): Detec Any isolate of Staphylococcus aureus from a blood culture is NOT considered a contaminant.    Radiology: reviewed     Medications:  albuterol/ipratropium for Nebulization 3 milliLiter(s) Nebulizer every 8 hours  atorvastatin 40 milliGRAM(s) Oral at bedtime  ceFAZolin   IVPB 2000 milliGRAM(s) IV Intermittent every 8 hours  dextrose 5%. 1000 milliLiter(s) IV Continuous <Continuous>  dextrose 5%. 1000 milliLiter(s) IV Continuous <Continuous>  dextrose 50% Injectable 25 Gram(s) IV Push once  dextrose 50% Injectable 12.5 Gram(s) IV Push once  dextrose 50% Injectable 25 Gram(s) IV Push once  dextrose Oral Gel 15 Gram(s) Oral once PRN  diltiazem    milliGRAM(s) Oral daily  furosemide    Tablet 40 milliGRAM(s) Oral daily  glucagon  Injectable 1 milliGRAM(s) IntraMuscular once  guaiFENesin Oral Liquid (Sugar-Free) 200 milliGRAM(s) Oral every 6 hours PRN  heparin   Injectable 2500 Unit(s) IV Push every 6 hours PRN  heparin   Injectable 5000 Unit(s) IV Push every 6 hours PRN  heparin  Infusion.  Unit(s)/Hr IV Continuous <Continuous>  influenza  Vaccine (HIGH DOSE) 0.7 milliLiter(s) IntraMuscular once  insulin glargine Injectable (LANTUS) 5 Unit(s) SubCutaneous at bedtime  insulin lispro (ADMELOG) corrective regimen sliding scale   SubCutaneous three times a day before meals  insulin lispro (ADMELOG) corrective regimen sliding scale   SubCutaneous at bedtime  insulin lispro Injectable (ADMELOG) 3 Unit(s) SubCutaneous three times a day before meals  levETIRAcetam 750 milliGRAM(s) Oral two times a day  ondansetron Injectable 4 milliGRAM(s) IV Push every 6 hours PRN  pantoprazole  Injectable 40 milliGRAM(s) IV Push every 12 hours  sodium chloride 1 Gram(s) Oral two times a day  sucralfate 1 Gram(s) Oral four times a day  urea Oral Powder 15 Gram(s) Oral daily  warfarin 2.5 milliGRAM(s) Oral once    Current Antimicrobials:  ceFAZolin   IVPB 2000 milliGRAM(s) IV Intermittent every 8 hours    Prior/Completed Antimicrobials:  piperacillin/tazobactam IVPB.  piperacillin/tazobactam IVPB.-

## 2023-10-14 NOTE — PROGRESS NOTE ADULT - ASSESSMENT
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   ID c/s on HD#3 for fever to 102F overnight    Acute Multifocal PNA/AHRF on NC  - fever to 102 overnight, leukocytosis to 18 on 10/7  - CT chest showing multifocal PNA  - S/p zosyn 10/7-10/11; Abx tailored as below    MSSA Bacteremia  - noted Culture - Blood (10.08.23 @ 10:40)-  Methicillin SENSITIVE Staphylococcus aureus (MSSA)  - repeat blood cultures collected 10/10 20:38, NGTD  - c/w Cefazolin 2 grams IV q8 started 10/11  - BCx NGTD x72H, pt will need midline and LT IV Abx--midline ordered  Additional care per primary team    D/w patient and son at bedside  Karen Grimes M.D.  [facility], Division of Infectious Diseases  [phone]  After 5pm on weekdays and all day on weekends - please call [phone]

## 2023-10-15 NOTE — PROGRESS NOTE ADULT - SUBJECTIVE AND OBJECTIVE BOX
Patient is a 68y Male whom presented to the hospital with hyponatremia     PAST MEDICAL & SURGICAL HISTORY:  CVA (cerebral vascular accident)      HTN (hypertension)      DM (diabetes mellitus)      History of atrial fibrillation      Right hemiparesis      Aphasia due to acute stroke      Upper GI bleed      Osteomyelitis      S/P CABG (coronary artery bypass graft)      S/P brain surgery      History of aortic valve repair          MEDICATIONS  (STANDING):  atorvastatin 40 milliGRAM(s) Oral at bedtime  levETIRAcetam 750 milliGRAM(s) Oral two times a day  pantoprazole Infusion 8 mG/Hr (10 mL/Hr) IV Continuous <Continuous>  sucralfate 1 Gram(s) Oral four times a day      Allergies    No Known Allergies    Intolerances        SOCIAL HISTORY:  Denies ETOh,Smoking,     FAMILY HISTORY:  FH: coronary artery disease (Sibling)    FH: type 2 diabetes (Sibling)        REVIEW OF SYSTEMS:    CONSTITUTIONAL: No weakness, fevers or chills  RESPIRATORY: No cough, wheezing, hemoptysis; No shortness of breath  CARDIOVASCULAR: No chest pain or palpitations  GASTROINTESTINAL: No abdominal or epigastric pain. No nausea, vomiting,     No diarrhea or constipation. No melena   SKIN: dry                                                                10.0   8.31  )-----------( 275      ( 15 Oct 2023 10:20 )             32.2       CBC Full  -  ( 15 Oct 2023 10:20 )  WBC Count : 8.31 K/uL  RBC Count : 3.79 M/uL  Hemoglobin : 10.0 g/dL  Hematocrit : 32.2 %  Platelet Count - Automated : 275 K/uL  Mean Cell Volume : 85.0 fl  Mean Cell Hemoglobin : 26.4 pg  Mean Cell Hemoglobin Concentration : 31.1 gm/dL  Auto Neutrophil # : x  Auto Lymphocyte # : x  Auto Monocyte # : x  Auto Eosinophil # : x  Auto Basophil # : x  Auto Neutrophil % : x  Auto Lymphocyte % : x  Auto Monocyte % : x  Auto Eosinophil % : x  Auto Basophil % : x              CAPILLARY BLOOD GLUCOSE      POCT Blood Glucose.: 158 mg/dL (15 Oct 2023 07:44)  POCT Blood Glucose.: 251 mg/dL (14 Oct 2023 22:29)  POCT Blood Glucose.: 268 mg/dL (14 Oct 2023 20:55)  POCT Blood Glucose.: 284 mg/dL (14 Oct 2023 17:23)  POCT Blood Glucose.: 248 mg/dL (14 Oct 2023 11:56)      Vital Signs Last 24 Hrs  T(C): 36.4 (15 Oct 2023 05:24), Max: 36.8 (14 Oct 2023 20:28)  T(F): 97.5 (15 Oct 2023 05:24), Max: 98.3 (14 Oct 2023 20:28)  HR: 76 (15 Oct 2023 05:24) (72 - 94)  BP: 135/76 (15 Oct 2023 05:24) (125/67 - 135/76)  BP(mean): --  RR: 18 (15 Oct 2023 05:24) (17 - 18)  SpO2: 98% (15 Oct 2023 05:24) (95% - 98%)    Parameters below as of 15 Oct 2023 05:24  Patient On (Oxygen Delivery Method): nasal cannula            PT/INR - ( 14 Oct 2023 06:46 )   PT: 21.9 sec;   INR: 1.91 ratio         PTT - ( 14 Oct 2023 23:26 )  PTT:134.9 sec                                             PHYSICAL EXAM:    Constitutional: NAD  HEENT: conjunctive   clear   Neck:  No JVD  Respiratory: CTAB  Cardiovascular: S1 and S2  Gastrointestinal: BS+, soft, NT/ND  Extremities: No peripheral edema  Neurological:  no focal deficits  Skin: dry   Access: Not applicable

## 2023-10-15 NOTE — PROGRESS NOTE ADULT - SUBJECTIVE AND OBJECTIVE BOX
[facility]  Shane Murray PA-C  [address] [place] [address]  [phone]      INTERVAL HPI/OVERNIGHT EVENTS:  Pt s/e with family at bedside translating  No overt GI bleeding or new GI complaints    MEDICATIONS  (STANDING):  albuterol/ipratropium for Nebulization 3 milliLiter(s) Nebulizer every 8 hours  atorvastatin 40 milliGRAM(s) Oral at bedtime  ceFAZolin   IVPB 2000 milliGRAM(s) IV Intermittent every 8 hours  dextrose 5%. 1000 milliLiter(s) (50 mL/Hr) IV Continuous <Continuous>  dextrose 5%. 1000 milliLiter(s) (100 mL/Hr) IV Continuous <Continuous>  dextrose 50% Injectable 25 Gram(s) IV Push once  dextrose 50% Injectable 12.5 Gram(s) IV Push once  dextrose 50% Injectable 25 Gram(s) IV Push once  diltiazem    milliGRAM(s) Oral daily  furosemide    Tablet 40 milliGRAM(s) Oral daily  glucagon  Injectable 1 milliGRAM(s) IntraMuscular once  heparin  Infusion.  Unit(s)/Hr (11 mL/Hr) IV Continuous <Continuous>  influenza  Vaccine (HIGH DOSE) 0.7 milliLiter(s) IntraMuscular once  insulin glargine Injectable (LANTUS) 5 Unit(s) SubCutaneous at bedtime  insulin lispro (ADMELOG) corrective regimen sliding scale   SubCutaneous three times a day before meals  insulin lispro (ADMELOG) corrective regimen sliding scale   SubCutaneous at bedtime  insulin lispro Injectable (ADMELOG) 3 Unit(s) SubCutaneous three times a day before meals  levETIRAcetam 750 milliGRAM(s) Oral two times a day  pantoprazole  Injectable 40 milliGRAM(s) IV Push every 12 hours  sodium chloride 1 Gram(s) Oral two times a day  sucralfate 1 Gram(s) Oral four times a day  urea Oral Powder 15 Gram(s) Oral daily    MEDICATIONS  (PRN):  dextrose Oral Gel 15 Gram(s) Oral once PRN Blood Glucose LESS THAN 70 milliGRAM(s)/deciliter  guaiFENesin Oral Liquid (Sugar-Free) 200 milliGRAM(s) Oral every 6 hours PRN Cough  heparin   Injectable 2500 Unit(s) IV Push every 6 hours PRN For aPTT between 40 - 57  heparin   Injectable 5000 Unit(s) IV Push every 6 hours PRN For aPTT less than 40  ondansetron Injectable 4 milliGRAM(s) IV Push every 6 hours PRN Nausea and/or Vomiting      Allergies    No Known Allergies      PHYSICAL EXAM:   Vital Signs:  Vital Signs Last 24 Hrs  T(C): 36.4 (15 Oct 2023 05:24), Max: 36.8 (14 Oct 2023 20:28)  T(F): 97.5 (15 Oct 2023 05:24), Max: 98.3 (14 Oct 2023 20:28)  HR: 76 (15 Oct 2023 05:24) (72 - 94)  BP: 135/76 (15 Oct 2023 05:24) (125/67 - 135/76)  BP(mean): --  RR: 18 (15 Oct 2023 05:24) (17 - 18)  SpO2: 98% (15 Oct 2023 05:24) (95% - 98%)    Parameters below as of 15 Oct 2023 05:24  Patient On (Oxygen Delivery Method): nasal cannula      Daily     Daily Weight in k.2 (15 Oct 2023 05:24)    GENERAL:  Appears stated age  HEENT:  NC/AT  CHEST:  Full & symmetric excursion  HEART:  Regular rhythm  ABDOMEN:  Soft, non-tender, non-distended  EXTEREMITIES:  no cyanosis  SKIN:  No rash  NEURO:  Alert      LABS:                        10.0   8.31  )-----------( 275      ( 15 Oct 2023 10:20 )             32.2           PT/INR - ( 15 Oct 2023 10:20 )   PT: 32.7 sec;   INR: 2.88 ratio         PTT - ( 15 Oct 2023 10:20 )  PTT:56.6 sec

## 2023-10-15 NOTE — PROGRESS NOTE ADULT - SUBJECTIVE AND OBJECTIVE BOX
[facility]  ADAM Nava Y. Patel, S. Shah, G. Chris  [phone]  ([phone] - weekdays after 5pm and weekends)    Name: ARNIE DELGADILLO  Age/Gender: 68y Male  MRN: [id number]    Interval History:  Patient seen and examined.  No new complaints noted.  Notes reviewed  No concerning overnight events  Afebrile. Son at bedside   Allergies: No Known Allergies      Objective:  Vitals:   T(F): 98.1 (10-15-23 @ 11:54), Max: 98.3 (10-14-23 @ 20:28)  HR: 73 (10-15-23 @ 11:54) (72 - 94)  BP: 133/69 (10-15-23 @ 11:54) (125/67 - 135/76)  RR: 18 (10-15-23 @ 11:54) (17 - 18)  SpO2: 99% (10-15-23 @ 11:54) (95% - 99%)  Physical Examination:  General: no acute distress, nontoxic appearing   HEENT: L hemicraniectomy, anicteric, neck supple  Respiratory: no acc muscle use, breathing comfortably  Cardiovascular: S1 and S2 present  Gastrointestinal: normal appearing, nondistended  Extremities: no edema, no cyanosis  Skin: no visible rash    Laboratory Studies:  CBC:                       10.0   8.31  )-----------( 275      ( 15 Oct 2023 10:20 )             32.2     WBC Trend:  8.31 10-15-23 @ 10:20  7.52 10-14-23 @ 06:46  6.50 10-13-23 @ 07:45  6.58 10-12-23 @ 07:30  7.86 10-11-23 @ 07:22  8.73 10-10-23 @ 14:15  8.58 10-10-23 @ 06:50  7.58 10-09-23 @ 07:40    CMP: 10-15    135  |  98  |  27<H>  ----------------------------<  219<H>  3.7   |  32<H>  |  1.30    Ca    9.2      15 Oct 2023 10:20    TPro  7.2  /  Alb  2.6<L>  /  TBili  0.4  /  DBili  x   /  AST  46<H>  /  ALT  16  /  AlkPhos  933<H>  10-15    Creatinine: 1.30 mg/dL (10-15-23 @ 10:20)  Creatinine: 1.40 mg/dL (10-11-23 @ 07:22)  Creatinine: 1.60 mg/dL (10-10-23 @ 06:51)  Creatinine: 1.70 mg/dL (10-09-23 @ 07:40)    LIVER FUNCTIONS - ( 15 Oct 2023 10:20 )  Alb: 2.6 g/dL / Pro: 7.2 g/dL / ALK PHOS: 933 U/L / ALT: 16 U/L / AST: 46 U/L / GGT: x           Microbiology: reviewed   Culture - Blood (collected 10-10-23 @ 20:38)  Source: .Blood Blood-Peripheral  Preliminary Report (10-15-23 @ 02:01):    No growth at 4 days    Culture - Blood (collected 10-10-23 @ 20:32)  Source: .Blood Blood-Peripheral  Preliminary Report (10-15-23 @ 01:00):    No growth at 4 days    Culture - Blood (collected 10-08-23 @ 11:40)  Source: .Blood Blood-Peripheral  Final Report (10-13-23 @ 17:00):    No growth at 5 days    Culture - Blood (collected 10-08-23 @ 10:40)  Source: .Blood Blood-Peripheral  Gram Stain (10-10-23 @ 19:53):    Growth in anaerobic bottle: Gram positive cocci in pairs  Final Report (10-13-23 @ 11:20):    Growth in anaerobic bottle: Staphylococcus aureus    Direct identification is available within approximately 3-5    hours either by Blood Panel Multiplexed PCR or Direct    MALDI-TOF. Details: [email or web address]  Organism: Blood Culture PCR  Staphylococcus aureus (10-13-23 @ 11:20)  Organism: Staphylococcus aureus (10-13-23 @ 11:20)      Method Type: MARY      -  Ampicillin/Sulbactam: S <=8/4      -  Cefazolin: S <=4      -  Clindamycin: S <=0.25      -  Erythromycin: S <=0.25      -  Gentamicin: S <=1 Should not be used as monotherapy      -  Oxacillin: S <=0.25 Oxacillin predicts susceptibility for dicloxacillin, methicillin, and nafcillin      -  Rifampin: S <=1 Should not be used as monotherapy      -  Tetracycline: S <=1      -  Trimethoprim/Sulfamethoxazole: S <=0.5/9.5      -  Vancomycin: S 1  Organism: Blood Culture PCR (10-13-23 @ 11:20)      Method Type: PCR      -  Methicillin SENSITIVE Staphylococcus aureus (MSSA): Detec Any isolate of Staphylococcus aureus from a blood culture is NOT considered a contaminant.    Radiology: reviewed     Medications:  albuterol/ipratropium for Nebulization 3 milliLiter(s) Nebulizer every 8 hours  atorvastatin 40 milliGRAM(s) Oral at bedtime  ceFAZolin   IVPB 2000 milliGRAM(s) IV Intermittent every 8 hours  dextrose 5%. 1000 milliLiter(s) IV Continuous <Continuous>  dextrose 5%. 1000 milliLiter(s) IV Continuous <Continuous>  dextrose 50% Injectable 25 Gram(s) IV Push once  dextrose 50% Injectable 25 Gram(s) IV Push once  dextrose 50% Injectable 12.5 Gram(s) IV Push once  dextrose Oral Gel 15 Gram(s) Oral once PRN  diltiazem    milliGRAM(s) Oral daily  furosemide    Tablet 40 milliGRAM(s) Oral daily  glucagon  Injectable 1 milliGRAM(s) IntraMuscular once  guaiFENesin Oral Liquid (Sugar-Free) 200 milliGRAM(s) Oral every 6 hours PRN  influenza  Vaccine (HIGH DOSE) 0.7 milliLiter(s) IntraMuscular once  insulin glargine Injectable (LANTUS) 5 Unit(s) SubCutaneous at bedtime  insulin lispro (ADMELOG) corrective regimen sliding scale   SubCutaneous three times a day before meals  insulin lispro (ADMELOG) corrective regimen sliding scale   SubCutaneous at bedtime  insulin lispro Injectable (ADMELOG) 3 Unit(s) SubCutaneous three times a day before meals  levETIRAcetam 750 milliGRAM(s) Oral two times a day  ondansetron Injectable 4 milliGRAM(s) IV Push every 6 hours PRN  pantoprazole  Injectable 40 milliGRAM(s) IV Push every 12 hours  sodium chloride 1 Gram(s) Oral two times a day  sucralfate 1 Gram(s) Oral four times a day  urea Oral Powder 15 Gram(s) Oral daily  warfarin 1 milliGRAM(s) Oral once    Current Antimicrobials:  ceFAZolin   IVPB 2000 milliGRAM(s) IV Intermittent every 8 hours    Prior/Completed Antimicrobials:  piperacillin/tazobactam IVPB.  piperacillin/tazobactam IVPB.-

## 2023-10-15 NOTE — PHARMACOTHERAPY INTERVENTION NOTE - COMMENTS
68 male being treated for acute mutlifocal PNA with blood cx growing MSSA. Order entered for vancomycin 1g x1. Patient currently on zosyn 3.375g q8h which covers MSSA. Spoke with ordering provider - Resident Lopez. Order for vancomyin d/c'ed per discussion. 
Patient is a 68 year old male with a fib and mechanical AVR onwWarfarin. INR today was 10/14 was 1.91 and 1.26 on 10/13 after receiving a 5mg dose yesterday. Order entered for 4mg x1 today.  Discussed with Dr. Harmon. Recommended a 50% dose reduction (2.5mg) per hospital policy as INR jumped > 0.5 in a 24 hour timeframe. Accepted and order entered. 
Patient is a 68 year old male with A fib and a mechanical AVR being transitioned from a heparin drip to warfarin (goal INR 2.5-3.5) . His INR today was 2.88, a 0.97 increase from yesterdays INR of 1.91. An order was entered for 2mg to be given tonight. Discussed with Hospitalist Dr. Harmon. Recommended holding tonight's dose in the setting of a large INR jump per [facility] policy. Dr. Harmon would like to continue with a dose of 1mg instead of holding tonight's dose per clinical discretion. Order entered per discussion.

## 2023-10-15 NOTE — PROGRESS NOTE ADULT - SUBJECTIVE AND OBJECTIVE BOX
Date/Time Patient Seen:  		  Referring MD:   Data Reviewed	       Patient is a 68y old  Male who presents with a chief complaint of black stool (14 Oct 2023 16:17)      Subjective/HPI     PAST MEDICAL & SURGICAL HISTORY:  CVA (cerebral vascular accident)    HTN (hypertension)    DM (diabetes mellitus)    Arrhythmia    History of atrial fibrillation    Right hemiparesis    Aphasia due to acute stroke    GI bleed    Iron deficiency anemia    Upper GI bleed    Osteomyelitis    S/P CABG (coronary artery bypass graft)    S/P brain surgery    History of aortic valve repair          Medication list         MEDICATIONS  (STANDING):  albuterol/ipratropium for Nebulization 3 milliLiter(s) Nebulizer every 8 hours  atorvastatin 40 milliGRAM(s) Oral at bedtime  ceFAZolin   IVPB 2000 milliGRAM(s) IV Intermittent every 8 hours  dextrose 5%. 1000 milliLiter(s) (50 mL/Hr) IV Continuous <Continuous>  dextrose 5%. 1000 milliLiter(s) (100 mL/Hr) IV Continuous <Continuous>  dextrose 50% Injectable 25 Gram(s) IV Push once  dextrose 50% Injectable 12.5 Gram(s) IV Push once  dextrose 50% Injectable 25 Gram(s) IV Push once  diltiazem    milliGRAM(s) Oral daily  furosemide    Tablet 40 milliGRAM(s) Oral daily  glucagon  Injectable 1 milliGRAM(s) IntraMuscular once  heparin  Infusion.  Unit(s)/Hr (11 mL/Hr) IV Continuous <Continuous>  influenza  Vaccine (HIGH DOSE) 0.7 milliLiter(s) IntraMuscular once  insulin glargine Injectable (LANTUS) 5 Unit(s) SubCutaneous at bedtime  insulin lispro (ADMELOG) corrective regimen sliding scale   SubCutaneous three times a day before meals  insulin lispro (ADMELOG) corrective regimen sliding scale   SubCutaneous at bedtime  insulin lispro Injectable (ADMELOG) 3 Unit(s) SubCutaneous three times a day before meals  levETIRAcetam 750 milliGRAM(s) Oral two times a day  pantoprazole  Injectable 40 milliGRAM(s) IV Push every 12 hours  sodium chloride 1 Gram(s) Oral two times a day  sucralfate 1 Gram(s) Oral four times a day  urea Oral Powder 15 Gram(s) Oral daily    MEDICATIONS  (PRN):  dextrose Oral Gel 15 Gram(s) Oral once PRN Blood Glucose LESS THAN 70 milliGRAM(s)/deciliter  guaiFENesin Oral Liquid (Sugar-Free) 200 milliGRAM(s) Oral every 6 hours PRN Cough  heparin   Injectable 2500 Unit(s) IV Push every 6 hours PRN For aPTT between 40 - 57  heparin   Injectable 5000 Unit(s) IV Push every 6 hours PRN For aPTT less than 40  ondansetron Injectable 4 milliGRAM(s) IV Push every 6 hours PRN Nausea and/or Vomiting         Vitals log        ICU Vital Signs Last 24 Hrs  T(C): 36.4 (15 Oct 2023 05:24), Max: 36.8 (14 Oct 2023 20:28)  T(F): 97.5 (15 Oct 2023 05:24), Max: 98.3 (14 Oct 2023 20:28)  HR: 76 (15 Oct 2023 05:24) (72 - 94)  BP: 135/76 (15 Oct 2023 05:24) (125/67 - 135/76)  BP(mean): --  ABP: --  ABP(mean): --  RR: 18 (15 Oct 2023 05:24) (17 - 18)  SpO2: 98% (15 Oct 2023 05:24) (94% - 98%)    O2 Parameters below as of 15 Oct 2023 05:24  Patient On (Oxygen Delivery Method): nasal cannula                 Input and Output:  I&O's Detail    13 Oct 2023 07:01  -  14 Oct 2023 07:00  --------------------------------------------------------  IN:    Heparin Infusion: 96 mL    Oral Fluid: 50 mL  Total IN: 146 mL    OUT:  Total OUT: 0 mL    Total NET: 146 mL      14 Oct 2023 07:01  -  15 Oct 2023 05:53  --------------------------------------------------------  IN:    Heparin Infusion: 44 mL  Total IN: 44 mL    OUT:  Total OUT: 0 mL    Total NET: 44 mL          Lab Data                        9.1    7.52  )-----------( 247      ( 14 Oct 2023 06:46 )             29.4                   Review of Systems	      Objective     Physical Examination    heart ss2  lung dc BS      Pertinent Lab findings & Imaging      Bryan:  NO   Adequate UO     I&O's Detail    13 Oct 2023 07:01  -  14 Oct 2023 07:00  --------------------------------------------------------  IN:    Heparin Infusion: 96 mL    Oral Fluid: 50 mL  Total IN: 146 mL    OUT:  Total OUT: 0 mL    Total NET: 146 mL      14 Oct 2023 07:01  -  15 Oct 2023 05:53  --------------------------------------------------------  IN:    Heparin Infusion: 44 mL  Total IN: 44 mL    OUT:  Total OUT: 0 mL    Total NET: 44 mL               Discussed with:     Cultures:	        Radiology

## 2023-10-15 NOTE — PROGRESS NOTE ADULT - SUBJECTIVE AND OBJECTIVE BOX
Patient is a 68y old  Male who presents with a chief complaint of black stool (15 Oct 2023 08:08)    Date of servie : 10-15-23 @ 09:29  INTERVAL HPI/OVERNIGHT EVENTS:  T(C): 36.4 (10-15-23 @ 05:24), Max: 36.8 (10-14-23 @ 20:28)  HR: 76 (10-15-23 @ 05:24) (72 - 94)  BP: 135/76 (10-15-23 @ 05:24) (125/67 - 135/76)  RR: 18 (10-15-23 @ 05:24) (17 - 18)  SpO2: 98% (10-15-23 @ 05:24) (95% - 98%)  Wt(kg): --  I&O's Summary    14 Oct 2023 07:01  -  15 Oct 2023 07:00  --------------------------------------------------------  IN: 44 mL / OUT: 0 mL / NET: 44 mL        LABS:                        9.1    7.52  )-----------( 247      ( 14 Oct 2023 06:46 )             29.4           PT/INR - ( 14 Oct 2023 06:46 )   PT: 21.9 sec;   INR: 1.91 ratio         PTT - ( 14 Oct 2023 23:26 )  PTT:134.9 sec    CAPILLARY BLOOD GLUCOSE      POCT Blood Glucose.: 158 mg/dL (15 Oct 2023 07:44)  POCT Blood Glucose.: 251 mg/dL (14 Oct 2023 22:29)  POCT Blood Glucose.: 268 mg/dL (14 Oct 2023 20:55)  POCT Blood Glucose.: 284 mg/dL (14 Oct 2023 17:23)  POCT Blood Glucose.: 248 mg/dL (14 Oct 2023 11:56)            MEDICATIONS  (STANDING):  albuterol/ipratropium for Nebulization 3 milliLiter(s) Nebulizer every 8 hours  atorvastatin 40 milliGRAM(s) Oral at bedtime  ceFAZolin   IVPB 2000 milliGRAM(s) IV Intermittent every 8 hours  dextrose 5%. 1000 milliLiter(s) (50 mL/Hr) IV Continuous <Continuous>  dextrose 5%. 1000 milliLiter(s) (100 mL/Hr) IV Continuous <Continuous>  dextrose 50% Injectable 25 Gram(s) IV Push once  dextrose 50% Injectable 25 Gram(s) IV Push once  dextrose 50% Injectable 12.5 Gram(s) IV Push once  diltiazem    milliGRAM(s) Oral daily  furosemide    Tablet 40 milliGRAM(s) Oral daily  glucagon  Injectable 1 milliGRAM(s) IntraMuscular once  heparin  Infusion.  Unit(s)/Hr (11 mL/Hr) IV Continuous <Continuous>  influenza  Vaccine (HIGH DOSE) 0.7 milliLiter(s) IntraMuscular once  insulin glargine Injectable (LANTUS) 5 Unit(s) SubCutaneous at bedtime  insulin lispro (ADMELOG) corrective regimen sliding scale   SubCutaneous three times a day before meals  insulin lispro (ADMELOG) corrective regimen sliding scale   SubCutaneous at bedtime  insulin lispro Injectable (ADMELOG) 3 Unit(s) SubCutaneous three times a day before meals  levETIRAcetam 750 milliGRAM(s) Oral two times a day  pantoprazole  Injectable 40 milliGRAM(s) IV Push every 12 hours  sodium chloride 1 Gram(s) Oral two times a day  sucralfate 1 Gram(s) Oral four times a day  urea Oral Powder 15 Gram(s) Oral daily    MEDICATIONS  (PRN):  dextrose Oral Gel 15 Gram(s) Oral once PRN Blood Glucose LESS THAN 70 milliGRAM(s)/deciliter  guaiFENesin Oral Liquid (Sugar-Free) 200 milliGRAM(s) Oral every 6 hours PRN Cough  heparin   Injectable 2500 Unit(s) IV Push every 6 hours PRN For aPTT between 40 - 57  heparin   Injectable 5000 Unit(s) IV Push every 6 hours PRN For aPTT less than 40  ondansetron Injectable 4 milliGRAM(s) IV Push every 6 hours PRN Nausea and/or Vomiting          PHYSICAL EXAM:  GENERAL: NAD, well-groomed, well-developed  HEAD:  Atraumatic, Normocephalic  CHEST/LUNG: Clear to percussion bilaterally; No rales, rhonchi, wheezing, or rubs  HEART: Regular rate and rhythm; No murmurs, rubs, or gallops  ABDOMEN: Soft, Nontender, Nondistended; Bowel sounds present  EXTREMITIES:  2+ Peripheral Pulses, No clubbing, cyanosis, or edema  LYMPH: No lymphadenopathy noted  SKIN: No rashes or lesions    Care Discussed with Consultants/Other Providers [ ] YES  [ ] NO

## 2023-10-15 NOTE — PROGRESS NOTE ADULT - ASSESSMENT
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   ID c/s on HD#3 for fever to 102F overnight    Acute Multifocal PNA/AHRF on NC  - fever to 102 overnight, leukocytosis to 18 on 10/7  - CT chest showing multifocal PNA  - S/p zosyn 10/7-10/11; Abx tailored as below    MSSA Bacteremia  - noted Culture - Blood (10.08.23 @ 10:40)-  Methicillin SENSITIVE Staphylococcus aureus (MSSA)  - repeat blood cultures collected 10/10 20:38, NGTD  - c/w Cefazolin 2 grams IV q8 started 10/11  - BCx NGTD x72H, pt will need midline and LT IV Abx--midline ordered  Additional care per primary team    D/w patient and son at bedside  Karen Grimes M.D.  [facility], Division of Infectious Diseases  [phone]  After 5pm on weekdays and all day on weekends - please call [phone]

## 2023-10-15 NOTE — CHART NOTE - NSCHARTNOTEFT_GEN_A_CORE
Assessment: patient seen for malnutrition follow up. chart reviewed   68y old  Male who presents with a chief complaint of black stool   acute blood loss anemia  + BM 10/11 loose  patient seen with family in room good PO and taking glucerna supplement. confirmed by CNA good PO intake      Factors impacting intake: [x ] none [ ] nausea  [ ] vomiting [ ] diarrhea [ ] constipation  [ ]chewing problems [x ] swallowing issues  [ ] other: speech recommends soft and bite sized thin liquids    Diet Prescription: soft and bite sized consistent cho low Na glucerna BID   Intake: fairly good PO per CNA and family . taking supplement    Current Weight: 10/15 wt 143.7#  no edema    Pertinent Medications: MEDICATIONS  (STANDING):  albuterol/ipratropium for Nebulization 3 milliLiter(s) Nebulizer every 8 hours  atorvastatin 40 milliGRAM(s) Oral at bedtime  ceFAZolin   IVPB 2000 milliGRAM(s) IV Intermittent every 8 hours  dextrose 5%. 1000 milliLiter(s) (50 mL/Hr) IV Continuous <Continuous>  dextrose 5%. 1000 milliLiter(s) (100 mL/Hr) IV Continuous <Continuous>  dextrose 50% Injectable 25 Gram(s) IV Push once  dextrose 50% Injectable 12.5 Gram(s) IV Push once  dextrose 50% Injectable 25 Gram(s) IV Push once  diltiazem    milliGRAM(s) Oral daily  furosemide    Tablet 40 milliGRAM(s) Oral daily  glucagon  Injectable 1 milliGRAM(s) IntraMuscular once  heparin  Infusion.  Unit(s)/Hr (11 mL/Hr) IV Continuous <Continuous>  influenza  Vaccine (HIGH DOSE) 0.7 milliLiter(s) IntraMuscular once  insulin glargine Injectable (LANTUS) 5 Unit(s) SubCutaneous at bedtime  insulin lispro (ADMELOG) corrective regimen sliding scale   SubCutaneous three times a day before meals  insulin lispro (ADMELOG) corrective regimen sliding scale   SubCutaneous at bedtime  insulin lispro Injectable (ADMELOG) 3 Unit(s) SubCutaneous three times a day before meals  levETIRAcetam 750 milliGRAM(s) Oral two times a day  pantoprazole  Injectable 40 milliGRAM(s) IV Push every 12 hours  sodium chloride 1 Gram(s) Oral two times a day  sucralfate 1 Gram(s) Oral four times a day  urea Oral Powder 15 Gram(s) Oral daily    MEDICATIONS  (PRN):  dextrose Oral Gel 15 Gram(s) Oral once PRN Blood Glucose LESS THAN 70 milliGRAM(s)/deciliter  guaiFENesin Oral Liquid (Sugar-Free) 200 milliGRAM(s) Oral every 6 hours PRN Cough  heparin   Injectable 2500 Unit(s) IV Push every 6 hours PRN For aPTT between 40 - 57  heparin   Injectable 5000 Unit(s) IV Push every 6 hours PRN For aPTT less than 40  ondansetron Injectable 4 milliGRAM(s) IV Push every 6 hours PRN Nausea and/or Vomiting    Pertinent Labs: POCT 158 251 A1c 7.6%  Skin:   no pressure injury  Estimated Needs:   [x ] no change since previous assessment based IBW 78kg 172# 25-30kcals/kg 1950-2340kcals and 1-1.2gms protein/kg 78-93gms protein  [ ] recalculated:     Previous Nutrition Diagnosis:    [x ] Malnutrition chronic moderate     Nutrition Diagnosis is [x ] ongoing  [ ] resolved [ ] not applicable     New Nutrition Diagnosis: [x ] not applicable       Interventions:   Recommend  [ ] Change Diet To:  [ ] Nutrition Supplement  [ ] Nutrition Support  x ] Other: continue diet as ordered with supplement , recommend MVI    Monitoring and Evaluation:   [x ] PO intake [ x ] Tolerance to diet prescription [ x ] weights [ x ] labs[ x ] follow up per protocol  [ ] other:

## 2023-10-15 NOTE — PROGRESS NOTE ADULT - SUBJECTIVE AND OBJECTIVE BOX
{\rtf1\qcpldv53771\ansi\evllvjz7917\ftnbj\uc1\deff0  {\fonttbl{\f0 \fnil Segoe UI;}{\f1 \fnil Florien;}{\f2 \fnil \fcharset0 Segoe UI;}{\f3 \fnil Symbol;}{\f4 \fnil Wingdings;}}  {\colortbl ;\naf538\nycte004\acaj561 ;\red0\green0\blue0 ;\red0\green0\blue0 ;}  {\stylesheet{\f0\fs20 Normal;}{\cs1 Default Paragraph Font;}{\cs2\f0\fs16 Line Number;}{\cs3\f1\fs24 Hyperlink;}}  {\*\revtbl{Unknown;}}  {\*\listtable  {\list\listtemplateid-1  {\listlevel\levelnfc0\levelfollow0\levelstartat1\levelnorestart{\leveltext \'02\'00.}{\levelnumbers \'01}\f1\fs24}  {\listlevel\levelnfc4\levelfollow0\levelstartat1\levelnorestart{\leveltext \'02\'01.}{\levelnumbers \'01}\f1\fs24}  {\listlevel\levelnfc0\levelfollow0\levelstartat1\levelnorestart{\leveltext \'02\'02.}{\levelnumbers \'01}\f1\fs24}  {\listlevel\levelnfc0\levelfollow0\levelstartat1\levelnorestart{\leveltext \'02\'03.}{\levelnumbers \'01}\f1\fs24}  {\listlevel\levelnfc0\levelfollow0\levelstartat1\levelnorestart{\leveltext \'02\'04.}{\levelnumbers \'01}\f1\fs24}  {\listlevel\levelnfc0\levelfollow0\levelstartat1\levelnorestart{\leveltext \'02\'05.}{\levelnumbers \'01}\f1\fs24}  {\listlevel\levelnfc0\levelfollow0\levelstartat1\levelnorestart{\leveltext \'02\'06.}{\levelnumbers \'01}\f1\fs24}  {\listlevel\levelnfc0\levelfollow0\levelstartat1\levelnorestart{\leveltext \'02\'07.}{\levelnumbers \'01}\f1\fs24}  {\listlevel\levelnfc0\levelfollow0\levelstartat1\levelnorestart{\leveltext \'02\'08.}{\levelnumbers \'01}\f1\fs24}  {\listname ;}\[id number]  }  }  {\*\listoverridetable  {\listoverride\[id number]\listoverridecount0\ls1}  }  \mptiyl47555\cilhfj27564\eskks0213\endin3655\owxko3502\msvbo2375\tcekkxv245\qasgley011\nogrowautofit\uxiunr789\formshade\nofeaturethrottle1\dntblnsbdb\fet4\aendnotes\aftnnrlc\pgbrdrhead\pgbrdrfoot  \sectd\mqambo50158\yenaim77264\guttersxn0\utqxholk6905\sdzzfxhp2768\zuljvbdo2423\xyxsxlbk5651\jgnmrtx526\kidwero368\sbkpage\pgncont\pgndec  \plain\plain\f0\fs24\pard\plain\f0\fs24\plain\f0\fs20\wydq3644\hich\f0\dbch\f0\loch\f0\fs20 Neurology follow up note\par  \par  NAIB [id number]\par  \par  \par  Interval History:\par  \par  Patient feels ok no new complaints.\par  \par  Allergies\par  \par  No Known Allergies\par  \par  Intolerances\par  \par  \par  \par  MEDICATIONS\par  \par  albuterol/ipratropium for Nebulization 3 milliLiter(s) Nebulizer every 8 hours\par  atorvastatin 40 milliGRAM(s) Oral at bedtime\par  ceFAZolin   IVPB 2000 milliGRAM(s) IV Intermittent every 8 hours\par  dextrose 5%. 1000 milliLiter(s) IV Continuous <Continuous>\par  dextrose 5%. 1000 milliLiter(s) IV Continuous <Continuous>\par  dextrose 50% Injectable 25 Gram(s) IV Push once\par  dextrose 50% Injectable 25 Gram(s) IV Push once\par  dextrose 50% Injectable 12.5 Gram(s) IV Push once\par  dextrose Oral Gel 15 Gram(s) Oral once PRN\par  diltiazem    milliGRAM(s) Oral daily\par  furosemide    Tablet 40 milliGRAM(s) Oral daily\par  glucagon  Injectable 1 milliGRAM(s) IntraMuscular once\par  guaiFENesin Oral Liquid (Sugar-Free) 200 milliGRAM(s) Oral every 6 hours PRN\par  heparin   Injectable 2500 Unit(s) IV Push every 6 hours PRN\par  heparin   Injectable 5000 Unit(s) IV Push every 6 hours PRN\par  heparin  Infusion.  Unit(s)/Hr IV Continuous <Continuous>\par  influenza  Vaccine (HIGH DOSE) 0.7 milliLiter(s) IntraMuscular once\par  insulin glargine Injectable (LANTUS) 5 Unit(s) SubCutaneous at bedtime\par  insulin lispro (ADMELOG) corrective regimen sliding scale   SubCutaneous three times a day before meals\par  insulin lispro (ADMELOG) corrective regimen sliding scale   SubCutaneous at bedtime\par  insulin lispro Injectable (ADMELOG) 3 Unit(s) SubCutaneous three times a day before meals\par  levETIRAcetam 750 milliGRAM(s) Oral two times a day\par  ondansetron Injectable 4 milliGRAM(s) IV Push every 6 hours PRN\par  pantoprazole  Injectable 40 milliGRAM(s) IV Push every 12 hours\par  sodium chloride 1 Gram(s) Oral two times a day\par  sucralfate 1 Gram(s) Oral four times a day\par  urea Oral Powder 15 Gram(s) Oral daily\par  \par  \par  \par  \par  \par  \par  Vital Signs Last 24 Hrs\par  T(C): 36.4 (15 Oct 2023 05:24), Max: 36.8 (14 Oct 2023 20:28)\par  T(F): 97.5 (15 Oct 2023 05:24), Max: 98.3 (14 Oct 2023 20:28)\par  HR: 76 (15 Oct 2023 05:24) (72 - 94)\par  BP: 135/76 (15 Oct 2023 05:24) (125/67 - 135/76)\par  BP(mean): --\par  RR: 18 (15 Oct 2023 05:24) (17 - 18)\par  SpO2: 98% (15 Oct 2023 05:24) (95% - 98%)\par  \par  Parameters below as of 15 Oct 2023 05:24\par  Patient On (Oxygen Delivery Method): nasal cannula\par  \par  \ql\plain\f0\fs24\plain\f0\fs20\ivam6695\hich\f0\dbch\f0\loch\f0\fs20 REVIEW OF SYSTEMS:  Completely limited secondary to the patient repeats words over, has severe expressive aphasia, but had been in his normal state of health as per daughter.\par  \par  PHYSICAL EXAMINATION:  HEENT:  Head:  Normocephalic, atraumatic.  Eyes:  No scleral icterus.  Ears:  Hard to evaluate secondary to he could not answer questions accordingly but appears to follow commands.  NECK:  Supple.  RESPIRATORY:  Air entry bilaterally.    CARDIOVASCULAR:  S1 and S2 heard.  ABDOMEN:  Soft and nontender.  EXTREMITIES:  Positive discoloration was noted on the left toe. \par  \par  NEUROLOGIC:  The patient was awake, alert.  On-off blink to visual threat.  Positive expressive aphasia.  Will say a few words but repeat the same words over.  Right upper and right lower were 0/5 with increased tone.  Right hemiplegia is his baseline.    Left upper and left lower were 4/5.\par  \pard\plain\f0\fs24\plain\f0\fs20\wuet6453\hich\f0\dbch\f0\loch\f0\fs20\par  \par  \par  LABS:\par  CBC Full  -  ( 14 Oct 2023 06:46 )\par  WBC Count : 7.52 K/uL\par  RBC Count : 3.45 M/uL\par  Hemoglobin : 9.1 g/dL\par  Hematocrit : 29.4 %\par  Platelet Count - Automated : 247 K/uL\par  Mean Cell Volume : 85.2 fl\par  Mean Cell Hemoglobin : 26.4 pg\par  Mean Cell Hemoglobin Concentration : 31.0 gm/dL\par  Auto Neutrophil # : x\par  Auto Lymphocyte # : x\par  Auto Monocyte # : x\par  Auto Eosinophil # : x\par  Auto Basophil # : x\par  Auto Neutrophil % : x\par  Auto Lymphocyte % : x\par  Auto Monocyte % : x\par  Auto Eosinophil % : x\par  Auto Basophil % : x\par  \par  \par  \par  \par  \par  Hemoglobin A1C: \par  \par  \par  Vitamin B12 \par  PT/INR - ( 14 Oct 2023 06:46 )   PT: 21.9 sec;   INR: 1.91 ratio  \par  \par   \par  PTT - ( 14 Oct 2023 23:26 )  PTT:134.9 sec\par  \par  \par  RADIOLOGY\par  \par  \ql\plain\f0\fs24\plain\f0\fs20\hmmq4174\hich\f0\dbch\f0\loch\f0\fs20 ANALYSIS AND PLAN:  This is a 68-year-old with a history of cerebrovascular accident, atrial fibrillation, now with possibly gastrointestinal bleed.\par  {\listtext\pard\plain\f1\fs24 1.\tab}  \pard\ssparaaux0\s0\tx360\ls1\ilvl0\fi-360\li360\ql\plain\f0\fs24\plain\f0\fs20\klmw6764\hich\f0\dbch\f0\loch\f0\fs20 For history of cerebrovascular accident and atrial fibrillation, the patient is on multiple blood thinning medications.  The patient's   warfarin INR is significantly elevated.  For now, I would recommend gastrointestinal workup, when possible to prevent further cerebrovascular accident, would recommend to reinstitute anticoagulation, but possibly an alternative to warfarin secondary to   elevation of INR, unclear if the patient needs to be on antiplatelets as well.  If the patient cannot have strong anticoagulation, then we will recommend at least a baby aspirin if possible.\par  {\listtext\pard\plain\f1\fs24 2.\tab}  For history of seizure prophylaxis, continue the patient on his Keppra.\par  {\listtext\pard\plain\f1\fs24 3.\tab}  For history of diabetes, strict control of blood sugars.\par  {\listtext\pard\plain\f1\fs24 4.\tab}  For hyperlipidemia, continue the patient on statin.\par  {\listtext\pard\plain\f1\fs24 5.\tab}  GI noted suspect severe gi bleed in setting of elevated INR AC as per medical team \par  {\listtext\pard\plain\f1\fs24 6.\tab}  as per her no blood in stools \par  {\listtext\pard\plain\f1\fs24 7.\tab}  monitor oral intake \par  \pard\ssparaaux0\s0\tx360\ql\plain\f0\fs24\plain\f0\fs20\knty7820\hich\f0\dbch\f0\loch\f0\fs20\par  seen with son today 10/14 no new events \par  \pard\ssparaaux0\s0\ql\plain\f0\fs24\plain\f0\fs20\pwco9595\hich\f0\dbch\f0\loch\f0\fs20\par  Spoke with daughter, Jeffrey, at bedside 10/12 .  She does understand my reasoning and thought process.  Her telephone number is [phone].\par  \par  Greater than 46 minutes of time was spent with the patient, plan of care, reviewing data, speaking to the family and  50% of the visit was spent counseling and/or coordinating care with multidisciplinary healthcare team\plain\f2\fs16\dkor4711\hich\f2\dbch\f2\loch\f2\cf2\fs16\par  \par  \plain\f2\fs16\bnxm5079\hich\f2\dbch\f2\loch\f2\cf2\fs16\strike\plain\f0\fs20\cale3189\hich\f0\dbch\f0\loch\f0\fs20\par  }   Neurology follow up note    ARNIE [id number]      Interval History:    Patient feels ok no new complaints.    Allergies    No Known Allergies    Intolerances        MEDICATIONS    albuterol/ipratropium for Nebulization 3 milliLiter(s) Nebulizer every 8 hours  atorvastatin 40 milliGRAM(s) Oral at bedtime  ceFAZolin   IVPB 2000 milliGRAM(s) IV Intermittent every 8 hours  dextrose 5%. 1000 milliLiter(s) IV Continuous <Continuous>  dextrose 5%. 1000 milliLiter(s) IV Continuous <Continuous>  dextrose 50% Injectable 25 Gram(s) IV Push once  dextrose 50% Injectable 25 Gram(s) IV Push once  dextrose 50% Injectable 12.5 Gram(s) IV Push once  dextrose Oral Gel 15 Gram(s) Oral once PRN  diltiazem    milliGRAM(s) Oral daily  furosemide    Tablet 40 milliGRAM(s) Oral daily  glucagon  Injectable 1 milliGRAM(s) IntraMuscular once  guaiFENesin Oral Liquid (Sugar-Free) 200 milliGRAM(s) Oral every 6 hours PRN  heparin   Injectable 2500 Unit(s) IV Push every 6 hours PRN  heparin   Injectable 5000 Unit(s) IV Push every 6 hours PRN  heparin  Infusion.  Unit(s)/Hr IV Continuous <Continuous>  influenza  Vaccine (HIGH DOSE) 0.7 milliLiter(s) IntraMuscular once  insulin glargine Injectable (LANTUS) 5 Unit(s) SubCutaneous at bedtime  insulin lispro (ADMELOG) corrective regimen sliding scale   SubCutaneous three times a day before meals  insulin lispro (ADMELOG) corrective regimen sliding scale   SubCutaneous at bedtime  insulin lispro Injectable (ADMELOG) 3 Unit(s) SubCutaneous three times a day before meals  levETIRAcetam 750 milliGRAM(s) Oral two times a day  ondansetron Injectable 4 milliGRAM(s) IV Push every 6 hours PRN  pantoprazole  Injectable 40 milliGRAM(s) IV Push every 12 hours  sodium chloride 1 Gram(s) Oral two times a day  sucralfate 1 Gram(s) Oral four times a day  urea Oral Powder 15 Gram(s) Oral daily              Vital Signs Last 24 Hrs  T(C): 36.4 (15 Oct 2023 05:24), Max: 36.8 (14 Oct 2023 20:28)  T(F): 97.5 (15 Oct 2023 05:24), Max: 98.3 (14 Oct 2023 20:28)  HR: 76 (15 Oct 2023 05:24) (72 - 94)  BP: 135/76 (15 Oct 2023 05:24) (125/67 - 135/76)  BP(mean): --  RR: 18 (15 Oct 2023 05:24) (17 - 18)  SpO2: 98% (15 Oct 2023 05:24) (95% - 98%)    Parameters below as of 15 Oct 2023 05:24  Patient On (Oxygen Delivery Method): nasal cannula    REVIEW OF SYSTEMS:  Completely limited secondary to the patient repeats words over, has severe expressive aphasia, but had been in his normal state of health as per daughter.    PHYSICAL EXAMINATION:  HEENT:  Head:  Normocephalic, atraumatic.  Eyes:  No scleral icterus.  Ears:  Hard to evaluate secondary to he could not answer questions accordingly but appears to follow commands.  NECK:  Supple.  RESPIRATORY:  Air entry bilaterally.  CARDIOVASCULAR:  S1 and S2 heard.  ABDOMEN:  Soft and nontender.  EXTREMITIES:  Positive discoloration was noted on the left toe.     NEUROLOGIC:  The patient was awake, alert.  On-off blink to visual threat.  Positive expressive aphasia.  Will say a few words but repeat the same words over.  Right upper and right lower were 0/5 with increased tone.  Right hemiplegia is his baseline.  Left upper and left lower were 4/5.        LABS:  CBC Full  -  ( 14 Oct 2023 06:46 )  WBC Count : 7.52 K/uL  RBC Count : 3.45 M/uL  Hemoglobin : 9.1 g/dL  Hematocrit : 29.4 %  Platelet Count - Automated : 247 K/uL  Mean Cell Volume : 85.2 fl  Mean Cell Hemoglobin : 26.4 pg  Mean Cell Hemoglobin Concentration : 31.0 gm/dL  Auto Neutrophil # : x  Auto Lymphocyte # : x  Auto Monocyte # : x  Auto Eosinophil # : x  Auto Basophil # : x  Auto Neutrophil % : x  Auto Lymphocyte % : x  Auto Monocyte % : x  Auto Eosinophil % : x  Auto Basophil % : x            Hemoglobin A1C:       Vitamin B12   PT/INR - ( 14 Oct 2023 06:46 )   PT: 21.9 sec;   INR: 1.91 ratio         PTT - ( 14 Oct 2023 23:26 )  PTT:134.9 sec      RADIOLOGY    ANALYSIS AND PLAN:  This is a 68-year-old with a history of cerebrovascular accident, atrial fibrillation, now with possibly gastrointestinal bleed.  For history of cerebrovascular accident and atrial fibrillation, the patient is on multiple blood thinning medications.  The patient's warfarin INR is significantly elevated.  For now, I would recommend gastrointestinal workup, when possible to prevent further cerebrovascular accident, would recommend to reinstitute anticoagulation, but possibly an alternative to warfarin secondary to elevation of INR, unclear if the patient needs to be on antiplatelets as well.  If the patient cannot have strong anticoagulation, then we will recommend at least a baby aspirin if possible.  For history of seizure prophylaxis, continue the patient on his Keppra.  For history of diabetes, strict control of blood sugars.  For hyperlipidemia, continue the patient on statin.  GI noted suspect severe gi bleed in setting of elevated INR AC as per medical team   as per her no blood in stools   monitor oral intake     Spoke with daughter, Jeffrey, at bedside 10/12 .  She does understand my reasoning and thought process.  Her telephone number is [phone].    Greater than 46 minutes of time was spent with the patient, plan of care, reviewing data, speaking to the family and  50% of the visit was spent counseling and/or coordinating care with multidisciplinary healthcare team

## 2023-10-16 NOTE — PROGRESS NOTE ADULT - ASSESSMENT
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   ID c/s on HD#3 for fever to 102F overnight    Acute Multifocal PNA/AHRF on NC  - fever to 102 overnight, leukocytosis to 18 on 10/7  - CT chest showing multifocal PNA  - S/p zosyn 10/7-10/11; Abx tailored as below    MSSA Bacteremia  - noted Culture - Blood (10.08.23 @ 10:40)-  Methicillin SENSITIVE Staphylococcus aureus (MSSA)  - repeat blood cultures collected 10/10 20:38, NGTD  - c/w Cefazolin 2 grams IV q8 started 10/11 so last day 10/24  weekly CBC, CMP faxed to [phone] can then remove midline, nl midline care    Thank you for consulting us and involving us in the management of this most interesting and challenging case.  We will follow along in the care of this patient. Please call us at [phone] or text me directly on my cell# at [phone] with any concerns.

## 2023-10-16 NOTE — PROGRESS NOTE ADULT - SUBJECTIVE AND OBJECTIVE BOX
Date/Time Patient Seen:  		  Referring MD:   Data Reviewed	       Patient is a 68y old  Male who presents with a chief complaint of black stool (15 Oct 2023 17:13)      Subjective/HPI     PAST MEDICAL & SURGICAL HISTORY:  CVA (cerebral vascular accident)    HTN (hypertension)    DM (diabetes mellitus)    Arrhythmia    History of atrial fibrillation    Right hemiparesis    Aphasia due to acute stroke    GI bleed    Iron deficiency anemia    Upper GI bleed    Osteomyelitis    S/P CABG (coronary artery bypass graft)    S/P brain surgery    History of aortic valve repair          Medication list         MEDICATIONS  (STANDING):  albuterol/ipratropium for Nebulization 3 milliLiter(s) Nebulizer every 8 hours  atorvastatin 40 milliGRAM(s) Oral at bedtime  ceFAZolin   IVPB 2000 milliGRAM(s) IV Intermittent every 8 hours  dextrose 5%. 1000 milliLiter(s) (50 mL/Hr) IV Continuous <Continuous>  dextrose 5%. 1000 milliLiter(s) (100 mL/Hr) IV Continuous <Continuous>  dextrose 50% Injectable 25 Gram(s) IV Push once  dextrose 50% Injectable 25 Gram(s) IV Push once  dextrose 50% Injectable 12.5 Gram(s) IV Push once  diltiazem    milliGRAM(s) Oral daily  furosemide    Tablet 40 milliGRAM(s) Oral daily  glucagon  Injectable 1 milliGRAM(s) IntraMuscular once  influenza  Vaccine (HIGH DOSE) 0.7 milliLiter(s) IntraMuscular once  insulin glargine Injectable (LANTUS) 5 Unit(s) SubCutaneous at bedtime  insulin lispro (ADMELOG) corrective regimen sliding scale   SubCutaneous three times a day before meals  insulin lispro (ADMELOG) corrective regimen sliding scale   SubCutaneous at bedtime  insulin lispro Injectable (ADMELOG) 3 Unit(s) SubCutaneous three times a day before meals  levETIRAcetam 750 milliGRAM(s) Oral two times a day  pantoprazole  Injectable 40 milliGRAM(s) IV Push every 12 hours  sodium chloride 1 Gram(s) Oral two times a day  sucralfate 1 Gram(s) Oral four times a day  urea Oral Powder 15 Gram(s) Oral daily    MEDICATIONS  (PRN):  dextrose Oral Gel 15 Gram(s) Oral once PRN Blood Glucose LESS THAN 70 milliGRAM(s)/deciliter  guaiFENesin Oral Liquid (Sugar-Free) 200 milliGRAM(s) Oral every 6 hours PRN Cough  ondansetron Injectable 4 milliGRAM(s) IV Push every 6 hours PRN Nausea and/or Vomiting         Vitals log        ICU Vital Signs Last 24 Hrs  T(C): 36.7 (15 Oct 2023 20:57), Max: 36.7 (15 Oct 2023 11:54)  T(F): 98.1 (15 Oct 2023 20:57), Max: 98.1 (15 Oct 2023 11:54)  HR: 69 (15 Oct 2023 20:57) (69 - 88)  BP: 134/67 (15 Oct 2023 20:57) (133/69 - 134/67)  BP(mean): --  ABP: --  ABP(mean): --  RR: 18 (15 Oct 2023 20:57) (18 - 18)  SpO2: 97% (15 Oct 2023 20:57) (97% - 99%)    O2 Parameters below as of 15 Oct 2023 20:57  Patient On (Oxygen Delivery Method): nasal cannula                 Input and Output:  I&O's Detail    14 Oct 2023 07:01  -  15 Oct 2023 07:00  --------------------------------------------------------  IN:    Heparin Infusion: 44 mL  Total IN: 44 mL    OUT:  Total OUT: 0 mL    Total NET: 44 mL      15 Oct 2023 07:01  -  16 Oct 2023 05:50  --------------------------------------------------------  IN:    Oral Fluid: 250 mL  Total IN: 250 mL    OUT:  Total OUT: 0 mL    Total NET: 250 mL          Lab Data                        10.0   8.31  )-----------( 275      ( 15 Oct 2023 10:20 )             32.2     10-15    135  |  98  |  27<H>  ----------------------------<  219<H>  3.7   |  32<H>  |  1.30    Ca    9.2      15 Oct 2023 10:20    TPro  7.2  /  Alb  2.6<L>  /  TBili  0.4  /  DBili  x   /  AST  46<H>  /  ALT  16  /  AlkPhos  933<H>  10-15            Review of Systems	      Objective     Physical Examination    heart s1s2  lung dc BS  head nc      Pertinent Lab findings & Imaging      Bryan:  NO   Adequate UO     I&O's Detail    14 Oct 2023 07:01  -  15 Oct 2023 07:00  --------------------------------------------------------  IN:    Heparin Infusion: 44 mL  Total IN: 44 mL    OUT:  Total OUT: 0 mL    Total NET: 44 mL      15 Oct 2023 07:01  -  16 Oct 2023 05:50  --------------------------------------------------------  IN:    Oral Fluid: 250 mL  Total IN: 250 mL    OUT:  Total OUT: 0 mL    Total NET: 250 mL               Discussed with:     Cultures:	        Radiology

## 2023-10-16 NOTE — PROGRESS NOTE ADULT - SUBJECTIVE AND OBJECTIVE BOX
[facility]  Shane Murray PA-C  [address] [place] [address]  [phone]      INTERVAL HPI/OVERNIGHT EVENTS:  Pt s/e; daughter at bedside translated for pt  No overt GI bleeding or new GI complaints    MEDICATIONS  (STANDING):  albuterol/ipratropium for Nebulization 3 milliLiter(s) Nebulizer every 8 hours  atorvastatin 40 milliGRAM(s) Oral at bedtime  ceFAZolin   IVPB 2000 milliGRAM(s) IV Intermittent every 8 hours  dextrose 5%. 1000 milliLiter(s) (50 mL/Hr) IV Continuous <Continuous>  dextrose 5%. 1000 milliLiter(s) (100 mL/Hr) IV Continuous <Continuous>  dextrose 50% Injectable 25 Gram(s) IV Push once  dextrose 50% Injectable 25 Gram(s) IV Push once  dextrose 50% Injectable 12.5 Gram(s) IV Push once  diltiazem    milliGRAM(s) Oral daily  furosemide    Tablet 40 milliGRAM(s) Oral daily  glucagon  Injectable 1 milliGRAM(s) IntraMuscular once  influenza  Vaccine (HIGH DOSE) 0.7 milliLiter(s) IntraMuscular once  insulin glargine Injectable (LANTUS) 5 Unit(s) SubCutaneous at bedtime  insulin lispro (ADMELOG) corrective regimen sliding scale   SubCutaneous three times a day before meals  insulin lispro (ADMELOG) corrective regimen sliding scale   SubCutaneous at bedtime  insulin lispro Injectable (ADMELOG) 3 Unit(s) SubCutaneous three times a day before meals  levETIRAcetam 750 milliGRAM(s) Oral two times a day  pantoprazole  Injectable 40 milliGRAM(s) IV Push every 12 hours  sodium chloride 1 Gram(s) Oral two times a day  sucralfate 1 Gram(s) Oral four times a day  urea Oral Powder 15 Gram(s) Oral daily    MEDICATIONS  (PRN):  dextrose Oral Gel 15 Gram(s) Oral once PRN Blood Glucose LESS THAN 70 milliGRAM(s)/deciliter  guaiFENesin Oral Liquid (Sugar-Free) 200 milliGRAM(s) Oral every 6 hours PRN Cough  ondansetron Injectable 4 milliGRAM(s) IV Push every 6 hours PRN Nausea and/or Vomiting      Allergies    No Known Allergies        PHYSICAL EXAM:   Vital Signs:  Vital Signs Last 24 Hrs  T(C): 36.5 (16 Oct 2023 04:59), Max: 36.7 (15 Oct 2023 20:57)  T(F): 97.7 (16 Oct 2023 04:59), Max: 98.1 (15 Oct 2023 20:57)  HR: 66 (16 Oct 2023 04:59) (66 - 69)  BP: 134/72 (16 Oct 2023 04:59) (134/67 - 134/72)  BP(mean): --  RR: 18 (16 Oct 2023 04:59) (18 - 18)  SpO2: 96% (16 Oct 2023 04:59) (96% - 97%)    Parameters below as of 16 Oct 2023 04:59  Patient On (Oxygen Delivery Method): nasal cannula      GENERAL:  Appears stated age  HEENT:  NC/AT  CHEST:  Full & symmetric excursion  HEART:  Regular rhythm  ABDOMEN:  Soft, non-tender, non-distended  EXTEREMITIES:  no cyanosis  SKIN:  No rash  NEURO:  Alert      LABS:                        10.5   8.07  )-----------( 275      ( 16 Oct 2023 07:50 )             33.7     10-16    134<L>  |  97  |  31<H>  ----------------------------<  144<H>  5.0   |  32<H>  |  1.20    Ca    9.2      16 Oct 2023 07:50    TPro  7.2  /  Alb  2.6<L>  /  TBili  0.6  /  DBili  x   /  AST  68<H>  /  ALT  16  /  AlkPhos  980<H>  10-16    PT/INR - ( 16 Oct 2023 07:50 )   PT: 28.7 sec;   INR: 2.52 ratio         PTT - ( 15 Oct 2023 10:20 )  PTT:56.6 sec  Urinalysis Basic - ( 16 Oct 2023 07:50 )    Color: x / Appearance: x / SG: x / pH: x  Gluc: 144 mg/dL / Ketone: x  / Bili: x / Urobili: x   Blood: x / Protein: x / Nitrite: x   Leuk Esterase: x / RBC: x / WBC x   Sq Epi: x / Non Sq Epi: x / Bacteria: x

## 2023-10-16 NOTE — PROGRESS NOTE ADULT - SUBJECTIVE AND OBJECTIVE BOX
CAPILLARY BLOOD GLUCOSE      POCT Blood Glucose.: 133 mg/dL (16 Oct 2023 07:14)  POCT Blood Glucose.: 306 mg/dL (15 Oct 2023 21:00)  POCT Blood Glucose.: 195 mg/dL (15 Oct 2023 17:20)  POCT Blood Glucose.: 253 mg/dL (15 Oct 2023 12:28)  POCT Blood Glucose.: 158 mg/dL (15 Oct 2023 07:44)      Vital Signs Last 24 Hrs  T(C): 36.5 (16 Oct 2023 04:59), Max: 36.7 (15 Oct 2023 11:54)  T(F): 97.7 (16 Oct 2023 04:59), Max: 98.1 (15 Oct 2023 11:54)  HR: 66 (16 Oct 2023 04:59) (66 - 88)  BP: 134/72 (16 Oct 2023 04:59) (133/69 - 134/72)  BP(mean): --  RR: 18 (16 Oct 2023 04:59) (18 - 18)  SpO2: 96% (16 Oct 2023 04:59) (96% - 99%)    Parameters below as of 16 Oct 2023 04:59  Patient On (Oxygen Delivery Method): nasal cannula        General: WN/WD NAD  Respiratory: CTA B/L  CV: RRR, S1S2, no murmurs, rubs or gallops  Abdominal: Soft, NT, ND +BS, Last BM  Extremities: No edema, + peripheral pulses     10-15    135  |  98  |  27<H>  ----------------------------<  219<H>  3.7   |  32<H>  |  1.30    Ca    9.2      15 Oct 2023 10:20    TPro  7.2  /  Alb  2.6<L>  /  TBili  0.4  /  DBili  x   /  AST  46<H>  /  ALT  16  /  AlkPhos  933<H>  10-15      atorvastatin 40 milliGRAM(s) Oral at bedtime  dextrose 50% Injectable 25 Gram(s) IV Push once  dextrose 50% Injectable 25 Gram(s) IV Push once  dextrose 50% Injectable 12.5 Gram(s) IV Push once  dextrose Oral Gel 15 Gram(s) Oral once PRN  glucagon  Injectable 1 milliGRAM(s) IntraMuscular once  insulin glargine Injectable (LANTUS) 5 Unit(s) SubCutaneous at bedtime  insulin lispro (ADMELOG) corrective regimen sliding scale   SubCutaneous three times a day before meals  insulin lispro (ADMELOG) corrective regimen sliding scale   SubCutaneous at bedtime  insulin lispro Injectable (ADMELOG) 3 Unit(s) SubCutaneous three times a day before meals

## 2023-10-16 NOTE — PROGRESS NOTE ADULT - SUBJECTIVE AND OBJECTIVE BOX
Neurology follow up note    NAIB [id number]      Interval History:    Patient feels ok no new complaints.    Allergies    No Known Allergies    Intolerances        MEDICATIONS    albuterol/ipratropium for Nebulization 3 milliLiter(s) Nebulizer every 8 hours  atorvastatin 40 milliGRAM(s) Oral at bedtime  ceFAZolin   IVPB 2000 milliGRAM(s) IV Intermittent every 8 hours  dextrose 5%. 1000 milliLiter(s) IV Continuous <Continuous>  dextrose 5%. 1000 milliLiter(s) IV Continuous <Continuous>  dextrose 50% Injectable 25 Gram(s) IV Push once  dextrose 50% Injectable 25 Gram(s) IV Push once  dextrose 50% Injectable 12.5 Gram(s) IV Push once  dextrose Oral Gel 15 Gram(s) Oral once PRN  diltiazem    milliGRAM(s) Oral daily  furosemide    Tablet 40 milliGRAM(s) Oral daily  glucagon  Injectable 1 milliGRAM(s) IntraMuscular once  guaiFENesin Oral Liquid (Sugar-Free) 200 milliGRAM(s) Oral every 6 hours PRN  influenza  Vaccine (HIGH DOSE) 0.7 milliLiter(s) IntraMuscular once  insulin glargine Injectable (LANTUS) 5 Unit(s) SubCutaneous at bedtime  insulin lispro (ADMELOG) corrective regimen sliding scale   SubCutaneous three times a day before meals  insulin lispro (ADMELOG) corrective regimen sliding scale   SubCutaneous at bedtime  insulin lispro Injectable (ADMELOG) 3 Unit(s) SubCutaneous three times a day before meals  levETIRAcetam 750 milliGRAM(s) Oral two times a day  ondansetron Injectable 4 milliGRAM(s) IV Push every 6 hours PRN  pantoprazole  Injectable 40 milliGRAM(s) IV Push every 12 hours  sodium chloride 1 Gram(s) Oral two times a day  sucralfate 1 Gram(s) Oral four times a day  urea Oral Powder 15 Gram(s) Oral daily              Vital Signs Last 24 Hrs  T(C): 36.5 (16 Oct 2023 04:59), Max: 36.7 (15 Oct 2023 11:54)  T(F): 97.7 (16 Oct 2023 04:59), Max: 98.1 (15 Oct 2023 11:54)  HR: 66 (16 Oct 2023 04:59) (66 - 73)  BP: 134/72 (16 Oct 2023 04:59) (133/69 - 134/72)  BP(mean): --  RR: 18 (16 Oct 2023 04:59) (18 - 18)  SpO2: 96% (16 Oct 2023 04:59) (96% - 99%)    Parameters below as of 16 Oct 2023 04:59  Patient On (Oxygen Delivery Method): nasal cannula    REVIEW OF SYSTEMS:  Completely limited secondary to the patient repeats words over, has severe expressive aphasia, but had been in his normal state of health as per daughter.    PHYSICAL EXAMINATION:  HEENT:  Head:  Normocephalic, atraumatic.  Eyes:  No scleral icterus.  Ears:  Hard to evaluate secondary to he could not answer questions accordingly but appears to follow commands.  NECK:  Supple.  RESPIRATORY:  Air entry bilaterally.  CARDIOVASCULAR:  S1 and S2 heard.  ABDOMEN:  Soft and nontender.  EXTREMITIES:  Positive discoloration was noted on the left toe.     NEUROLOGIC:  The patient was awake, alert.  On-off blink to visual threat.  Positive expressive aphasia.  Will say a few words but repeat the same words over.  Right upper and right lower were 0/5 with increased tone.  Right hemiplegia is his baseline.  Left upper and left lower were 4/5.       LABS:  CBC Full  -  ( 15 Oct 2023 10:20 )  WBC Count : 8.31 K/uL  RBC Count : 3.79 M/uL  Hemoglobin : 10.0 g/dL  Hematocrit : 32.2 %  Platelet Count - Automated : 275 K/uL  Mean Cell Volume : 85.0 fl  Mean Cell Hemoglobin : 26.4 pg  Mean Cell Hemoglobin Concentration : 31.1 gm/dL  Auto Neutrophil # : x  Auto Lymphocyte # : x  Auto Monocyte # : x  Auto Eosinophil # : x  Auto Basophil # : x  Auto Neutrophil % : x  Auto Lymphocyte % : x  Auto Monocyte % : x  Auto Eosinophil % : x  Auto Basophil % : x    Urinalysis Basic - ( 15 Oct 2023 10:20 )    Color: x / Appearance: x / SG: x / pH: x  Gluc: 219 mg/dL / Ketone: x  / Bili: x / Urobili: x   Blood: x / Protein: x / Nitrite: x   Leuk Esterase: x / RBC: x / WBC x   Sq Epi: x / Non Sq Epi: x / Bacteria: x      10-15    135  |  98  |  27<H>  ----------------------------<  219<H>  3.7   |  32<H>  |  1.30    Ca    9.2      15 Oct 2023 10:20    TPro  7.2  /  Alb  2.6<L>  /  TBili  0.4  /  DBili  x   /  AST  46<H>  /  ALT  16  /  AlkPhos  933<H>  10-15    Hemoglobin A1C:     LIVER FUNCTIONS - ( 15 Oct 2023 10:20 )  Alb: 2.6 g/dL / Pro: 7.2 g/dL / ALK PHOS: 933 U/L / ALT: 16 U/L / AST: 46 U/L / GGT: x           Vitamin B12   PT/INR - ( 15 Oct 2023 10:20 )   PT: 32.7 sec;   INR: 2.88 ratio         PTT - ( 15 Oct 2023 10:20 )  PTT:56.6 sec      RADIOLOGY    ANALYSIS AND PLAN:  This is a 68-year-old with a history of cerebrovascular accident, atrial fibrillation, now with possibly gastrointestinal bleed.  For history of cerebrovascular accident and atrial fibrillation, the patient is on multiple blood thinning medications.  The patient's warfarin INR is significantly elevated.  For now, I would recommend gastrointestinal workup, when possible to prevent further cerebrovascular accident, would recommend to reinstitute anticoagulation, but possibly an alternative to warfarin secondary to elevation of INR, unclear if the patient needs to be on antiplatelets as well.  If the patient cannot have strong anticoagulation, then we will recommend at least a baby aspirin if possible.  For history of seizure prophylaxis, continue the patient on his Keppra.  For history of diabetes, strict control of blood sugars.  For hyperlipidemia, continue the patient on statin.  GI noted suspect severe gi bleed in setting of elevated INR AC as per medical team   as per her no blood in stools   monitor oral intake     Spoke with daughter, Jeffrey, at bedside 10/12 .  She does understand my reasoning and thought process.  Her telephone number is [phone].    Greater than 46 minutes of time was spent with the patient, plan of care, reviewing data, speaking to the family and  50% of the visit was spent counseling and/or coordinating care with multidisciplinary healthcare team   Neurology follow up note    NAIB [id number]      Interval History:    Patient feels ok no new complaints.    Allergies    No Known Allergies    Intolerances        MEDICATIONS    albuterol/ipratropium for Nebulization 3 milliLiter(s) Nebulizer every 8 hours  atorvastatin 40 milliGRAM(s) Oral at bedtime  ceFAZolin   IVPB 2000 milliGRAM(s) IV Intermittent every 8 hours  dextrose 5%. 1000 milliLiter(s) IV Continuous <Continuous>  dextrose 5%. 1000 milliLiter(s) IV Continuous <Continuous>  dextrose 50% Injectable 25 Gram(s) IV Push once  dextrose 50% Injectable 25 Gram(s) IV Push once  dextrose 50% Injectable 12.5 Gram(s) IV Push once  dextrose Oral Gel 15 Gram(s) Oral once PRN  diltiazem    milliGRAM(s) Oral daily  furosemide    Tablet 40 milliGRAM(s) Oral daily  glucagon  Injectable 1 milliGRAM(s) IntraMuscular once  guaiFENesin Oral Liquid (Sugar-Free) 200 milliGRAM(s) Oral every 6 hours PRN  influenza  Vaccine (HIGH DOSE) 0.7 milliLiter(s) IntraMuscular once  insulin glargine Injectable (LANTUS) 5 Unit(s) SubCutaneous at bedtime  insulin lispro (ADMELOG) corrective regimen sliding scale   SubCutaneous three times a day before meals  insulin lispro (ADMELOG) corrective regimen sliding scale   SubCutaneous at bedtime  insulin lispro Injectable (ADMELOG) 3 Unit(s) SubCutaneous three times a day before meals  levETIRAcetam 750 milliGRAM(s) Oral two times a day  ondansetron Injectable 4 milliGRAM(s) IV Push every 6 hours PRN  pantoprazole  Injectable 40 milliGRAM(s) IV Push every 12 hours  sodium chloride 1 Gram(s) Oral two times a day  sucralfate 1 Gram(s) Oral four times a day  urea Oral Powder 15 Gram(s) Oral daily              Vital Signs Last 24 Hrs  T(C): 36.5 (16 Oct 2023 04:59), Max: 36.7 (15 Oct 2023 11:54)  T(F): 97.7 (16 Oct 2023 04:59), Max: 98.1 (15 Oct 2023 11:54)  HR: 66 (16 Oct 2023 04:59) (66 - 73)  BP: 134/72 (16 Oct 2023 04:59) (133/69 - 134/72)  BP(mean): --  RR: 18 (16 Oct 2023 04:59) (18 - 18)  SpO2: 96% (16 Oct 2023 04:59) (96% - 99%)    Parameters below as of 16 Oct 2023 04:59  Patient On (Oxygen Delivery Method): nasal cannula    REVIEW OF SYSTEMS:  Completely limited secondary to the patient repeats words over, has severe expressive aphasia, but had been in his normal state of health as per daughter.    PHYSICAL EXAMINATION:  HEENT:  Head:  Normocephalic, atraumatic.  Eyes:  No scleral icterus.  Ears:  Hard to evaluate secondary to he could not answer questions accordingly but appears to follow commands.  NECK:  Supple.  RESPIRATORY:  Air entry bilaterally.  CARDIOVASCULAR:  S1 and S2 heard.  ABDOMEN:  Soft and nontender.  EXTREMITIES:  Positive discoloration was noted on the left toe.     NEUROLOGIC:  The patient was awake, alert.  On-off blink to visual threat.  Positive expressive aphasia.  Will say a few words but repeat the same words over.  Right upper and right lower were 0/5 with increased tone.  Right hemiplegia is his baseline.  Left upper and left lower were 4/5.       LABS:  CBC Full  -  ( 15 Oct 2023 10:20 )  WBC Count : 8.31 K/uL  RBC Count : 3.79 M/uL  Hemoglobin : 10.0 g/dL  Hematocrit : 32.2 %  Platelet Count - Automated : 275 K/uL  Mean Cell Volume : 85.0 fl  Mean Cell Hemoglobin : 26.4 pg  Mean Cell Hemoglobin Concentration : 31.1 gm/dL  Auto Neutrophil # : x  Auto Lymphocyte # : x  Auto Monocyte # : x  Auto Eosinophil # : x  Auto Basophil # : x  Auto Neutrophil % : x  Auto Lymphocyte % : x  Auto Monocyte % : x  Auto Eosinophil % : x  Auto Basophil % : x    Urinalysis Basic - ( 15 Oct 2023 10:20 )    Color: x / Appearance: x / SG: x / pH: x  Gluc: 219 mg/dL / Ketone: x  / Bili: x / Urobili: x   Blood: x / Protein: x / Nitrite: x   Leuk Esterase: x / RBC: x / WBC x   Sq Epi: x / Non Sq Epi: x / Bacteria: x      10-15    135  |  98  |  27<H>  ----------------------------<  219<H>  3.7   |  32<H>  |  1.30    Ca    9.2      15 Oct 2023 10:20    TPro  7.2  /  Alb  2.6<L>  /  TBili  0.4  /  DBili  x   /  AST  46<H>  /  ALT  16  /  AlkPhos  933<H>  10-15    Hemoglobin A1C:     LIVER FUNCTIONS - ( 15 Oct 2023 10:20 )  Alb: 2.6 g/dL / Pro: 7.2 g/dL / ALK PHOS: 933 U/L / ALT: 16 U/L / AST: 46 U/L / GGT: x           Vitamin B12   PT/INR - ( 15 Oct 2023 10:20 )   PT: 32.7 sec;   INR: 2.88 ratio         PTT - ( 15 Oct 2023 10:20 )  PTT:56.6 sec      RADIOLOGY    ANALYSIS AND PLAN:  This is a 68-year-old with a history of cerebrovascular accident, atrial fibrillation, now with possibly gastrointestinal bleed.  For history of cerebrovascular accident and atrial fibrillation, the patient is on multiple blood thinning medications.  The patient's warfarin INR is significantly elevated.  For now, I would recommend gastrointestinal workup, when possible to prevent further cerebrovascular accident, would recommend to reinstitute anticoagulation, but possibly an alternative to warfarin secondary to elevation of INR, unclear if the patient needs to be on antiplatelets as well.  If the patient cannot have strong anticoagulation, then we will recommend at least a baby aspirin if possible.  For history of seizure prophylaxis, continue the patient on his Keppra.  For history of diabetes, strict control of blood sugars.  For hyperlipidemia, continue the patient on statin.  GI noted suspect severe gi bleed in setting of elevated INR AC as per medical team   as per her no blood in stools   monitor oral intake   seen with other daughter today doing well     Spoke with daughter, Jeffrey, at bedside 10/12 .  She does understand my reasoning and thought process.  Her telephone number is [phone].    Greater than 46 minutes of time was spent with the patient, plan of care, reviewing data, speaking to the family and  50% of the visit was spent counseling and/or coordinating care with multidisciplinary healthcare team

## 2023-10-16 NOTE — PROGRESS NOTE ADULT - SUBJECTIVE AND OBJECTIVE BOX
Patient is a 68y old  Male who presents with a chief complaint of black stool (16 Oct 2023 12:11)    Date of servie : 10-16-23 @ 13:33  INTERVAL HPI/OVERNIGHT EVENTS:  T(C): 36.5 (10-16-23 @ 12:14), Max: 36.7 (10-15-23 @ 20:57)  HR: 72 (10-16-23 @ 12:14) (66 - 72)  BP: 124/63 (10-16-23 @ 12:14) (124/63 - 134/72)  RR: 18 (10-16-23 @ 12:14) (18 - 18)  SpO2: 99% (10-16-23 @ 12:14) (96% - 99%)  Wt(kg): --  I&O's Summary    15 Oct 2023 07:01  -  16 Oct 2023 07:00  --------------------------------------------------------  IN: 250 mL / OUT: 800 mL / NET: -550 mL        LABS:                        10.5   8.07  )-----------( 275      ( 16 Oct 2023 07:50 )             33.7     10-16    134<L>  |  97  |  31<H>  ----------------------------<  144<H>  5.0   |  32<H>  |  1.20    Ca    9.2      16 Oct 2023 07:50    TPro  7.2  /  Alb  2.6<L>  /  TBili  0.6  /  DBili  x   /  AST  68<H>  /  ALT  16  /  AlkPhos  980<H>  10-16    PT/INR - ( 16 Oct 2023 07:50 )   PT: 28.7 sec;   INR: 2.52 ratio         PTT - ( 15 Oct 2023 10:20 )  PTT:56.6 sec  Urinalysis Basic - ( 16 Oct 2023 07:50 )    Color: x / Appearance: x / SG: x / pH: x  Gluc: 144 mg/dL / Ketone: x  / Bili: x / Urobili: x   Blood: x / Protein: x / Nitrite: x   Leuk Esterase: x / RBC: x / WBC x   Sq Epi: x / Non Sq Epi: x / Bacteria: x      CAPILLARY BLOOD GLUCOSE      POCT Blood Glucose.: 292 mg/dL (16 Oct 2023 12:10)  POCT Blood Glucose.: 133 mg/dL (16 Oct 2023 07:14)  POCT Blood Glucose.: 306 mg/dL (15 Oct 2023 21:00)  POCT Blood Glucose.: 195 mg/dL (15 Oct 2023 17:20)        Urinalysis Basic - ( 16 Oct 2023 07:50 )    Color: x / Appearance: x / SG: x / pH: x  Gluc: 144 mg/dL / Ketone: x  / Bili: x / Urobili: x   Blood: x / Protein: x / Nitrite: x   Leuk Esterase: x / RBC: x / WBC x   Sq Epi: x / Non Sq Epi: x / Bacteria: x        MEDICATIONS  (STANDING):  albuterol/ipratropium for Nebulization 3 milliLiter(s) Nebulizer every 8 hours  atorvastatin 40 milliGRAM(s) Oral at bedtime  ceFAZolin   IVPB 2000 milliGRAM(s) IV Intermittent every 8 hours  dextrose 5%. 1000 milliLiter(s) (50 mL/Hr) IV Continuous <Continuous>  dextrose 5%. 1000 milliLiter(s) (100 mL/Hr) IV Continuous <Continuous>  dextrose 50% Injectable 25 Gram(s) IV Push once  dextrose 50% Injectable 25 Gram(s) IV Push once  dextrose 50% Injectable 12.5 Gram(s) IV Push once  diltiazem    milliGRAM(s) Oral daily  furosemide    Tablet 40 milliGRAM(s) Oral daily  glucagon  Injectable 1 milliGRAM(s) IntraMuscular once  influenza  Vaccine (HIGH DOSE) 0.7 milliLiter(s) IntraMuscular once  insulin glargine Injectable (LANTUS) 5 Unit(s) SubCutaneous at bedtime  insulin lispro (ADMELOG) corrective regimen sliding scale   SubCutaneous three times a day before meals  insulin lispro (ADMELOG) corrective regimen sliding scale   SubCutaneous at bedtime  insulin lispro Injectable (ADMELOG) 3 Unit(s) SubCutaneous three times a day before meals  levETIRAcetam 750 milliGRAM(s) Oral two times a day  pantoprazole  Injectable 40 milliGRAM(s) IV Push every 12 hours  sodium chloride 1 Gram(s) Oral two times a day  sucralfate 1 Gram(s) Oral four times a day  urea Oral Powder 15 Gram(s) Oral daily  warfarin 2 milliGRAM(s) Oral once    MEDICATIONS  (PRN):  dextrose Oral Gel 15 Gram(s) Oral once PRN Blood Glucose LESS THAN 70 milliGRAM(s)/deciliter  guaiFENesin Oral Liquid (Sugar-Free) 200 milliGRAM(s) Oral every 6 hours PRN Cough  ondansetron Injectable 4 milliGRAM(s) IV Push every 6 hours PRN Nausea and/or Vomiting          PHYSICAL EXAM:  GENERAL: NAD, well-groomed, well-developed  HEAD:  Atraumatic, Normocephalic  CHEST/LUNG: Clear to percussion bilaterally; No rales, rhonchi, wheezing, or rubs  HEART: Regular rate and rhythm; No murmurs, rubs, or gallops  ABDOMEN: Soft, Nontender, Nondistended; Bowel sounds present  EXTREMITIES:  2+ Peripheral Pulses, No clubbing, cyanosis, or edema  LYMPH: No lymphadenopathy noted  SKIN: No rashes or lesions    Care Discussed with Consultants/Other Providers [ ] YES  [ ] NO

## 2023-10-16 NOTE — BRIEF OPERATIVE NOTE - OPERATION/FINDINGS
RUE 4 fr 15 cm power midline via cephalic vein. No blood return due to small vessel. May be used for medication infusion.

## 2023-10-16 NOTE — PROGRESS NOTE ADULT - ASSESSMENT
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

## 2023-10-16 NOTE — PROGRESS NOTE ADULT - SUBJECTIVE AND OBJECTIVE BOX
[facility] DIVISION of INFECTIOUS DISEASE  Guru Michel MD PhD, Lisa James MD, Becca Acosta MD, Karen Grimes MD, Corky Perez MD  and providing coverage with Carlos Marx MD  Providing Infectious Disease Consultations at [facility], [facility], [facility], [facility]'s    Office# [phone] to schedule follow up appointments  Answering Service for urgent calls or New Consults [phone]  Cell# to text for urgent issues Guru Michel [phone]     infectious diseases progress note:    ARNIE DELGADILLO is a 68y y. o. Male patient    Overnight and events of the last 24hrs reviewed    Allergies    No Known Allergies    Intolerances        ANTIBIOTICS/RELEVANT:  antimicrobials  ceFAZolin   IVPB 2000 milliGRAM(s) IV Intermittent every 8 hours    immunologic:  influenza  Vaccine (HIGH DOSE) 0.7 milliLiter(s) IntraMuscular once    OTHER:  albuterol/ipratropium for Nebulization 3 milliLiter(s) Nebulizer every 8 hours  atorvastatin 40 milliGRAM(s) Oral at bedtime  dextrose 5%. 1000 milliLiter(s) IV Continuous <Continuous>  dextrose 5%. 1000 milliLiter(s) IV Continuous <Continuous>  dextrose 50% Injectable 25 Gram(s) IV Push once  dextrose 50% Injectable 25 Gram(s) IV Push once  dextrose 50% Injectable 12.5 Gram(s) IV Push once  dextrose Oral Gel 15 Gram(s) Oral once PRN  diltiazem    milliGRAM(s) Oral daily  furosemide    Tablet 40 milliGRAM(s) Oral daily  glucagon  Injectable 1 milliGRAM(s) IntraMuscular once  guaiFENesin Oral Liquid (Sugar-Free) 200 milliGRAM(s) Oral every 6 hours PRN  insulin glargine Injectable (LANTUS) 5 Unit(s) SubCutaneous at bedtime  insulin lispro (ADMELOG) corrective regimen sliding scale   SubCutaneous three times a day before meals  insulin lispro (ADMELOG) corrective regimen sliding scale   SubCutaneous at bedtime  insulin lispro Injectable (ADMELOG) 3 Unit(s) SubCutaneous three times a day before meals  levETIRAcetam 750 milliGRAM(s) Oral two times a day  ondansetron Injectable 4 milliGRAM(s) IV Push every 6 hours PRN  pantoprazole  Injectable 40 milliGRAM(s) IV Push every 12 hours  sodium chloride 1 Gram(s) Oral two times a day  sucralfate 1 Gram(s) Oral four times a day  urea Oral Powder 15 Gram(s) Oral daily  warfarin 2 milliGRAM(s) Oral once      Objective:  Vital Signs Last 24 Hrs  T(C): 36.5 (16 Oct 2023 12:14), Max: 36.7 (15 Oct 2023 20:57)  T(F): 97.7 (16 Oct 2023 12:14), Max: 98.1 (15 Oct 2023 20:57)  HR: 72 (16 Oct 2023 12:14) (66 - 72)  BP: 124/63 (16 Oct 2023 12:14) (124/63 - 134/72)  BP(mean): --  RR: 18 (16 Oct 2023 12:14) (18 - 18)  SpO2: 99% (16 Oct 2023 12:14) (96% - 99%)    Parameters below as of 16 Oct 2023 12:14  Patient On (Oxygen Delivery Method): nasal cannula        T(C): 36.5 (10-16-23 @ 12:14), Max: 36.8 (10-14-23 @ 20:28)  T(C): 36.5 (10-16-23 @ 12:14), Max: 36.9 (10-13-23 @ 20:45)  T(C): 36.5 (10-16-23 @ 12:14), Max: 37 (10-13-23 @ 12:22)    PHYSICAL EXAM:  HEENT: NC atraumatic  Neck: supple  Respiratory: no accessory muscle use, breathing comfortably  Cardiovascular: distant  Gastrointestinal: normal appearing, nondistended  Extremities: no clubbing, no cyanosis,        LABS:                          10.5   8.07  )-----------( 275      ( 16 Oct 2023 07:50 )             33.7       WBC  8.07 10-16 @ 07:50  8.31 10-15 @ 10:20  7.52 10-14 @ 06:46  6.50 10-13 @ 07:45  6.58 10-12 @ 07:30  7.86 10-11 @ 07:22  8.73 10-10 @ 14:15  8.58 10-10 @ 06:50      10-16    134<L>  |  97  |  31<H>  ----------------------------<  144<H>  5.0   |  32<H>  |  1.20    Ca    9.2      16 Oct 2023 07:50    TPro  7.2  /  Alb  2.6<L>  /  TBili  0.6  /  DBili  x   /  AST  68<H>  /  ALT  16  /  AlkPhos  980<H>  10-16      Creatinine: 1.20 mg/dL (10-16-23 @ 07:50)  Creatinine: 1.30 mg/dL (10-15-23 @ 10:20)  Creatinine: 1.40 mg/dL (10-11-23 @ 07:22)  Creatinine: 1.60 mg/dL (10-10-23 @ 06:51)      PT/INR - ( 16 Oct 2023 07:50 )   PT: 28.7 sec;   INR: 2.52 ratio         PTT - ( 15 Oct 2023 10:20 )  PTT:56.6 sec  Urinalysis Basic - ( 16 Oct 2023 07:50 )    Color: x / Appearance: x / SG: x / pH: x  Gluc: 144 mg/dL / Ketone: x  / Bili: x / Urobili: x   Blood: x / Protein: x / Nitrite: x   Leuk Esterase: x / RBC: x / WBC x   Sq Epi: x / Non Sq Epi: x / Bacteria: x            INFLAMMATORY MARKERS      MICROBIOLOGY:              RADIOLOGY & ADDITIONAL STUDIES:

## 2023-10-16 NOTE — PROGRESS NOTE ADULT - ASSESSMENT
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 Per daughter at bedside, patient's INR level was 7.2 two days ago and was instructed by hematologist to hold coumadin for the past 2 days. Of note, patient had EGD and colonoscopy in 08/2023 revealing gastritis, duodenitis, non-bleeding AVM, and two polyps, one removed (pathology revealing tubular adenoma). Patient was afebrile and HDS on arrival with SBPs 90s and HR 80s. Labs notable for Hgb 5.9, INR 7.01, BUN/Cr 64/1.80, Alk Phos 418. ED ordered for Kcentra, Vit K, and 2U PRBCs.    1Acute blood loss anemia  - monitor cbc  - GI fu  - management as per GI - no plan for scope at present  - winifred sec to high INR     2  mechanical valve   - monitor cbc   - check iNR and dose coumadin daily     3 Hx of CVA  - neuro fu    4 DM  - uncontrolled   - monitor FS  - basal, bolus   - endo following     5 SOB , tachycardia   - improved  -VQ scan low probability     6 MSSA bacteremia, pna  - cw abx  - fu repeat cultures  - ID fu

## 2023-10-16 NOTE — PROGRESS NOTE ADULT - SUBJECTIVE AND OBJECTIVE BOX
Patient is a 68y Male whom presented to the hospital with hyponatremia     PAST MEDICAL & SURGICAL HISTORY:  CVA (cerebral vascular accident)      HTN (hypertension)      DM (diabetes mellitus)      History of atrial fibrillation      Right hemiparesis      Aphasia due to acute stroke      Upper GI bleed      Osteomyelitis      S/P CABG (coronary artery bypass graft)      S/P brain surgery      History of aortic valve repair          MEDICATIONS  (STANDING):  atorvastatin 40 milliGRAM(s) Oral at bedtime  levETIRAcetam 750 milliGRAM(s) Oral two times a day  pantoprazole Infusion 8 mG/Hr (10 mL/Hr) IV Continuous <Continuous>  sucralfate 1 Gram(s) Oral four times a day      Allergies    No Known Allergies    Intolerances        SOCIAL HISTORY:  Denies ETOh,Smoking,     FAMILY HISTORY:  FH: coronary artery disease (Sibling)    FH: type 2 diabetes (Sibling)        REVIEW OF SYSTEMS:    CONSTITUTIONAL: No weakness, fevers or chills  RESPIRATORY: No cough, wheezing, hemoptysis; No shortness of breath  CARDIOVASCULAR: No chest pain or palpitations  GASTROINTESTINAL: No abdominal or epigastric pain. No nausea, vomiting,     No diarrhea or constipation. No melena   SKIN: dry                                                                                   10.5   8.07  )-----------( 275      ( 16 Oct 2023 07:50 )             33.7       CBC Full  -  ( 16 Oct 2023 07:50 )  WBC Count : 8.07 K/uL  RBC Count : 3.93 M/uL  Hemoglobin : 10.5 g/dL  Hematocrit : 33.7 %  Platelet Count - Automated : 275 K/uL  Mean Cell Volume : 85.8 fl  Mean Cell Hemoglobin : 26.7 pg  Mean Cell Hemoglobin Concentration : 31.2 gm/dL  Auto Neutrophil # : x  Auto Lymphocyte # : x  Auto Monocyte # : x  Auto Eosinophil # : x  Auto Basophil # : x  Auto Neutrophil % : x  Auto Lymphocyte % : x  Auto Monocyte % : x  Auto Eosinophil % : x  Auto Basophil % : x      10-16    134<L>  |  97  |  31<H>  ----------------------------<  144<H>  5.0   |  32<H>  |  1.20    Ca    9.2      16 Oct 2023 07:50    TPro  7.2  /  Alb  2.6<L>  /  TBili  0.6  /  DBili  x   /  AST  68<H>  /  ALT  16  /  AlkPhos  980<H>  10-16      CAPILLARY BLOOD GLUCOSE      POCT Blood Glucose.: 255 mg/dL (16 Oct 2023 17:23)  POCT Blood Glucose.: 292 mg/dL (16 Oct 2023 12:10)  POCT Blood Glucose.: 133 mg/dL (16 Oct 2023 07:14)  POCT Blood Glucose.: 306 mg/dL (15 Oct 2023 21:00)      Vital Signs Last 24 Hrs  T(C): 36.5 (16 Oct 2023 12:14), Max: 36.7 (15 Oct 2023 20:57)  T(F): 97.7 (16 Oct 2023 12:14), Max: 98.1 (15 Oct 2023 20:57)  HR: 72 (16 Oct 2023 16:21) (66 - 75)  BP: 124/63 (16 Oct 2023 12:14) (124/63 - 134/72)  BP(mean): --  RR: 18 (16 Oct 2023 12:14) (18 - 18)  SpO2: 99% (16 Oct 2023 12:14) (96% - 99%)    Parameters below as of 16 Oct 2023 16:21  Patient On (Oxygen Delivery Method): nasal cannula, 2 lpm        Urinalysis Basic - ( 16 Oct 2023 07:50 )    Color: x / Appearance: x / SG: x / pH: x  Gluc: 144 mg/dL / Ketone: x  / Bili: x / Urobili: x   Blood: x / Protein: x / Nitrite: x   Leuk Esterase: x / RBC: x / WBC x   Sq Epi: x / Non Sq Epi: x / Bacteria: x        PT/INR - ( 16 Oct 2023 07:50 )   PT: 28.7 sec;   INR: 2.52 ratio         PTT - ( 15 Oct 2023 10:20 )  PTT:56.6 sec      PHYSICAL EXAM:    Constitutional: NAD  HEENT: conjunctive   clear   Neck:  No JVD  Respiratory: CTAB  Cardiovascular: S1 and S2  Gastrointestinal: BS+, soft, NT/ND  Extremities: No peripheral edema  Neurological:  no focal deficits  Skin: dry   Access: Not applicable

## 2023-10-17 NOTE — DISCHARGE NOTE PROVIDER - PROVIDER TOKENS
PROVIDER:[TOKEN:[[id number]]],PROVIDER:[TOKEN:[[id number]]] PROVIDER:[TOKEN:[[id number]]],PROVIDER:[TOKEN:[[id number]]],PROVIDER:[TOKEN:[[id number]]]

## 2023-10-17 NOTE — PROGRESS NOTE ADULT - SUBJECTIVE AND OBJECTIVE BOX
Patient is a 68y old  Male who presents with a chief complaint of black stool (17 Oct 2023 13:29)    Date of servie : 10-17-23 @ 14:23  INTERVAL HPI/OVERNIGHT EVENTS:  T(C): 36.6 (10-17-23 @ 12:26), Max: 36.6 (10-17-23 @ 12:26)  HR: 68 (10-17-23 @ 12:26) (18 - 76)  BP: 120/69 (10-17-23 @ 12:26) (120/69 - 145/76)  RR: 18 (10-17-23 @ 12:26) (18 - 18)  SpO2: 93% (10-17-23 @ 12:26) (93% - 100%)  Wt(kg): --  I&O's Summary      LABS:                        9.6    7.64  )-----------( 252      ( 17 Oct 2023 06:20 )             31.8     10-17    134<L>  |  96  |  33<H>  ----------------------------<  201<H>  4.7   |  32<H>  |  1.30    Ca    9.4      17 Oct 2023 06:20    TPro  7.0  /  Alb  2.7<L>  /  TBili  0.6  /  DBili  x   /  AST  41<H>  /  ALT  12  /  AlkPhos  957<H>  10-17    PT/INR - ( 17 Oct 2023 06:20 )   PT: 24.3 sec;   INR: 2.12 ratio           Urinalysis Basic - ( 17 Oct 2023 06:20 )    Color: x / Appearance: x / SG: x / pH: x  Gluc: 201 mg/dL / Ketone: x  / Bili: x / Urobili: x   Blood: x / Protein: x / Nitrite: x   Leuk Esterase: x / RBC: x / WBC x   Sq Epi: x / Non Sq Epi: x / Bacteria: x      CAPILLARY BLOOD GLUCOSE      POCT Blood Glucose.: 309 mg/dL (17 Oct 2023 12:24)  POCT Blood Glucose.: 218 mg/dL (17 Oct 2023 08:36)  POCT Blood Glucose.: 262 mg/dL (16 Oct 2023 21:30)  POCT Blood Glucose.: 255 mg/dL (16 Oct 2023 17:23)        Urinalysis Basic - ( 17 Oct 2023 06:20 )    Color: x / Appearance: x / SG: x / pH: x  Gluc: 201 mg/dL / Ketone: x  / Bili: x / Urobili: x   Blood: x / Protein: x / Nitrite: x   Leuk Esterase: x / RBC: x / WBC x   Sq Epi: x / Non Sq Epi: x / Bacteria: x        MEDICATIONS  (STANDING):  albuterol/ipratropium for Nebulization 3 milliLiter(s) Nebulizer every 8 hours  atorvastatin 40 milliGRAM(s) Oral at bedtime  ceFAZolin   IVPB 2000 milliGRAM(s) IV Intermittent every 8 hours  dextrose 5%. 1000 milliLiter(s) (50 mL/Hr) IV Continuous <Continuous>  dextrose 5%. 1000 milliLiter(s) (100 mL/Hr) IV Continuous <Continuous>  dextrose 50% Injectable 25 Gram(s) IV Push once  dextrose 50% Injectable 12.5 Gram(s) IV Push once  dextrose 50% Injectable 25 Gram(s) IV Push once  diltiazem    milliGRAM(s) Oral daily  furosemide    Tablet 40 milliGRAM(s) Oral daily  glucagon  Injectable 1 milliGRAM(s) IntraMuscular once  influenza  Vaccine (HIGH DOSE) 0.7 milliLiter(s) IntraMuscular once  insulin glargine Injectable (LANTUS) 5 Unit(s) SubCutaneous at bedtime  insulin lispro (ADMELOG) corrective regimen sliding scale   SubCutaneous three times a day before meals  insulin lispro (ADMELOG) corrective regimen sliding scale   SubCutaneous at bedtime  insulin lispro Injectable (ADMELOG) 3 Unit(s) SubCutaneous three times a day before meals  levETIRAcetam 750 milliGRAM(s) Oral two times a day  pantoprazole  Injectable 40 milliGRAM(s) IV Push every 12 hours  sodium chloride 1 Gram(s) Oral two times a day  sucralfate 1 Gram(s) Oral four times a day  urea Oral Powder 15 Gram(s) Oral daily  warfarin 3 milliGRAM(s) Oral once    MEDICATIONS  (PRN):  dextrose Oral Gel 15 Gram(s) Oral once PRN Blood Glucose LESS THAN 70 milliGRAM(s)/deciliter  guaiFENesin Oral Liquid (Sugar-Free) 200 milliGRAM(s) Oral every 6 hours PRN Cough  ondansetron Injectable 4 milliGRAM(s) IV Push every 6 hours PRN Nausea and/or Vomiting          PHYSICAL EXAM:  GENERAL: NAD  CHEST/LUNG: Clear to percussion bilaterally; No rales, rhonchi, wheezing, or rubs  HEART: Regular rate and rhythm; No murmurs, rubs, or gallops  ABDOMEN: Soft, Nontender, Nondistended; Bowel sounds present  EXTREMITIES:  edema +    Care Discussed with Consultants/Other Providers [ ] YES  [ ] NO

## 2023-10-17 NOTE — PROGRESS NOTE ADULT - ASSESSMENT
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 Per daughter at bedside, patient's INR level was 7.2 two days ago and was instructed by hematologist to hold coumadin for the past 2 days. Of note, patient had EGD and colonoscopy in 08/2023 revealing gastritis, duodenitis, non-bleeding AVM, and two polyps, one removed (pathology revealing tubular adenoma). Patient was afebrile and HDS on arrival with SBPs 90s and HR 80s. Labs notable for Hgb 5.9, INR 7.01, BUN/Cr 64/1.80, Alk Phos 418. ED ordered for Kcentra, Vit K, and 2U PRBCs. (05 Oct 2023 15:48)    hyponatremia improved urea Oral Powder 15 Gram(s) Oral daily  ,    sodium chloride 1 Gram(s) Oral two times a day  f/u  check ua , urine osmolality , urine sodium , urine uric acid , serum sodium , serum osmolality , serum uric acid , f/u with hyponatremia work up , f/u with bmp , monitor i and o    ACUTE RENAL FAILURE:   Serum creatinine is  improving   There is no progression . No uremic symptoms  No evidence of anemia .  Fluid status stable.  Will continue to avoid nephrotoxic drugs.  Patient remains asymptomatic.   Continue current therapy.       BP monitoring,continue current antihypertensive meds, low salt diet,followup with PMD in 1-2 weeks  diltiazem    milliGRAM(s) Oral daily    f/u  blood and urine cx,serial lactate levels,monitor vitals closley,ivfs hydration,monitor urine output and renal profile  ceFAZolin   IVPB 2000 milliGRAM(s) IV Intermittent every 8 hours    Accuchecks monitoring and insulin sliding scale coverage, no concentrated sweets diet, serial labs and f/up with PMD, monitor HB A 1 C every 3-4 mnth   insulin lispro Injectable (ADMELOG) 3 Unit(s) SubCutaneous three times a day before meals

## 2023-10-17 NOTE — PROGRESS NOTE ADULT - SUBJECTIVE AND OBJECTIVE BOX
Patient is a 68y Male whom presented to the hospital with hyponatremia     PAST MEDICAL & SURGICAL HISTORY:  CVA (cerebral vascular accident)      HTN (hypertension)      DM (diabetes mellitus)      History of atrial fibrillation      Right hemiparesis      Aphasia due to acute stroke      Upper GI bleed      Osteomyelitis      S/P CABG (coronary artery bypass graft)      S/P brain surgery      History of aortic valve repair          MEDICATIONS  (STANDING):  atorvastatin 40 milliGRAM(s) Oral at bedtime  levETIRAcetam 750 milliGRAM(s) Oral two times a day  pantoprazole Infusion 8 mG/Hr (10 mL/Hr) IV Continuous <Continuous>  sucralfate 1 Gram(s) Oral four times a day      Allergies    No Known Allergies    Intolerances        SOCIAL HISTORY:  Denies ETOh,Smoking,     FAMILY HISTORY:  FH: coronary artery disease (Sibling)    FH: type 2 diabetes (Sibling)        REVIEW OF SYSTEMS:    CONSTITUTIONAL: No weakness, fevers or chills  RESPIRATORY: No cough, wheezing, hemoptysis; No shortness of breath  CARDIOVASCULAR: No chest pain or palpitations  GASTROINTESTINAL: No abdominal or epigastric pain. No nausea, vomiting,     No diarrhea or constipation. No melena   SKIN: dry                                                                                                  10.5   8.07  )-----------( 275      ( 16 Oct 2023 07:50 )             33.7       CBC Full  -  ( 16 Oct 2023 07:50 )  WBC Count : 8.07 K/uL  RBC Count : 3.93 M/uL  Hemoglobin : 10.5 g/dL  Hematocrit : 33.7 %  Platelet Count - Automated : 275 K/uL  Mean Cell Volume : 85.8 fl  Mean Cell Hemoglobin : 26.7 pg  Mean Cell Hemoglobin Concentration : 31.2 gm/dL  Auto Neutrophil # : x  Auto Lymphocyte # : x  Auto Monocyte # : x  Auto Eosinophil # : x  Auto Basophil # : x  Auto Neutrophil % : x  Auto Lymphocyte % : x  Auto Monocyte % : x  Auto Eosinophil % : x  Auto Basophil % : x      10-16    134<L>  |  97  |  31<H>  ----------------------------<  144<H>  5.0   |  32<H>  |  1.20    Ca    9.2      16 Oct 2023 07:50    TPro  7.2  /  Alb  2.6<L>  /  TBili  0.6  /  DBili  x   /  AST  68<H>  /  ALT  16  /  AlkPhos  980<H>  10-16      CAPILLARY BLOOD GLUCOSE      POCT Blood Glucose.: 262 mg/dL (16 Oct 2023 21:30)  POCT Blood Glucose.: 255 mg/dL (16 Oct 2023 17:23)  POCT Blood Glucose.: 292 mg/dL (16 Oct 2023 12:10)      Vital Signs Last 24 Hrs  T(C): 36.4 (17 Oct 2023 04:47), Max: 36.5 (16 Oct 2023 12:14)  T(F): 97.5 (17 Oct 2023 04:47), Max: 97.7 (16 Oct 2023 12:14)  HR: 76 (17 Oct 2023 04:47) (18 - 76)  BP: 145/76 (17 Oct 2023 04:47) (124/63 - 145/76)  BP(mean): --  RR: 18 (17 Oct 2023 04:47) (18 - 18)  SpO2: 98% (17 Oct 2023 04:47) (95% - 100%)    Parameters below as of 17 Oct 2023 04:47  Patient On (Oxygen Delivery Method): nasal cannula  O2 Flow (L/min): 2      Urinalysis Basic - ( 16 Oct 2023 07:50 )    Color: x / Appearance: x / SG: x / pH: x  Gluc: 144 mg/dL / Ketone: x  / Bili: x / Urobili: x   Blood: x / Protein: x / Nitrite: x   Leuk Esterase: x / RBC: x / WBC x   Sq Epi: x / Non Sq Epi: x / Bacteria: x        PT/INR - ( 16 Oct 2023 07:50 )   PT: 28.7 sec;   INR: 2.52 ratio         PTT - ( 15 Oct 2023 10:20 )  PTT:56.6 sec  PHYSICAL EXAM:    Constitutional: NAD  HEENT: conjunctive   clear   Neck:  No JVD  Respiratory: CTAB  Cardiovascular: S1 and S2  Gastrointestinal: BS+, soft, NT/ND  Extremities: No peripheral edema  Neurological:  no focal deficits  Skin: dry   Access: Not applicable

## 2023-10-17 NOTE — DISCHARGE NOTE PROVIDER - NSDCHHNEEDSERVICE_GEN_ALL_CORE
Medication teaching and assessment Catheter insertion/Medication teaching and assessment/Other, specify...

## 2023-10-17 NOTE — DISCHARGE NOTE PROVIDER - CARE PROVIDERS DIRECT ADDRESSES
,DirectAddress_Unknown,[email or web address] ,DirectAddress_Unknown,[email or web address]

## 2023-10-17 NOTE — PROGRESS NOTE ADULT - ASSESSMENT
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, seizure disorder, prior CABG, DM2, AFib, Mechanical AV replacement in 2007 (on Warfarin) presenting to [facility] ED per recommendation of hematologist for low Hgb to 5.2 with associated black stools    anemia  mech valve  cva  aphasia  seizure disorder  pleural eff  atelectasis  AF  CAD  CABG hx  DM    ct reviewed  on ABX  ID eval noted  on LASIX  s/p midline insertion -     GI and ICU cx noted  serial hgb  I and O  monitor VS and HD  PPI  goal map > 60  cvs rx regimen  goal sat > 88 pct    GOC discussion

## 2023-10-17 NOTE — DISCHARGE NOTE PROVIDER - NSDCMRMEDTOKEN_GEN_ALL_CORE_FT
aspirin 81 mg oral delayed release tablet: 1 tab(s) orally once a day  atorvastatin 40 mg oral tablet: 1 tab(s) orally once a day (at bedtime)  ceFAZolin 2 g intravenous injection: 2 gram(s) intravenous every 8 hours  dilTIAZem 240 mg/24 hours oral capsule, extended release: 1 cap(s) orally once a day  famotidine 20 mg oral tablet: 1 tab(s) orally once a day (at bedtime)  furosemide 40 mg oral tablet: 1 tab(s) orally once a day  insulin aspart 100 units/mL injectable solution: 5 unit(s) injectable 3 times a day with meals  insulin glargine 100 units/mL subcutaneous solution: 5 unit(s) subcutaneous once a day (at bedtime)  levETIRAcetam 750 mg oral tablet: 1 tab(s) orally 2 times a day  metFORMIN 500 mg oral tablet: 1 tab(s) orally 2 times a day  metoprolol succinate 25 mg oral capsule, extended release: 1 cap(s) orally once a day (at bedtime)  pantoprazole 40 mg oral delayed release tablet: 1 tab(s) orally 2 times a day  Plavix 75 mg oral tablet: 1 tab(s) orally once a day in morning  spironolactone 25 mg oral tablet: 1 tab(s) orally once a day in morning  warfarin 3 mg oral tablet: 1 tab(s) orally once a day (at bedtime)   aspirin 81 mg oral delayed release tablet: 1 tab(s) orally once a day  atorvastatin 40 mg oral tablet: 1 tab(s) orally once a day (at bedtime)  ceFAZolin 2 g intravenous injection: 2 gram(s) intravenous every 8 hours  dilTIAZem 240 mg/24 hours oral capsule, extended release: 1 cap(s) orally once a day  famotidine 20 mg oral tablet: 1 tab(s) orally once a day (at bedtime)  finasteride 5 mg oral tablet: 1 tab(s) orally once a day  furosemide 40 mg oral tablet: 1 tab(s) orally once a day  insulin aspart 100 units/mL injectable solution: 5 unit(s) injectable 3 times a day with meals  insulin glargine 100 units/mL subcutaneous solution: 5 unit(s) subcutaneous once a day (at bedtime)  levETIRAcetam 750 mg oral tablet: 1 tab(s) orally 2 times a day  metFORMIN 500 mg oral tablet: 1 tab(s) orally 2 times a day  metoprolol succinate 25 mg oral capsule, extended release: 1 cap(s) orally once a day (at bedtime)  pantoprazole 40 mg oral delayed release tablet: 1 tab(s) orally 2 times a day  Plavix 75 mg oral tablet: 1 tab(s) orally once a day in morning  spironolactone 25 mg oral tablet: 1 tab(s) orally once a day in morning  tamsulosin 0.4 mg oral capsule: 2 cap(s) orally once a day  warfarin 3 mg oral tablet: 1 tab(s) orally once a day (at bedtime)

## 2023-10-17 NOTE — PATIENT CHOICE NOTE. - NSPTCHOICESTATE_GEN_ALL_CORE

## 2023-10-17 NOTE — PROGRESS NOTE ADULT - SUBJECTIVE AND OBJECTIVE BOX
Date/Time Patient Seen:  		  Referring MD:   Data Reviewed	       Patient is a 68y old  Male who presents with a chief complaint of black stool (16 Oct 2023 19:04)      Subjective/HPI     PAST MEDICAL & SURGICAL HISTORY:  CVA (cerebral vascular accident)    HTN (hypertension)    DM (diabetes mellitus)    Arrhythmia    History of atrial fibrillation    Right hemiparesis    Aphasia due to acute stroke    GI bleed    Iron deficiency anemia    Upper GI bleed    Osteomyelitis    S/P CABG (coronary artery bypass graft)    S/P brain surgery    History of aortic valve repair          Medication list         MEDICATIONS  (STANDING):  albuterol/ipratropium for Nebulization 3 milliLiter(s) Nebulizer every 8 hours  atorvastatin 40 milliGRAM(s) Oral at bedtime  ceFAZolin   IVPB 2000 milliGRAM(s) IV Intermittent every 8 hours  dextrose 5%. 1000 milliLiter(s) (50 mL/Hr) IV Continuous <Continuous>  dextrose 5%. 1000 milliLiter(s) (100 mL/Hr) IV Continuous <Continuous>  dextrose 50% Injectable 25 Gram(s) IV Push once  dextrose 50% Injectable 25 Gram(s) IV Push once  dextrose 50% Injectable 12.5 Gram(s) IV Push once  diltiazem    milliGRAM(s) Oral daily  furosemide    Tablet 40 milliGRAM(s) Oral daily  glucagon  Injectable 1 milliGRAM(s) IntraMuscular once  influenza  Vaccine (HIGH DOSE) 0.7 milliLiter(s) IntraMuscular once  insulin glargine Injectable (LANTUS) 5 Unit(s) SubCutaneous at bedtime  insulin lispro (ADMELOG) corrective regimen sliding scale   SubCutaneous three times a day before meals  insulin lispro (ADMELOG) corrective regimen sliding scale   SubCutaneous at bedtime  insulin lispro Injectable (ADMELOG) 3 Unit(s) SubCutaneous three times a day before meals  levETIRAcetam 750 milliGRAM(s) Oral two times a day  pantoprazole  Injectable 40 milliGRAM(s) IV Push every 12 hours  sodium chloride 1 Gram(s) Oral two times a day  sucralfate 1 Gram(s) Oral four times a day  urea Oral Powder 15 Gram(s) Oral daily    MEDICATIONS  (PRN):  dextrose Oral Gel 15 Gram(s) Oral once PRN Blood Glucose LESS THAN 70 milliGRAM(s)/deciliter  guaiFENesin Oral Liquid (Sugar-Free) 200 milliGRAM(s) Oral every 6 hours PRN Cough  ondansetron Injectable 4 milliGRAM(s) IV Push every 6 hours PRN Nausea and/or Vomiting         Vitals log        ICU Vital Signs Last 24 Hrs  T(C): 36.4 (17 Oct 2023 04:47), Max: 36.5 (16 Oct 2023 12:14)  T(F): 97.5 (17 Oct 2023 04:47), Max: 97.7 (16 Oct 2023 12:14)  HR: 76 (17 Oct 2023 04:47) (18 - 76)  BP: 145/76 (17 Oct 2023 04:47) (124/63 - 145/76)  BP(mean): --  ABP: --  ABP(mean): --  RR: 18 (17 Oct 2023 04:47) (18 - 18)  SpO2: 98% (17 Oct 2023 04:47) (95% - 100%)    O2 Parameters below as of 17 Oct 2023 04:47  Patient On (Oxygen Delivery Method): nasal cannula  O2 Flow (L/min): 2               Input and Output:  I&O's Detail    15 Oct 2023 07:01  -  16 Oct 2023 07:00  --------------------------------------------------------  IN:    Oral Fluid: 250 mL  Total IN: 250 mL    OUT:    Voided (mL): 800 mL  Total OUT: 800 mL    Total NET: -550 mL          Lab Data                        10.5   8.07  )-----------( 275      ( 16 Oct 2023 07:50 )             33.7     10-16    134<L>  |  97  |  31<H>  ----------------------------<  144<H>  5.0   |  32<H>  |  1.20    Ca    9.2      16 Oct 2023 07:50    TPro  7.2  /  Alb  2.6<L>  /  TBili  0.6  /  DBili  x   /  AST  68<H>  /  ALT  16  /  AlkPhos  980<H>  10-16            Review of Systems	      Objective     Physical Examination    heart s1s2  lung dc bS  head nc      Pertinent Lab findings & Imaging      Bryan:  NO   Adequate UO     I&O's Detail    15 Oct 2023 07:01  -  16 Oct 2023 07:00  --------------------------------------------------------  IN:    Oral Fluid: 250 mL  Total IN: 250 mL    OUT:    Voided (mL): 800 mL  Total OUT: 800 mL    Total NET: -550 mL               Discussed with:     Cultures:	        Radiology

## 2023-10-17 NOTE — DISCHARGE NOTE NURSING/CASE MANAGEMENT/SOCIAL WORK - PATIENT PORTAL LINK FT
You can access the FollowMyHealth Patient Portal offered by [facility] by registering at the following website: [email or web address]. By joining [facility]s FollowMyHealth portal, you will also be able to view your health information using other applications (apps) compatible with our system.

## 2023-10-17 NOTE — PROGRESS NOTE ADULT - SUBJECTIVE AND OBJECTIVE BOX
[facility] DIVISION of INFECTIOUS DISEASE  Guru Michel MD PhD, Lisa James MD, Becca Acosta MD, Karen Grimes MD, Corky Perez MD  and providing coverage with Carlos Marx MD  Providing Infectious Disease Consultations at [facility], [facility], [facility], [facility]'s    Office# [phone] to schedule follow up appointments  Answering Service for urgent calls or New Consults [phone]  Cell# to text for urgent issues Guru Michel [phone]     infectious diseases progress note:    ARNIE DELGADILLO is a 68y y. o. Male patient    Overnight and events of the last 24hrs reviewed    Allergies    No Known Allergies    Intolerances        ANTIBIOTICS/RELEVANT:  antimicrobials  ceFAZolin   IVPB 2000 milliGRAM(s) IV Intermittent every 8 hours    immunologic:  influenza  Vaccine (HIGH DOSE) 0.7 milliLiter(s) IntraMuscular once    OTHER:  albuterol/ipratropium for Nebulization 3 milliLiter(s) Nebulizer every 8 hours  atorvastatin 40 milliGRAM(s) Oral at bedtime  dextrose 5%. 1000 milliLiter(s) IV Continuous <Continuous>  dextrose 5%. 1000 milliLiter(s) IV Continuous <Continuous>  dextrose 50% Injectable 25 Gram(s) IV Push once  dextrose 50% Injectable 25 Gram(s) IV Push once  dextrose 50% Injectable 12.5 Gram(s) IV Push once  dextrose Oral Gel 15 Gram(s) Oral once PRN  diltiazem    milliGRAM(s) Oral daily  furosemide    Tablet 40 milliGRAM(s) Oral daily  glucagon  Injectable 1 milliGRAM(s) IntraMuscular once  guaiFENesin Oral Liquid (Sugar-Free) 200 milliGRAM(s) Oral every 6 hours PRN  insulin glargine Injectable (LANTUS) 5 Unit(s) SubCutaneous at bedtime  insulin lispro (ADMELOG) corrective regimen sliding scale   SubCutaneous three times a day before meals  insulin lispro (ADMELOG) corrective regimen sliding scale   SubCutaneous at bedtime  insulin lispro Injectable (ADMELOG) 3 Unit(s) SubCutaneous three times a day before meals  levETIRAcetam 750 milliGRAM(s) Oral two times a day  ondansetron Injectable 4 milliGRAM(s) IV Push every 6 hours PRN  pantoprazole  Injectable 40 milliGRAM(s) IV Push every 12 hours  sodium chloride 1 Gram(s) Oral two times a day  sucralfate 1 Gram(s) Oral four times a day  urea Oral Powder 15 Gram(s) Oral daily      Objective:  Vital Signs Last 24 Hrs  T(C): 36.4 (17 Oct 2023 04:47), Max: 36.5 (16 Oct 2023 12:14)  T(F): 97.5 (17 Oct 2023 04:47), Max: 97.7 (16 Oct 2023 12:14)  HR: 74 (17 Oct 2023 07:53) (18 - 76)  BP: 145/76 (17 Oct 2023 04:47) (124/63 - 145/76)  BP(mean): --  RR: 18 (17 Oct 2023 04:47) (18 - 18)  SpO2: 100% (17 Oct 2023 07:53) (95% - 100%)    Parameters below as of 17 Oct 2023 07:53  Patient On (Oxygen Delivery Method): nasal cannula        T(C): 36.4 (10-17-23 @ 04:47), Max: 36.7 (10-15-23 @ 11:54)  T(C): 36.4 (10-17-23 @ 04:47), Max: 36.8 (10-14-23 @ 20:28)  T(C): 36.4 (10-17-23 @ 04:47), Max: 37 (10-13-23 @ 12:22)    PHYSICAL EXAM:  HEENT: NC atraumatic  Neck: supple  Respiratory: no accessory muscle use, breathing comfortably  Cardiovascular: distant  Gastrointestinal: normal appearing, nondistended  Extremities: no clubbing, no cyanosis,        LABS:                          9.6    7.64  )-----------( 252      ( 17 Oct 2023 06:20 )             31.8       WBC  7.64 10-17 @ 06:20  8.07 10-16 @ 07:50  8.31 10-15 @ 10:20  7.52 10-14 @ 06:46  6.50 10-13 @ 07:45  6.58 10-12 @ 07:30  7.86 10-11 @ 07:22  8.73 10-10 @ 14:15      10-16    134<L>  |  97  |  31<H>  ----------------------------<  144<H>  5.0   |  32<H>  |  1.20    Ca    9.2      16 Oct 2023 07:50    TPro  7.2  /  Alb  2.6<L>  /  TBili  0.6  /  DBili  x   /  AST  68<H>  /  ALT  16  /  AlkPhos  980<H>  10-16      Creatinine: 1.20 mg/dL (10-16-23 @ 07:50)  Creatinine: 1.30 mg/dL (10-15-23 @ 10:20)  Creatinine: 1.40 mg/dL (10-11-23 @ 07:22)      PT/INR - ( 17 Oct 2023 06:20 )   PT: 24.3 sec;   INR: 2.12 ratio           Urinalysis Basic - ( 16 Oct 2023 07:50 )    Color: x / Appearance: x / SG: x / pH: x  Gluc: 144 mg/dL / Ketone: x  / Bili: x / Urobili: x   Blood: x / Protein: x / Nitrite: x   Leuk Esterase: x / RBC: x / WBC x   Sq Epi: x / Non Sq Epi: x / Bacteria: x            INFLAMMATORY MARKERS      MICROBIOLOGY:              RADIOLOGY & ADDITIONAL STUDIES:

## 2023-10-17 NOTE — DISCHARGE NOTE PROVIDER - HOSPITAL COURSE
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 Per daughter at bedside, patient's INR level was 7.2 two days ago and was instructed by hematologist to hold coumadin for the past 2 days. Of note, patient had EGD and colonoscopy in 08/2023 revealing gastritis, duodenitis, non-bleeding AVM, and two polyps, one removed (pathology revealing tubular adenoma). Patient was afebrile and HDS on arrival with SBPs 90s and HR 80s. Labs notable for Hgb 5.9, INR 7.01, BUN/Cr 64/1.80, Alk Phos 418. ED ordered for Kcentra, Vit K, and 2U PRBCs.    1Acute blood loss anemia  - monitor cbc  - GI fu  - management as per GI - no plan for scope at present  - felizley sec to high INR     2  mechanical valve   - monitor cbc   - check iNR and dose coumadin daily     3 Hx of CVA  - neuro fu    4 DM  - uncontrolled   - monitor FS  - basal, bolus   - endo following     5 SOB , tachycardia   - improved  -VQ scan low probability     6 MSSA bacteremia, pna  - cw abx as per ID   - fu repeat cultures  - ID fu    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, seizure disorder, prior CABG, DM2, AFib, Mechanical AV replacement in 2007 (on Warfarin) presenting to [facility] ED per recommendation of hematologist for low Hgb to 5.2 with associated black stools for the past few days, last being yesterday. Per daughter at bedside, patient's INR level was 7.2 two days ago and was instructed by hematologist to hold coumadin for the past 2 days. Of note, patient had EGD and colonoscopy in 08/2023 revealing gastritis, duodenitis, non-bleeding AVM, and two polyps, one removed (pathology revealing tubular adenoma). Patient was afebrile and HDS on arrival with SBPs 90s and HR 80s. Labs notable for Hgb 5.9, INR 7.01, BUN/Cr 64/1.80, Alk Phos 418. ED ordered for Kcentra, Vit K, and 2U PRBCs.    1Acute blood loss anemia  - monitor cbc  - GI fu  - management as per GI - no plan for scope at present  - likley sec to high INR     2  mechanical valve   - monitor cbc   - check iNR and dose coumadin daily     3 Hx of CVA  - neuro fu    4 DM  - uncontrolled   - monitor FS  - basal, bolus   - endo following     5 SOB , tachycardia   - improved  -VQ scan low probability     6 MSSA bacteremia, pna  - cw abx as per ID   - fu repeat cultures  - ID fu     7 Urinary retention  -sp nesbitt  - cw Flomax and finestride

## 2023-10-17 NOTE — DISCHARGE NOTE NURSING/CASE MANAGEMENT/SOCIAL WORK - NSDCPEFALRISK_GEN_ALL_CORE
For information on Fall & Injury Prevention, visit: [email or web address] OR  [email or web address] OR  https://www.cdc.gov/steadi/patient.html

## 2023-10-17 NOTE — DISCHARGE NOTE PROVIDER - NSDCCAREPROVSEEN_GEN_ALL_CORE_FT
Mile, Anirudh Michel, Guru Seals, Veena Harmon, Davindar Perlman, Ryan Salazar, Ruth Ornelas, Ike Weathers, Sidney Kendrick, Gamaliel Ace, Frieda Mckeon, James J Weil, Patricia

## 2023-10-17 NOTE — PROGRESS NOTE ADULT - SUBJECTIVE AND OBJECTIVE BOX
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 Per daughter at bedside, patient's INR level was 7.2 two days ago and was instructed by hematologist to hold coumadin for the past 2 days. Of note, patient had EGD and colonoscopy in 08/2023 revealing gastritis, duodenitis, non-bleeding AVM, and two polyps, one removed (pathology revealing tubular adenoma). Patient was afebrile and HDS on arrival with SBPs 90s and HR 80s. Labs notable for Hgb 5.9, INR 7.01, BUN/Cr 64/1.80, Alk Phos 418. ED ordered for Kcentra, Vit K, and 2U PRBCs. (05 Oct 2023 15:48)    PAST MEDICAL & SURGICAL HISTORY:  CVA (cerebral vascular accident)      HTN (hypertension)      DM (diabetes mellitus)      History of atrial fibrillation      Right hemiparesis      Aphasia due to acute stroke      Upper GI bleed      Osteomyelitis      S/P CABG (coronary artery bypass graft)      S/P brain surgery      History of aortic valve repair        MEDICATIONS  (STANDING):  albuterol/ipratropium for Nebulization 3 milliLiter(s) Nebulizer every 8 hours  atorvastatin 40 milliGRAM(s) Oral at bedtime  ceFAZolin   IVPB 2000 milliGRAM(s) IV Intermittent every 8 hours  dextrose 5%. 1000 milliLiter(s) (100 mL/Hr) IV Continuous <Continuous>  dextrose 5%. 1000 milliLiter(s) (50 mL/Hr) IV Continuous <Continuous>  dextrose 50% Injectable 25 Gram(s) IV Push once  dextrose 50% Injectable 25 Gram(s) IV Push once  dextrose 50% Injectable 12.5 Gram(s) IV Push once  diltiazem    milliGRAM(s) Oral daily  furosemide    Tablet 40 milliGRAM(s) Oral daily  glucagon  Injectable 1 milliGRAM(s) IntraMuscular once  influenza  Vaccine (HIGH DOSE) 0.7 milliLiter(s) IntraMuscular once  insulin glargine Injectable (LANTUS) 5 Unit(s) SubCutaneous at bedtime  insulin lispro (ADMELOG) corrective regimen sliding scale   SubCutaneous three times a day before meals  insulin lispro (ADMELOG) corrective regimen sliding scale   SubCutaneous at bedtime  insulin lispro Injectable (ADMELOG) 3 Unit(s) SubCutaneous three times a day before meals  levETIRAcetam 750 milliGRAM(s) Oral two times a day  pantoprazole  Injectable 40 milliGRAM(s) IV Push every 12 hours  sodium chloride 1 Gram(s) Oral two times a day  sucralfate 1 Gram(s) Oral four times a day  urea Oral Powder 15 Gram(s) Oral daily  warfarin 3 milliGRAM(s) Oral once    MEDICATIONS  (PRN):  dextrose Oral Gel 15 Gram(s) Oral once PRN Blood Glucose LESS THAN 70 milliGRAM(s)/deciliter  guaiFENesin Oral Liquid (Sugar-Free) 200 milliGRAM(s) Oral every 6 hours PRN Cough  ondansetron Injectable 4 milliGRAM(s) IV Push every 6 hours PRN Nausea and/or Vomiting    Allergies    No Known Allergies    Intolerances        REVIEW OF SYSTEMS:    CONSTITUTIONAL: No weakness, fevers or chills.   EYES/ENT: No visual changes;    NECK: No pain or stiffness  RESPIRATORY: No cough, wheezing, No shortness of breath  CARDIOVASCULAR: No chest pain or palpitations  GASTROINTESTINAL: No abdominal pain, or hematochezia.  GENITOURINARY: No dysuria orhematuria  NEUROLOGICAL: No numbness or weakness  SKIN: No itching, burning, rashes  All other review of systems is negative unless indicated above    Vital Signs Last 24 Hrs  T(C): 36.6 (17 Oct 2023 12:26), Max: 36.6 (17 Oct 2023 12:26)  T(F): 97.9 (17 Oct 2023 12:26), Max: 97.9 (17 Oct 2023 12:26)  HR: 68 (17 Oct 2023 12:26) (18 - 76)  BP: 120/69 (17 Oct 2023 12:26) (120/69 - 145/76)  BP(mean): --  RR: 18 (17 Oct 2023 12:26) (18 - 18)  SpO2: 93% (17 Oct 2023 12:26) (93% - 100%)    Parameters below as of 17 Oct 2023 12:26  Patient On (Oxygen Delivery Method): room air      I&O's Summary      PHYSICAL EXAM:  Constitutional: NAD  Neurological: Alert, no focal deficits  HEENT: no JVD, EOMI  Skin: No rashes.  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 Per daughter at bedside, patient's INR level was 7.2 two days ago and was instructed by hematologist to hold coumadin for the past 2 days. Of note, patient had EGD and colonoscopy in 08/2023 revealing gastritis, duodenitis, non-bleeding AVM, and two polyps, one removed (pathology revealing tubular adenoma). Patient was afebrile and HDS on arrival with SBPs 90s and HR 80s. Labs notable for Hgb 5.9, INR 7.01, BUN/Cr 64/1.80, Alk Phos 418. ED ordered for Kcentra, Vit K, and 2U PRBCs. (05 Oct 2023 15:48)     1. GI bleed  - Keep Hb > 7.5,  follow with serial CBC  -s/p PRBC  - Holding ASA, plavix and re-start when OK with GI  - s/p IV heparin, warfarin restarted  - INR goal 3    2. AF  Rate controlled on diltiazem 240 mg    3. H/o metallic AVR  s/pwarfarin.    4. CABG  No cp sob    Will follow prn  - Patient sees Alexandru Alonso out-patient

## 2023-10-17 NOTE — PROGRESS NOTE ADULT - SUBJECTIVE AND OBJECTIVE BOX
Neurology follow up note    NAIB [id number]      Interval History:    Patient feels ok no new complaints having breakfast     Allergies    No Known Allergies    Intolerances        MEDICATIONS    albuterol/ipratropium for Nebulization 3 milliLiter(s) Nebulizer every 8 hours  atorvastatin 40 milliGRAM(s) Oral at bedtime  ceFAZolin   IVPB 2000 milliGRAM(s) IV Intermittent every 8 hours  dextrose 5%. 1000 milliLiter(s) IV Continuous <Continuous>  dextrose 5%. 1000 milliLiter(s) IV Continuous <Continuous>  dextrose 50% Injectable 25 Gram(s) IV Push once  dextrose 50% Injectable 25 Gram(s) IV Push once  dextrose 50% Injectable 12.5 Gram(s) IV Push once  dextrose Oral Gel 15 Gram(s) Oral once PRN  diltiazem    milliGRAM(s) Oral daily  furosemide    Tablet 40 milliGRAM(s) Oral daily  glucagon  Injectable 1 milliGRAM(s) IntraMuscular once  guaiFENesin Oral Liquid (Sugar-Free) 200 milliGRAM(s) Oral every 6 hours PRN  influenza  Vaccine (HIGH DOSE) 0.7 milliLiter(s) IntraMuscular once  insulin glargine Injectable (LANTUS) 5 Unit(s) SubCutaneous at bedtime  insulin lispro (ADMELOG) corrective regimen sliding scale   SubCutaneous three times a day before meals  insulin lispro (ADMELOG) corrective regimen sliding scale   SubCutaneous at bedtime  insulin lispro Injectable (ADMELOG) 3 Unit(s) SubCutaneous three times a day before meals  levETIRAcetam 750 milliGRAM(s) Oral two times a day  ondansetron Injectable 4 milliGRAM(s) IV Push every 6 hours PRN  pantoprazole  Injectable 40 milliGRAM(s) IV Push every 12 hours  sodium chloride 1 Gram(s) Oral two times a day  sucralfate 1 Gram(s) Oral four times a day  urea Oral Powder 15 Gram(s) Oral daily              Vital Signs Last 24 Hrs  T(C): 36.4 (17 Oct 2023 04:47), Max: 36.5 (16 Oct 2023 12:14)  T(F): 97.5 (17 Oct 2023 04:47), Max: 97.7 (16 Oct 2023 12:14)  HR: 74 (17 Oct 2023 07:53) (18 - 76)  BP: 145/76 (17 Oct 2023 04:47) (124/63 - 145/76)  BP(mean): --  RR: 18 (17 Oct 2023 04:47) (18 - 18)  SpO2: 100% (17 Oct 2023 07:53) (95% - 100%)    Parameters below as of 17 Oct 2023 07:53  Patient On (Oxygen Delivery Method): nasal cannula    REVIEW OF SYSTEMS:  Completely limited secondary to the patient repeats words over, has severe expressive aphasia, but had been in his normal state of health as per daughter.    PHYSICAL EXAMINATION:  HEENT:  Head:  Normocephalic, atraumatic.  Eyes:  No scleral icterus.  Ears:  Hard to evaluate secondary to he could not answer questions accordingly but appears to follow commands.  NECK:  Supple.  RESPIRATORY:  Air entry bilaterally.  CARDIOVASCULAR:  S1 and S2 heard.  ABDOMEN:  Soft and nontender.  EXTREMITIES:  Positive discoloration was noted on the left toe.     NEUROLOGIC:  The patient was awake, alert.  On-off blink to visual threat.  Positive expressive aphasia.  Will say a few words but repeat the same words over.  Right upper and right lower were 0/5 with increased tone.  Right hemiplegia is his baseline.  Left upper and left lower were 4/5.      LABS:  CBC Full  -  ( 17 Oct 2023 06:20 )  WBC Count : 7.64 K/uL  RBC Count : 3.68 M/uL  Hemoglobin : 9.6 g/dL  Hematocrit : 31.8 %  Platelet Count - Automated : 252 K/uL  Mean Cell Volume : 86.4 fl  Mean Cell Hemoglobin : 26.1 pg  Mean Cell Hemoglobin Concentration : 30.2 gm/dL  Auto Neutrophil # : x  Auto Lymphocyte # : x  Auto Monocyte # : x  Auto Eosinophil # : x  Auto Basophil # : x  Auto Neutrophil % : x  Auto Lymphocyte % : x  Auto Monocyte % : x  Auto Eosinophil % : x  Auto Basophil % : x    Urinalysis Basic - ( 17 Oct 2023 06:20 )    Color: x / Appearance: x / SG: x / pH: x  Gluc: 201 mg/dL / Ketone: x  / Bili: x / Urobili: x   Blood: x / Protein: x / Nitrite: x   Leuk Esterase: x / RBC: x / WBC x   Sq Epi: x / Non Sq Epi: x / Bacteria: x      10-17    134<L>  |  96  |  33<H>  ----------------------------<  201<H>  4.7   |  32<H>  |  1.30    Ca    9.4      17 Oct 2023 06:20    TPro  7.0  /  Alb  2.7<L>  /  TBili  0.6  /  DBili  x   /  AST  41<H>  /  ALT  12  /  AlkPhos  957<H>  10-17    Hemoglobin A1C:     LIVER FUNCTIONS - ( 17 Oct 2023 06:20 )  Alb: 2.7 g/dL / Pro: 7.0 g/dL / ALK PHOS: 957 U/L / ALT: 12 U/L / AST: 41 U/L / GGT: x           Vitamin B12   PT/INR - ( 17 Oct 2023 06:20 )   PT: 24.3 sec;   INR: 2.12 ratio               RADIOLOGY    ANALYSIS AND PLAN:  This is a 68-year-old with a history of cerebrovascular accident, atrial fibrillation, now with possibly gastrointestinal bleed.  For history of cerebrovascular accident and atrial fibrillation, the patient is on multiple blood thinning medications.  The patient's warfarin INR is significantly elevated.  For now, I would recommend gastrointestinal workup, when possible to prevent further cerebrovascular accident, would recommend to reinstitute anticoagulation, but possibly an alternative to warfarin secondary to elevation of INR, unclear if the patient needs to be on antiplatelets as well.  If the patient cannot have strong anticoagulation, then we will recommend at least a baby aspirin if possible.  For history of seizure prophylaxis, continue the patient on his Keppra.  For history of diabetes, strict control of blood sugars.  For hyperlipidemia, continue the patient on statin.  GI noted suspect severe gi bleed in setting of elevated INR AC as per medical team   as per her no blood in stools   monitor oral intake   seen with other daughter today doing well     Spoke with daughter, Jeffrey, at bedside 10/17.  She does understand my reasoning and thought process.  Her telephone number is [phone].    Greater than 46 minutes of time was spent with the patient, plan of care, reviewing data, speaking to the family and  50% of the visit was spent counseling and/or coordinating care with multidisciplinary healthcare team

## 2023-10-17 NOTE — DISCHARGE NOTE PROVIDER - NSDCCPCAREPLAN_GEN_ALL_CORE_FT
PRINCIPAL DISCHARGE DIAGNOSIS  Diagnosis: GI bleed  Assessment and Plan of Treatment: resolved  fu with GI  Started coumadin      SECONDARY DISCHARGE DIAGNOSES  Diagnosis: MSSA bacteremia  Assessment and Plan of Treatment: finish abx till 10/24  fu with ID     PRINCIPAL DISCHARGE DIAGNOSIS  Diagnosis: GI bleed  Assessment and Plan of Treatment: resolved  fu with GI  Started coumadin      SECONDARY DISCHARGE DIAGNOSES  Diagnosis: MSSA bacteremia  Assessment and Plan of Treatment: finish abx till 10/24  fu with ID    Diagnosis: Urinary retention due to benign prostatic hyperplasia  Assessment and Plan of Treatment: fu with urology    Diagnosis: BPH without urinary obstruction  Assessment and Plan of Treatment: sp nesbitt

## 2023-10-17 NOTE — PROGRESS NOTE ADULT - ASSESSMENT
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   ID c/s on HD#3 for fever to 102F overnight    Acute Multifocal PNA/AHRF on NC  - fever to 102 overnight, leukocytosis to 18 on 10/7  - CT chest showing multifocal PNA  - S/p zosyn 10/7-10/11; Abx tailored as below    MSSA Bacteremia  - noted Culture - Blood (10.08.23 @ 10:40)-  Methicillin SENSITIVE Staphylococcus aureus (MSSA)  - repeat blood cultures collected 10/10 20:38, NGTD  - c/w Cefazolin 2 grams IV q8 (started 10/11) last day 10/24 [facility] [phone]  weekly CBC, CMP faxed to [phone] can then remove midline, nl midline care    From an ID standpoint no further requirement for inpatient status for the management of ID issues. Fine with discharge from ID standpoint when other medical issues no longer require inpatient care and social issues allow for a safe discharge plan. To schedule an outpatient ID follow up appointment please call our office at [phone]      Thank you for consulting us and involving us in the management of this most interesting and challenging case.  We will follow along in the care of this patient. Please call us at [phone] or text me directly on my cell# at [phone] with any concerns.

## 2023-10-17 NOTE — DISCHARGE NOTE PROVIDER - CARE PROVIDER_API CALL
Gamaliel Kendrick  Gastroenterology  [address] [place] [id number]  Phone: [phone]  Fax: [phone]  Follow Up Time:     Guru Michel  Infectious Disease  [address] [place] [address]  Phone: [phone]  Fax: [phone]  Follow Up Time:    Gamaliel Kendrick  Gastroenterology  [address] [place] [address]  Phone: [phone]  Fax: [phone]  Follow Up Time:     Guru Michel  Infectious Disease  [address] [place] [id number]  Phone: [phone]  Fax: [phone]  Follow Up Time:     Shiva Barton Ar  Urology  [address], [address]  [place] [address]  Phone: [phone]  Fax: [phone]  Follow Up Time:

## 2023-10-17 NOTE — PROGRESS NOTE ADULT - SUBJECTIVE AND OBJECTIVE BOX
[facility]  Shane Murray PA-C  [address] [place] [address]  [phone]      INTERVAL HPI/OVERNIGHT EVENTS:  Pt s/e, daughter at bedside translated for pt  +BM   No new Gi events    MEDICATIONS  (STANDING):  albuterol/ipratropium for Nebulization 3 milliLiter(s) Nebulizer every 8 hours  atorvastatin 40 milliGRAM(s) Oral at bedtime  ceFAZolin   IVPB 2000 milliGRAM(s) IV Intermittent every 8 hours  dextrose 5%. 1000 milliLiter(s) (50 mL/Hr) IV Continuous <Continuous>  dextrose 5%. 1000 milliLiter(s) (100 mL/Hr) IV Continuous <Continuous>  dextrose 50% Injectable 25 Gram(s) IV Push once  dextrose 50% Injectable 12.5 Gram(s) IV Push once  dextrose 50% Injectable 25 Gram(s) IV Push once  diltiazem    milliGRAM(s) Oral daily  furosemide    Tablet 40 milliGRAM(s) Oral daily  glucagon  Injectable 1 milliGRAM(s) IntraMuscular once  influenza  Vaccine (HIGH DOSE) 0.7 milliLiter(s) IntraMuscular once  insulin glargine Injectable (LANTUS) 5 Unit(s) SubCutaneous at bedtime  insulin lispro (ADMELOG) corrective regimen sliding scale   SubCutaneous three times a day before meals  insulin lispro (ADMELOG) corrective regimen sliding scale   SubCutaneous at bedtime  insulin lispro Injectable (ADMELOG) 3 Unit(s) SubCutaneous three times a day before meals  levETIRAcetam 750 milliGRAM(s) Oral two times a day  pantoprazole  Injectable 40 milliGRAM(s) IV Push every 12 hours  sodium chloride 1 Gram(s) Oral two times a day  sucralfate 1 Gram(s) Oral four times a day  urea Oral Powder 15 Gram(s) Oral daily    MEDICATIONS  (PRN):  dextrose Oral Gel 15 Gram(s) Oral once PRN Blood Glucose LESS THAN 70 milliGRAM(s)/deciliter  guaiFENesin Oral Liquid (Sugar-Free) 200 milliGRAM(s) Oral every 6 hours PRN Cough  ondansetron Injectable 4 milliGRAM(s) IV Push every 6 hours PRN Nausea and/or Vomiting      Allergies    No Known Allergies    PHYSICAL EXAM:   Vital Signs:  Vital Signs Last 24 Hrs  T(C): 36.6 (17 Oct 2023 12:26), Max: 36.6 (17 Oct 2023 12:)  T(F): 97.9 (17 Oct 2023 12:), Max: 97.9 (17 Oct 2023 12:26)  HR: 68 (17 Oct 2023 12:) (18 - 76)  BP: 120/69 (17 Oct 2023 12:) (120/69 - 145/76)  BP(mean): --  RR: 18 (17 Oct 2023 12:) (18 - 18)  SpO2: 93% (17 Oct 2023 12:) (93% - 100%)    Parameters below as of 17 Oct 2023 12:  Patient On (Oxygen Delivery Method): room air      Daily     Daily Weight in k.5 (17 Oct 2023 04:47)    GENERAL:  Appears stated age  HEENT:  NC/AT  CHEST:  Full & symmetric excursion  HEART:  Regular rhythm  ABDOMEN:  Soft, non-tender, non-distended  EXTEREMITIES:  no cyanosis  SKIN:  No rash  NEURO:  Alert      LABS:                        9.6    7.64  )-----------( 252      ( 17 Oct 2023 06:20 )             31.8     10-17    134<L>  |  96  |  33<H>  ----------------------------<  201<H>  4.7   |  32<H>  |  1.30    Ca    9.4      17 Oct 2023 06:20    TPro  7.0  /  Alb  2.7<L>  /  TBili  0.6  /  DBili  x   /  AST  41<H>  /  ALT  12  /  AlkPhos  957<H>  10-17    PT/INR - ( 17 Oct 2023 06:20 )   PT: 24.3 sec;   INR: 2.12 ratio           Urinalysis Basic - ( 17 Oct 2023 06:20 )    Color: x / Appearance: x / SG: x / pH: x  Gluc: 201 mg/dL / Ketone: x  / Bili: x / Urobili: x   Blood: x / Protein: x / Nitrite: x   Leuk Esterase: x / RBC: x / WBC x   Sq Epi: x / Non Sq Epi: x / Bacteria: x   - - -

## 2023-10-18 NOTE — PROGRESS NOTE ADULT - SUBJECTIVE AND OBJECTIVE BOX
[facility]  Shane Murray PA-C  [address] [place] [address]  [phone]      INTERVAL HPI/OVERNIGHT EVENTS:  Pt s/e with dtr at bedside who translated for pt  No overt GI bleeding    MEDICATIONS  (STANDING):  albuterol/ipratropium for Nebulization 3 milliLiter(s) Nebulizer every 8 hours  atorvastatin 40 milliGRAM(s) Oral at bedtime  ceFAZolin   IVPB 2000 milliGRAM(s) IV Intermittent every 8 hours  dextrose 5%. 1000 milliLiter(s) (50 mL/Hr) IV Continuous <Continuous>  dextrose 5%. 1000 milliLiter(s) (100 mL/Hr) IV Continuous <Continuous>  dextrose 50% Injectable 25 Gram(s) IV Push once  dextrose 50% Injectable 25 Gram(s) IV Push once  dextrose 50% Injectable 12.5 Gram(s) IV Push once  diltiazem    milliGRAM(s) Oral daily  furosemide    Tablet 40 milliGRAM(s) Oral daily  glucagon  Injectable 1 milliGRAM(s) IntraMuscular once  influenza  Vaccine (HIGH DOSE) 0.7 milliLiter(s) IntraMuscular once  insulin glargine Injectable (LANTUS) 8 Unit(s) SubCutaneous at bedtime  insulin lispro (ADMELOG) corrective regimen sliding scale   SubCutaneous three times a day before meals  insulin lispro (ADMELOG) corrective regimen sliding scale   SubCutaneous at bedtime  insulin lispro Injectable (ADMELOG) 3 Unit(s) SubCutaneous three times a day before meals  levETIRAcetam 750 milliGRAM(s) Oral two times a day  pantoprazole  Injectable 40 milliGRAM(s) IV Push every 12 hours  sodium chloride 1 Gram(s) Oral two times a day  sucralfate 1 Gram(s) Oral four times a day  tamsulosin 0.8 milliGRAM(s) Oral daily  urea Oral Powder 15 Gram(s) Oral daily  warfarin 3 milliGRAM(s) Oral once    MEDICATIONS  (PRN):  dextrose Oral Gel 15 Gram(s) Oral once PRN Blood Glucose LESS THAN 70 milliGRAM(s)/deciliter  guaiFENesin Oral Liquid (Sugar-Free) 200 milliGRAM(s) Oral every 6 hours PRN Cough  ondansetron Injectable 4 milliGRAM(s) IV Push every 6 hours PRN Nausea and/or Vomiting      Allergies    No Known Allergies        PHYSICAL EXAM:   Vital Signs:  Vital Signs Last 24 Hrs  T(C): 36.3 (18 Oct 2023 05:06), Max: 36.3 (18 Oct 2023 05:06)  T(F): 97.4 (18 Oct 2023 05:06), Max: 97.4 (18 Oct 2023 05:06)  HR: 74 (18 Oct 2023 09:30) (60 - 76)  BP: 112/60 (18 Oct 2023 05:06) (112/60 - 138/78)  BP(mean): --  RR: 18 (18 Oct 2023 05:06) (18 - 18)  SpO2: 96% (18 Oct 2023 09:30) (96% - 100%)    Parameters below as of 18 Oct 2023 09:30  Patient On (Oxygen Delivery Method): nasal cannula, 2L    GENERAL:  Appears stated age  HEENT:  NC/AT  CHEST:  Full & symmetric excursion  HEART:  Regular rhythm  ABDOMEN:  Soft, non-tender, non-distended  EXTEREMITIES:  no cyanosis  SKIN:  No rash  NEURO:  Alert      LABS:                        10.0   7.07  )-----------( 240      ( 18 Oct 2023 07:22 )             31.9     10-17    134<L>  |  96  |  33<H>  ----------------------------<  201<H>  4.7   |  32<H>  |  1.30    Ca    9.4      17 Oct 2023 06:20    TPro  7.0  /  Alb  2.7<L>  /  TBili  0.6  /  DBili  x   /  AST  41<H>  /  ALT  12  /  AlkPhos  957<H>  10-17    PT/INR - ( 18 Oct 2023 07:22 )   PT: 24.8 sec;   INR: 2.17 ratio           Urinalysis Basic - ( 17 Oct 2023 06:20 )    Color: x / Appearance: x / SG: x / pH: x  Gluc: 201 mg/dL / Ketone: x  / Bili: x / Urobili: x   Blood: x / Protein: x / Nitrite: x   Leuk Esterase: x / RBC: x / WBC x   Sq Epi: x / Non Sq Epi: x / Bacteria: x

## 2023-10-18 NOTE — PROGRESS NOTE ADULT - SUBJECTIVE AND OBJECTIVE BOX
CAPILLARY BLOOD GLUCOSE      POCT Blood Glucose.: 207 mg/dL (18 Oct 2023 08:05)  POCT Blood Glucose.: 236 mg/dL (17 Oct 2023 21:37)  POCT Blood Glucose.: 213 mg/dL (17 Oct 2023 17:19)  POCT Blood Glucose.: 309 mg/dL (17 Oct 2023 12:24)      Vital Signs Last 24 Hrs  T(C): 36.3 (18 Oct 2023 05:06), Max: 36.6 (17 Oct 2023 12:26)  T(F): 97.4 (18 Oct 2023 05:06), Max: 97.9 (17 Oct 2023 12:26)  HR: 74 (18 Oct 2023 09:30) (60 - 76)  BP: 112/60 (18 Oct 2023 05:06) (112/60 - 138/78)  BP(mean): --  RR: 18 (18 Oct 2023 05:06) (18 - 18)  SpO2: 96% (18 Oct 2023 09:30) (93% - 100%)    Parameters below as of 18 Oct 2023 09:30  Patient On (Oxygen Delivery Method): nasal cannula, 2L        General: WN/WD NAD  Respiratory: CTA B/L  CV: RRR, S1S2, no murmurs, rubs or gallops  Abdominal: Soft, NT, ND +BS, Last BM  Extremities: No edema, + peripheral pulses     10-17    134<L>  |  96  |  33<H>  ----------------------------<  201<H>  4.7   |  32<H>  |  1.30    Ca    9.4      17 Oct 2023 06:20    TPro  7.0  /  Alb  2.7<L>  /  TBili  0.6  /  DBili  x   /  AST  41<H>  /  ALT  12  /  AlkPhos  957<H>  10-17      atorvastatin 40 milliGRAM(s) Oral at bedtime  dextrose 50% Injectable 25 Gram(s) IV Push once  dextrose 50% Injectable 25 Gram(s) IV Push once  dextrose 50% Injectable 12.5 Gram(s) IV Push once  dextrose Oral Gel 15 Gram(s) Oral once PRN  glucagon  Injectable 1 milliGRAM(s) IntraMuscular once  insulin glargine Injectable (LANTUS) 5 Unit(s) SubCutaneous at bedtime  insulin lispro (ADMELOG) corrective regimen sliding scale   SubCutaneous three times a day before meals  insulin lispro (ADMELOG) corrective regimen sliding scale   SubCutaneous at bedtime  insulin lispro Injectable (ADMELOG) 3 Unit(s) SubCutaneous three times a day before meals

## 2023-10-18 NOTE — PROGRESS NOTE ADULT - SUBJECTIVE AND OBJECTIVE BOX
Neurology follow up note    NAIB [id number]      Interval History:    Patient feels ok no new complaints.    Allergies    No Known Allergies    Intolerances        MEDICATIONS    albuterol/ipratropium for Nebulization 3 milliLiter(s) Nebulizer every 8 hours  atorvastatin 40 milliGRAM(s) Oral at bedtime  ceFAZolin   IVPB 2000 milliGRAM(s) IV Intermittent every 8 hours  dextrose 5%. 1000 milliLiter(s) IV Continuous <Continuous>  dextrose 5%. 1000 milliLiter(s) IV Continuous <Continuous>  dextrose 50% Injectable 25 Gram(s) IV Push once  dextrose 50% Injectable 12.5 Gram(s) IV Push once  dextrose 50% Injectable 25 Gram(s) IV Push once  dextrose Oral Gel 15 Gram(s) Oral once PRN  diltiazem    milliGRAM(s) Oral daily  furosemide    Tablet 40 milliGRAM(s) Oral daily  glucagon  Injectable 1 milliGRAM(s) IntraMuscular once  guaiFENesin Oral Liquid (Sugar-Free) 200 milliGRAM(s) Oral every 6 hours PRN  influenza  Vaccine (HIGH DOSE) 0.7 milliLiter(s) IntraMuscular once  insulin glargine Injectable (LANTUS) 5 Unit(s) SubCutaneous at bedtime  insulin lispro (ADMELOG) corrective regimen sliding scale   SubCutaneous three times a day before meals  insulin lispro (ADMELOG) corrective regimen sliding scale   SubCutaneous at bedtime  insulin lispro Injectable (ADMELOG) 3 Unit(s) SubCutaneous three times a day before meals  levETIRAcetam 750 milliGRAM(s) Oral two times a day  ondansetron Injectable 4 milliGRAM(s) IV Push every 6 hours PRN  pantoprazole  Injectable 40 milliGRAM(s) IV Push every 12 hours  sodium chloride 1 Gram(s) Oral two times a day  sucralfate 1 Gram(s) Oral four times a day  urea Oral Powder 15 Gram(s) Oral daily              Vital Signs Last 24 Hrs  T(C): 36.3 (18 Oct 2023 05:06), Max: 36.6 (17 Oct 2023 12:26)  T(F): 97.4 (18 Oct 2023 05:06), Max: 97.9 (17 Oct 2023 12:26)  HR: 74 (18 Oct 2023 09:30) (60 - 76)  BP: 112/60 (18 Oct 2023 05:06) (112/60 - 138/78)  BP(mean): --  RR: 18 (18 Oct 2023 05:06) (18 - 18)  SpO2: 96% (18 Oct 2023 09:30) (93% - 100%)    Parameters below as of 18 Oct 2023 09:30  Patient On (Oxygen Delivery Method): nasal cannula, 2L        REVIEW OF SYSTEMS:  Completely limited secondary to the patient repeats words over, has severe expressive aphasia, but had been in his normal state of health as per daughter.    PHYSICAL EXAMINATION:  HEENT:  Head:  Normocephalic, atraumatic.  Eyes:  No scleral icterus.  Ears:  Hard to evaluate secondary to he could not answer questions accordingly but appears to follow commands.  NECK:  Supple.  RESPIRATORY:  Air entry bilaterally.  CARDIOVASCULAR:  S1 and S2 heard.  ABDOMEN:  Soft and nontender.  EXTREMITIES:  Positive discoloration was noted on the left toe.     NEUROLOGIC:  The patient was awake, alert.  On-off blink to visual threat.  Positive expressive aphasia.  Will say a few words but repeat the same words over.  Right upper and right lower were 0/5 with increased tone.  Right hemiplegia is his baseline.  Left upper and left lower were 4/5.       LABS:  CBC Full  -  ( 18 Oct 2023 07:22 )  WBC Count : 7.07 K/uL  RBC Count : 3.72 M/uL  Hemoglobin : 10.0 g/dL  Hematocrit : 31.9 %  Platelet Count - Automated : 240 K/uL  Mean Cell Volume : 85.8 fl  Mean Cell Hemoglobin : 26.9 pg  Mean Cell Hemoglobin Concentration : 31.3 gm/dL  Auto Neutrophil # : x  Auto Lymphocyte # : x  Auto Monocyte # : x  Auto Eosinophil # : x  Auto Basophil # : x  Auto Neutrophil % : x  Auto Lymphocyte % : x  Auto Monocyte % : x  Auto Eosinophil % : x  Auto Basophil % : x    Urinalysis Basic - ( 17 Oct 2023 06:20 )    Color: x / Appearance: x / SG: x / pH: x  Gluc: 201 mg/dL / Ketone: x  / Bili: x / Urobili: x   Blood: x / Protein: x / Nitrite: x   Leuk Esterase: x / RBC: x / WBC x   Sq Epi: x / Non Sq Epi: x / Bacteria: x      10-17    134<L>  |  96  |  33<H>  ----------------------------<  201<H>  4.7   |  32<H>  |  1.30    Ca    9.4      17 Oct 2023 06:20    TPro  7.0  /  Alb  2.7<L>  /  TBili  0.6  /  DBili  x   /  AST  41<H>  /  ALT  12  /  AlkPhos  957<H>  10-17    Hemoglobin A1C:     LIVER FUNCTIONS - ( 17 Oct 2023 06:20 )  Alb: 2.7 g/dL / Pro: 7.0 g/dL / ALK PHOS: 957 U/L / ALT: 12 U/L / AST: 41 U/L / GGT: x           Vitamin B12   PT/INR - ( 18 Oct 2023 07:22 )   PT: 24.8 sec;   INR: 2.17 ratio               RADIOLOGY      ANALYSIS AND PLAN:  This is a 68-year-old with a history of cerebrovascular accident, atrial fibrillation, now with possibly gastrointestinal bleed.  For history of cerebrovascular accident and atrial fibrillation, the patient is on multiple blood thinning medications.  The patient's warfarin INR is significantly elevated.  For now, I would recommend gastrointestinal workup, when possible to prevent further cerebrovascular accident, would recommend to reinstitute anticoagulation, but possibly an alternative to warfarin secondary to elevation of INR, unclear if the patient needs to be on antiplatelets as well.  If the patient cannot have strong anticoagulation, then we will recommend at least a baby aspirin if possible.  For history of seizure prophylaxis, continue the patient on his Keppra.  For history of diabetes, strict control of blood sugars.  For hyperlipidemia, continue the patient on statin.  GI noted suspect severe gi bleed in setting of elevated INR AC as per medical team   as per her no blood in stools   monitor oral intake   seen with daughter today doing well  10/18    Greater than 46 minutes of time was spent with the patient, plan of care, reviewing data, speaking to the family and  50% of the visit was spent counseling and/or coordinating care with multidisciplinary healthcare team

## 2023-10-18 NOTE — PROGRESS NOTE ADULT - SUBJECTIVE AND OBJECTIVE BOX
Date/Time Patient Seen:  		  Referring MD:   Data Reviewed	       Patient is a 68y old  Male who presents with a chief complaint of black stool (17 Oct 2023 14:36)      Subjective/HPI     PAST MEDICAL & SURGICAL HISTORY:  CVA (cerebral vascular accident)    HTN (hypertension)    DM (diabetes mellitus)    Arrhythmia    History of atrial fibrillation    Right hemiparesis    Aphasia due to acute stroke    GI bleed    Iron deficiency anemia    Upper GI bleed    Osteomyelitis    S/P CABG (coronary artery bypass graft)    S/P brain surgery    History of aortic valve repair          Medication list         MEDICATIONS  (STANDING):  albuterol/ipratropium for Nebulization 3 milliLiter(s) Nebulizer every 8 hours  atorvastatin 40 milliGRAM(s) Oral at bedtime  ceFAZolin   IVPB 2000 milliGRAM(s) IV Intermittent every 8 hours  dextrose 5%. 1000 milliLiter(s) (50 mL/Hr) IV Continuous <Continuous>  dextrose 5%. 1000 milliLiter(s) (100 mL/Hr) IV Continuous <Continuous>  dextrose 50% Injectable 25 Gram(s) IV Push once  dextrose 50% Injectable 25 Gram(s) IV Push once  dextrose 50% Injectable 12.5 Gram(s) IV Push once  diltiazem    milliGRAM(s) Oral daily  furosemide    Tablet 40 milliGRAM(s) Oral daily  glucagon  Injectable 1 milliGRAM(s) IntraMuscular once  influenza  Vaccine (HIGH DOSE) 0.7 milliLiter(s) IntraMuscular once  insulin glargine Injectable (LANTUS) 5 Unit(s) SubCutaneous at bedtime  insulin lispro (ADMELOG) corrective regimen sliding scale   SubCutaneous three times a day before meals  insulin lispro (ADMELOG) corrective regimen sliding scale   SubCutaneous at bedtime  insulin lispro Injectable (ADMELOG) 3 Unit(s) SubCutaneous three times a day before meals  levETIRAcetam 750 milliGRAM(s) Oral two times a day  pantoprazole  Injectable 40 milliGRAM(s) IV Push every 12 hours  sodium chloride 1 Gram(s) Oral two times a day  sucralfate 1 Gram(s) Oral four times a day  urea Oral Powder 15 Gram(s) Oral daily    MEDICATIONS  (PRN):  dextrose Oral Gel 15 Gram(s) Oral once PRN Blood Glucose LESS THAN 70 milliGRAM(s)/deciliter  guaiFENesin Oral Liquid (Sugar-Free) 200 milliGRAM(s) Oral every 6 hours PRN Cough  ondansetron Injectable 4 milliGRAM(s) IV Push every 6 hours PRN Nausea and/or Vomiting         Vitals log        ICU Vital Signs Last 24 Hrs  T(C): 36.3 (18 Oct 2023 05:06), Max: 36.6 (17 Oct 2023 12:26)  T(F): 97.4 (18 Oct 2023 05:06), Max: 97.9 (17 Oct 2023 12:26)  HR: 71 (18 Oct 2023 05:06) (60 - 75)  BP: 112/60 (18 Oct 2023 05:06) (112/60 - 138/78)  BP(mean): --  ABP: --  ABP(mean): --  RR: 18 (18 Oct 2023 05:06) (18 - 18)  SpO2: 100% (18 Oct 2023 05:06) (93% - 100%)    O2 Parameters below as of 18 Oct 2023 05:06  Patient On (Oxygen Delivery Method): room air                 Input and Output:  I&O's Detail      Lab Data                        9.6    7.64  )-----------( 252      ( 17 Oct 2023 06:20 )             31.8     10-17    134<L>  |  96  |  33<H>  ----------------------------<  201<H>  4.7   |  32<H>  |  1.30    Ca    9.4      17 Oct 2023 06:20    TPro  7.0  /  Alb  2.7<L>  /  TBili  0.6  /  DBili  x   /  AST  41<H>  /  ALT  12  /  AlkPhos  957<H>  10-17            Review of Systems	      Objective     Physical Examination    heart s1s2  lung dc BS      Pertinent Lab findings & Imaging      Adams:  NO   Adequate UO     I&O's Detail           Discussed with:     Cultures:	        Radiology

## 2023-10-18 NOTE — PROGRESS NOTE ADULT - ASSESSMENT
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 Per daughter at bedside, patient's INR level was 7.2 two days ago and was instructed by hematologist to hold coumadin for the past 2 days. Of note, patient had EGD and colonoscopy in 08/2023 revealing gastritis, duodenitis, non-bleeding AVM, and two polyps, one removed (pathology revealing tubular adenoma). Patient was afebrile and HDS on arrival with SBPs 90s and HR 80s. Labs notable for Hgb 5.9, INR 7.01, BUN/Cr 64/1.80, Alk Phos 418. ED ordered for Kcentra, Vit K, and 2U PRBCs.    1Acute blood loss anemia  - monitor cbc  - GI fu  - management as per GI - no plan for scope at present  - likely sec to high INR     2  mechanical valve   - monitor cbc   - check INR and dose coumadin daily     3 Hx of CVA  - neuro fu    4 DM  - uncontrolled   - monitor FS  - basal, bolus   - endo following     5 SOB , tachycardia   - improved  -VQ scan low probability     6 MSSA bacteremia, pna  - cw abx  - fu repeat cultures  - ID fu     7 Urinary retention  - started flomax  - bladder scan and straight cath PRN

## 2023-10-18 NOTE — PROGRESS NOTE ADULT - SUBJECTIVE AND OBJECTIVE BOX
Patient is a 68y old  Male who presents with a chief complaint of black stool (18 Oct 2023 11:53)    Date of servie : 10-18-23 @ 12:19  INTERVAL HPI/OVERNIGHT EVENTS:  T(C): 36.3 (10-18-23 @ 05:06), Max: 36.6 (10-17-23 @ 12:26)  HR: 74 (10-18-23 @ 09:30) (60 - 76)  BP: 112/60 (10-18-23 @ 05:06) (112/60 - 138/78)  RR: 18 (10-18-23 @ 05:06) (18 - 18)  SpO2: 96% (10-18-23 @ 09:30) (93% - 100%)  Wt(kg): --  I&O's Summary      LABS:                        10.0   7.07  )-----------( 240      ( 18 Oct 2023 07:22 )             31.9     10-17    134<L>  |  96  |  33<H>  ----------------------------<  201<H>  4.7   |  32<H>  |  1.30    Ca    9.4      17 Oct 2023 06:20    TPro  7.0  /  Alb  2.7<L>  /  TBili  0.6  /  DBili  x   /  AST  41<H>  /  ALT  12  /  AlkPhos  957<H>  10-17    PT/INR - ( 18 Oct 2023 07:22 )   PT: 24.8 sec;   INR: 2.17 ratio           Urinalysis Basic - ( 17 Oct 2023 06:20 )    Color: x / Appearance: x / SG: x / pH: x  Gluc: 201 mg/dL / Ketone: x  / Bili: x / Urobili: x   Blood: x / Protein: x / Nitrite: x   Leuk Esterase: x / RBC: x / WBC x   Sq Epi: x / Non Sq Epi: x / Bacteria: x      CAPILLARY BLOOD GLUCOSE      POCT Blood Glucose.: 316 mg/dL (18 Oct 2023 12:11)  POCT Blood Glucose.: 207 mg/dL (18 Oct 2023 08:05)  POCT Blood Glucose.: 236 mg/dL (17 Oct 2023 21:37)  POCT Blood Glucose.: 213 mg/dL (17 Oct 2023 17:19)  POCT Blood Glucose.: 309 mg/dL (17 Oct 2023 12:24)        Urinalysis Basic - ( 17 Oct 2023 06:20 )    Color: x / Appearance: x / SG: x / pH: x  Gluc: 201 mg/dL / Ketone: x  / Bili: x / Urobili: x   Blood: x / Protein: x / Nitrite: x   Leuk Esterase: x / RBC: x / WBC x   Sq Epi: x / Non Sq Epi: x / Bacteria: x        MEDICATIONS  (STANDING):  albuterol/ipratropium for Nebulization 3 milliLiter(s) Nebulizer every 8 hours  atorvastatin 40 milliGRAM(s) Oral at bedtime  ceFAZolin   IVPB 2000 milliGRAM(s) IV Intermittent every 8 hours  dextrose 5%. 1000 milliLiter(s) (50 mL/Hr) IV Continuous <Continuous>  dextrose 5%. 1000 milliLiter(s) (100 mL/Hr) IV Continuous <Continuous>  dextrose 50% Injectable 25 Gram(s) IV Push once  dextrose 50% Injectable 25 Gram(s) IV Push once  dextrose 50% Injectable 12.5 Gram(s) IV Push once  diltiazem    milliGRAM(s) Oral daily  furosemide    Tablet 40 milliGRAM(s) Oral daily  glucagon  Injectable 1 milliGRAM(s) IntraMuscular once  influenza  Vaccine (HIGH DOSE) 0.7 milliLiter(s) IntraMuscular once  insulin glargine Injectable (LANTUS) 8 Unit(s) SubCutaneous at bedtime  insulin lispro (ADMELOG) corrective regimen sliding scale   SubCutaneous three times a day before meals  insulin lispro (ADMELOG) corrective regimen sliding scale   SubCutaneous at bedtime  insulin lispro Injectable (ADMELOG) 3 Unit(s) SubCutaneous three times a day before meals  levETIRAcetam 750 milliGRAM(s) Oral two times a day  pantoprazole  Injectable 40 milliGRAM(s) IV Push every 12 hours  sodium chloride 1 Gram(s) Oral two times a day  sucralfate 1 Gram(s) Oral four times a day  tamsulosin 0.8 milliGRAM(s) Oral daily  urea Oral Powder 15 Gram(s) Oral daily  warfarin 3 milliGRAM(s) Oral once    MEDICATIONS  (PRN):  dextrose Oral Gel 15 Gram(s) Oral once PRN Blood Glucose LESS THAN 70 milliGRAM(s)/deciliter  guaiFENesin Oral Liquid (Sugar-Free) 200 milliGRAM(s) Oral every 6 hours PRN Cough  ondansetron Injectable 4 milliGRAM(s) IV Push every 6 hours PRN Nausea and/or Vomiting          PHYSICAL EXAM:  GENERAL: NAD, well-groomed, well-developed  HEAD:  Atraumatic, Normocephalic  CHEST/LUNG: Clear to percussion bilaterally; No rales, rhonchi, wheezing, or rubs  HEART: Regular rate and rhythm; No murmurs, rubs, or gallops  ABDOMEN: Soft, Nontender, Nondistended; Bowel sounds present  EXTREMITIES:  2+ Peripheral Pulses, No clubbing, cyanosis, or edema  LYMPH: No lymphadenopathy noted  SKIN: No rashes or lesions    Care Discussed with Consultants/Other Providers [ ] YES  [ ] NO

## 2023-10-18 NOTE — PROGRESS NOTE ADULT - ASSESSMENT
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 Per daughter at bedside, patient's INR level was 7.2 two days ago and was instructed by hematologist to hold coumadin for the past 2 days. Of note, patient had EGD and colonoscopy in 08/2023 revealing gastritis, duodenitis, non-bleeding AVM, and two polyps, one removed (pathology revealing tubular adenoma). Patient was afebrile and HDS on arrival with SBPs 90s and HR 80s. Labs notable for Hgb 5.9, INR 7.01, BUN/Cr 64/1.80, Alk Phos 418. ED ordered for Kcentra, Vit K, and 2U PRBCs. (05 Oct 2023 15:48)    hyponatremia improved urea Oral Powder 15 Gram(s) Oral daily  ,    sodium chloride 1 Gram(s) Oral two times a day  f/u  check ua , urine osmolality , urine sodium , urine uric acid , serum sodium , serum osmolality , serum uric acid , f/u with hyponatremia work up , f/u with bmp , monitor i and o    ACUTE RENAL FAILURE:   Serum creatinine is  improving   There is no progression . No uremic symptoms  No evidence of anemia .  Fluid status stable.  Will continue to avoid nephrotoxic drugs.  Patient remains asymptomatic.   Continue current therapy.       BP monitoring,continue current antihypertensive meds, low salt diet,followup with PMD in 1-2 weeks  diltiazem    milliGRAM(s) Oral daily    f/u  blood and urine cx,serial lactate levels,monitor vitals closley,ivfs hydration,monitor urine output and renal profile  ceFAZolin   IVPB 2000 milliGRAM(s) IV Intermittent every 8 hours    Accuchecks monitoring and insulin sliding scale coverage, no concentrated sweets diet, serial labs and f/up with PMD, monitor HB A 1 C every 3-4 mnth   insulin lispro Injectable (ADMELOG) 3 Unit(s) SubCutaneous three times a day before meals   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, seizure disorder, prior CABG, DM2, AFib, Mechanical AV replacement in 2007 (on Warfarin) presenting to [facility] ED per recommendation of hematologist for low Hgb to 5.2 with associated black stools for the past few days, last being yesterday. Per daughter at bedside, patient's INR level was 7.2 two days ago and was instructed by hematologist to hold coumadin for the past 2 days. Of note, patient had EGD and colonoscopy in 08/2023 revealing gastritis, duodenitis, non-bleeding AVM, and two polyps, one removed (pathology revealing tubular adenoma). Patient was afebrile and HDS on arrival with SBPs 90s and HR 80s. Labs notable for Hgb 5.9, INR 7.01, BUN/Cr 64/1.80, Alk Phos 418. ED ordered for Kcentra, Vit K, and 2U PRBCs. (05 Oct 2023 15:48)  Urinary Retention  pt with +scan and 900cc on nesbitt placement,  hyponatremia improved urea Oral Powder 15 Gram(s) Oral daily  ,    sodium chloride 1 Gram(s) Oral two times a day  f/u  check ua , urine osmolality , urine sodium , urine uric acid , serum sodium , serum osmolality , serum uric acid , f/u with hyponatremia work up , f/u with bmp , monitor i and o    ACUTE RENAL FAILURE:   Serum creatinine is  improving   There is no progression . No uremic symptoms  No evidence of anemia .  Fluid status stable.  Will continue to avoid nephrotoxic drugs.  Patient remains asymptomatic.   Continue current therapy.       BP monitoring,continue current antihypertensive meds, low salt diet,followup with PMD in 1-2 weeks  diltiazem    milliGRAM(s) Oral daily    f/u  blood and urine cx,serial lactate levels,monitor vitals closley,ivfs hydration,monitor urine output and renal profile  ceFAZolin   IVPB 2000 milliGRAM(s) IV Intermittent every 8 hours    Accuchecks monitoring and insulin sliding scale coverage, no concentrated sweets diet, serial labs and f/up with PMD, monitor HB A 1 C every 3-4 mnth   insulin lispro Injectable (ADMELOG) 3 Unit(s) SubCutaneous three times a day before meals

## 2023-10-18 NOTE — PROGRESS NOTE ADULT - ASSESSMENT
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   ID c/s on HD#3 for fever to 102F overnight    Acute Multifocal PNA/AHRF on NC  - fever to 102 overnight, leukocytosis to 18 on 10/7  - CT chest showing multifocal PNA  - S/p zosyn 10/7-10/11; Abx tailored as below    MSSA Bacteremia  - noted Culture - Blood (10.08.23 @ 10:40)-  Methicillin SENSITIVE Staphylococcus aureus (MSSA)  - repeat blood cultures collected 10/10 20:38, NGTD  - c/w Cefazolin 2 grams IV q8 (started 10/11) last day 10/24called in to [facility] [phone]  weekly CBC, CMP faxed to [phone] can then remove midline, nl midline care    Urinary Retention  pt with +scan and 900cc on nesbitt placement, may impact timing of dc  From an ID standpoint no further requirement for inpatient status for the management of ID issues. Fine with discharge from ID standpoint when other medical issues no longer require inpatient care and social issues allow for a safe discharge plan. To schedule an outpatient ID follow up appointment please call our office at [phone]      Thank you for consulting us and involving us in the management of this most interesting and challenging case.  We will follow along in the care of this patient. Please call us at [phone] or text me directly on my cell# at [phone] with any concerns.    Starting tomorrow Dr Becca Acosta will be assuming care of this patient so please contact her with any questions, concerns or new micro data.

## 2023-10-18 NOTE — PROGRESS NOTE ADULT - SUBJECTIVE AND OBJECTIVE BOX
Patient is a 68y Male whom presented to the hospital with hyponatremia     PAST MEDICAL & SURGICAL HISTORY:  CVA (cerebral vascular accident)      HTN (hypertension)      DM (diabetes mellitus)      History of atrial fibrillation      Right hemiparesis      Aphasia due to acute stroke      Upper GI bleed      Osteomyelitis      S/P CABG (coronary artery bypass graft)      S/P brain surgery      History of aortic valve repair          MEDICATIONS  (STANDING):  atorvastatin 40 milliGRAM(s) Oral at bedtime  levETIRAcetam 750 milliGRAM(s) Oral two times a day  pantoprazole Infusion 8 mG/Hr (10 mL/Hr) IV Continuous <Continuous>  sucralfate 1 Gram(s) Oral four times a day      Allergies    No Known Allergies    Intolerances        SOCIAL HISTORY:  Denies ETOh,Smoking,     FAMILY HISTORY:  FH: coronary artery disease (Sibling)    FH: type 2 diabetes (Sibling)        REVIEW OF SYSTEMS:    CONSTITUTIONAL: No weakness, fevers or chills  RESPIRATORY: No cough, wheezing, hemoptysis; No shortness of breath  CARDIOVASCULAR: No chest pain or palpitations                                10.0   7.07  )-----------( 240      ( 18 Oct 2023 07:22 )             31.9       CBC Full  -  ( 18 Oct 2023 07:22 )  WBC Count : 7.07 K/uL  RBC Count : 3.72 M/uL  Hemoglobin : 10.0 g/dL  Hematocrit : 31.9 %  Platelet Count - Automated : 240 K/uL  Mean Cell Volume : 85.8 fl  Mean Cell Hemoglobin : 26.9 pg  Mean Cell Hemoglobin Concentration : 31.3 gm/dL  Auto Neutrophil # : x  Auto Lymphocyte # : x  Auto Monocyte # : x  Auto Eosinophil # : x  Auto Basophil # : x  Auto Neutrophil % : x  Auto Lymphocyte % : x  Auto Monocyte % : x  Auto Eosinophil % : x  Auto Basophil % : x      10-17    134<L>  |  96  |  33<H>  ----------------------------<  201<H>  4.7   |  32<H>  |  1.30    Ca    9.4      17 Oct 2023 06:20    TPro  7.0  /  Alb  2.7<L>  /  TBili  0.6  /  DBili  x   /  AST  41<H>  /  ALT  12  /  AlkPhos  957<H>  10-17      CAPILLARY BLOOD GLUCOSE      POCT Blood Glucose.: 207 mg/dL (18 Oct 2023 08:05)  POCT Blood Glucose.: 236 mg/dL (17 Oct 2023 21:37)  POCT Blood Glucose.: 213 mg/dL (17 Oct 2023 17:19)  POCT Blood Glucose.: 309 mg/dL (17 Oct 2023 12:24)      Vital Signs Last 24 Hrs  T(C): 36.3 (18 Oct 2023 05:06), Max: 36.6 (17 Oct 2023 12:26)  T(F): 97.4 (18 Oct 2023 05:06), Max: 97.9 (17 Oct 2023 12:26)  HR: 74 (18 Oct 2023 09:30) (60 - 76)  BP: 112/60 (18 Oct 2023 05:06) (112/60 - 138/78)  BP(mean): --  RR: 18 (18 Oct 2023 05:06) (18 - 18)  SpO2: 96% (18 Oct 2023 09:30) (93% - 100%)    Parameters below as of 18 Oct 2023 09:30  Patient On (Oxygen Delivery Method): nasal cannula, 2L        Urinalysis Basic - ( 17 Oct 2023 06:20 )    Color: x / Appearance: x / SG: x / pH: x  Gluc: 201 mg/dL / Ketone: x  / Bili: x / Urobili: x   Blood: x / Protein: x / Nitrite: x   Leuk Esterase: x / RBC: x / WBC x   Sq Epi: x / Non Sq Epi: x / Bacteria: x        PT/INR - ( 18 Oct 2023 07:22 )   PT: 24.8 sec;   INR: 2.17 ratio                   PHYSICAL EXAM:    Constitutional: NAD  HEENT: conjunctive   clear   Neck:  No JVD  Respiratory: CTAB  Cardiovascular: S1 and S2  Gastrointestinal: BS+, soft, NT/ND  Extremities: No peripheral edema  Neurological:  no focal deficits  Skin: dry   Access: Not applicable

## 2023-10-18 NOTE — PROGRESS NOTE ADULT - SUBJECTIVE AND OBJECTIVE BOX
[facility] of INFECTIOUS DISEASE  Guru Michel MD PhD, Lisa James MD, Becca Acosta MD, Karen Grimes MD, Corky Perez MD  and providing coverage with Carlos Marx MD  Providing Infectious Disease Consultations at [facility], [facility], [facility], [facility]'s    Office# [phone] to schedule follow up appointments  Answering Service for urgent calls or New Consults [phone]  Cell# to text for urgent issues Guru Michel [phone]     infectious diseases progress note:    ARNIE DELGADILLO is a 68y y. o. Male patient    Overnight and events of the last 24hrs reviewed    Allergies    No Known Allergies    Intolerances        ANTIBIOTICS/RELEVANT:  antimicrobials  ceFAZolin   IVPB 2000 milliGRAM(s) IV Intermittent every 8 hours    immunologic:  influenza  Vaccine (HIGH DOSE) 0.7 milliLiter(s) IntraMuscular once    OTHER:  albuterol/ipratropium for Nebulization 3 milliLiter(s) Nebulizer every 8 hours  atorvastatin 40 milliGRAM(s) Oral at bedtime  dextrose 5%. 1000 milliLiter(s) IV Continuous <Continuous>  dextrose 5%. 1000 milliLiter(s) IV Continuous <Continuous>  dextrose 50% Injectable 12.5 Gram(s) IV Push once  dextrose 50% Injectable 25 Gram(s) IV Push once  dextrose 50% Injectable 25 Gram(s) IV Push once  dextrose Oral Gel 15 Gram(s) Oral once PRN  diltiazem    milliGRAM(s) Oral daily  furosemide    Tablet 40 milliGRAM(s) Oral daily  glucagon  Injectable 1 milliGRAM(s) IntraMuscular once  guaiFENesin Oral Liquid (Sugar-Free) 200 milliGRAM(s) Oral every 6 hours PRN  insulin glargine Injectable (LANTUS) 5 Unit(s) SubCutaneous at bedtime  insulin lispro (ADMELOG) corrective regimen sliding scale   SubCutaneous three times a day before meals  insulin lispro (ADMELOG) corrective regimen sliding scale   SubCutaneous at bedtime  insulin lispro Injectable (ADMELOG) 3 Unit(s) SubCutaneous three times a day before meals  levETIRAcetam 750 milliGRAM(s) Oral two times a day  ondansetron Injectable 4 milliGRAM(s) IV Push every 6 hours PRN  pantoprazole  Injectable 40 milliGRAM(s) IV Push every 12 hours  sodium chloride 1 Gram(s) Oral two times a day  sucralfate 1 Gram(s) Oral four times a day  urea Oral Powder 15 Gram(s) Oral daily      Objective:  Vital Signs Last 24 Hrs  T(C): 36.3 (18 Oct 2023 05:06), Max: 36.6 (17 Oct 2023 12:26)  T(F): 97.4 (18 Oct 2023 05:06), Max: 97.9 (17 Oct 2023 12:26)  HR: 71 (18 Oct 2023 05:06) (60 - 72)  BP: 112/60 (18 Oct 2023 05:06) (112/60 - 138/78)  BP(mean): --  RR: 18 (18 Oct 2023 05:06) (18 - 18)  SpO2: 100% (18 Oct 2023 05:06) (93% - 100%)    Parameters below as of 18 Oct 2023 05:06  Patient On (Oxygen Delivery Method): room air        T(C): 36.3 (10-18-23 @ 05:06), Max: 36.6 (10-17-23 @ 12:26)  T(C): 36.3 (10-18-23 @ 05:06), Max: 36.7 (10-15-23 @ 11:54)  T(C): 36.3 (10-18-23 @ 05:06), Max: 36.8 (10-14-23 @ 20:28)    PHYSICAL EXAM:  HEENT: NC atraumatic  Neck: supple  Respiratory: no accessory muscle use, breathing comfortably  Cardiovascular: distant  Gastrointestinal: normal appearing, nondistended  Extremities: no clubbing, no cyanosis,        LABS:                          10.0   7.07  )-----------( 240      ( 18 Oct 2023 07:22 )             31.9       WBC  7.07 10-18 @ 07:22  7.64 10-17 @ 06:20  8.07 10-16 @ 07:50  8.31 10-15 @ 10:20  7.52 10-14 @ 06:46  6.50 10-13 @ 07:45  6.58 10-12 @ 07:30      10-17    134<L>  |  96  |  33<H>  ----------------------------<  201<H>  4.7   |  32<H>  |  1.30    Ca    9.4      17 Oct 2023 06:20    TPro  7.0  /  Alb  2.7<L>  /  TBili  0.6  /  DBili  x   /  AST  41<H>  /  ALT  12  /  AlkPhos  957<H>  10-17      Creatinine: 1.30 mg/dL (10-17-23 @ 06:20)  Creatinine: 1.20 mg/dL (10-16-23 @ 07:50)  Creatinine: 1.30 mg/dL (10-15-23 @ 10:20)      PT/INR - ( 18 Oct 2023 07:22 )   PT: 24.8 sec;   INR: 2.17 ratio           Urinalysis Basic - ( 17 Oct 2023 06:20 )    Color: x / Appearance: x / SG: x / pH: x  Gluc: 201 mg/dL / Ketone: x  / Bili: x / Urobili: x   Blood: x / Protein: x / Nitrite: x   Leuk Esterase: x / RBC: x / WBC x   Sq Epi: x / Non Sq Epi: x / Bacteria: x            INFLAMMATORY MARKERS      MICROBIOLOGY:              RADIOLOGY & ADDITIONAL STUDIES:

## 2023-10-19 NOTE — PROGRESS NOTE ADULT - ASSESSMENT
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   ID c/s on HD#3 for fever to 102F overnight    Acute Multifocal PNA/AHRF on NC  - S/p zosyn 10/7-10/11; Abx tailored as below    MSSA Bacteremia  - noted Culture - Blood (10.08.23 @ 10:40)-  Methicillin SENSITIVE Staphylococcus aureus (MSSA)  - repeat blood cultures collected 10/10 20:38, NGTD  - c/w Cefazolin 2 grams IV q8 (started 10/11) last day 10/24  weekly CBC, CMP faxed to [phone] can then remove midline, nl midline care    Urinary Retention  straight cath as needed    From an ID standpoint no further requirement for inpatient status for the management of ID issues. Fine with discharge from ID standpoint when other medical issues no longer require inpatient care and social issues allow for a safe discharge plan. To schedule an outpatient ID follow up appointment please call our office at [phone]

## 2023-10-19 NOTE — CAREGIVER ENGAGEMENT NOTE - CAREGIVER OUTREACH - FIRST ATTEMPT
Successfully contacted
Unable to reach
Successfully contacted

## 2023-10-19 NOTE — PROGRESS NOTE ADULT - SUBJECTIVE AND OBJECTIVE BOX
Neurology follow up note    NAIB [id number]      Interval History:    Patient feels ok no new complaints.    Allergies    No Known Allergies    Intolerances        MEDICATIONS    albuterol/ipratropium for Nebulization 3 milliLiter(s) Nebulizer every 8 hours  atorvastatin 40 milliGRAM(s) Oral at bedtime  ceFAZolin   IVPB 2000 milliGRAM(s) IV Intermittent every 8 hours  dextrose 5%. 1000 milliLiter(s) IV Continuous <Continuous>  dextrose 5%. 1000 milliLiter(s) IV Continuous <Continuous>  dextrose 50% Injectable 25 Gram(s) IV Push once  dextrose 50% Injectable 25 Gram(s) IV Push once  dextrose 50% Injectable 12.5 Gram(s) IV Push once  dextrose Oral Gel 15 Gram(s) Oral once PRN  diltiazem    milliGRAM(s) Oral daily  furosemide    Tablet 40 milliGRAM(s) Oral daily  glucagon  Injectable 1 milliGRAM(s) IntraMuscular once  guaiFENesin Oral Liquid (Sugar-Free) 200 milliGRAM(s) Oral every 6 hours PRN  influenza  Vaccine (HIGH DOSE) 0.7 milliLiter(s) IntraMuscular once  insulin glargine Injectable (LANTUS) 8 Unit(s) SubCutaneous at bedtime  insulin lispro (ADMELOG) corrective regimen sliding scale   SubCutaneous three times a day before meals  insulin lispro (ADMELOG) corrective regimen sliding scale   SubCutaneous at bedtime  insulin lispro Injectable (ADMELOG) 3 Unit(s) SubCutaneous three times a day before meals  levETIRAcetam 750 milliGRAM(s) Oral two times a day  ondansetron Injectable 4 milliGRAM(s) IV Push every 6 hours PRN  pantoprazole  Injectable 40 milliGRAM(s) IV Push every 12 hours  sodium chloride 1 Gram(s) Oral two times a day  sucralfate 1 Gram(s) Oral four times a day  tamsulosin 0.8 milliGRAM(s) Oral daily  urea Oral Powder 15 Gram(s) Oral daily              Vital Signs Last 24 Hrs  T(C): 36.3 (19 Oct 2023 05:19), Max: 36.4 (18 Oct 2023 12:56)  T(F): 97.4 (19 Oct 2023 05:19), Max: 97.5 (18 Oct 2023 12:56)  HR: 85 (19 Oct 2023 09:05) (68 - 86)  BP: 138/66 (19 Oct 2023 05:19) (115/62 - 138/71)  BP(mean): --  RR: 18 (19 Oct 2023 05:19) (18 - 18)  SpO2: 94% (19 Oct 2023 09:05) (93% - 96%)    Parameters below as of 19 Oct 2023 09:05  Patient On (Oxygen Delivery Method): room air      REVIEW OF SYSTEMS:  Completely limited secondary to the patient repeats words over, has severe expressive aphasia, but had been in his normal state of health as per daughter.    PHYSICAL EXAMINATION:  HEENT:  Head:  Normocephalic, atraumatic.  Eyes:  No scleral icterus.  Ears:  Hard to evaluate secondary to he could not answer questions accordingly but appears to follow commands.  NECK:  Supple.  RESPIRATORY:  Air entry bilaterally.  CARDIOVASCULAR:  S1 and S2 heard.  ABDOMEN:  Soft and nontender.  EXTREMITIES:  Positive discoloration was noted on the left toe.     NEUROLOGIC:  The patient was awake, alert.  On-off blink to visual threat.  Positive expressive aphasia.  Will say a few words but repeat the same words over.  Right upper and right lower were 0/5 with increased tone.  Right hemiplegia is his baseline.  Left upper and left lower were 4/5.      LABS:  CBC Full  -  ( 19 Oct 2023 07:20 )  WBC Count : 6.60 K/uL  RBC Count : 3.66 M/uL  Hemoglobin : 9.9 g/dL  Hematocrit : 31.8 %  Platelet Count - Automated : 239 K/uL  Mean Cell Volume : 86.9 fl  Mean Cell Hemoglobin : 27.0 pg  Mean Cell Hemoglobin Concentration : 31.1 gm/dL  Auto Neutrophil # : x  Auto Lymphocyte # : x  Auto Monocyte # : x  Auto Eosinophil # : x  Auto Basophil # : x  Auto Neutrophil % : x  Auto Lymphocyte % : x  Auto Monocyte % : x  Auto Eosinophil % : x  Auto Basophil % : x    Urinalysis Basic - ( 19 Oct 2023 07:20 )    Color: x / Appearance: x / SG: x / pH: x  Gluc: 250 mg/dL / Ketone: x  / Bili: x / Urobili: x   Blood: x / Protein: x / Nitrite: x   Leuk Esterase: x / RBC: x / WBC x   Sq Epi: x / Non Sq Epi: x / Bacteria: x      10-19    132<L>  |  94<L>  |  33<H>  ----------------------------<  250<H>  4.8   |  32<H>  |  1.40<H>    Ca    9.1      19 Oct 2023 07:20    TPro  6.8  /  Alb  2.7<L>  /  TBili  0.5  /  DBili  x   /  AST  33  /  ALT  11<L>  /  AlkPhos  987<H>  10-19    Hemoglobin A1C:     LIVER FUNCTIONS - ( 19 Oct 2023 07:20 )  Alb: 2.7 g/dL / Pro: 6.8 g/dL / ALK PHOS: 987 U/L / ALT: 11 U/L / AST: 33 U/L / GGT: x           Vitamin B12   PT/INR - ( 19 Oct 2023 07:20 )   PT: 31.2 sec;   INR: 2.75 ratio               RADIOLOGY    ANALYSIS AND PLAN:  This is a 68-year-old with a history of cerebrovascular accident, atrial fibrillation, now with possibly gastrointestinal bleed.  For history of cerebrovascular accident and atrial fibrillation, the patient is on multiple blood thinning medications.  The patient's warfarin INR is significantly elevated.  For now, I would recommend gastrointestinal workup, when possible to prevent further cerebrovascular accident, would recommend to reinstitute anticoagulation, but possibly an alternative to warfarin secondary to elevation of INR, unclear if the patient needs to be on antiplatelets as well.  If the patient cannot have strong anticoagulation, then we will recommend at least a baby aspirin if possible.  For history of seizure prophylaxis, continue the patient on his Keppra.  For history of diabetes, strict control of blood sugars.  For hyperlipidemia, continue the patient on statin.  GI noted suspect severe gi bleed in setting of elevated INR AC as per medical team   as per her no blood in stools   monitor oral intake   monitor urine out put now   seen with daughter today doing well  10/19    Greater than 46 minutes of time was spent with the patient, plan of care, reviewing data, speaking to the family and  50% of the visit was spent counseling and/or coordinating care with multidisciplinary healthcare team

## 2023-10-19 NOTE — PROGRESS NOTE ADULT - SUBJECTIVE AND OBJECTIVE BOX
[facility]  Shane Murray PA-C  [address] [place] [address]  [phone]      INTERVAL HPI/OVERNIGHT EVENTS:  Pt s/e  Family at bedside translates for pt  No overt GI bleeding    MEDICATIONS  (STANDING):  albuterol/ipratropium for Nebulization 3 milliLiter(s) Nebulizer every 8 hours  atorvastatin 40 milliGRAM(s) Oral at bedtime  ceFAZolin   IVPB 2000 milliGRAM(s) IV Intermittent every 8 hours  dextrose 5%. 1000 milliLiter(s) (50 mL/Hr) IV Continuous <Continuous>  dextrose 5%. 1000 milliLiter(s) (100 mL/Hr) IV Continuous <Continuous>  dextrose 50% Injectable 25 Gram(s) IV Push once  dextrose 50% Injectable 12.5 Gram(s) IV Push once  dextrose 50% Injectable 25 Gram(s) IV Push once  diltiazem    milliGRAM(s) Oral daily  finasteride 5 milliGRAM(s) Oral once  furosemide    Tablet 40 milliGRAM(s) Oral daily  glucagon  Injectable 1 milliGRAM(s) IntraMuscular once  influenza  Vaccine (HIGH DOSE) 0.7 milliLiter(s) IntraMuscular once  insulin glargine Injectable (LANTUS) 8 Unit(s) SubCutaneous at bedtime  insulin lispro (ADMELOG) corrective regimen sliding scale   SubCutaneous three times a day before meals  insulin lispro (ADMELOG) corrective regimen sliding scale   SubCutaneous at bedtime  insulin lispro Injectable (ADMELOG) 3 Unit(s) SubCutaneous three times a day before meals  levETIRAcetam 750 milliGRAM(s) Oral two times a day  pantoprazole  Injectable 40 milliGRAM(s) IV Push every 12 hours  sodium chloride 1 Gram(s) Oral two times a day  sucralfate 1 Gram(s) Oral four times a day  tamsulosin 0.8 milliGRAM(s) Oral daily  urea Oral Powder 15 Gram(s) Oral daily  warfarin 3 milliGRAM(s) Oral once    MEDICATIONS  (PRN):  dextrose Oral Gel 15 Gram(s) Oral once PRN Blood Glucose LESS THAN 70 milliGRAM(s)/deciliter  guaiFENesin Oral Liquid (Sugar-Free) 200 milliGRAM(s) Oral every 6 hours PRN Cough  ondansetron Injectable 4 milliGRAM(s) IV Push every 6 hours PRN Nausea and/or Vomiting      Allergies    No Known Allergies    PHYSICAL EXAM:   Vital Signs:  Vital Signs Last 24 Hrs  T(C): 36.4 (19 Oct 2023 11:27), Max: 36.4 (18 Oct 2023 21:06)  T(F): 97.5 (19 Oct 2023 11:27), Max: 97.5 (18 Oct 2023 21:06)  HR: 72 (19 Oct 2023 11:) (68 - 86)  BP: 132/57 (19 Oct 2023 11:) (115/62 - 138/66)  BP(mean): --  RR: 18 (19 Oct 2023 11:) (18 - 18)  SpO2: 92% (19 Oct 2023 11:) (92% - 96%)    Parameters below as of 19 Oct 2023 11:27  Patient On (Oxygen Delivery Method): nasal cannula      Daily     Daily Weight in k.1 (19 Oct 2023 05:19)    GENERAL:  Appears stated age  HEENT:  NC/AT  CHEST:  Full & symmetric excursion  HEART:  Regular rhythm  ABDOMEN:  Soft, non-tender, non-distended  EXTEREMITIES:  no cyanosis  SKIN:  No rash  NEURO:  Alert      LABS:                        9.9    6.60  )-----------( 239      ( 19 Oct 2023 07:20 )             31.8     10-    132<L>  |  94<L>  |  33<H>  ----------------------------<  250<H>  4.8   |  32<H>  |  1.40<H>    Ca    9.1      19 Oct 2023 07:20    TPro  6.8  /  Alb  2.7<L>  /  TBili  0.5  /  DBili  x   /  AST  33  /  ALT  11<L>  /  AlkPhos  987<H>  10-19    PT/INR - ( 19 Oct 2023 07:20 )   PT: 31.2 sec;   INR: 2.75 ratio           Urinalysis Basic - ( 19 Oct 2023 07:20 )    Color: x / Appearance: x / SG: x / pH: x  Gluc: 250 mg/dL / Ketone: x  / Bili: x / Urobili: x   Blood: x / Protein: x / Nitrite: x   Leuk Esterase: x / RBC: x / WBC x   Sq Epi: x / Non Sq Epi: x / Bacteria: x

## 2023-10-19 NOTE — CHART NOTE - NSCHARTNOTEFT_GEN_A_CORE
Called by RN, pt retaining urine, bladder scan showed 473cc urine. Patient was straight catheterized yesterday for bladder scan showing 900cc urine.      T(F): 97.4 (10-19-23 @ 05:19), Max: 97.5 (10-18-23 @ 21:06)  HR: 74 (10-19-23 @ 05:19) (68 - 80)  BP: 138/66 (10-19-23 @ 05:19) (115/62 - 138/66)  RR: 18 (10-19-23 @ 05:19) (18 - 18)  SpO2: 93% (10-19-23 @ 05:19) (93% - 96%)      Assessment/Plan: 68-year-old with a history of cerebrovascular accident, atrial fibrillation, admitted for acute blood loss anemia 2/2 GI bleed  #urinary retention   - RN to straight cath now  - RN to call if any changes

## 2023-10-19 NOTE — PROGRESS NOTE ADULT - SUBJECTIVE AND OBJECTIVE BOX
Patient is a 68y old  Male who presents with a chief complaint of black stool (19 Oct 2023 10:40)    Date of servie : 10-19-23 @ 11:57  INTERVAL HPI/OVERNIGHT EVENTS:  T(C): 36.4 (10-19-23 @ 11:27), Max: 36.4 (10-18-23 @ 12:56)  HR: 72 (10-19-23 @ 11:27) (68 - 86)  BP: 132/57 (10-19-23 @ 11:27) (115/62 - 138/71)  RR: 18 (10-19-23 @ 11:27) (18 - 18)  SpO2: 92% (10-19-23 @ 11:27) (92% - 96%)  Wt(kg): --  I&O's Summary    19 Oct 2023 07:01  -  19 Oct 2023 11:57  --------------------------------------------------------  IN: 0 mL / OUT: 400 mL / NET: -400 mL        LABS:                        9.9    6.60  )-----------( 239      ( 19 Oct 2023 07:20 )             31.8     10-19    132<L>  |  94<L>  |  33<H>  ----------------------------<  250<H>  4.8   |  32<H>  |  1.40<H>    Ca    9.1      19 Oct 2023 07:20    TPro  6.8  /  Alb  2.7<L>  /  TBili  0.5  /  DBili  x   /  AST  33  /  ALT  11<L>  /  AlkPhos  987<H>  10-19    PT/INR - ( 19 Oct 2023 07:20 )   PT: 31.2 sec;   INR: 2.75 ratio           Urinalysis Basic - ( 19 Oct 2023 07:20 )    Color: x / Appearance: x / SG: x / pH: x  Gluc: 250 mg/dL / Ketone: x  / Bili: x / Urobili: x   Blood: x / Protein: x / Nitrite: x   Leuk Esterase: x / RBC: x / WBC x   Sq Epi: x / Non Sq Epi: x / Bacteria: x      CAPILLARY BLOOD GLUCOSE      POCT Blood Glucose.: 250 mg/dL (19 Oct 2023 08:19)  POCT Blood Glucose.: 267 mg/dL (18 Oct 2023 21:03)  POCT Blood Glucose.: 217 mg/dL (18 Oct 2023 17:15)  POCT Blood Glucose.: 316 mg/dL (18 Oct 2023 12:11)        Urinalysis Basic - ( 19 Oct 2023 07:20 )    Color: x / Appearance: x / SG: x / pH: x  Gluc: 250 mg/dL / Ketone: x  / Bili: x / Urobili: x   Blood: x / Protein: x / Nitrite: x   Leuk Esterase: x / RBC: x / WBC x   Sq Epi: x / Non Sq Epi: x / Bacteria: x        MEDICATIONS  (STANDING):  albuterol/ipratropium for Nebulization 3 milliLiter(s) Nebulizer every 8 hours  atorvastatin 40 milliGRAM(s) Oral at bedtime  ceFAZolin   IVPB 2000 milliGRAM(s) IV Intermittent every 8 hours  dextrose 5%. 1000 milliLiter(s) (50 mL/Hr) IV Continuous <Continuous>  dextrose 5%. 1000 milliLiter(s) (100 mL/Hr) IV Continuous <Continuous>  dextrose 50% Injectable 25 Gram(s) IV Push once  dextrose 50% Injectable 12.5 Gram(s) IV Push once  dextrose 50% Injectable 25 Gram(s) IV Push once  diltiazem    milliGRAM(s) Oral daily  finasteride 5 milliGRAM(s) Oral once  furosemide    Tablet 40 milliGRAM(s) Oral daily  glucagon  Injectable 1 milliGRAM(s) IntraMuscular once  influenza  Vaccine (HIGH DOSE) 0.7 milliLiter(s) IntraMuscular once  insulin glargine Injectable (LANTUS) 8 Unit(s) SubCutaneous at bedtime  insulin lispro (ADMELOG) corrective regimen sliding scale   SubCutaneous at bedtime  insulin lispro (ADMELOG) corrective regimen sliding scale   SubCutaneous three times a day before meals  insulin lispro Injectable (ADMELOG) 3 Unit(s) SubCutaneous three times a day before meals  levETIRAcetam 750 milliGRAM(s) Oral two times a day  pantoprazole  Injectable 40 milliGRAM(s) IV Push every 12 hours  sodium chloride 1 Gram(s) Oral two times a day  sucralfate 1 Gram(s) Oral four times a day  tamsulosin 0.8 milliGRAM(s) Oral daily  urea Oral Powder 15 Gram(s) Oral daily  warfarin 3 milliGRAM(s) Oral once    MEDICATIONS  (PRN):  dextrose Oral Gel 15 Gram(s) Oral once PRN Blood Glucose LESS THAN 70 milliGRAM(s)/deciliter  guaiFENesin Oral Liquid (Sugar-Free) 200 milliGRAM(s) Oral every 6 hours PRN Cough  ondansetron Injectable 4 milliGRAM(s) IV Push every 6 hours PRN Nausea and/or Vomiting          PHYSICAL EXAM:  GENERAL: frail  HEAD:  Atraumatic, Normocephalic  CHEST/LUNG: Clear to percussion bilaterally; No rales, rhonchi, wheezing, or rubs  HEART: Regular rate and rhythm; No murmurs, rubs, or gallops  ABDOMEN: Soft, Nontender, Nondistended; Bowel sounds present  EXTREMITIES: edema +    Care Discussed with Consultants/Other Providers [x ] YES  [ ] NO

## 2023-10-19 NOTE — PROGRESS NOTE ADULT - SUBJECTIVE AND OBJECTIVE BOX
Date/Time Patient Seen:  		  Referring MD:   Data Reviewed	       Patient is a 68y old  Male who presents with a chief complaint of black stool (18 Oct 2023 12:29)      Subjective/HPI     PAST MEDICAL & SURGICAL HISTORY:  CVA (cerebral vascular accident)    HTN (hypertension)    DM (diabetes mellitus)    Arrhythmia    History of atrial fibrillation    Right hemiparesis    Aphasia due to acute stroke    GI bleed    Iron deficiency anemia    Upper GI bleed    Osteomyelitis    S/P CABG (coronary artery bypass graft)    S/P brain surgery    History of aortic valve repair          Medication list         MEDICATIONS  (STANDING):  albuterol/ipratropium for Nebulization 3 milliLiter(s) Nebulizer every 8 hours  atorvastatin 40 milliGRAM(s) Oral at bedtime  ceFAZolin   IVPB 2000 milliGRAM(s) IV Intermittent every 8 hours  dextrose 5%. 1000 milliLiter(s) (50 mL/Hr) IV Continuous <Continuous>  dextrose 5%. 1000 milliLiter(s) (100 mL/Hr) IV Continuous <Continuous>  dextrose 50% Injectable 25 Gram(s) IV Push once  dextrose 50% Injectable 25 Gram(s) IV Push once  dextrose 50% Injectable 12.5 Gram(s) IV Push once  diltiazem    milliGRAM(s) Oral daily  furosemide    Tablet 40 milliGRAM(s) Oral daily  glucagon  Injectable 1 milliGRAM(s) IntraMuscular once  influenza  Vaccine (HIGH DOSE) 0.7 milliLiter(s) IntraMuscular once  insulin glargine Injectable (LANTUS) 8 Unit(s) SubCutaneous at bedtime  insulin lispro (ADMELOG) corrective regimen sliding scale   SubCutaneous three times a day before meals  insulin lispro (ADMELOG) corrective regimen sliding scale   SubCutaneous at bedtime  insulin lispro Injectable (ADMELOG) 3 Unit(s) SubCutaneous three times a day before meals  levETIRAcetam 750 milliGRAM(s) Oral two times a day  pantoprazole  Injectable 40 milliGRAM(s) IV Push every 12 hours  sodium chloride 1 Gram(s) Oral two times a day  sucralfate 1 Gram(s) Oral four times a day  tamsulosin 0.8 milliGRAM(s) Oral daily  urea Oral Powder 15 Gram(s) Oral daily    MEDICATIONS  (PRN):  dextrose Oral Gel 15 Gram(s) Oral once PRN Blood Glucose LESS THAN 70 milliGRAM(s)/deciliter  guaiFENesin Oral Liquid (Sugar-Free) 200 milliGRAM(s) Oral every 6 hours PRN Cough  ondansetron Injectable 4 milliGRAM(s) IV Push every 6 hours PRN Nausea and/or Vomiting         Vitals log        ICU Vital Signs Last 24 Hrs  T(C): 36.3 (19 Oct 2023 05:19), Max: 36.4 (18 Oct 2023 12:56)  T(F): 97.4 (19 Oct 2023 05:19), Max: 97.5 (18 Oct 2023 12:56)  HR: 74 (19 Oct 2023 05:19) (68 - 80)  BP: 138/66 (19 Oct 2023 05:19) (115/62 - 138/71)  BP(mean): --  ABP: --  ABP(mean): --  RR: 18 (19 Oct 2023 05:19) (18 - 18)  SpO2: 93% (19 Oct 2023 05:19) (93% - 96%)    O2 Parameters below as of 19 Oct 2023 05:19  Patient On (Oxygen Delivery Method): room air                 Input and Output:  I&O's Detail      Lab Data                        10.0   7.07  )-----------( 240      ( 18 Oct 2023 07:22 )             31.9     10-17    134<L>  |  96  |  33<H>  ----------------------------<  201<H>  4.7   |  32<H>  |  1.30    Ca    9.4      17 Oct 2023 06:20    TPro  7.0  /  Alb  2.7<L>  /  TBili  0.6  /  DBili  x   /  AST  41<H>  /  ALT  12  /  AlkPhos  957<H>  10-17            Review of Systems	      Objective     Physical Examination    heart s1s2  lung dc BS      Pertinent Lab findings & Imaging      Adams:  NO   Adequate UO     I&O's Detail           Discussed with:     Cultures:	        Radiology

## 2023-10-19 NOTE — PROGRESS NOTE ADULT - ASSESSMENT
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 Per daughter at bedside, patient's INR level was 7.2 two days ago and was instructed by hematologist to hold coumadin for the past 2 days. Of note, patient had EGD and colonoscopy in 08/2023 revealing gastritis, duodenitis, non-bleeding AVM, and two polyps, one removed (pathology revealing tubular adenoma). Patient was afebrile and HDS on arrival with SBPs 90s and HR 80s. Labs notable for Hgb 5.9, INR 7.01, BUN/Cr 64/1.80, Alk Phos 418. ED ordered for Kcentra, Vit K, and 2U PRBCs.    1Acute blood loss anemia  - monitor cbc  - GI fu  - management as per GI - no plan for scope at present  - likely sec to high INR     2  mechanical valve   - monitor cbc   - check INR and dose coumadin daily     3 Hx of CVA  - neuro fu    4 DM  - uncontrolled   - monitor FS  - basal, bolus   - endo following     5 SOB , tachycardia   - improved  -VQ scan low probability     6 MSSA bacteremia, pna  - cw abx  - fu repeat cultures  - ID fu     7 Urinary retention  - cw  flomax  - nesbitt today  - started finestride  - urology consult

## 2023-10-19 NOTE — CAREGIVER ENGAGEMENT NOTE - CAREGIVER OUTREACH NOTES - FREE TEXT
CM met with patient and daughter at bedside. Discussed that per MD, pt is not stable for transition home today due to urinary retention. Daughter confirmed that infusion supplies and medication were delivered to the house yesterday. CM contacted [facility] spoke with Ame and notified her of above.
Confirmed that she is caregiver for her father, provided information for assessment
Discussed that plan is for her father to be transitioned home tomorrow per MD. Visiting nurse from [facility] will be visiting patient to administer 2pm dose of IV antibiotics.
Discussed that per MD pt is stable for transition home today with home care and home IV antbiotic infusion. Pt has been accepted to [facility] at home visiting nurse, and [facility] for start of infusion today at 3pm. CM notified MD of  above, Awaiting d/c summary to send to Home care agencies. Jeffrey stated that she is familiar with the IV infusion as she has done in before for her father. RN notified to instruct patient's daughter on infusion and care of [facility]. Daughter will be transporting patient home. Discharge notice (IMM) reviewed, copy given to patient's daughter. Daughter is in agreement with her father transitioning to home today.

## 2023-10-19 NOTE — PROGRESS NOTE ADULT - ASSESSMENT
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 Per daughter at bedside, patient's INR level was 7.2 two days ago and was instructed by hematologist to hold coumadin for the past 2 days. Of note, patient had EGD and colonoscopy in 08/2023 revealing gastritis, duodenitis, non-bleeding AVM, and two polyps, one removed (pathology revealing tubular adenoma). Patient was afebrile and HDS on arrival with SBPs 90s and HR 80s. Labs notable for Hgb 5.9, INR 7.01, BUN/Cr 64/1.80, Alk Phos 418. ED ordered for Kcentra, Vit K, and 2U PRBCs. (05 Oct 2023 15:48)  Urinary Retention  pt with +scan and 900cc on nesbitt placement,  hyponatremia improved urea Oral Powder 15 Gram(s) Oral daily  ,    sodium chloride 1 Gram(s) Oral two times a day  f/u  check ua , urine osmolality , urine sodium , urine uric acid , serum sodium , serum osmolality , serum uric acid , f/u with hyponatremia work up , f/u with bmp , monitor i and o    ACUTE RENAL FAILURE:   Serum creatinine is  improving   There is no progression . No uremic symptoms  No evidence of anemia .  Fluid status stable.  Will continue to avoid nephrotoxic drugs.  Patient remains asymptomatic.   Continue current therapy.       BP monitoring,continue current antihypertensive meds, low salt diet,followup with PMD in 1-2 weeks  diltiazem    milliGRAM(s) Oral daily    f/u  blood and urine cx,serial lactate levels,monitor vitals closley,ivfs hydration,monitor urine output and renal profile  ceFAZolin   IVPB 2000 milliGRAM(s) IV Intermittent every 8 hours    Accuchecks monitoring and insulin sliding scale coverage, no concentrated sweets diet, serial labs and f/up with PMD, monitor HB A 1 C every 3-4 mnth   insulin lispro Injectable (ADMELOG) 3 Unit(s) SubCutaneous three times a day before meals

## 2023-10-19 NOTE — PROGRESS NOTE ADULT - SUBJECTIVE AND OBJECTIVE BOX
Patient is a 68y Male whom presented to the hospital with hyponatremia     PAST MEDICAL & SURGICAL HISTORY:  CVA (cerebral vascular accident)      HTN (hypertension)      DM (diabetes mellitus)      History of atrial fibrillation      Right hemiparesis      Aphasia due to acute stroke      Upper GI bleed      Osteomyelitis      S/P CABG (coronary artery bypass graft)      S/P brain surgery      History of aortic valve repair          MEDICATIONS  (STANDING):  atorvastatin 40 milliGRAM(s) Oral at bedtime  levETIRAcetam 750 milliGRAM(s) Oral two times a day  pantoprazole Infusion 8 mG/Hr (10 mL/Hr) IV Continuous <Continuous>  sucralfate 1 Gram(s) Oral four times a day      Allergies    No Known Allergies    Intolerances        SOCIAL HISTORY:  Denies ETOh,Smoking,     FAMILY HISTORY:  FH: coronary artery disease (Sibling)    FH: type 2 diabetes (Sibling)        REVIEW OF SYSTEMS:    CONSTITUTIONAL: No weakness, fevers or chills  RESPIRATORY: No cough, wheezing, hemoptysis; No shortness of breath  CARDIOVASCULAR: No chest pain or palpitations                                             9.9    6.60  )-----------( 239      ( 19 Oct 2023 07:20 )             31.8       CBC Full  -  ( 19 Oct 2023 07:20 )  WBC Count : 6.60 K/uL  RBC Count : 3.66 M/uL  Hemoglobin : 9.9 g/dL  Hematocrit : 31.8 %  Platelet Count - Automated : 239 K/uL  Mean Cell Volume : 86.9 fl  Mean Cell Hemoglobin : 27.0 pg  Mean Cell Hemoglobin Concentration : 31.1 gm/dL  Auto Neutrophil # : x  Auto Lymphocyte # : x  Auto Monocyte # : x  Auto Eosinophil # : x  Auto Basophil # : x  Auto Neutrophil % : x  Auto Lymphocyte % : x  Auto Monocyte % : x  Auto Eosinophil % : x  Auto Basophil % : x      10-19    132<L>  |  94<L>  |  33<H>  ----------------------------<  250<H>  4.8   |  32<H>  |  1.40<H>    Ca    9.1      19 Oct 2023 07:20    TPro  6.8  /  Alb  2.7<L>  /  TBili  0.5  /  DBili  x   /  AST  33  /  ALT  11<L>  /  AlkPhos  987<H>  10-19      CAPILLARY BLOOD GLUCOSE      POCT Blood Glucose.: 232 mg/dL (19 Oct 2023 17:11)  POCT Blood Glucose.: 221 mg/dL (19 Oct 2023 12:30)  POCT Blood Glucose.: 250 mg/dL (19 Oct 2023 08:19)  POCT Blood Glucose.: 267 mg/dL (18 Oct 2023 21:03)      Vital Signs Last 24 Hrs  T(C): 36.4 (19 Oct 2023 11:27), Max: 36.4 (18 Oct 2023 21:06)  T(F): 97.5 (19 Oct 2023 11:27), Max: 97.5 (18 Oct 2023 21:06)  HR: 87 (19 Oct 2023 14:53) (68 - 89)  BP: 132/57 (19 Oct 2023 11:27) (115/62 - 138/66)  BP(mean): --  RR: 18 (19 Oct 2023 11:27) (18 - 18)  SpO2: 94% (19 Oct 2023 14:53) (92% - 96%)    Parameters below as of 19 Oct 2023 14:53  Patient On (Oxygen Delivery Method): room air        Urinalysis Basic - ( 19 Oct 2023 07:20 )    Color: x / Appearance: x / SG: x / pH: x  Gluc: 250 mg/dL / Ketone: x  / Bili: x / Urobili: x   Blood: x / Protein: x / Nitrite: x   Leuk Esterase: x / RBC: x / WBC x   Sq Epi: x / Non Sq Epi: x / Bacteria: x        PT/INR - ( 19 Oct 2023 07:20 )   PT: 31.2 sec;   INR: 2.75 ratio                   PHYSICAL EXAM:    Constitutional: NAD  HEENT: conjunctive   clear   Neck:  No JVD  Respiratory: CTAB  Cardiovascular: S1 and S2  Gastrointestinal: BS+, soft, NT/ND  Extremities: No peripheral edema  Neurological:  no focal deficits  Skin: dry   Access: Not applicable

## 2023-10-19 NOTE — PROGRESS NOTE ADULT - SUBJECTIVE AND OBJECTIVE BOX
[facility], Division of Infectious   Dr Michel, Dr James, Dr Acosta, DANNY Calvillo Chris  [phone]  after hours and weekends [phone]    Name: ARNIE DELGADILLO  Age: 68y  Gender: Male  MRN: [id number]    Interval History--  Notes reviewed  no complaints         Allergies    No Known Allergies    Intolerances        Medications--  Antibiotics:  ceFAZolin   IVPB 2000 milliGRAM(s) IV Intermittent every 8 hours    Immunologic:  influenza  Vaccine (HIGH DOSE) 0.7 milliLiter(s) IntraMuscular once    Other:  albuterol/ipratropium for Nebulization  atorvastatin  dextrose 5%.  dextrose 5%.  dextrose 50% Injectable  dextrose 50% Injectable  dextrose 50% Injectable  dextrose Oral Gel PRN  diltiazem   CD  finasteride  furosemide    Tablet  glucagon  Injectable  guaiFENesin Oral Liquid (Sugar-Free) PRN  insulin glargine Injectable (LANTUS)  insulin lispro (ADMELOG) corrective regimen sliding scale  insulin lispro (ADMELOG) corrective regimen sliding scale  insulin lispro Injectable (ADMELOG)  levETIRAcetam  ondansetron Injectable PRN  pantoprazole  Injectable  sodium chloride  sucralfate  tamsulosin  urea Oral Powder  warfarin      Review of Systems--  A 10-point review of systems was obtained.   unchanged     Review of systems otherwise negative except as previously noted.    Physical Examination--  Vital Signs: T(F): 97.5 (10-19-23 @ 11:27), Max: 97.5 (10-18-23 @ 21:06)  HR: 87 (10-19-23 @ 14:53)  BP: 132/57 (10-19-23 @ 11:27)  RR: 18 (10-19-23 @ 11:27)  SpO2: 94% (10-19-23 @ 14:53)  Wt(kg): --  General: Nontoxic-appearing Male in no acute distress.  HEENT: AT/NC.  Neck: Not rigid. No sense of mass.  Nodes: None palpable.  Lungs: Clear bilaterally without rales, wheezing or rhonchi  Heart: Regular rate and rhythm. No Murmur. No rub. No gallop. No palpable thrill.  Abdomen: Bowel sounds present and normoactive. Soft.  Extremities: No cyanosis or clubbing. No edema. rue picc  Skin: Warm. Dry. Good turgor. No rash. No vasculitic stigmata.  Psychiatric: Appropriate affect and mood for situation.         Laboratory Studies--  CBC                        9.9    6.60  )-----------( 239      ( 19 Oct 2023 07:20 )             31.8       Chemistries  10-19    132<L>  |  94<L>  |  33<H>  ----------------------------<  250<H>  4.8   |  32<H>  |  1.40<H>    Ca    9.1      19 Oct 2023 07:20    TPro  6.8  /  Alb  2.7<L>  /  TBili  0.5  /  DBili  x   /  AST  33  /  ALT  11<L>  /  AlkPhos  987<H>  10-19      Culture Data

## 2023-10-19 NOTE — PROGRESS NOTE ADULT - NUTRITIONAL ASSESSMENT
MEDICATIONS  (STANDING):  albuterol/ipratropium for Nebulization 3 milliLiter(s) Nebulizer every 8 hours  atorvastatin 40 milliGRAM(s) Oral at bedtime  ceFAZolin   IVPB 2000 milliGRAM(s) IV Intermittent every 8 hours  dextrose 5%. 1000 milliLiter(s) (100 mL/Hr) IV Continuous <Continuous>  dextrose 5%. 1000 milliLiter(s) (50 mL/Hr) IV Continuous <Continuous>  dextrose 50% Injectable 25 Gram(s) IV Push once  dextrose 50% Injectable 12.5 Gram(s) IV Push once  dextrose 50% Injectable 25 Gram(s) IV Push once  diltiazem    milliGRAM(s) Oral daily  furosemide    Tablet 40 milliGRAM(s) Oral daily  glucagon  Injectable 1 milliGRAM(s) IntraMuscular once  heparin  Infusion.  Unit(s)/Hr (11 mL/Hr) IV Continuous <Continuous>  influenza  Vaccine (HIGH DOSE) 0.7 milliLiter(s) IntraMuscular once  insulin glargine Injectable (LANTUS) 5 Unit(s) SubCutaneous at bedtime  insulin lispro (ADMELOG) corrective regimen sliding scale   SubCutaneous three times a day before meals  insulin lispro (ADMELOG) corrective regimen sliding scale   SubCutaneous at bedtime  insulin lispro Injectable (ADMELOG) 3 Unit(s) SubCutaneous three times a day before meals  levETIRAcetam 750 milliGRAM(s) Oral two times a day  pantoprazole  Injectable 40 milliGRAM(s) IV Push every 12 hours  sodium chloride 1 Gram(s) Oral two times a day  sucralfate 1 Gram(s) Oral four times a day  urea Oral Powder 15 Gram(s) Oral daily  warfarin 4 milliGRAM(s) Oral once
MEDICATIONS  (STANDING):  albuterol/ipratropium for Nebulization 3 milliLiter(s) Nebulizer every 8 hours  atorvastatin 40 milliGRAM(s) Oral at bedtime  ceFAZolin   IVPB 2000 milliGRAM(s) IV Intermittent every 8 hours  dextrose 5%. 1000 milliLiter(s) (50 mL/Hr) IV Continuous <Continuous>  dextrose 5%. 1000 milliLiter(s) (100 mL/Hr) IV Continuous <Continuous>  dextrose 50% Injectable 25 Gram(s) IV Push once  dextrose 50% Injectable 12.5 Gram(s) IV Push once  dextrose 50% Injectable 25 Gram(s) IV Push once  diltiazem    milliGRAM(s) Oral daily  furosemide    Tablet 40 milliGRAM(s) Oral daily  glucagon  Injectable 1 milliGRAM(s) IntraMuscular once  influenza  Vaccine (HIGH DOSE) 0.7 milliLiter(s) IntraMuscular once  insulin glargine Injectable (LANTUS) 5 Unit(s) SubCutaneous at bedtime  insulin lispro (ADMELOG) corrective regimen sliding scale   SubCutaneous three times a day before meals  insulin lispro (ADMELOG) corrective regimen sliding scale   SubCutaneous at bedtime  insulin lispro Injectable (ADMELOG) 3 Unit(s) SubCutaneous three times a day before meals  levETIRAcetam 750 milliGRAM(s) Oral two times a day  pantoprazole  Injectable 40 milliGRAM(s) IV Push every 12 hours  sodium chloride 1 Gram(s) Oral two times a day  sucralfate 1 Gram(s) Oral four times a day  urea Oral Powder 15 Gram(s) Oral daily
MEDICATIONS  (STANDING):  albuterol/ipratropium for Nebulization 3 milliLiter(s) Nebulizer every 8 hours  atorvastatin 40 milliGRAM(s) Oral at bedtime  dextrose 5%. 1000 milliLiter(s) (50 mL/Hr) IV Continuous <Continuous>  dextrose 5%. 1000 milliLiter(s) (100 mL/Hr) IV Continuous <Continuous>  dextrose 50% Injectable 25 Gram(s) IV Push once  dextrose 50% Injectable 12.5 Gram(s) IV Push once  dextrose 50% Injectable 25 Gram(s) IV Push once  diltiazem    milliGRAM(s) Oral daily  furosemide    Tablet 40 milliGRAM(s) Oral daily  glucagon  Injectable 1 milliGRAM(s) IntraMuscular once  heparin  Infusion.  Unit(s)/Hr (11 mL/Hr) IV Continuous <Continuous>  influenza  Vaccine (HIGH DOSE) 0.7 milliLiter(s) IntraMuscular once  insulin glargine Injectable (LANTUS) 20 Unit(s) SubCutaneous at bedtime  insulin lispro (ADMELOG) corrective regimen sliding scale   SubCutaneous at bedtime  insulin lispro (ADMELOG) corrective regimen sliding scale   SubCutaneous three times a day before meals  insulin lispro Injectable (ADMELOG) 4 Unit(s) SubCutaneous three times a day before meals  levETIRAcetam 750 milliGRAM(s) Oral two times a day  pantoprazole  Injectable 40 milliGRAM(s) IV Push every 12 hours  pantoprazole Infusion 8 mG/Hr (10 mL/Hr) IV Continuous <Continuous>  piperacillin/tazobactam IVPB.. 3.375 Gram(s) IV Intermittent every 8 hours  sodium chloride 1 Gram(s) Oral two times a day  sucralfate 1 Gram(s) Oral four times a day  urea Oral Powder 15 Gram(s) Oral daily
This patient has been assessed with a concern for Malnutrition and has been determined to have a diagnosis/diagnoses of Moderate protein-calorie malnutrition.    This patient is being managed with:   Diet Soft and Bite Sized-  Consistent Carbohydrate {Evening Snack}  Low Sodium  Supplement Feeding Modality:  Oral  Glucerna Shake Cans or Servings Per Day:  1       Frequency:  Two Times a day  Entered: Oct 12 2023  2:53PM  
MEDICATIONS  (STANDING):  albuterol/ipratropium for Nebulization 3 milliLiter(s) Nebulizer every 8 hours  atorvastatin 40 milliGRAM(s) Oral at bedtime  ceFAZolin   IVPB 2000 milliGRAM(s) IV Intermittent every 8 hours  dextrose 5%. 1000 milliLiter(s) (100 mL/Hr) IV Continuous <Continuous>  dextrose 5%. 1000 milliLiter(s) (50 mL/Hr) IV Continuous <Continuous>  dextrose 50% Injectable 25 Gram(s) IV Push once  dextrose 50% Injectable 12.5 Gram(s) IV Push once  dextrose 50% Injectable 25 Gram(s) IV Push once  diltiazem    milliGRAM(s) Oral daily  furosemide    Tablet 40 milliGRAM(s) Oral daily  glucagon  Injectable 1 milliGRAM(s) IntraMuscular once  heparin  Infusion.  Unit(s)/Hr (11 mL/Hr) IV Continuous <Continuous>  influenza  Vaccine (HIGH DOSE) 0.7 milliLiter(s) IntraMuscular once  insulin glargine Injectable (LANTUS) 5 Unit(s) SubCutaneous at bedtime  insulin lispro (ADMELOG) corrective regimen sliding scale   SubCutaneous three times a day before meals  insulin lispro (ADMELOG) corrective regimen sliding scale   SubCutaneous at bedtime  insulin lispro Injectable (ADMELOG) 3 Unit(s) SubCutaneous three times a day before meals  levETIRAcetam 750 milliGRAM(s) Oral two times a day  pantoprazole  Injectable 40 milliGRAM(s) IV Push every 12 hours  sodium chloride 1 Gram(s) Oral two times a day  sucralfate 1 Gram(s) Oral four times a day  urea Oral Powder 15 Gram(s) Oral daily  warfarin 4 milliGRAM(s) Oral once
MEDICATIONS  (STANDING):  albuterol/ipratropium for Nebulization 3 milliLiter(s) Nebulizer every 8 hours  atorvastatin 40 milliGRAM(s) Oral at bedtime  ceFAZolin   IVPB 2000 milliGRAM(s) IV Intermittent every 8 hours  dextrose 5%. 1000 milliLiter(s) (50 mL/Hr) IV Continuous <Continuous>  dextrose 5%. 1000 milliLiter(s) (100 mL/Hr) IV Continuous <Continuous>  dextrose 50% Injectable 25 Gram(s) IV Push once  dextrose 50% Injectable 12.5 Gram(s) IV Push once  dextrose 50% Injectable 25 Gram(s) IV Push once  diltiazem    milliGRAM(s) Oral daily  furosemide    Tablet 40 milliGRAM(s) Oral daily  glucagon  Injectable 1 milliGRAM(s) IntraMuscular once  influenza  Vaccine (HIGH DOSE) 0.7 milliLiter(s) IntraMuscular once  insulin glargine Injectable (LANTUS) 5 Unit(s) SubCutaneous at bedtime  insulin lispro (ADMELOG) corrective regimen sliding scale   SubCutaneous three times a day before meals  insulin lispro (ADMELOG) corrective regimen sliding scale   SubCutaneous at bedtime  insulin lispro Injectable (ADMELOG) 3 Unit(s) SubCutaneous three times a day before meals  levETIRAcetam 750 milliGRAM(s) Oral two times a day  pantoprazole  Injectable 40 milliGRAM(s) IV Push every 12 hours  sodium chloride 1 Gram(s) Oral two times a day  sucralfate 1 Gram(s) Oral four times a day  urea Oral Powder 15 Gram(s) Oral daily
This patient has been assessed with a concern for Malnutrition and has been determined to have a diagnosis/diagnoses of Moderate protein-calorie malnutrition.    This patient is being managed with:   Diet Soft and Bite Sized-  Consistent Carbohydrate {Evening Snack}  Low Sodium  Supplement Feeding Modality:  Oral  Glucerna Shake Cans or Servings Per Day:  1       Frequency:  Two Times a day  Entered: Oct 12 2023  2:53PM  
MEDICATIONS  (STANDING):  albuterol/ipratropium for Nebulization 3 milliLiter(s) Nebulizer every 8 hours  atorvastatin 40 milliGRAM(s) Oral at bedtime  dextrose 5%. 1000 milliLiter(s) (50 mL/Hr) IV Continuous <Continuous>  dextrose 5%. 1000 milliLiter(s) (100 mL/Hr) IV Continuous <Continuous>  dextrose 50% Injectable 25 Gram(s) IV Push once  dextrose 50% Injectable 12.5 Gram(s) IV Push once  dextrose 50% Injectable 25 Gram(s) IV Push once  glucagon  Injectable 1 milliGRAM(s) IntraMuscular once  heparin  Infusion.  Unit(s)/Hr (11 mL/Hr) IV Continuous <Continuous>  influenza  Vaccine (HIGH DOSE) 0.7 milliLiter(s) IntraMuscular once  insulin glargine Injectable (LANTUS) 10 Unit(s) SubCutaneous at bedtime  insulin lispro (ADMELOG) corrective regimen sliding scale   SubCutaneous three times a day before meals  levETIRAcetam 750 milliGRAM(s) Oral two times a day  pantoprazole Infusion 8 mG/Hr (10 mL/Hr) IV Continuous <Continuous>  piperacillin/tazobactam IVPB.- 3.375 Gram(s) IV Intermittent once  piperacillin/tazobactam IVPB.. 3.375 Gram(s) IV Intermittent every 8 hours  sodium chloride 1 Gram(s) Oral two times a day  sucralfate 1 Gram(s) Oral four times a day  urea Oral Powder 15 Gram(s) Oral daily
This patient has been assessed with a concern for Malnutrition and has been determined to have a diagnosis/diagnoses of Moderate protein-calorie malnutrition.    This patient is being managed with:   Diet Soft and Bite Sized-  Consistent Carbohydrate {Evening Snack}  Low Sodium  Supplement Feeding Modality:  Oral  Glucerna Shake Cans or Servings Per Day:  1       Frequency:  Two Times a day  Entered: Oct 12 2023  2:53PM  
This patient has been assessed with a concern for Malnutrition and has been determined to have a diagnosis/diagnoses of Moderate protein-calorie malnutrition.    This patient is being managed with:   Diet Soft and Bite Sized-  Consistent Carbohydrate {Evening Snack}  Low Sodium  Supplement Feeding Modality:  Oral  Glucerna Shake Cans or Servings Per Day:  1       Frequency:  Two Times a day  Entered: Oct 12 2023  2:53PM  
MEDICATIONS  (STANDING):  albuterol/ipratropium for Nebulization 3 milliLiter(s) Nebulizer every 8 hours  atorvastatin 40 milliGRAM(s) Oral at bedtime  ceFAZolin   IVPB 2000 milliGRAM(s) IV Intermittent every 8 hours  dextrose 5%. 1000 milliLiter(s) (100 mL/Hr) IV Continuous <Continuous>  dextrose 5%. 1000 milliLiter(s) (50 mL/Hr) IV Continuous <Continuous>  dextrose 50% Injectable 25 Gram(s) IV Push once  dextrose 50% Injectable 12.5 Gram(s) IV Push once  dextrose 50% Injectable 25 Gram(s) IV Push once  diltiazem    milliGRAM(s) Oral daily  furosemide    Tablet 40 milliGRAM(s) Oral daily  glucagon  Injectable 1 milliGRAM(s) IntraMuscular once  heparin  Infusion.  Unit(s)/Hr (11 mL/Hr) IV Continuous <Continuous>  influenza  Vaccine (HIGH DOSE) 0.7 milliLiter(s) IntraMuscular once  insulin glargine Injectable (LANTUS) 5 Unit(s) SubCutaneous at bedtime  insulin lispro (ADMELOG) corrective regimen sliding scale   SubCutaneous three times a day before meals  insulin lispro (ADMELOG) corrective regimen sliding scale   SubCutaneous at bedtime  insulin lispro Injectable (ADMELOG) 3 Unit(s) SubCutaneous three times a day before meals  levETIRAcetam 750 milliGRAM(s) Oral two times a day  pantoprazole  Injectable 40 milliGRAM(s) IV Push every 12 hours  sodium chloride 1 Gram(s) Oral two times a day  sucralfate 1 Gram(s) Oral four times a day  urea Oral Powder 15 Gram(s) Oral daily  warfarin 4 milliGRAM(s) Oral once
MEDICATIONS  (STANDING):  albuterol/ipratropium for Nebulization 3 milliLiter(s) Nebulizer every 8 hours  atorvastatin 40 milliGRAM(s) Oral at bedtime  ceFAZolin   IVPB 2000 milliGRAM(s) IV Intermittent every 8 hours  dextrose 5%. 1000 milliLiter(s) (50 mL/Hr) IV Continuous <Continuous>  dextrose 5%. 1000 milliLiter(s) (100 mL/Hr) IV Continuous <Continuous>  dextrose 50% Injectable 25 Gram(s) IV Push once  dextrose 50% Injectable 12.5 Gram(s) IV Push once  dextrose 50% Injectable 25 Gram(s) IV Push once  diltiazem    milliGRAM(s) Oral daily  furosemide    Tablet 40 milliGRAM(s) Oral daily  glucagon  Injectable 1 milliGRAM(s) IntraMuscular once  influenza  Vaccine (HIGH DOSE) 0.7 milliLiter(s) IntraMuscular once  insulin glargine Injectable (LANTUS) 5 Unit(s) SubCutaneous at bedtime  insulin lispro (ADMELOG) corrective regimen sliding scale   SubCutaneous three times a day before meals  insulin lispro (ADMELOG) corrective regimen sliding scale   SubCutaneous at bedtime  insulin lispro Injectable (ADMELOG) 3 Unit(s) SubCutaneous three times a day before meals  levETIRAcetam 750 milliGRAM(s) Oral two times a day  pantoprazole  Injectable 40 milliGRAM(s) IV Push every 12 hours  sodium chloride 1 Gram(s) Oral two times a day  sucralfate 1 Gram(s) Oral four times a day  urea Oral Powder 15 Gram(s) Oral daily
MEDICATIONS  (STANDING):  albuterol/ipratropium for Nebulization 3 milliLiter(s) Nebulizer every 8 hours  atorvastatin 40 milliGRAM(s) Oral at bedtime  dextrose 5%. 1000 milliLiter(s) (50 mL/Hr) IV Continuous <Continuous>  dextrose 5%. 1000 milliLiter(s) (100 mL/Hr) IV Continuous <Continuous>  dextrose 50% Injectable 25 Gram(s) IV Push once  dextrose 50% Injectable 12.5 Gram(s) IV Push once  dextrose 50% Injectable 25 Gram(s) IV Push once  diltiazem    milliGRAM(s) Oral daily  furosemide    Tablet 40 milliGRAM(s) Oral daily  glucagon  Injectable 1 milliGRAM(s) IntraMuscular once  heparin  Infusion.  Unit(s)/Hr (11 mL/Hr) IV Continuous <Continuous>  influenza  Vaccine (HIGH DOSE) 0.7 milliLiter(s) IntraMuscular once  insulin glargine Injectable (LANTUS) 20 Unit(s) SubCutaneous at bedtime  insulin lispro (ADMELOG) corrective regimen sliding scale   SubCutaneous at bedtime  insulin lispro (ADMELOG) corrective regimen sliding scale   SubCutaneous three times a day before meals  insulin lispro Injectable (ADMELOG) 4 Unit(s) SubCutaneous three times a day before meals  levETIRAcetam 750 milliGRAM(s) Oral two times a day  pantoprazole  Injectable 40 milliGRAM(s) IV Push every 12 hours  pantoprazole Infusion 8 mG/Hr (10 mL/Hr) IV Continuous <Continuous>  piperacillin/tazobactam IVPB.. 3.375 Gram(s) IV Intermittent every 8 hours  sodium chloride 1 Gram(s) Oral two times a day  sucralfate 1 Gram(s) Oral four times a day  urea Oral Powder 15 Gram(s) Oral daily
MEDICATIONS  (STANDING):  albuterol/ipratropium for Nebulization 3 milliLiter(s) Nebulizer every 8 hours  atorvastatin 40 milliGRAM(s) Oral at bedtime  dextrose 5%. 1000 milliLiter(s) (50 mL/Hr) IV Continuous <Continuous>  dextrose 5%. 1000 milliLiter(s) (100 mL/Hr) IV Continuous <Continuous>  dextrose 50% Injectable 25 Gram(s) IV Push once  dextrose 50% Injectable 12.5 Gram(s) IV Push once  dextrose 50% Injectable 25 Gram(s) IV Push once  diltiazem    milliGRAM(s) Oral daily  furosemide    Tablet 40 milliGRAM(s) Oral daily  glucagon  Injectable 1 milliGRAM(s) IntraMuscular once  heparin  Infusion.  Unit(s)/Hr (11 mL/Hr) IV Continuous <Continuous>  influenza  Vaccine (HIGH DOSE) 0.7 milliLiter(s) IntraMuscular once  insulin glargine Injectable (LANTUS) 20 Unit(s) SubCutaneous at bedtime  insulin lispro (ADMELOG) corrective regimen sliding scale   SubCutaneous at bedtime  insulin lispro (ADMELOG) corrective regimen sliding scale   SubCutaneous three times a day before meals  insulin lispro Injectable (ADMELOG) 4 Unit(s) SubCutaneous three times a day before meals  levETIRAcetam 750 milliGRAM(s) Oral two times a day  pantoprazole  Injectable 40 milliGRAM(s) IV Push every 12 hours  pantoprazole Infusion 8 mG/Hr (10 mL/Hr) IV Continuous <Continuous>  piperacillin/tazobactam IVPB.. 3.375 Gram(s) IV Intermittent every 8 hours  sodium chloride 1 Gram(s) Oral two times a day  sucralfate 1 Gram(s) Oral four times a day  urea Oral Powder 15 Gram(s) Oral daily
MEDICATIONS  (STANDING):  albuterol/ipratropium for Nebulization 3 milliLiter(s) Nebulizer every 8 hours  atorvastatin 40 milliGRAM(s) Oral at bedtime  dextrose 5%. 1000 milliLiter(s) (50 mL/Hr) IV Continuous <Continuous>  dextrose 5%. 1000 milliLiter(s) (100 mL/Hr) IV Continuous <Continuous>  dextrose 50% Injectable 25 Gram(s) IV Push once  dextrose 50% Injectable 12.5 Gram(s) IV Push once  dextrose 50% Injectable 25 Gram(s) IV Push once  glucagon  Injectable 1 milliGRAM(s) IntraMuscular once  heparin  Infusion.  Unit(s)/Hr (11 mL/Hr) IV Continuous <Continuous>  influenza  Vaccine (HIGH DOSE) 0.7 milliLiter(s) IntraMuscular once  insulin glargine Injectable (LANTUS) 10 Unit(s) SubCutaneous at bedtime  insulin lispro (ADMELOG) corrective regimen sliding scale   SubCutaneous three times a day before meals  levETIRAcetam 750 milliGRAM(s) Oral two times a day  pantoprazole Infusion 8 mG/Hr (10 mL/Hr) IV Continuous <Continuous>  piperacillin/tazobactam IVPB.- 3.375 Gram(s) IV Intermittent once  piperacillin/tazobactam IVPB.. 3.375 Gram(s) IV Intermittent every 8 hours  sodium chloride 1 Gram(s) Oral two times a day  sucralfate 1 Gram(s) Oral four times a day  urea Oral Powder 15 Gram(s) Oral daily
This patient has been assessed with a concern for Malnutrition and has been determined to have a diagnosis/diagnoses of Moderate protein-calorie malnutrition.    This patient is being managed with:   Diet Soft and Bite Sized-  Consistent Carbohydrate {Evening Snack}  Low Sodium  Supplement Feeding Modality:  Oral  Glucerna Shake Cans or Servings Per Day:  1       Frequency:  Two Times a day  Entered: Oct 12 2023  2:53PM  
MEDICATIONS  (STANDING):  albuterol/ipratropium for Nebulization 3 milliLiter(s) Nebulizer every 8 hours  atorvastatin 40 milliGRAM(s) Oral at bedtime  dextrose 5%. 1000 milliLiter(s) (50 mL/Hr) IV Continuous <Continuous>  dextrose 5%. 1000 milliLiter(s) (100 mL/Hr) IV Continuous <Continuous>  dextrose 50% Injectable 25 Gram(s) IV Push once  dextrose 50% Injectable 12.5 Gram(s) IV Push once  dextrose 50% Injectable 25 Gram(s) IV Push once  diltiazem    milliGRAM(s) Oral daily  furosemide    Tablet 40 milliGRAM(s) Oral daily  glucagon  Injectable 1 milliGRAM(s) IntraMuscular once  heparin  Infusion.  Unit(s)/Hr (11 mL/Hr) IV Continuous <Continuous>  influenza  Vaccine (HIGH DOSE) 0.7 milliLiter(s) IntraMuscular once  insulin glargine Injectable (LANTUS) 20 Unit(s) SubCutaneous at bedtime  insulin lispro (ADMELOG) corrective regimen sliding scale   SubCutaneous at bedtime  insulin lispro (ADMELOG) corrective regimen sliding scale   SubCutaneous three times a day before meals  insulin lispro Injectable (ADMELOG) 4 Unit(s) SubCutaneous three times a day before meals  levETIRAcetam 750 milliGRAM(s) Oral two times a day  pantoprazole  Injectable 40 milliGRAM(s) IV Push every 12 hours  pantoprazole Infusion 8 mG/Hr (10 mL/Hr) IV Continuous <Continuous>  piperacillin/tazobactam IVPB.. 3.375 Gram(s) IV Intermittent every 8 hours  sodium chloride 1 Gram(s) Oral two times a day  sucralfate 1 Gram(s) Oral four times a day  urea Oral Powder 15 Gram(s) Oral daily
MEDICATIONS  (STANDING):  albuterol/ipratropium for Nebulization 3 milliLiter(s) Nebulizer every 8 hours  atorvastatin 40 milliGRAM(s) Oral at bedtime  dextrose 5%. 1000 milliLiter(s) (50 mL/Hr) IV Continuous <Continuous>  dextrose 5%. 1000 milliLiter(s) (100 mL/Hr) IV Continuous <Continuous>  dextrose 50% Injectable 25 Gram(s) IV Push once  dextrose 50% Injectable 12.5 Gram(s) IV Push once  dextrose 50% Injectable 25 Gram(s) IV Push once  diltiazem    milliGRAM(s) Oral daily  furosemide    Tablet 40 milliGRAM(s) Oral daily  glucagon  Injectable 1 milliGRAM(s) IntraMuscular once  heparin  Infusion.  Unit(s)/Hr (11 mL/Hr) IV Continuous <Continuous>  influenza  Vaccine (HIGH DOSE) 0.7 milliLiter(s) IntraMuscular once  insulin glargine Injectable (LANTUS) 20 Unit(s) SubCutaneous at bedtime  insulin lispro (ADMELOG) corrective regimen sliding scale   SubCutaneous at bedtime  insulin lispro (ADMELOG) corrective regimen sliding scale   SubCutaneous three times a day before meals  insulin lispro Injectable (ADMELOG) 4 Unit(s) SubCutaneous three times a day before meals  levETIRAcetam 750 milliGRAM(s) Oral two times a day  pantoprazole  Injectable 40 milliGRAM(s) IV Push every 12 hours  pantoprazole Infusion 8 mG/Hr (10 mL/Hr) IV Continuous <Continuous>  piperacillin/tazobactam IVPB.. 3.375 Gram(s) IV Intermittent every 8 hours  sodium chloride 1 Gram(s) Oral two times a day  sucralfate 1 Gram(s) Oral four times a day  urea Oral Powder 15 Gram(s) Oral daily
This patient has been assessed with a concern for Malnutrition and has been determined to have a diagnosis/diagnoses of Moderate protein-calorie malnutrition.    This patient is being managed with:   Diet Soft and Bite Sized-  Consistent Carbohydrate {Evening Snack}  Low Sodium  Supplement Feeding Modality:  Oral  Glucerna Shake Cans or Servings Per Day:  1       Frequency:  Two Times a day  Entered: Oct 12 2023  2:53PM  
This patient has been assessed with a concern for Malnutrition and has been determined to have a diagnosis/diagnoses of Moderate protein-calorie malnutrition.    This patient is being managed with:   Diet Soft and Bite Sized-  Consistent Carbohydrate {Evening Snack}  Low Sodium  Supplement Feeding Modality:  Oral  Glucerna Shake Cans or Servings Per Day:  1       Frequency:  Two Times a day  Entered: Oct 12 2023  2:53PM

## 2023-10-19 NOTE — PROGRESS NOTE ADULT - SUBJECTIVE AND OBJECTIVE BOX
[facility] Cardiology    CHIEF COMPLAINT: Patient is a 68y old  Male who presents with a chief complaint of black stool (19 Oct 2023 10:10)      Follow Up: [ ] Chest Pain      [ ] Dyspnea     [ ] Palpitations    [ ] Atrial Fibrillation     [ ] Ventricular Dysrhythmia    [ ] Abnormal EKG                      [ ] Abnormal Cardiac Enzymes     [ ] Valvular Disease    HPI:  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, seizure disorder, prior CABG, DM2, AFib, Mechanical AV replacement in 2007 (on Warfarin) presenting to [facility] ED per recommendation of hematologist for low Hgb to 5.2 with associated black stools for the past few days, last being yesterday. Per daughter at bedside, patient's INR level was 7.2 two days ago and was instructed by hematologist to hold coumadin for the past 2 days. Of note, patient had EGD and colonoscopy in 08/2023 revealing gastritis, duodenitis, non-bleeding AVM, and two polyps, one removed (pathology revealing tubular adenoma). Patient was afebrile and HDS on arrival with SBPs 90s and HR 80s. Labs notable for Hgb 5.9, INR 7.01, BUN/Cr 64/1.80, Alk Phos 418. ED ordered for Kcentra, Vit K, and 2U PRBCs. (05 Oct 2023 15:48)    PAST MEDICAL & SURGICAL HISTORY:  CVA (cerebral vascular accident)      HTN (hypertension)      DM (diabetes mellitus)      History of atrial fibrillation      Right hemiparesis      Aphasia due to acute stroke      Upper GI bleed      Osteomyelitis      S/P CABG (coronary artery bypass graft)      S/P brain surgery      History of aortic valve repair        MEDICATIONS  (STANDING):  albuterol/ipratropium for Nebulization 3 milliLiter(s) Nebulizer every 8 hours  atorvastatin 40 milliGRAM(s) Oral at bedtime  ceFAZolin   IVPB 2000 milliGRAM(s) IV Intermittent every 8 hours  dextrose 5%. 1000 milliLiter(s) (50 mL/Hr) IV Continuous <Continuous>  dextrose 5%. 1000 milliLiter(s) (100 mL/Hr) IV Continuous <Continuous>  dextrose 50% Injectable 25 Gram(s) IV Push once  dextrose 50% Injectable 12.5 Gram(s) IV Push once  dextrose 50% Injectable 25 Gram(s) IV Push once  diltiazem    milliGRAM(s) Oral daily  furosemide    Tablet 40 milliGRAM(s) Oral daily  glucagon  Injectable 1 milliGRAM(s) IntraMuscular once  influenza  Vaccine (HIGH DOSE) 0.7 milliLiter(s) IntraMuscular once  insulin glargine Injectable (LANTUS) 8 Unit(s) SubCutaneous at bedtime  insulin lispro (ADMELOG) corrective regimen sliding scale   SubCutaneous three times a day before meals  insulin lispro (ADMELOG) corrective regimen sliding scale   SubCutaneous at bedtime  insulin lispro Injectable (ADMELOG) 3 Unit(s) SubCutaneous three times a day before meals  levETIRAcetam 750 milliGRAM(s) Oral two times a day  pantoprazole  Injectable 40 milliGRAM(s) IV Push every 12 hours  sodium chloride 1 Gram(s) Oral two times a day  sucralfate 1 Gram(s) Oral four times a day  tamsulosin 0.8 milliGRAM(s) Oral daily  urea Oral Powder 15 Gram(s) Oral daily    MEDICATIONS  (PRN):  dextrose Oral Gel 15 Gram(s) Oral once PRN Blood Glucose LESS THAN 70 milliGRAM(s)/deciliter  guaiFENesin Oral Liquid (Sugar-Free) 200 milliGRAM(s) Oral every 6 hours PRN Cough  ondansetron Injectable 4 milliGRAM(s) IV Push every 6 hours PRN Nausea and/or Vomiting    Allergies    No Known Allergies    Intolerances        REVIEW OF SYSTEMS:    CONSTITUTIONAL: No weakness, fevers or chills.   EYES/ENT: No visual changes;    NECK: No pain or stiffness  RESPIRATORY: No cough, wheezing, No shortness of breath  CARDIOVASCULAR: No chest pain or palpitations  GASTROINTESTINAL: No abdominal pain, or hematochezia.  GENITOURINARY: No dysuria orhematuria  NEUROLOGICAL: No numbness or weakness  SKIN: No itching, burning, rashes  All other review of systems is negative unless indicated above    Vital Signs Last 24 Hrs  T(C): 36.3 (19 Oct 2023 05:19), Max: 36.4 (18 Oct 2023 12:56)  T(F): 97.4 (19 Oct 2023 05:19), Max: 97.5 (18 Oct 2023 12:56)  HR: 85 (19 Oct 2023 09:05) (68 - 86)  BP: 138/66 (19 Oct 2023 05:19) (115/62 - 138/71)  BP(mean): --  RR: 18 (19 Oct 2023 05:19) (18 - 18)  SpO2: 94% (19 Oct 2023 09:05) (93% - 96%)    Parameters below as of 19 Oct 2023 09:05  Patient On (Oxygen Delivery Method): room air  I&O's Summary      PHYSICAL EXAM:  Constitutional: NAD  Neurological: calm  HEENT: no JVD, EOMI  EXT:  tr peripheral edema  Skin: No rashes.  Psych:  Mood calm  LABS: All Labs Reviewed:                          9.9    6.60  )-----------( 239      ( 19 Oct 2023 07:20 )             31.8     10-19    132<L>  |  94<L>  |  33<H>  ----------------------------<  250<H>  4.8   |  32<H>  |  1.40<H>    Ca    9.1      19 Oct 2023 07:20    TPro  6.8  /  Alb  2.7<L>  /  TBili  0.5  /  DBili  x   /  AST  33  /  ALT  11<L>  /  AlkPhos  987<H>  10-19    PT/INR - ( 19 Oct 2023 07:20 )   PT: 31.2 sec;   INR: 2.75 ratio       · Assessment    68M [employer] speaking male with prior hemorrhagic CVA s/p L craniotomy with residual aphasia, seizure disorder, prior CABG, DM2, AFib, Mechanical AV replacement in 2007 (on Warfarin) presenting to [facility] ED per recommendation of hematologist for low Hgb to 5.2 with associated black stools for the past few days, last being yesterday. Per daughter at bedside, patient's INR level was 7.2 two days ago and was instructed by hematologist to hold coumadin for the past 2 days. Of note, patient had EGD and colonoscopy in 08/2023 revealing gastritis, duodenitis, non-bleeding AVM, and two polyps, one removed (pathology revealing tubular adenoma). Patient was afebrile and HDS on arrival with SBPs 90s and HR 80s. Labs notable for Hgb 5.9, INR 7.01, BUN/Cr 64/1.80, Alk Phos 418. ED ordered for Kcentra, Vit K, and 2U PRBCs. (05 Oct 2023 15:48)     1. GI bleed  - Keep Hb > 7.5,  follow with serial CBC  -s/p PRBC  - Holding ASA, plavix and re-start when OK with GI  - s/p IV heparin,   warfarin restarted  - INR goal 3    2. AF  Rate controlled on diltiazem 240 mg    3. H/o metallic AVR  s/p warfarin.    4. CABG  No cp sob    Will follow prn  - Patient sees Alexandru Alonso out-patient

## 2023-10-20 NOTE — PROGRESS NOTE ADULT - SUBJECTIVE AND OBJECTIVE BOX
[facility] Cardiology    CHIEF COMPLAINT: Patient is a 68y old  Male who presents with a chief complaint of black stool (20 Oct 2023 10:22)      Follow Up: [ ] Chest Pain      [ ] Dyspnea     [ ] Palpitations    [ ] Atrial Fibrillation     [ ] Ventricular Dysrhythmia    [ ] Abnormal EKG                      [ ] Abnormal Cardiac Enzymes     [ ] Valvular Disease    HPI:  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, seizure disorder, prior CABG, DM2, AFib, Mechanical AV replacement in 2007 (on Warfarin) presenting to [facility] ED per recommendation of hematologist for low Hgb to 5.2 with associated black stools for the past few days, last being yesterday. Per daughter at bedside, patient's INR level was 7.2 two days ago and was instructed by hematologist to hold coumadin for the past 2 days. Of note, patient had EGD and colonoscopy in 08/2023 revealing gastritis, duodenitis, non-bleeding AVM, and two polyps, one removed (pathology revealing tubular adenoma). Patient was afebrile and HDS on arrival with SBPs 90s and HR 80s. Labs notable for Hgb 5.9, INR 7.01, BUN/Cr 64/1.80, Alk Phos 418. ED ordered for Kcentra, Vit K, and 2U PRBCs. (05 Oct 2023 15:48)    PAST MEDICAL & SURGICAL HISTORY:  CVA (cerebral vascular accident)      HTN (hypertension)      DM (diabetes mellitus)      History of atrial fibrillation      Right hemiparesis      Aphasia due to acute stroke      Upper GI bleed      Osteomyelitis      S/P CABG (coronary artery bypass graft)      S/P brain surgery      History of aortic valve repair        MEDICATIONS  (STANDING):  albuterol/ipratropium for Nebulization 3 milliLiter(s) Nebulizer every 8 hours  atorvastatin 40 milliGRAM(s) Oral at bedtime  ceFAZolin   IVPB 2000 milliGRAM(s) IV Intermittent every 8 hours  dextrose 5%. 1000 milliLiter(s) (50 mL/Hr) IV Continuous <Continuous>  dextrose 5%. 1000 milliLiter(s) (100 mL/Hr) IV Continuous <Continuous>  dextrose 50% Injectable 25 Gram(s) IV Push once  dextrose 50% Injectable 25 Gram(s) IV Push once  dextrose 50% Injectable 12.5 Gram(s) IV Push once  diltiazem    milliGRAM(s) Oral daily  furosemide    Tablet 40 milliGRAM(s) Oral daily  glucagon  Injectable 1 milliGRAM(s) IntraMuscular once  influenza  Vaccine (HIGH DOSE) 0.7 milliLiter(s) IntraMuscular once  insulin glargine Injectable (LANTUS) 8 Unit(s) SubCutaneous at bedtime  insulin lispro (ADMELOG) corrective regimen sliding scale   SubCutaneous at bedtime  insulin lispro (ADMELOG) corrective regimen sliding scale   SubCutaneous three times a day before meals  insulin lispro Injectable (ADMELOG) 3 Unit(s) SubCutaneous three times a day before meals  levETIRAcetam 750 milliGRAM(s) Oral two times a day  pantoprazole  Injectable 40 milliGRAM(s) IV Push every 12 hours  sodium chloride 1 Gram(s) Oral two times a day  sucralfate 1 Gram(s) Oral four times a day  tamsulosin 0.8 milliGRAM(s) Oral daily  urea Oral Powder 15 Gram(s) Oral daily    MEDICATIONS  (PRN):  dextrose Oral Gel 15 Gram(s) Oral once PRN Blood Glucose LESS THAN 70 milliGRAM(s)/deciliter  guaiFENesin Oral Liquid (Sugar-Free) 200 milliGRAM(s) Oral every 6 hours PRN Cough  ondansetron Injectable 4 milliGRAM(s) IV Push every 6 hours PRN Nausea and/or Vomiting    Allergies    No Known Allergies    Intolerances        REVIEW OF SYSTEMS:    CONSTITUTIONAL: No weakness, fevers or chills.   EYES/ENT: No visual changes;    NECK: No pain or stiffness  RESPIRATORY: No cough, wheezing, No shortness of breath  CARDIOVASCULAR: No chest pain or palpitations  GASTROINTESTINAL: No abdominal pain, or hematochezia.  GENITOURINARY: No dysuria orhematuria  NEUROLOGICAL: No numbness or weakness  SKIN: No itching, burning, rashes  All other review of systems is negative unless indicated above    Vital Signs Last 24 Hrs  T(C): 36.4 (20 Oct 2023 05:27), Max: 36.4 (19 Oct 2023 11:27)  T(F): 97.6 (20 Oct 2023 05:27), Max: 97.6 (20 Oct 2023 05:27)  HR: 72 (20 Oct 2023 05:27) (72 - 97)  BP: 105/62 (20 Oct 2023 05:27) (105/62 - 134/74)  BP(mean): --  RR: 18 (20 Oct 2023 05:27) (18 - 18)  SpO2: 92% (20 Oct 2023 05:27) (92% - 97%)    Parameters below as of 20 Oct 2023 05:27  Patient On (Oxygen Delivery Method): room air      I&O's Summary    19 Oct 2023 07:01  -  20 Oct 2023 07:00  --------------------------------------------------------  IN: 50 mL / OUT: 1925 mL / NET: -1875 mL        PHYSICAL EXAM:  Constitutional: NAD  Neurological: calm  HEENT: no JVD,  EXT:  no peripheral edema  Skin: No rashes.  Psych:  Mood calm  LABS: All Labs Reviewed:                          9.5    5.56  )-----------( 236      ( 20 Oct 2023 07:26 )             30.4     10-20    132<L>  |  94<L>  |  35<H>  ----------------------------<  241<H>  3.7   |  31  |  1.40<H>    Ca    8.6      20 Oct 2023 07:26    TPro  6.5  /  Alb  2.4<L>  /  TBili  0.4  /  DBili  x   /  AST  44<H>  /  ALT  15  /  AlkPhos  986<H>  10-20    PT/INR - ( 20 Oct 2023 07:26 )   PT: 35.0 sec;   INR: 3.09 ratio            · Assessment    68M [employer] speaking male with prior hemorrhagic CVA s/p L craniotomy with residual aphasia, seizure disorder, prior CABG, DM2, AFib, Mechanical AV replacement in 2007 (on Warfarin) presenting to [facility] ED per recommendation of hematologist for low Hgb to 5.2 with associated black stools for the past few days, last being yesterday. Per daughter at bedside, patient's INR level was 7.2 two days ago and was instructed by hematologist to hold coumadin for the past 2 days. Of note, patient had EGD and colonoscopy in 08/2023 revealing gastritis, duodenitis, non-bleeding AVM, and two polyps, one removed (pathology revealing tubular adenoma). Patient was afebrile and HDS on arrival with SBPs 90s and HR 80s. Labs notable for Hgb 5.9, INR 7.01, BUN/Cr 64/1.80, Alk Phos 418. ED ordered for Kcentra, Vit K, and 2U PRBCs. (05 Oct 2023 15:48)     1. GI bleed  - Keep Hb > 7.5,  follow with serial CBC  -s/p PRBC  - Holding ASA, plavix and re-start when OK with GI  - s/p IV heparin,   warfarin restarted  - INR goal 3    2. AF  Rate controlled on diltiazem 240 mg    3. H/o metallic AVR  s/p warfarin.    4. CABG  No cp sob    Will follow prn  - Patient sees Alexandru Alonso out-patient

## 2023-10-20 NOTE — PROGRESS NOTE ADULT - ASSESSMENT
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, seizure disorder, prior CABG, DM2, AFib, Mechanical AV replacement in 2007 (on Warfarin) presenting to [facility] ED per recommendation of hematologist for low Hgb to 5.2 with associated black stools    anemia  mech valve  cva  aphasia  seizure disorder  pleural eff  atelectasis  AF  CAD  CABG hx  DM    ct reviewed  on ABX  on Flomax  on LASIX    GI and ICU cx noted  serial hgb  I and O  monitor VS and HD  PPI  goal map > 60  cvs rx regimen  goal sat > 88 pct    GOC discussion

## 2023-10-20 NOTE — PROGRESS NOTE ADULT - PROBLEM/PLAN-1
Subjective:     Patient ID: Krys Mayes is a 75 y.o. female.    Chief Complaint:  Follow-up DJD right knee   Corticosteroid injection 5/17/2022 right knee  Visco injection right knee 6/7/2022  History of Present Illness  Krys Mayes    The patient presents to clinic today for evaluation of her right knee. Received Visco supplementation injection last visit with fairly good symptom relief. She does have pain that does wax and wane, differ from day to day, but overall improving. She does continue to support herself. Holding on to a railing with a sending and descending stairs and with on and off curbs and does continue to ambulate with a slight limp, but is improving.     Her hip is no longer symptomatic, is experiencing pain. Across the low back at the SI joint, which does come across the posterior part to the lateral aspect of the hip, which is a new issue. Denies any presence of numbness or tingling radiating down the right lower extremity. Is not experiencing any pain radiating down the lower extremity as well. She also like to be seen for pain she's experiencing at her left ankle. Status post open reduction, internal fixation ankle fracture approximately 12 years ago and on and off for the last 1 year, has began noticing pain. Pain present with dorsiflexion and plantar flexion and pain also present when she is ambulating. Denies any recent x-rays, MRI, CT. Pain present across the anterolateral aspect of the ankle, does radiate to the distal tibia. Denies known injury. Again, she does feel as if she is ambulating to compensate for the discomfort that she is experiencing at the ankle. Denies any other concerns present at this time.    Social History     Occupational History   • Not on file   Tobacco Use   • Smoking status: Never Smoker   • Smokeless tobacco: Never Used   • Tobacco comment: caffeine use /soft drinks   Vaping Use   • Vaping Use: Never used   Substance and Sexual Activity   • Alcohol 
DISPLAY PLAN FREE TEXT
use: Yes     Comment: She uses alcohol once or twice a year.   • Drug use: No   • Sexual activity: Defer      Past Medical History:   Diagnosis Date   • Allergic    • Arthritis    • Controlled type 2 diabetes with renal manifestation     states borderline    • Gastroesophageal reflux disease 1/21/2016   • Hyperlipidemia    • Hypertension    • Kidney stone     recurrent, calcium oxalate   • Menopausal disorder 1998    She went through menopause in 1998   • Obesity    • PAF (paroxysmal atrial fibrillation) (HCC)     in the setting of urosepsis, 9/2016   • Reflux esophagitis 8/19/2016   • Sebaceous cyst of labia 12/20/2017   • Sepsis due to urinary tract infection (HCC)    • Sigmoid diverticulitis 11/13/2017   • Urinary tract infection    • Vitamin D deficiency 1/21/2016     Past Surgical History:   Procedure Laterality Date   • ANKLE SURGERY      Ankle Repair / Description: ORif the left ankle in July 2010. Secondary to fall   • BREAST BIOPSY Right     benign   • BREAST SURGERY      biopsy    • CHOLECYSTECTOMY     • COLONOSCOPY  2014    Done 2004 normal recheck in 10 years. Repeated February 2014 colonoscopy was normal and to be repeated in 10 years.   • CYSTOSCOPY BLADDER STONE LITHOTRIPSY     • HEMORRHOIDECTOMY     • NEPHROLITHOTOMY Left 1/9/2017    Procedure: NEPHROLITHOTOMY PERCUTANEOUS SECOND LOOK WITH STENT PLACEMENT AND LASER LITHOTRIPSY;  Surgeon: Mati Villegas MD;  Location: [facility];  Service:    • OTHER SURGICAL HISTORY      Tubal Ligation   • PERCUTANEOUS NEPHROSTOLITHOTOMY Left 12/2016   • TUBAL ABDOMINAL LIGATION         Family History   Problem Relation Age of Onset   • COPD Mother    • Heart attack Father         acute myocardial infarction   • Heart attack Son    • Kidney disease Maternal Grandmother    • Breast cancer Neg Hx                Objective:  Physical Exam    General: No acute distress.  Eyes: conjunctiva clear; pupils equally round and reactive  ENT: external ears and nose 
"atraumatic; oropharynx clear  CV: no peripheral edema  Resp: normal respiratory effort  Skin: no rashes or wounds; normal turgor  Psych: mood and affect appropriate; recent and remote memory intact    Vitals:    07/21/22 1303   Weight: 89.8 kg (198 lb)   Height: 167.6 cm (66\")         07/21/22  1303   Weight: 89.8 kg (198 lb)     Body mass index is 31.96 kg/m².      Left Ankle Exam     Tenderness   The patient is experiencing tenderness in the lateral malleolus and ATF.   Swelling: moderate    Range of Motion   Dorsiflexion: 25 (passive 4/5 strength)   Plantar flexion: 45 (passive 4/5 strength)     Tests   Anterior drawer: negative  Varus tilt: negative    Other   Erythema: absent  Sensation: normal  Pulse: present    Comments:  Positive sensation the dorsum and plantar surface of the foot  2+ dorsalis pedis pulse      Right Knee Exam     Tenderness   The patient is experiencing tenderness in the medial joint line.    Range of Motion   Extension: 0   Flexion: 120     Tests   Rylie:  Medial - negative Lateral - negative  Varus: negative Valgus: negative  Lachman:  Anterior - 1+    Posterior - negative  Drawer:  Anterior - negative    Posterior - negative  Patellar apprehension: positive    Other   Erythema: absent  Sensation: normal  Pulse: present  Swelling: moderate    Comments:  Positive crepitus throughout arc of motion  Positive active patella compression                  Imaging:  Left Ankle X-Ray  Indication: Pain  Views: AP, Lateral, Mortise  Findings:  No fracture  No bony lesion  Soft tissues normal  Lateral distal fibula hardware and medial hardware remains intact no evidence of loosening arthritic changes noted throughout the ankle    No prior studies available for comparison.    Assessment:        1. Pain    2. Primary osteoarthritis of right knee    3. Greater trochanteric bursitis of right hip    4. Post-traumatic arthritis of left ankle           Plan:  1. Discussed plan of care with patient.  "
DISPLAY PLAN FREE TEXT
Discussed treatment options including corticosteroid injection left ankle, injection of the SI joint or Medrol Dosepak which will also benefit the knee.  Wishes to proceed with Medrol Dosepak today's visit.  If she is not improving plan to see her back in clinic in 4 weeks to reevaluate.  May consider corticosteroid injection of the SI joint right side and the left ankle at follow-up.  She verbalized understanding of all information agrees with plan of care.  Denies all other concerns present this time.  Orders:  Orders Placed This Encounter   Procedures   • XR Ankle 3+ View Left     New Medications Ordered This Visit   Medications   • methylPREDNISolone (MEDROL) 4 MG dose pack     Sig: Use as directed by package instructions     Dispense:  21 tablet     Refill:  0           I ordered and reviewed the SHAREE today.       Transcribed from ambient dictation for AGUSTIN Jo by Akhil Andrew.  07/21/22   13:39 EDT          
DISPLAY PLAN FREE TEXT

## 2023-10-20 NOTE — PROGRESS NOTE ADULT - PROBLEM SELECTOR PLAN 1
lantus increased 20 units qhs  cont admelog 4 units 3x/day before meals  change admelog mod dose corrective scale coverage qac/qhs  cont cons cho diet  goal bg 100-180 in hosp setting
cont current mdii  change mod dose admelog corrective scale coverage qac/qhs  cont cons cho diet  goal bg 100-180 in hosp setting
cont lantus 20 units qhs  increase admelog 7 units 3x/day before meals  cont mod dose admelog corrective scale coverage qac/qhs  cont cons cho diet  goal bg 100-180 in hosp setting
increase lantus 8 units qhs  cont admelog 3 units 3x/day before meals  cont admelog corrective scale coverage qac/qhs  cont cons cho diet  goal bg 100-180 in hosp setting
lantus decreased 5 units qhs (hypoglycemia)  decrease admelog 3 units 3x/day before meals  metformin on hold  cont cons cho diet  goal bg 100-180 in hosp setting
cont current mdii  cont cons cho diet  goal bg 100-180 in hosp setting

## 2023-10-20 NOTE — PROGRESS NOTE ADULT - SUBJECTIVE AND OBJECTIVE BOX
Date/Time Patient Seen:  		  Referring MD:   Data Reviewed	       Patient is a 68y old  Male who presents with a chief complaint of black stool (19 Oct 2023 18:10)      Subjective/HPI     PAST MEDICAL & SURGICAL HISTORY:  CVA (cerebral vascular accident)    HTN (hypertension)    DM (diabetes mellitus)    Arrhythmia    History of atrial fibrillation    Right hemiparesis    Aphasia due to acute stroke    GI bleed    Iron deficiency anemia    Upper GI bleed    Osteomyelitis    S/P CABG (coronary artery bypass graft)    S/P brain surgery    History of aortic valve repair          Medication list         MEDICATIONS  (STANDING):  albuterol/ipratropium for Nebulization 3 milliLiter(s) Nebulizer every 8 hours  atorvastatin 40 milliGRAM(s) Oral at bedtime  ceFAZolin   IVPB 2000 milliGRAM(s) IV Intermittent every 8 hours  dextrose 5%. 1000 milliLiter(s) (50 mL/Hr) IV Continuous <Continuous>  dextrose 5%. 1000 milliLiter(s) (100 mL/Hr) IV Continuous <Continuous>  dextrose 50% Injectable 25 Gram(s) IV Push once  dextrose 50% Injectable 25 Gram(s) IV Push once  dextrose 50% Injectable 12.5 Gram(s) IV Push once  diltiazem    milliGRAM(s) Oral daily  furosemide    Tablet 40 milliGRAM(s) Oral daily  glucagon  Injectable 1 milliGRAM(s) IntraMuscular once  influenza  Vaccine (HIGH DOSE) 0.7 milliLiter(s) IntraMuscular once  insulin glargine Injectable (LANTUS) 8 Unit(s) SubCutaneous at bedtime  insulin lispro (ADMELOG) corrective regimen sliding scale   SubCutaneous three times a day before meals  insulin lispro (ADMELOG) corrective regimen sliding scale   SubCutaneous at bedtime  insulin lispro Injectable (ADMELOG) 3 Unit(s) SubCutaneous three times a day before meals  levETIRAcetam 750 milliGRAM(s) Oral two times a day  pantoprazole  Injectable 40 milliGRAM(s) IV Push every 12 hours  sodium chloride 1 Gram(s) Oral two times a day  sucralfate 1 Gram(s) Oral four times a day  tamsulosin 0.8 milliGRAM(s) Oral daily  urea Oral Powder 15 Gram(s) Oral daily    MEDICATIONS  (PRN):  dextrose Oral Gel 15 Gram(s) Oral once PRN Blood Glucose LESS THAN 70 milliGRAM(s)/deciliter  guaiFENesin Oral Liquid (Sugar-Free) 200 milliGRAM(s) Oral every 6 hours PRN Cough  ondansetron Injectable 4 milliGRAM(s) IV Push every 6 hours PRN Nausea and/or Vomiting         Vitals log        ICU Vital Signs Last 24 Hrs  T(C): 36.4 (20 Oct 2023 05:27), Max: 36.4 (19 Oct 2023 11:27)  T(F): 97.6 (20 Oct 2023 05:27), Max: 97.6 (20 Oct 2023 05:27)  HR: 72 (20 Oct 2023 05:27) (72 - 97)  BP: 105/62 (20 Oct 2023 05:27) (105/62 - 134/74)  BP(mean): --  ABP: --  ABP(mean): --  RR: 18 (20 Oct 2023 05:27) (18 - 18)  SpO2: 92% (20 Oct 2023 05:27) (92% - 97%)    O2 Parameters below as of 20 Oct 2023 05:27  Patient On (Oxygen Delivery Method): room air                 Input and Output:  I&O's Detail    19 Oct 2023 07:01  -  20 Oct 2023 06:03  --------------------------------------------------------  IN:    IV PiggyBack: 50 mL  Total IN: 50 mL    OUT:    Voided (mL): 1725 mL  Total OUT: 1725 mL    Total NET: -1675 mL          Lab Data                        9.9    6.60  )-----------( 239      ( 19 Oct 2023 07:20 )             31.8     10-19    132<L>  |  94<L>  |  33<H>  ----------------------------<  250<H>  4.8   |  32<H>  |  1.40<H>    Ca    9.1      19 Oct 2023 07:20    TPro  6.8  /  Alb  2.7<L>  /  TBili  0.5  /  DBili  x   /  AST  33  /  ALT  11<L>  /  AlkPhos  987<H>  10-19            Review of Systems	      Objective     Physical Examination    heart s1s2  lung dec BS  head nc      Pertinent Lab findings & Imaging      Bryan:  NO   Adequate UO     I&O's Detail    19 Oct 2023 07:01  -  20 Oct 2023 06:03  --------------------------------------------------------  IN:    IV PiggyBack: 50 mL  Total IN: 50 mL    OUT:    Voided (mL): 1725 mL  Total OUT: 1725 mL    Total NET: -1675 mL               Discussed with:     Cultures:	        Radiology

## 2023-10-20 NOTE — PROGRESS NOTE ADULT - REASON FOR ADMISSION
black stool

## 2023-10-20 NOTE — CHART NOTE - NSCHARTNOTEFT_GEN_A_CORE
Assessment: patient seen for follow up malnutrition    68y old  Male who presents with a chief complaint of black stool   10/19 BM  patient seen with family in room taking meals well with good appetite        Factors impacting intake: [ ] none [ ] nausea  [ ] vomiting [ ] diarrhea [ ] constipation  [ ]chewing problems [x ] swallowing issues  [ ] other: soft and bite sized thin liquids    Diet Prescription: soft and bite sized consistent cho low Na glucerna BID  Intake: up to 75% per flow sheet    Current Weight: 10/19 wt 139.1# + 1 left ankle and foot edema + 3 right foot and ankle edema      Pertinent Medications: MEDICATIONS  (STANDING):  albuterol/ipratropium for Nebulization 3 milliLiter(s) Nebulizer every 8 hours  atorvastatin 40 milliGRAM(s) Oral at bedtime  ceFAZolin   IVPB 2000 milliGRAM(s) IV Intermittent every 8 hours  dextrose 5%. 1000 milliLiter(s) (50 mL/Hr) IV Continuous <Continuous>  dextrose 5%. 1000 milliLiter(s) (100 mL/Hr) IV Continuous <Continuous>  dextrose 50% Injectable 25 Gram(s) IV Push once  dextrose 50% Injectable 25 Gram(s) IV Push once  dextrose 50% Injectable 12.5 Gram(s) IV Push once  diltiazem    milliGRAM(s) Oral daily  furosemide    Tablet 40 milliGRAM(s) Oral daily  glucagon  Injectable 1 milliGRAM(s) IntraMuscular once  influenza  Vaccine (HIGH DOSE) 0.7 milliLiter(s) IntraMuscular once  insulin glargine Injectable (LANTUS) 8 Unit(s) SubCutaneous at bedtime  insulin lispro (ADMELOG) corrective regimen sliding scale   SubCutaneous three times a day before meals  insulin lispro (ADMELOG) corrective regimen sliding scale   SubCutaneous at bedtime  insulin lispro Injectable (ADMELOG) 3 Unit(s) SubCutaneous three times a day before meals  levETIRAcetam 750 milliGRAM(s) Oral two times a day  pantoprazole  Injectable 40 milliGRAM(s) IV Push every 12 hours  sodium chloride 1 Gram(s) Oral two times a day  sucralfate 1 Gram(s) Oral four times a day  tamsulosin 0.8 milliGRAM(s) Oral daily  urea Oral Powder 15 Gram(s) Oral daily    MEDICATIONS  (PRN):  dextrose Oral Gel 15 Gram(s) Oral once PRN Blood Glucose LESS THAN 70 milliGRAM(s)/deciliter  guaiFENesin Oral Liquid (Sugar-Free) 200 milliGRAM(s) Oral every 6 hours PRN Cough  ondansetron Injectable 4 milliGRAM(s) IV Push every 6 hours PRN Nausea and/or Vomiting    Pertinent Labs: A1c 7.6% POCT 236, 214 BUN 33 Creat 1.4  Skin: no pressure injury     Estimated Needs:   [x ] no change since previous assessment based on 143# 78kg 25-30kcals/kg 1950-2340kcals and 1-1.2 gms protein/kg 78-93gms protein   [ ] recalculated:     Previous Nutrition Diagnosis:    [x ] Malnutrition chronic moderate     Nutrition Diagnosis is [x ] ongoing  [ ] resolved [ ] not applicable     New Nutrition Diagnosis: [x ] not applicable       Interventions:   Recommend  [ ] Change Diet To:  [ ] Nutrition Supplement  [ ] Nutrition Support  [x ] Other: continue diet as ordered with supplement     Monitoring and Evaluation:   [x ] PO intake [ x ] Tolerance to diet prescription [ x ] weights [ x ] labs[ x ] follow up per protocol  [ ] other:

## 2023-10-20 NOTE — PROGRESS NOTE ADULT - SUBJECTIVE AND OBJECTIVE BOX
CAPILLARY BLOOD GLUCOSE      POCT Blood Glucose.: 214 mg/dL (19 Oct 2023 21:18)  POCT Blood Glucose.: 232 mg/dL (19 Oct 2023 17:11)  POCT Blood Glucose.: 221 mg/dL (19 Oct 2023 12:30)  POCT Blood Glucose.: 250 mg/dL (19 Oct 2023 08:19)      Vital Signs Last 24 Hrs  T(C): 36.4 (20 Oct 2023 05:27), Max: 36.4 (19 Oct 2023 11:27)  T(F): 97.6 (20 Oct 2023 05:27), Max: 97.6 (20 Oct 2023 05:27)  HR: 72 (20 Oct 2023 05:27) (72 - 97)  BP: 105/62 (20 Oct 2023 05:27) (105/62 - 134/74)  BP(mean): --  RR: 18 (20 Oct 2023 05:27) (18 - 18)  SpO2: 92% (20 Oct 2023 05:27) (92% - 97%)    Parameters below as of 20 Oct 2023 05:27  Patient On (Oxygen Delivery Method): room air        General: WN/WD NAD  Respiratory: CTA B/L  CV: RRR, S1S2, no murmurs, rubs or gallops  Abdominal: Soft, NT, ND +BS, Last BM  Extremities: No edema, + peripheral pulses     10-19    132<L>  |  94<L>  |  33<H>  ----------------------------<  250<H>  4.8   |  32<H>  |  1.40<H>    Ca    9.1      19 Oct 2023 07:20    TPro  6.8  /  Alb  2.7<L>  /  TBili  0.5  /  DBili  x   /  AST  33  /  ALT  11<L>  /  AlkPhos  987<H>  10-19      atorvastatin 40 milliGRAM(s) Oral at bedtime  dextrose 50% Injectable 25 Gram(s) IV Push once  dextrose 50% Injectable 12.5 Gram(s) IV Push once  dextrose 50% Injectable 25 Gram(s) IV Push once  dextrose Oral Gel 15 Gram(s) Oral once PRN  glucagon  Injectable 1 milliGRAM(s) IntraMuscular once  insulin glargine Injectable (LANTUS) 8 Unit(s) SubCutaneous at bedtime  insulin lispro (ADMELOG) corrective regimen sliding scale   SubCutaneous three times a day before meals  insulin lispro (ADMELOG) corrective regimen sliding scale   SubCutaneous at bedtime  insulin lispro Injectable (ADMELOG) 3 Unit(s) SubCutaneous three times a day before meals

## 2023-10-20 NOTE — PROGRESS NOTE ADULT - SUBJECTIVE AND OBJECTIVE BOX
[facility]  Shane Murray PA-C  [address] [place] [address]  [phone]      INTERVAL HPI/OVERNIGHT EVENTS:  Pt s/e  Daughter at bedside translated for pt  No overt GI bleeding    MEDICATIONS  (STANDING):  albuterol/ipratropium for Nebulization 3 milliLiter(s) Nebulizer every 8 hours  atorvastatin 40 milliGRAM(s) Oral at bedtime  ceFAZolin   IVPB 2000 milliGRAM(s) IV Intermittent every 8 hours  dextrose 5%. 1000 milliLiter(s) (50 mL/Hr) IV Continuous <Continuous>  dextrose 5%. 1000 milliLiter(s) (100 mL/Hr) IV Continuous <Continuous>  dextrose 50% Injectable 25 Gram(s) IV Push once  dextrose 50% Injectable 25 Gram(s) IV Push once  dextrose 50% Injectable 12.5 Gram(s) IV Push once  diltiazem    milliGRAM(s) Oral daily  furosemide    Tablet 40 milliGRAM(s) Oral daily  glucagon  Injectable 1 milliGRAM(s) IntraMuscular once  influenza  Vaccine (HIGH DOSE) 0.7 milliLiter(s) IntraMuscular once  insulin glargine Injectable (LANTUS) 8 Unit(s) SubCutaneous at bedtime  insulin lispro (ADMELOG) corrective regimen sliding scale   SubCutaneous three times a day before meals  insulin lispro (ADMELOG) corrective regimen sliding scale   SubCutaneous at bedtime  insulin lispro Injectable (ADMELOG) 3 Unit(s) SubCutaneous three times a day before meals  levETIRAcetam 750 milliGRAM(s) Oral two times a day  pantoprazole  Injectable 40 milliGRAM(s) IV Push every 12 hours  sodium chloride 1 Gram(s) Oral two times a day  sucralfate 1 Gram(s) Oral four times a day  tamsulosin 0.8 milliGRAM(s) Oral daily  urea Oral Powder 15 Gram(s) Oral daily    MEDICATIONS  (PRN):  dextrose Oral Gel 15 Gram(s) Oral once PRN Blood Glucose LESS THAN 70 milliGRAM(s)/deciliter  guaiFENesin Oral Liquid (Sugar-Free) 200 milliGRAM(s) Oral every 6 hours PRN Cough  ondansetron Injectable 4 milliGRAM(s) IV Push every 6 hours PRN Nausea and/or Vomiting      Allergies    No Known Allergies      PHYSICAL EXAM:   Vital Signs:  Vital Signs Last 24 Hrs  T(C): 36.4 (20 Oct 2023 05:27), Max: 36.4 (19 Oct 2023 11:27)  T(F): 97.6 (20 Oct 2023 05:27), Max: 97.6 (20 Oct 2023 05:)  HR: 72 (20 Oct 2023 05:) (72 - 97)  BP: 105/62 (20 Oct 2023 05:) (105/62 - 134/74)  BP(mean): --  RR: 18 (20 Oct 2023 05:) (18 - 18)  SpO2: 92% (20 Oct 2023 05:) (92% - 97%)    Parameters below as of 20 Oct 2023 05:  Patient On (Oxygen Delivery Method): room air      Daily     Daily Weight in k.1 (20 Oct 2023 05:)    GENERAL:  Appears stated age  HEENT:  NC/AT  CHEST:  Full & symmetric excursion  HEART:  Regular rhythm  ABDOMEN:  Soft, non-tender, non-distended  EXTEREMITIES:  no cyanosis  SKIN:  No rash  NEURO:  Alert      LABS:                        9.5    5.56  )-----------( 236      ( 20 Oct 2023 07:26 )             30.4     10-20    132<L>  |  94<L>  |  35<H>  ----------------------------<  241<H>  3.7   |  31  |  1.40<H>    Ca    8.6      20 Oct 2023 07:26    TPro  6.5  /  Alb  2.4<L>  /  TBili  0.4  /  DBili  x   /  AST  44<H>  /  ALT  15  /  AlkPhos  986<H>  10-20    PT/INR - ( 20 Oct 2023 07:26 )   PT: 35.0 sec;   INR: 3.09 ratio           Urinalysis Basic - ( 20 Oct 2023 07:26 )    Color: x / Appearance: x / SG: x / pH: x  Gluc: 241 mg/dL / Ketone: x  / Bili: x / Urobili: x   Blood: x / Protein: x / Nitrite: x   Leuk Esterase: x / RBC: x / WBC x   Sq Epi: x / Non Sq Epi: x / Bacteria: x

## 2023-10-20 NOTE — PROGRESS NOTE ADULT - SUBJECTIVE AND OBJECTIVE BOX
Neurology follow up note    NAIB [id number]      Interval History:    Patient feels ok no new complaints.    Allergies    No Known Allergies    Intolerances        MEDICATIONS    albuterol/ipratropium for Nebulization 3 milliLiter(s) Nebulizer every 8 hours  atorvastatin 40 milliGRAM(s) Oral at bedtime  ceFAZolin   IVPB 2000 milliGRAM(s) IV Intermittent every 8 hours  dextrose 5%. 1000 milliLiter(s) IV Continuous <Continuous>  dextrose 5%. 1000 milliLiter(s) IV Continuous <Continuous>  dextrose 50% Injectable 25 Gram(s) IV Push once  dextrose 50% Injectable 25 Gram(s) IV Push once  dextrose 50% Injectable 12.5 Gram(s) IV Push once  dextrose Oral Gel 15 Gram(s) Oral once PRN  diltiazem    milliGRAM(s) Oral daily  furosemide    Tablet 40 milliGRAM(s) Oral daily  glucagon  Injectable 1 milliGRAM(s) IntraMuscular once  guaiFENesin Oral Liquid (Sugar-Free) 200 milliGRAM(s) Oral every 6 hours PRN  influenza  Vaccine (HIGH DOSE) 0.7 milliLiter(s) IntraMuscular once  insulin glargine Injectable (LANTUS) 8 Unit(s) SubCutaneous at bedtime  insulin lispro (ADMELOG) corrective regimen sliding scale   SubCutaneous three times a day before meals  insulin lispro (ADMELOG) corrective regimen sliding scale   SubCutaneous at bedtime  insulin lispro Injectable (ADMELOG) 3 Unit(s) SubCutaneous three times a day before meals  levETIRAcetam 750 milliGRAM(s) Oral two times a day  ondansetron Injectable 4 milliGRAM(s) IV Push every 6 hours PRN  pantoprazole  Injectable 40 milliGRAM(s) IV Push every 12 hours  sodium chloride 1 Gram(s) Oral two times a day  sucralfate 1 Gram(s) Oral four times a day  tamsulosin 0.8 milliGRAM(s) Oral daily  urea Oral Powder 15 Gram(s) Oral daily              Vital Signs Last 24 Hrs  T(C): 36.4 (20 Oct 2023 05:27), Max: 36.4 (19 Oct 2023 11:27)  T(F): 97.6 (20 Oct 2023 05:27), Max: 97.6 (20 Oct 2023 05:27)  HR: 72 (20 Oct 2023 05:27) (72 - 97)  BP: 105/62 (20 Oct 2023 05:27) (105/62 - 134/74)  BP(mean): --  RR: 18 (20 Oct 2023 05:27) (18 - 18)  SpO2: 92% (20 Oct 2023 05:27) (92% - 97%)    Parameters below as of 20 Oct 2023 05:27  Patient On (Oxygen Delivery Method): room air    REVIEW OF SYSTEMS:  Completely limited secondary to the patient repeats words over, has severe expressive aphasia, but had been in his normal state of health as per daughter.    PHYSICAL EXAMINATION:  HEENT:  Head:  Normocephalic, atraumatic.  Eyes:  No scleral icterus.  Ears:  Hard to evaluate secondary to he could not answer questions accordingly but appears to follow commands.  NECK:  Supple.  RESPIRATORY:  Air entry bilaterally.  CARDIOVASCULAR:  S1 and S2 heard.  ABDOMEN:  Soft and nontender.  EXTREMITIES:  Positive discoloration was noted on the left toe.     NEUROLOGIC:  The patient was awake, alert.  On-off blink to visual threat.  Positive expressive aphasia.  Will say a few words but repeat the same words over.  Right upper and right lower were 0/5 with increased tone.  Right hemiplegia is his baseline.  Left upper and left lower were 4/5.      LABS:  CBC Full  -  ( 20 Oct 2023 07:26 )  WBC Count : 5.56 K/uL  RBC Count : 3.56 M/uL  Hemoglobin : 9.5 g/dL  Hematocrit : 30.4 %  Platelet Count - Automated : 236 K/uL  Mean Cell Volume : 85.4 fl  Mean Cell Hemoglobin : 26.7 pg  Mean Cell Hemoglobin Concentration : 31.3 gm/dL  Auto Neutrophil # : x  Auto Lymphocyte # : x  Auto Monocyte # : x  Auto Eosinophil # : x  Auto Basophil # : x  Auto Neutrophil % : x  Auto Lymphocyte % : x  Auto Monocyte % : x  Auto Eosinophil % : x  Auto Basophil % : x    Urinalysis Basic - ( 20 Oct 2023 07:26 )    Color: x / Appearance: x / SG: x / pH: x  Gluc: 241 mg/dL / Ketone: x  / Bili: x / Urobili: x   Blood: x / Protein: x / Nitrite: x   Leuk Esterase: x / RBC: x / WBC x   Sq Epi: x / Non Sq Epi: x / Bacteria: x      10-20    132<L>  |  94<L>  |  35<H>  ----------------------------<  241<H>  3.7   |  31  |  1.40<H>    Ca    8.6      20 Oct 2023 07:26    TPro  6.5  /  Alb  2.4<L>  /  TBili  0.4  /  DBili  x   /  AST  44<H>  /  ALT  15  /  AlkPhos  986<H>  10-20    Hemoglobin A1C:     LIVER FUNCTIONS - ( 20 Oct 2023 07:26 )  Alb: 2.4 g/dL / Pro: 6.5 g/dL / ALK PHOS: 986 U/L / ALT: 15 U/L / AST: 44 U/L / GGT: x           Vitamin B12   PT/INR - ( 20 Oct 2023 07:26 )   PT: 35.0 sec;   INR: 3.09 ratio               RADIOLOGY      ANALYSIS AND PLAN:  This is a 68-year-old with a history of cerebrovascular accident, atrial fibrillation, now with possibly gastrointestinal bleed.  For history of cerebrovascular accident and atrial fibrillation, the patient is on multiple blood thinning medications.  The patient's warfarin INR is significantly elevated.  For now, I would recommend gastrointestinal workup, when possible to prevent further cerebrovascular accident, would recommend to reinstitute anticoagulation, but possibly an alternative to warfarin secondary to elevation of INR, unclear if the patient needs to be on antiplatelets as well.  If the patient cannot have strong anticoagulation, then we will recommend at least a baby aspirin if possible.  For history of seizure prophylaxis, continue the patient on his Keppra.  For history of diabetes, strict control of blood sugars.  For hyperlipidemia, continue the patient on statin.  GI noted suspect severe gi bleed in setting of elevated INR AC as per medical team   as per her no blood in stools   monitor oral intake   monitor urine out put as needed   seen with daughter today doing well  10/20    Greater than 46 minutes of time was spent with the patient, plan of care, reviewing data, speaking to the family and  50% of the visit was spent counseling and/or coordinating care with multidisciplinary healthcare team

## 2023-10-20 NOTE — CHART NOTE - NSCHARTNOTEFT_GEN_A_CORE
Called by RN, pt able to void earlier in evening 375cc urine. RN bladder scan now showed 475cc urine retained.     T(F): 97.5 (10-19-23 @ 21:13), Max: 97.5 (10-19-23 @ 21:13)  HR: 74 (10-19-23 @ 21:13) (74 - 97)  BP: 134/74 (10-19-23 @ 21:13) (134/74 - 134/74)  RR: 18 (10-19-23 @ 21:13) (18 - 18)  SpO2: 92% (10-19-23 @ 21:13) (92% - 97%)      Assessment/Plan: 68-year-old with a history of cerebrovascular accident, atrial fibrillation, admitted for acute blood loss anemia 2/2 GI bleed. Patient with episodes of urinary retention during hospitalization.     1.) Urinary retention  - Straight cath ordered  - RN to call if any changes

## 2023-10-20 NOTE — PROGRESS NOTE ADULT - PROVIDER SPECIALTY LIST ADULT
Cardiology
Cardiology
Endocrinology
Gastroenterology
Hospitalist
Hospitalist
Infectious Disease
Nephrology
Neurology
Pulmonology
Cardiology
Endocrinology
Gastroenterology
Heme/Onc
Hospitalist
Infectious Disease
Nephrology
Nephrology
Neurology
Pulmonology
Cardiology
Gastroenterology
Heme/Onc
Hospitalist
Infectious Disease
Infectious Disease
Nephrology
Neurology
Pulmonology
Cardiology
Endocrinology
Endocrinology
Gastroenterology
Heme/Onc
Heme/Onc
Hospitalist
Hospitalist
Infectious Disease
Nephrology
Pulmonology
Endocrinology
Hospitalist
Nephrology
Endocrinology

## 2023-12-16 NOTE — H&P ADULT - UNABLE TO OBTAIN
Altered mentation
PRINCIPAL DISCHARGE DIAGNOSIS  Diagnosis: Hyperemesis  Assessment and Plan of Treatment: 30F with daily marijuana use presented for evaluation of intractable vomiting, admitted for hydration and IV anti-emesis.   # vomiting.   - usually occurs when she gets her period and resolves after a few days    resolved and eating well.   - needs op GI follow up   # Hepatic lesion   - New, 2cm hypodense liver lesion detected on CT A/P. MRI Abdomen w/wo contrast ordered but was a poor study d/t motion artifact   -  triple phase ct done and but results pending and patient not will to wait. Patient  is aferbile and eating and drinking well. Discussed follow up with gastroenterology   # KIMBERLY  - Baseline creatinine 0.9, 1.29 on admission - likely in the setting of prerenal volume depletion  -resolved   # Chronic GERD  - Daily PPI  - Maalox q4 PRN  #HTN  - pt reports htn for many years, denies any family members with htn at a young age   - pt is on contraceptives   - will check secondary causes. tsh, cortisol, urine metanephrines, renin/aldosterone levels  - start amlodipine   - f/u with Dr. Sullivan outaptient   # Leukocytosis  - Likely reactive, monitor         SECONDARY DISCHARGE DIAGNOSES  Diagnosis: Liver lesion  Assessment and Plan of Treatment:     Diagnosis: KIMBERLY (acute kidney injury)  Assessment and Plan of Treatment:

## 2024-01-01 ENCOUNTER — INPATIENT (INPATIENT)
Facility: HOSPITAL | Age: 69
LOS: 0 days | DRG: 298 | End: 2024-05-18
Attending: STUDENT IN AN ORGANIZED HEALTH CARE EDUCATION/TRAINING PROGRAM | Admitting: STUDENT IN AN ORGANIZED HEALTH CARE EDUCATION/TRAINING PROGRAM
Payer: MEDICARE

## 2024-01-01 VITALS — HEART RATE: 101 BPM | WEIGHT: 139.99 LBS | RESPIRATION RATE: 18 BRPM | OXYGEN SATURATION: 95 %

## 2024-01-01 VITALS — OXYGEN SATURATION: 65 % | RESPIRATION RATE: 30 BRPM

## 2024-01-01 DIAGNOSIS — N17.9 ACUTE KIDNEY FAILURE, UNSPECIFIED: ICD-10-CM

## 2024-01-01 DIAGNOSIS — Z95.1 PRESENCE OF AORTOCORONARY BYPASS GRAFT: Chronic | ICD-10-CM

## 2024-01-01 DIAGNOSIS — I10 ESSENTIAL (PRIMARY) HYPERTENSION: ICD-10-CM

## 2024-01-01 DIAGNOSIS — D68.9 COAGULATION DEFECT, UNSPECIFIED: ICD-10-CM

## 2024-01-01 DIAGNOSIS — E11.9 TYPE 2 DIABETES MELLITUS WITHOUT COMPLICATIONS: ICD-10-CM

## 2024-01-01 DIAGNOSIS — Z29.9 ENCOUNTER FOR PROPHYLACTIC MEASURES, UNSPECIFIED: ICD-10-CM

## 2024-01-01 DIAGNOSIS — I48.91 UNSPECIFIED ATRIAL FIBRILLATION: ICD-10-CM

## 2024-01-01 DIAGNOSIS — E87.20 ACIDOSIS, UNSPECIFIED: ICD-10-CM

## 2024-01-01 DIAGNOSIS — G40.909 EPILEPSY, UNSPECIFIED, NOT INTRACTABLE, WITHOUT STATUS EPILEPTICUS: ICD-10-CM

## 2024-01-01 DIAGNOSIS — K21.9 GASTRO-ESOPHAGEAL REFLUX DISEASE WITHOUT ESOPHAGITIS: ICD-10-CM

## 2024-01-01 DIAGNOSIS — J18.9 PNEUMONIA, UNSPECIFIED ORGANISM: ICD-10-CM

## 2024-01-01 DIAGNOSIS — Z98.890 OTHER SPECIFIED POSTPROCEDURAL STATES: Chronic | ICD-10-CM

## 2024-01-01 DIAGNOSIS — I46.9 CARDIAC ARREST, CAUSE UNSPECIFIED: ICD-10-CM

## 2024-01-01 DIAGNOSIS — I25.10 ATHEROSCLEROTIC HEART DISEASE OF NATIVE CORONARY ARTERY WITHOUT ANGINA PECTORIS: ICD-10-CM

## 2024-01-01 DIAGNOSIS — E78.5 HYPERLIPIDEMIA, UNSPECIFIED: ICD-10-CM

## 2024-01-01 LAB
A1C WITH ESTIMATED AVERAGE GLUCOSE RESULT: 9.4 % — HIGH (ref 4–5.6)
ALBUMIN SERPL ELPH-MCNC: 1.8 G/DL — LOW (ref 3.3–5)
ALBUMIN SERPL ELPH-MCNC: 2 G/DL — LOW (ref 3.3–5)
ALP SERPL-CCNC: 440 U/L — HIGH (ref 40–120)
ALP SERPL-CCNC: 515 U/L — HIGH (ref 40–120)
ALT FLD-CCNC: 100 U/L — HIGH (ref 12–78)
ALT FLD-CCNC: 27 U/L — SIGNIFICANT CHANGE UP (ref 12–78)
ANION GAP SERPL CALC-SCNC: 26 MMOL/L — HIGH (ref 5–17)
ANION GAP SERPL CALC-SCNC: 27 MMOL/L — HIGH (ref 5–17)
ANION GAP SERPL CALC-SCNC: 30 MMOL/L — HIGH (ref 5–17)
APPEARANCE UR: ABNORMAL
APTT BLD: 53.9 SEC — HIGH (ref 24.5–35.6)
APTT BLD: 99.2 SEC — HIGH (ref 24.5–35.6)
AST SERPL-CCNC: 285 U/L — HIGH (ref 15–37)
AST SERPL-CCNC: 41 U/L — HIGH (ref 15–37)
BASE EXCESS BLDA CALC-SCNC: -22.3 MMOL/L — LOW (ref -2–3)
BASOPHILS # BLD AUTO: 0 K/UL — SIGNIFICANT CHANGE UP (ref 0–0.2)
BASOPHILS # BLD AUTO: 0.07 K/UL — SIGNIFICANT CHANGE UP (ref 0–0.2)
BASOPHILS NFR BLD AUTO: 0 % — SIGNIFICANT CHANGE UP (ref 0–2)
BASOPHILS NFR BLD AUTO: 0.2 % — SIGNIFICANT CHANGE UP (ref 0–2)
BILIRUB SERPL-MCNC: 0.8 MG/DL — SIGNIFICANT CHANGE UP (ref 0.2–1.2)
BILIRUB SERPL-MCNC: 1.4 MG/DL — HIGH (ref 0.2–1.2)
BILIRUB UR-MCNC: ABNORMAL
BLOOD GAS COMMENTS ARTERIAL: SIGNIFICANT CHANGE UP
BLOOD GAS COMMENTS ARTERIAL: SIGNIFICANT CHANGE UP
BUN SERPL-MCNC: 69 MG/DL — HIGH (ref 7–23)
BUN SERPL-MCNC: 74 MG/DL — HIGH (ref 7–23)
BUN SERPL-MCNC: 78 MG/DL — HIGH (ref 7–23)
CALCIUM SERPL-MCNC: 10.5 MG/DL — SIGNIFICANT CHANGE UP (ref 8.4–10.5)
CALCIUM SERPL-MCNC: 12.7 MG/DL — HIGH (ref 8.5–10.1)
CALCIUM SERPL-MCNC: 9.3 MG/DL — SIGNIFICANT CHANGE UP (ref 8.5–10.1)
CHLORIDE SERPL-SCNC: 89 MMOL/L — LOW (ref 96–108)
CHLORIDE SERPL-SCNC: 92 MMOL/L — LOW (ref 96–108)
CHLORIDE SERPL-SCNC: 99 MMOL/L — SIGNIFICANT CHANGE UP (ref 96–108)
CO2 SERPL-SCNC: 10 MMOL/L — CRITICAL LOW (ref 22–31)
CO2 SERPL-SCNC: 8 MMOL/L — CRITICAL LOW (ref 22–31)
CO2 SERPL-SCNC: 9 MMOL/L — CRITICAL LOW (ref 22–31)
COLOR SPEC: ABNORMAL
CREAT SERPL-MCNC: 4.2 MG/DL — HIGH (ref 0.5–1.3)
CREAT SERPL-MCNC: 4.83 MG/DL — HIGH (ref 0.5–1.3)
CREAT SERPL-MCNC: 5.1 MG/DL — HIGH (ref 0.5–1.3)
D DIMER BLD IA.RAPID-MCNC: HIGH NG/ML DDU
DIFF PNL FLD: ABNORMAL
E COLI DNA BLD POS QL NAA+NON-PROBE: SIGNIFICANT CHANGE UP
EGFR: 12 ML/MIN/1.73M2 — LOW
EGFR: 13 ML/MIN/1.73M2 — LOW
EGFR: 15 ML/MIN/1.73M2 — LOW
EOSINOPHIL # BLD AUTO: 0 K/UL — SIGNIFICANT CHANGE UP (ref 0–0.5)
EOSINOPHIL # BLD AUTO: 0.01 K/UL — SIGNIFICANT CHANGE UP (ref 0–0.5)
EOSINOPHIL NFR BLD AUTO: 0 % — SIGNIFICANT CHANGE UP (ref 0–6)
EOSINOPHIL NFR BLD AUTO: 0 % — SIGNIFICANT CHANGE UP (ref 0–6)
ESTIMATED AVERAGE GLUCOSE: 223 MG/DL — HIGH (ref 68–114)
FIBRINOGEN PPP-MCNC: 558 MG/DL — HIGH (ref 200–475)
GAS PNL BLDA: SIGNIFICANT CHANGE UP
GAS PNL BLDA: SIGNIFICANT CHANGE UP
GLUCOSE SERPL-MCNC: 117 MG/DL — HIGH (ref 70–99)
GLUCOSE SERPL-MCNC: 78 MG/DL — SIGNIFICANT CHANGE UP (ref 70–99)
GLUCOSE SERPL-MCNC: 83 MG/DL — SIGNIFICANT CHANGE UP (ref 70–99)
GLUCOSE UR QL: NEGATIVE MG/DL — SIGNIFICANT CHANGE UP
GRAM STN FLD: ABNORMAL
HCO3 BLDA-SCNC: 10 MMOL/L — CRITICAL LOW (ref 21–28)
HCO3 BLDA-SCNC: <11 MMOL/L — LOW (ref 21–28)
HCT VFR BLD CALC: 29.4 % — LOW (ref 39–50)
HCT VFR BLD CALC: 32.8 % — LOW (ref 39–50)
HCT VFR BLD CALC: 33.1 % — LOW (ref 39–50)
HGB BLD-MCNC: 8.8 G/DL — LOW (ref 13–17)
HGB BLD-MCNC: 9.5 G/DL — LOW (ref 13–17)
HGB BLD-MCNC: 9.9 G/DL — LOW (ref 13–17)
HOROWITZ INDEX BLDA+IHG-RTO: 100 — SIGNIFICANT CHANGE UP
HOROWITZ INDEX BLDA+IHG-RTO: 100 — SIGNIFICANT CHANGE UP
IMM GRANULOCYTES NFR BLD AUTO: 2.7 % — HIGH (ref 0–0.9)
INR BLD: 1.86 RATIO — HIGH (ref 0.85–1.18)
INR BLD: 3.19 RATIO — HIGH (ref 0.85–1.18)
INR BLD: >15 RATIO (ref 0.85–1.18)
KETONES UR-MCNC: NEGATIVE MG/DL — SIGNIFICANT CHANGE UP
LACTATE SERPL-SCNC: 16.5 MMOL/L — CRITICAL HIGH (ref 0.7–2)
LACTATE SERPL-SCNC: 18.4 MMOL/L — CRITICAL HIGH (ref 0.7–2)
LACTATE SERPL-SCNC: 18.7 MMOL/L — CRITICAL HIGH (ref 0.7–2)
LEUKOCYTE ESTERASE UR-ACNC: ABNORMAL
LYMPHOCYTES # BLD AUTO: 0.79 K/UL — LOW (ref 1–3.3)
LYMPHOCYTES # BLD AUTO: 2.3 % — LOW (ref 13–44)
LYMPHOCYTES # BLD AUTO: 2.56 K/UL — SIGNIFICANT CHANGE UP (ref 1–3.3)
LYMPHOCYTES # BLD AUTO: 8 % — LOW (ref 13–44)
MAGNESIUM SERPL-MCNC: 2 MG/DL — SIGNIFICANT CHANGE UP (ref 1.6–2.6)
MCHC RBC-ENTMCNC: 27.4 PG — SIGNIFICANT CHANGE UP (ref 27–34)
MCHC RBC-ENTMCNC: 27.5 PG — SIGNIFICANT CHANGE UP (ref 27–34)
MCHC RBC-ENTMCNC: 27.5 PG — SIGNIFICANT CHANGE UP (ref 27–34)
MCHC RBC-ENTMCNC: 29 GM/DL — LOW (ref 32–36)
MCHC RBC-ENTMCNC: 29.9 GM/DL — LOW (ref 32–36)
MCHC RBC-ENTMCNC: 29.9 GM/DL — LOW (ref 32–36)
MCV RBC AUTO: 91.7 FL — SIGNIFICANT CHANGE UP (ref 80–100)
MCV RBC AUTO: 91.9 FL — SIGNIFICANT CHANGE UP (ref 80–100)
MCV RBC AUTO: 95.1 FL — SIGNIFICANT CHANGE UP (ref 80–100)
METHOD TYPE: SIGNIFICANT CHANGE UP
MONOCYTES # BLD AUTO: 1.28 K/UL — HIGH (ref 0–0.9)
MONOCYTES # BLD AUTO: 1.77 K/UL — HIGH (ref 0–0.9)
MONOCYTES NFR BLD AUTO: 4 % — SIGNIFICANT CHANGE UP (ref 2–14)
MONOCYTES NFR BLD AUTO: 5.2 % — SIGNIFICANT CHANGE UP (ref 2–14)
MRSA PCR RESULT.: SIGNIFICANT CHANGE UP
NEUTROPHILS # BLD AUTO: 27.82 K/UL — HIGH (ref 1.8–7.4)
NEUTROPHILS # BLD AUTO: 30.38 K/UL — HIGH (ref 1.8–7.4)
NEUTROPHILS NFR BLD AUTO: 86 % — HIGH (ref 43–77)
NEUTROPHILS NFR BLD AUTO: 89.6 % — HIGH (ref 43–77)
NITRITE UR-MCNC: POSITIVE
NRBC # BLD: 0 /100 WBCS — SIGNIFICANT CHANGE UP (ref 0–0)
NRBC # BLD: 0 /100 WBCS — SIGNIFICANT CHANGE UP (ref 0–0)
NRBC # BLD: SIGNIFICANT CHANGE UP /100 WBCS (ref 0–0)
NT-PROBNP SERPL-SCNC: HIGH PG/ML (ref 0–125)
PCO2 BLDA: 42 MMHG — SIGNIFICANT CHANGE UP (ref 35–48)
PCO2 BLDA: 48 MMHG — SIGNIFICANT CHANGE UP (ref 35–48)
PH BLDA: 6.93 — CRITICAL LOW (ref 7.35–7.45)
PH BLDA: <6.93 — CRITICAL LOW (ref 7.35–7.45)
PH UR: 6.5 — SIGNIFICANT CHANGE UP (ref 5–8)
PHOSPHATE SERPL-MCNC: 9 MG/DL — HIGH (ref 2.5–4.5)
PLATELET # BLD AUTO: 216 K/UL — SIGNIFICANT CHANGE UP (ref 150–400)
PLATELET # BLD AUTO: 235 K/UL — SIGNIFICANT CHANGE UP (ref 150–400)
PLATELET # BLD AUTO: 252 K/UL — SIGNIFICANT CHANGE UP (ref 150–400)
PO2 BLDA: 61 MMHG — LOW (ref 83–108)
PO2 BLDA: 62 MMHG — LOW (ref 83–108)
POTASSIUM SERPL-MCNC: 4.3 MMOL/L — SIGNIFICANT CHANGE UP (ref 3.5–5.3)
POTASSIUM SERPL-MCNC: 4.5 MMOL/L — SIGNIFICANT CHANGE UP (ref 3.5–5.3)
POTASSIUM SERPL-MCNC: 4.9 MMOL/L — SIGNIFICANT CHANGE UP (ref 3.5–5.3)
POTASSIUM SERPL-SCNC: 4.3 MMOL/L — SIGNIFICANT CHANGE UP (ref 3.5–5.3)
POTASSIUM SERPL-SCNC: 4.5 MMOL/L — SIGNIFICANT CHANGE UP (ref 3.5–5.3)
POTASSIUM SERPL-SCNC: 4.9 MMOL/L — SIGNIFICANT CHANGE UP (ref 3.5–5.3)
PROT SERPL-MCNC: 5.5 G/DL — LOW (ref 6–8.3)
PROT SERPL-MCNC: 6.1 G/DL — SIGNIFICANT CHANGE UP (ref 6–8.3)
PROT UR-MCNC: >=1000 MG/DL
PROTHROM AB SERPL-ACNC: 21.4 SEC — HIGH (ref 9.5–13)
PROTHROM AB SERPL-ACNC: 36.1 SEC — HIGH (ref 9.5–13)
PROTHROM AB SERPL-ACNC: > 200 SEC (ref 9.5–13)
RAPID RVP RESULT: SIGNIFICANT CHANGE UP
RBC # BLD: 3.2 M/UL — LOW (ref 4.2–5.8)
RBC # BLD: 3.45 M/UL — LOW (ref 4.2–5.8)
RBC # BLD: 3.61 M/UL — LOW (ref 4.2–5.8)
RBC # FLD: 14.6 % — HIGH (ref 10.3–14.5)
RBC # FLD: 14.6 % — HIGH (ref 10.3–14.5)
RBC # FLD: 14.8 % — HIGH (ref 10.3–14.5)
S AUREUS DNA NOSE QL NAA+PROBE: DETECTED
SAO2 % BLDA: 73.7 % — LOW (ref 94–98)
SAO2 % BLDA: 76.2 % — LOW (ref 94–98)
SARS-COV-2 RNA SPEC QL NAA+PROBE: SIGNIFICANT CHANGE UP
SODIUM SERPL-SCNC: 125 MMOL/L — LOW (ref 135–145)
SODIUM SERPL-SCNC: 132 MMOL/L — LOW (ref 135–145)
SODIUM SERPL-SCNC: 133 MMOL/L — LOW (ref 135–145)
SP GR SPEC: 1.02 — SIGNIFICANT CHANGE UP (ref 1–1.03)
SPECIMEN SOURCE: SIGNIFICANT CHANGE UP
SPECIMEN SOURCE: SIGNIFICANT CHANGE UP
T4 AB SER-ACNC: 3.3 UG/DL — LOW (ref 4.6–12)
TROPONIN I, HIGH SENSITIVITY RESULT: 196.2 NG/L — HIGH
TROPONIN I, HIGH SENSITIVITY RESULT: 419.4 NG/L — HIGH
TROPONIN I, HIGH SENSITIVITY RESULT: 821.5 NG/L — HIGH
TSH SERPL-MCNC: 2.41 UIU/ML — SIGNIFICANT CHANGE UP (ref 0.36–3.74)
UROBILINOGEN FLD QL: 0.2 MG/DL — SIGNIFICANT CHANGE UP (ref 0.2–1)
VANCOMYCIN FLD-MCNC: 11 UG/ML — SIGNIFICANT CHANGE UP (ref 10–25)
WBC # BLD: 31.98 K/UL — HIGH (ref 3.8–10.5)
WBC # BLD: 33.92 K/UL — HIGH (ref 3.8–10.5)
WBC # BLD: 35.24 K/UL — HIGH (ref 3.8–10.5)
WBC # FLD AUTO: 31.98 K/UL — HIGH (ref 3.8–10.5)
WBC # FLD AUTO: 33.92 K/UL — HIGH (ref 3.8–10.5)
WBC # FLD AUTO: 35.24 K/UL — HIGH (ref 3.8–10.5)

## 2024-01-01 PROCEDURE — 83880 ASSAY OF NATRIURETIC PEPTIDE: CPT

## 2024-01-01 PROCEDURE — 93010 ELECTROCARDIOGRAM REPORT: CPT | Mod: 76

## 2024-01-01 PROCEDURE — 83036 HEMOGLOBIN GLYCOSYLATED A1C: CPT

## 2024-01-01 PROCEDURE — 71045 X-RAY EXAM CHEST 1 VIEW: CPT | Mod: 26,77

## 2024-01-01 PROCEDURE — 92950 HEART/LUNG RESUSCITATION CPR: CPT

## 2024-01-01 PROCEDURE — 87641 MR-STAPH DNA AMP PROBE: CPT

## 2024-01-01 PROCEDURE — 71045 X-RAY EXAM CHEST 1 VIEW: CPT | Mod: 26

## 2024-01-01 PROCEDURE — 83605 ASSAY OF LACTIC ACID: CPT

## 2024-01-01 PROCEDURE — C1751: CPT

## 2024-01-01 PROCEDURE — 94003 VENT MGMT INPAT SUBQ DAY: CPT

## 2024-01-01 PROCEDURE — 94799 UNLISTED PULMONARY SVC/PX: CPT

## 2024-01-01 PROCEDURE — 87186 SC STD MICRODIL/AGAR DIL: CPT

## 2024-01-01 PROCEDURE — 85379 FIBRIN DEGRADATION QUANT: CPT

## 2024-01-01 PROCEDURE — 94002 VENT MGMT INPAT INIT DAY: CPT

## 2024-01-01 PROCEDURE — 80202 ASSAY OF VANCOMYCIN: CPT

## 2024-01-01 PROCEDURE — 84436 ASSAY OF TOTAL THYROXINE: CPT

## 2024-01-01 PROCEDURE — 85730 THROMBOPLASTIN TIME PARTIAL: CPT

## 2024-01-01 PROCEDURE — 36600 WITHDRAWAL OF ARTERIAL BLOOD: CPT

## 2024-01-01 PROCEDURE — 0225U NFCT DS DNA&RNA 21 SARSCOV2: CPT

## 2024-01-01 PROCEDURE — 71045 X-RAY EXAM CHEST 1 VIEW: CPT

## 2024-01-01 PROCEDURE — 84484 ASSAY OF TROPONIN QUANT: CPT

## 2024-01-01 PROCEDURE — 93005 ELECTROCARDIOGRAM TRACING: CPT

## 2024-01-01 PROCEDURE — 87040 BLOOD CULTURE FOR BACTERIA: CPT

## 2024-01-01 PROCEDURE — 80048 BASIC METABOLIC PNL TOTAL CA: CPT

## 2024-01-01 PROCEDURE — 99292 CRITICAL CARE ADDL 30 MIN: CPT

## 2024-01-01 PROCEDURE — 85384 FIBRINOGEN ACTIVITY: CPT

## 2024-01-01 PROCEDURE — 84443 ASSAY THYROID STIM HORMONE: CPT

## 2024-01-01 PROCEDURE — 87150 DNA/RNA AMPLIFIED PROBE: CPT

## 2024-01-01 PROCEDURE — 36556 INSERT NON-TUNNEL CV CATH: CPT

## 2024-01-01 PROCEDURE — 99291 CRITICAL CARE FIRST HOUR: CPT | Mod: 25

## 2024-01-01 PROCEDURE — 99223 1ST HOSP IP/OBS HIGH 75: CPT | Mod: GC

## 2024-01-01 PROCEDURE — 99291 CRITICAL CARE FIRST HOUR: CPT

## 2024-01-01 PROCEDURE — 81001 URINALYSIS AUTO W/SCOPE: CPT

## 2024-01-01 PROCEDURE — 84100 ASSAY OF PHOSPHORUS: CPT

## 2024-01-01 PROCEDURE — 87077 CULTURE AEROBIC IDENTIFY: CPT

## 2024-01-01 PROCEDURE — 96374 THER/PROPH/DIAG INJ IV PUSH: CPT

## 2024-01-01 PROCEDURE — 85027 COMPLETE CBC AUTOMATED: CPT

## 2024-01-01 PROCEDURE — 87086 URINE CULTURE/COLONY COUNT: CPT

## 2024-01-01 PROCEDURE — 85025 COMPLETE CBC W/AUTO DIFF WBC: CPT

## 2024-01-01 PROCEDURE — 85610 PROTHROMBIN TIME: CPT

## 2024-01-01 PROCEDURE — 36415 COLL VENOUS BLD VENIPUNCTURE: CPT

## 2024-01-01 PROCEDURE — 94760 N-INVAS EAR/PLS OXIMETRY 1: CPT

## 2024-01-01 PROCEDURE — 80053 COMPREHEN METABOLIC PANEL: CPT

## 2024-01-01 PROCEDURE — 87640 STAPH A DNA AMP PROBE: CPT

## 2024-01-01 PROCEDURE — 82962 GLUCOSE BLOOD TEST: CPT

## 2024-01-01 PROCEDURE — 96375 TX/PRO/DX INJ NEW DRUG ADDON: CPT | Mod: XU

## 2024-01-01 PROCEDURE — 83735 ASSAY OF MAGNESIUM: CPT

## 2024-01-01 PROCEDURE — 82803 BLOOD GASES ANY COMBINATION: CPT

## 2024-01-01 RX ORDER — PIPERACILLIN AND TAZOBACTAM 4; .5 G/20ML; G/20ML
3.38 INJECTION, POWDER, LYOPHILIZED, FOR SOLUTION INTRAVENOUS EVERY 12 HOURS
Refills: 0 | Status: DISCONTINUED | OUTPATIENT
Start: 2024-01-01 | End: 2024-01-01

## 2024-01-01 RX ORDER — GLUCAGON INJECTION, SOLUTION 0.5 MG/.1ML
1 INJECTION, SOLUTION SUBCUTANEOUS ONCE
Refills: 0 | Status: DISCONTINUED | OUTPATIENT
Start: 2024-01-01 | End: 2024-01-01

## 2024-01-01 RX ORDER — DEXTROSE 10 % IN WATER 10 %
125 INTRAVENOUS SOLUTION INTRAVENOUS ONCE
Refills: 0 | Status: DISCONTINUED | OUTPATIENT
Start: 2024-01-01 | End: 2024-01-01

## 2024-01-01 RX ORDER — ATORVASTATIN CALCIUM 80 MG/1
40 TABLET, FILM COATED ORAL AT BEDTIME
Refills: 0 | Status: DISCONTINUED | OUTPATIENT
Start: 2024-01-01 | End: 2024-01-01

## 2024-01-01 RX ORDER — ATORVASTATIN CALCIUM 80 MG/1
1 TABLET, FILM COATED ORAL
Refills: 0 | DISCHARGE

## 2024-01-01 RX ORDER — IPRATROPIUM/ALBUTEROL SULFATE 18-103MCG
3 AEROSOL WITH ADAPTER (GRAM) INHALATION ONCE
Refills: 0 | Status: DISCONTINUED | OUTPATIENT
Start: 2024-01-01 | End: 2024-01-01

## 2024-01-01 RX ORDER — CHLORHEXIDINE GLUCONATE 213 G/1000ML
15 SOLUTION TOPICAL EVERY 12 HOURS
Refills: 0 | Status: DISCONTINUED | OUTPATIENT
Start: 2024-01-01 | End: 2024-01-01

## 2024-01-01 RX ORDER — INSULIN ASPART 100 [IU]/ML
5 INJECTION, SOLUTION SUBCUTANEOUS
Qty: 0 | Refills: 0 | DISCHARGE

## 2024-01-01 RX ORDER — SPIRONOLACTONE 25 MG/1
1 TABLET, FILM COATED ORAL
Refills: 0 | DISCHARGE

## 2024-01-01 RX ORDER — LEVETIRACETAM 250 MG/1
1 TABLET, FILM COATED ORAL
Qty: 0 | Refills: 0 | DISCHARGE

## 2024-01-01 RX ORDER — FOLIC ACID 0.8 MG
1 TABLET ORAL
Refills: 0 | DISCHARGE

## 2024-01-01 RX ORDER — SODIUM CHLORIDE 9 MG/ML
1000 INJECTION INTRAMUSCULAR; INTRAVENOUS; SUBCUTANEOUS ONCE
Refills: 0 | Status: COMPLETED | OUTPATIENT
Start: 2024-01-01 | End: 2024-01-01

## 2024-01-01 RX ORDER — SODIUM CHLORIDE 9 MG/ML
1000 INJECTION, SOLUTION INTRAVENOUS
Refills: 0 | Status: DISCONTINUED | OUTPATIENT
Start: 2024-01-01 | End: 2024-01-01

## 2024-01-01 RX ORDER — SODIUM BICARBONATE 1 MEQ/ML
50 SYRINGE (ML) INTRAVENOUS
Refills: 0 | Status: COMPLETED | OUTPATIENT
Start: 2024-01-01 | End: 2024-01-01

## 2024-01-01 RX ORDER — NOREPINEPHRINE BITARTRATE/D5W 8 MG/250ML
3 PLASTIC BAG, INJECTION (ML) INTRAVENOUS
Qty: 16 | Refills: 0 | Status: DISCONTINUED | OUTPATIENT
Start: 2024-01-01 | End: 2024-01-01

## 2024-01-01 RX ORDER — PANTOPRAZOLE SODIUM 20 MG/1
1 TABLET, DELAYED RELEASE ORAL
Refills: 0 | DISCHARGE

## 2024-01-01 RX ORDER — DEXTROSE 50 % IN WATER 50 %
25 SYRINGE (ML) INTRAVENOUS ONCE
Refills: 0 | Status: DISCONTINUED | OUTPATIENT
Start: 2024-01-01 | End: 2024-01-01

## 2024-01-01 RX ORDER — EPINEPHRINE 0.3 MG/.3ML
1 INJECTION INTRAMUSCULAR; SUBCUTANEOUS ONCE
Refills: 0 | Status: COMPLETED | OUTPATIENT
Start: 2024-01-01 | End: 2024-01-01

## 2024-01-01 RX ORDER — INSULIN LISPRO 100/ML
VIAL (ML) SUBCUTANEOUS EVERY 6 HOURS
Refills: 0 | Status: DISCONTINUED | OUTPATIENT
Start: 2024-01-01 | End: 2024-01-01

## 2024-01-01 RX ORDER — VASOPRESSIN 20 [USP'U]/ML
0.04 INJECTION INTRAVENOUS
Qty: 40 | Refills: 0 | Status: DISCONTINUED | OUTPATIENT
Start: 2024-01-01 | End: 2024-01-01

## 2024-01-01 RX ORDER — PHENYLEPHRINE HYDROCHLORIDE 10 MG/ML
0.7 INJECTION INTRAVENOUS
Qty: 160 | Refills: 0 | Status: DISCONTINUED | OUTPATIENT
Start: 2024-01-01 | End: 2024-01-01

## 2024-01-01 RX ORDER — PANTOPRAZOLE SODIUM 20 MG/1
40 TABLET, DELAYED RELEASE ORAL DAILY
Refills: 0 | Status: DISCONTINUED | OUTPATIENT
Start: 2024-01-01 | End: 2024-01-01

## 2024-01-01 RX ORDER — CLOPIDOGREL BISULFATE 75 MG/1
1 TABLET, FILM COATED ORAL
Refills: 0 | DISCHARGE

## 2024-01-01 RX ORDER — CEFTRIAXONE 500 MG/1
1000 INJECTION, POWDER, FOR SOLUTION INTRAMUSCULAR; INTRAVENOUS ONCE
Refills: 0 | Status: DISCONTINUED | OUTPATIENT
Start: 2024-01-01 | End: 2024-01-01

## 2024-01-01 RX ORDER — ROCURONIUM BROMIDE 10 MG/ML
100 VIAL (ML) INTRAVENOUS ONCE
Refills: 0 | Status: COMPLETED | OUTPATIENT
Start: 2024-01-01 | End: 2024-01-01

## 2024-01-01 RX ORDER — PHYTONADIONE (VIT K1) 5 MG
10 TABLET ORAL ONCE
Refills: 0 | Status: COMPLETED | OUTPATIENT
Start: 2024-01-01 | End: 2024-01-01

## 2024-01-01 RX ORDER — WARFARIN SODIUM 2.5 MG/1
1 TABLET ORAL
Refills: 0 | DISCHARGE

## 2024-01-01 RX ORDER — INSULIN LISPRO 100/ML
VIAL (ML) SUBCUTANEOUS
Refills: 0 | Status: DISCONTINUED | OUTPATIENT
Start: 2024-01-01 | End: 2024-01-01

## 2024-01-01 RX ORDER — TAMSULOSIN HYDROCHLORIDE 0.4 MG/1
1 CAPSULE ORAL
Refills: 0 | DISCHARGE

## 2024-01-01 RX ORDER — DEXTROSE 50 % IN WATER 50 %
12.5 SYRINGE (ML) INTRAVENOUS ONCE
Refills: 0 | Status: DISCONTINUED | OUTPATIENT
Start: 2024-01-01 | End: 2024-01-01

## 2024-01-01 RX ORDER — FAMOTIDINE 10 MG/ML
1 INJECTION INTRAVENOUS
Refills: 0 | DISCHARGE

## 2024-01-01 RX ORDER — PROTHROMBIN COMPLEX CONCENTRATE (HUMAN) 25.5; 16.5; 24; 22; 22; 26 [IU]/ML; [IU]/ML; [IU]/ML; [IU]/ML; [IU]/ML; [IU]/ML
1500 POWDER, FOR SOLUTION INTRAVENOUS ONCE
Refills: 0 | Status: COMPLETED | OUTPATIENT
Start: 2024-01-01 | End: 2024-01-01

## 2024-01-01 RX ORDER — SODIUM BICARBONATE 1 MEQ/ML
0.18 SYRINGE (ML) INTRAVENOUS
Qty: 150 | Refills: 0 | Status: DISCONTINUED | OUTPATIENT
Start: 2024-01-01 | End: 2024-01-01

## 2024-01-01 RX ORDER — LEVETIRACETAM 250 MG/1
250 TABLET, FILM COATED ORAL EVERY 12 HOURS
Refills: 0 | Status: DISCONTINUED | OUTPATIENT
Start: 2024-01-01 | End: 2024-01-01

## 2024-01-01 RX ORDER — FUROSEMIDE 40 MG
1 TABLET ORAL
Qty: 0 | Refills: 0 | DISCHARGE

## 2024-01-01 RX ORDER — PIPERACILLIN AND TAZOBACTAM 4; .5 G/20ML; G/20ML
3.38 INJECTION, POWDER, LYOPHILIZED, FOR SOLUTION INTRAVENOUS ONCE
Refills: 0 | Status: COMPLETED | OUTPATIENT
Start: 2024-01-01 | End: 2024-01-01

## 2024-01-01 RX ORDER — VANCOMYCIN HCL 1 G
1000 VIAL (EA) INTRAVENOUS ONCE
Refills: 0 | Status: COMPLETED | OUTPATIENT
Start: 2024-01-01 | End: 2024-01-01

## 2024-01-01 RX ORDER — EPINEPHRINE 0.3 MG/.3ML
0.05 INJECTION INTRAMUSCULAR; SUBCUTANEOUS
Qty: 8 | Refills: 0 | Status: DISCONTINUED | OUTPATIENT
Start: 2024-01-01 | End: 2024-01-01

## 2024-01-01 RX ORDER — DEXTROSE 50 % IN WATER 50 %
15 SYRINGE (ML) INTRAVENOUS ONCE
Refills: 0 | Status: DISCONTINUED | OUTPATIENT
Start: 2024-01-01 | End: 2024-01-01

## 2024-01-01 RX ORDER — FUROSEMIDE 40 MG
1 TABLET ORAL
Refills: 0 | DISCHARGE

## 2024-01-01 RX ORDER — SODIUM BICARBONATE 1 MEQ/ML
0.08 SYRINGE (ML) INTRAVENOUS
Qty: 50 | Refills: 0 | Status: DISCONTINUED | OUTPATIENT
Start: 2024-01-01 | End: 2024-01-01

## 2024-01-01 RX ORDER — AZITHROMYCIN 500 MG/1
500 TABLET, FILM COATED ORAL ONCE
Refills: 0 | Status: DISCONTINUED | OUTPATIENT
Start: 2024-01-01 | End: 2024-01-01

## 2024-01-01 RX ORDER — METOPROLOL TARTRATE 50 MG
1 TABLET ORAL
Refills: 0 | DISCHARGE

## 2024-01-01 RX ORDER — METFORMIN HYDROCHLORIDE 850 MG/1
1 TABLET ORAL
Qty: 0 | Refills: 0 | DISCHARGE

## 2024-01-01 RX ORDER — KETAMINE HYDROCHLORIDE 100 MG/ML
120 INJECTION INTRAMUSCULAR; INTRAVENOUS ONCE
Refills: 0 | Status: DISCONTINUED | OUTPATIENT
Start: 2024-01-01 | End: 2024-01-01

## 2024-01-01 RX ADMIN — PANTOPRAZOLE SODIUM 40 MILLIGRAM(S): 20 TABLET, DELAYED RELEASE ORAL at 22:16

## 2024-01-01 RX ADMIN — Medication 179 MICROGRAM(S)/KG/MIN: at 00:28

## 2024-01-01 RX ADMIN — Medication 102 MILLIGRAM(S): at 21:41

## 2024-01-01 RX ADMIN — CHLORHEXIDINE GLUCONATE 15 MILLILITER(S): 213 SOLUTION TOPICAL at 05:12

## 2024-01-01 RX ADMIN — Medication 179 MICROGRAM(S)/KG/MIN: at 01:51

## 2024-01-01 RX ADMIN — Medication 50 MILLIEQUIVALENT(S): at 20:48

## 2024-01-01 RX ADMIN — SODIUM CHLORIDE 1000 MILLILITER(S): 9 INJECTION INTRAMUSCULAR; INTRAVENOUS; SUBCUTANEOUS at 20:53

## 2024-01-01 RX ADMIN — Medication 179 MICROGRAM(S)/KG/MIN: at 05:26

## 2024-01-01 RX ADMIN — PHENYLEPHRINE HYDROCHLORIDE 8.33 MICROGRAM(S)/KG/MIN: 10 INJECTION INTRAVENOUS at 21:08

## 2024-01-01 RX ADMIN — PHENYLEPHRINE HYDROCHLORIDE 8.33 MICROGRAM(S)/KG/MIN: 10 INJECTION INTRAVENOUS at 23:53

## 2024-01-01 RX ADMIN — Medication 179 MICROGRAM(S)/KG/MIN: at 23:11

## 2024-01-01 RX ADMIN — Medication 179 MICROGRAM(S)/KG/MIN: at 06:08

## 2024-01-01 RX ADMIN — PHENYLEPHRINE HYDROCHLORIDE 8.33 MICROGRAM(S)/KG/MIN: 10 INJECTION INTRAVENOUS at 04:10

## 2024-01-01 RX ADMIN — EPINEPHRINE 5.95 MICROGRAM(S)/KG/MIN: 0.3 INJECTION INTRAMUSCULAR; SUBCUTANEOUS at 20:54

## 2024-01-01 RX ADMIN — Medication 179 MICROGRAM(S)/KG/MIN: at 06:49

## 2024-01-01 RX ADMIN — EPINEPHRINE 5.95 MICROGRAM(S)/KG/MIN: 0.3 INJECTION INTRAMUSCULAR; SUBCUTANEOUS at 04:33

## 2024-01-01 RX ADMIN — PIPERACILLIN AND TAZOBACTAM 200 GRAM(S): 4; .5 INJECTION, POWDER, LYOPHILIZED, FOR SOLUTION INTRAVENOUS at 21:10

## 2024-01-01 RX ADMIN — Medication 179 MICROGRAM(S)/KG/MIN: at 03:17

## 2024-01-01 RX ADMIN — Medication 179 MICROGRAM(S)/KG/MIN: at 21:28

## 2024-01-01 RX ADMIN — Medication 179 MICROGRAM(S)/KG/MIN: at 22:30

## 2024-01-01 RX ADMIN — Medication 179 MICROGRAM(S)/KG/MIN: at 04:41

## 2024-01-01 RX ADMIN — PROTHROMBIN COMPLEX CONCENTRATE (HUMAN) 400 INTERNATIONAL UNIT(S): 25.5; 16.5; 24; 22; 22; 26 POWDER, FOR SOLUTION INTRAVENOUS at 21:41

## 2024-01-01 RX ADMIN — PIPERACILLIN AND TAZOBACTAM 25 GRAM(S): 4; .5 INJECTION, POWDER, LYOPHILIZED, FOR SOLUTION INTRAVENOUS at 05:13

## 2024-01-01 RX ADMIN — VASOPRESSIN 6 UNIT(S)/MIN: 20 INJECTION INTRAVENOUS at 03:58

## 2024-01-01 RX ADMIN — Medication 250 MILLIGRAM(S): at 21:26

## 2024-01-01 RX ADMIN — Medication 179 MICROGRAM(S)/KG/MIN: at 02:33

## 2024-01-01 RX ADMIN — Medication 50 MILLIEQUIVALENT(S): at 21:58

## 2024-01-01 RX ADMIN — LEVETIRACETAM 250 MILLIGRAM(S): 250 TABLET, FILM COATED ORAL at 05:13

## 2024-01-01 RX ADMIN — EPINEPHRINE 1 MILLIGRAM(S): 0.3 INJECTION INTRAMUSCULAR; SUBCUTANEOUS at 20:30

## 2024-01-01 RX ADMIN — Medication 179 MICROGRAM(S)/KG/MIN: at 03:58

## 2024-01-01 RX ADMIN — Medication 179 MICROGRAM(S)/KG/MIN: at 21:09

## 2024-01-01 RX ADMIN — KETAMINE HYDROCHLORIDE 120 MILLIGRAM(S): 100 INJECTION INTRAMUSCULAR; INTRAVENOUS at 20:30

## 2024-01-01 RX ADMIN — Medication 179 MICROGRAM(S)/KG/MIN: at 01:07

## 2024-01-01 RX ADMIN — Medication 179 MICROGRAM(S)/KG/MIN: at 23:52

## 2024-01-01 RX ADMIN — Medication 75 MEQ/KG/HR: at 03:34

## 2024-01-01 RX ADMIN — Medication 100 MILLIGRAM(S): at 20:30

## 2024-01-01 RX ADMIN — Medication 33.4 MEQ/KG/HR: at 20:55

## 2024-01-01 NOTE — ED ADULT NURSE NOTE - NS ED NOTE ABUSE RESPONSE YN
Please return in 1 month for your 2nd flu and covid shots    Unable to assess due to medical condition

## 2024-01-23 NOTE — ED ADULT NURSE NOTE - NURSING GU BLADDER
I cannot change them to STAT - they are already running and should result today.   
Onset: Weeks      Location / description: Seen in office yesterday for neck pain and muscle issues. Muscle issues her legs are weak. Has stabbing pains in her buttock. Feels her brain is slow. States the pain is worse in some areas and changes from day to day. Her shoulders seem to hurt worse today. She stopped exercising but stretching hurts now too. She just tries to stay very still. Pt states she can't sleep on her side any more. And is worried she will not be able to get out of bed tomorrow.   Precipitating Factors: Unsure   Pain Scale (1-10), 10 highest: 10/10 Not constant but when moving certain ways. Right now standing still she doesn't feel anything. Than she has a headache. 8/10 Headache.   What improves/worsens symptoms: Movement.   Symptom specific medications: Taking advil. Will be starting to take it daily. Just took it around 8:30 this morning so has not currently kicked in yet.    LMP : Patient's last menstrual period was 2023 (approximate).  Are you pregnant or breast feedin year old   Recent visits (last 3-4 weeks) for same reason or recent surgery:  Was seen in office yesterday.     PLAN:  Pt would like her labs to be changed to result sooner. Stat if able. Patient is worried she will not be able to get out of bed tomorrow. She is also complaining of a new headache. Please advise further if able.   Provider's office  See Provider within next few days    Unsure if patient/caller will follow recommendations.  Reason for Disposition   MODERATE pain (e.g., interferes with normal activities) and present > 3 days    Protocols used: Muscle Aches and Body Pain-A-[place]    
Regarding: wi 59yo f worsen neck pain not able to move neck 9-1l last ofv yesterday, new diarrhea, headache 8  ----- Message from Patricia Horner sent at 1/23/2024  8:40 AM CST -----  Patient Name: Lauren Mackey    Specialist or PCP Name: Karen Patterson PA    Symptoms: wi 59yo f worsen neck pain not able to move neck 9-1l last ofv yesterday, new diarrhea, headache 8    Pregnant (females aged 13-60. If Yes, how long?) : no    Call Back # : [phone]    Which State are you currently located in?: [place]    Name of Clinic Site / Acct# : [address] [place] [address]    Use following scripting for patients waiting for a callback:   \"Nurse callback times vary based on call volumes; please be aware the return phone call may come from an unidentified or out of state phone number. If your symptoms worsen or become life threatening while waiting, you should seek immediate assistance by calling 911 or going to the ER for evaluation.\"    
non-tender

## 2024-04-05 NOTE — PROGRESS NOTE ADULT - PROVIDER SPECIALTY LIST ADULT
says we can use abnormal mammogram for the dx, and the ultrasound only needs to be the left breast.   
----- Message from Jennifer Espinosa sent at 4/5/2024  8:55 AM EDT -----  Regarding: Abnormal mammogram results  Contact: [phone]  Good morning. I had a mammogram 3/29/24 that came back with abnormal results. I was asked to request you please write orders for bilateral diagnostic and bilateral ultrasound pictures to follow up. These orders can be faxed to [facility] at [phone]. Thank you.     I can be reached at [phone] with any questions.     Jennifer  
Cardiology
Impression    Findings indicate additional imaging studies of the left breast are  required for a complete evaluation.    BI-RADS CATEGORY:  Overall: 0 - Incomplete: Needs Additional Imaging Evaluation    RECOMMENDATION:       - Additional Imaging Diagnostic Mammogram with Possible Ultrasound.    Patient Lifetime Risk Score of Breast Malignancy:  10.9 %  This risk assessment is calculated using the Gely Risk Assessment model  which may underestimate the lifetime risk of breast malignancy.    COMMUNICATION:  Computer-aided detection (CAD) and tomosynthesis were utilized by the  radiologist in the interpretation of this examination. The results and  recommendations will be sent to the patient in a printed lay language  version of the imaging report.    Narrative    EXAM:  Mammography Breast Screening with Tomosynthesis    REASON FOR EXAM:  Screening Mammogram    HISTORY:  Patient is 44 y.o.  Hormone history includes iud.      COMPARISON STUDIES:  No comparisons were made when reading this study.    BREAST COMPOSITION:  The breasts are heterogeneously dense, which may obscure small masses.    FINDINGS:  RIGHT: No suspicious masses, microcalcifications or areas of architectural  distortion are seen.    LEFT: Finding 1, possible asymmetry, 30mm in the medial region of the  breast with a middle depth 7 cm from the nipple.   Referenced Leonardo  slice(s) CC#15  
Infectious Disease
Infectious Disease
Order placed as requested, please let me know if this needs any further adjustment
Pulmonology
Pulmonology
Senait can you please check with mammography [facility] and see exactly what we need to order for this to be ordered correctly.
They are needing a diagnostic mammogram bilateral and a breast ultrasound bilateral.   
Cardiology
Hospitalist
Hospitalist
Infectious Disease
Nephrology
Pulmonology
Pulmonology
Gastroenterology
Hospitalist
Nephrology
Pulmonology
Gastroenterology
Hospitalist
Hospitalist
Infectious Disease
Infectious Disease
Nephrology
Pulmonology
Endocrinology
Nephrology

## 2024-04-09 NOTE — DISCHARGE NOTE ADULT - NS MD DC FALL RISK RISK
error  
For information on Fall & Injury Prevention, visit [email or web address]

## 2024-05-17 NOTE — H&P ADULT - PROBLEM SELECTOR PLAN 12
- Hold home Protonix 40mg bid and Famotidine 20mg qhs    #BPH: hold home Flomax    #DVT ppx: hold pharmacologic AC given coagulopathy

## 2024-05-17 NOTE — PATIENT PROFILE ADULT - FALL HARM RISK - HARM RISK INTERVENTIONS
Assistance with ambulation/Assistance OOB with selected safe patient handling equipment/Communicate Risk of Fall with Harm to all staff/Discuss with provider need for PT consult/Monitor gait and stability/Reinforce activity limits and safety measures with patient and family/Review medications for side effects contributing to fall risk/Sit up slowly, dangle for a short time, stand at bedside before walking/Tailored Fall Risk Interventions/Toileting schedule using arm’s reach rule for commode and bathroom/Visual Cue: Yellow wristband and red socks/Bed in lowest position, wheels locked, appropriate side rails in place/Call bell, personal items and telephone in reach/Instruct patient to call for assistance before getting out of bed or chair/Non-slip footwear when patient is out of bed/[place] to call system/Physically safe environment - no spills, clutter or unnecessary equipment/Purposeful Proactive Rounding/Room/bathroom lighting operational, light cord in reach

## 2024-05-17 NOTE — H&P ADULT - PROBLEM SELECTOR PLAN 6
firm, mobile, non-tender, and of normal              size. PAP sample was obtained. Health Maintenance   Topic Date Due    HIV screen  05/25/2009    Chlamydia screen  05/25/2010    DTaP/Tdap/Td vaccine (1 - Tdap) 05/25/2013    Cervical cancer screen  05/25/2015    Flu vaccine (1) 09/01/2017          Assessment/Plan:   1. Well woman exam with routine gynecological exam  Number GYN exam Pap smear sent. - PAP SMEAR    2. Elevated blood pressure reading without diagnosis of hypertension  Advised about low sodium diet and lifestyle changes  Follow-up in 3 months  If still the blood pressure is elevated then we would consider discontinuing the birth control pills.        Follow up in 3 months     Shannon Mcfarland  11/17/2017 9:47 AM
- Continue home Keppra 750mg BID

## 2024-05-17 NOTE — PROCEDURE NOTE - ADDITIONAL PROCEDURE DETAILS
Date of entry of this note is equal to the date of services rendered.  Procedure performed independent of critical care time.    Cardiac arrest  Acute hypoxic respiratory failure  Shock  KHALIF  AGMA

## 2024-05-17 NOTE — H&P ADULT - ATTENDING COMMENTS
Patient seen and observed at bedside. Patient presented as a cardiac arrest in asystole. Code blue was called and rosc was achieved in ED. However, pt remained unstable and arrested multiple times. Finally was able to be placed on pressor support and admitted to ICU for management. Recommend empiric coverage with zosyn and management of hypotension by ICU team. poor Prognosis however patient currently remains full code.

## 2024-05-17 NOTE — ED ADULT TRIAGE NOTE - GLASGOW COMA SCALE: EYE OPENING, MLM
pt is a 24y female c/o burning cp, sore throat, burping and cough.   no relief with tea per pt.  also reports constant pressure in chest.   denies vomiting, sob, congestion, vaginal bleeding, swelling, headache, lightheadedness, radiation of pain, nasal congestion.  no s/s acute distress noted, pt is AOX4, ambulatory.  currently approx 28 wks pregnant.
(E4) spontaneous

## 2024-05-17 NOTE — ED PROVIDER NOTE - OBJECTIVE STATEMENT
Patient with a past medical history of COPD, hypertension, hyperlipidemia, diabetes, recent admission for GI bleed patient with a past medical history of prior stroke with residual right-sided weakness, cardiac bypass, A-fib on warfarin, diabetes is presenting with shortness of breath and nausea, history obtained from patient's daughter at bedside.  Symptoms started yesterday but worsened today.  No reported fevers at home.  Patient has been more fatigued compared to his baseline.  Was seen by hematologist, reportedly had normal lab work.  Also was diagnosed with a UTI earlier today by urologist.

## 2024-05-17 NOTE — H&P ADULT - PROBLEM SELECTOR PLAN 1
- Pt arrested and resuscitated multiple times in ED and in ICU  - Started on vasopressors, maintain MAP >65  - Plan per ICU

## 2024-05-17 NOTE — AIRWAY PLACEMENT NOTE ADULT - AIRWAY COMMENTS:
Pt coded in ER, intubated pt with Glidescope 7.5 ETT. Secured tube at 23cm@ lip. Positive color change on end tidal co2. bilateral b/s present. CXR pending

## 2024-05-17 NOTE — ED PROVIDER NOTE - CLINICAL SUMMARY MEDICAL DECISION MAKING FREE TEXT BOX
Patient with shortness of breath, tachycardic, concern for infection including pneumonia.  Patient however is not hypoxic here, he is 94% on room air.  Patient's EKG per my independent rotation showed rate of 80, atrial fibrillation, concern for ST elevations in aVR and V1 with diffuse ST depressions.  ECG was shown to Dr. Jefferson at 1817, no STEMI at this time but recommending aggressive medical management.  Will also treat for infection at this time with concern for pneumonia.

## 2024-05-17 NOTE — H&P ADULT - ASSESSMENT
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

## 2024-05-17 NOTE — ED PROVIDER NOTE - CARE PLAN
1 Principal Discharge DX:	Cardiac arrest  Secondary Diagnosis:	KHALIF (acute kidney injury)  Secondary Diagnosis:	Sepsis with hypotension

## 2024-05-17 NOTE — H&P ADULT - PROBLEM SELECTOR PLAN 3
- On home Coumadin 3mg on Mon, Tues, Wed   - On home Coumadin 2.5mg on Thurs, Fri, Sat, Sun  - INR >15 on admission  - Given Kcentra and Vit K in ICU  - Hold pharmacologic AC

## 2024-05-17 NOTE — H&P ADULT - HISTORY OF PRESENT ILLNESS
CHARTING IN PROGRESS    69yo M with PMHx of       ED course:  Vitals:  Labs:  EKG:   CXR: Left lower zone moderate pleural effusion and/or lower zone airspace   consolidation.  Given   69 yo M with PMHx of COPD, hypertension, hyperlipidemia, diabetes,  recent admission for GI bleed stroke with residual right-sided weakness, cardiac bypass, A-fib on warfarin, diabetes      ED course:  Vitals:  Labs:  EKG:   CXR: Left lower zone moderate pleural effusion and/or lower zone airspace   consolidation.  Given   67 yo M with PMHx of COPD, Afib (on Warfarin), Hemorrhagic CVA s/p craniotomy with residual R hemiparesis, Hypertension, Hyperlipidemia, T2DM, CAD s/p CABG, recent admission for GI bleed presented to ED c/o shortness of breath and nausea. Pt saw his outpatient Urologist today and was dx with UTI, prescribed abx. He experienced worsening fatigue and shortness of breath today and was brought to ED for evaluation. Upon presentation to ED, pt arrested and was resuscitated. He then arrested ~3-4 more times in the ED and was resuscitated. He was admitted to ICU and upon arrival to ICU he arrested once again and was resuscitated     ED course:  Vitals:  Labs:  EKG:   CXR: Left lower zone moderate pleural effusion and/or lower zone airspace   consolidation.  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 History is obtained by daughter at beside as pt is unable to provide 2/2 clinical condition. Pt was experiencing generalized fatigue and mild shortness of breath that began yesterday evening. This morning, he saw his outpatient Urologist and was dx with UTI. He was prescribed Cefpodoxime 200mg BID and he took one dose this morning. Around 4pm, pt began experiencing worsening shortness of breath for which he was brought to ED for evaluation. Upon presentation to ED, pt arrested and was intubated and resuscitated. He then arrested ~3-4 more times in the ED and was resuscitated. He was admitted to ICU and upon arrival to ICU he arrested once again and was resuscitated.     ED course:  Vitals: , RR 18, SpO2 95% on 4L NC  Labs: WBC 31.98, RBC 3.2, Hgb 8.8, Hct 29.4, PTT 99.2, PT >200, INR >15, Na 125, Cl 89, CO2 9, anion gap 27, BUN 78, Cr 5.1, Alk phos 440, AST 41, GFR 12, Trops 196.2, pro-BNP 52399  EKG: Afib 80 bpm, ST and T wave abnormalities  CXR: Left lower zone moderate pleural effusion and/or lower zone airspace consolidation.  Given: 1L IV NS bolus x1, IV Zosyn x1

## 2024-05-17 NOTE — AIRWAY PLACEMENT NOTE ADULT - INDICATIONS:
Shift summary, Pt A&O x 4, 3 BM soft, loose & gassy with very minimal pain, refused walking due to weakness for not eating solid food, complain of dryness of throat, offered Perox-A-Mint & ice chips. 5  Pt feels  Nausated, Zofran given. 1640  Pt pain=7 is controlled by Percocet 5-325 mg,1T, PO, bring down to Pain=3.  1730  Pt complain of hunger and weakness, applesauce and ice cream given and satisfy her.     Patient Vitals for the past 12 hrs:   Temp Pulse Resp BP SpO2   04/05/18 1927 98.1 °F (36.7 °C) 72 16 127/64 98 %   04/05/18 1554 99 °F (37.2 °C) 66 16 115/63 96 %   04/05/18 1141 98 °F (36.7 °C) 65 16 134/66 100 %   04/05/18 0817 98.8 °F (37.1 °C) 61 16 110/56 98 % CODE

## 2024-05-17 NOTE — H&P ADULT - PROBLEM SELECTOR PLAN 7
- Hold home Metformin 500mg bid  - Start low dose ISS  - Accuchecks qac&qhs  - Hypoglycemia protocol

## 2024-05-17 NOTE — PATIENT PROFILE ADULT - FUNCTIONAL ASSESSMENT - BASIC MOBILITY 6.
1-calculated by average/Not able to assess (calculate score using [facility] averaging method)

## 2024-05-17 NOTE — PHARMACOTHERAPY INTERVENTION NOTE - COMMENTS
67 yo male with complicated PMhx including prior stroke, cardiac bypass, AFib on warfarin, diabetes, presenting with shortness of breath. While in ER, patient became bradycardic and hypotensive and underwent cardiac arrest with multiple rounds in and out of ROSC. Assisted ED team to dose and prepare rocuronium 100 mg and ketamine 120 mg for RSI during ROSC. Orders entered per discussion with Dr. Capellan.   69 yo male with complicated PMhx including prior stroke, cardiac bypass, AFib on warfarin, diabetes, presenting with shortness of breath. While in ER, patient became bradycardic and hypotensive and underwent cardiac arrest with multiple rounds in and out of ROSC. Assisted ED team to dose and prepare ketamine 120 mg and rocuronium 100 mg for RSI during ROSC. Orders entered per discussion with Dr. Capellan.

## 2024-05-17 NOTE — ED ADULT NURSE NOTE - NSFALLRISKINTERV_ED_ALL_ED
Assistance OOB with selected safe patient handling equipment if applicable/Assistance with ambulation/Communicate fall risk and risk factors to all staff, patient, and family/Provide patient with walking aids/Provide visual cue: yellow wristband, yellow gown, etc/Reinforce activity limits and safety measures with patient and family/Call bell, personal items and telephone in reach/Instruct patient to call for assistance before getting out of bed/chair/stretcher/Non-slip footwear applied when patient is off stretcher/[place] to call system/Physically safe environment - no spills, clutter or unnecessary equipment/Purposeful Proactive Rounding/Room/bathroom lighting operational, light cord in reach

## 2024-05-17 NOTE — ED PROVIDER NOTE - PHYSICAL EXAMINATION
Constitutional: Awake, Alert, chronically ill appearing  HEAD: left hemicraniectomy  ENT: External ears normal. No rhinorrhea, no tracheal deviation   NECK: Supple, non-tender  CARDIOVASCULAR: irregular rate and rhythm.  RESPIRATORY: increased respiratory effort; breath sounds are diminished b/l.  ABDOMEN: Soft; non-tender, non-distended.  MSK:  no lower extremity edema, no deformities  SKIN: Warm, dry  NEURO: A&O x1. right paraplegic

## 2024-05-17 NOTE — ED ADULT NURSE NOTE - OBJECTIVE STATEMENT
received pt in room 6A, 68 yr/o male A+OX0. pt was brought to ED by EMS for C/O SOB. as per daughter at bedside pt began to have a cough this morning. Upon RN assessment pt became restless then went into Afib and began to darinel into the 40s. pt then became unresponsive and pulses CPR initiated. code documented in flow sheet.

## 2024-05-17 NOTE — CONSULT NOTE ADULT - ASSESSMENT
68 year old male with a PMH of hemorrhagic CVA s/p L craniotomy w/ residual R hemiparesis and aphasia, seizure disorder, HTN, HLD, CAD s/p CABG, DM2, afib, mechanical AV replacement on Coumadin who presented to the ED with complaints of SOB and fatigue.    Problem list:  Cardiac arrest  Acute hypoxic respiratory failure  Shock  KHALIF  AGMA  ?DIC    Neuro: Not currently requiring sedation.  Unable to obtain CT brain as patient too unstable.  If he stabilizes, will need CT for neuroprognostication and to r/o CVA.   CV: Now on levo, epi, and mitesh gtts.  Actively titrating for MAP >65.  EKG on arrival with concern for ischemic changes however patient deemed not a STEMI by cardio.  Will trend trops and get serial EKGs.  Check echo.  No heparin gtt given coagulopathy.   Pulm: Full ventilator support.  Post intubation ABG and CXR pending.  Will titrate vent settings accordingly.   GI: NPO.  Protonix for stress ulcer prophylaxis.    Renal: Bicarb drip added for metabolic acidosis.  Uncertain etiology of acute renal failure.  Unable to obtain CT abd/pel presently.  Cr was 1.4 in October 2023.  Place nesbitt.  Monitor renal function and UOP.  Replete electrolytes prn.   Heme: On Coumadin as an outpatient.  INR >15.  Will check D dimer and fibrinogen levels.  Will give Kcentra and Vitamin K as unable to obtain any imaging to r/o active bleeding.  No chemical DVT prophylaxis for now given coagulapathy.   ID: Possible pna on CXR.  Also diagnosed with a UTI as an outpatient.  Will initiate empiric abx coverage with zosyn and vanc by level.  Check UA, ucx, urine legionella/strep pna ag, MRSA swab.  F/u blood cx sent.   Endocrine: ISS q6 hrs while NPO.  Keep euglycemic.     Dispo: Admit to ICU.   Family at bedside and witnessed 2 arrests.  Explained to them that patient is gravely ill and unlikely to survive the night.  They stated that they would like us to continue to do everything possible.  He remains full code.      Case discussed w/ E-ICU attending Dr. Bob.     100 minutes of critical care time spent assessing presenting problems of acute illness, which pose high probability of life threatening deterioration or end organ damage/dysfunction, as well as medical decision making including initiating plan of care, reviewing data, reviewing radiologic exams, discussing with multidisciplinary team,  discussing goals of care with patient/family, and writing this note.  Non-inclusive of procedures performed.  Date of entry of this note is equal to the date of services rendered.      68 year old male with a PMH of hemorrhagic CVA s/p L craniotomy w/ residual R hemiparesis and aphasia, seizure disorder, HTN, HLD, CAD s/p CABG, DM2, afib, mechanical AV replacement on Coumadin who presented to the ED with complaints of SOB and fatigue.    Problem list:  Cardiac arrest  Acute hypoxic respiratory failure  Shock  KHALIF  AGMA  ?DIC    Neuro: Not currently requiring sedation.  Unable to obtain CT brain as patient too unstable.  If he stabilizes, will need CT for neuroprognostication and to r/o CVA.  Hx seizure disorder on Keppra as an outpatient.  Will continue with this and adjust dose for renal impairment.   CV: Now on levo, epi, and mitesh gtts.  Actively titrating for MAP >65.  EKG on arrival with concern for ischemic changes however patient deemed not a STEMI by cardio.  Will trend trops and get serial EKGs.  Check echo.  No heparin gtt given coagulopathy.   Pulm: Full ventilator support.  Post intubation ABG and CXR pending.  Will titrate vent settings accordingly.   GI: NPO.  Protonix for stress ulcer prophylaxis.    Renal: Bicarb drip added for metabolic acidosis.  Uncertain etiology of acute renal failure.  Unable to obtain CT abd/pel presently.  Cr was 1.4 in October 2023.  Place nesbitt.  Monitor renal function and UOP.  Replete electrolytes prn.   Heme: On Coumadin as an outpatient.  INR >15.  Will check D dimer and fibrinogen levels.  Will give Kcentra and Vitamin K as unable to obtain any imaging to r/o active bleeding.  No chemical DVT prophylaxis for now given coagulapathy.   ID: Possible pna on CXR.  Also diagnosed with a UTI as an outpatient.  Will initiate empiric abx coverage with zosyn and vanc by level.  Check UA, ucx, urine legionella/strep pna ag, MRSA swab.  F/u blood cx sent.   Endocrine: ISS q6 hrs while NPO.  Keep euglycemic.     Dispo: Admit to ICU.   Family at bedside and witnessed 2 arrests.  Explained to them that patient is gravely ill and unlikely to survive the night.  They stated that they would like us to continue to do everything possible.  He remains full code.      Case discussed w/ E-ICU attending Dr. Bob.     100 minutes of critical care time spent assessing presenting problems of acute illness, which pose high probability of life threatening deterioration or end organ damage/dysfunction, as well as medical decision making including initiating plan of care, reviewing data, reviewing radiologic exams, discussing with multidisciplinary team,  discussing goals of care with patient/family, and writing this note.  Non-inclusive of procedures performed.  Date of entry of this note is equal to the date of services rendered.      68 year old male with a PMH of hemorrhagic CVA s/p L craniotomy w/ residual R hemiparesis and aphasia, seizure disorder, HTN, HLD, CAD s/p CABG, DM2, afib, mechanical AV replacement on Coumadin who presented to the ED with complaints of SOB and fatigue.    Problem list:  Cardiac arrest  Acute hypoxic respiratory failure  Shock  KHALIF  AGMA  ?DIC    Neuro: Not currently requiring sedation.  Unable to obtain CT brain as patient too unstable.  If he stabilizes, will need CT for neuroprognostication and to r/o CVA.  Hx seizure disorder on Keppra as an outpatient.  Will continue with this and adjust dose for renal impairment.   CV: Now on levo, epi, and mitesh gtts.  Actively titrating for MAP >65.  EKG on arrival with concern for ischemic changes however patient deemed not a STEMI by cardio.  Will trend trops and get serial EKGs.  Check echo.  No heparin gtt given coagulopathy.   Pulm: Full ventilator support.  Post intubation ABG and CXR pending.  Will titrate vent settings accordingly.   GI: NPO.  Protonix for stress ulcer prophylaxis.      Renal: Bicarb drip added for metabolic acidosis.  Uncertain etiology of acute renal failure.  Unable to obtain CT abd/pel presently.  Cr was 1.4 in October 2023.  Place nesbitt.  Monitor renal function and UOP.  Replete electrolytes prn.   Heme: On Coumadin as an outpatient.  INR >15.  Will check D dimer and fibrinogen levels.  Will give Kcentra and Vitamin K as unable to obtain any imaging to r/o active bleeding.  No chemical DVT prophylaxis for now given coagulapathy.   ID: Possible pna on CXR.  Also diagnosed with a UTI as an outpatient.  Will initiate empiric abx coverage with zosyn and vanc by level.  Check UA, ucx, urine legionella/strep pna ag, MRSA swab.  F/u blood cx sent.   Endocrine: ISS q6 hrs while NPO.  Keep euglycemic.     Dispo: Admit to ICU.   Family at bedside and witnessed 2 of the patient's arrests.  Explained to them that patient is gravely ill and unlikely to survive this.  They stated that they would like us to continue to do everything possible.  He remains full code.      Case discussed w/ E-ICU attending Dr. Bob.     100 minutes of critical care time spent assessing presenting problems of acute illness, which pose high probability of life threatening deterioration or end organ damage/dysfunction, as well as medical decision making including initiating plan of care, reviewing data, reviewing radiologic exams, discussing with multidisciplinary team,  discussing goals of care with patient/family, and writing this note.  Non-inclusive of procedures performed.  Date of entry of this note is equal to the date of services rendered.

## 2024-05-17 NOTE — H&P ADULT - PROBLEM SELECTOR PLAN 4
- CXR: Left lower zone moderate pleural effusion and/or lower zone airspace consolidation.  - Given 1L IV NS bolus x1, IV Zosyn x1 in ED  - Continue IV Zosyn  - F/u UA, urine and blood cultures, MRSA, strep penumo and legionella

## 2024-05-17 NOTE — PROCEDURE NOTE - NSPROCDETAILS_GEN_ALL_CORE
location identified, draped/prepped, sterile technique used, needle inserted/introduced/positive blood return obtained via catheter/connected to a pressurized flush line/sutured in place/hemostasis with direct pressure, dressing applied/Seldinger technique/all materials/supplies accounted for at end of procedure procedure not performed under sterile conditions due to emergent nature/positive blood return obtained via catheter/connected to a pressurized flush line/sutured in place/hemostasis with direct pressure, dressing applied/Seldinger technique/all materials/supplies accounted for at end of procedure

## 2024-05-17 NOTE — H&P ADULT - PROBLEM SELECTOR PLAN 5
- Cr 5.1 on admission likely 2/2 shock state  - Start IV hydration  - Monitor urine output  - Monitor and replace lytes

## 2024-05-17 NOTE — CONSULT NOTE ADULT - SUBJECTIVE AND OBJECTIVE BOX
Patient is a 68y old  Male who presents with a chief complaint of     BRIEF HOSPITAL COURSE: ***    Events last 24 hours: ***    ROS:    PAST MEDICAL & SURGICAL HISTORY:  CVA (cerebral vascular accident)      HTN (hypertension)      DM (diabetes mellitus)      History of atrial fibrillation      Right hemiparesis      Aphasia due to acute stroke      Upper GI bleed      Osteomyelitis      S/P CABG (coronary artery bypass graft)      S/P brain surgery      History of aortic valve repair            Medications:  piperacillin/tazobactam IVPB... 3.375 Gram(s) IV Intermittent once  vancomycin  IVPB. 1000 milliGRAM(s) IV Intermittent once    EPINEPHrine    Infusion 0.05 MICROgram(s)/kG/Min IV Continuous <Continuous>    albuterol/ipratropium for Nebulization. 3 milliLiter(s) Nebulizer once    ketamine Injectable 120 milliGRAM(s) IV Push Once  rocuronium Injectable 100 milliGRAM(s) IV Push Once              sodium bicarbonate  Infusion 0.079 mEq/kG/Hr IV Continuous <Continuous>  sodium chloride 0.9% Bolus 1000 milliLiter(s) IV Bolus once                ICU Vital Signs Last 24 Hrs  T(C): --  T(F): --  HR: 89 (17 May 2024 19:21) (89 - 123)  BP: 117/56 (17 May 2024 19:06) (117/56 - 119/84)  BP(mean): --  ABP: --  ABP(mean): --  RR: 18 (17 May 2024 17:47) (18 - 18)  SpO2: 100% (17 May 2024 19:21) (91% - 100%)    O2 Parameters below as of 17 May 2024 19:21  Patient On (Oxygen Delivery Method): ventilator                I&O's Detail        LABS:                        8.8    31.98 )-----------( 235      ( 17 May 2024 19:35 )             29.4     05-17    125<L>  |  89<L>  |  78<H>  ----------------------------<  83  4.3   |  9<LL>  |  5.10<H>    Ca    12.7<H>      17 May 2024 19:30    TPro  6.1  /  Alb  2.0<L>  /  TBili  0.8  /  DBili  x   /  AST  41<H>  /  ALT  27  /  AlkPhos  440<H>  05-17          CAPILLARY BLOOD GLUCOSE      POCT Blood Glucose.: 97 mg/dL (17 May 2024 18:33)      Urinalysis Basic - ( 17 May 2024 19:30 )    Color: x / Appearance: x / SG: x / pH: x  Gluc: 83 mg/dL / Ketone: x  / Bili: x / Urobili: x   Blood: x / Protein: x / Nitrite: x   Leuk Esterase: x / RBC: x / WBC x   Sq Epi: x / Non Sq Epi: x / Bacteria: x      CULTURES:      Physical Examination:    General: No acute distress.      HEENT: Pupils equal, reactive to light.  Symmetric.    PULM: Clear to auscultation bilaterally, no significant sputum production    NECK: Supple, no lymphadenopathy, trachea midline    CVS: Regular rate and rhythm, no murmurs, rubs, or gallops    ABD: Soft, nondistended, nontender, normoactive bowel sounds, no masses    EXT: No edema, nontender    SKIN: Warm and well perfused, no rashes noted.    NEURO: Alert, oriented, interactive, nonfocal    DEVICES:     RADIOLOGY: ***    CRITICAL CARE TIME SPENT: ***   Patient is a 68y old  Male who presents with a chief complaint of shortness of breath     BRIEF HOSPITAL COURSE: 68 year old male with a PMH of hemorrhagic CVA s/p L craniotomy w/ residual R hemiparesis and aphasia, seizure disorder, HTN, HLD, CAD s/p CABG, DM2, afib, mechanical AV replacement on Coumadin who presented to the ED with complaints of SOB and fatigue.  Patient was seen by his urologist earlier today and diagnosed with a UTI.  In the ED while awaiting workup patient became bradycardic and suffered a witnessed cardiac arrest.  ROSC achieved however patient proceeded to suffer multiple PEA arrests since.  Patient transferred to ICU for further management.     ROS: Unable to obtain     PAST MEDICAL & SURGICAL HISTORY:  CVA (cerebral vascular accident)  HTN (hypertension)  DM (diabetes mellitus)  History of atrial fibrillation  Right hemiparesis  Aphasia due to acute stroke  Upper GI bleed  Osteomyelitis  S/P CABG (coronary artery bypass graft)  S/P brain surgery  History of aortic valve repair        Medications:  piperacillin/tazobactam IVPB... 3.375 Gram(s) IV Intermittent once  vancomycin  IVPB. 1000 milliGRAM(s) IV Intermittent once  EPINEPHrine    Infusion 0.05 MICROgram(s)/kG/Min IV Continuous <Continuous>  albuterol/ipratropium for Nebulization. 3 milliLiter(s) Nebulizer once  ketamine Injectable 120 milliGRAM(s) IV Push Once  rocuronium Injectable 100 milliGRAM(s) IV Push Once  sodium bicarbonate  Infusion 0.079 mEq/kG/Hr IV Continuous <Continuous>  sodium chloride 0.9% Bolus 1000 milliLiter(s) IV Bolus once        ICU Vital Signs Last 24 Hrs  T(C): --  T(F): --  HR: 89 (17 May 2024 19:21) (89 - 123)  BP: 117/56 (17 May 2024 19:06) (117/56 - 119/84)  BP(mean): --  ABP: --  ABP(mean): --  RR: 18 (17 May 2024 17:47) (18 - 18)  SpO2: 100% (17 May 2024 19:21) (91% - 100%)    O2 Parameters below as of 17 May 2024 19:21  Patient On (Oxygen Delivery Method): ventilator        I&O's Detail        LABS:                        8.8    31.98 )-----------( 235      ( 17 May 2024 19:35 )             29.4     05-17    125<L>  |  89<L>  |  78<H>  ----------------------------<  83  4.3   |  9<LL>  |  5.10<H>    Ca    12.7<H>      17 May 2024 19:30    TPro  6.1  /  Alb  2.0<L>  /  TBili  0.8  /  DBili  x   /  AST  41<H>  /  ALT  27  /  AlkPhos  440<H>  05-17          CAPILLARY BLOOD GLUCOSE      POCT Blood Glucose.: 97 mg/dL (17 May 2024 18:33)      Urinalysis Basic - ( 17 May 2024 19:30 )    Color: x / Appearance: x / SG: x / pH: x  Gluc: 83 mg/dL / Ketone: x  / Bili: x / Urobili: x   Blood: x / Protein: x / Nitrite: x   Leuk Esterase: x / RBC: x / WBC x   Sq Epi: x / Non Sq Epi: x / Bacteria: x      CULTURES:      Physical Examination:    General: Ill appearing male      HEENT: Pupils fixed and dialted    PULM: Clear to auscultation bilaterally    CVS: Tachycardic, irregular     ABD: Soft, nondistended    EXT: No edema    NEURO: No gag reflex.  Unresponsive to verbal or painful stimuli.    Patient is a 68y old  Male who presents with a chief complaint of shortness of breath     BRIEF HOSPITAL COURSE: 68 year old male with a PMH of hemorrhagic CVA s/p L craniotomy w/ residual R hemiparesis and aphasia, seizure disorder, HTN, HLD, CAD s/p CABG, DM2, afib, mechanical AV replacement on Coumadin who presented to the ED with complaints of SOB and fatigue.  Patient was seen by his urologist earlier today and diagnosed with a UTI.  In the ED while awaiting workup patient became bradycardic and suffered a witnessed cardiac arrest.  ROSC achieved however patient proceeded to suffer multiple PEA arrests since.  Patient transferred to ICU for further management.     ROS: Unable to obtain     PAST MEDICAL & SURGICAL HISTORY:  CVA (cerebral vascular accident)  HTN (hypertension)  DM (diabetes mellitus)  History of atrial fibrillation  Right hemiparesis  Aphasia due to acute stroke  Upper GI bleed  Osteomyelitis  S/P CABG (coronary artery bypass graft)  S/P brain surgery  History of aortic valve repair        Medications:  piperacillin/tazobactam IVPB... 3.375 Gram(s) IV Intermittent once  vancomycin  IVPB. 1000 milliGRAM(s) IV Intermittent once  EPINEPHrine    Infusion 0.05 MICROgram(s)/kG/Min IV Continuous <Continuous>  albuterol/ipratropium for Nebulization. 3 milliLiter(s) Nebulizer once  ketamine Injectable 120 milliGRAM(s) IV Push Once  rocuronium Injectable 100 milliGRAM(s) IV Push Once  sodium bicarbonate  Infusion 0.079 mEq/kG/Hr IV Continuous <Continuous>  sodium chloride 0.9% Bolus 1000 milliLiter(s) IV Bolus once        ICU Vital Signs Last 24 Hrs  T(C): --  T(F): --  HR: 89 (17 May 2024 19:21) (89 - 123)  BP: 117/56 (17 May 2024 19:06) (117/56 - 119/84)  BP(mean): --  ABP: --  ABP(mean): --  RR: 18 (17 May 2024 17:47) (18 - 18)  SpO2: 100% (17 May 2024 19:21) (91% - 100%)    O2 Parameters below as of 17 May 2024 19:21  Patient On (Oxygen Delivery Method): ventilator        I&O's Detail        LABS:                        8.8    31.98 )-----------( 235      ( 17 May 2024 19:35 )             29.4     05-17    125<L>  |  89<L>  |  78<H>  ----------------------------<  83  4.3   |  9<LL>  |  5.10<H>    Ca    12.7<H>      17 May 2024 19:30    TPro  6.1  /  Alb  2.0<L>  /  TBili  0.8  /  DBili  x   /  AST  41<H>  /  ALT  27  /  AlkPhos  440<H>  05-17          CAPILLARY BLOOD GLUCOSE      POCT Blood Glucose.: 97 mg/dL (17 May 2024 18:33)      Urinalysis Basic - ( 17 May 2024 19:30 )    Color: x / Appearance: x / SG: x / pH: x  Gluc: 83 mg/dL / Ketone: x  / Bili: x / Urobili: x   Blood: x / Protein: x / Nitrite: x   Leuk Esterase: x / RBC: x / WBC x   Sq Epi: x / Non Sq Epi: x / Bacteria: x      CULTURES:      Physical Examination:    General: Ill appearing male      HEENT: Pupils fixed and dilated    PULM: Clear to auscultation bilaterally    CVS: Tachycardic, irregular     ABD: Soft, nondistended    EXT: No edema    NEURO: No gag reflex.  Unresponsive to verbal or painful stimuli.

## 2024-05-17 NOTE — H&P ADULT - NSHPPHYSICALEXAM_GEN_ALL_CORE
VITALS:   T(C): --  HR: 89 (05-17-24 @ 19:21) (89 - 123)  BP: 117/56 (05-17-24 @ 19:06) (117/56 - 119/84)  RR: 18 (05-17-24 @ 17:47) (18 - 18)  SpO2: 100% (05-17-24 @ 19:21) (91% - 100%)    GENERAL: ill-appearing, intubated  HEAD:  Atraumatic, Normocephalic  EYES: pupils fixed and dilated  ENT: +ETT  CHEST/LUNG: clear to auscultation b/l  HEART: irregular rate and rhythm  ABDOMEN: Soft, nontender, nondistended  EXTREMITIES: + A-line; No clubbing, cyanosis, or edema  NERVOUS SYSTEM: unresponsive to verbal or painful stimuli

## 2024-05-17 NOTE — ED ADULT TRIAGE NOTE - CHIEF COMPLAINT QUOTE
Pt BIBA from home for shortness of breathe and cough starting this morning. Does not wear home O2 but requires 4L NC. Pt found to be hypotensive upon EMS arrival to house. Denies pain. Currently taking cefpodoxime 200mg 2x per day for UTI.

## 2024-05-17 NOTE — ED PROVIDER NOTE - PROGRESS NOTE DETAILS
While in the ER, patient became bradycardic and hypotensive and then underwent cardiac arrest.  ACLS protocols were started.  Patient underwent multiple rounds of CPR with epinephrine, calcium, bicarb and had return of spontaneous circulation.  Patient had multiple episodes of going in and out of ROSC with ICU team dr. lam and carine howard at bedside.  Family was brought to bedside.  Ultimate decision was made to bring patient to ICU once ROSC was achieved.  Case discussed with Dr. Frey for admission. Chuy Capellan MD.

## 2024-05-17 NOTE — ED PROCEDURE NOTE - CPROC ED INFUS LINE DETAIL1
nonsterile conditions due to acuity of case/Ultrasound guidance was used./The guidewire was recovered./All lumen(s) aspirated and flushed without difficulty.

## 2024-05-17 NOTE — PHARMACOTHERAPY INTERVENTION NOTE - COMMENTS
67 yo male with complicated PMHx including hemorrhagic CVA s/p L craniotomy w/ residual R hemiparesis and aphasia, AFib, mechanical AV replacement on Coumadin, s/p multiple cardiac arrests with ROSC. Patient's INR in the ICU is >15. ICU team ordering KCentra 1500 units for reversal given patient's history of hemorrhagic CVA with patient too unstable to obtain CT for neuroprognostication and to r/o CVA. Discussed with AUDIE Turner and recommended [facility]PB [facility]. Also discussed that in patients with a mechanical prosethetic heart valve, some experts recommend low-dose vitamin K in combination with 4-factor PCC in order to balance risk of bleeding valve thrombosis. Given hx of hemorrhagic CVA, critical nature of patient, and INR on presentation order entered for vitamin k 10 mg IVPB x 1 per discussion.  69 yo male with complicated PMHx including hemorrhagic CVA s/p L craniotomy w/ residual R hemiparesis and aphasia, AFib, and mechanical AV replacement on Coumadin presenting with SOB and s/p multiple cardiac arrests with ROSC. Patient's INR in the ICU is >15. ICU team ordering KCentra 1500 units for reversal given patient's history of hemorrhagic CVA with patient too unstable to obtain CT for neuroprognostication and to r/o CVA. Discussed with AUDIE Turner and recommended vitamin K IVPB per KCentra for rapid warfarin reversal policy. Also discussed that in patients with a mechanical prosethetic heart valve, some experts recommend low-dose vitamin K in combination with 4-factor PCC in order to balance risk of bleeding valve thrombosis. Given hx of hemorrhagic CVA, critical nature of patient, and INR on presentation order entered for vitamin k 10 mg IVPB x 1 per discussion.  69 yo male with complicated PMHx including hemorrhagic CVA s/p L craniotomy w/ residual R hemiparesis and aphasia, AFib, and mechanical AV replacement on Coumadin presenting with SOB and s/p multiple cardiac arrests with ROSC. Patient's INR in the ICU is >15. ICU team ordering KCentra 1500 units for reversal given patient's history of hemorrhagic CVA with patient too unstable to obtain CT for neuroprognostication and to r/o CVA. Discussed with AUDIE Turner and [facility]. Also discussed that in patients with a mechanical prosethetic heart valve, some experts recommend low-dose vitamin K in combination with 4-factor PCC in order to balance risk of bleeding valve thrombosis. Given hx of hemorrhagic CVA, critical nature of patient, INR on presentation, and concern for new hemorrhagic CVA order entered for vitamin k 10 mg IVPB x 1 per discussion.  69 yo male with complicated PMHx including hemorrhagic CVA s/p L craniotomy w/ residual R hemiparesis and aphasia, AFib, and mechanical AV replacement on Coumadin presenting with SOB and s/p multiple cardiac arrests with ROSC. Patient's INR in the ICU is >15. ICU team ordering KCentra 1500 units for reversal given patient's history of hemorrhagic CVA with patient too unstable to obtain CT for neuroprognostication and to r/o CVA. Discussed with AUDIE Turner and recommended vitamin K IVPB per KCentra for rapid warfarin reversal policy. Also discussed that in patients with a mechanical prosethetic heart valve, some experts recommend low-dose vitamin K in combination with 4-factor PCC in order to balance risk of bleeding valve thrombosis. Given hx of hemorrhagic CVA, critical nature of patient, INR on presentation, and concern for new hemorrhagic CVA order entered for vitamin k 10 mg IVPB x 1 per discussion.     Additionally, discussed ordering a STAT 2nd INR immediately after completing Kcentra® IV infusion and draw no more than 30 minutes later to assess if supplemental KCentra dose is needed.

## 2024-05-17 NOTE — PATIENT PROFILE ADULT - HOW PATIENT ADDRESSED, PROFILE
PRINCIPAL DISCHARGE DIAGNOSIS  Diagnosis: Acute pancreatitis  Assessment and Plan of Treatment:        Naib

## 2024-05-17 NOTE — H&P ADULT - TIME BILLING
Independently obtaining a history, performing a physical examination, discussing the plan with the patient, ordering medications/tests, documenting clinical information, and coordinating care.    82 minutes spent on total encounter which excludes time spent teaching.

## 2024-05-17 NOTE — PROVIDER CONTACT NOTE (EICU) - RECOMMENDATIONS
kcentra for elevated inr and ptt. patient currently unstable to CT. broad spetrum abx. adjust electrolytes as able. no clear etiology of arrests

## 2024-05-18 NOTE — PROVIDER CONTACT NOTE (CRITICAL VALUE NOTIFICATION) - TEST AND RESULT REPORTED:
Co2 = 10. Troponin 419.4
ABG/ PH, PO2, HCO3
Lactate 16.5
Lactate 18.4
Troponin 196.2
ABG/ PH AND PO2
CO2-8 ,Lactate-18.7 ,Troponin-821.5
PT>200 ,INR>15

## 2024-05-18 NOTE — PROVIDER CONTACT NOTE (CRITICAL VALUE NOTIFICATION) - NAME OF MD/NP/PA/DO NOTIFIED:
Erika Turner
AMINA PINEDO
Erika Turner
[place]
AMINA PINEDO
Erika Baker
Dr Figueroa and JAN Turner
Erika TurnerPA

## 2024-05-18 NOTE — PROVIDER CONTACT NOTE (EICU) - ACTION/TREATMENT ORDERED:
EMR vent orders updated to reflect current vent settings noted during daily EICU vent rounds
discussed with bedside provider

## 2024-05-18 NOTE — PROVIDER CONTACT NOTE (CRITICAL VALUE NOTIFICATION) - PERSON GIVING RESULT:
HEIDI/ RESPIRATORY
HEIDI/ RESPIRATORY
[facility]
Zeina PATTERSON
[facility] ([place])
Lab, [facility]

## 2024-05-18 NOTE — DISCHARGE NOTE FOR THE EXPIRED PATIENT - HOSPITAL COURSE
68 year old male with a PMH of hemorrhagic CVA s/p L craniotomy w/ residual R hemiparesis and aphasia, seizure disorder, HTN, HLD, CAD s/p CABG, DM2, afib, mechanical AV replacement on Coumadin who presented to the ED with complaints of SOB and fatigue.  Patient was seen by his urologist earlier today and diagnosed with a UTI.  In the ED while awaiting workup patient became bradycardic and suffered a witnessed cardiac arrest.  ROSC achieved however patient proceeded to suffer multiple PEA arrests since.  Rapidly escalating vasopressor doses which included norepinephrine, phenylephrine, epinephrine and vasopressin.  Attempted to obtain imaging throughout the night, but too unstable for transport.  Profound metabolic acidosis, but too unstable for hemodialysis and started on continuous bicarb infusion.  Ultimately suffered another PEA arrest.  Given high doses of vasopressors and maximal medical therapy, decision made to stop resuscitation efforts.  Family notified.  Attending aware.   68 year old male with a PMH of hemorrhagic CVA s/p L craniotomy w/ residual R hemiparesis and aphasia, seizure disorder, HTN, HLD, CAD s/p CABG, DM2, afib, mechanical AV replacement on Coumadin who presented to the ED with complaints of SOB and fatigue.  Patient was seen by his urologist earlier today and diagnosed with a UTI.  In the ED while awaiting workup patient became bradycardic and suffered a witnessed cardiac arrest.  ROSC achieved however patient proceeded to suffer multiple PEA arrests since.  Rapidly escalating vasopressor doses which included norepinephrine, phenylephrine, epinephrine and vasopressin.  Attempted to obtain imaging throughout the night, but too unstable for transport.  Profound metabolic acidosis, ? related to metformin toxicity, but too unstable for hemodialysis and started on continuous bicarb infusion.  Ultimately suffered another PEA arrest.  Given high doses of vasopressors and maximal medical therapy, decision made to stop resuscitation efforts.  Family notified.  Attending aware.

## 2024-05-20 LAB
-  AMOXICILLIN/CLAVULANIC ACID: SIGNIFICANT CHANGE UP
-  AMPICILLIN/SULBACTAM: SIGNIFICANT CHANGE UP
-  AMPICILLIN: SIGNIFICANT CHANGE UP
-  AZTREONAM: SIGNIFICANT CHANGE UP
-  CEFAZOLIN: SIGNIFICANT CHANGE UP
-  CEFEPIME: SIGNIFICANT CHANGE UP
-  CEFOTAXIME: SIGNIFICANT CHANGE UP
-  CEFOXITIN: SIGNIFICANT CHANGE UP
-  CEFTAZIDIME: SIGNIFICANT CHANGE UP
-  CEFTRIAXONE: SIGNIFICANT CHANGE UP
-  CEFUROXIME: SIGNIFICANT CHANGE UP
-  CIPROFLOXACIN: SIGNIFICANT CHANGE UP
-  ERTAPENEM: SIGNIFICANT CHANGE UP
-  GENTAMICIN: SIGNIFICANT CHANGE UP
-  IMIPENEM: SIGNIFICANT CHANGE UP
-  LEVOFLOXACIN: SIGNIFICANT CHANGE UP
-  MEROPENEM: SIGNIFICANT CHANGE UP
-  MINOCYCLINE: SIGNIFICANT CHANGE UP
-  PIPERACILLIN/TAZOBACTAM: SIGNIFICANT CHANGE UP
-  TIGECYCLINE: SIGNIFICANT CHANGE UP
-  TOBRAMYCIN: SIGNIFICANT CHANGE UP
-  TRIMETHOPRIM/SULFAMETHOXAZOLE: SIGNIFICANT CHANGE UP
CULTURE RESULTS: ABNORMAL
CULTURE RESULTS: ABNORMAL
METHOD TYPE: SIGNIFICANT CHANGE UP
ORGANISM # SPEC MICROSCOPIC CNT: ABNORMAL
ORGANISM # SPEC MICROSCOPIC CNT: ABNORMAL
ORGANISM # SPEC MICROSCOPIC CNT: SIGNIFICANT CHANGE UP
SPECIMEN SOURCE: SIGNIFICANT CHANGE UP
SPECIMEN SOURCE: SIGNIFICANT CHANGE UP

## 2024-10-31 NOTE — DIETITIAN INITIAL EVALUATION ADULT - PERTINENT LABORATORY DATA
10-11    133<L>  |  99  |  46<H>  ----------------------------<  242<H>  3.3<L>   |  27  |  1.40<H>    Ca    8.8      11 Oct 2023 07:22    POCT Blood Glucose.: 115 mg/dL (10-12-23 @ 12:13)  A1C with Estimated Average Glucose Result: 7.6 % (10-08-23 @ 06:45)  A1C with Estimated Average Glucose Result: 7.3 % (10-07-23 @ 09:40)  A1C with Estimated Average Glucose Result: 9.4 % (06-01-23 @ 15:50)  
.

## 2025-02-04 NOTE — ED PROVIDER NOTE - WR ORDER ID 1
Rx Refill Note  Requested Prescriptions     Pending Prescriptions Disp Refills    Tirzepatide (Mounjaro) 5 MG/0.5ML solution auto-injector [Pharmacy Med Name: MOUNJARO PEN 5MG/0.5ML] 6 mL 1     Sig: INJECT THE CONTENTS OF ONE PEN  SUBCUTANEOUSLY WEEKLY AS  DIRECTED      Last office visit with prescribing clinician: 12/13/2024     Next office visit with prescribing clinician: 2/4/2025   {Archana Clements MA  02/04/25, 12:46 EST   
[id number]

## 2025-02-10 NOTE — ED ADULT NURSE NOTE - EXTENSIONS OF SELF_ADULT
Spoke with mom, symptoms started today.  Complaining of ear pain. Just had strep, done with abx just finished them yesterday. No other s/s of illness.   Per KK pt to come in at 4pm.  Future Appointments   Date Time Provider Department Center   2/10/2025  4:00 PM Fely Armstrong MD [id number]        Eyeglasses/Wheel Chair

## (undated) DEVICE — TUBING IV SET SECOND 34" W/O LOK-BLUNT

## (undated) DEVICE — SUT MONOSOF 2-0 18" C-15

## (undated) DEVICE — SUT POLYSORB 2-0 30" GS-22 UNDYED

## (undated) DEVICE — DRSG COMBINE 5X9"

## (undated) DEVICE — DRSG KERLIX ROLL 4.5"

## (undated) DEVICE — PLV-SCD MACHINE: Type: DURABLE MEDICAL EQUIPMENT

## (undated) DEVICE — NDL HYPO SAFE 22G X 1.5" (BLACK)

## (undated) DEVICE — VENODYNE/SCD SLEEVE CALF MEDIUM

## (undated) DEVICE — BUR MICROAIRE CARBIDE OVAL 4MM 8 FLUTE

## (undated) DEVICE — DRSG XEROFORM 1 X 8"

## (undated) DEVICE — SENSOR O2 FINGER XL ADULT 24/BX 6BX/CA

## (undated) DEVICE — BRUSH CYTO ENDO

## (undated) DEVICE — FLOORMAT SURGISAFE ABSORBANT WHITE 36" X 72"

## (undated) DEVICE — STERIS DEFENDO 3-PIECE KIT (AIR/WATER, SUCTION & BIOPSY VALVES)

## (undated) DEVICE — TUBE O2 SUPL CRUSH RESIS CONN SOUTHSIDE ONLY

## (undated) DEVICE — PREP ALCOHOL PAD MED

## (undated) DEVICE — SOL IRR POUR H2O 500ML

## (undated) DEVICE — SOL IRR POUR H2O 1000ML

## (undated) DEVICE — CATH ELECHMSTAT  INJ 7FR 210CM

## (undated) DEVICE — CATH IV SAFE BC 22G X 1" (BLUE)

## (undated) DEVICE — SUT MONOSOF 3-0 18" P-14

## (undated) DEVICE — TUBING IV SET GRAVITY 3Y 100" MACRO

## (undated) DEVICE — BLADE SCALPEL SAFETYLOCK #15

## (undated) DEVICE — FORCEP RADIAL JAW 4 W NDL 2.2MM 2.8MM 160CM YELLOW DISP

## (undated) DEVICE — GLV 8 PROTEXIS (WHITE)

## (undated) DEVICE — TUBING CANNULA SALTER LABS NASAL ADULT 7FT

## (undated) DEVICE — PACK LOWER EXTREMITY NS PLAINVI

## (undated) DEVICE — DRSG CURITY GAUZE SPONGE 4 X 4" 12-PLY

## (undated) DEVICE — SNARE LRG

## (undated) DEVICE — SUCTION YANKAUER TAPERED BULBOUS NO VENT

## (undated) DEVICE — GLV 7.5 PROTEXIS (WHITE)

## (undated) DEVICE — BLADE SCALPEL SAFETYLOCK #10

## (undated) DEVICE — Device

## (undated) DEVICE — NDL HYPO SAFE 18G X 1.5" (PINK)

## (undated) DEVICE — FORMALIN CUPS 10% BUFFERED

## (undated) DEVICE — SAW BLADE LINVATEC SAGITTAL 19.5X41.0X..4MM COARSE

## (undated) DEVICE — VENODYNE/SCD SLEEVE CALF LARGE

## (undated) DEVICE — SAW BLADE LINVATEC SAGITTAL MIC 9.5X25.5X0.4MM

## (undated) DEVICE — SOL IRR NS 0.9% 250ML

## (undated) DEVICE — BITE BLOCK MAXI RUBBER STAMP

## (undated) DEVICE — NDL INJ SCLERO INTERJECT 23G

## (undated) DEVICE — CATH ELCTR GLIDE PRB 7FR

## (undated) DEVICE — PREP BETADINE KIT

## (undated) DEVICE — DRAPE 3/4 SHEET W REINFORCEMENT 56X77"

## (undated) DEVICE — SOL IRR BAG H2O 1000ML

## (undated) DEVICE — SYR LUER LOK 10CC

## (undated) DEVICE — TUBING SUCTION CONN 6FT STERILE

## (undated) DEVICE — MARKER ENDO SPOT EX

## (undated) DEVICE — WARMING BLANKET UPPER ADULT

## (undated) DEVICE — SUT POLYSORB 4-0 18" P-12 UNDYED

## (undated) DEVICE — ELCTR GROUNDING PAD ADULT COVIDIEN

## (undated) DEVICE — SOL IRR POUR NS 0.9% 1000ML

## (undated) DEVICE — DRSG TELFA 3 X 8

## (undated) DEVICE — SET IV PUMP BLOOD 1VALVE 180FILTER NON-DEHP

## (undated) DEVICE — BRUSH COLONOSCOPY CYTOLOGY

## (undated) DEVICE — SPECIMEN CONTAINER 4OZ

## (undated) DEVICE — TUBING ENDO EXT OLYMPUS 160 24HR USE

## (undated) DEVICE — PLV/PSP-ESU FORCEFX F8J7721A: Type: DURABLE MEDICAL EQUIPMENT

## (undated) DEVICE — SYR IV POSIFLUSH NS 3ML 30/TY

## (undated) DEVICE — SYR LUER LOK 30CC

## (undated) DEVICE — CATH IV SAFE BC 20G X 1.16" (PINK)